# Patient Record
Sex: FEMALE | Race: BLACK OR AFRICAN AMERICAN | Employment: FULL TIME | URBAN - METROPOLITAN AREA
[De-identification: names, ages, dates, MRNs, and addresses within clinical notes are randomized per-mention and may not be internally consistent; named-entity substitution may affect disease eponyms.]

---

## 2017-01-13 ENCOUNTER — ANESTHESIA EVENT (OUTPATIENT)
Dept: ENDOSCOPY | Age: 32
End: 2017-01-13
Payer: MEDICAID

## 2017-01-16 ENCOUNTER — ANESTHESIA (OUTPATIENT)
Dept: ENDOSCOPY | Age: 32
End: 2017-01-16
Payer: MEDICAID

## 2017-01-16 ENCOUNTER — SURGERY (OUTPATIENT)
Age: 32
End: 2017-01-16

## 2017-01-16 ENCOUNTER — HOSPITAL ENCOUNTER (OUTPATIENT)
Age: 32
Setting detail: OUTPATIENT SURGERY
Discharge: HOME | End: 2017-01-16
Attending: INTERNAL MEDICINE | Admitting: INTERNAL MEDICINE
Payer: MEDICAID

## 2017-01-16 VITALS
BODY MASS INDEX: 37.03 KG/M2 | SYSTOLIC BLOOD PRESSURE: 137 MMHG | HEIGHT: 69 IN | DIASTOLIC BLOOD PRESSURE: 101 MMHG | HEART RATE: 70 BPM | OXYGEN SATURATION: 100 % | TEMPERATURE: 98.2 F | RESPIRATION RATE: 16 BRPM | WEIGHT: 250 LBS

## 2017-01-16 LAB
GLUCOSE BLD STRIP.AUTO-MCNC: 250 MG/DL (ref 70–110)
GLUCOSE BLD STRIP.AUTO-MCNC: 261 MG/DL (ref 70–110)
HCG UR QL: NEGATIVE

## 2017-01-16 PROCEDURE — 88342 IMHCHEM/IMCYTCHM 1ST ANTB: CPT | Performed by: INTERNAL MEDICINE

## 2017-01-16 PROCEDURE — 74011636637 HC RX REV CODE- 636/637: Performed by: NURSE ANESTHETIST, CERTIFIED REGISTERED

## 2017-01-16 PROCEDURE — 88305 TISSUE EXAM BY PATHOLOGIST: CPT | Performed by: INTERNAL MEDICINE

## 2017-01-16 PROCEDURE — 82962 GLUCOSE BLOOD TEST: CPT

## 2017-01-16 PROCEDURE — 74011250636 HC RX REV CODE- 250/636

## 2017-01-16 PROCEDURE — 74011000250 HC RX REV CODE- 250

## 2017-01-16 PROCEDURE — 77030009426 HC FCPS BIOP ENDOSC BSC -B: Performed by: INTERNAL MEDICINE

## 2017-01-16 PROCEDURE — 74011250636 HC RX REV CODE- 250/636: Performed by: NURSE ANESTHETIST, CERTIFIED REGISTERED

## 2017-01-16 PROCEDURE — 76040000019: Performed by: INTERNAL MEDICINE

## 2017-01-16 PROCEDURE — 76060000031 HC ANESTHESIA FIRST 0.5 HR: Performed by: INTERNAL MEDICINE

## 2017-01-16 PROCEDURE — 81025 URINE PREGNANCY TEST: CPT

## 2017-01-16 PROCEDURE — 74011000250 HC RX REV CODE- 250: Performed by: NURSE ANESTHETIST, CERTIFIED REGISTERED

## 2017-01-16 RX ORDER — LIDOCAINE HYDROCHLORIDE 20 MG/ML
INJECTION, SOLUTION EPIDURAL; INFILTRATION; INTRACAUDAL; PERINEURAL AS NEEDED
Status: DISCONTINUED | OUTPATIENT
Start: 2017-01-16 | End: 2017-01-16 | Stop reason: HOSPADM

## 2017-01-16 RX ORDER — MIDAZOLAM HYDROCHLORIDE 1 MG/ML
INJECTION, SOLUTION INTRAMUSCULAR; INTRAVENOUS AS NEEDED
Status: DISCONTINUED | OUTPATIENT
Start: 2017-01-16 | End: 2017-01-16 | Stop reason: HOSPADM

## 2017-01-16 RX ORDER — INSULIN LISPRO 100 [IU]/ML
INJECTION, SOLUTION INTRAVENOUS; SUBCUTANEOUS ONCE
Status: COMPLETED | OUTPATIENT
Start: 2017-01-16 | End: 2017-01-16

## 2017-01-16 RX ORDER — DEXTROSE 50 % IN WATER (D50W) INTRAVENOUS SYRINGE
25-50 AS NEEDED
Status: DISCONTINUED | OUTPATIENT
Start: 2017-01-16 | End: 2017-01-16 | Stop reason: HOSPADM

## 2017-01-16 RX ORDER — INSULIN GLARGINE 100 [IU]/ML
INJECTION, SOLUTION SUBCUTANEOUS
COMMUNITY
End: 2018-11-05 | Stop reason: SDUPTHER

## 2017-01-16 RX ORDER — PROPOFOL 10 MG/ML
INJECTION, EMULSION INTRAVENOUS AS NEEDED
Status: DISCONTINUED | OUTPATIENT
Start: 2017-01-16 | End: 2017-01-16 | Stop reason: HOSPADM

## 2017-01-16 RX ORDER — SODIUM CHLORIDE 0.9 % (FLUSH) 0.9 %
5-10 SYRINGE (ML) INJECTION EVERY 8 HOURS
Status: DISCONTINUED | OUTPATIENT
Start: 2017-01-16 | End: 2017-01-16 | Stop reason: HOSPADM

## 2017-01-16 RX ORDER — MIDAZOLAM HYDROCHLORIDE 1 MG/ML
INJECTION, SOLUTION INTRAMUSCULAR; INTRAVENOUS
Status: DISCONTINUED
Start: 2017-01-16 | End: 2017-01-16 | Stop reason: HOSPADM

## 2017-01-16 RX ORDER — METFORMIN HYDROCHLORIDE 1000 MG/1
1000 TABLET ORAL 2 TIMES DAILY WITH MEALS
COMMUNITY

## 2017-01-16 RX ORDER — SODIUM CHLORIDE 0.9 % (FLUSH) 0.9 %
5-10 SYRINGE (ML) INJECTION AS NEEDED
Status: DISCONTINUED | OUTPATIENT
Start: 2017-01-16 | End: 2017-01-16 | Stop reason: HOSPADM

## 2017-01-16 RX ORDER — FAMOTIDINE 10 MG/ML
20 INJECTION INTRAVENOUS ONCE
Status: COMPLETED | OUTPATIENT
Start: 2017-01-16 | End: 2017-01-16

## 2017-01-16 RX ORDER — MAGNESIUM SULFATE 100 %
4 CRYSTALS MISCELLANEOUS AS NEEDED
Status: DISCONTINUED | OUTPATIENT
Start: 2017-01-16 | End: 2017-01-16 | Stop reason: HOSPADM

## 2017-01-16 RX ORDER — SODIUM CHLORIDE, SODIUM LACTATE, POTASSIUM CHLORIDE, CALCIUM CHLORIDE 600; 310; 30; 20 MG/100ML; MG/100ML; MG/100ML; MG/100ML
75 INJECTION, SOLUTION INTRAVENOUS CONTINUOUS
Status: DISCONTINUED | OUTPATIENT
Start: 2017-01-16 | End: 2017-01-16 | Stop reason: HOSPADM

## 2017-01-16 RX ADMIN — INSULIN LISPRO 9 UNITS: 100 INJECTION, SOLUTION INTRAVENOUS; SUBCUTANEOUS at 10:24

## 2017-01-16 RX ADMIN — LIDOCAINE HYDROCHLORIDE 60 MG: 20 INJECTION, SOLUTION EPIDURAL; INFILTRATION; INTRACAUDAL; PERINEURAL at 10:42

## 2017-01-16 RX ADMIN — MIDAZOLAM HYDROCHLORIDE 2 MG: 1 INJECTION, SOLUTION INTRAMUSCULAR; INTRAVENOUS at 10:42

## 2017-01-16 RX ADMIN — PROPOFOL 50 MG: 10 INJECTION, EMULSION INTRAVENOUS at 10:46

## 2017-01-16 RX ADMIN — FAMOTIDINE 20 MG: 10 INJECTION, SOLUTION INTRAVENOUS at 10:19

## 2017-01-16 RX ADMIN — PROPOFOL 50 MG: 10 INJECTION, EMULSION INTRAVENOUS at 10:42

## 2017-01-16 RX ADMIN — PROPOFOL 50 MG: 10 INJECTION, EMULSION INTRAVENOUS at 10:44

## 2017-01-16 NOTE — PROGRESS NOTES
[email or web address]  [facility], [address]      Brief Procedure Note    Danny Barger  1985  [id number]    Date of Procedure: 1/16/2017    Preoperative diagnosis: 789.06 - R10.13,  Epigastric pain  789.02 - R10.12, Left upper quadrant abdominal pain  571.8 - K76.0, Steatosis of liver  564.00 - K59.00, Constipation  278.00 - E66.9,  Obesity    Postoperative diagnosis: mild gastritis    Type of Anesthesia: MAC (Monitored anesthesia care)    Description of findings: same as post op dx    Procedure: Procedure(s):  UPPER ENDOSCOPY w/ biopsies    :  Dr. Judith Sheriff MD    Assistant(s): Endoscopy Technician-1: Kiya Tam  Endoscopy Technician-2: David Morrison  Endoscopy RN-1: Joce Butt RN    EBL:None    Specimens:   ID Type Source Tests Collected by Time Destination   1 : Antrum and body biopsies Preservative Stomach, Antrum  Judith Sheriff MD 1/16/2017 1045 Pathology       Findings: See printed and scanned procedure note    Complications: None    Dr. Judith Sheriff MD  1/16/2017  10:55 AM

## 2017-01-16 NOTE — ANESTHESIA PREPROCEDURE EVALUATION
Anesthetic History   No history of anesthetic complications            Review of Systems / Medical History  Patient summary reviewed and pertinent labs reviewed    Pulmonary          Smoker         Neuro/Psych     seizures         Cardiovascular    Hypertension                   GI/Hepatic/Renal  Within defined limits              Endo/Other    Diabetes  Hypothyroidism  Morbid obesity and arthritis     Other Findings   Comments: Current Smoker? YES       Elective Surgery? Yes       Abstained from smoking 24 hours prior to anesthesia? YES    Risk Factors for Postoperative nausea/vomiting:       History of postoperative nausea/vomiting? NO       Female? YES       Motion sickness? NO       Intended opioid administration for postoperative analgesia?   NO           Physical Exam    Airway  Mallampati: II  TM Distance: 4 - 6 cm  Neck ROM: normal range of motion   Mouth opening: Normal     Cardiovascular  Regular rate and rhythm,  S1 and S2 normal,  no murmur, click, rub, or gallop             Dental  No notable dental hx       Pulmonary  Breath sounds clear to auscultation               Abdominal  Abdominal exam normal       Other Findings            Anesthetic Plan    ASA: 3  Anesthesia type: MAC          Induction: Intravenous  Anesthetic plan and risks discussed with: Patient

## 2017-01-16 NOTE — ANESTHESIA POSTPROCEDURE EVALUATION
Post-Anesthesia Evaluation and Assessment    Patient: Kelly Cowan MRN: [id number]  SSN: [id number]    YOB: 1985  Age: 27 y.o. Sex: female       Cardiovascular Function/Vital Signs  Visit Vitals    BP (!) 162/99    Pulse 81    Temp 36.7 °C (98.1 °F)    Resp 20    Ht 5' 9\" (1.753 m)    Wt 113.4 kg (250 lb)    SpO2 95%    Breastfeeding No    BMI 36.92 kg/m2       Patient is status post MAC anesthesia for Procedure(s):  UPPER ENDOSCOPY w/ biopsies. Nausea/Vomiting: None    Postoperative hydration reviewed and adequate. Pain:  Pain Scale 1: Numeric (0 - 10) (01/16/17 1020)  Pain Intensity 1: 9 (01/16/17 1020)   Managed    Neurological Status: At baseline    Mental Status and Level of Consciousness: Arousable    Pulmonary Status:   O2 Device: Nasal cannula (01/16/17 1048)   Adequate oxygenation and airway patent    Complications related to anesthesia: None    Post-anesthesia assessment completed.  No concerns    Signed By: Mary Jo Knott MD     January 16, 2017

## 2017-01-16 NOTE — DISCHARGE INSTRUCTIONS
Gastritis: Care Instructions  Your Care Instructions    Gastritis is a sore and upset stomach. It happens when something irritates the stomach lining. Many things can cause it. These include an infection such as the flu or something you ate or drank. Medicines or a sore on the lining of the stomach (ulcer) also can cause it. Your belly may bloat and ache. You may belch, vomit, and feel sick to your stomach. You should be able to relieve the problem by taking medicine. And it may help to change your diet. If gastritis lasts, your doctor may prescribe medicine. Follow-up care is a key part of your treatment and safety. Be sure to make and go to all appointments, and call your doctor if you are having problems. It's also a good idea to know your test results and keep a list of the medicines you take. How can you care for yourself at home? · If your doctor prescribed antibiotics, take them as directed. Do not stop taking them just because you feel better. You need to take the full course of antibiotics. · Be safe with medicines. If your doctor prescribed medicine to decrease stomach acid, take it as directed. Call your doctor if you think you are having a problem with your medicine. · Do not take any other medicine, including over-the-counter pain relievers, without talking to your doctor first.  · If your doctor recommends over-the-counter medicine to reduce stomach acid, such as Pepcid AC, Prilosec, Tagamet HB, or Zantac 75, follow the directions on the label. · Drink plenty of fluids (enough so that your urine is light yellow or clear like water) to prevent dehydration. Choose water and other caffeine-free clear liquids. If you have kidney, heart, or liver disease and have to limit fluids, talk with your doctor before you increase the amount of fluids you drink. · Limit how much alcohol you drink. · Avoid coffee, tea, cola drinks, chocolate, and other foods with caffeine.  They increase stomach acid.  When should you call for help? Call 911 anytime you think you may need emergency care. For example, call if:  · You vomit blood or what looks like coffee grounds. · You pass maroon or very bloody stools. Call your doctor now or seek immediate medical care if:  · You start breathing fast and have not produced urine in the last 8 hours. · You cannot keep fluids down. Watch closely for changes in your health, and be sure to contact your doctor if:  · You do not get better as expected. Where can you learn more? Go to http://josh-autumn.info/. Enter [id number] in the search box to learn more about \"Gastritis: Care Instructions. \"  Current as of: August 9, 2016  Content Version: 11.1  © 6931-5191 [employer]. Care instructions adapted under license by [employer] (which disclaims liability or warranty for this information). If you have questions about a medical condition or this instruction, always ask your healthcare professional. [place] [address] any warranty or liability for your use of this information. DISCHARGE SUMMARY from Nurse     POST-PROCEDURE INSTRUCTIONS:    Call your Physician if you:  ? Observe any excess bleeding. ? Develop a temperature over 100.5o F.  ? Experience abdominal, shoulder or chest pain. ? Notice any signs of decreased circulation or nerve impairment to an extremity such as a change in color, persistent numbness, tingling, coldness or increase in pain. ? Vomit blood or you have nausea and vomiting lasting longer than 4 hours. ? Are unable to take medications. ? Are unable to urinate within 8 hours after discharge following general anesthesia or intravenous sedation.     For the next 24 hours after receiving general anesthesia or intravenous sedation, or while taking prescription Narcotics, limit your activities:  ? Do NOT drive a motor vehicle, operate hazard machinery or power tools, or perform tasks that require coordination. The medication you received during your procedure may have some effect on your mental awareness. ? Do NOT make important personal or business decisions. The medication you received during your procedure may have some effect on your mental awareness. ? Do NOT drink alcoholic beverages. These drinks do not mix well with the medications that have been given to you. ? Upon discharge from the hospital, you must be accompanied by a responsible adult. ? Resume your diet as directed by your physician. ? Resume medications as your physician has prescribed. ? Please give a list of your current medications to your Primary Care Provider. ? Please update this list whenever your medications are discontinued, doses are changed, or new medications (including over-the-counter products) are added. ? Please carry medication information at all times in case of emergency situations. These are general instructions for a healthy lifestyle:    No smoking/ No tobacco products/ Avoid exposure to second hand smoke.  Surgeon General's Warning:  Quitting smoking now greatly reduces serious risk to your health. Obesity, smoking, and a sedentary lifestyle greatly increase your risk for illness.  A healthy diet, regular physical exercise & weight monitoring are important for maintaining a healthy lifestyle   You may be retaining fluid if you have a history of heart failure or if you experience any of the following symptoms:  Weight gain of 3 pounds or more overnight or 5 pounds in a week, increased swelling in our hands or feet or shortness of breath while lying flat in bed. Please call your doctor as soon as you notice any of these symptoms; do not wait until your next office visit.     Recognize signs and symptoms of STROKE:  F  -  Face looks uneven  A  -  Arms unable to move or move unevenly  S  -  Speech slurred or non-existent  T  -  Time to call 911 - as soon as signs and symptoms begin - DO NOT go back to bed or wait to see If you get better - TIME IS BRAIN. Colorectal Screening   Colorectal cancer almost always develops from precancerous polyps (abnormal growths) in the colon or rectum. Screening tests can find precancerous polyps, so that they can be removed before they turn into cancer. Screening tests can also find colorectal cancer early, when treatment works best.  [employer] Speak with your physician about when you should begin screening and how often you should be tested. Upper GI Endoscopy: What to Expect at [address] [place] [address]  After you have an endoscopy, you will stay at the hospital or clinic for 1 to 2 hours. This will allow the medicine to wear off. You will be able to go home after your doctor or nurse checks to make sure you are not having any problems. You may have to stay overnight if you had treatment during the test. You may have a sore throat for a day or two after the test.  This care sheet gives you a general idea about what to expect after the test.  How can you care for yourself at home? Activity  · Rest as much as you need to after you go home. · You should be able to go back to your usual activities the day after the test.  Diet  · Follow your doctor's directions for eating after the test.  · Drink plenty of fluids (unless your doctor has told you not to). Medications  · If you have a sore throat the day after the test, use an over-the-counter spray to numb your throat. Follow-up care is a key part of your treatment and safety. Be sure to make and go to all appointments, and call your doctor if you are having problems. It's also a good idea to know your test results and keep a list of the medicines you take. When should you call for help? Call 911 anytime you think you may need emergency care. For example, call if:  · You passed out (lost consciousness). · You cough up blood. · You vomit blood or what looks like coffee grounds.   · You pass maroon or very bloody stools. Call your doctor now or seek immediate medical care if:  · You have trouble swallowing. · You have belly pain. · Your stools are black and tarlike or have streaks of blood. · You are sick to your stomach or cannot keep fluids down. Watch closely for changes in your health, and be sure to contact your doctor if:  · Your throat still hurts after a day or two. · You do not get better as expected. Where can you learn more? Go to [employer]  Enter J454 in the search box to learn more about \"Upper GI Endoscopy: What to Expect at Home. \"   © 8405-3904 [employer]. Care instructions adapted under license by [employer] (which disclaims liability or warranty for this information). This care instruction is for use with your licensed healthcare professional. If you have questions about a medical condition or this instruction, always ask your healthcare professional. [place] [address] any warranty or liability for your use of this information.   Content Version: 94.5.446621; Current as of: November 14, 2014

## 2017-01-16 NOTE — IP AVS SNAPSHOT
[address] [facility] 
 
 
 [address] [id number] [phone] Patient: Fred Castro MRN: [id number] RAT:7/51/3789 You are allergic to the following No active allergies Recent Documentation Height Weight Breastfeeding? BMI OB Status Smoking Status 1.753 m 113.4 kg No 36.92 kg/m2 Hysterectomy Former Smoker Emergency Contacts Name Discharge Info Relation Home Work Mobile [place] CAREGIVER [3] Mother [14] [phone] About your hospitalization You were admitted on:  January 16, 2017 You last received care in the:  [facility] You were discharged on:  January 16, 2017 Unit phone number:  [phone] Why you were hospitalized Your primary diagnosis was:  Not on File Providers Seen During Your Hospitalizations Provider Role Specialty Primary office phone Tomma Dakin, MD Attending Provider Gastroenterology [phone] Your Primary Care Physician (PCP) Primary Care Physician Office Phone Office Fax [phone], [address] [place] [address] [phone] Follow-up Information Follow up With Details Comments Contact Info Tomma Dakin, MD   [address] [place] [address] [address] 200 [facility] 
[phone] Cammy Hodges MD   [address] [place] [address] 
[phone] Current Discharge Medication List  
  
CONTINUE these medications which have NOT CHANGED Dose & Instructions Dispensing Information Comments Morning Noon Evening Bedtime  
 atorvastatin 40 mg tablet Commonly known as:  LIPITOR Your next dose is: Today, Tomorrow Other:  _________ Take  by mouth daily. Refills:  0  
     
   
   
   
  
 fenofibrate nanocrystallized 48 mg tablet Commonly known as:  [employer] Your next dose is: Today, Tomorrow Other:  _________ Dose:  48 mg Take 48 mg by mouth. Refills:  0  
     
   
   
   
  
 furosemide 20 mg tablet Commonly known as:  LASIX Your next dose is: Today, Tomorrow Other:  _________ Take  by mouth daily. Refills:  0  
     
   
   
   
  
 LANTUS 100 unit/mL injection Generic drug:  insulin glargine Your next dose is: Today, Tomorrow Other:  _________  
   
   
 by SubCUTAneous route nightly. Indications: type 2 diabetes mellitus Refills:  0  
     
   
   
   
  
 liothyronine 25 mcg tablet Commonly known as:  CYTOMEL Your next dose is: Today, Tomorrow Other:  _________ Dose:  50 mcg Take 50 mcg by mouth daily. Refills:  0  
     
   
   
   
  
 lisinopril 5 mg tablet Commonly known as:  Joe Shutters Your next dose is: Today, Tomorrow Other:  _________ Dose:  40 mg Take 40 mg by mouth daily. Refills:  0  
     
   
   
   
  
 metFORMIN 1,000 mg tablet Commonly known as:  GLUCOPHAGE Your next dose is: Today, Tomorrow Other:  _________ Dose:  1000 mg Take 1,000 mg by mouth two (2) times daily (with meals). Refills:  0  
     
   
   
   
  
 metoprolol tartrate 25 mg tablet Commonly known as:  LOPRESSOR Your next dose is: Today, Tomorrow Other:  _________ Dose:  25 mg Take 25 mg by mouth two (2) times a day. Refills:  0  
     
   
   
   
  
 topiramate 25 mg tablet Commonly known as:  TOPAMAX Your next dose is: Today, Tomorrow Other:  _________ Dose:  25 mg Take 1 Tab by mouth two (2) times a day. Quantity:  60 Tab Refills:  0 Discharge Instructions Gastritis: Care Instructions Your Care Instructions Gastritis is a sore and upset stomach. It happens when something irritates the stomach lining. Many things can cause it.  These include an infection such as the flu or something you ate or drank. Medicines or a sore on the lining of the stomach (ulcer) also can cause it. Your belly may bloat and ache. You may belch, vomit, and feel sick to your stomach. You should be able to relieve the problem by taking medicine. And it may help to change your diet. If gastritis lasts, your doctor may prescribe medicine. Follow-up care is a key part of your treatment and safety. Be sure to make and go to all appointments, and call your doctor if you are having problems. It's also a good idea to know your test results and keep a list of the medicines you take. How can you care for yourself at home? · If your doctor prescribed antibiotics, take them as directed. Do not stop taking them just because you feel better. You need to take the full course of antibiotics. · Be safe with medicines. If your doctor prescribed medicine to decrease stomach acid, take it as directed. Call your doctor if you think you are having a problem with your medicine. · Do not take any other medicine, including over-the-counter pain relievers, without talking to your doctor first. 
· If your doctor recommends over-the-counter medicine to reduce stomach acid, such as Pepcid AC, Prilosec, Tagamet HB, or Zantac 75, follow the directions on the label. · Drink plenty of fluids (enough so that your urine is light yellow or clear like water) to prevent dehydration. Choose water and other caffeine-free clear liquids. If you have kidney, heart, or liver disease and have to limit fluids, talk with your doctor before you increase the amount of fluids you drink. · Limit how much alcohol you drink. · Avoid coffee, tea, cola drinks, chocolate, and other foods with caffeine. They increase stomach acid. When should you call for help? Call 911 anytime you think you may need emergency care. For example, call if: 
· You vomit blood or what looks like coffee grounds. · You pass maroon or very bloody stools. Call your doctor now or seek immediate medical care if: 
· You start breathing fast and have not produced urine in the last 8 hours. · You cannot keep fluids down. Watch closely for changes in your health, and be sure to contact your doctor if: 
· You do not get better as expected. Where can you learn more? Go to http://josh-autumn.info/. Enter [id number] in the search box to learn more about \"Gastritis: Care Instructions. \" Current as of: August 9, 2016 Content Version: 11.1 © 6917-3167 [employer]. Care instructions adapted under license by [employer] (which disclaims liability or warranty for this information). If you have questions about a medical condition or this instruction, always ask your healthcare professional. [address] any warranty or liability for your use of this information. DISCHARGE SUMMARY from Nurse POST-PROCEDURE INSTRUCTIONS: 
 
Call your Physician if you: 
? Observe any excess bleeding. ? Develop a temperature over 100.5o F. 
? Experience abdominal, shoulder or chest pain. ? Notice any signs of decreased circulation or nerve impairment to an extremity such as a change in color, persistent numbness, tingling, coldness or increase in pain. ? Vomit blood or you have nausea and vomiting lasting longer than 4 hours. ? Are unable to take medications. ? Are unable to urinate within 8 hours after discharge following general anesthesia or intravenous sedation. For the next 24 hours after receiving general anesthesia or intravenous sedation, or while taking prescription Narcotics, limit your activities: 
? Do NOT drive a motor vehicle, operate hazard machinery or power tools, or perform tasks that require coordination. The medication you received during your procedure may have some effect on your mental awareness. ? Do NOT make important personal or business decisions.   The medication you received during your procedure may have some effect on your mental awareness. ? Do NOT drink alcoholic beverages. These drinks do not mix well with the medications that have been given to you. ? Upon discharge from the hospital, you must be accompanied by a responsible adult. ? Resume your diet as directed by your physician. ? Resume medications as your physician has prescribed. ? Please give a list of your current medications to your Primary Care Provider. ? Please update this list whenever your medications are discontinued, doses are changed, or new medications (including over-the-counter products) are added. ? Please carry medication information at all times in case of emergency situations. These are general instructions for a healthy lifestyle: No smoking/ No tobacco products/ Avoid exposure to second hand smoke. ? Surgeon General's Warning:  Quitting smoking now greatly reduces serious risk to your health. Obesity, smoking, and a sedentary lifestyle greatly increase your risk for illness. ? A healthy diet, regular physical exercise & weight monitoring are important for maintaining a healthy lifestyle ? You may be retaining fluid if you have a history of heart failure or if you experience any of the following symptoms:  Weight gain of 3 pounds or more overnight or 5 pounds in a week, increased swelling in our hands or feet or shortness of breath while lying flat in bed. Please call your doctor as soon as you notice any of these symptoms; do not wait until your next office visit. Recognize signs and symptoms of STROKE: 
F  -  Face looks uneven A  -  Arms unable to move or move unevenly S  -  Speech slurred or non-existent T  -  Time to call 911 - as soon as signs and symptoms begin - DO NOT go back to bed or wait to see If you get better - TIME IS BRAIN. Colorectal Screening ?  Colorectal cancer almost always develops from precancerous polyps (abnormal growths) in the colon or rectum. Screening tests can find precancerous polyps, so that they can be removed before they turn into cancer. Screening tests can also find colorectal cancer early, when treatment works best. 
? Speak with your physician about when you should begin screening and how often you should be tested. Upper GI Endoscopy: What to Expect at [facility] Your Recovery After you have an endoscopy, you will stay at the hospital or clinic for 1 to 2 hours. This will allow the medicine to wear off. You will be able to go home after your doctor or nurse checks to make sure you are not having any problems. You may have to stay overnight if you had treatment during the test. You may have a sore throat for a day or two after the test. 
This care sheet gives you a general idea about what to expect after the test. 
How can you care for yourself at home? Activity · Rest as much as you need to after you go home. · You should be able to go back to your usual activities the day after the test. 
Diet · Follow your doctor's directions for eating after the test. 
· Drink plenty of fluids (unless your doctor has told you not to). Medications · If you have a sore throat the day after the test, use an over-the-counter spray to numb your throat. Follow-up care is a key part of your treatment and safety. Be sure to make and go to all appointments, and call your doctor if you are having problems. It's also a good idea to know your test results and keep a list of the medicines you take. When should you call for help? Call 911 anytime you think you may need emergency care. For example, call if: 
· You passed out (lost consciousness). · You cough up blood. · You vomit blood or what looks like coffee grounds. · You pass maroon or very bloody stools. Call your doctor now or seek immediate medical care if: 
· You have trouble swallowing. · You have belly pain. · Your stools are black and tarlike or have streaks of blood. · You are sick to your stomach or cannot keep fluids down. Watch closely for changes in your health, and be sure to contact your doctor if: 
· Your throat still hurts after a day or two. · You do not get better as expected. Where can you learn more? Go to BroadHop.be Enter [phone] in the search box to learn more about \"Upper GI Endoscopy: What to Expect at Home. \"  
© 3934-3400 Healthwise, Incorporated. Care instructions adapted under license by [facility] (which disclaims liability or warranty for this information). This care instruction is for use with your licensed healthcare professional. If you have questions about a medical condition or this instruction, always ask your healthcare professional. [address] any warranty or liability for your use of this information. Content Version: 75.7.629472; Current as of: November 14, 2014 Discharge Orders None Introducing [facility] SERVICES! [facility] introduces [employer] patient portal. Now you can access parts of your medical record, email your doctor's office, and request medication refills online. 1. In your internet browser, go to [email or web address] 2. Click on the First Time User? Click Here link in the Sign In box. You will see the New Member Sign Up page. 3. Enter your [employer]ist[employer] Access Code exactly as it appears below. You will not need to use this code after youve completed the sign-up process. If you do not sign up before the expiration date, you must request a new code. · [employer] Access Code: [email or web address] Expires: 4/13/2017  4:55 PM 
 
4. Enter the last four digits of your Social Security Number (xxxx) and Date of Birth (mm/dd/yyyy) as indicated and click Submit. You will be taken to the next sign-up page. 5. Create a [employer] ID. This will be your [employer] login ID and cannot be changed, so think of one that is secure and easy to remember. 6. Create a [employer] password. You can change your password at any time. 7. Enter your Password Reset Question and Answer. This can be used at a later time if you forget your password. 8. Enter your e-mail address. You will receive e-mail notification when new information is available in [address] 9. Click Sign Up. You can now view and download portions of your medical record. 10. Click the Download Summary menu link to download a portable copy of your medical information. If you have questions, please visit the Frequently Asked Questions section of the [employer] website. Remember, [employer] is NOT to be used for urgent needs. For medical emergencies, dial 911. Now available from your iPhone and Android! General Information Please provide this summary of care documentation to your next provider. Patient Signature:  ____________________________________________________________ Date:  ____________________________________________________________  
  
Greta Yanez Provider Signature:  ____________________________________________________________ Date:  ____________________________________________________________

## 2017-01-16 NOTE — H&P
H&P is updated and reviewed on procedure note, physical exam/medications/allergies were reviewed immediately prior to anesthesia    Clarissa Ordoñez MD  [facility].  [email or web address] [employer]  Phone: [phone]  Pager: [phone]  Cell: [phone]. [email or web address]

## 2017-01-28 ENCOUNTER — HOSPITAL ENCOUNTER (EMERGENCY)
Age: 32
Discharge: HOME | End: 2017-01-28
Attending: EMERGENCY MEDICINE
Payer: MEDICAID

## 2017-01-28 VITALS
OXYGEN SATURATION: 99 % | HEART RATE: 75 BPM | SYSTOLIC BLOOD PRESSURE: 131 MMHG | RESPIRATION RATE: 18 BRPM | DIASTOLIC BLOOD PRESSURE: 82 MMHG | WEIGHT: 250 LBS | HEIGHT: 69 IN | TEMPERATURE: 98.3 F | BODY MASS INDEX: 37.03 KG/M2

## 2017-01-28 DIAGNOSIS — B37.31 YEAST VAGINITIS: Primary | ICD-10-CM

## 2017-01-28 DIAGNOSIS — K29.50 CHRONIC GASTRITIS WITHOUT BLEEDING, UNSPECIFIED GASTRITIS TYPE: ICD-10-CM

## 2017-01-28 DIAGNOSIS — R10.10 UPPER ABDOMINAL PAIN: ICD-10-CM

## 2017-01-28 DIAGNOSIS — R73.9 HYPERGLYCEMIA: ICD-10-CM

## 2017-01-28 LAB
ALBUMIN SERPL BCP-MCNC: 2.8 G/DL (ref 3.4–5)
ALBUMIN/GLOB SERPL: 0.8 {RATIO} (ref 0.8–1.7)
ALP SERPL-CCNC: 77 U/L (ref 45–117)
ALT SERPL-CCNC: 24 U/L (ref 13–56)
ANION GAP BLD CALC-SCNC: 11 MMOL/L (ref 3–18)
APPEARANCE UR: CLEAR
AST SERPL W P-5'-P-CCNC: 13 U/L (ref 15–37)
BACTERIA URNS QL MICRO: ABNORMAL /HPF
BASOPHILS # BLD AUTO: 0 K/UL (ref 0–0.1)
BASOPHILS # BLD: 0 % (ref 0–2)
BILIRUB SERPL-MCNC: 0.3 MG/DL (ref 0.2–1)
BILIRUB UR QL: NEGATIVE
BUN SERPL-MCNC: 9 MG/DL (ref 7–18)
BUN/CREAT SERPL: 11 (ref 12–20)
CALCIUM SERPL-MCNC: 8.2 MG/DL (ref 8.5–10.1)
CHLORIDE SERPL-SCNC: 101 MMOL/L (ref 100–108)
CO2 SERPL-SCNC: 24 MMOL/L (ref 21–32)
COLOR UR: YELLOW
CREAT SERPL-MCNC: 0.81 MG/DL (ref 0.6–1.3)
DIFFERENTIAL METHOD BLD: NORMAL
EOSINOPHIL # BLD: 0.1 K/UL (ref 0–0.4)
EOSINOPHIL NFR BLD: 1 % (ref 0–5)
EPITH CASTS URNS QL MICRO: ABNORMAL /LPF (ref 0–5)
ERYTHROCYTE [DISTWIDTH] IN BLOOD BY AUTOMATED COUNT: 13.5 % (ref 11.6–14.5)
GLOBULIN SER CALC-MCNC: 3.3 G/DL (ref 2–4)
GLUCOSE SERPL-MCNC: 304 MG/DL (ref 74–99)
GLUCOSE UR STRIP.AUTO-MCNC: >1000 MG/DL
HCT VFR BLD AUTO: 36.6 % (ref 35–45)
HGB BLD-MCNC: 12.3 G/DL (ref 12–16)
HGB UR QL STRIP: ABNORMAL
KETONES UR QL STRIP.AUTO: NEGATIVE MG/DL
LEUKOCYTE ESTERASE UR QL STRIP.AUTO: NEGATIVE
LIPASE SERPL-CCNC: 113 U/L (ref 73–393)
LYMPHOCYTES # BLD AUTO: 44 % (ref 21–52)
LYMPHOCYTES # BLD: 3.4 K/UL (ref 0.9–3.6)
MCH RBC QN AUTO: 28.6 PG (ref 24–34)
MCHC RBC AUTO-ENTMCNC: 33.6 G/DL (ref 31–37)
MCV RBC AUTO: 85.1 FL (ref 74–97)
MONOCYTES # BLD: 0.3 K/UL (ref 0.05–1.2)
MONOCYTES NFR BLD AUTO: 4 % (ref 3–10)
NEUTS SEG # BLD: 4 K/UL (ref 1.8–8)
NEUTS SEG NFR BLD AUTO: 51 % (ref 40–73)
NITRITE UR QL STRIP.AUTO: NEGATIVE
PH UR STRIP: 5.5 [PH] (ref 5–8)
PLATELET # BLD AUTO: 263 K/UL (ref 135–420)
PMV BLD AUTO: 10.4 FL (ref 9.2–11.8)
POTASSIUM SERPL-SCNC: 3.5 MMOL/L (ref 3.5–5.5)
PROT SERPL-MCNC: 6.1 G/DL (ref 6.4–8.2)
PROT UR STRIP-MCNC: 300 MG/DL
RBC # BLD AUTO: 4.3 M/UL (ref 4.2–5.3)
RBC #/AREA URNS HPF: ABNORMAL /HPF (ref 0–5)
SERVICE CMNT-IMP: NORMAL
SODIUM SERPL-SCNC: 136 MMOL/L (ref 136–145)
SP GR UR REFRACTOMETRY: >1.03 (ref 1–1.03)
UROBILINOGEN UR QL STRIP.AUTO: 0.2 EU/DL (ref 0.2–1)
WBC # BLD AUTO: 7.9 K/UL (ref 4.6–13.2)
WBC URNS QL MICRO: ABNORMAL /HPF (ref 0–4)
WET PREP GENITAL: NORMAL

## 2017-01-28 PROCEDURE — 74011250636 HC RX REV CODE- 250/636: Performed by: PHYSICIAN ASSISTANT

## 2017-01-28 PROCEDURE — 83690 ASSAY OF LIPASE: CPT | Performed by: PHYSICIAN ASSISTANT

## 2017-01-28 PROCEDURE — 85025 COMPLETE CBC W/AUTO DIFF WBC: CPT | Performed by: PHYSICIAN ASSISTANT

## 2017-01-28 PROCEDURE — 96374 THER/PROPH/DIAG INJ IV PUSH: CPT

## 2017-01-28 PROCEDURE — 81001 URINALYSIS AUTO W/SCOPE: CPT | Performed by: PHYSICIAN ASSISTANT

## 2017-01-28 PROCEDURE — 74011250637 HC RX REV CODE- 250/637: Performed by: PHYSICIAN ASSISTANT

## 2017-01-28 PROCEDURE — 87210 SMEAR WET MOUNT SALINE/INK: CPT | Performed by: PHYSICIAN ASSISTANT

## 2017-01-28 PROCEDURE — 74011000250 HC RX REV CODE- 250: Performed by: PHYSICIAN ASSISTANT

## 2017-01-28 PROCEDURE — 96361 HYDRATE IV INFUSION ADD-ON: CPT

## 2017-01-28 PROCEDURE — 99284 EMERGENCY DEPT VISIT MOD MDM: CPT

## 2017-01-28 PROCEDURE — 87491 CHLMYD TRACH DNA AMP PROBE: CPT | Performed by: PHYSICIAN ASSISTANT

## 2017-01-28 PROCEDURE — 96375 TX/PRO/DX INJ NEW DRUG ADDON: CPT

## 2017-01-28 PROCEDURE — 80053 COMPREHEN METABOLIC PANEL: CPT | Performed by: PHYSICIAN ASSISTANT

## 2017-01-28 RX ORDER — FAMOTIDINE 10 MG/ML
20 INJECTION INTRAVENOUS
Status: COMPLETED | OUTPATIENT
Start: 2017-01-28 | End: 2017-01-28

## 2017-01-28 RX ORDER — SODIUM CHLORIDE 9 MG/ML
1000 INJECTION, SOLUTION INTRAVENOUS ONCE
Status: COMPLETED | OUTPATIENT
Start: 2017-01-28 | End: 2017-01-28

## 2017-01-28 RX ORDER — FLUCONAZOLE 100 MG/1
150 TABLET ORAL
Status: COMPLETED | OUTPATIENT
Start: 2017-01-28 | End: 2017-01-28

## 2017-01-28 RX ORDER — METOCLOPRAMIDE HYDROCHLORIDE 5 MG/ML
10 INJECTION INTRAMUSCULAR; INTRAVENOUS
Status: COMPLETED | OUTPATIENT
Start: 2017-01-28 | End: 2017-01-28

## 2017-01-28 RX ADMIN — SODIUM CHLORIDE 1000 ML: 900 INJECTION, SOLUTION INTRAVENOUS at 16:36

## 2017-01-28 RX ADMIN — METOCLOPRAMIDE 10 MG: 5 INJECTION, SOLUTION INTRAMUSCULAR; INTRAVENOUS at 13:57

## 2017-01-28 RX ADMIN — FAMOTIDINE 20 MG: 10 INJECTION, SOLUTION INTRAVENOUS at 13:57

## 2017-01-28 RX ADMIN — SODIUM CHLORIDE 1000 ML: 900 INJECTION, SOLUTION INTRAVENOUS at 13:57

## 2017-01-28 RX ADMIN — FLUCONAZOLE 150 MG: 100 TABLET ORAL at 16:36

## 2017-01-28 NOTE — ED PROVIDER NOTES
HPI Comments: 27yoF with hx of HTN, DM, seizures, thyroid cancer to ED for evaluation of RUQ, epigastric, and LUQ abd pain x last night. Pt states she ate baked chicken and mashed potatoes last night for dinner prior to pain starting. Hx of same pain, had EGD with biopsies by Dr. Marizol Pinzon earlier this month. The biopsy showed inflammation secondary to chronic gastritis. She was placed on medication for this. Pt also reports vaginal irritation and white discharge x 4 days. Reports dysuria. Denies fever, chills, back pain, pelvic pain or any other associated complaints. Patient is a 27 y.o. female presenting with abdominal pain, female genitourinary complaint, and other event. The history is provided by the patient. Abdominal Pain      Vaginal Pain   Associated symptoms include abdominal pain. Other   Associated symptoms include abdominal pain. Past Medical History:   Diagnosis Date    Arthritis     Chest pain     Diabetes ([address]     Essential hypertension     Headache(784.0)     Hyperchloremia     Hypertension     Seizures (HCC)     SOB (shortness of breath)     Thyroid cancer (HCC)        Past Surgical History:   Procedure Laterality Date    Hx partial hysterectomy      Hx tubal ligation      Hx thyroidectomy           Family History:   Problem Relation Age of Onset    Hypertension Mother        Social History     Social History    Marital status: SINGLE     Spouse name: N/A    Number of children: N/A    Years of education: N/A     Occupational History    Not on file. Social History Main Topics    Smoking status: Former Smoker    Smokeless tobacco: Not on file    Alcohol use No    Drug use: No    Sexual activity: Yes     Partners: Male     Birth control/ protection: Surgical     Other Topics Concern    Not on file     Social History Narrative         ALLERGIES: Review of patient's allergies indicates no known allergies.     Review of Systems   Gastrointestinal: Positive for abdominal pain. Genitourinary: Positive for vaginal discharge. All other systems reviewed and are negative. Vitals:    01/28/17 1245   BP: 131/82   Pulse: 75   Resp: 18   Temp: 98.3 °F (36.8 °C)   SpO2: 99%   Weight: 113.4 kg (250 lb)   Height: 5' 9\" (1.753 m)            Physical Exam   Constitutional: She is oriented to person, place, and time. She appears well-developed and well-nourished. No distress. Morbidly obese   HENT:   Head: Normocephalic and atraumatic. Right Ear: External ear normal.   Left Ear: External ear normal.   Mouth/Throat: Oropharynx is clear and moist.   Eyes: Conjunctivae are normal.   Neck: Normal range of motion. Neck supple. Cardiovascular: Normal rate and regular rhythm. Pulmonary/Chest: Effort normal and breath sounds normal.   Abdominal: Soft. She exhibits no distension. There is tenderness. There is no rebound and no guarding. Musculoskeletal: Normal range of motion. Neurological: She is alert and oriented to person, place, and time. Skin: Skin is warm and dry. No rash noted. She is not diaphoretic. Psychiatric: She has a normal mood and affect. MDM  Number of Diagnoses or Management Options  Diagnosis management comments: DDX: Abdominal pain, Pregnancy, Pancreatitis, Ectopic Pregnancy, Gastritis, Gastroenteritis, Colitis, Diverticulitis, Ovarian Cyst, Ovarian Torsion, Tubo-Ovarian Abscess, PID, PUD, Cholecystitis, Choledocholithiasis, Mesenteric Ischemia, Ectopic Pregnancy Rupture, Pelvic Pain Syndrome, Acute Cystitis, Pyelonephritis, Renal Colic, Biliary Colic, Perforation, Splenic Flexure Syndrome, Abdominal Aortic Aneurysm, Gastrointestinal Bleed. Pt with upper abd pain and vaginal irritation. She is hyperglycemic and dehydrated. Will give 2L NS and treat with diflucan PO x1 for yeast vaginitis. She has good f/u with GI and has been evaluated with EGD. VSS, afebrile, tolerating PO. Pt is stable for out/pt f/u. Pt agrees with plan.  Elton Galeazzi ESTEBAN Castle 4:36 PM           Amount and/or Complexity of Data Reviewed  Clinical lab tests: ordered and reviewed  Review and summarize past medical records: yes    Risk of Complications, Morbidity, and/or Mortality  Presenting problems: moderate  Diagnostic procedures: moderate  Management options: low    Patient Progress  Patient progress: improved    ED Course       Pelvic Exam  Date/Time: 1/28/2017 1:53 PM  Performed by: PA  Procedure duration:  3 minutes. Exam assisted by:  Pat Record. Type of exam performed: bimanual and speculum. External genitalia appearance: normal.    Vaginal exam:  normal.    Cervical exam:  normal.    Specimen(s) collected:  chlamydia, GC and vaginal culture.   Bimanual exam:  normal.    Patient tolerance: Patient tolerated the procedure well with no immediate complications

## 2017-01-28 NOTE — ED TRIAGE NOTES
Pt, c/o  Right side abdominal  Pain for 2 days , also c/o  Vaginal  Irritation /pain with urination  Also 2 days

## 2017-01-28 NOTE — DISCHARGE INSTRUCTIONS
Gastritis: Care Instructions  Your Care Instructions    Gastritis is a sore and upset stomach. It happens when something irritates the stomach lining. Many things can cause it. These include an infection such as the flu or something you ate or drank. Medicines or a sore on the lining of the stomach (ulcer) also can cause it. Your belly may bloat and ache. You may belch, vomit, and feel sick to your stomach. You should be able to relieve the problem by taking medicine. And it may help to change your diet. If gastritis lasts, your doctor may prescribe medicine. Follow-up care is a key part of your treatment and safety. Be sure to make and go to all appointments, and call your doctor if you are having problems. It's also a good idea to know your test results and keep a list of the medicines you take. How can you care for yourself at home? · If your doctor prescribed antibiotics, take them as directed. Do not stop taking them just because you feel better. You need to take the full course of antibiotics. · Be safe with medicines. If your doctor prescribed medicine to decrease stomach acid, take it as directed. Call your doctor if you think you are having a problem with your medicine. · Do not take any other medicine, including over-the-counter pain relievers, without talking to your doctor first.  · If your doctor recommends over-the-counter medicine to reduce stomach acid, such as Pepcid AC, Prilosec, Tagamet HB, or Zantac 75, follow the directions on the label. · Drink plenty of fluids (enough so that your urine is light yellow or clear like water) to prevent dehydration. Choose water and other caffeine-free clear liquids. If you have kidney, heart, or liver disease and have to limit fluids, talk with your doctor before you increase the amount of fluids you drink. · Limit how much alcohol you drink. · Avoid coffee, tea, cola drinks, chocolate, and other foods with caffeine.  They increase stomach acid.  When should you call for help? Call 911 anytime you think you may need emergency care. For example, call if:  · You vomit blood or what looks like coffee grounds. · You pass maroon or very bloody stools. Call your doctor now or seek immediate medical care if:  · You start breathing fast and have not produced urine in the last 8 hours. · You cannot keep fluids down. Watch closely for changes in your health, and be sure to contact your doctor if:  · You do not get better as expected. Where can you learn more? Go to http://josh-autumn.info/. Enter [id number] in the search box to learn more about \"Gastritis: Care Instructions. \"  Current as of: August 9, 2016  Content Version: 11.1  © 5754-9931 [employer]. Care instructions adapted under license by [employer] (which disclaims liability or warranty for this information). If you have questions about a medical condition or this instruction, always ask your healthcare professional. [address] any warranty or liability for your use of this information. Learning About High Blood Sugar  What is high blood sugar? Your body turns the food you eat into glucose (sugar), which it uses for energy. But if your body isn't able to use the sugar right away, it can build up in your blood and lead to high blood sugar. When the amount of sugar in your blood stays too high for too much of the time, you may have diabetes. Diabetes is a disease that can cause serious health problems. The good news is that lifestyle changes may help you get your blood sugar back to normal and avoid or delay diabetes. What causes high blood sugar? Sugar (glucose) can build up in your blood if you:  · Are overweight. · Have a family history of diabetes. · Take certain medicines, such as steroids. What are the symptoms? Having high blood sugar may not cause any symptoms at all.  Or it may make you feel very thirsty or very hungry. You may also urinate more often than usual, have blurry vision, or lose weight without trying. How is high blood sugar treated? You can take steps to lower your blood sugar level if you understand what makes it get higher. Your doctor may want you to learn how to test your blood sugar level at home. Then you can see how illness, stress, or different kinds of food or medicine raise or lower your blood sugar level. Other tests may be needed to see if you have diabetes. How can you prevent high blood sugar? · Watch your weight. If you're overweight, losing just a small amount of weight may help. Reducing fat around your waist is most important. · Limit the amount of calories, sweets, and unhealthy fat you eat. Ask your doctor if a dietitian can help you. A registered dietitian can help you create meal plans that fit your lifestyle. · Get at least 30 minutes of exercise on most days of the week. Exercise helps control your blood sugar. It also helps you maintain a healthy weight. Walking is a good choice. You also may want to do other activities, such as running, swimming, cycling, or playing tennis or team sports. · If your doctor prescribed medicines, take them exactly as prescribed. Call your doctor if you think you are having a problem with your medicine. You will get more details on the specific medicines your doctor prescribes. Follow-up care is a key part of your treatment and safety. Be sure to make and go to all appointments, and call your doctor if you are having problems. It's also a good idea to know your test results and keep a list of the medicines you take. Where can you learn more? Go to http://josh-autumn.info/. Enter O108 in the search box to learn more about \"Learning About High Blood Sugar. \"  Current as of: May 23, 2016  Content Version: 11.1  © 9471-8330 [employer].  Care instructions adapted under license by [employer] (which disclaims liability or warranty for this information). If you have questions about a medical condition or this instruction, always ask your healthcare professional. [address] any warranty or liability for your use of this information.

## 2017-01-28 NOTE — ED NOTES
I performed a brief evaluation, including history and physical, of the patient here in triage and I have determined that pt will need further treatment and evaluation from the fast track side ER physician/PA. I have placed initial orders to help in expediting patients care.      January 28, 2017 at 12:51 PM - April DEVAN Davis PA-C        Visit Vitals    /82 (BP 1 Location: Left arm, BP Patient Position: Sitting)    Pulse 75    Temp 98.3 °F (36.8 °C)    Resp 18    Ht 5' 9\" (1.753 m)    Wt 113.4 kg (250 lb)    SpO2 99%    BMI 36.92 kg/m2

## 2017-01-30 LAB
C TRACH RRNA SPEC QL NAA+PROBE: NEGATIVE
N GONORRHOEA RRNA SPEC QL NAA+PROBE: NEGATIVE
SPECIMEN SOURCE: NORMAL

## 2017-01-31 ENCOUNTER — HOSPITAL ENCOUNTER (OUTPATIENT)
Dept: NUCLEAR MEDICINE | Age: 32
Discharge: HOME | End: 2017-01-31
Attending: INTERNAL MEDICINE
Payer: MEDICAID

## 2017-01-31 DIAGNOSIS — R68.81 EARLY SATIETY: ICD-10-CM

## 2017-01-31 PROCEDURE — 78264 GASTRIC EMPTYING IMG STUDY: CPT

## 2017-03-14 ENCOUNTER — HOSPITAL ENCOUNTER (OUTPATIENT)
Dept: NUCLEAR MEDICINE | Age: 32
Discharge: HOME | End: 2017-03-14
Attending: INTERNAL MEDICINE
Payer: MEDICAID

## 2017-03-14 VITALS — BODY MASS INDEX: 33.97 KG/M2 | WEIGHT: 230 LBS

## 2017-03-14 DIAGNOSIS — R10.11 RIGHT UPPER QUADRANT PAIN: ICD-10-CM

## 2017-03-14 PROCEDURE — 78227 HEPATOBIL SYST IMAGE W/DRUG: CPT

## 2017-03-14 PROCEDURE — 74011250636 HC RX REV CODE- 250/636: Performed by: INTERNAL MEDICINE

## 2017-03-14 RX ORDER — SODIUM CHLORIDE 9 MG/ML
50 INJECTION, SOLUTION INTRAVENOUS
Status: SHIPPED | OUTPATIENT
Start: 2017-03-14 | End: 2017-03-14

## 2017-03-14 RX ADMIN — SINCALIDE 2.1 MCG: 5 INJECTION, POWDER, LYOPHILIZED, FOR SOLUTION INTRAVENOUS at 12:23

## 2017-04-26 ENCOUNTER — HOSPITAL ENCOUNTER (EMERGENCY)
Age: 32
Discharge: HOME | End: 2017-04-26
Attending: EMERGENCY MEDICINE
Payer: MEDICAID

## 2017-04-26 VITALS
OXYGEN SATURATION: 98 % | BODY MASS INDEX: 35.55 KG/M2 | WEIGHT: 240 LBS | DIASTOLIC BLOOD PRESSURE: 67 MMHG | TEMPERATURE: 98.7 F | SYSTOLIC BLOOD PRESSURE: 121 MMHG | RESPIRATION RATE: 18 BRPM | HEART RATE: 79 BPM | HEIGHT: 69 IN

## 2017-04-26 DIAGNOSIS — H92.03 EARACHE SYMPTOMS IN BOTH EARS: Primary | ICD-10-CM

## 2017-04-26 PROCEDURE — 99282 EMERGENCY DEPT VISIT SF MDM: CPT

## 2017-04-26 RX ORDER — IBUPROFEN 600 MG/1
600 TABLET ORAL
Qty: 20 TAB | Refills: 0 | Status: SHIPPED | OUTPATIENT
Start: 2017-04-26 | End: 2018-03-30

## 2017-04-26 RX ORDER — AMOXICILLIN 875 MG/1
875 TABLET, FILM COATED ORAL 2 TIMES DAILY
Qty: 20 TAB | Refills: 0 | Status: SHIPPED | OUTPATIENT
Start: 2017-04-26 | End: 2017-05-06

## 2017-04-26 NOTE — ED PROVIDER NOTES
HPI Comments: 8:52 AM Marjorie Hernandez is a 27 y.o. female who presents to the ED via POV c/o bilateral ear pain that began 2 days ago. Pt reports an associated HA and more pain in her left ear. Pt denies swimming, cough, congestion, runny nose, any further sx, complaints or concerns at this time. Past Medical History:   Diagnosis Date    Arthritis     Chest pain     Diabetes ([address]     Essential hypertension     Headache     Hyperchloremia     Hypertension     Seizures (HCC)     SOB (shortness of breath)     Thyroid cancer (HCC)        Past Surgical History:   Procedure Laterality Date    HX PARTIAL HYSTERECTOMY      HX THYROIDECTOMY      HX TUBAL LIGATION           Family History:   Problem Relation Age of Onset    Hypertension Mother        Social History     Social History    Marital status: SINGLE     Spouse name: N/A    Number of children: N/A    Years of education: N/A     Occupational History    Not on file. Social History Main Topics    Smoking status: Former Smoker    Smokeless tobacco: Not on file    Alcohol use No    Drug use: No    Sexual activity: Yes     Partners: Male     Birth control/ protection: Surgical     Other Topics Concern    Not on file     Social History Narrative         ALLERGIES: Review of patient's allergies indicates no known allergies. Review of Systems   Constitutional: Negative for chills and fever. HENT: Positive for ear pain (bilateral). Negative for congestion, rhinorrhea and sore throat. Respiratory: Negative for cough and shortness of breath. Cardiovascular: Negative for chest pain. Gastrointestinal: Negative for diarrhea and vomiting. Skin: Negative for rash. Neurological: Negative for headaches.        Vitals:    04/26/17 0830   BP: 121/67   Pulse: 79   Resp: 18   Temp: 98.7 °F (37.1 °C)   SpO2: 98%   Weight: 108.9 kg (240 lb)   Height: 5' 9\" (1.753 m)            Physical Exam   Constitutional: She is oriented to person, place, and time. She appears well-developed and well-nourished. No distress. HENT:   Head: Normocephalic and atraumatic. Fluid post to L TMJ, R TM translucent and midposition. Dentition normal. Nose clear. OP normal   Eyes: No scleral icterus. Cardiovascular: Normal rate. Pulmonary/Chest: Effort normal.   Lymphadenopathy:     She has no cervical adenopathy. Neurological: She is alert and oriented to person, place, and time. Skin: Skin is warm and dry. Psychiatric: She has a normal mood and affect. Nursing note and vitals reviewed. MDM  Number of Diagnoses or Management Options  Earache symptoms in both ears:   Diagnosis management comments: IMP: Bilat ear pain. Minimal exam findings. No dental abscess visible as source of referred pain. No nasal congestion. ED Course       Procedures    Vitals:  Patient Vitals for the past 12 hrs:   Temp Pulse Resp BP SpO2   04/26/17 0830 98.7 °F (37.1 °C) 79 18 121/67 98 %   Pulse ox reviewed and WNL. Medications ordered:   Medications - No data to display      Lab findings:  No results found for this or any previous visit (from the past 12 hour(s)). EKG interpretation by ED Physician:        X-Ray, CT or other radiology findings or impressions:  No orders to display         Progress notes, Consult notes or additional Procedure notes:        Disposition:  Diagnosis:   1. Earache symptoms in both ears      1) Amoxicillin  2) Ibuprofen  3) PCP follow up 1 week if not improved. Disposition: home    [facility]  Documented by: Shawanda hamptoning for, and in the presence of, Dianelys Singletary MD 04/26/17 9:24 AM     Signed by: Sampson Montoya, 04/26/17 9:24 AM     PROVIDER ATTESTATION STATEMENT  I personally performed the services described in the documentation, reviewed the documentation, as recorded by the scribe in my presence, and it accurately and completely records my words and actions.   Dianelys Singletary MD

## 2017-04-26 NOTE — ED NOTES
D/C instructions given to mom with 3 prescriptions  Voiced understanding of follow up instructions  Pt ambulated to exit without difficulty   No distress noted.  No complaints voiced

## 2017-04-26 NOTE — DISCHARGE INSTRUCTIONS
Earache: Care Instructions  Your Care Instructions  Even though infection is a common cause of ear pain, not all ear pain means an infection. If you have ear pain and don't have an infection, it could be because of a jaw problem, such as temporomandibular joint (TMJ) pain. Or it could be because of a neck problem. When ear discomfort or pain is mild or comes and goes without other symptoms, home treatment may be all you need. Follow-up care is a key part of your treatment and safety. Be sure to make and go to all appointments, and call your doctor if you are having problems. It's also a good idea to know your test results and keep a list of the medicines you take. How can you care for yourself at home? · Apply heat on the ear to ease pain. To apply heat, put a warm water bottle, a heating pad set on low, or a warm cloth on your ear. Do not go to sleep with a heating pad on your skin. · Take an over-the-counter pain medicine, such as acetaminophen (Tylenol), ibuprofen (Advil, Motrin), or naproxen (Aleve). Be safe with medicines. Read and follow all instructions on the label. · Do not take two or more pain medicines at the same time unless the doctor told you to. Many pain medicines have acetaminophen, which is Tylenol. Too much acetaminophen (Tylenol) can be harmful. · Never insert anything, such as a cotton swab or a curt pin, into the ear. When should you call for help? Call your doctor now or seek immediate medical care if:  · You have a fever. · You feel a lump in your neck or jaw. · The area around the ear starts to swell. · There is pus or blood draining from the ear. · There is new or different drainage from the ear. Watch closely for changes in your health, and be sure to contact your doctor if:  · Your pain gets worse. · You do not get better as expected. Where can you learn more? Go to http://josh-autumn.info/.   Enter U017 in the search box to learn more about \"Earache: Care Instructions. \"  Current as of: July 29, 2016  Content Version: 11.2  © 1794-5732 [employer]. Care instructions adapted under license by [employer] (which disclaims liability or warranty for this information). If you have questions about a medical condition or this instruction, always ask your healthcare professional. [place] [address] any warranty or liability for your use of this information.

## 2017-09-24 ENCOUNTER — HOSPITAL ENCOUNTER (EMERGENCY)
Age: 32
Discharge: HOME | End: 2017-09-24
Attending: EMERGENCY MEDICINE
Payer: MEDICAID

## 2017-09-24 ENCOUNTER — APPOINTMENT (OUTPATIENT)
Dept: GENERAL RADIOLOGY | Age: 32
End: 2017-09-24
Attending: PHYSICIAN ASSISTANT
Payer: MEDICAID

## 2017-09-24 VITALS
HEART RATE: 88 BPM | SYSTOLIC BLOOD PRESSURE: 147 MMHG | DIASTOLIC BLOOD PRESSURE: 91 MMHG | TEMPERATURE: 97.4 F | RESPIRATION RATE: 16 BRPM | OXYGEN SATURATION: 99 %

## 2017-09-24 DIAGNOSIS — R07.89 CHEST WALL PAIN: ICD-10-CM

## 2017-09-24 DIAGNOSIS — A59.01 TRICHOMONAS VAGINALIS (TV) INFECTION: Primary | ICD-10-CM

## 2017-09-24 LAB
ANION GAP SERPL CALC-SCNC: 8 MMOL/L (ref 3–18)
APPEARANCE UR: CLEAR
BACTERIA URNS QL MICRO: ABNORMAL /HPF
BASOPHILS # BLD: 0 K/UL (ref 0–0.06)
BASOPHILS NFR BLD: 0 % (ref 0–2)
BILIRUB UR QL: NEGATIVE
BUN SERPL-MCNC: 9 MG/DL (ref 7–18)
BUN/CREAT SERPL: 10 (ref 12–20)
CALCIUM SERPL-MCNC: 9.2 MG/DL (ref 8.5–10.1)
CHLORIDE SERPL-SCNC: 100 MMOL/L (ref 100–108)
CK MB CFR SERPL CALC: 1.2 % (ref 0–4)
CK MB SERPL-MCNC: 6.4 NG/ML (ref 5–25)
CK SERPL-CCNC: 517 U/L (ref 26–192)
CO2 SERPL-SCNC: 24 MMOL/L (ref 21–32)
COLOR UR: YELLOW
CREAT SERPL-MCNC: 0.89 MG/DL (ref 0.6–1.3)
DIFFERENTIAL METHOD BLD: ABNORMAL
EOSINOPHIL # BLD: 0.1 K/UL (ref 0–0.4)
EOSINOPHIL NFR BLD: 1 % (ref 0–5)
EPITH CASTS URNS QL MICRO: ABNORMAL /LPF (ref 0–5)
ERYTHROCYTE [DISTWIDTH] IN BLOOD BY AUTOMATED COUNT: 13.7 % (ref 11.6–14.5)
GLUCOSE SERPL-MCNC: 192 MG/DL (ref 74–99)
GLUCOSE UR STRIP.AUTO-MCNC: >1000 MG/DL
HCG UR QL: NEGATIVE
HCT VFR BLD AUTO: 44.7 % (ref 35–45)
HGB BLD-MCNC: 15.1 G/DL (ref 12–16)
HGB UR QL STRIP: ABNORMAL
HYALINE CASTS URNS QL MICRO: ABNORMAL /LPF (ref 0–2)
KETONES UR QL STRIP.AUTO: NEGATIVE MG/DL
LEUKOCYTE ESTERASE UR QL STRIP.AUTO: NEGATIVE
LYMPHOCYTES # BLD: 3.5 K/UL (ref 0.9–3.6)
LYMPHOCYTES NFR BLD: 43 % (ref 21–52)
MCH RBC QN AUTO: 28.2 PG (ref 24–34)
MCHC RBC AUTO-ENTMCNC: 33.8 G/DL (ref 31–37)
MCV RBC AUTO: 83.4 FL (ref 74–97)
MONOCYTES # BLD: 0.5 K/UL (ref 0.05–1.2)
MONOCYTES NFR BLD: 6 % (ref 3–10)
MUCOUS THREADS URNS QL MICRO: ABNORMAL /LPF
NEUTS SEG # BLD: 4 K/UL (ref 1.8–8)
NEUTS SEG NFR BLD: 50 % (ref 40–73)
NITRITE UR QL STRIP.AUTO: NEGATIVE
PH UR STRIP: 6 [PH] (ref 5–8)
PLATELET # BLD AUTO: 288 K/UL (ref 135–420)
PMV BLD AUTO: 10.4 FL (ref 9.2–11.8)
POTASSIUM SERPL-SCNC: 3.6 MMOL/L (ref 3.5–5.5)
PROT UR STRIP-MCNC: >1000 MG/DL
RBC # BLD AUTO: 5.36 M/UL (ref 4.2–5.3)
RBC #/AREA URNS HPF: ABNORMAL /HPF (ref 0–5)
SERVICE CMNT-IMP: NORMAL
SODIUM SERPL-SCNC: 132 MMOL/L (ref 136–145)
SP GR UR REFRACTOMETRY: >1.03 (ref 1–1.03)
TROPONIN I SERPL-MCNC: <0.02 NG/ML (ref 0–0.04)
UROBILINOGEN UR QL STRIP.AUTO: 0.2 EU/DL (ref 0.2–1)
WBC # BLD AUTO: 8.1 K/UL (ref 4.6–13.2)
WBC URNS QL MICRO: ABNORMAL /HPF (ref 0–4)
WET PREP GENITAL: NORMAL

## 2017-09-24 PROCEDURE — 93005 ELECTROCARDIOGRAM TRACING: CPT

## 2017-09-24 PROCEDURE — 96360 HYDRATION IV INFUSION INIT: CPT

## 2017-09-24 PROCEDURE — 80048 BASIC METABOLIC PNL TOTAL CA: CPT | Performed by: PHYSICIAN ASSISTANT

## 2017-09-24 PROCEDURE — 81025 URINE PREGNANCY TEST: CPT | Performed by: PHYSICIAN ASSISTANT

## 2017-09-24 PROCEDURE — 87491 CHLMYD TRACH DNA AMP PROBE: CPT | Performed by: PHYSICIAN ASSISTANT

## 2017-09-24 PROCEDURE — 74011250637 HC RX REV CODE- 250/637: Performed by: PHYSICIAN ASSISTANT

## 2017-09-24 PROCEDURE — 99284 EMERGENCY DEPT VISIT MOD MDM: CPT

## 2017-09-24 PROCEDURE — 82550 ASSAY OF CK (CPK): CPT | Performed by: PHYSICIAN ASSISTANT

## 2017-09-24 PROCEDURE — 96372 THER/PROPH/DIAG INJ SC/IM: CPT

## 2017-09-24 PROCEDURE — 81001 URINALYSIS AUTO W/SCOPE: CPT | Performed by: PHYSICIAN ASSISTANT

## 2017-09-24 PROCEDURE — 74011250636 HC RX REV CODE- 250/636: Performed by: PHYSICIAN ASSISTANT

## 2017-09-24 PROCEDURE — 85025 COMPLETE CBC W/AUTO DIFF WBC: CPT | Performed by: PHYSICIAN ASSISTANT

## 2017-09-24 PROCEDURE — 71020 XR CHEST PA LAT: CPT

## 2017-09-24 PROCEDURE — 87210 SMEAR WET MOUNT SALINE/INK: CPT | Performed by: PHYSICIAN ASSISTANT

## 2017-09-24 RX ORDER — AZITHROMYCIN 250 MG/1
1000 TABLET, FILM COATED ORAL
Status: COMPLETED | OUTPATIENT
Start: 2017-09-24 | End: 2017-09-24

## 2017-09-24 RX ORDER — METRONIDAZOLE 500 MG/1
2000 TABLET ORAL
Status: COMPLETED | OUTPATIENT
Start: 2017-09-24 | End: 2017-09-24

## 2017-09-24 RX ORDER — CEFTRIAXONE 250 MG/8ML
250 INJECTION, POWDER, FOR SOLUTION INTRAMUSCULAR; INTRAVENOUS
Status: COMPLETED | OUTPATIENT
Start: 2017-09-24 | End: 2017-09-24

## 2017-09-24 RX ADMIN — AZITHROMYCIN 1000 MG: 250 TABLET, FILM COATED ORAL at 16:19

## 2017-09-24 RX ADMIN — CEFTRIAXONE SODIUM 250 MG: 250 INJECTION, POWDER, FOR SOLUTION INTRAMUSCULAR; INTRAVENOUS at 16:18

## 2017-09-24 RX ADMIN — SODIUM CHLORIDE 1000 ML: 900 INJECTION, SOLUTION INTRAVENOUS at 14:53

## 2017-09-24 RX ADMIN — METRONIDAZOLE 2000 MG: 500 TABLET ORAL at 16:19

## 2017-09-24 NOTE — ED PROVIDER NOTES
HPI Comments: 35 yo F c/o CP x 2 days. CP located on left side, wraps around from her back to under her breast. Has been constant from onset. Worse with palpation or movement. Has not taken anything at home for pain. Also c/o vaginal discharge and itching x 2 days. Has not tried anything at home for sx. No concern for STD. Denies fever, chills, SOB, n/v/d, recent travel, recent surgery, OCP use . No other complaints. Past Medical History:   Diagnosis Date    Arthritis     Chest pain     Diabetes ([address]     Essential hypertension     Headache     Hyperchloremia     Hypertension     Seizures (HCC)     SOB (shortness of breath)     Thyroid cancer (HCC)        Past Surgical History:   Procedure Laterality Date    HX PARTIAL HYSTERECTOMY      HX THYROIDECTOMY      HX TUBAL LIGATION           Family History:   Problem Relation Age of Onset    Hypertension Mother        Social History     Social History    Marital status: SINGLE     Spouse name: N/A    Number of children: N/A    Years of education: N/A     Occupational History    Not on file. Social History Main Topics    Smoking status: Former Smoker    Smokeless tobacco: Not on file    Alcohol use No    Drug use: No    Sexual activity: Yes     Partners: Male     Birth control/ protection: Surgical     Other Topics Concern    Not on file     Social History Narrative         ALLERGIES: Review of patient's allergies indicates no known allergies. Review of Systems   Constitutional: Negative for chills and fever. Respiratory: Negative for cough, shortness of breath and wheezing. Cardiovascular: Positive for chest pain. Negative for palpitations. Gastrointestinal: Negative for abdominal pain. Genitourinary: Positive for vaginal discharge. Negative for dysuria and frequency. Skin: Negative for rash. All other systems reviewed and are negative.       Vitals:    09/24/17 1243 09/24/17 1322 09/24/17 1338   BP: 130/80  (!) 147/91 Pulse: 60  88   Resp: 16  16   Temp: 98.1 °F (36.7 °C)  97.4 °F (36.3 °C)   SpO2: 99% 99% 99%            Physical Exam   Constitutional: She is oriented to person, place, and time. She appears well-developed and well-nourished. No distress. HENT:   Head: Normocephalic and atraumatic. Eyes: Conjunctivae are normal.   Neck: Normal range of motion. Neck supple. Cardiovascular: Normal rate, regular rhythm and normal heart sounds. Pulmonary/Chest: Effort normal and breath sounds normal. No respiratory distress. She has no wheezes. She has no rales. Abdominal: Soft. She exhibits no distension. There is no tenderness. There is no rebound and no guarding. Genitourinary: Uterus normal. There is no tenderness or lesion on the right labia. There is no tenderness or lesion on the left labia. Cervix exhibits no motion tenderness. Right adnexum displays no tenderness and no fullness. Left adnexum displays no tenderness and no fullness. No tenderness or bleeding in the vagina. Vaginal discharge (yellow) found. Musculoskeletal: Normal range of motion. Neurological: She is alert and oriented to person, place, and time. Skin: Skin is warm and dry. Psychiatric: She has a normal mood and affect. Her behavior is normal. Judgment and thought content normal.   Nursing note and vitals reviewed.        MDM  Number of Diagnoses or Management Options  Chest wall pain:   Trichomonas vaginalis (TV) infection:     ED Course       Procedures    -------------------------------------------------------------------------------------------------------------------     EKG INTERPRETATIONS:      RADIOLOGY RESULTS:   XR CHEST PA LAT   Final Result          LABORATORY RESULTS:  Recent Results (from the past 12 hour(s))   EKG, 12 LEAD, INITIAL    Collection Time: 09/24/17  1:14 PM   Result Value Ref Range    Ventricular Rate 93 BPM    Atrial Rate 93 BPM    P-R Interval 128 ms    QRS Duration 74 ms    Q-T Interval 320 ms    QTC Calculation (Bezet) 397 ms    Calculated P Axis 67 degrees    Calculated R Axis 23 degrees    Calculated T Axis 91 degrees    Diagnosis       Normal sinus rhythm  Possible Left atrial enlargement  Nonspecific ST and T wave abnormality  Abnormal ECG  When compared with ECG of 20-SEP-2015 07:41,  ST now depressed in Anterior leads     URINALYSIS W/ RFLX MICROSCOPIC    Collection Time: 09/24/17  1:20 PM   Result Value Ref Range    Color YELLOW      Appearance CLEAR      Specific gravity >1.030 (H) 1.005 - 1.030    pH (UA) 6.0 5.0 - 8.0      Protein >1000 (A) NEG mg/dL    Glucose >1000 (A) NEG mg/dL    Ketone NEGATIVE  NEG mg/dL    Bilirubin NEGATIVE  NEG      Blood SMALL (A) NEG      Urobilinogen 0.2 0.2 - 1.0 EU/dL    Nitrites NEGATIVE  NEG      Leukocyte Esterase NEGATIVE  NEG     CBC WITH AUTOMATED DIFF    Collection Time: 09/24/17  1:20 PM   Result Value Ref Range    WBC 8.1 4.6 - 13.2 K/uL    RBC 5.36 (H) 4.20 - 5.30 M/uL    HGB 15.1 12.0 - 16.0 g/dL    HCT 44.7 35.0 - 45.0 %    MCV 83.4 74.0 - 97.0 FL    MCH 28.2 24.0 - 34.0 PG    MCHC 33.8 31.0 - 37.0 g/dL    RDW 13.7 11.6 - 14.5 %    PLATELET 292 841 - 678 K/uL    MPV 10.4 9.2 - 11.8 FL    NEUTROPHILS 50 40 - 73 %    LYMPHOCYTES 43 21 - 52 %    MONOCYTES 6 3 - 10 %    EOSINOPHILS 1 0 - 5 %    BASOPHILS 0 0 - 2 %    ABS. NEUTROPHILS 4.0 1.8 - 8.0 K/UL    ABS. LYMPHOCYTES 3.5 0.9 - 3.6 K/UL    ABS. MONOCYTES 0.5 0.05 - 1.2 K/UL    ABS. EOSINOPHILS 0.1 0.0 - 0.4 K/UL    ABS.  BASOPHILS 0.0 0.0 - 0.06 K/UL    DF AUTOMATED     METABOLIC PANEL, BASIC    Collection Time: 09/24/17  1:20 PM   Result Value Ref Range    Sodium 132 (L) 136 - 145 mmol/L    Potassium 3.6 3.5 - 5.5 mmol/L    Chloride 100 100 - 108 mmol/L    CO2 24 21 - 32 mmol/L    Anion gap 8 3.0 - 18 mmol/L    Glucose 192 (H) 74 - 99 mg/dL    BUN 9 7.0 - 18 MG/DL    Creatinine 0.89 0.6 - 1.3 MG/DL    BUN/Creatinine ratio 10 (L) 12 - 20      GFR est AA >60 >60 ml/min/1.73m2    GFR est non-AA >60 >60 ml/min/1.73m2    Calcium 9.2 8.5 - 10.1 MG/DL   CARDIAC PANEL,(CK, CKMB & TROPONIN)    Collection Time: 09/24/17  1:20 PM   Result Value Ref Range     (H) 26 - 192 U/L    CK - MB 6.4 (H) <3.6 ng/ml    CK-MB Index 1.2 0.0 - 4.0 %    Troponin-I, Qt. <0.02 0.0 - 0.045 NG/ML   URINE MICROSCOPIC ONLY    Collection Time: 09/24/17  1:20 PM   Result Value Ref Range    WBC 1 to 4 0 - 4 /hpf    RBC 0 to 3 0 - 5 /hpf    Epithelial cells 2+ 0 - 5 /lpf    Bacteria 1+ (A) NEG /hpf    Mucus 1+ (A) NEG /lpf    Hyaline cast 0 to 3 0 - 2 /lpf   HCG URINE, QL    Collection Time: 09/24/17  1:20 PM   Result Value Ref Range    HCG urine, Ql. NEGATIVE  NEG     WET PREP    Collection Time: 09/24/17  3:45 PM   Result Value Ref Range    Special Requests: NO SPECIAL REQUESTS      Wet prep FEW  MOTILE TRICHOMONAS NOTED        Wet prep CLUE CELLS ABSENT      Wet prep NO YEAST SEEN             CONSULTATIONS:        PROGRESS NOTES:    4:16 PM Pt well appearing and in NAD. EKG shows NSR, no ST changes, normal axis. CP reproducible with palpation. PERC negative. Low concern for ACS. Trich noted on wet prep. Otherwise labs unremarkable. Would like empiric STD treatment. PCP f/u for further eval.      Lengthy D/W pt regarding possible worsening of pt's condition, need for follow up and strict ED return instructions for any worsening symptoms. DISPOSITION:  ED DIAGNOSIS & DISPOSITION INFORMATION  Diagnosis:   1. Trichomonas vaginalis (TV) infection    2.  Chest wall pain          Disposition: home    Follow-up Information     Follow up With Details Comments [address], [place] Call on 9/25/2017 To make a follow up appointment [address] [address] [phone]      [facility]  Immediately if symptoms worsen [address] [address]  [phone]          Patient's Medications   Start Taking    No medications on file   Continue Taking    ATORVASTATIN (LIPITOR) 40 MG TABLET    Take  by mouth daily. FENOFIBRATE NANOCRYSTALLIZED (TRICOR) 48 MG TABLET    Take 48 mg by mouth. FUROSEMIDE (LASIX) 20 MG TABLET    Take  by mouth daily. IBUPROFEN (MOTRIN) 600 MG TABLET    Take 1 Tab by mouth every six (6) hours as needed for Pain. INSULIN GLARGINE (LANTUS) 100 UNIT/ML INJECTION    by SubCUTAneous route nightly. Indications: type 2 diabetes mellitus    LIOTHYRONINE (CYTOMEL) 25 MCG TABLET    Take 50 mcg by mouth daily. LISINOPRIL (PRINIVIL, ZESTRIL) 5 MG TABLET    Take 40 mg by mouth daily. METFORMIN (GLUCOPHAGE) 1,000 MG TABLET    Take 1,000 mg by mouth two (2) times daily (with meals). METOPROLOL TARTRATE (LOPRESSOR) 25 MG TABLET    Take 25 mg by mouth two (2) times a day. TOPIRAMATE (TOPAMAX) 25 MG TABLET    Take 1 Tab by mouth two (2) times a day.    These Medications have changed    No medications on file   Stop Taking    No medications on file

## 2017-09-24 NOTE — DISCHARGE INSTRUCTIONS
Trichomoniasis: Care Instructions  Your Care Instructions  Trichomoniasis is a sexually transmitted infection (STI) that is spread by having sex with an infected partner. Trichomoniasis is commonly called trich (say \"trick\"). In women, trich may cause vaginal itching and a smelly discharge. But in many cases, especially in men, there are no symptoms. Stu Coulter is treated so that you do not spread it to others. Both you and your sex partner or partners should be treated at the same time so you do not infect each other again. Trich may cause problems with pregnancy. Your doctor will talk with you about treatment for Trich if you are pregnant. Follow-up care is a key part of your treatment and safety. Be sure to make and go to all appointments, and call your doctor if you are having problems. Its also a good idea to know your test results and keep a list of the medicines you take. How can you care for yourself at home? · Take your antibiotics as directed. Do not stop taking them just because you feel better. You need to take the full course of antibiotics. · Do not have sex while you are being treated. If your doctor gave you a single dose of antibiotics, do not have sex for one week after being treated and until your partner also has been treated. · Tell your sex partner (or partners) that he or she will also need to be tested and treated. · Use a cold water compress or cool baths to relieve itching. To prevent trichomoniasis in the future  · Use latex condoms every time you have sex. Use them from the beginning to the end of sexual contact. · Talk to your partner before having sex. Find out if he or she has or is at risk for trich or any other STI. Keep in mind that a person may be able to spread an STI even if he or she does not have symptoms. · Do not have sex if you are being treated for trich or any other STI.   · Do not have sex with anyone who has symptoms of an STI, such as sores on the genitals or mouth.  · Having one sex partner (who does not have STIs and does not have sex with anyone else) is a good way to avoid STIs. When should you call for help? Call your doctor now or seek immediate medical care if:  · You have unusual vaginal bleeding. · You have a fever. · You have new discharge from the vagina or penis. · You have pelvic pain. Watch closely for changes in your health, and be sure to contact your doctor if:  · You do not get better as expected. · You have any new symptoms or your symptoms get worse. Where can you learn more? Go to http://josh-autumn.info/. Enter B581 in the search box to learn more about \"Trichomoniasis: Care Instructions. \"  Current as of: March 20, 2017  Content Version: 11.3  © 7719-9201 [employer]. Care instructions adapted under license by [employer] (which disclaims liability or warranty for this information). If you have questions about a medical condition or this instruction, always ask your healthcare professional. [address] any warranty or liability for your use of this information.

## 2017-09-24 NOTE — ED TRIAGE NOTES
Patient arrived from home c/o left arm pain and vaginal itching. Patient states she has 0/10 pain at the moment. Patient allergies up to date.

## 2017-09-24 NOTE — Clinical Note
Please follow up with your primary doctor for further evaluation and further STD testing. Return to the ED for any concerns.

## 2017-09-25 LAB
ATRIAL RATE: 93 BPM
C TRACH RRNA SPEC QL NAA+PROBE: NEGATIVE
CALCULATED P AXIS, ECG09: 67 DEGREES
CALCULATED R AXIS, ECG10: 23 DEGREES
CALCULATED T AXIS, ECG11: 91 DEGREES
DIAGNOSIS, 93000: NORMAL
N GONORRHOEA RRNA SPEC QL NAA+PROBE: NEGATIVE
P-R INTERVAL, ECG05: 128 MS
Q-T INTERVAL, ECG07: 320 MS
QRS DURATION, ECG06: 74 MS
QTC CALCULATION (BEZET), ECG08: 397 MS
SPECIMEN SOURCE: NORMAL
VENTRICULAR RATE, ECG03: 93 BPM

## 2017-10-19 ENCOUNTER — HOSPITAL ENCOUNTER (EMERGENCY)
Age: 32
Discharge: HOME | End: 2017-10-19
Attending: EMERGENCY MEDICINE
Payer: MEDICAID

## 2017-10-19 VITALS
RESPIRATION RATE: 20 BRPM | BODY MASS INDEX: 37.03 KG/M2 | TEMPERATURE: 97.9 F | HEART RATE: 89 BPM | DIASTOLIC BLOOD PRESSURE: 72 MMHG | WEIGHT: 250 LBS | OXYGEN SATURATION: 98 % | HEIGHT: 69 IN | SYSTOLIC BLOOD PRESSURE: 108 MMHG

## 2017-10-19 DIAGNOSIS — R73.9 HYPERGLYCEMIA: ICD-10-CM

## 2017-10-19 DIAGNOSIS — B96.89 BV (BACTERIAL VAGINOSIS): ICD-10-CM

## 2017-10-19 DIAGNOSIS — B37.31 YEAST VAGINITIS: Primary | ICD-10-CM

## 2017-10-19 DIAGNOSIS — N76.0 BV (BACTERIAL VAGINOSIS): ICD-10-CM

## 2017-10-19 LAB
ALBUMIN SERPL-MCNC: 2.8 G/DL (ref 3.4–5)
ALBUMIN/GLOB SERPL: 0.6 {RATIO} (ref 0.8–1.7)
ALP SERPL-CCNC: 97 U/L (ref 45–117)
ALT SERPL-CCNC: 31 U/L (ref 13–56)
ANION GAP SERPL CALC-SCNC: 8 MMOL/L (ref 3–18)
APPEARANCE UR: CLEAR
AST SERPL-CCNC: 16 U/L (ref 15–37)
BACTERIA URNS QL MICRO: ABNORMAL /HPF
BASOPHILS # BLD: 0 K/UL (ref 0–0.1)
BASOPHILS NFR BLD: 0 % (ref 0–2)
BILIRUB SERPL-MCNC: 0.3 MG/DL (ref 0.2–1)
BILIRUB UR QL: NEGATIVE
BUN SERPL-MCNC: 14 MG/DL (ref 7–18)
BUN/CREAT SERPL: 17 (ref 12–20)
CALCIUM SERPL-MCNC: 9.4 MG/DL (ref 8.5–10.1)
CHLORIDE SERPL-SCNC: 98 MMOL/L (ref 100–108)
CO2 SERPL-SCNC: 26 MMOL/L (ref 21–32)
COLOR UR: YELLOW
CREAT SERPL-MCNC: 0.81 MG/DL (ref 0.6–1.3)
DIFFERENTIAL METHOD BLD: NORMAL
EOSINOPHIL # BLD: 0.1 K/UL (ref 0–0.4)
EOSINOPHIL NFR BLD: 1 % (ref 0–5)
EPITH CASTS URNS QL MICRO: ABNORMAL /LPF (ref 0–5)
ERYTHROCYTE [DISTWIDTH] IN BLOOD BY AUTOMATED COUNT: 13.3 % (ref 11.6–14.5)
GLOBULIN SER CALC-MCNC: 4.9 G/DL (ref 2–4)
GLUCOSE SERPL-MCNC: 286 MG/DL (ref 74–99)
GLUCOSE UR STRIP.AUTO-MCNC: >1000 MG/DL
HCG UR QL: NEGATIVE
HCT VFR BLD AUTO: 40.7 % (ref 35–45)
HGB BLD-MCNC: 13.7 G/DL (ref 12–16)
HGB UR QL STRIP: ABNORMAL
KETONES UR QL STRIP.AUTO: NEGATIVE MG/DL
LEUKOCYTE ESTERASE UR QL STRIP.AUTO: NEGATIVE
LIPASE SERPL-CCNC: 125 U/L (ref 73–393)
LYMPHOCYTES # BLD: 3.3 K/UL (ref 0.9–3.6)
LYMPHOCYTES NFR BLD: 38 % (ref 21–52)
MCH RBC QN AUTO: 28.3 PG (ref 24–34)
MCHC RBC AUTO-ENTMCNC: 33.7 G/DL (ref 31–37)
MCV RBC AUTO: 84.1 FL (ref 74–97)
MONOCYTES # BLD: 0.5 K/UL (ref 0.05–1.2)
MONOCYTES NFR BLD: 6 % (ref 3–10)
NEUTS SEG # BLD: 4.8 K/UL (ref 1.8–8)
NEUTS SEG NFR BLD: 55 % (ref 40–73)
NITRITE UR QL STRIP.AUTO: NEGATIVE
PH UR STRIP: 5 [PH] (ref 5–8)
PLATELET # BLD AUTO: 409 K/UL (ref 135–420)
PMV BLD AUTO: 10.3 FL (ref 9.2–11.8)
POTASSIUM SERPL-SCNC: 4.2 MMOL/L (ref 3.5–5.5)
PROT SERPL-MCNC: 7.7 G/DL (ref 6.4–8.2)
PROT UR STRIP-MCNC: 300 MG/DL
RBC # BLD AUTO: 4.84 M/UL (ref 4.2–5.3)
RBC #/AREA URNS HPF: ABNORMAL /HPF (ref 0–5)
SERVICE CMNT-IMP: NORMAL
SODIUM SERPL-SCNC: 132 MMOL/L (ref 136–145)
SP GR UR REFRACTOMETRY: 1.03 (ref 1–1.03)
UROBILINOGEN UR QL STRIP.AUTO: 0.2 EU/DL (ref 0.2–1)
WBC # BLD AUTO: 8.7 K/UL (ref 4.6–13.2)
WBC URNS QL MICRO: ABNORMAL /HPF (ref 0–4)
WET PREP GENITAL: NORMAL

## 2017-10-19 PROCEDURE — 99284 EMERGENCY DEPT VISIT MOD MDM: CPT

## 2017-10-19 PROCEDURE — 80053 COMPREHEN METABOLIC PANEL: CPT | Performed by: PHYSICIAN ASSISTANT

## 2017-10-19 PROCEDURE — 81001 URINALYSIS AUTO W/SCOPE: CPT | Performed by: PHYSICIAN ASSISTANT

## 2017-10-19 PROCEDURE — 74011250637 HC RX REV CODE- 250/637: Performed by: PHYSICIAN ASSISTANT

## 2017-10-19 PROCEDURE — 85025 COMPLETE CBC W/AUTO DIFF WBC: CPT | Performed by: PHYSICIAN ASSISTANT

## 2017-10-19 PROCEDURE — 81025 URINE PREGNANCY TEST: CPT | Performed by: PHYSICIAN ASSISTANT

## 2017-10-19 PROCEDURE — 83690 ASSAY OF LIPASE: CPT | Performed by: PHYSICIAN ASSISTANT

## 2017-10-19 PROCEDURE — 87210 SMEAR WET MOUNT SALINE/INK: CPT | Performed by: PHYSICIAN ASSISTANT

## 2017-10-19 PROCEDURE — 87491 CHLMYD TRACH DNA AMP PROBE: CPT | Performed by: PHYSICIAN ASSISTANT

## 2017-10-19 RX ORDER — FLUCONAZOLE 100 MG/1
150 TABLET ORAL
Status: COMPLETED | OUTPATIENT
Start: 2017-10-19 | End: 2017-10-19

## 2017-10-19 RX ORDER — METRONIDAZOLE 500 MG/1
500 TABLET ORAL 2 TIMES DAILY
Qty: 14 TAB | Refills: 0 | Status: SHIPPED | OUTPATIENT
Start: 2017-10-19 | End: 2017-10-26

## 2017-10-19 RX ADMIN — FLUCONAZOLE 150 MG: 100 TABLET ORAL at 17:39

## 2017-10-19 NOTE — ED PROVIDER NOTES
HPI Comments: 32yoF to ED c/o vaginal itching, dysuria x 3 days. Reports associated left flank pain. Denies abd pain, N/V/D, fever, chills or any other complaints. Patient is a 34 y.o. female presenting with abdominal pain and frequency. The history is provided by the patient. Abdominal Pain    Associated symptoms include dysuria and frequency. Urinary Frequency    Associated symptoms include frequency and abdominal pain. Pertinent negatives include no vaginal discharge. Past Medical History:   Diagnosis Date    Arthritis     Chest pain     Diabetes ([address]     Essential hypertension     Headache     Hyperchloremia     Hypertension     Seizures (HCC)     SOB (shortness of breath)     Thyroid cancer (HCC)        Past Surgical History:   Procedure Laterality Date    HX PARTIAL HYSTERECTOMY      HX THYROIDECTOMY      HX TUBAL LIGATION           Family History:   Problem Relation Age of Onset    Hypertension Mother        Social History     Social History    Marital status: SINGLE     Spouse name: N/A    Number of children: N/A    Years of education: N/A     Occupational History    Not on file. Social History Main Topics    Smoking status: Former Smoker    Smokeless tobacco: Not on file    Alcohol use No    Drug use: No    Sexual activity: Yes     Partners: Male     Birth control/ protection: Surgical     Other Topics Concern    Not on file     Social History Narrative         ALLERGIES: Review of patient's allergies indicates no known allergies. Review of Systems   Gastrointestinal: Positive for abdominal pain. Genitourinary: Positive for dysuria and frequency. Negative for vaginal discharge.        Vitals:    10/19/17 1432   BP: 108/72   Pulse: 89   Resp: 20   Temp: 97.9 °F (36.6 °C)   SpO2: 98%   Weight: 113.4 kg (250 lb)   Height: 5' 9\" (1.753 m)            Physical Exam     MDM  Number of Diagnoses or Management Options  BV (bacterial vaginosis):   Hyperglycemia:   Yeast vaginitis:   Diagnosis management comments: Pt c/o urinary symptoms and vaginal itching. Likely yeast infection due to hyperglycemia in poorly controlled DM. UA w/o UTI; BV on wet prep. Remainder of labs essentially unremarkable. Pt advised to f/u with pcp for eval of hyperglycemia. ESTEBAN Marsh 5:33 PM         Amount and/or Complexity of Data Reviewed  Clinical lab tests: reviewed    Risk of Complications, Morbidity, and/or Mortality  Presenting problems: low  Diagnostic procedures: low  Management options: low    Patient Progress  Patient progress: improved    ED Course       Pelvic Exam  Date/Time: 10/19/2017 5:32 PM  Performed by: PA  Procedure duration:  5 minutes. Exam assisted by:  Nadine Gamble RN. Type of exam performed: speculum and bimanual.    External genitalia appearance: normal.    Vaginal exam:  discharge. The discharge was white and thick. Cervical exam:  normal and no cervical motion tenderness. Specimen(s) collected:  chlamydia, GC and vaginal culture.   Bimanual exam:  normal.    Patient tolerance: Patient tolerated the procedure well with no immediate complications

## 2017-10-19 NOTE — ED NOTES
I performed a brief evaluation, including history and physical, of the patient here in triage and I have determined that pt will need further treatment and evaluation from the fast track provider. I have placed initial orders to help in expediting patients care.      October 19, 2017 at 2:47 PM - April DEVAN Davis PA-C        Visit Vitals    /72 (BP 1 Location: Left arm, BP Patient Position: At rest)    Pulse 89    Temp 97.9 °F (36.6 °C)    Resp 20    Ht 5' 9\" (1.753 m)    Wt 113.4 kg (250 lb)    SpO2 98%    BMI 36.92 kg/m2

## 2017-10-19 NOTE — ED NOTES
PT discharged per provider order, educated patient on discharge instruction, medication and follow up care, verbalized understanding, ambulated out of ED

## 2017-10-19 NOTE — DISCHARGE INSTRUCTIONS
Bacterial Vaginosis: Care Instructions  Your Care Instructions    Bacterial vaginosis is a type of vaginal infection. It is caused by excess growth of certain bacteria that are normally found in the vagina. Symptoms can include itching, swelling, pain when you urinate or have sex, and a gray or yellow discharge with a \"fishy\" odor. It is not considered an infection that is spread through sexual contact. Although symptoms can be annoying and uncomfortable, bacterial vaginosis does not usually cause other health problems. However, if you have it while you are pregnant, it can cause complications. While the infection may go away on its own, most doctors use antibiotics to treat it. You may have been prescribed pills or vaginal cream. With treatment, bacterial vaginosis usually clears up in 5 to 7 days. Follow-up care is a key part of your treatment and safety. Be sure to make and go to all appointments, and call your doctor if you are having problems. It's also a good idea to know your test results and keep a list of the medicines you take. How can you care for yourself at home? · Take your antibiotics as directed. Do not stop taking them just because you feel better. You need to take the full course of antibiotics. · Do not eat or drink anything that contains alcohol if you are taking metronidazole (Flagyl). · Keep using your medicine if you start your period. Use pads instead of tampons while using a vaginal cream or suppository. Tampons can absorb the medicine. · Wear loose cotton clothing. Do not wear nylon and other materials that hold body heat and moisture close to the skin. · Do not scratch. Relieve itching with a cold pack or a cool bath. · Do not wash your vaginal area more than once a day. Use plain water or a mild, unscented soap. Do not douche. When should you call for help?   Watch closely for changes in your health, and be sure to contact your doctor if:  · You have unexpected vaginal bleeding. · You have a fever. · You have new or increased pain in your vagina or pelvis. · You are not getting better after 1 week. · Your symptoms return after you finish the course of your medicine. Where can you learn more? Go to http://josh-autumn.info/. Marlene Silva in the search box to learn more about \"Bacterial Vaginosis: Care Instructions. \"  Current as of: October 13, 2016  Content Version: 11.3  © 0991-2900 [employer]. Care instructions adapted under license by [employer] (which disclaims liability or warranty for this information). If you have questions about a medical condition or this instruction, always ask your healthcare professional. [address] any warranty or liability for your use of this information. Vaginal Yeast Infection: Care Instructions  Your Care Instructions  A vaginal yeast infection is caused by too many yeast cells in the vagina. This is common in women of all ages. Itching, vaginal discharge and irritation, and other symptoms can bother you. But yeast infections don't often cause other health problems. Some medicines can increase your risk of getting a yeast infection. These include antibiotics, birth control pills, hormones, and steroids. You may also be more likely to get a yeast infection if you are pregnant, have diabetes, douche, or wear tight clothes. With treatment, most yeast infections get better in 2 to 3 days. Follow-up care is a key part of your treatment and safety. Be sure to make and go to all appointments, and call your doctor if you are having problems. It's also a good idea to know your test results and keep a list of the medicines you take. How can you care for yourself at home? · Take your medicines exactly as prescribed. Call your doctor if you think you are having a problem with your medicine. · Ask your doctor about over-the-counter (OTC) medicines for yeast infections.  They may cost less than prescription medicines. If you use an OTC treatment, read and follow all instructions on the label. · Do not use tampons while using a vaginal cream or suppository. The tampons can absorb the medicine. Use pads instead. · Wear loose cotton clothing. Do not wear nylon or other fabric that holds body heat and moisture close to the skin. · Try sleeping without underwear. · Do not scratch. Relieve itching with a cold pack or a cool bath. · Do not wash your vaginal area more than once a day. Use plain water or a mild, unscented soap. Air-dry the vaginal area. · Change out of wet swimsuits after swimming. · Do not have sex until you have finished your treatment. · Do not douche. When should you call for help? Call your doctor now or seek immediate medical care if:  · You have unexpected vaginal bleeding. · You have new or increased pain in your vagina or pelvis. Watch closely for changes in your health, and be sure to contact your doctor if:  · You have a fever. · You are not getting better after 2 days. · Your symptoms come back after you finish your medicines. Where can you learn more? Go to http://josh-autumn.info/. Enter Z145 in the search box to learn more about \"Vaginal Yeast Infection: Care Instructions. \"  Current as of: October 13, 2016  Content Version: 11.3  © 1338-0339 [employer]. Care instructions adapted under license by [employer] (which disclaims liability or warranty for this information). If you have questions about a medical condition or this instruction, always ask your healthcare professional. [place] [address] any warranty or liability for your use of this information.

## 2017-11-20 ENCOUNTER — HOSPITAL ENCOUNTER (EMERGENCY)
Age: 32
Discharge: HOME | End: 2017-11-20
Attending: EMERGENCY MEDICINE
Payer: MEDICAID

## 2017-11-20 VITALS
SYSTOLIC BLOOD PRESSURE: 145 MMHG | DIASTOLIC BLOOD PRESSURE: 90 MMHG | HEART RATE: 77 BPM | OXYGEN SATURATION: 96 % | TEMPERATURE: 98.1 F | RESPIRATION RATE: 16 BRPM | HEIGHT: 68 IN | WEIGHT: 230 LBS | BODY MASS INDEX: 34.86 KG/M2

## 2017-11-20 DIAGNOSIS — R73.9 HYPERGLYCEMIA: ICD-10-CM

## 2017-11-20 DIAGNOSIS — B37.31 VAGINAL YEAST INFECTION: Primary | ICD-10-CM

## 2017-11-20 DIAGNOSIS — A59.01 TRICHOMONAS VAGINITIS: ICD-10-CM

## 2017-11-20 LAB
ANION GAP SERPL CALC-SCNC: 10 MMOL/L (ref 3–18)
APPEARANCE UR: CLEAR
BACTERIA URNS QL MICRO: ABNORMAL /HPF
BASOPHILS # BLD: 0 K/UL (ref 0–0.06)
BASOPHILS NFR BLD: 0 % (ref 0–2)
BILIRUB UR QL: NEGATIVE
BUN SERPL-MCNC: 12 MG/DL (ref 7–18)
BUN/CREAT SERPL: 13 (ref 12–20)
CALCIUM SERPL-MCNC: 8.7 MG/DL (ref 8.5–10.1)
CHLORIDE SERPL-SCNC: 96 MMOL/L (ref 100–108)
CO2 SERPL-SCNC: 25 MMOL/L (ref 21–32)
COLOR UR: YELLOW
CREAT SERPL-MCNC: 0.94 MG/DL (ref 0.6–1.3)
DIFFERENTIAL METHOD BLD: NORMAL
EOSINOPHIL # BLD: 0.1 K/UL (ref 0–0.4)
EOSINOPHIL NFR BLD: 1 % (ref 0–5)
EPITH CASTS URNS QL MICRO: ABNORMAL /LPF (ref 0–5)
ERYTHROCYTE [DISTWIDTH] IN BLOOD BY AUTOMATED COUNT: 13.5 % (ref 11.6–14.5)
GLUCOSE BLD STRIP.AUTO-MCNC: 311 MG/DL (ref 70–110)
GLUCOSE BLD STRIP.AUTO-MCNC: 456 MG/DL (ref 70–110)
GLUCOSE SERPL-MCNC: 428 MG/DL (ref 74–99)
GLUCOSE UR STRIP.AUTO-MCNC: >1000 MG/DL
HCG UR QL: NEGATIVE
HCT VFR BLD AUTO: 42.4 % (ref 35–45)
HGB BLD-MCNC: 13.9 G/DL (ref 12–16)
HGB UR QL STRIP: ABNORMAL
KETONES UR QL STRIP.AUTO: ABNORMAL MG/DL
LEUKOCYTE ESTERASE UR QL STRIP.AUTO: NEGATIVE
LYMPHOCYTES # BLD: 2.8 K/UL (ref 0.9–3.6)
LYMPHOCYTES NFR BLD: 39 % (ref 21–52)
MCH RBC QN AUTO: 27.8 PG (ref 24–34)
MCHC RBC AUTO-ENTMCNC: 32.8 G/DL (ref 31–37)
MCV RBC AUTO: 84.8 FL (ref 74–97)
MONOCYTES # BLD: 0.5 K/UL (ref 0.05–1.2)
MONOCYTES NFR BLD: 6 % (ref 3–10)
NEUTS SEG # BLD: 3.9 K/UL (ref 1.8–8)
NEUTS SEG NFR BLD: 54 % (ref 40–73)
NITRITE UR QL STRIP.AUTO: NEGATIVE
PH UR STRIP: 5.5 [PH] (ref 5–8)
PLATELET # BLD AUTO: 351 K/UL (ref 135–420)
PMV BLD AUTO: 10.8 FL (ref 9.2–11.8)
POTASSIUM SERPL-SCNC: 3.8 MMOL/L (ref 3.5–5.5)
PROT UR STRIP-MCNC: 300 MG/DL
RBC # BLD AUTO: 5 M/UL (ref 4.2–5.3)
RBC #/AREA URNS HPF: ABNORMAL /HPF (ref 0–5)
SERVICE CMNT-IMP: NORMAL
SODIUM SERPL-SCNC: 131 MMOL/L (ref 136–145)
SP GR UR REFRACTOMETRY: >1.03 (ref 1–1.03)
UROBILINOGEN UR QL STRIP.AUTO: 0.2 EU/DL (ref 0.2–1)
WBC # BLD AUTO: 7.3 K/UL (ref 4.6–13.2)
WBC URNS QL MICRO: ABNORMAL /HPF (ref 0–5)
WET PREP GENITAL: NORMAL

## 2017-11-20 PROCEDURE — 85025 COMPLETE CBC W/AUTO DIFF WBC: CPT | Performed by: PHYSICIAN ASSISTANT

## 2017-11-20 PROCEDURE — 96374 THER/PROPH/DIAG INJ IV PUSH: CPT

## 2017-11-20 PROCEDURE — 81025 URINE PREGNANCY TEST: CPT | Performed by: PHYSICIAN ASSISTANT

## 2017-11-20 PROCEDURE — 80048 BASIC METABOLIC PNL TOTAL CA: CPT | Performed by: PHYSICIAN ASSISTANT

## 2017-11-20 PROCEDURE — 87210 SMEAR WET MOUNT SALINE/INK: CPT | Performed by: PHYSICIAN ASSISTANT

## 2017-11-20 PROCEDURE — 81001 URINALYSIS AUTO W/SCOPE: CPT | Performed by: PHYSICIAN ASSISTANT

## 2017-11-20 PROCEDURE — 87491 CHLMYD TRACH DNA AMP PROBE: CPT | Performed by: PHYSICIAN ASSISTANT

## 2017-11-20 PROCEDURE — 99284 EMERGENCY DEPT VISIT MOD MDM: CPT

## 2017-11-20 PROCEDURE — 74011636637 HC RX REV CODE- 636/637: Performed by: PHYSICIAN ASSISTANT

## 2017-11-20 PROCEDURE — 82962 GLUCOSE BLOOD TEST: CPT

## 2017-11-20 RX ORDER — FLUCONAZOLE 150 MG/1
150 TABLET ORAL ONCE
Qty: 1 TAB | Refills: 0 | Status: SHIPPED | OUTPATIENT
Start: 2017-11-20 | End: 2017-11-20

## 2017-11-20 RX ORDER — METRONIDAZOLE 500 MG/1
500 TABLET ORAL 2 TIMES DAILY
Qty: 14 TAB | Refills: 0 | Status: SHIPPED | OUTPATIENT
Start: 2017-11-20 | End: 2017-11-27

## 2017-11-20 RX ADMIN — INSULIN HUMAN 8 UNITS: 100 INJECTION, SOLUTION PARENTERAL at 11:40

## 2017-11-20 NOTE — DISCHARGE INSTRUCTIONS
Trichomoniasis: Care Instructions  Your Care Instructions  Trichomoniasis is a sexually transmitted infection (STI) that is spread by having sex with an infected partner. Trichomoniasis is commonly called trich (say \"trick\"). In women, trich may cause vaginal itching and a smelly discharge. But in many cases, especially in men, there are no symptoms. Maxine Arp is treated so that you do not spread it to others. Both you and your sex partner or partners should be treated at the same time so you do not infect each other again. Trich may cause problems with pregnancy. Your doctor will talk with you about treatment for Trich if you are pregnant. Follow-up care is a key part of your treatment and safety. Be sure to make and go to all appointments, and call your doctor if you are having problems. It's also a good idea to know your test results and keep a list of the medicines you take. How can you care for yourself at home? · Take your antibiotics as directed. Do not stop taking them just because you feel better. You need to take the full course of antibiotics. · Do not have sex while you are being treated. If your doctor gave you a single dose of antibiotics, do not have sex for one week after being treated and until your partner also has been treated. · Tell your sex partner (or partners) that he or she will also need to be tested and treated. · Use a cold water compress or cool baths to relieve itching. To prevent trichomoniasis in the future  · Use latex condoms every time you have sex. Use them from the beginning to the end of sexual contact. · Talk to your partner before having sex. Find out if he or she has or is at risk for trich or any other STI. Keep in mind that a person may be able to spread an STI even if he or she does not have symptoms. · Do not have sex if you are being treated for trich or any other STI.   · Do not have sex with anyone who has symptoms of an STI, such as sores on the genitals or mouth.  · Having one sex partner (who does not have STIs and does not have sex with anyone else) is a good way to avoid STIs. When should you call for help? Call your doctor now or seek immediate medical care if:  ? · You have unusual vaginal bleeding. ? · You have a fever. ? · You have new discharge from the vagina or penis. ? · You have pelvic pain. ? Watch closely for changes in your health, and be sure to contact your doctor if:  ? · You do not get better as expected. ? · You have any new symptoms or your symptoms get worse. Where can you learn more? Go to http://josh-autumn.info/. Enter I646 in the search box to learn more about \"Trichomoniasis: Care Instructions. \"  Current as of: March 20, 2017  Content Version: 11.4  © 6849-3392 [employer]. Care instructions adapted under license by [employer] (which disclaims liability or warranty for this information). If you have questions about a medical condition or this instruction, always ask your healthcare professional. [place] [address] any warranty or liability for your use of this information. Vaginal Yeast Infection: Care Instructions  Your Care Instructions    A vaginal yeast infection is caused by too many yeast cells in the vagina. This is common in women of all ages. Itching, vaginal discharge and irritation, and other symptoms can bother you. But yeast infections don't often cause other health problems. Some medicines can increase your risk of getting a yeast infection. These include antibiotics, birth control pills, hormones, and steroids. You may also be more likely to get a yeast infection if you are pregnant, have diabetes, douche, or wear tight clothes. With treatment, most yeast infections get better in 2 to 3 days. Follow-up care is a key part of your treatment and safety. Be sure to make and go to all appointments, and call your doctor if you are having problems. It's also a good idea to know your test results and keep a list of the medicines you take. How can you care for yourself at home? · Take your medicines exactly as prescribed. Call your doctor if you think you are having a problem with your medicine. · Ask your doctor about over-the-counter (OTC) medicines for yeast infections. They may cost less than prescription medicines. If you use an OTC treatment, read and follow all instructions on the label. · Do not use tampons while using a vaginal cream or suppository. The tampons can absorb the medicine. Use pads instead. · Wear loose cotton clothing. Do not wear nylon or other fabric that holds body heat and moisture close to the skin. · Try sleeping without underwear. · Do not scratch. Relieve itching with a cold pack or a cool bath. · Do not wash your vaginal area more than once a day. Use plain water or a mild, unscented soap. Air-dry the vaginal area. · Change out of wet swimsuits after swimming. · Do not have sex until you have finished your treatment. · Do not douche. When should you call for help? Call your doctor now or seek immediate medical care if:  ? · You have unexpected vaginal bleeding. ? · You have new or increased pain in your vagina or pelvis. ? Watch closely for changes in your health, and be sure to contact your doctor if:  ? · You have a fever. ? · You are not getting better after 2 days. ? · Your symptoms come back after you finish your medicines. Where can you learn more? Go to http://josh-autumn.info/. Enter A447 in the search box to learn more about \"Vaginal Yeast Infection: Care Instructions. \"  Current as of: October 13, 2016  Content Version: 11.4  © 9003-8567 [employer]. Care instructions adapted under license by [employer] (which disclaims liability or warranty for this information).  If you have questions about a medical condition or this instruction, always ask your healthcare professional. [place] [address] any warranty or liability for your use of this information. Learning About High Blood Sugar  What is high blood sugar? Your body turns the food you eat into glucose (sugar), which it uses for energy. But if your body isn't able to use the sugar right away, it can build up in your blood and lead to high blood sugar. When the amount of sugar in your blood stays too high for too much of the time, you may have diabetes. Diabetes is a disease that can cause serious health problems. The good news is that lifestyle changes may help you get your blood sugar back to normal and avoid or delay diabetes. What causes high blood sugar? Sugar (glucose) can build up in your blood if you:  · Are overweight. · Have a family history of diabetes. · Take certain medicines, such as steroids. What are the symptoms? Having high blood sugar may not cause any symptoms at all. Or it may make you feel very thirsty or very hungry. You may also urinate more often than usual, have blurry vision, or lose weight without trying. How is high blood sugar treated? You can take steps to lower your blood sugar level if you understand what makes it get higher. Your doctor may want you to learn how to test your blood sugar level at home. Then you can see how illness, stress, or different kinds of food or medicine raise or lower your blood sugar level. Other tests may be needed to see if you have diabetes. How can you prevent high blood sugar? · Watch your weight. If you're overweight, losing just a small amount of weight may help. Reducing fat around your waist is most important. · Limit the amount of calories, sweets, and unhealthy fat you eat. Ask your doctor if a dietitian can help you. A registered dietitian can help you create meal plans that fit your lifestyle. · Get at least 30 minutes of exercise on most days of the week. Exercise helps control your blood sugar. It also helps you maintain a healthy weight. Walking is a good choice. You also may want to do other activities, such as running, swimming, cycling, or playing tennis or team sports. · If your doctor prescribed medicines, take them exactly as prescribed. Call your doctor if you think you are having a problem with your medicine. You will get more details on the specific medicines your doctor prescribes. Follow-up care is a key part of your treatment and safety. Be sure to make and go to all appointments, and call your doctor if you are having problems. It's also a good idea to know your test results and keep a list of the medicines you take. Where can you learn more? Go to http://josh-autumn.info/. Enter O108 in the search box to learn more about \"Learning About High Blood Sugar. \"  Current as of: March 13, 2017  Content Version: 11.4  © 8053-6929 [employer]. Care instructions adapted under license by [employer] (which disclaims liability or warranty for this information). If you have questions about a medical condition or this instruction, always ask your healthcare professional. [address] any warranty or liability for your use of this information.

## 2017-11-20 NOTE — ED PROVIDER NOTES
HPI Comments: 34year old female with history of DM to the ED with C/O vaginal burning, itching for the past several days. States she is a IDDM and that her sugar has been fluctuating all the way up to 300s. Denies any fevers, chills, NVD, abdominal pain, headache, chest pain. The history is provided by the patient. Past Medical History:   Diagnosis Date    Arthritis     Chest pain     Diabetes ([address]     Essential hypertension     Headache     Hyperchloremia     Hypertension     Seizures (HCC)     SOB (shortness of breath)     Thyroid cancer (HCC)        Past Surgical History:   Procedure Laterality Date    HX PARTIAL HYSTERECTOMY      HX THYROIDECTOMY      HX TUBAL LIGATION           Family History:   Problem Relation Age of Onset    Hypertension Mother        Social History     Social History    Marital status: SINGLE     Spouse name: N/A    Number of children: N/A    Years of education: N/A     Occupational History    Not on file. Social History Main Topics    Smoking status: Former Smoker    Smokeless tobacco: Not on file    Alcohol use No    Drug use: No    Sexual activity: Yes     Partners: Male     Birth control/ protection: Surgical     Other Topics Concern    Not on file     Social History Narrative         ALLERGIES: Review of patient's allergies indicates no known allergies. Review of Systems   Constitutional: Negative for chills and fever. HENT: Negative. Respiratory: Negative for chest tightness, shortness of breath and wheezing. Gastrointestinal: Negative for abdominal pain, diarrhea, nausea and vomiting. Genitourinary: Positive for vaginal discharge. Negative for dysuria, flank pain, frequency, pelvic pain, vaginal bleeding and vaginal pain. Vaginal itching and burning   Musculoskeletal: Negative. Neurological: Negative. All other systems reviewed and are negative.       Vitals:    11/20/17 0941   BP: 145/90   Pulse: 77   Resp: 16 Temp: 98.1 °F (36.7 °C)   SpO2: 96%   Weight: 104.3 kg (230 lb)   Height: 5' 8\" (1.727 m)            Physical Exam   Constitutional: She is oriented to person, place, and time. She appears well-developed and well-nourished. No distress. HENT:   Head: Normocephalic and atraumatic. Eyes: EOM are normal. Pupils are equal, round, and reactive to light. No scleral icterus. Neck: Neck supple. No tracheal deviation present. Cardiovascular: Normal rate, regular rhythm and normal heart sounds. Exam reveals no gallop and no friction rub. No murmur heard. Pulmonary/Chest: Effort normal and breath sounds normal. No respiratory distress. She has no wheezes. She has no rales. Abdominal: Soft. Bowel sounds are normal. She exhibits no distension and no mass. There is no tenderness. There is no rebound and no guarding. Protuberant abdomen   Genitourinary:   Genitourinary Comments: Bimanual and speculum exam were performed. G/C and wet prep specimens were obtained. Patient tolerated the procedure well. Chaperoned by Audrey Ramos. Cervical os was closed  There was no vaginal bleeding. + thin white vaginal discharge. There was no CMT and Adnexal tenderness. Uterus was WNL. No masses. Labia with erythema and yeasty discharge. No shallow base ulcers     Lymphadenopathy:     She has no cervical adenopathy. Neurological: She is alert and oriented to person, place, and time. No cranial nerve deficit. Skin: Skin is warm and dry. No rash noted. She is not diaphoretic. No erythema. No pallor. Psychiatric: She has a normal mood and affect. Her behavior is normal.   Nursing note and vitals reviewed. MDM  Number of Diagnoses or Management Options  Hyperglycemia:   Trichomonas vaginitis:   Vaginal yeast infection:   Diagnosis management comments: IMpression:  Hyperglycemia, trichomonas, yeast.  Treated hyperglycemia. Will write for Flagyl and Diflucan. PCP follow up. PAtient agrees.    Maeve Pastor [facility], [address] 1:07 PM           Amount and/or Complexity of Data Reviewed  Clinical lab tests: ordered and reviewed    Risk of Complications, Morbidity, and/or Mortality  Presenting problems: moderate  Diagnostic procedures: moderate  Management options: moderate    Patient Progress  Patient progress: stable    ED Course       Procedures

## 2018-02-04 ENCOUNTER — HOSPITAL ENCOUNTER (EMERGENCY)
Age: 33
Discharge: HOME | End: 2018-02-04
Attending: EMERGENCY MEDICINE
Payer: MEDICAID

## 2018-02-04 VITALS
OXYGEN SATURATION: 98 % | TEMPERATURE: 97.8 F | SYSTOLIC BLOOD PRESSURE: 132 MMHG | HEART RATE: 96 BPM | RESPIRATION RATE: 18 BRPM | DIASTOLIC BLOOD PRESSURE: 87 MMHG

## 2018-02-04 DIAGNOSIS — M54.17 LUMBOSACRAL RADICULOPATHY: Primary | ICD-10-CM

## 2018-02-04 LAB
D DIMER PPP FEU-MCNC: 0.48 UG/ML(FEU)
HCG UR QL: NEGATIVE

## 2018-02-04 PROCEDURE — 99283 EMERGENCY DEPT VISIT LOW MDM: CPT

## 2018-02-04 PROCEDURE — 81025 URINE PREGNANCY TEST: CPT

## 2018-02-04 PROCEDURE — 85379 FIBRIN DEGRADATION QUANT: CPT

## 2018-02-04 PROCEDURE — 93971 EXTREMITY STUDY: CPT

## 2018-02-04 RX ORDER — METHYLPREDNISOLONE 4 MG/1
TABLET ORAL
Qty: 1 DOSE PACK | Refills: 0 | Status: SHIPPED | OUTPATIENT
Start: 2018-02-04 | End: 2018-12-20 | Stop reason: ALTCHOICE

## 2018-02-04 NOTE — ED TRIAGE NOTES
Patient complains of right leg pain , patient states she has had it before but not this bad. Patient states it has been hurting for two days.

## 2018-02-04 NOTE — PROCEDURES
[facility]  [facility] ***    Name: Myrna Root  MRN: [id number]  : [address] 1985  HIS Order #: [id number]  [address] [facility] Visit #: [id number]  Date: 2018    TYPE OF TEST: Peripheral Venous Testing    REASON FOR TEST  Limb swelling    Right Leg:-  Deep venous thrombosis:           No  Superficial venous thrombosis:    Not examined  Deep venous insufficiency:        Not examined  Superficial venous insufficiency: Not examined    Left Leg:-  Deep venous thrombosis:           No  Superficial venous thrombosis:    No  Deep venous insufficiency:        Not examined  Superficial venous insufficiency: Not examined      INTERPRETATION/FINDINGS  Duplex images were obtained using 2-D gray scale, color flow, and  spectral Doppler analysis. Right leg :  1. Deep vein(s) visualized include the common femoral, proximal  femoral, mid femoral, distal femoral, popliteal(above knee),  popliteal(fossa), popliteal(below knee), posterior tibial and peroneal   veins. 2. No evidence of deep venous thrombosis detected in the veins  visualized. 3. Superficial vein(s) visualized include the great saphenous vein. Left leg :  1. Deep vein(s) visualized include the common femoral vein. 2. No evidence of deep venous thrombosis detected in the veins  visualized. 3. No evidence of superficial thrombosis detected. ADDITIONAL COMMENTS    I have personally reviewed the data relevant to the interpretation of  this  study. TECHNOLOGIST: Pretty Singh, [facility], RVT/  Signed: 2018 03:56 PM    PHYSICIAN: Cheyenne Martines MD  Signed: 2018 08:59 AM

## 2018-02-04 NOTE — ED PROVIDER NOTES
EMERGENCY DEPARTMENT HISTORY AND PHYSICAL EXAM    4:07 PM      Date: 2/4/2018  Patient Name: Arabella Wong    History of Presenting Illness     Chief Complaint   Patient presents with    Leg Pain         History Provided By: Patient    Chief Complaint: R leg with sharp pain radiating down the leg x 24 hours. Denies of any fall, no foot droop or urinary sx. Is currently taking Neurontin without relief. Duration:  as noted above   Timing:  Constant  Location: as noted above  Quality: Sharp  Severity: 7 out of 10  Modifying Factors: none  Associated Symptoms: as noted above       Additional History (Context): Arabella Wong is a 34 y.o. female with diabetes, hypertension, hyperlipidemia and seizure who presents with sx as noted above but denies of any fall, or pain and swelling on the L leg. Allen Renteria PCP: Penelope Ramírez MD    Current Outpatient Prescriptions   Medication Sig Dispense Refill    methylPREDNISolone (MEDROL, ROBERT,) 4 mg tablet PLEASE TAKEN AS INSTRUCTED ON BOX 1 Dose Pack 0    ibuprofen (MOTRIN) 600 mg tablet Take 1 Tab by mouth every six (6) hours as needed for Pain. 20 Tab 0    insulin glargine (LANTUS) 100 unit/mL injection by SubCUTAneous route nightly. Indications: type 2 diabetes mellitus      metFORMIN (GLUCOPHAGE) 1,000 mg tablet Take 1,000 mg by mouth two (2) times daily (with meals).  metoprolol tartrate (LOPRESSOR) 25 mg tablet Take 25 mg by mouth two (2) times a day.  fenofibrate nanocrystallized (TRICOR) 48 mg tablet Take 48 mg by mouth.  atorvastatin (LIPITOR) 40 mg tablet Take  by mouth daily.  furosemide (LASIX) 20 mg tablet Take  by mouth daily.  liothyronine (CYTOMEL) 25 mcg tablet Take 50 mcg by mouth daily.  topiramate (TOPAMAX) 25 mg tablet Take 1 Tab by mouth two (2) times a day. 60 Tab 0    lisinopril (PRINIVIL, ZESTRIL) 5 mg tablet Take 40 mg by mouth daily.          Past History     Past Medical History:  Past Medical History:   Diagnosis Date    Arthritis     Chest pain     Diabetes ([facility] [address]     Essential hypertension     Headache     Hyperchloremia     Hypertension     Seizures (HCC)     SOB (shortness of breath)     Thyroid cancer (HCC)        Past Surgical History:  Past Surgical History:   Procedure Laterality Date    HX PARTIAL HYSTERECTOMY      HX THYROIDECTOMY      HX TUBAL LIGATION         Family History:  Family History   Problem Relation Age of Onset    Hypertension Mother        Social History:  Social History   Substance Use Topics    Smoking status: Former Smoker    Smokeless tobacco: Not on file    Alcohol use No       Allergies:  No Known Allergies      Review of Systems       Review of Systems   Constitutional: Negative for fever. Cardiovascular: Negative for chest pain and palpitations. Gastrointestinal: Negative for abdominal distention. Genitourinary: Negative for dysuria. All other systems reviewed and are negative. Physical Exam     Visit Vitals    /87 (BP 1 Location: Left arm, BP Patient Position: At rest)    Pulse 96    Temp 97.8 °F (36.6 °C)    Resp 18    SpO2 98%         Physical Exam   Constitutional: She is oriented to person, place, and time. She appears well-developed and well-nourished. No distress. HENT:   Head: Normocephalic and atraumatic. Eyes: Conjunctivae and EOM are normal. Pupils are equal, round, and reactive to light. Neck: Normal range of motion. Cardiovascular: Normal rate, regular rhythm and normal heart sounds. Pulmonary/Chest: Effort normal and breath sounds normal. No respiratory distress. She has no wheezes. She has no rales. She exhibits no tenderness. Abdominal: Soft. Bowel sounds are normal. She exhibits no distension. There is no tenderness. There is no rebound and no guarding. Musculoskeletal: Normal range of motion.    Able to perform SLR both leg  Lateral foot sensation intact both feet    Ankle DTR intact     DP/PT + 1 R foot    Neurological: She is alert and oriented to person, place, and time. Skin: Skin is warm. She is not diaphoretic. Psychiatric: She has a normal mood and affect. Her behavior is normal. Judgment and thought content normal.         Diagnostic Study Results     Labs -  Recent Results (from the past 12 hour(s))   HCG URINE, QL    Collection Time: 02/04/18  1:58 PM   Result Value Ref Range    HCG urine, QL NEGATIVE  NEG     D DIMER    Collection Time: 02/04/18  2:05 PM   Result Value Ref Range    D DIMER 0.48 (H) <0.46 ug/ml(FEU)     Radiologic Studies -   DUPLEX LOWER EXT VENOUS RIGHT             Medical Decision Making   I am the first provider for this patient. I reviewed the vital signs, available nursing notes, past medical history, past surgical history, family history and social history. Vital Signs-Reviewed the patient's vital signs. Provider Notes (Medical Decision Making):   Lower ext PVL negative for DVT. Will treat for radiculopathy with tapering dose steriods and recommend follow up with PCP. Diagnosis     Clinical Impression:   1. Lumbosacral radiculopathy        Disposition: HOME    Follow-up Information     Follow up With Details Comments [address], [place] In 1 day Follow up with your PCP [address]      [facility] EMERGENCY DEPT  As needed, If symptoms worsen [address] [place] [id number]  [phone]           Patient's Medications   Start Taking    METHYLPREDNISOLONE (MEDROL, ROBERT,) 4 MG TABLET    PLEASE TAKEN AS INSTRUCTED ON BOX   Continue Taking    ATORVASTATIN (LIPITOR) 40 MG TABLET    Take  by mouth daily. FENOFIBRATE NANOCRYSTALLIZED (TRICOR) 48 MG TABLET    Take 48 mg by mouth. FUROSEMIDE (LASIX) 20 MG TABLET    Take  by mouth daily. IBUPROFEN (MOTRIN) 600 MG TABLET    Take 1 Tab by mouth every six (6) hours as needed for Pain.     INSULIN GLARGINE (LANTUS) 100 UNIT/ML INJECTION    by SubCUTAneous route nightly. Indications: type 2 diabetes mellitus    LIOTHYRONINE (CYTOMEL) 25 MCG TABLET    Take 50 mcg by mouth daily. LISINOPRIL (PRINIVIL, ZESTRIL) 5 MG TABLET    Take 40 mg by mouth daily. METFORMIN (GLUCOPHAGE) 1,000 MG TABLET    Take 1,000 mg by mouth two (2) times daily (with meals). METOPROLOL TARTRATE (LOPRESSOR) 25 MG TABLET    Take 25 mg by mouth two (2) times a day. TOPIRAMATE (TOPAMAX) 25 MG TABLET    Take 1 Tab by mouth two (2) times a day.    These Medications have changed    No medications on file   Stop Taking    No medications on file

## 2018-02-13 ENCOUNTER — HOSPITAL ENCOUNTER (OUTPATIENT)
Dept: GENERAL RADIOLOGY | Age: 33
Discharge: HOME | End: 2018-02-13
Payer: MEDICAID

## 2018-02-13 DIAGNOSIS — M79.669 CALF PAIN: ICD-10-CM

## 2018-02-13 PROCEDURE — 73562 X-RAY EXAM OF KNEE 3: CPT

## 2018-02-13 PROCEDURE — 73590 X-RAY EXAM OF LOWER LEG: CPT

## 2018-02-19 ENCOUNTER — HOSPITAL ENCOUNTER (OUTPATIENT)
Dept: PHYSICAL THERAPY | Age: 33
Discharge: HOME | End: 2018-02-19
Payer: MEDICAID

## 2018-02-19 PROCEDURE — 97110 THERAPEUTIC EXERCISES: CPT

## 2018-02-19 PROCEDURE — 97162 PT EVAL MOD COMPLEX 30 MIN: CPT

## 2018-02-19 NOTE — PROGRESS NOTES
PT DAILY TREATMENT NOTE 8-14    Patient Name: Jelly Maciel  Date:2018  : 1985  [x]  Patient  Verified  Payor: BLUE CROSS MEDICAID / Plan: [facility] HEALTHKEEPERS PLUS / Product Type: Managed Care Medicaid /    In time:900  Out time:935  Total Treatment Time (min): 35  Visit #: 1 of 8    Treatment Area: Pain in right leg [M79.604]    SUBJECTIVE  Pain Level (0-10 scale): 10  Any medication changes, allergies to medications, adverse drug reactions, diagnosis change, or new procedure performed?: [x] No    [] Yes (see summary sheet for update)  Subjective functional status/changes:   [x] See Eval form in paper chart     OBJECTIVE      27 min [x]Eval                  []Re-Eval         8 min Therapeutic Exercise:  [x] See flow sheet :HEP   Rationale: increase ROM, increase strength, improve coordination, improve balance and increase proprioception to improve the patients ability to perform ADLs. With   [] TE   [] TA   [] neuro   [] other: Patient Education: [x] Review HEP    [] Progressed/Changed HEP based on:   [] positioning   [] body mechanics   [] transfers   [] heat/ice application    [] other:           Pain Level (0-10 scale) post treatment: 10    ASSESSMENT:   [x]  See Evaluation          Goals:  Short Term Goals: To be accomplished in 1 weeks:  1. Establish HEP for ROM & Strengthening. Long Term Goals: To be accomplished in 4 weeks:  1. Patient will be independent with HEP for ROM & Strengthening. Eval Status:n/a  2. Pt will report decrease in referred LE symptoms by 50% to improve ease with mobility. Eval Status:n/a  3. Pt will increase FOTO score to 5 points to demonstrate increased ease with ADLs. Eval Status: FOTO: 25  4. Pt will report decrease in average pain rating on VAS to <5/10 to improve ease with mobility.     Eval Status:10/10     PLAN      [x]  Continue plan of care           Tung Flower PT 2018  9:55 AM

## 2018-02-19 NOTE — PROGRESS NOTES
[facility] [place] [address]  [address], [address] [phone] fax    Plan of Care/ Statement of Necessity for Physical Therapy Services           Patient name: Tucker Aly Start of Care: 2018   Referral source: Jori Lau MD : 1985    Medical Diagnosis: Pain in right leg [M79.604]   Onset Date:1 month    Treatment Diagnosis: R leg pain    Prior Hospitalization: see medical history Provider#: [id number]   Medications: Verified on Patient summary List    Comorbidities: hx of thyroid CA, seizures, DM, HTN   Prior Level of Function: currently not working, lives with children in 2 story [facility]. Functionally independent. The Plan of Care and following information is based on the information from the initial evaluation. Assessment/ key information: Patient is a 34 y. o.female presenting with Pain in right leg [M79.604]. Ms. Lashae Nicholson presents to initial PT evaluation with c/o worsening posterior R calf pain over the past month, without reported mechanism of injury. She  Demonstrates positive (+) SLR and (+) slump on R  For reproduction of pain into the R calf. She displays clear lumbar extension bias consistent with potential disc irritation. We will work to centralize symptoms to decrease pain and improve ease with daily mobility. Patient will benefit from skilled PT services to address deficits and facilitate return to premorbid activity level and promote improved quality of life. Evaluation Complexity History MEDIUM  Complexity : 1-2 comorbidities / personal factors will impact the outcome/ POC ; Examination MEDIUM Complexity : 3 Standardized tests and measures addressing body structure, function, activity limitation and / or participation in recreation  ;Presentation MEDIUM Complexity : Evolving with changing characteristics  ; Clinical Decision Making MEDIUM Complexity : FOTO score of 26-74  Overall Complexity Rating: MEDIUM  Problem List: pain affecting function, decrease ROM, decrease strength, edema affecting function, impaired gait/ balance, decrease ADL/ functional abilitiies, decrease activity tolerance, decrease flexibility/ joint mobility and decrease transfer abilities   Treatment Plan may include any combination of the following: Therapeutic exercise, Therapeutic activities, Neuromuscular re-education, Physical agent/modality, Gait/balance training, Manual therapy, Aquatic therapy, Patient education, Self Care training, Functional mobility training, Home safety training and Stair training  Patient / Family readiness to learn indicated by: asking questions, trying to perform skills and interest  Persons(s) to be included in education: patient (P)  Barriers to Learning/Limitations: None  Patient Goal (s): get the pain down.   Patient Self Reported Health Status: fair  Rehabilitation Potential: good  Short Term Goals: To be accomplished in 1 weeks:  1. Establish HEP for ROM & Strengthening. Long Term Goals: To be accomplished in 4 weeks:  1. Patient will be independent with HEP for ROM & Strengthening. Eval Status:n/a  2. Pt will report decrease in referred LE symptoms by 50% to improve ease with mobility. Eval Status:n/a  3. Pt will increase FOTO score to 5 points to demonstrate increased ease with ADLs. Eval Status: FOTO: 25  4. Pt will report decrease in average pain rating on VAS to <5/10 to improve ease with mobility. Eval Status:10/10     Frequency / Duration: Patient to be seen 2 times per week for 4 weeks. Patient/ Caregiver education and instruction: Diagnosis, prognosis, self care, activity modification and exercises   [x]  Plan of care has been reviewed with DAYTON Llanos, PT 2/19/2018 9:03 AM    ________________________________________________________________________    I certify that the above Therapy Services are being furnished while the patient is under my care.  I agree with the treatment plan and certify that this therapy is necessary.     Physician's Signature:____________________  Date:____________Time: _________    Please sign and return to [facility] - [facility]  [address] [place] [address] [address], [address] [phone] fax

## 2018-02-26 ENCOUNTER — APPOINTMENT (OUTPATIENT)
Dept: PHYSICAL THERAPY | Age: 33
End: 2018-02-26
Payer: MEDICAID

## 2018-02-28 ENCOUNTER — APPOINTMENT (OUTPATIENT)
Dept: PHYSICAL THERAPY | Age: 33
End: 2018-02-28
Payer: MEDICAID

## 2018-02-28 ENCOUNTER — HOSPITAL ENCOUNTER (OUTPATIENT)
Dept: PHYSICAL THERAPY | Age: 33
Discharge: HOME | End: 2018-02-28
Payer: MEDICAID

## 2018-02-28 PROCEDURE — 97112 NEUROMUSCULAR REEDUCATION: CPT

## 2018-02-28 PROCEDURE — 97110 THERAPEUTIC EXERCISES: CPT

## 2018-02-28 NOTE — PROGRESS NOTES
PT DAILY TREATMENT NOTE 12    Patient Name: Danish Apt  Date:2018  : 1985  [x]  Patient  Verified  Payor: BLUE CROSS MEDICAID / Plan: [facility] HEALTHKEEPERS PLUS / Product Type: Managed Care Medicaid /    In time:1018  Out time:1102  Total Treatment Time (min): 44  Visit #: 2 of 8    Treatment Area: Pain in right leg [M79.604]    SUBJECTIVE  Pain Level (0-10 scale): 10  Any medication changes, allergies to medications, adverse drug reactions, diagnosis change, or new procedure performed?: [x] No    [] Yes (see summary sheet for update)  Subjective functional status/changes:   [] No changes reported  \"I'm having pain down both legs. \"    OBJECTIVE    Modality rationale: decrease pain to improve the patients ability to improve mobility and gait   Min Type Additional Details    [] Estim:  []Unatt       []IFC  []Premod                        []Other:  []w/ice   []w/heat  Position:  Location:    [] Estim: []Att    []TENS instruct  []NMES                    []Other:  []w/US   []w/ice   []w/heat  Position:  Location:    []  Traction: [] Cervical       []Lumbar                       [] Prone          []Supine                       []Intermittent   []Continuous Lbs:  [] before manual  [] after manual    []  Ultrasound: []Continuous   [] Pulsed                           []1MHz   []3MHz W/cm2:  Location:    []  Iontophoresis with dexamethasone         Location: [] Take home patch   [] In clinic   10 []  Ice     [x]  heat  []  Ice massage  []  Laser   []  Anodyne Position: seated   Location: Right posterior calf    []  Laser with stim  []  Other:  Position:  Location:    []  Vasopneumatic Device Pressure:       [] lo [] med [] hi   Temperature: [] lo [] med [] hi   [x] Skin assessment post-treatment:  [x]intact []redness- no adverse reaction    []redness - adverse reaction:     8 min Therapeutic Exercise:  [x] See flow sheet :   Rationale: increase ROM and increase strength to improve the patients ability to perform ADLs    26 min Neuromuscular Re-education:  [x]  See flow sheet : quad re-ed activities, prone activities, core stabilization activaties   Rationale: increase ROM, increase strength, improve coordination, improve balance and increase proprioception  to improve the patients ability to improve mobility and positional tolerance      With   [x] TE   [] TA   [x] neuro   [] other: Patient Education: [x] Review HEP    [] Progressed/Changed HEP based on:   [x] positioning   [x] body mechanics   [] transfers   [] heat/ice application    [] other:      Other Objective/Functional Measures:      Pain Level (0-10 scale) post treatment: 6    ASSESSMENT/Changes in Function: Initiated POC. Her pain remains elevated currently to 10/10. Pt was challenged with squat form. She shifts weight anteriorly and requires a significant amount of UE assistance to perform. Poor sitting and standing posture. Pt sacral sits with sitting and stands in an APT. Patient will continue to benefit from skilled PT services to modify and progress therapeutic interventions, address functional mobility deficits, address ROM deficits, address strength deficits, analyze and address soft tissue restrictions, analyze and cue movement patterns, analyze and modify body mechanics/ergonomics, assess and modify postural abnormalities, address imbalance/dizziness and instruct in home and community integration to attain remaining goals. [x]  See Plan of Care  []  See progress note/recertification  []  See Discharge Summary         Progress towards goals / Updated goals:  Short Term Goals: To be accomplished in 1 weeks:  1. Establish HEP for ROM & Strengthening. MET   Long Term Goals: To be accomplished in 4 weeks:  1. Patient will be independent with HEP for ROM & Strengthening. Eval Status:n/a   Pt reports compliance  2. Pt will report decrease in referred LE symptoms by 50% to improve ease with mobility.     Eval Status:n/a   Continues to have sx  3. Pt will increase FOTO score to 5 points to demonstrate increased ease with ADLs. Eval Status: FOTO: 25   Assess at 4th visit  4. Pt will report decrease in average pain rating on VAS to <5/10 to improve ease with mobility.     Eval Status:10/10    Pain remains elevated    PLAN  []  Upgrade activities as tolerated     [x]  Continue plan of care  []  Update interventions per flow sheet       []  Discharge due to:_  []  Other:_      Gabo Najera PTA, [facility] 2/28/2018  11:05 AM    Future Appointments  Date Time Provider Wilson Booth   2/28/2018 10:30 AM [facility]   3/2/2018 10:00 AM [facility]

## 2018-03-02 ENCOUNTER — HOSPITAL ENCOUNTER (OUTPATIENT)
Dept: PHYSICAL THERAPY | Age: 33
Discharge: HOME | End: 2018-03-02
Payer: MEDICAID

## 2018-03-02 PROCEDURE — 97112 NEUROMUSCULAR REEDUCATION: CPT

## 2018-03-02 PROCEDURE — 97110 THERAPEUTIC EXERCISES: CPT

## 2018-03-02 NOTE — PROGRESS NOTES
PT DAILY TREATMENT NOTE     Patient Name: Kendra Celis  Date:3/2/2018  : 1985  [x]  Patient  Verified  Payor: BLUE CROSS MEDICAID / Plan: [facility] [email or web address] HEALTHKEEPERS PLUS / Product Type: Managed Care Medicaid /    In time:1000  Out time:1052  Total Treatment Time (min): 52  Visit #: 3 of 8    Treatment Area: Pain in right leg [M79.604]    SUBJECTIVE  Pain Level (0-10 scale): 6  Any medication changes, allergies to medications, adverse drug reactions, diagnosis change, or new procedure performed?: [x] No    [] Yes (see summary sheet for update)  Subjective functional status/changes:   [] No changes reported  \"I feel better today. \"    OBJECTIVE    Modality rationale: decrease pain to improve the patients ability to improve mobility and gait   Min Type Additional Details    [] Estim:  []Unatt       []IFC  []Premod                        []Other:  []w/ice   []w/heat  Position:  Location:    [] Estim: []Att    []TENS instruct  []NMES                    []Other:  []w/US   []w/ice   []w/heat  Position:  Location:    []  Traction: [] Cervical       []Lumbar                       [] Prone          []Supine                       []Intermittent   []Continuous Lbs:  [] before manual  [] after manual    []  Ultrasound: []Continuous   [] Pulsed                           []1MHz   []3MHz W/cm2:  Location:    []  Iontophoresis with dexamethasone         Location: [] Take home patch   [] In clinic   10 []  Ice     [x]  heat  []  Ice massage  []  Laser   []  Anodyne Position: seated   Location: right calf    []  Laser with stim  []  Other:  Position:  Location:    []  Vasopneumatic Device Pressure:       [] lo [] med [] hi   Temperature: [] lo [] med [] hi   [x] Skin assessment post-treatment:  [x]intact []redness- no adverse reaction    []redness - adverse reaction:     12 min Therapeutic Exercise:  [x] See flow sheet :   Rationale: increase ROM and increase strength to improve the patients ability to perform ADLs    30 min Neuromuscular Re-education:  [x]  See flow sheet : quad re-ed activities, prone activities, core stabilization activaties   Rationale: increase ROM, increase strength, improve coordination, improve balance and increase proprioception  to improve the patients ability to improve mobility and positional tolerance         With   [x] TE   [] TA   [x] neuro   [] other: Patient Education: [x] Review HEP    [] Progressed/Changed HEP based on:   [x] positioning   [x] body mechanics   [] transfers   [] heat/ice application    [] other:      Other Objective/Functional Measures:      Pain Level (0-10 scale) post treatment: 5    ASSESSMENT/Changes in Function: Pt had improved mobility around the clinic and had good carryover from her first follow up visit. We will progress as able. Patient will continue to benefit from skilled PT services to modify and progress therapeutic interventions, address functional mobility deficits, address ROM deficits, address strength deficits, analyze and address soft tissue restrictions, analyze and cue movement patterns, analyze and modify body mechanics/ergonomics, assess and modify postural abnormalities, address imbalance/dizziness and instruct in home and community integration to attain remaining goals. [x]  See Plan of Care  []  See progress note/recertification  []  See Discharge Summary         Progress towards goals / Updated goals:  Short Term Goals: To be accomplished in 1 weeks:  1. Establish HEP for ROM & Strengthening. New [place] be accomplished in 4 weeks:  1. Patient will be independent with HEP for ROM & Strengthening. Eval Status:n/a   Pt reports compliance  2. Pt will report decrease in referred LE symptoms by 50% to improve ease with mobility. Eval Status:n/a   Continues to have sx  3. Pt will increase FOTO score to 5 points to demonstrate increased ease with ADLs.     Eval Status: FOTO: 25   Assess at NV  4.  Pt will report decrease in average pain rating on VAS to <5/10 to improve ease with mobility. Eval Status:10/10    Pain remains elevated    PLAN  []  Upgrade activities as tolerated     [x]  Continue plan of care  []  Update interventions per flow sheet       []  Discharge due to:_  []  Other:_      Antnoino Can, PTA, CSCS 3/2/2018  10:57 AM    No future appointments.

## 2018-03-05 ENCOUNTER — APPOINTMENT (OUTPATIENT)
Dept: PHYSICAL THERAPY | Age: 33
End: 2018-03-05
Payer: MEDICAID

## 2018-03-08 ENCOUNTER — APPOINTMENT (OUTPATIENT)
Dept: PHYSICAL THERAPY | Age: 33
End: 2018-03-08
Payer: MEDICAID

## 2018-03-08 ENCOUNTER — HOSPITAL ENCOUNTER (OUTPATIENT)
Dept: PHYSICAL THERAPY | Age: 33
Discharge: HOME | End: 2018-03-08
Payer: MEDICAID

## 2018-03-08 PROCEDURE — 97112 NEUROMUSCULAR REEDUCATION: CPT | Performed by: PHYSICAL THERAPIST

## 2018-03-08 PROCEDURE — 97110 THERAPEUTIC EXERCISES: CPT | Performed by: PHYSICAL THERAPIST

## 2018-03-08 NOTE — PROGRESS NOTES
PT DAILY TREATMENT NOTE     Patient Name: Karely Cai  Date:3/8/2018  : 1985  [x]  Patient  Verified  Payor: BLUE CROSS MEDICAID / Plan: [facility] HEALTHKEEPERS PLUS / Product Type: Managed Care Medicaid /    In time:1015  Out time:1058  Total Treatment Time (min): 43  Visit #: 4 of 8    Treatment Area: Pain in right leg [M79.604]    SUBJECTIVE  Pain Level (0-10 scale): 0  Any medication changes, allergies to medications, adverse drug reactions, diagnosis change, or new procedure performed?: [x] No    [] Yes (see summary sheet for update)  Subjective functional status/changes:   [] No changes reported  \"My pain is still a 9-10/10 average when I'm outside doing things. My R leg hurts when I'm outside. \"    OBJECTIVE    30 min Therapeutic Exercise:  [] See flow sheet :   Rationale: increase ROM, increase strength, improve coordination, improve balance and increase proprioception to improve the patients ability to ambulate in the community with reduced c/o pain. 13 min Neuromuscular Re-education:  []  See flow sheet :   Rationale: increase ROM, increase strength, improve coordination, improve balance and increase proprioception  to improve the patients ability to ambulate with reduced c/o pain. With   [] TE   [] TA   [] neuro   [] other: Patient Education: [x] Review HEP    [] Progressed/Changed HEP based on:   [] positioning   [] body mechanics   [] transfers   [] heat/ice application    [] other:      Other Objective/Functional Measures: Pt continues to require increased verbal and visual cues for correct squat body mechanics. Pain Level (0-10 scale) post treatment: 0    ASSESSMENT/Changes in Function: Pt tolerates session well today and reports no pain before/during/after treatment today.     Patient will continue to benefit from skilled PT services to modify and progress therapeutic interventions, address functional mobility deficits, address ROM deficits, address strength deficits, analyze and address soft tissue restrictions, analyze and cue movement patterns, analyze and modify body mechanics/ergonomics, assess and modify postural abnormalities and address imbalance/dizziness to attain remaining goals. []  See Plan of Care  []  See progress note/recertification  []  See Discharge Summary         Progress towards goals / Updated goals:  Short Term Goals: To be accomplished in 1 weeks:  1. Establish HEP for ROM & Strengthening. New [place] be accomplished in 4 weeks:  1. Patient will be independent with HEP for ROM & Strengthening. Eval Status:n/a                        Pt reports compliance  2. Pt will report decrease in referred LE symptoms by 50% to improve ease with mobility. Eval Status:n/a                        Continues to have sx  3. Pt will increase FOTO score to 5 points to demonstrate increased ease with ADLs.                          Eval Status: FOTO: 25                        3/8/18: 44  4. Pt will report decrease in average pain rating on VAS to <5/10 to improve ease with mobility. Eval Status:10/10                         Pain remains elevated--pt reports 4-5/10 avg in her home, but 9-10/10 avg pain when she is outside \"in the air\"    PLAN  []  Upgrade activities as tolerated     []  Continue plan of care  []  Update interventions per flow sheet       []  Discharge due to:_  []  Other:_      Brendolyn Opitz, PT 3/8/2018  10:35 AM    No future appointments.

## 2018-03-15 ENCOUNTER — APPOINTMENT (OUTPATIENT)
Dept: PHYSICAL THERAPY | Age: 33
End: 2018-03-15
Payer: MEDICAID

## 2018-03-15 NOTE — PROGRESS NOTES
[facility]  [address], [address] [phone] fax    Discharge Summary  Patient name: Luciana Terry Start of Care: 2018   Referral source: Manuel Blank MD : 1985                          Medical Diagnosis: Pain in right leg [M79.604] Onset Date:1 month                          Treatment Diagnosis: R leg pain    Prior Hospitalization: see medical history Provider#: [id number]   Medications: Verified on Patient summary List    Comorbidities: hx of thyroid CA, seizures, DM, HTN   Prior Level of Function: currently not working, lives with children in [facility]. Functionally independent.          Visits from Start of Care: 4    Missed Visits: 4    Reporting Period : 18 to 3/8/18    Progress towards goals / Updated goals: - unable to reassess goals, not met  Short Term Goals: To be accomplished in 1 weeks:  1. Establish HEP for ROM & Strengthening.                        [facility]   [facility] be accomplished in 4 weeks:  1. Patient will be independent with HEP for ROM & Strengthening.                        Eval Status:n/a                        Pt reports compliance  2. Pt will report decrease in referred LE symptoms by 50% to improve ease with mobility.                       Eval Status:n/a                        Continues to have sx  3. Pt will increase FOTO score to 5 points to demonstrate increased ease with ADLs.                          Eval Status: FOTO: 25                        3/8/18: 44  4. Pt will report decrease in average pain rating on VAS to <5/10 to improve ease with mobility.                          Eval Status:10/10                         Pain remains elevated--pt reports 4-5/10 avg in her home, but 9-10/10 avg pain when she is outside \"in the air\"     Assessment/Summary of care: Dear Dr. Manuel Blank MD, under your direction, we have been providing physical therapy for your patient Chris Lindsay, for a diagnosis of Pain in right leg [M79.604]. The patient was scheduled for 8 visits. They attended 4 of them and was last seen on 3/8/18 with 4 missed visits. On the last visit they reported \"My pain is still a 9-10/10 average when I'm outside doing things. My R leg hurts when I'm outside. \"    Due to the inability to further their care from non-attendance, we are discharging the patient from physical therapy at this time. We appreciate the kind referral and would willingly work with this patient again, should they be able to arrange regular attendance. Your patient's health is our primary concern. RECOMMENDATIONS:  [x]Discontinue therapy: []Patient has reached or is progressing toward set goals      [x]Patient is non-compliant or has abdicated      []Due to lack of appreciable progress towards set goals    [place], PT 3/15/2018 11:51 AM    NOTE TO PHYSICIAN:  Please complete the following and fax to: [facility] at [facility] at [phone]  . Retain this original for your records. If you are unable to process this request in   24 hours, please contact our office.      [] I have read the above report and request that my patient continue therapy with the following changes/special instructions:  [] I have read the above report and request that my patient be discharged from therapy    Physician's Signature:_____________________ Date:___________Time:__________

## 2018-03-20 ENCOUNTER — HOSPITAL ENCOUNTER (OUTPATIENT)
Dept: PHYSICAL THERAPY | Age: 33
End: 2018-03-20
Payer: MEDICAID

## 2018-03-22 ENCOUNTER — APPOINTMENT (OUTPATIENT)
Dept: PHYSICAL THERAPY | Age: 33
End: 2018-03-22
Payer: MEDICAID

## 2018-03-29 ENCOUNTER — HOSPITAL ENCOUNTER (EMERGENCY)
Age: 33
Discharge: HOME | End: 2018-03-29
Attending: EMERGENCY MEDICINE
Payer: MEDICAID

## 2018-03-29 VITALS
TEMPERATURE: 98.5 F | HEART RATE: 102 BPM | HEIGHT: 69 IN | SYSTOLIC BLOOD PRESSURE: 156 MMHG | OXYGEN SATURATION: 95 % | WEIGHT: 174 LBS | RESPIRATION RATE: 16 BRPM | BODY MASS INDEX: 25.77 KG/M2 | DIASTOLIC BLOOD PRESSURE: 100 MMHG

## 2018-03-29 DIAGNOSIS — L02.91 ABSCESS: Primary | ICD-10-CM

## 2018-03-29 PROCEDURE — 99282 EMERGENCY DEPT VISIT SF MDM: CPT

## 2018-03-29 RX ORDER — SULFAMETHOXAZOLE AND TRIMETHOPRIM 800; 160 MG/1; MG/1
1 TABLET ORAL 2 TIMES DAILY
Qty: 14 TAB | Refills: 0 | Status: SHIPPED | OUTPATIENT
Start: 2018-03-29 | End: 2018-04-05

## 2018-03-29 NOTE — LETTER
NOTIFICATION RETURN TO WORK / SCHOOL 
 
3/29/2018 8:36 PM 
 
Ms. [address] [address] To Whom It May Concern: 
 
Fox Walsh was under the care of [facility] EMERGENCY DEPT. She will return to work/school on: 03/31/2018 If there are questions or concerns please have the patient contact our office. Sincerely, ESTEBAN Strong/Luke Saha RN

## 2018-03-30 ENCOUNTER — HOSPITAL ENCOUNTER (EMERGENCY)
Age: 33
Discharge: HOME | End: 2018-03-31
Attending: EMERGENCY MEDICINE | Admitting: EMERGENCY MEDICINE
Payer: MEDICAID

## 2018-03-30 VITALS
SYSTOLIC BLOOD PRESSURE: 150 MMHG | OXYGEN SATURATION: 100 % | HEART RATE: 97 BPM | DIASTOLIC BLOOD PRESSURE: 91 MMHG | TEMPERATURE: 98.6 F | RESPIRATION RATE: 16 BRPM

## 2018-03-30 DIAGNOSIS — L02.91 ABSCESS: Primary | ICD-10-CM

## 2018-03-30 PROCEDURE — 75810000289 HC I&D ABSCESS SIMP/COMP/MULT

## 2018-03-30 PROCEDURE — 99282 EMERGENCY DEPT VISIT SF MDM: CPT

## 2018-03-30 PROCEDURE — 77030018836 HC SOL IRR NACL ICUM -A

## 2018-03-30 PROCEDURE — 77030019895 HC PCKNG STRP IODO -A

## 2018-03-30 RX ORDER — CLINDAMYCIN HYDROCHLORIDE 150 MG/1
300 CAPSULE ORAL
Status: COMPLETED | OUTPATIENT
Start: 2018-03-30 | End: 2018-03-31

## 2018-03-30 RX ORDER — IBUPROFEN 800 MG/1
800 TABLET ORAL
Qty: 20 TAB | Refills: 0 | Status: SHIPPED | OUTPATIENT
Start: 2018-03-30 | End: 2018-04-06

## 2018-03-30 RX ORDER — CLINDAMYCIN HYDROCHLORIDE 300 MG/1
300 CAPSULE ORAL 4 TIMES DAILY
Qty: 40 CAP | Refills: 0 | Status: SHIPPED | OUTPATIENT
Start: 2018-03-30 | End: 2018-04-09

## 2018-03-30 RX ORDER — IBUPROFEN 600 MG/1
600 TABLET ORAL
Qty: 20 TAB | Refills: 0 | Status: SHIPPED | OUTPATIENT
Start: 2018-03-30 | End: 2018-12-20 | Stop reason: SDUPTHER

## 2018-03-30 RX ORDER — IBUPROFEN 600 MG/1
600 TABLET ORAL
Qty: 20 TAB | Refills: 0 | Status: SHIPPED | OUTPATIENT
Start: 2018-03-30 | End: 2019-03-17

## 2018-03-30 RX ORDER — IBUPROFEN 400 MG/1
800 TABLET ORAL
Status: COMPLETED | OUTPATIENT
Start: 2018-03-30 | End: 2018-03-31

## 2018-03-30 NOTE — LETTER
[address] [place] [address] [facility] 
[address] [facility] [address] 
[phone] Work/School Note Date: 3/30/2018 To Whom It May concern: 
 
Saadia Khan was seen and treated today in the emergency room by the following provider(s): 
Attending Provider: Elpidio Currie MD.   
 
Saadia Khan may return to work on Monday 4/ 2/ 2018. Sincerely, Cynthia Dyer RN

## 2018-03-30 NOTE — DISCHARGE INSTRUCTIONS

## 2018-03-30 NOTE — ED PROVIDER NOTES
EMERGENCY DEPARTMENT HISTORY AND PHYSICAL EXAM    Date: 3/29/2018  Patient Name: Tiffanie Peguero    History of Presenting Illness     Chief Complaint   Patient presents with   [address]         History Provided By: Patient    Chief Complaint: abscess  Duration: 1 Days  Timing:  Acute  Location: left hip and left lower back  Quality: Aching  Severity: Moderate  Modifying Factors: none  Associated Symptoms: denies any other associated signs or symptoms      Additional History (Context): Tiffanie Peguero is a 34 y.o. female with diabetes who presents with concern for abscess which she noticed yesterday. States she initially thought she was bit by something as spots were itchy, today they began to hurt. Has not taken anything for pain. Denies fever. No other complaints. PCP: Karol Whipple MD    Current Outpatient Prescriptions   Medication Sig Dispense Refill    trimethoprim-sulfamethoxazole (BACTRIM DS) 160-800 mg per tablet Take 1 Tab by mouth two (2) times a day for 7 days. 14 Tab 0    methylPREDNISolone (MEDROL, ROBERT,) 4 mg tablet PLEASE TAKEN AS INSTRUCTED ON BOX 1 Dose Pack 0    ibuprofen (MOTRIN) 600 mg tablet Take 1 Tab by mouth every six (6) hours as needed for Pain. 20 Tab 0    insulin glargine (LANTUS) 100 unit/mL injection by SubCUTAneous route nightly. Indications: type 2 diabetes mellitus      metFORMIN (GLUCOPHAGE) 1,000 mg tablet Take 1,000 mg by mouth two (2) times daily (with meals).  metoprolol tartrate (LOPRESSOR) 25 mg tablet Take 25 mg by mouth two (2) times a day.  fenofibrate nanocrystallized (TRICOR) 48 mg tablet Take 48 mg by mouth.  atorvastatin (LIPITOR) 40 mg tablet Take  by mouth daily.  furosemide (LASIX) 20 mg tablet Take  by mouth daily.  liothyronine (CYTOMEL) 25 mcg tablet Take 50 mcg by mouth daily.  topiramate (TOPAMAX) 25 mg tablet Take 1 Tab by mouth two (2) times a day.  60 Tab 0    lisinopril (PRINIVIL, ZESTRIL) 5 mg tablet Take 40 mg by mouth daily. Past History     Past Medical History:  Past Medical History:   Diagnosis Date    Arthritis     Chest pain     Diabetes ([address]     Essential hypertension     Headache(784.0)     Hyperchloremia     Hypertension     Seizures (HCC)     SOB (shortness of breath)     Thyroid cancer (HCC)        Past Surgical History:  Past Surgical History:   Procedure Laterality Date    HX PARTIAL HYSTERECTOMY      HX THYROIDECTOMY      HX TUBAL LIGATION         Family History:  Family History   Problem Relation Age of Onset    Hypertension Mother        Social History:  Social History   Substance Use Topics    Smoking status: Former Smoker    Smokeless tobacco: Never Used    Alcohol use No       Allergies:  No Known Allergies      Review of Systems   Review of Systems   Constitutional: Negative for chills and fever. Skin: Positive for color change. All other systems reviewed and are negative. All Other Systems Negative  Physical Exam     Vitals:    03/29/18 2004   BP: (!) 156/100   Pulse: (!) 102   Resp: 16   Temp: 98.5 °F (36.9 °C)   SpO2: 95%   Weight: 78.9 kg (174 lb)   Height: 5' 9\" (1.753 m)     Physical Exam   Constitutional: She is oriented to person, place, and time. She appears well-developed and well-nourished. No distress. HENT:   Head: Normocephalic and atraumatic. Eyes: Conjunctivae are normal.   Neck: Normal range of motion. Neck supple. Cardiovascular: Normal rate, regular rhythm and normal heart sounds. Pulmonary/Chest: Effort normal and breath sounds normal. No respiratory distress. She has no wheezes. She has no rales. Musculoskeletal: Normal range of motion. Neurological: She is alert and oriented to person, place, and time. Skin: Skin is warm and dry. 3 cm circular area of erythema noted to left hip. 1 cm circular area of erythema noted to left lower back. No central fluctuance or drainage noted. Psychiatric: She has a normal mood and affect.  Her behavior is normal. Judgment and thought content normal.   Nursing note and vitals reviewed. Diagnostic Study Results     Labs -   No results found for this or any previous visit (from the past 12 hour(s)). Radiologic Studies -   No orders to display     CT Results  (Last 48 hours)    None        CXR Results  (Last 48 hours)    None            Medical Decision Making   I am the first provider for this patient. I reviewed the vital signs, available nursing notes, past medical history, past surgical history, family history and social history. Records Reviewed: Nursing Notes    Procedures:  Procedures    Provider Notes (Medical Decision Making):     DDX: insect bite, abscess    Pt well appearing and in NAD. No indication for I+D. Will start on abx. PCP f/u for further eval.     MED RECONCILIATION:  No current facility-administered medications for this encounter. Current Outpatient Prescriptions   Medication Sig    trimethoprim-sulfamethoxazole (BACTRIM DS) 160-800 mg per tablet Take 1 Tab by mouth two (2) times a day for 7 days.  methylPREDNISolone (MEDROL, ROBERT,) 4 mg tablet PLEASE TAKEN AS INSTRUCTED ON BOX    ibuprofen (MOTRIN) 600 mg tablet Take 1 Tab by mouth every six (6) hours as needed for Pain.  insulin glargine (LANTUS) 100 unit/mL injection by SubCUTAneous route nightly. Indications: type 2 diabetes mellitus    metFORMIN (GLUCOPHAGE) 1,000 mg tablet Take 1,000 mg by mouth two (2) times daily (with meals).  metoprolol tartrate (LOPRESSOR) 25 mg tablet Take 25 mg by mouth two (2) times a day.  fenofibrate nanocrystallized (TRICOR) 48 mg tablet Take 48 mg by mouth.  atorvastatin (LIPITOR) 40 mg tablet Take  by mouth daily.  furosemide (LASIX) 20 mg tablet Take  by mouth daily.  liothyronine (CYTOMEL) 25 mcg tablet Take 50 mcg by mouth daily.  topiramate (TOPAMAX) 25 mg tablet Take 1 Tab by mouth two (2) times a day.     lisinopril (PRINIVIL, ZESTRIL) 5 mg tablet Take 40 mg by mouth daily. Disposition:  home    DISCHARGE NOTE:     Patient is improved, resting quietly and comfortably. The patient will be discharged home.      The patient was reassured that these symptoms do not appear to represent a serious or life threatening condition at this time. Warning signs of worsening condition were discussed and understood by the patient.      Based on patient's age, coexisting illness, exam, and the results of this ED evaluation, the decision to treat as an outpatient was made. Based on the information available at time of discharge, acute pathology requiring immediate intervention was deemed relative unlikely. While it is impossible to completely exclude the possibility of underlying serious disease or worsening of condition, I feel the relative likelihood is extremely low. I discussed this uncertainty with the patient, who understood ED evaluation and treatment and felt comfortable with the outpatient treatment plan.      All questions regarding care, test results, and follow up were answered. The patient is stable and appropriate to discharge. They understand that they should return to the emergency department for any new or worsening symptoms. I stressed the importance of follow up for repeat assessment and possibly further evaluation/treatment. Follow-up Information     Follow up With Details Comments Contact Info    Enoch Plascencia MD  As needed [address]  [facility] [address]  [phone]      [address] [facility] EMERGENCY DEPT  Immediately if symptoms worsen [address]  [phone]          Current Discharge Medication List      START taking these medications    Details   trimethoprim-sulfamethoxazole (BACTRIM DS) 160-800 mg per tablet Take 1 Tab by mouth two (2) times a day for 7 days.   Qty: 14 Tab, Refills: 0         CONTINUE these medications which have NOT CHANGED    Details   methylPREDNISolone (MEDROL, ROBERT,) 4 mg tablet PLEASE TAKEN AS INSTRUCTED ON BOX  Qty: 1 Dose Pack, Refills: 0      ibuprofen (MOTRIN) 600 mg tablet Take 1 Tab by mouth every six (6) hours as needed for Pain. Qty: 20 Tab, Refills: 0      insulin glargine (LANTUS) 100 unit/mL injection by SubCUTAneous route nightly. Indications: type 2 diabetes mellitus      metFORMIN (GLUCOPHAGE) 1,000 mg tablet Take 1,000 mg by mouth two (2) times daily (with meals). metoprolol tartrate (LOPRESSOR) 25 mg tablet Take 25 mg by mouth two (2) times a day. fenofibrate nanocrystallized (TRICOR) 48 mg tablet Take 48 mg by mouth. atorvastatin (LIPITOR) 40 mg tablet Take  by mouth daily. furosemide (LASIX) 20 mg tablet Take  by mouth daily. liothyronine (CYTOMEL) 25 mcg tablet Take 50 mcg by mouth daily. topiramate (TOPAMAX) 25 mg tablet Take 1 Tab by mouth two (2) times a day. Qty: 60 Tab, Refills: 0    Associated Diagnoses: Migraines; Seizures ([facility])      lisinopril (PRINIVIL, ZESTRIL) 5 mg tablet Take 40 mg by mouth daily. Associated Diagnoses: Migraines; Seizures ([facility]               Diagnosis     Clinical Impression:   1.  Abscess

## 2018-03-30 NOTE — ED NOTES
I have reviewed discharge instructions with the patient. The patient verbalized understanding. Patient armband removed and given to patient to take home. Patient was informed of the privacy risks if armband lost or stolen  Current Discharge Medication List      START taking these medications    Details   trimethoprim-sulfamethoxazole (BACTRIM DS) 160-800 mg per tablet Take 1 Tab by mouth two (2) times a day for 7 days. Qty: 14 Tab, Refills: 0         CONTINUE these medications which have NOT CHANGED    Details   methylPREDNISolone (MEDROL, ROBERT,) 4 mg tablet PLEASE TAKEN AS INSTRUCTED ON BOX  Qty: 1 Dose Pack, Refills: 0      ibuprofen (MOTRIN) 600 mg tablet Take 1 Tab by mouth every six (6) hours as needed for Pain. Qty: 20 Tab, Refills: 0      insulin glargine (LANTUS) 100 unit/mL injection by SubCUTAneous route nightly. Indications: type 2 diabetes mellitus      metFORMIN (GLUCOPHAGE) 1,000 mg tablet Take 1,000 mg by mouth two (2) times daily (with meals). metoprolol tartrate (LOPRESSOR) 25 mg tablet Take 25 mg by mouth two (2) times a day. fenofibrate nanocrystallized (TRICOR) 48 mg tablet Take 48 mg by mouth. atorvastatin (LIPITOR) 40 mg tablet Take  by mouth daily. furosemide (LASIX) 20 mg tablet Take  by mouth daily. liothyronine (CYTOMEL) 25 mcg tablet Take 50 mcg by mouth daily. topiramate (TOPAMAX) 25 mg tablet Take 1 Tab by mouth two (2) times a day. Qty: 60 Tab, Refills: 0    Associated Diagnoses: Migraines; Seizures ([facility])      lisinopril (PRINIVIL, ZESTRIL) 5 mg tablet Take 40 mg by mouth daily. Associated Diagnoses: Migraines;  Seizures ([address]

## 2018-03-31 PROCEDURE — 74011250637 HC RX REV CODE- 250/637: Performed by: EMERGENCY MEDICINE

## 2018-03-31 RX ADMIN — CLINDAMYCIN HYDROCHLORIDE 300 MG: 150 CAPSULE ORAL at 00:07

## 2018-03-31 RX ADMIN — IBUPROFEN 800 MG: 400 TABLET ORAL at 00:07

## 2018-03-31 NOTE — ED TRIAGE NOTES
Seen here last night for insect bite to left hip. Tonight having increased pain and numbness down her left leg.

## 2018-03-31 NOTE — DISCHARGE INSTRUCTIONS

## 2018-03-31 NOTE — ED PROVIDER NOTES
EMERGENCY DEPARTMENT HISTORY AND PHYSICAL EXAM    11:21 PM      Date: 3/30/2018  Patient Name: Calli Vanessa    History of Presenting Illness     Chief Complaint   Patient presents with    Insect Bite         History Provided By: Patient    Chief Complaint: abscess  Duration:  Days  Timing:  Constant and Worsening  Location: Lt lateral hip  Quality: Aching  Severity: Mild  Modifying Factors: No worsening or alleviating factors. Associated Symptoms: denies any other associated signs or symptoms      Additional History (Context): Calli Vanessa is a 34 y.o. female with PmHx of HTN and DM who presents with a constant mild aching abscess to the Lt lateral hip x 2 days. Pt was seen here last night for a abscess and was Dx with bactrim. Pt states that since then the abscess has gotten bigger with increased pain. No worsening or alleviating factors. Pt denies fevers, chills, and any other Sx or complaints at this time. PCP: Shanon Lay MD    Current Facility-Administered Medications   Medication Dose Route Frequency Provider Last Rate Last Dose    clindamycin (CLEOCIN) capsule 300 mg  300 mg Oral NOW Shonna Camarillo MD        ibuprofen (MOTRIN) tablet 800 mg  800 mg Oral NOW Shonna Camarillo MD         Current Outpatient Prescriptions   Medication Sig Dispense Refill    clindamycin (CLEOCIN) 300 mg capsule Take 1 Cap by mouth four (4) times daily for 10 days. 40 Cap 0    ibuprofen (MOTRIN) 800 mg tablet Take 1 Tab by mouth every six (6) hours as needed for Pain for up to 7 days. 20 Tab 0    ibuprofen (MOTRIN) 600 mg tablet Take 1 Tab by mouth every six (6) hours as needed for Pain. 20 Tab 0    clindamycin (CLEOCIN) 300 mg capsule Take 1 Cap by mouth four (4) times daily for 10 days. 40 Cap 0    ibuprofen (MOTRIN) 600 mg tablet Take 1 Tab by mouth every six (6) hours as needed for Pain.  20 Tab 0    trimethoprim-sulfamethoxazole (BACTRIM DS) 160-800 mg per tablet Take 1 Tab by mouth two (2) times a day for 7 days. 14 Tab 0    methylPREDNISolone (MEDROL, ROBERT,) 4 mg tablet PLEASE TAKEN AS INSTRUCTED ON BOX 1 Dose Pack 0    insulin glargine (LANTUS) 100 unit/mL injection by SubCUTAneous route nightly. Indications: type 2 diabetes mellitus      metFORMIN (GLUCOPHAGE) 1,000 mg tablet Take 1,000 mg by mouth two (2) times daily (with meals).  metoprolol tartrate (LOPRESSOR) 25 mg tablet Take 25 mg by mouth two (2) times a day.  fenofibrate nanocrystallized (TRICOR) 48 mg tablet Take 48 mg by mouth.  atorvastatin (LIPITOR) 40 mg tablet Take  by mouth daily.  furosemide (LASIX) 20 mg tablet Take  by mouth daily.  liothyronine (CYTOMEL) 25 mcg tablet Take 50 mcg by mouth daily.  topiramate (TOPAMAX) 25 mg tablet Take 1 Tab by mouth two (2) times a day. 60 Tab 0    lisinopril (PRINIVIL, ZESTRIL) 5 mg tablet Take 40 mg by mouth daily. Past History     Past Medical History:  Past Medical History:   Diagnosis Date    Arthritis     Chest pain     Diabetes ([address]     Essential hypertension     Headache(784.0)     Hyperchloremia     Hypertension     Seizures (HCC)     SOB (shortness of breath)     Thyroid cancer (HCC)        Past Surgical History:  Past Surgical History:   Procedure Laterality Date    HX PARTIAL HYSTERECTOMY      HX THYROIDECTOMY      HX TUBAL LIGATION         Family History:  Family History   Problem Relation Age of Onset    Hypertension Mother        Social History:  Social History   Substance Use Topics    Smoking status: Former Smoker    Smokeless tobacco: Never Used    Alcohol use No       Allergies:  No Known Allergies      Review of Systems       Review of Systems   Constitutional: Negative. Negative for chills and fever. HENT: Negative. Eyes: Negative. Respiratory: Negative. Cardiovascular: Negative. Gastrointestinal: Negative. Endocrine: Negative. Genitourinary: Negative. Skin: Positive for wound.    Allergic/Immunologic: Negative. Neurological: Negative. Hematological: Negative. Psychiatric/Behavioral: Negative. All other systems reviewed and are negative. Physical Exam     Visit Vitals    BP (!) 150/91    Pulse 97    Temp 98.6 °F (37 °C)    Resp 16    SpO2 100%         Physical Exam   Constitutional: She is oriented to person, place, and time. She appears well-developed and well-nourished. No distress. HENT:   Head: Normocephalic. Right Ear: External ear normal.   Left Ear: External ear normal.   Mouth/Throat: No oropharyngeal exudate. Eyes: Conjunctivae and EOM are normal. Pupils are equal, round, and reactive to light. Right eye exhibits no discharge. Left eye exhibits no discharge. No scleral icterus. Neck: Normal range of motion. Neck supple. No JVD present. No tracheal deviation present. No thyromegaly present. Cardiovascular: Normal rate, regular rhythm, normal heart sounds and intact distal pulses. Exam reveals no gallop and no friction rub. No murmur heard. Pulmonary/Chest: Effort normal and breath sounds normal. No stridor. No respiratory distress. She has no wheezes. She has no rales. She exhibits no tenderness. Abdominal: Soft. Bowel sounds are normal. She exhibits no distension and no mass. There is no tenderness. There is no rebound and no guarding. Musculoskeletal: Normal range of motion. She exhibits no edema or tenderness. Lymphadenopathy:     She has no cervical adenopathy. Neurological: She is alert and oriented to person, place, and time. She displays normal reflexes. No cranial nerve deficit. She exhibits normal muscle tone. Coordination normal.   Skin: Skin is warm and dry. No rash noted. She is not diaphoretic. No erythema. No pallor. 3cm area of induration with fluctuance and erythema to the lt lateral hip area   Nursing note and vitals reviewed.         Diagnostic Study Results     Labs -  No results found for this or any previous visit (from the past 12 hour(s)). Radiologic Studies -   No orders to display         Medical Decision Making   I am the first provider for this patient. I reviewed the vital signs, available nursing notes, past medical history, past surgical history, family history and social history. Vital Signs-Reviewed the patient's vital signs. Records Reviewed: Nursing Notes and Old Medical Records (Time of Review: 11:21 PM)    ED Course: Progress Notes, Reevaluation, and Consults:  11:54 PM I have assessed the patient and discussed her results and diagnosis. Pt feels better. Pt is discharged is in stable condition. Patient will f/u with PCP. Patient is to return to emergency department if any new or worsening condition. Patient understands and verbalizes agreement with plan. I&D Abcess Simple  Date/Time: 3/30/2018 11:52 PM  Performed by: Veronica Shearer by: Yari Antunez     Consent:     Consent obtained:  Written    Consent given by:  Patient    Risks discussed:  Infection    Alternatives discussed:  Alternative treatment  Location:     Type:  Abscess    Size:  3cm    Location:  Lower extremity    Lower extremity location:  Hip    Hip location:  L hip  Pre-procedure details:     Procedure prep: [place]urclens. Anesthesia (see MAR for exact dosages): Anesthesia method:  Local infiltration    Local anesthetic:  Lidocaine 1% w/o epi  Procedure type:     Complexity:  Simple  Procedure details:     Needle aspiration: no      Incision types:  Stab incision    Scalpel blade:  11    Wound management:  Irrigated with saline    Drainage:  Purulent    Drainage amount: Moderate    Wound treatment:  Drain placed    Packing materials:  1/4 in iodoform gauze    Amount 1/4\" iodoform:  8cm  Post-procedure details:     Patient tolerance of procedure: Tolerated well, no immediate complications         Provider Notes (Medical Decision Making): Dif Dx: abscess, cellulitis, lipoma      Diagnosis     Clinical Impression:   1.  Abscess Disposition: d/c    Follow-up Information     Follow up With Details Comments Contact Info    Claude Tapia MD Call in 1 day For wound re-check [address]      [id number] [facility] EMERGENCY DEPT Go to As needed, If symptoms worsen [address] [facility]  [phone]           Patient's Medications   Start Taking    CLINDAMYCIN (CLEOCIN) 300 MG CAPSULE    Take 1 Cap by mouth four (4) times daily for 10 days. CLINDAMYCIN (CLEOCIN) 300 MG CAPSULE    Take 1 Cap by mouth four (4) times daily for 10 days. IBUPROFEN (MOTRIN) 600 MG TABLET    Take 1 Tab by mouth every six (6) hours as needed for Pain. IBUPROFEN (MOTRIN) 800 MG TABLET    Take 1 Tab by mouth every six (6) hours as needed for Pain for up to 7 days. Continue Taking    ATORVASTATIN (LIPITOR) 40 MG TABLET    Take  by mouth daily. FENOFIBRATE NANOCRYSTALLIZED (TRICOR) 48 MG TABLET    Take 48 mg by mouth. FUROSEMIDE (LASIX) 20 MG TABLET    Take  by mouth daily. INSULIN GLARGINE (LANTUS) 100 UNIT/ML INJECTION    by SubCUTAneous route nightly. Indications: type 2 diabetes mellitus    LIOTHYRONINE (CYTOMEL) 25 MCG TABLET    Take 50 mcg by mouth daily. LISINOPRIL (PRINIVIL, ZESTRIL) 5 MG TABLET    Take 40 mg by mouth daily. METFORMIN (GLUCOPHAGE) 1,000 MG TABLET    Take 1,000 mg by mouth two (2) times daily (with meals). METHYLPREDNISOLONE (MEDROL, ROBERT,) 4 MG TABLET    PLEASE TAKEN AS INSTRUCTED ON BOX    METOPROLOL TARTRATE (LOPRESSOR) 25 MG TABLET    Take 25 mg by mouth two (2) times a day. TOPIRAMATE (TOPAMAX) 25 MG TABLET    Take 1 Tab by mouth two (2) times a day. TRIMETHOPRIM-SULFAMETHOXAZOLE (BACTRIM DS) 160-800 MG PER TABLET    Take 1 Tab by mouth two (2) times a day for 7 days.    These Medications have changed    Modified Medication Previous Medication    IBUPROFEN (MOTRIN) 600 MG TABLET ibuprofen (MOTRIN) 600 mg tablet       Take 1 Tab by mouth every six (6) hours as needed for Pain. Take 1 Tab by mouth every six (6) hours as needed for Pain. Stop Taking    No medications on file     _______________________________    Attestations:  Scribe Attestation     Mary Kate Castillo acting as a scribe for and in the presence of Janine Donnelly MD      March 30, 2018 at 11:21 PM       Provider Attestation:      I personally performed the services described in the documentation, reviewed the documentation, as recorded by the scribe in my presence, and it accurately and completely records my words and actions.  March 30, 2018 at 11:21 PM - Janine Donnelly MD    _______________________________

## 2018-05-24 ENCOUNTER — HOSPITAL ENCOUNTER (OUTPATIENT)
Dept: ULTRASOUND IMAGING | Age: 33
Discharge: HOME | End: 2018-05-24
Attending: FAMILY MEDICINE
Payer: MEDICAID

## 2018-05-24 ENCOUNTER — HOSPITAL ENCOUNTER (EMERGENCY)
Age: 33
Discharge: HOME | End: 2018-05-24
Attending: EMERGENCY MEDICINE
Payer: MEDICAID

## 2018-05-24 VITALS
HEART RATE: 83 BPM | DIASTOLIC BLOOD PRESSURE: 93 MMHG | TEMPERATURE: 97.5 F | WEIGHT: 260 LBS | RESPIRATION RATE: 16 BRPM | OXYGEN SATURATION: 100 % | BODY MASS INDEX: 38.51 KG/M2 | HEIGHT: 69 IN | SYSTOLIC BLOOD PRESSURE: 147 MMHG

## 2018-05-24 DIAGNOSIS — E11.69 DIABETES MELLITUS TYPE 2 IN OBESE (HCC): ICD-10-CM

## 2018-05-24 DIAGNOSIS — R51.9 NONINTRACTABLE EPISODIC HEADACHE, UNSPECIFIED HEADACHE TYPE: Primary | ICD-10-CM

## 2018-05-24 DIAGNOSIS — R10.13 ACUTE EPIGASTRIC PAIN: ICD-10-CM

## 2018-05-24 DIAGNOSIS — E66.9 DIABETES MELLITUS TYPE 2 IN OBESE (HCC): ICD-10-CM

## 2018-05-24 DIAGNOSIS — I10 ESSENTIAL HYPERTENSION: ICD-10-CM

## 2018-05-24 LAB
ANION GAP SERPL CALC-SCNC: 9 MMOL/L (ref 3–18)
BASOPHILS # BLD: 0 K/UL (ref 0–0.1)
BASOPHILS NFR BLD: 0 % (ref 0–2)
BUN SERPL-MCNC: 13 MG/DL (ref 7–18)
BUN/CREAT SERPL: 16 (ref 12–20)
CALCIUM SERPL-MCNC: 8.8 MG/DL (ref 8.5–10.1)
CHLORIDE SERPL-SCNC: 102 MMOL/L (ref 100–108)
CO2 SERPL-SCNC: 27 MMOL/L (ref 21–32)
CREAT SERPL-MCNC: 0.79 MG/DL (ref 0.6–1.3)
DIFFERENTIAL METHOD BLD: NORMAL
EOSINOPHIL # BLD: 0.1 K/UL (ref 0–0.4)
EOSINOPHIL NFR BLD: 1 % (ref 0–5)
ERYTHROCYTE [DISTWIDTH] IN BLOOD BY AUTOMATED COUNT: 13.8 % (ref 11.6–14.5)
GLUCOSE SERPL-MCNC: 224 MG/DL (ref 74–99)
HCT VFR BLD AUTO: 36.9 % (ref 35–45)
HGB BLD-MCNC: 12.1 G/DL (ref 12–16)
LYMPHOCYTES # BLD: 3.4 K/UL (ref 0.9–3.6)
LYMPHOCYTES NFR BLD: 42 % (ref 21–52)
MCH RBC QN AUTO: 26.7 PG (ref 24–34)
MCHC RBC AUTO-ENTMCNC: 32.8 G/DL (ref 31–37)
MCV RBC AUTO: 81.3 FL (ref 74–97)
MONOCYTES # BLD: 0.5 K/UL (ref 0.05–1.2)
MONOCYTES NFR BLD: 6 % (ref 3–10)
NEUTS SEG # BLD: 4.1 K/UL (ref 1.8–8)
NEUTS SEG NFR BLD: 51 % (ref 40–73)
PLATELET # BLD AUTO: 290 K/UL (ref 135–420)
PMV BLD AUTO: 9.4 FL (ref 9.2–11.8)
POTASSIUM SERPL-SCNC: 3.6 MMOL/L (ref 3.5–5.5)
RBC # BLD AUTO: 4.54 M/UL (ref 4.2–5.3)
SODIUM SERPL-SCNC: 138 MMOL/L (ref 136–145)
WBC # BLD AUTO: 8.1 K/UL (ref 4.6–13.2)

## 2018-05-24 PROCEDURE — 80048 BASIC METABOLIC PNL TOTAL CA: CPT | Performed by: EMERGENCY MEDICINE

## 2018-05-24 PROCEDURE — 96375 TX/PRO/DX INJ NEW DRUG ADDON: CPT

## 2018-05-24 PROCEDURE — 96374 THER/PROPH/DIAG INJ IV PUSH: CPT

## 2018-05-24 PROCEDURE — 99283 EMERGENCY DEPT VISIT LOW MDM: CPT

## 2018-05-24 PROCEDURE — 85025 COMPLETE CBC W/AUTO DIFF WBC: CPT | Performed by: EMERGENCY MEDICINE

## 2018-05-24 PROCEDURE — 76700 US EXAM ABDOM COMPLETE: CPT

## 2018-05-24 PROCEDURE — 96361 HYDRATE IV INFUSION ADD-ON: CPT

## 2018-05-24 PROCEDURE — 74011250636 HC RX REV CODE- 250/636: Performed by: EMERGENCY MEDICINE

## 2018-05-24 RX ORDER — METOCLOPRAMIDE HYDROCHLORIDE 5 MG/ML
10 INJECTION INTRAMUSCULAR; INTRAVENOUS
Status: COMPLETED | OUTPATIENT
Start: 2018-05-24 | End: 2018-05-24

## 2018-05-24 RX ORDER — DIPHENHYDRAMINE HYDROCHLORIDE 50 MG/ML
50 INJECTION, SOLUTION INTRAMUSCULAR; INTRAVENOUS ONCE
Status: COMPLETED | OUTPATIENT
Start: 2018-05-24 | End: 2018-05-24

## 2018-05-24 RX ADMIN — METOCLOPRAMIDE 10 MG: 5 INJECTION, SOLUTION INTRAMUSCULAR; INTRAVENOUS at 19:25

## 2018-05-24 RX ADMIN — SODIUM CHLORIDE 1000 ML: 900 INJECTION, SOLUTION INTRAVENOUS at 18:15

## 2018-05-24 RX ADMIN — DIPHENHYDRAMINE HYDROCHLORIDE 50 MG: 50 INJECTION, SOLUTION INTRAMUSCULAR; INTRAVENOUS at 19:25

## 2018-05-24 NOTE — ED PROVIDER NOTES
EMERGENCY DEPARTMENT HISTORY AND PHYSICAL EXAM    Date: 5/24/2018  Patient Name: Madiha Sherman    History of Presenting Illness     Chief Complaint   Patient presents with    Headache         History Provided By: Patient    Chief Complaint: headache  Duration: 2 Days  Timing:  Acute  Location: frontal  Quality: Aching  Severity: Moderate  Modifying Factors: taking Ibuprofen w/o relief  Associated Symptoms: denies any other associated signs or symptoms      Additional History (Context): Madiha Sherman is a 34 y.o. female with diabetes, hypertension, hyperlipidemia, obesity, osteoarthritis, malignancy and seizure who presents with HA since last night. HA on left frontal/temporal area, now across her forehead. Took Ibuprofen 500mg w/o relief. Denies injury/trauma, fever, nuchal rigidity, changes in speech or vision, unilateral weakness. +partial hysterectomy. Takes her HTN and seizure meds regularly. PCP: Red Rodriguez MD    Current Facility-Administered Medications   Medication Dose Route Frequency Provider Last Rate Last Dose    sodium chloride 0.9 % bolus infusion 1,000 mL  1,000 mL IntraVENous ONCE Lynsey Locket, PA        diphenhydrAMINE (BENADRYL) injection 50 mg  50 mg IntraVENous ONCE Lynsey Locket, PA        metoclopramide HCl (REGLAN) injection 10 mg  10 mg IntraVENous NOW Lynsey Locket, PA         Current Outpatient Prescriptions   Medication Sig Dispense Refill    ibuprofen (MOTRIN) 600 mg tablet Take 1 Tab by mouth every six (6) hours as needed for Pain. 20 Tab 0    ibuprofen (MOTRIN) 600 mg tablet Take 1 Tab by mouth every six (6) hours as needed for Pain. 20 Tab 0    methylPREDNISolone (MEDROL, ROBERT,) 4 mg tablet PLEASE TAKEN AS INSTRUCTED ON BOX 1 Dose Pack 0    insulin glargine (LANTUS) 100 unit/mL injection by SubCUTAneous route nightly. Indications: type 2 diabetes mellitus      metFORMIN (GLUCOPHAGE) 1,000 mg tablet Take 1,000 mg by mouth two (2) times daily (with meals).       metoprolol tartrate (LOPRESSOR) 25 mg tablet Take 25 mg by mouth two (2) times a day.  fenofibrate nanocrystallized (TRICOR) 48 mg tablet Take 48 mg by mouth.  atorvastatin (LIPITOR) 40 mg tablet Take  by mouth daily.  furosemide (LASIX) 20 mg tablet Take  by mouth daily.  liothyronine (CYTOMEL) 25 mcg tablet Take 50 mcg by mouth daily.  topiramate (TOPAMAX) 25 mg tablet Take 1 Tab by mouth two (2) times a day. 60 Tab 0    lisinopril (PRINIVIL, ZESTRIL) 5 mg tablet Take 40 mg by mouth daily. Past History     Past Medical History:  Past Medical History:   Diagnosis Date    Arthritis     Chest pain     Diabetes ([address]     Essential hypertension     Headache(784.0)     Hyperchloremia     Hypertension     Seizures (HCC)     SOB (shortness of breath)     Thyroid cancer (HCC)        Past Surgical History:  Past Surgical History:   Procedure Laterality Date    HX PARTIAL HYSTERECTOMY      HX THYROIDECTOMY      HX TUBAL LIGATION         Family History:  Family History   Problem Relation Age of Onset    Hypertension Mother        Social History:  Social History   Substance Use Topics    Smoking status: Former Smoker    Smokeless tobacco: Never Used    Alcohol use No       Allergies:  No Known Allergies      Review of Systems   Review of Systems   Constitutional: Negative. Negative for fever. HENT: Negative. Eyes: Negative. Negative for photophobia and visual disturbance. Respiratory: Negative. Cardiovascular: Negative. Gastrointestinal: Negative. Negative for nausea and vomiting. Endocrine: Negative. Genitourinary: Negative. Musculoskeletal: Negative for neck pain and neck stiffness. Skin: Negative. Allergic/Immunologic: Negative. Neurological: Positive for headaches. Negative for dizziness, tremors, seizures, syncope, facial asymmetry, speech difficulty, weakness, light-headedness and numbness. Hematological: Negative. Psychiatric/Behavioral: Negative. All other systems reviewed and are negative. All Other Systems Negative  Physical Exam     Vitals:    05/24/18 1620   BP: (!) 147/93   Pulse: 83   Resp: 16   Temp: 97.5 °F (36.4 °C)   SpO2: 100%   Weight: 117.9 kg (260 lb)   Height: 5' 9\" (1.753 m)     Physical Exam   Constitutional: She is oriented to person, place, and time. She appears well-developed. No distress. Morbidly obese   HENT:   Head: Normocephalic and atraumatic. Eyes: EOM are normal. Pupils are equal, round, and reactive to light. No nystagmus. Neck: No JVD present. No tracheal deviation present. No thyromegaly present. Cardiovascular: Normal rate, regular rhythm and normal heart sounds. Exam reveals no gallop and no friction rub. No murmur heard. Pulmonary/Chest: Effort normal and breath sounds normal. No stridor. No respiratory distress. She has no wheezes. She has no rales. She exhibits no tenderness. Abdominal: Soft. She exhibits no distension and no mass. There is no tenderness. There is no rebound and no guarding. Musculoskeletal: She exhibits no edema or tenderness. Lymphadenopathy:     She has no cervical adenopathy. Neurological: She is alert and oriented to person, place, and time. Negative Romberg. No dysdiadochokinesis, past pointing, tremor. Normal heel-shin. Skin: Skin is warm and dry. No rash noted. No erythema. No pallor. Psychiatric: She has a normal mood and affect. Her behavior is normal. Thought content normal.   Nursing note and vitals reviewed.              Diagnostic Study Results     Labs -     Recent Results (from the past 12 hour(s))   CBC WITH AUTOMATED DIFF    Collection Time: 05/24/18  6:25 PM   Result Value Ref Range    WBC 8.1 4.6 - 13.2 K/uL    RBC 4.54 4.20 - 5.30 M/uL    HGB 12.1 12.0 - 16.0 g/dL    HCT 36.9 35.0 - 45.0 %    MCV 81.3 74.0 - 97.0 FL    MCH 26.7 24.0 - 34.0 PG    MCHC 32.8 31.0 - 37.0 g/dL    RDW 13.8 11.6 - 14.5 %    PLATELET 662 135 - 420 K/uL    MPV 9.4 9.2 - 11.8 FL    NEUTROPHILS 51 40 - 73 %    LYMPHOCYTES 42 21 - 52 %    MONOCYTES 6 3 - 10 %    EOSINOPHILS 1 0 - 5 %    BASOPHILS 0 0 - 2 %    ABS. NEUTROPHILS 4.1 1.8 - 8.0 K/UL    ABS. LYMPHOCYTES 3.4 0.9 - 3.6 K/UL    ABS. MONOCYTES 0.5 0.05 - 1.2 K/UL    ABS. EOSINOPHILS 0.1 0.0 - 0.4 K/UL    ABS. BASOPHILS 0.0 0.0 - 0.1 K/UL    DF AUTOMATED     METABOLIC PANEL, BASIC    Collection Time: 05/24/18  6:25 PM   Result Value Ref Range    Sodium 138 136 - 145 mmol/L    Potassium 3.6 3.5 - 5.5 mmol/L    Chloride 102 100 - 108 mmol/L    CO2 27 21 - 32 mmol/L    Anion gap 9 3.0 - 18 mmol/L    Glucose 224 (H) 74 - 99 mg/dL    BUN 13 7.0 - 18 MG/DL    Creatinine 0.79 0.6 - 1.3 MG/DL    BUN/Creatinine ratio 16 12 - 20      GFR est AA >60 >60 ml/min/1.73m2    GFR est non-AA >60 >60 ml/min/1.73m2    Calcium 8.8 8.5 - 10.1 MG/DL       Radiologic Studies -   No orders to display     CT Results  (Last 48 hours)    None        CXR Results  (Last 48 hours)    None            Medical Decision Making   I am the first provider for this patient. I reviewed the vital signs, available nursing notes, past medical history, past surgical history, family history and social history. Vital Signs-Reviewed the patient's vital signs. Records Reviewed: Nursing Notes    Procedures:  Procedures    Provider Notes (Medical Decision Making): migraine; sinusitis; ICH; tension HA; cluster HA    Get labs, check electrolytes and hydrate. Treat pain.    7:06 PM  HA has resolved.     MED RECONCILIATION:  Current Facility-Administered Medications   Medication Dose Route Frequency    sodium chloride 0.9 % bolus infusion 1,000 mL  1,000 mL IntraVENous ONCE    diphenhydrAMINE (BENADRYL) injection 50 mg  50 mg IntraVENous ONCE    metoclopramide HCl (REGLAN) injection 10 mg  10 mg IntraVENous NOW     Current Outpatient Prescriptions   Medication Sig    ibuprofen (MOTRIN) 600 mg tablet Take 1 Tab by mouth every six (6) hours as needed for Pain.  ibuprofen (MOTRIN) 600 mg tablet Take 1 Tab by mouth every six (6) hours as needed for Pain.  methylPREDNISolone (MEDROL, ROBERT,) 4 mg tablet PLEASE TAKEN AS INSTRUCTED ON BOX    insulin glargine (LANTUS) 100 unit/mL injection by SubCUTAneous route nightly. Indications: type 2 diabetes mellitus    metFORMIN (GLUCOPHAGE) 1,000 mg tablet Take 1,000 mg by mouth two (2) times daily (with meals).  metoprolol tartrate (LOPRESSOR) 25 mg tablet Take 25 mg by mouth two (2) times a day.  fenofibrate nanocrystallized (TRICOR) 48 mg tablet Take 48 mg by mouth.  atorvastatin (LIPITOR) 40 mg tablet Take  by mouth daily.  furosemide (LASIX) 20 mg tablet Take  by mouth daily.  liothyronine (CYTOMEL) 25 mcg tablet Take 50 mcg by mouth daily.  topiramate (TOPAMAX) 25 mg tablet Take 1 Tab by mouth two (2) times a day.  lisinopril (PRINIVIL, ZESTRIL) 5 mg tablet Take 40 mg by mouth daily. Disposition:  home    DISCHARGE NOTE:   7:06 PM    Pt has been reexamined. Patient has no new complaints, changes, or physical findings. Care plan outlined and precautions discussed. Results of labs were reviewed with the patient. All medications were reviewed with the patient; will d/c home with reassurance. All of pt's questions and concerns were addressed. Patient was instructed and agrees to follow up with PCP, as well as to return to the ED upon further deterioration. Patient is ready to go home. Follow-up Information     Follow up With Details Comments Contact Info    Red Rodriguez MD Schedule an appointment as soon as possible for a visit in 1 day  Ike Walton      [facility] EMERGENCY DEPT  If symptoms worsen return immediately [address]  [phone]          Current Discharge Medication List          Diagnosis     Clinical Impression:   1.  Nonintractable episodic headache, unspecified headache type 2. Diabetes mellitus type 2 in obese ([address]    3.  Essential hypertension

## 2018-05-24 NOTE — ED TRIAGE NOTES
Pt. States \"My head keep hurting it's going from one side to the other side\" reports symptoms started \"last night\".  Denies any N/V.

## 2018-05-25 ENCOUNTER — HOSPITAL ENCOUNTER (EMERGENCY)
Age: 33
Discharge: HOME | End: 2018-05-25
Attending: EMERGENCY MEDICINE | Admitting: EMERGENCY MEDICINE
Payer: MEDICAID

## 2018-05-25 ENCOUNTER — APPOINTMENT (OUTPATIENT)
Dept: CT IMAGING | Age: 33
End: 2018-05-25
Attending: EMERGENCY MEDICINE
Payer: MEDICAID

## 2018-05-25 VITALS
RESPIRATION RATE: 16 BRPM | DIASTOLIC BLOOD PRESSURE: 96 MMHG | HEART RATE: 89 BPM | BODY MASS INDEX: 38.51 KG/M2 | HEIGHT: 69 IN | WEIGHT: 260 LBS | TEMPERATURE: 97.8 F | SYSTOLIC BLOOD PRESSURE: 164 MMHG | OXYGEN SATURATION: 100 %

## 2018-05-25 DIAGNOSIS — R51.9 NONINTRACTABLE EPISODIC HEADACHE, UNSPECIFIED HEADACHE TYPE: Primary | ICD-10-CM

## 2018-05-25 LAB
ANION GAP SERPL CALC-SCNC: 9 MMOL/L (ref 3–18)
BASOPHILS # BLD: 0 K/UL (ref 0–0.1)
BASOPHILS NFR BLD: 0 % (ref 0–2)
BUN SERPL-MCNC: 9 MG/DL (ref 7–18)
BUN/CREAT SERPL: 10 (ref 12–20)
CALCIUM SERPL-MCNC: 8.7 MG/DL (ref 8.5–10.1)
CHLORIDE SERPL-SCNC: 100 MMOL/L (ref 100–108)
CO2 SERPL-SCNC: 27 MMOL/L (ref 21–32)
CREAT SERPL-MCNC: 0.88 MG/DL (ref 0.6–1.3)
DIFFERENTIAL METHOD BLD: NORMAL
EOSINOPHIL # BLD: 0.1 K/UL (ref 0–0.4)
EOSINOPHIL NFR BLD: 1 % (ref 0–5)
ERYTHROCYTE [DISTWIDTH] IN BLOOD BY AUTOMATED COUNT: 13.8 % (ref 11.6–14.5)
GLUCOSE SERPL-MCNC: 275 MG/DL (ref 74–99)
HCT VFR BLD AUTO: 37.4 % (ref 35–45)
HGB BLD-MCNC: 12.4 G/DL (ref 12–16)
LYMPHOCYTES # BLD: 2.7 K/UL (ref 0.9–3.6)
LYMPHOCYTES NFR BLD: 35 % (ref 21–52)
MCH RBC QN AUTO: 27.1 PG (ref 24–34)
MCHC RBC AUTO-ENTMCNC: 33.2 G/DL (ref 31–37)
MCV RBC AUTO: 81.8 FL (ref 74–97)
MONOCYTES # BLD: 0.6 K/UL (ref 0.05–1.2)
MONOCYTES NFR BLD: 7 % (ref 3–10)
NEUTS SEG # BLD: 4.5 K/UL (ref 1.8–8)
NEUTS SEG NFR BLD: 57 % (ref 40–73)
PLATELET # BLD AUTO: 292 K/UL (ref 135–420)
PMV BLD AUTO: 10 FL (ref 9.2–11.8)
POTASSIUM SERPL-SCNC: 3.6 MMOL/L (ref 3.5–5.5)
RBC # BLD AUTO: 4.57 M/UL (ref 4.2–5.3)
SODIUM SERPL-SCNC: 136 MMOL/L (ref 136–145)
WBC # BLD AUTO: 7.9 K/UL (ref 4.6–13.2)

## 2018-05-25 PROCEDURE — 80201 ASSAY OF TOPIRAMATE: CPT | Performed by: EMERGENCY MEDICINE

## 2018-05-25 PROCEDURE — 74011250636 HC RX REV CODE- 250/636: Performed by: EMERGENCY MEDICINE

## 2018-05-25 PROCEDURE — 85025 COMPLETE CBC W/AUTO DIFF WBC: CPT | Performed by: EMERGENCY MEDICINE

## 2018-05-25 PROCEDURE — 80048 BASIC METABOLIC PNL TOTAL CA: CPT | Performed by: EMERGENCY MEDICINE

## 2018-05-25 PROCEDURE — 99284 EMERGENCY DEPT VISIT MOD MDM: CPT

## 2018-05-25 PROCEDURE — 96374 THER/PROPH/DIAG INJ IV PUSH: CPT

## 2018-05-25 PROCEDURE — 70450 CT HEAD/BRAIN W/O DYE: CPT

## 2018-05-25 PROCEDURE — 96375 TX/PRO/DX INJ NEW DRUG ADDON: CPT

## 2018-05-25 RX ORDER — KETOROLAC TROMETHAMINE 30 MG/ML
30 INJECTION, SOLUTION INTRAMUSCULAR; INTRAVENOUS
Status: COMPLETED | OUTPATIENT
Start: 2018-05-25 | End: 2018-05-25

## 2018-05-25 RX ORDER — METOCLOPRAMIDE HYDROCHLORIDE 5 MG/ML
10 INJECTION INTRAMUSCULAR; INTRAVENOUS ONCE
Status: COMPLETED | OUTPATIENT
Start: 2018-05-25 | End: 2018-05-25

## 2018-05-25 RX ADMIN — KETOROLAC TROMETHAMINE 30 MG: 30 INJECTION INTRAMUSCULAR; INTRAVENOUS at 16:13

## 2018-05-25 RX ADMIN — SODIUM CHLORIDE 1000 ML: 900 INJECTION, SOLUTION INTRAVENOUS at 16:13

## 2018-05-25 RX ADMIN — METOCLOPRAMIDE 10 MG: 5 INJECTION, SOLUTION INTRAMUSCULAR; INTRAVENOUS at 16:13

## 2018-05-25 NOTE — ED NOTES
I have reviewed discharge instructions with the patient. The patient verbalized understanding.  Iv discontinued, pt ambulated out of ED in stable condition with no distress noted and no complaints voiced

## 2018-05-25 NOTE — DISCHARGE INSTRUCTIONS
Resume your normal medications and make sure you are getting plenty of sleep  Contact your Neurologist to see if you can be seen this week and let them know we checked the level of your seizure medication  If you continue to have recurrent seizures, change in your vision or worsening headaches, please return to the ED as you may need to have the pressure of your spinal fluid checked

## 2018-05-25 NOTE — ED TRIAGE NOTES
Pt reports HA x 2 days, seen here yesterday for it. Pt family reports 2 witnessed seizures less than 1 min long. Pt arrives A/O x 4, with HA and photosensitivity. Pt does have a  Hx of seizures.

## 2018-05-25 NOTE — ED PROVIDER NOTES
EMERGENCY DEPARTMENT HISTORY AND PHYSICAL EXAM    3:55 PM      Date: 5/25/2018  Patient Name: Malcolm Reid    History of Presenting Illness     Chief Complaint   Patient presents with    Seizure    Headache         History Provided By: Patient and Patient's family member    Chief Complaint: Seizure  Duration:  Today PTA  Timing:  Acute  Location: Reports frontal HA  Quality: N/A  Severity: 10 out of 10  Modifying Factors: Patient was seen in the ED 1 day ago for her headache, was given Benadryl, and Ibuprofen, and was discharged. Patient states that she went home, and slept. Notes that she woke up with the same headache. Associated Symptoms: Patient reports an associated symptom of a frontal headache. Patient also notes initial left eye redness, and pain, but reports that the pain is located in her bilateral eyes. Also reports photophobia. Additional History (Context): Malcolm Reid is a 34 y.o. female with PMHx of Chest Pain, SOB, DM, Seizures, HTN, and Hyperchloremia who presents with 2 witnessed seizures onset today. Patient reports having a headache for 2 days. Patient was seen in the ED 1 day ago for her headache, was given Benadryl, and Ibuprofen, and was discharged. Patient states that she went home, and slept. Notes that she woke up with the same headache. Patient's family member then reports that the patient had a seizure, and ended up laying on the floor. Upon EMS arrival, the patient had another seizure. Patient does not recall having her seizures. Reports taking Topamax for her seizures. Patient also notes initial left eye redness, and pain, but reports that the pain is now located in her bilateral eyes. Also reports photophobia. Reports last seizure in 2017. Patient denies any recent injuries/falls. Reports that her mother passed away in December 2017. No other concerns were expressed at this time.      PCP: Hobson Castleman, MD    Current Outpatient Prescriptions   Medication Sig Dispense Refill    ibuprofen (MOTRIN) 600 mg tablet Take 1 Tab by mouth every six (6) hours as needed for Pain. 20 Tab 0    ibuprofen (MOTRIN) 600 mg tablet Take 1 Tab by mouth every six (6) hours as needed for Pain. 20 Tab 0    methylPREDNISolone (MEDROL, ROBERT,) 4 mg tablet PLEASE TAKEN AS INSTRUCTED ON BOX 1 Dose Pack 0    insulin glargine (LANTUS) 100 unit/mL injection by SubCUTAneous route nightly. Indications: type 2 diabetes mellitus      metFORMIN (GLUCOPHAGE) 1,000 mg tablet Take 1,000 mg by mouth two (2) times daily (with meals).  metoprolol tartrate (LOPRESSOR) 25 mg tablet Take 25 mg by mouth two (2) times a day.  fenofibrate nanocrystallized (TRICOR) 48 mg tablet Take 48 mg by mouth.  atorvastatin (LIPITOR) 40 mg tablet Take  by mouth daily.  furosemide (LASIX) 20 mg tablet Take  by mouth daily.  liothyronine (CYTOMEL) 25 mcg tablet Take 50 mcg by mouth daily.  topiramate (TOPAMAX) 25 mg tablet Take 1 Tab by mouth two (2) times a day. 60 Tab 0    lisinopril (PRINIVIL, ZESTRIL) 5 mg tablet Take 40 mg by mouth daily. Past History     Past Medical History:  Past Medical History:   Diagnosis Date    Arthritis     Chest pain     Diabetes ([address]     Essential hypertension     Headache(784.0)     Hyperchloremia     Hypertension     Seizures (HCC)     SOB (shortness of breath)     Thyroid cancer (HCC)        Past Surgical History:  Past Surgical History:   Procedure Laterality Date    HX PARTIAL HYSTERECTOMY      HX THYROIDECTOMY      HX TUBAL LIGATION         Family History:  Family History   Problem Relation Age of Onset    Hypertension Mother        Social History:  Social History   Substance Use Topics    Smoking status: Former Smoker    Smokeless tobacco: Never Used    Alcohol use No       Allergies:  No Known Allergies      Review of Systems     Review of Systems   Constitutional: Negative for chills and fever.    Eyes: Positive for photophobia, pain (bilateral) and redness (left). Respiratory: Negative for shortness of breath. Cardiovascular: Negative for chest pain. Gastrointestinal: Negative for diarrhea, nausea and vomiting. Neurological: Positive for seizures and headaches. All other systems reviewed and are negative. Physical Exam     Visit Vitals    BP (!) 164/96    Pulse 89    Temp 97.8 °F (36.6 °C)    Resp 16    Ht 5' 9\" (1.753 m)    Wt 117.9 kg (260 lb)    SpO2 100%    BMI 38.4 kg/m2     Physical Exam   Constitutional: She is oriented to person, place, and time. She appears well-developed and well-nourished. No distress. HENT:   Head: Normocephalic and atraumatic. Eyes: Conjunctivae and EOM are normal. Right eye exhibits no discharge. Left eye exhibits no discharge. No scleral icterus. Neck: Normal range of motion. Neck supple. No tracheal deviation present. Cardiovascular: Normal rate, regular rhythm and normal heart sounds. No murmur heard. Pulmonary/Chest: Effort normal and breath sounds normal. No respiratory distress. She has no wheezes. She has no rales. Abdominal: Soft. She exhibits no distension. There is no tenderness. There is no rebound and no guarding. Musculoskeletal: Normal range of motion. She exhibits no edema or deformity. Neurological: She is alert and oriented to person, place, and time. No cranial nerve deficit. Skin: Skin is warm and dry. She is not diaphoretic. Psychiatric: She has a normal mood and affect. Her behavior is normal. Judgment and thought content normal.         Diagnostic Study Results     Labs -  No results found for this or any previous visit (from the past 12 hour(s)). Radiologic Studies -   CT HEAD WO CONT   Final Result            Medical Decision Making   I am the first provider for this patient. I reviewed the vital signs, available nursing notes, past medical history, past surgical history, family history and social history.     Vital Signs-Reviewed the patient's vital signs. Pulse Oximetry Analysis -  100% on room air (Interpretation)    Records Reviewed: Nursing Notes and Triage notes  (Time of Review: 3:55 PM)    ED Course: Progress Notes, Reevaluation, and Consults:  5:00 PM : Pt care transferred to Dr. Christiane Garcia  ,ED provider. History of patient complaint(s), available diagnostic reports and current treatment plan has been discussed thoroughly. Bedside rounding on patient occured : yes . Intended disposition of patient : TBD  Pending diagnostics reports and/or labs (please list): Pending CT Head, and re-evaluation of headache after CT. Diagnosis     Clinical Impression:   1. Nonintractable episodic headache, unspecified headache type        Disposition: Signed out to Dr. Christiane Garcia, ED Provider     Follow-up Information     Follow up With Details Comments Contact Info    [facility] EMERGENCY DEPT  As needed, If symptoms worsen [address] [facility] [address] [facility]    Leigh Guzman MD In 3 days  [facility]  [phone]             Discharge Medication List as of 5/25/2018  6:01 PM      CONTINUE these medications which have NOT CHANGED    Details   !! ibuprofen (MOTRIN) 600 mg tablet Take 1 Tab by mouth every six (6) hours as needed for Pain., Normal, Disp-20 Tab, R-0      !! ibuprofen (MOTRIN) 600 mg tablet Take 1 Tab by mouth every six (6) hours as needed for Pain., Print, Disp-20 Tab, R-0      methylPREDNISolone (MEDROL, ROBERT,) 4 mg tablet PLEASE TAKEN AS INSTRUCTED ON BOX, Print, Disp-1 Dose Pack, R-0      insulin glargine (LANTUS) 100 unit/mL injection by SubCUTAneous route nightly. Indications: type 2 diabetes mellitus, Historical Med      metFORMIN (GLUCOPHAGE) 1,000 mg tablet Take 1,000 mg by mouth two (2) times daily (with meals). , Historical Med      metoprolol tartrate (LOPRESSOR) 25 mg tablet Take 25 mg by mouth two (2) times a day., Historical Med      fenofibrate nanocrystallized (TRICOR) 48 mg tablet Take 48 mg by mouth., Historical Med      atorvastatin (LIPITOR) 40 mg tablet Take  by mouth daily. , Historical Med      furosemide (LASIX) 20 mg tablet Take  by mouth daily. , Historical Med      liothyronine (CYTOMEL) 25 mcg tablet Take 50 mcg by mouth daily. , Historical Med      topiramate (TOPAMAX) 25 mg tablet Take 1 Tab by mouth two (2) times a day., Print, Disp-60 Tab, R-0      lisinopril (PRINIVIL, ZESTRIL) 5 mg tablet Take 40 mg by mouth daily. , Historical Med       !! - Potential duplicate medications found. Please discuss with provider.        _______________________________    Attestations:  Sampson Washington 9967 acting as a scribe for and in the presence of Luis M Nathan MD      May 25, 2018 at 3:55 PM       Provider Attestation:      I personally performed the services described in the documentation, reviewed the documentation, as recorded by the scribe in my presence, and it accurately and completely records my words and actions.  May 25, 2018 at 3:55 PM - Luis M Nathan MD    _______________________________

## 2018-05-25 NOTE — ED NOTES
5:32 PM :Pt care assumed from Dr. Arelis Goldman , ED provider. Pt complaint(s), current treatment plan, progression and available diagnostic results have been discussed thoroughly. Rounding occurred: yes  Intended Disposition: TBD   Pending diagnostic reports and/or labs (please list): Reevaluation after Head CT results    Flaquito Marvin Oswaldo acting as a scribe for and in the presence of Torsten Rai MD      May 25, 2018 at 5:33 PM       Provider Attestation:      I personally performed the services described in the documentation, reviewed the documentation, as recorded by the scribe in my presence, and it accurately and completely records my words and actions. May 25, 2018 at 5:33 PM - Torsten Rai MD      Patient with good resolution of headache in ED and reassuring neurovascular examination at time of discharge. Discussed possible need for opening pressure for pseudotumor if headache continues return or worsen. No complaints of change in visual acuity.

## 2018-05-29 LAB — TOPIRAMATE SERPL-MCNC: 1.2 UG/ML (ref 2–25)

## 2018-09-29 ENCOUNTER — HOSPITAL ENCOUNTER (EMERGENCY)
Age: 33
Discharge: HOME | End: 2018-09-29
Attending: EMERGENCY MEDICINE
Payer: MEDICAID

## 2018-09-29 ENCOUNTER — APPOINTMENT (OUTPATIENT)
Dept: GENERAL RADIOLOGY | Age: 33
End: 2018-09-29
Attending: EMERGENCY MEDICINE
Payer: MEDICAID

## 2018-09-29 VITALS
HEIGHT: 69 IN | RESPIRATION RATE: 16 BRPM | TEMPERATURE: 97.9 F | HEART RATE: 94 BPM | BODY MASS INDEX: 38.51 KG/M2 | DIASTOLIC BLOOD PRESSURE: 82 MMHG | SYSTOLIC BLOOD PRESSURE: 148 MMHG | WEIGHT: 260 LBS | OXYGEN SATURATION: 99 %

## 2018-09-29 DIAGNOSIS — G40.909 SEIZURE DISORDER (HCC): Primary | ICD-10-CM

## 2018-09-29 LAB
ALBUMIN SERPL-MCNC: 2.8 G/DL (ref 3.4–5)
ALBUMIN/GLOB SERPL: 0.6 {RATIO} (ref 0.8–1.7)
ALP SERPL-CCNC: 109 U/L (ref 45–117)
ALT SERPL-CCNC: 42 U/L (ref 13–56)
ANION GAP SERPL CALC-SCNC: 6 MMOL/L (ref 3–18)
APPEARANCE UR: CLEAR
AST SERPL-CCNC: 29 U/L (ref 15–37)
BACTERIA URNS QL MICRO: ABNORMAL /HPF
BASOPHILS # BLD: 0 K/UL (ref 0–0.1)
BASOPHILS NFR BLD: 0 % (ref 0–2)
BILIRUB SERPL-MCNC: 0.3 MG/DL (ref 0.2–1)
BILIRUB UR QL: NEGATIVE
BUN SERPL-MCNC: 15 MG/DL (ref 7–18)
BUN/CREAT SERPL: 14 (ref 12–20)
CALCIUM SERPL-MCNC: 8.5 MG/DL (ref 8.5–10.1)
CHLORIDE SERPL-SCNC: 98 MMOL/L (ref 100–108)
CO2 SERPL-SCNC: 27 MMOL/L (ref 21–32)
COLOR UR: YELLOW
CREAT SERPL-MCNC: 1.06 MG/DL (ref 0.6–1.3)
DIFFERENTIAL METHOD BLD: NORMAL
EOSINOPHIL # BLD: 0.1 K/UL (ref 0–0.4)
EOSINOPHIL NFR BLD: 1 % (ref 0–5)
EPITH CASTS URNS QL MICRO: ABNORMAL /LPF (ref 0–5)
ERYTHROCYTE [DISTWIDTH] IN BLOOD BY AUTOMATED COUNT: 13.3 % (ref 11.6–14.5)
GLOBULIN SER CALC-MCNC: 4.4 G/DL (ref 2–4)
GLUCOSE BLD STRIP.AUTO-MCNC: 377 MG/DL (ref 70–110)
GLUCOSE SERPL-MCNC: 388 MG/DL (ref 74–99)
GLUCOSE UR STRIP.AUTO-MCNC: >1000 MG/DL
HCG UR QL: NEGATIVE
HCT VFR BLD AUTO: 39.9 % (ref 35–45)
HGB BLD-MCNC: 12.7 G/DL (ref 12–16)
HGB UR QL STRIP: ABNORMAL
KETONES UR QL STRIP.AUTO: ABNORMAL MG/DL
LEUKOCYTE ESTERASE UR QL STRIP.AUTO: NEGATIVE
LYMPHOCYTES # BLD: 2.5 K/UL (ref 0.9–3.6)
LYMPHOCYTES NFR BLD: 31 % (ref 21–52)
MCH RBC QN AUTO: 26.1 PG (ref 24–34)
MCHC RBC AUTO-ENTMCNC: 31.8 G/DL (ref 31–37)
MCV RBC AUTO: 82.1 FL (ref 74–97)
MONOCYTES # BLD: 0.7 K/UL (ref 0.05–1.2)
MONOCYTES NFR BLD: 9 % (ref 3–10)
NEUTS SEG # BLD: 4.6 K/UL (ref 1.8–8)
NEUTS SEG NFR BLD: 59 % (ref 40–73)
NITRITE UR QL STRIP.AUTO: NEGATIVE
PH UR STRIP: 5 [PH] (ref 5–8)
PLATELET # BLD AUTO: 286 K/UL (ref 135–420)
PMV BLD AUTO: 10.4 FL (ref 9.2–11.8)
POTASSIUM SERPL-SCNC: 4.4 MMOL/L (ref 3.5–5.5)
PROT SERPL-MCNC: 7.2 G/DL (ref 6.4–8.2)
PROT UR STRIP-MCNC: 300 MG/DL
RBC # BLD AUTO: 4.86 M/UL (ref 4.2–5.3)
RBC #/AREA URNS HPF: ABNORMAL /HPF (ref 0–5)
SODIUM SERPL-SCNC: 131 MMOL/L (ref 136–145)
SP GR UR REFRACTOMETRY: 1.03 (ref 1–1.03)
UROBILINOGEN UR QL STRIP.AUTO: 0.2 EU/DL (ref 0.2–1)
WBC # BLD AUTO: 7.8 K/UL (ref 4.6–13.2)
WBC URNS QL MICRO: ABNORMAL /HPF (ref 0–4)

## 2018-09-29 PROCEDURE — 99284 EMERGENCY DEPT VISIT MOD MDM: CPT

## 2018-09-29 PROCEDURE — 81025 URINE PREGNANCY TEST: CPT | Performed by: EMERGENCY MEDICINE

## 2018-09-29 PROCEDURE — 73590 X-RAY EXAM OF LOWER LEG: CPT

## 2018-09-29 PROCEDURE — 80053 COMPREHEN METABOLIC PANEL: CPT | Performed by: EMERGENCY MEDICINE

## 2018-09-29 PROCEDURE — 85025 COMPLETE CBC W/AUTO DIFF WBC: CPT | Performed by: EMERGENCY MEDICINE

## 2018-09-29 PROCEDURE — 73562 X-RAY EXAM OF KNEE 3: CPT

## 2018-09-29 PROCEDURE — 82962 GLUCOSE BLOOD TEST: CPT

## 2018-09-29 PROCEDURE — 73564 X-RAY EXAM KNEE 4 OR MORE: CPT

## 2018-09-29 PROCEDURE — 81001 URINALYSIS AUTO W/SCOPE: CPT | Performed by: EMERGENCY MEDICINE

## 2018-09-29 NOTE — ED TRIAGE NOTES
Patient presents to ER for witnessed seizure, patient C/O head pain and Left leg pain. EMS states witnesses saw patient lower herself to floor just prior to seizure, during seizure patient did hit her head on floor per witnesses. Patient is awake, alert and oriented x4. Seizure pads placed to side rails.

## 2018-09-29 NOTE — DISCHARGE INSTRUCTIONS
Epilepsy: Care Instructions  Your Care Instructions    Epilepsy is a common condition that causes repeated seizures. The seizures are caused by bursts of electrical activity in the brain that aren't normal. Seizures may cause problems with muscle control, movement, speech, vision, or awareness. They can be scary. Epilepsy affects each person differently. Some people have only a few seizures. Others get them more often. If you know what triggers a seizure, you may be able to avoid having one. You can take medicines to control and reduce seizures. You and your doctor will need to find the right combination, schedule, and dose of medicine. This may take time and careful changes. Seizures may get worse and happen more often over time. Follow-up care is a key part of your treatment and safety. Be sure to make and go to all appointments, and call your doctor if you are having problems. It's also a good idea to know your test results and keep a list of the medicines you take. How can you care for yourself at home? · Be safe with medicines. Take your medicines exactly as prescribed. Call your doctor if you think you are having a problem with your medicine. · Make a treatment plan with your doctor. Be sure to follow your plan. · Try to identify and avoid things that may make you more likely to have a seizure. These may include:  ¨ Not getting enough sleep. ¨ Using drugs or alcohol. ¨ Being emotionally stressed. ¨ Skipping meals. · Keep a record of any seizures you have. Note the date, time of day, and any details about the seizure that you can remember. Your doctor can use this information to plan or adjust your medicine or other treatment. · Be sure that any doctor treating you for another condition knows that you have epilepsy. Each doctor should know what medicines you are taking, if any. · Wear a medical ID bracelet. You can buy this at most [employer].  If you have a seizure that leaves you unconscious or unable to speak for yourself, this bracelet will let those who are treating you know that you have epilepsy. · Talk to your doctor about whether it is safe for you to do certain activities, such as drive or swim. When should you call for help? Call 911 anytime you think you may need emergency care. For example, call if:    · A seizure does not stop as it normally does.     · You have new symptoms such as:  ¨ Numbness, tingling, or weakness on one side of your body or face. ¨ Vision changes. ¨ Trouble speaking or thinking clearly.    Call your doctor now or seek immediate medical care if:    · You have a fever.     · You have a severe headache.    Watch closely for changes in your health, and be sure to contact your doctor if:    · The normal pattern or features of your seizures change. Where can you learn more? Go to http://josh-autumn.info/. Vergil Louder in the search box to learn more about \"Epilepsy: Care Instructions. \"  Current as of: October 9, 2017  Content Version: 11.7  © 1679-9186 [employer]. Care instructions adapted under license by [facility] (which disclaims liability or warranty for this information). If you have questions about a medical condition or this instruction, always ask your healthcare professional. [address] any warranty or liability for your use of this information.

## 2018-09-29 NOTE — ED NOTES
Pt able to ambulate with a limp to the bathroom with minimal assistance and return to bed with no assist.

## 2018-09-29 NOTE — ED PROVIDER NOTES
HPI Comments: Patient has a hx of seizure and has a seizure tonight. She states while having a seizure for a few seconds states she fell and injuried her left knee and lower leg. She denies having other complaints of injuries. .    Patient is a 28 y.o. female presenting with seizures. Seizure             Past Medical History:   Diagnosis Date    Arthritis     Chest pain     Diabetes ([address]     Essential hypertension     Headache(784.0)     Hyperchloremia     Hypertension     Seizures (HCC)     SOB (shortness of breath)     Thyroid cancer (HCC)        Past Surgical History:   Procedure Laterality Date    HX PARTIAL HYSTERECTOMY      HX THYROIDECTOMY      HX TUBAL LIGATION           Family History:   Problem Relation Age of Onset    Hypertension Mother        Social History     Social History    Marital status: SINGLE     Spouse name: N/A    Number of children: N/A    Years of education: N/A     Occupational History    Not on file. Social History Main Topics    Smoking status: Former Smoker    Smokeless tobacco: Never Used    Alcohol use No    Drug use: No    Sexual activity: Yes     Partners: Male     Birth control/ protection: Surgical     Other Topics Concern    Not on file     Social History Narrative         ALLERGIES: Review of patient's allergies indicates no known allergies. Review of Systems   Constitutional: Negative. HENT: Negative. Eyes: Negative. Respiratory: Negative. Cardiovascular: Negative. Genitourinary: Negative. Musculoskeletal: Positive for joint swelling, myalgias and neck pain. Negative for back pain. (+) left knee:  (+) pain, no edema, decrease ROM, normal pulses and sensory    Neurological: Negative. Hematological: Negative. Psychiatric/Behavioral: Negative.         Vitals:    09/29/18 0321   BP: 161/84   Pulse: (!) 111   Resp: 19   Temp: 97.9 °F (36.6 °C)   SpO2: 98%   Weight: 117.9 kg (260 lb)   Height: 5' 9\" (1.753 m) Physical Exam   Constitutional: She is oriented to person, place, and time. She appears well-developed and well-nourished. No distress. HENT:   Head: Normocephalic and atraumatic. Right Ear: External ear normal.   Left Ear: External ear normal.   Nose: Nose normal.   Eyes: Conjunctivae are normal. Pupils are equal, round, and reactive to light. Neck: Normal range of motion. Neck supple. Cardiovascular: Normal rate, regular rhythm and normal heart sounds. No murmur heard. Pulmonary/Chest: Effort normal and breath sounds normal. No respiratory distress. She has no wheezes. She has no rales. Abdominal: She exhibits no distension. There is no tenderness. There is no rebound and no guarding. Musculoskeletal: Normal range of motion. She exhibits no edema, tenderness or deformity. Neurological: She is alert and oriented to person, place, and time. Skin: Skin is warm and dry. No rash noted. She is not diaphoretic. No erythema. Psychiatric: She has a normal mood and affect. Her behavior is normal.        MDM  Number of Diagnoses or Management Options  Seizure disorder [facility]): new and requires workup     Amount and/or Complexity of Data Reviewed  Clinical lab tests: reviewed  Tests in the radiology section of CPT®: ordered    Risk of Complications, Morbidity, and/or Mortality  Presenting problems: moderate  Diagnostic procedures: moderate  Management options: moderate      ED Course     Patient remained stable. No seizure occurred in the ER.   Procedures    Dx: contusion of knee and leg, seizure disorder    Disposition: D/C

## 2018-09-29 NOTE — LETTER
[facility] EMERGENCY DEPT 
[address] [facility] [id number] 
[phone] Work/School Note Date: 9/29/2018 To Whom It May concern: 
 
Lyn Piper was seen and treated today in the emergency room by the following provider(s): 
Attending Provider: Maldonado Asencio MD.   
 
Lyn Piper may return to work on 10/1/18. Sincerely, 
 
 
 
 
Pritesh Allan

## 2018-11-05 ENCOUNTER — OFFICE VISIT (OUTPATIENT)
Dept: NEUROLOGY | Age: 33
End: 2018-11-05

## 2018-11-05 VITALS
TEMPERATURE: 98.3 F | HEIGHT: 69 IN | DIASTOLIC BLOOD PRESSURE: 70 MMHG | SYSTOLIC BLOOD PRESSURE: 138 MMHG | WEIGHT: 277.8 LBS | HEART RATE: 102 BPM | OXYGEN SATURATION: 98 % | RESPIRATION RATE: 16 BRPM | BODY MASS INDEX: 41.15 KG/M2

## 2018-11-05 DIAGNOSIS — G43.719 INTRACTABLE CHRONIC MIGRAINE WITHOUT AURA AND WITHOUT STATUS MIGRAINOSUS: ICD-10-CM

## 2018-11-05 DIAGNOSIS — R56.9 SEIZURES (HCC): Primary | ICD-10-CM

## 2018-11-05 PROBLEM — E66.01 OBESITY, MORBID (HCC): Status: ACTIVE | Noted: 2018-11-05

## 2018-11-05 RX ORDER — LIDOCAINE 50 MG/G
PATCH TOPICAL EVERY 24 HOURS
COMMUNITY
End: 2019-03-17

## 2018-11-05 RX ORDER — TRAZODONE HYDROCHLORIDE 50 MG/1
50 TABLET ORAL
COMMUNITY
End: 2021-05-24

## 2018-11-05 RX ORDER — TOPIRAMATE 50 MG/1
50 TABLET, FILM COATED ORAL 2 TIMES DAILY
Qty: 60 TAB | Refills: 6 | Status: SHIPPED | OUTPATIENT
Start: 2018-11-05 | End: 2018-12-20

## 2018-11-05 RX ORDER — INSULIN GLARGINE 100 [IU]/ML
90 INJECTION, SOLUTION SUBCUTANEOUS 2 TIMES DAILY
COMMUNITY
End: 2021-05-24

## 2018-11-05 RX ORDER — MELOXICAM 15 MG/1
15 TABLET ORAL
COMMUNITY
End: 2022-07-05

## 2018-11-05 RX ORDER — LIOTHYRONINE SODIUM 25 UG/1
75 TABLET ORAL DAILY
COMMUNITY

## 2018-11-05 RX ORDER — GABAPENTIN 300 MG/1
300 CAPSULE ORAL
COMMUNITY
End: 2021-08-16 | Stop reason: SDUPTHER

## 2018-11-05 NOTE — LETTER
11/5/2018 3:22 PM 
 
Patient:  Robin Madrigal YOB: 1985 Date of Visit: 11/5/2018 Dear Álvaro Joe MD 
[address] [facility] [address] VIA Facsimile: [phone] 
 : 
 
 
Thank you for referring Ms. Robin Madrigal to me for evaluation/treatment. Below are the relevant portions of my assessment and plan of care. If you have questions, please do not hesitate to call me. I look forward to following Ms. Del Real Chris along with you.  
 
 
 
Sincerely, 
 
 
Solange Renya MD

## 2018-11-05 NOTE — PROGRESS NOTES
Rohith Cleveland is a 28 y.o., right handed female, with an established history of diabetes and hypertension, who comes in complaining of seizures and headaches. She began having seizures in . The onset was spontaneous. She describes having some stereotypical movements and then fall down to the ground with generalized tonic clonic movements. She gets a seizure rarely since being on medication. Her most recent seizure was at the end of 2018 and prior to that event it was a year since her last event. She has no family of epilepsy. She's confused and has a headache after the event. She gets a sense she's going to have the seizure. , she gets a funny feeling in her body that she can't describe. Since  she's had no more than 5-6 seizures. She also complains of constant headaches. They started them about 3 years ago. She describes a right greater than left pressure like and throbbing sensation felt on the right side of her head. There's no nausea or vomiting. She denies any focal weakness or sensation changes. She gets no warning the headache's coming. To her knowledge no one in her family suffers with headaches. These seemed to start spontaneously. Her prior Neurologist diagnosed her with FANY and got her on a CPAP device. The last time she was evaluated was about 3 years ago. The last time she used the device was about 1 year ago for various reason. Social History; she's single, lives with her children. She denies use of alcohol, tobacco or illicit drugs. Works in [employer].      Family History; mother  from cancer, had hypertension. Father's history unknown. Has no siblings. Current Outpatient Medications   Medication Sig Dispense Refill    insulin glulisine U-100 (APIDRA SOLOSTAR U-100 INSULIN) 100 unit/mL pen 10 Units by SubCUTAneous route Before breakfast, lunch, and dinner.       insulin glargine (BASAGLAR KWIKPEN U-100 INSULIN) 100 unit/mL (3 mL) inpn 90 Units by SubCUTAneous route two (2) times a day.  gabapentin (NEURONTIN) 300 mg capsule Take 300 mg by mouth. One cap QAM, two caps QPM      lidocaine (LIDODERM) 5 % by TransDERmal route every twenty-four (24) hours. Apply patch to the affected area for 12 hours a day and remove for 12 hours a day.  liothyronine (CYTOMEL) 25 mcg tablet Take 75 mcg by mouth daily.  meloxicam (MOBIC) 15 mg tablet Take 15 mg by mouth daily as needed for Pain.  traZODone (DESYREL) 50 mg tablet Take 50 mg by mouth nightly as needed for Sleep.  metFORMIN (GLUCOPHAGE) 1,000 mg tablet Take 1,000 mg by mouth two (2) times daily (with meals).  metoprolol tartrate (LOPRESSOR) 25 mg tablet Take 25 mg by mouth daily.  topiramate (TOPAMAX) 25 mg tablet Take 1 Tab by mouth two (2) times a day. 60 Tab 0    lisinopril (PRINIVIL, ZESTRIL) 5 mg tablet Take 40 mg by mouth daily.  ibuprofen (MOTRIN) 600 mg tablet Take 1 Tab by mouth every six (6) hours as needed for Pain. 20 Tab 0    ibuprofen (MOTRIN) 600 mg tablet Take 1 Tab by mouth every six (6) hours as needed for Pain. 20 Tab 0    methylPREDNISolone (MEDROL, ROBERT,) 4 mg tablet PLEASE TAKEN AS INSTRUCTED ON BOX 1 Dose Pack 0    fenofibrate nanocrystallized (TRICOR) 48 mg tablet Take 48 mg by mouth.  atorvastatin (LIPITOR) 40 mg tablet Take  by mouth daily.  furosemide (LASIX) 20 mg tablet Take  by mouth daily. Past Medical History:   Diagnosis Date    Arthritis     Chest pain     Diabetes [facility] [address]     Essential hypertension     Headache(784.0)     Hyperchloremia     Hypertension     Seizures (HCC)     SOB (shortness of breath)     Thyroid cancer (HCC)        Past Surgical History:   Procedure Laterality Date    HX PARTIAL HYSTERECTOMY      HX THYROIDECTOMY      HX TUBAL LIGATION         No Known Allergies    Patient Active Problem List   Diagnosis Code    Migraines G43.909    Seizures ([facility] [address] R56.9    Obesity (BMI 30-39. 9) E66.9    Hyperglycemia R73.9    Vaginosis N76.0    Hypoglycemia E16.2    Obesity, morbid ([facility]) E66.01         Review of Systems:   As above otherwise 11 point review of systems negative including;   Constitutional no fever or chills  Skin denies rash or itching  HENT  Denies tinnitus, hearing lose  Eyes denies diplopia vision lose  Respiratory denies shortness of breath  Cardiovascular denies chest pain, dyspnea on exertion  Gastrointestinal denies nausea, vomiting, diarrhea, constipation  Genitourinary denies incontinence  Musculoskeletal denies joint pain or swelling  Endocrine denies weight change  Hematology denies easy bruising or bleeding   Neurological as above in HPI      PHYSICAL EXAMINATION:      VITAL SIGNS:    Visit Vitals  /70 (BP 1 Location: Right arm, BP Patient Position: Sitting)   Pulse (!) 102   Temp 98.3 °F (36.8 °C) (Oral)   Resp 16   Ht 5' 9\" (1.753 m)   Wt 126 kg (277 lb 12.8 oz)   SpO2 98%   BMI 41.02 kg/m²       GENERAL: The patient is well developed, well nourished, and in no apparent distress. EXTREMITIES: No clubbing, cyanosis, or edema is identified. Pulses 2+ and symmetrical.  Muscle tone is normal.  HEAD:   Ear, nose, and throat appear to be without trauma. The patient is normocephalic. NEUROLOGIC EXAMINATION    MENTAL STATUS: The patient is awake, alert, and oriented x 4. Fund of knowledge is adequate. Speech is fluent and memory appears to be intact, both long and short term. CRANIAL NERVES: II - Visual fields are full to confrontation. Funduscopic examination reveals flat disks bilaterally. Pupils are both 4 mm and briskly reactive to light and accommodation. III, IV, VI - Extraocular movements are intact and there is no nystagmus. V - Facial sensation is intact to pinprick and light touch. VII - Face is symmetrical.   VIII - Hearing is present. IX, X, XII- Palate rises symmetrically. Gag is present. Tongue is in the midline.       XI - Shoulder shrugging and head turning intact  MOTOR:  The patient is 5/5 in all four limbs without any drift. Fine finger movements are symmetrical.  Isolated motor group testing reveals no focal abnormalities. Tone is normal.  Sensory examination is intact to pinprick, light touch and position sense testing. Reflexes are 2+ and symmetrical. Plantars are down going. Cerebellar examination reveals no gross ataxia or dysmetria. Gait is normal and the patient can tandem walk without any difficulty. CBC:   Lab Results   Component Value Date/Time    WBC 7.8 09/29/2018 03:30 AM    RBC 4.86 09/29/2018 03:30 AM    HGB 12.7 09/29/2018 03:30 AM    HCT 39.9 09/29/2018 03:30 AM    PLATELET 744 47/57/1985 03:30 AM     BMP:   Lab Results   Component Value Date/Time    Glucose 388 (H) 09/29/2018 03:30 AM    Sodium 131 (L) 09/29/2018 03:30 AM    Potassium 4.4 09/29/2018 03:30 AM    Chloride 98 (L) 09/29/2018 03:30 AM    CO2 27 09/29/2018 03:30 AM    BUN 15 09/29/2018 03:30 AM    Creatinine 1.06 09/29/2018 03:30 AM    Calcium 8.5 09/29/2018 03:30 AM     CMP:   Lab Results   Component Value Date/Time    Glucose 388 (H) 09/29/2018 03:30 AM    Sodium 131 (L) 09/29/2018 03:30 AM    Potassium 4.4 09/29/2018 03:30 AM    Chloride 98 (L) 09/29/2018 03:30 AM    CO2 27 09/29/2018 03:30 AM    BUN 15 09/29/2018 03:30 AM    Creatinine 1.06 09/29/2018 03:30 AM    Calcium 8.5 09/29/2018 03:30 AM    Anion gap 6 09/29/2018 03:30 AM    BUN/Creatinine ratio 14 09/29/2018 03:30 AM    Alk.  phosphatase 109 09/29/2018 03:30 AM    Protein, total 7.2 09/29/2018 03:30 AM    Albumin 2.8 (L) 09/29/2018 03:30 AM    Globulin 4.4 (H) 09/29/2018 03:30 AM    A-G Ratio 0.6 (L) 09/29/2018 03:30 AM     Coagulation:   Lab Results   Component Value Date/Time    Prothrombin time 12.8 04/16/2013 06:40 PM    INR 1.0 04/16/2013 06:40 PM    aPTT 26.6 04/16/2013 06:40 PM     Cardiac markers:   Lab Results   Component Value Date/Time     (H) 09/24/2017 01:20 PM    CK-MB Index 1.2 09/24/2017 01:20 PM          Impression: Combination of both seizures and headaches in this patient who has risk factors including none. She has no family history of either epilepsy or migraines. She has no head trauma and no history of brain injury. She has what sounds like pretty benign seizures, only having one seizure a year or so. She also has migraines that are bit more bothersome, having nearly daily headaches. I did warn her about analgesic rebound headaches and not to take over-the-counter analgesics for the headaches, which are not working anyway. Plan: At this time after going through medications I have decided to increase her Topamax from 25 mg twice a day to 50 mg twice a day and attempt to take care of her headaches and give her better seizure control. An EEG will be obtained. I will see her back in about 4 weeks. In reference to her obstructive sleep apnea and CPAP device, I may send her for a reevaluation by sleep medicine if she does not feel the device continues to function well. PLEASE NOTE:   This document has been produced using voice recognition software. Unrecognized errors in transcription may be present.

## 2018-11-14 ENCOUNTER — HOSPITAL ENCOUNTER (OUTPATIENT)
Dept: NEUROLOGY | Age: 33
Discharge: HOME | End: 2018-11-14
Attending: PSYCHIATRY & NEUROLOGY
Payer: MEDICAID

## 2018-11-14 DIAGNOSIS — R56.9 SEIZURES (HCC): ICD-10-CM

## 2018-11-14 PROCEDURE — 95819 EEG AWAKE AND ASLEEP: CPT

## 2018-11-16 NOTE — PROCEDURES
[address] [place] [address] Dr NINA    Mehdi Thomson  MR#: [id number]  : 1985  ACCOUNT #: [de-identified]   DATE OF SERVICE: 2018    EEG NUMBER      HISTORY:  A 65-year-old female with history of a seizure disorder. MEDICATIONS:  Include insulin, gabapentin, liothyronine, Meloxicam, trazodone, metformin, metoprolol, Topamax, lisinopril, ibuprofen, Lasix. EEG REPORT:  This is a 16-channel routine EEG done using the International 10-20 electrode placement system. The predominant background consists of low amplitude 7-8 Hz posterior predominant rhythms with interspersed 6-7 Hz more central based symmetric activity consistent with drowsiness. There are frequent periods of electroencephalographic stage II sleep with the presence of central vertex waves, K complexes and sleep spindles. There were no abnormal photoparoxysmal responses. Hyperventilation did not produce alterations of the background. No epileptiform abnormalities were observed. IMPRESSION:  This is a normal awake, drowsy, and asleep EEG.       MD KARISSA Taylor / SUKUMAR  D: 11/15/2018 17:20     T: 2018 01:03  JOB #: [id number]  CC: Chrystal Wolf MD

## 2018-11-24 ENCOUNTER — HOSPITAL ENCOUNTER (EMERGENCY)
Age: 33
Discharge: HOME | End: 2018-11-24
Attending: EMERGENCY MEDICINE
Payer: MEDICAID

## 2018-11-24 VITALS
BODY MASS INDEX: 37.03 KG/M2 | TEMPERATURE: 98 F | RESPIRATION RATE: 22 BRPM | OXYGEN SATURATION: 99 % | HEIGHT: 69 IN | WEIGHT: 250 LBS | DIASTOLIC BLOOD PRESSURE: 79 MMHG | HEART RATE: 94 BPM | SYSTOLIC BLOOD PRESSURE: 155 MMHG

## 2018-11-24 DIAGNOSIS — R56.9 SEIZURE (HCC): Primary | ICD-10-CM

## 2018-11-24 DIAGNOSIS — R73.9 HYPERGLYCEMIA: ICD-10-CM

## 2018-11-24 LAB
ALBUMIN SERPL-MCNC: 2.8 G/DL (ref 3.4–5)
ALBUMIN/GLOB SERPL: 0.7 {RATIO} (ref 0.8–1.7)
ALP SERPL-CCNC: 100 U/L (ref 45–117)
ALT SERPL-CCNC: 32 U/L (ref 13–56)
ANION GAP SERPL CALC-SCNC: 10 MMOL/L (ref 3–18)
APPEARANCE UR: ABNORMAL
AST SERPL-CCNC: 18 U/L (ref 15–37)
BASOPHILS # BLD: 0 K/UL (ref 0–0.1)
BASOPHILS NFR BLD: 0 % (ref 0–2)
BILIRUB SERPL-MCNC: 0.2 MG/DL (ref 0.2–1)
BILIRUB UR QL: NEGATIVE
BUN SERPL-MCNC: 20 MG/DL (ref 7–18)
BUN/CREAT SERPL: 19 (ref 12–20)
CALCIUM SERPL-MCNC: 8.5 MG/DL (ref 8.5–10.1)
CHLORIDE SERPL-SCNC: 99 MMOL/L (ref 100–108)
CO2 SERPL-SCNC: 23 MMOL/L (ref 21–32)
COLOR UR: YELLOW
CREAT SERPL-MCNC: 1.08 MG/DL (ref 0.6–1.3)
DIFFERENTIAL METHOD BLD: NORMAL
EOSINOPHIL # BLD: 0.1 K/UL (ref 0–0.4)
EOSINOPHIL NFR BLD: 1 % (ref 0–5)
ERYTHROCYTE [DISTWIDTH] IN BLOOD BY AUTOMATED COUNT: 13.5 % (ref 11.6–14.5)
GLOBULIN SER CALC-MCNC: 4.3 G/DL (ref 2–4)
GLUCOSE BLD STRIP.AUTO-MCNC: 279 MG/DL (ref 70–110)
GLUCOSE BLD STRIP.AUTO-MCNC: 317 MG/DL (ref 70–110)
GLUCOSE BLD STRIP.AUTO-MCNC: 330 MG/DL (ref 70–110)
GLUCOSE BLD STRIP.AUTO-MCNC: 402 MG/DL (ref 70–110)
GLUCOSE SERPL-MCNC: 461 MG/DL (ref 74–99)
GLUCOSE UR STRIP.AUTO-MCNC: >1000 MG/DL
HCT VFR BLD AUTO: 37.8 % (ref 35–45)
HGB BLD-MCNC: 12.5 G/DL (ref 12–16)
HGB UR QL STRIP: ABNORMAL
KETONES UR QL STRIP.AUTO: NEGATIVE MG/DL
LEUKOCYTE ESTERASE UR QL STRIP.AUTO: NEGATIVE
LYMPHOCYTES # BLD: 2.9 K/UL (ref 0.9–3.6)
LYMPHOCYTES NFR BLD: 36 % (ref 21–52)
MCH RBC QN AUTO: 27.4 PG (ref 24–34)
MCHC RBC AUTO-ENTMCNC: 33.1 G/DL (ref 31–37)
MCV RBC AUTO: 82.7 FL (ref 74–97)
MONOCYTES # BLD: 0.7 K/UL (ref 0.05–1.2)
MONOCYTES NFR BLD: 8 % (ref 3–10)
NEUTS SEG # BLD: 4.4 K/UL (ref 1.8–8)
NEUTS SEG NFR BLD: 55 % (ref 40–73)
NITRITE UR QL STRIP.AUTO: NEGATIVE
PH UR STRIP: 5.5 [PH] (ref 5–8)
PLATELET # BLD AUTO: 269 K/UL (ref 135–420)
PMV BLD AUTO: 10 FL (ref 9.2–11.8)
POTASSIUM SERPL-SCNC: 4.6 MMOL/L (ref 3.5–5.5)
PROT SERPL-MCNC: 7.1 G/DL (ref 6.4–8.2)
PROT UR STRIP-MCNC: 100 MG/DL
RBC # BLD AUTO: 4.57 M/UL (ref 4.2–5.3)
SODIUM SERPL-SCNC: 132 MMOL/L (ref 136–145)
SP GR UR REFRACTOMETRY: 1.03 (ref 1–1.03)
UROBILINOGEN UR QL STRIP.AUTO: 0.2 EU/DL (ref 0.2–1)
WBC # BLD AUTO: 8.1 K/UL (ref 4.6–13.2)

## 2018-11-24 PROCEDURE — 99285 EMERGENCY DEPT VISIT HI MDM: CPT

## 2018-11-24 PROCEDURE — 96374 THER/PROPH/DIAG INJ IV PUSH: CPT

## 2018-11-24 PROCEDURE — 82962 GLUCOSE BLOOD TEST: CPT

## 2018-11-24 PROCEDURE — 96376 TX/PRO/DX INJ SAME DRUG ADON: CPT

## 2018-11-24 PROCEDURE — 74011636637 HC RX REV CODE- 636/637: Performed by: EMERGENCY MEDICINE

## 2018-11-24 PROCEDURE — 85025 COMPLETE CBC W/AUTO DIFF WBC: CPT

## 2018-11-24 PROCEDURE — 74011250636 HC RX REV CODE- 250/636: Performed by: EMERGENCY MEDICINE

## 2018-11-24 PROCEDURE — 81003 URINALYSIS AUTO W/O SCOPE: CPT

## 2018-11-24 PROCEDURE — 80053 COMPREHEN METABOLIC PANEL: CPT

## 2018-11-24 PROCEDURE — 96361 HYDRATE IV INFUSION ADD-ON: CPT

## 2018-11-24 RX ADMIN — INSULIN HUMAN 5 UNITS: 100 INJECTION, SOLUTION PARENTERAL at 07:30

## 2018-11-24 RX ADMIN — SODIUM CHLORIDE 1000 ML: 900 INJECTION, SOLUTION INTRAVENOUS at 07:22

## 2018-11-24 RX ADMIN — SODIUM CHLORIDE 1000 ML: 900 INJECTION, SOLUTION INTRAVENOUS at 05:45

## 2018-11-24 RX ADMIN — INSULIN HUMAN 10 UNITS: 100 INJECTION, SOLUTION PARENTERAL at 08:39

## 2018-11-24 RX ADMIN — INSULIN HUMAN 10 UNITS: 100 INJECTION, SOLUTION PARENTERAL at 06:17

## 2018-11-24 NOTE — ED TRIAGE NOTES
Pt. Joshua Lowland in by EMS after having a seizure at work and being lowered to the ground by coworkers per EMS. Pt upon arrival is A/Ox4 and ambulatory.

## 2018-11-24 NOTE — ED NOTES
Patient armband removed and shredded  Pt discharged home with after care instructions given to pt . Pt verbalizes understand of after care instructions.  Return to the ED for any worsening symptoms and follow with primary care doctor as directed   Andrey Thomas RN

## 2018-11-24 NOTE — LETTER
NOTIFICATION RETURN TO WORK 
 
11/24/2018 8:30 AM 
 
Ms. Brooke Short [facility] [id number] To Whom It May Concern: 
 
Brooke Sung is currently under the care of [id number] [facility] EMERGENCY DEPT. She will return to work on: 11/26/2018 If there are questions or concerns please have the patient contact our office.  
 
 
 
Sincerely, 
 
 
 
 
Robbie Wylie MD

## 2018-11-24 NOTE — ED PROVIDER NOTES
HPI: Patient is 27 yo female who has a hx of seizure disorder. She was at work and an episode of tonic clonic activity and fainted. She was standing when this happened and a co worker caught her prior to falling. Seizure activity lasted for a few minutes. Past Medical History:   Diagnosis Date    Arthritis     Chest pain     Diabetes ([address]     Essential hypertension     Headache(784.0)     Hyperchloremia     Hypertension     Seizures (HCC)     SOB (shortness of breath)     Thyroid cancer (HCC)        Past Surgical History:   Procedure Laterality Date    HX PARTIAL HYSTERECTOMY      HX THYROIDECTOMY      HX TUBAL LIGATION           Family History:   Problem Relation Age of Onset    Hypertension Mother        Social History     Socioeconomic History    Marital status: SINGLE     Spouse name: Not on file    Number of children: Not on file    Years of education: Not on file    Highest education level: Not on file   Social Needs    Financial resource strain: Not on file    Food insecurity - worry: Not on file    Food insecurity - inability: Not on file   Sequenom needs - medical: Not on file   Sequenom needs - non-medical: Not on file   Occupational History    Not on file   Tobacco Use    Smoking status: Former Smoker    Smokeless tobacco: Never Used   Substance and Sexual Activity    Alcohol use: No    Drug use: No    Sexual activity: Yes     Partners: Male     Birth control/protection: Surgical   Other Topics Concern    Not on file   Social History Narrative    Not on file         ALLERGIES: Patient has no known allergies. Review of Systems   Constitutional: Negative. HENT: Negative. Eyes: Negative. Respiratory: Negative. Cardiovascular: Negative. Gastrointestinal: Negative. Endocrine: Negative. Genitourinary: Negative. Musculoskeletal: Negative. Skin: Negative. Allergic/Immunologic: Negative. Neurological: Negative. Hematological: Negative. Psychiatric/Behavioral: Negative. All other systems reviewed and are negative. Vitals:    11/24/18 0522   BP: 149/83   Pulse: (!) 101   Resp: 20   Temp: 98 °F (36.7 °C)   SpO2: 100%   Weight: 113.4 kg (250 lb)   Height: 5' 9\" (1.753 m)            Physical Exam   Constitutional: She is oriented to person, place, and time. She appears well-developed and well-nourished. No distress. HENT:   Head: Normocephalic. Right Ear: External ear normal.   Left Ear: External ear normal.   Mouth/Throat: No oropharyngeal exudate. Eyes: Conjunctivae and EOM are normal. Pupils are equal, round, and reactive to light. Right eye exhibits no discharge. Left eye exhibits no discharge. No scleral icterus. Neck: Normal range of motion. Neck supple. No JVD present. No tracheal deviation present. No thyromegaly present. Cardiovascular: Normal rate, regular rhythm, normal heart sounds and intact distal pulses. Exam reveals no gallop and no friction rub. No murmur heard. Pulmonary/Chest: Effort normal and breath sounds normal. No stridor. No respiratory distress. She has no wheezes. She has no rales. She exhibits no tenderness. Abdominal: Soft. Bowel sounds are normal. She exhibits no distension and no mass. There is no tenderness. There is no rebound and no guarding. Musculoskeletal: Normal range of motion. She exhibits no edema or tenderness. Lymphadenopathy:     She has no cervical adenopathy. Neurological: She is alert and oriented to person, place, and time. She displays normal reflexes. No cranial nerve deficit. She exhibits normal muscle tone. Coordination normal.   Skin: Skin is warm and dry. No rash noted. She is not diaphoretic. No erythema. No pallor. Nursing note and vitals reviewed. MDM: seizure disorder, hyperglycemia, viral syndrome       Procedures     Hospital course: patient was tx with IV fluid and IV insulin.     DX: hyperglycemia, seizure disorder    Disp: Endorsed to Dr. Tracy Mims at 1948. Pt feeling much better, A&O ready to go home, agrees with dispo and F/U plan.   Dav Dobbins MD  8:29 AM

## 2018-11-24 NOTE — ED NOTES
7:24 AM :Pt care assumed from Dr. Elisabeth Moreno , ED provider. Pt complaint(s), current treatment plan, progression and available diagnostic results have been discussed thoroughly. Rounding occurred: yes  Intended Disposition: TBD  Pending diagnostic reports and/or labs (please list): Blood glucose to be brought down to normal      Scribe [employer] as a scribe for and in the presence of Sabina Vo MD      November 24, 2018 at [facility]       Provider Attestation:      I personally performed the services described in the documentation, reviewed the documentation, as recorded by the scribe in my presence, and it accurately and completely records my words and actions.  November 24, 2018 at 7:25 AM - Sabina Vo MD

## 2018-11-24 NOTE — ED NOTES
Received report on pt from [address] [place] [address] in room 6 sleeping with family at bedside. Pt awoke to verbal stimuli monitor showing NSR. Pt  Dr Shari Hastings ordered 5 units of insulin IV and another 1000ml of NS. Pt ambulated to BR to void.

## 2018-11-24 NOTE — ED NOTES
Pt. Arrived to the ER by EMS after having a seizure at work were her co-workers lowered her to the ground. Pt is A/Ox4 upon arrival, she is ambulatory to the bathroom with assistance. Pt is also complaining of a headache 8/10. MD notified and at the bedside at this time. Will continue to monitor.

## 2018-11-27 ENCOUNTER — DOCUMENTATION ONLY (OUTPATIENT)
Dept: NEUROLOGY | Age: 33
End: 2018-11-27

## 2018-12-20 ENCOUNTER — OFFICE VISIT (OUTPATIENT)
Dept: NEUROLOGY | Age: 33
End: 2018-12-20

## 2018-12-20 VITALS
RESPIRATION RATE: 20 BRPM | OXYGEN SATURATION: 98 % | BODY MASS INDEX: 41.47 KG/M2 | WEIGHT: 280 LBS | HEART RATE: 89 BPM | HEIGHT: 69 IN | DIASTOLIC BLOOD PRESSURE: 84 MMHG | SYSTOLIC BLOOD PRESSURE: 116 MMHG | TEMPERATURE: 97.5 F

## 2018-12-20 DIAGNOSIS — G43.719 INTRACTABLE CHRONIC MIGRAINE WITHOUT AURA AND WITHOUT STATUS MIGRAINOSUS: ICD-10-CM

## 2018-12-20 DIAGNOSIS — R56.9 SEIZURES (HCC): ICD-10-CM

## 2018-12-20 DIAGNOSIS — G47.33 OBSTRUCTIVE SLEEP APNEA: Primary | ICD-10-CM

## 2018-12-20 RX ORDER — TOPIRAMATE 25 MG/1
75 TABLET ORAL 2 TIMES DAILY
Qty: 180 TAB | Refills: 6 | Status: SHIPPED | OUTPATIENT
Start: 2018-12-20 | End: 2018-12-31 | Stop reason: SDUPTHER

## 2018-12-20 NOTE — PROGRESS NOTES
Re:  Mary Kate Beck up visit     12/20/2018 10:01 AM    SSN: [id number]    Subjective:   Kimberly Nielsen returns for follow up. She's had no seizures in the month. She still gets a headache weekly, down from nearly daily after the increase in the Topamax. She couldn't use the CPAP device since it hasn't been used for a while. She has the feeling that she can't breath properly when she gets up after sleeping. This has just recently started. Medications:    Current Outpatient Medications   Medication Sig Dispense Refill    insulin glulisine U-100 (APIDRA SOLOSTAR U-100 INSULIN) 100 unit/mL pen 10 Units by SubCUTAneous route Before breakfast, lunch, and dinner.  insulin glargine (BASAGLAR KWIKPEN U-100 INSULIN) 100 unit/mL (3 mL) inpn 90 Units by SubCUTAneous route two (2) times a day.  gabapentin (NEURONTIN) 300 mg capsule Take 300 mg by mouth. One cap QAM, two caps QPM      lidocaine (LIDODERM) 5 % by TransDERmal route every twenty-four (24) hours. Apply patch to the affected area for 12 hours a day and remove for 12 hours a day.  liothyronine (CYTOMEL) 25 mcg tablet Take 75 mcg by mouth daily.  meloxicam (MOBIC) 15 mg tablet Take 15 mg by mouth daily as needed for Pain.  traZODone (DESYREL) 50 mg tablet Take 50 mg by mouth nightly as needed for Sleep.  topiramate (TOPAMAX) 50 mg tablet Take 1 Tab by mouth two (2) times a day. 60 Tab 6    metFORMIN (GLUCOPHAGE) 1,000 mg tablet Take 1,000 mg by mouth two (2) times daily (with meals).  metoprolol tartrate (LOPRESSOR) 25 mg tablet Take 25 mg by mouth daily.  fenofibrate nanocrystallized (TRICOR) 48 mg tablet Take 48 mg by mouth.  atorvastatin (LIPITOR) 40 mg tablet Take  by mouth daily.  furosemide (LASIX) 20 mg tablet Take  by mouth daily.  lisinopril (PRINIVIL, ZESTRIL) 5 mg tablet Take 40 mg by mouth daily.       ibuprofen (MOTRIN) 600 mg tablet Take 1 Tab by mouth every six (6) hours as needed for Pain. 20 Tab 0       Vital signs:    Visit Vitals  /84 (BP 1 Location: Left arm, BP Patient Position: Sitting)   Pulse 89   Temp 97.5 °F (36.4 °C) (Oral)   Resp 20   Ht 5' 9\" (1.753 m)   Wt 127 kg (280 lb)   SpO2 98%   BMI 41.35 kg/m²       Review of Systems:   As above otherwise 11 point review of systems negative including;   Constitutional no fever or chills  Skin denies rash or itching  HEENT  Denies tinnitus, hearing lose  Eyes denies diplopia vision lose  Respiratory denies sortness of breath  Cardiovascular denies chest pain, dyspnea on exertion  Gastrointestinal denies nausea, vomiting, diarrhea, constipation  Genitourinary denies incontinence  Musculoskeletal denies joint pain or swelling  Endocrine denies weight change  Hematology denies easy bruising or bleeding   Neurological as above in HPI      Patient Active Problem List   Diagnosis Code    Migraines G43.909    Seizures ([address] R56.9    Obesity (BMI 30-39. 9) E66.9    Hyperglycemia R73.9    Vaginosis N76.0    Hypoglycemia E16.2    Obesity, morbid (HCC) E66.01         Objective: The patient is awake, alert, and oriented x 4. Fund of knowledge is adequate. Speech is fluent and memory is intact. Cranial Nerves: II - Visual fields are full to confrontation. III, IV, VI - Extraocular movements are intact. There is no nystagmus. V - Facial sensation is intact to pinprick. VII - Face is symmetrical.  VIII - Hearing is present. IX, X, XII - Palate is symmetrical.   XI - Shoulder shrugging and head turning intact  Motor: The patient moves all four limbs fairly well and symmetrically. Tone is normal. Reflexes are 2+ and symmetrical. Plantars are down going.  Gait is normal.    CBC:   Lab Results   Component Value Date/Time    WBC 8.1 11/24/2018 05:43 AM    RBC 4.57 11/24/2018 05:43 AM    HGB 12.5 11/24/2018 05:43 AM    HCT 37.8 11/24/2018 05:43 AM    PLATELET 779 83/39/3445 05:43 AM     BMP:   Lab Results   Component Value Date/Time    Glucose 461 (HH) 11/24/2018 05:43 AM    Sodium 132 (L) 11/24/2018 05:43 AM    Potassium 4.6 11/24/2018 05:43 AM    Chloride 99 (L) 11/24/2018 05:43 AM    CO2 23 11/24/2018 05:43 AM    BUN 20 (H) 11/24/2018 05:43 AM    Creatinine 1.08 11/24/2018 05:43 AM    Calcium 8.5 11/24/2018 05:43 AM     CMP:   Lab Results   Component Value Date/Time    Glucose 461 (HH) 11/24/2018 05:43 AM    Sodium 132 (L) 11/24/2018 05:43 AM    Potassium 4.6 11/24/2018 05:43 AM    Chloride 99 (L) 11/24/2018 05:43 AM    CO2 23 11/24/2018 05:43 AM    BUN 20 (H) 11/24/2018 05:43 AM    Creatinine 1.08 11/24/2018 05:43 AM    Calcium 8.5 11/24/2018 05:43 AM    Anion gap 10 11/24/2018 05:43 AM    BUN/Creatinine ratio 19 11/24/2018 05:43 AM    Alk. phosphatase 100 11/24/2018 05:43 AM    Protein, total 7.1 11/24/2018 05:43 AM    Albumin 2.8 (L) 11/24/2018 05:43 AM    Globulin 4.3 (H) 11/24/2018 05:43 AM    A-G Ratio 0.7 (L) 11/24/2018 05:43 AM     Coagulation:   Lab Results   Component Value Date/Time    Prothrombin time 12.8 04/16/2013 06:40 PM    INR 1.0 04/16/2013 06:40 PM    aPTT 26.6 04/16/2013 06:40 PM       Assessment:  Migraines and seizure, both seem to be getting under good control. Still having the occasional headache. Sleep apnea, now with early morning shortness of breath. Plan:  Needs updated sleep study for titration of CPAP device. Increase Topamax to 75 mg BID. Return here in about 8 weeks. Sincerely,        Clint Garnett.  Oksana Tapia M.D.

## 2018-12-20 NOTE — PROGRESS NOTES
Skyla Peres is a 28 y.o. female in today for follow-up on migraines and seizures. Patient has c/o strangling cough upon waking. 1. Have you been to the ER, urgent care clinic since your last visit? Hospitalized since your last visit? No    2. Have you seen or consulted any other health care providers outside of the [facility] since your last visit? Include any pap smears or colon screening.  No

## 2018-12-27 DIAGNOSIS — G43.719 INTRACTABLE CHRONIC MIGRAINE WITHOUT AURA AND WITHOUT STATUS MIGRAINOSUS: ICD-10-CM

## 2018-12-27 DIAGNOSIS — R56.9 SEIZURES (HCC): ICD-10-CM

## 2018-12-27 NOTE — TELEPHONE ENCOUNTER
Requested Prescriptions     Pending Prescriptions Disp Refills    topiramate (TOPAMAX) 25 mg tablet 180 Tab 6     Sig: Take 3 Tabs by mouth two (2) times a day.      Faxed request scanned to chart

## 2018-12-31 RX ORDER — TOPIRAMATE 25 MG/1
75 TABLET ORAL 2 TIMES DAILY
Qty: 180 TAB | Refills: 6 | OUTPATIENT
Start: 2018-12-31

## 2018-12-31 RX ORDER — TOPIRAMATE 25 MG/1
75 TABLET ORAL 2 TIMES DAILY
Qty: 180 TAB | Refills: 6 | Status: SHIPPED | OUTPATIENT
Start: 2018-12-31 | End: 2021-01-08 | Stop reason: SDUPTHER

## 2019-01-22 ENCOUNTER — HOSPITAL ENCOUNTER (OUTPATIENT)
Dept: SLEEP MEDICINE | Age: 34
Discharge: HOME | End: 2019-01-22
Payer: MEDICAID

## 2019-01-22 DIAGNOSIS — G47.33 OBSTRUCTIVE SLEEP APNEA: ICD-10-CM

## 2019-01-22 PROCEDURE — 95810 POLYSOM 6/> YRS 4/> PARAM: CPT

## 2019-01-23 VITALS — HEART RATE: 85 BPM | DIASTOLIC BLOOD PRESSURE: 80 MMHG | SYSTOLIC BLOOD PRESSURE: 123 MMHG

## 2019-02-08 ENCOUNTER — OFFICE VISIT (OUTPATIENT)
Dept: NEUROLOGY | Age: 34
End: 2019-02-08

## 2019-02-08 VITALS
WEIGHT: 276 LBS | HEIGHT: 69 IN | TEMPERATURE: 98.5 F | HEART RATE: 83 BPM | SYSTOLIC BLOOD PRESSURE: 120 MMHG | BODY MASS INDEX: 40.88 KG/M2 | DIASTOLIC BLOOD PRESSURE: 80 MMHG | OXYGEN SATURATION: 98 % | RESPIRATION RATE: 18 BRPM

## 2019-02-08 DIAGNOSIS — E66.01 MORBID OBESITY (HCC): ICD-10-CM

## 2019-02-08 DIAGNOSIS — G47.33 OSA (OBSTRUCTIVE SLEEP APNEA): Primary | ICD-10-CM

## 2019-02-08 DIAGNOSIS — G40.909 SEIZURE DISORDER (HCC): ICD-10-CM

## 2019-02-08 DIAGNOSIS — G44.89 OTHER HEADACHE SYNDROME: ICD-10-CM

## 2019-02-08 NOTE — PROGRESS NOTES
2/8/2019 10:15 AM    SSN: [id number]    Subjective:  27 y/o female referred by Dr. Mary Joseph for evaluation of FANY. Symptoms of EDS, snoring, fatigue, falling asleep anywhere, witnessed apneas in sleep led to a sleep study showing FANY. Reportedly she was having seizures in her sleep and there was concern the FANY was provoking them. She started about 2 yrs ago, used it for about a year, but had to get off because her mom became ill and all the up and down made her stop. She still has her machine at home. A PSG orderd by Dr. Mary Joseph done on 1/22/19 showed AHi of 10.5, REM AHi at 19, desats to min of 80%. She is on topamax and gabapentin for headaches. Social History     Socioeconomic History    Marital status: SINGLE     Spouse name: Not on file    Number of children: Not on file    Years of education: Not on file    Highest education level: Not on file   Social Needs    Financial resource strain: Not on file    Food insecurity - worry: Not on file    Food insecurity - inability: Not on file    Transportation needs - medical: Not on file   T[place]oodo needs - non-medical: Not on file   Occupational History    Not on file   Tobacco Use    Smoking status: Former Smoker    Smokeless tobacco: Never Used   Substance and Sexual Activity    Alcohol use: No    Drug use: No    Sexual activity: Yes     Partners: Male     Birth control/protection: Surgical   Other Topics Concern    Not on file   Social History Narrative    Not on file       Family History   Problem Relation Age of Onset    Hypertension Mother        Current Outpatient Medications   Medication Sig Dispense Refill    topiramate (TOPAMAX) 25 mg tablet Take 3 Tabs by mouth two (2) times a day. 180 Tab 6    insulin glulisine U-100 (APIDRA SOLOSTAR U-100 INSULIN) 100 unit/mL pen 10 Units by SubCUTAneous route Before breakfast, lunch, and dinner.       insulin glargine (BASAGLAR KWIKPEN U-100 INSULIN) 100 unit/mL (3 mL) inpn 90 Units by SubCUTAneous route two (2) times a day.  gabapentin (NEURONTIN) 300 mg capsule Take 300 mg by mouth. One cap QAM, two caps QPM      lidocaine (LIDODERM) 5 % by TransDERmal route every twenty-four (24) hours. Apply patch to the affected area for 12 hours a day and remove for 12 hours a day.  liothyronine (CYTOMEL) 25 mcg tablet Take 75 mcg by mouth daily.  meloxicam (MOBIC) 15 mg tablet Take 15 mg by mouth daily as needed for Pain.  traZODone (DESYREL) 50 mg tablet Take 50 mg by mouth nightly as needed for Sleep.  ibuprofen (MOTRIN) 600 mg tablet Take 1 Tab by mouth every six (6) hours as needed for Pain. 20 Tab 0    metFORMIN (GLUCOPHAGE) 1,000 mg tablet Take 1,000 mg by mouth two (2) times daily (with meals).  metoprolol tartrate (LOPRESSOR) 25 mg tablet Take 25 mg by mouth daily.  fenofibrate nanocrystallized (TRICOR) 48 mg tablet Take 48 mg by mouth.  atorvastatin (LIPITOR) 40 mg tablet Take  by mouth daily.  furosemide (LASIX) 20 mg tablet Take  by mouth daily.  lisinopril (PRINIVIL, ZESTRIL) 5 mg tablet Take 40 mg by mouth daily. Past Medical History:   Diagnosis Date    Arthritis     Chest pain     Diabetes ([address]     Essential hypertension     Headache(784.0)     Hyperchloremia     Hypertension     Seizures (HCC)     SOB (shortness of breath)     Thyroid cancer (HCC)        Past Surgical History:   Procedure Laterality Date    HX PARTIAL HYSTERECTOMY      HX THYROIDECTOMY      HX TUBAL LIGATION         No Known Allergies    Vital signs:    Visit Vitals  /80 (BP 1 Location: Right arm, BP Patient Position: Sitting)   Pulse 83   Temp 98.5 °F (36.9 °C) (Oral)   Resp 18   Ht 5' 9\" (1.753 m)   Wt 125.2 kg (276 lb)   SpO2 98%   BMI 40.76 kg/m²       Review of Systems:   GENERAL: Denies fever.  fatigue  CARDIAC: No CP or SOB  PULMONARY: No cough of SOB  MUSCULOSKELETAL: No new joint pain  NEURO: SEE HPI      EXAM: Alert, in NAD. Obese, Mallampati IV. Heart is regular. Oriented x3, EOM's are full, PERRL, no facial asymmetries. Strength and tone are normal. DTR's +2, gait symmetric           Assessment/Plan: FANY, with a metabolic syndrome, very symptomatic, associated co-morbidities including headaches and seizures, in the past nocturnal. She had benefited symptomatically when she consistently used it for a year. Machine not too old to need new one, but titration is. Will get CPAP trial. Will help her in the meantime with procurement of supplies so she can start at whatever settings she is on with her current machine. I will see her after her CPAP trial and we can make adjustments to treatment then. Counseled pt at length about obstructive sleep apnea evaluation, treatment options, weight loss as appropriate, and consequences of untreated FANY. PLEASE NOTE:   Portions of this document may have been produced using voice recognition software. Unrecognized errors in transcription may be present. This note will not be viewable in [address]

## 2019-02-08 NOTE — PROGRESS NOTES
Chief Complaint   Patient presents with    Sleep Study     follow [facility]     [facility]  over the last two weeks 2/8/2019   Little interest or pleasure in doing things Not at all   Feeling down, depressed, irritable, or hopeless Not at all   Total Score PHQ 2 0

## 2019-02-27 ENCOUNTER — TELEPHONE (OUTPATIENT)
Dept: NEUROLOGY | Age: 34
End: 2019-02-27

## 2019-02-27 NOTE — TELEPHONE ENCOUNTER
Jay Guevara @ [facility] [[email or web address]] [address] requests pt's  sleep study results and office notes prior to sleep study. Please fax to: [phone].

## 2019-03-17 ENCOUNTER — HOSPITAL ENCOUNTER (EMERGENCY)
Age: 34
Discharge: HOME | End: 2019-03-17
Attending: EMERGENCY MEDICINE
Payer: MEDICAID

## 2019-03-17 VITALS
HEIGHT: 69 IN | DIASTOLIC BLOOD PRESSURE: 78 MMHG | OXYGEN SATURATION: 96 % | TEMPERATURE: 99.3 F | BODY MASS INDEX: 37.03 KG/M2 | SYSTOLIC BLOOD PRESSURE: 139 MMHG | RESPIRATION RATE: 18 BRPM | HEART RATE: 103 BPM | WEIGHT: 250 LBS

## 2019-03-17 DIAGNOSIS — M54.50 ACUTE LOW BACK PAIN WITHOUT SCIATICA, UNSPECIFIED BACK PAIN LATERALITY: Primary | ICD-10-CM

## 2019-03-17 PROCEDURE — 99282 EMERGENCY DEPT VISIT SF MDM: CPT

## 2019-03-17 RX ORDER — METHOCARBAMOL 500 MG/1
500 TABLET, FILM COATED ORAL 3 TIMES DAILY
Qty: 12 TAB | Refills: 0 | Status: SHIPPED | OUTPATIENT
Start: 2019-03-17 | End: 2019-03-18

## 2019-03-17 RX ORDER — LIDOCAINE 40 MG/G
CREAM TOPICAL
Qty: 15 G | Refills: 0 | Status: SHIPPED | OUTPATIENT
Start: 2019-03-17 | End: 2019-03-18

## 2019-03-17 NOTE — ED TRIAGE NOTES
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 Denies difficulty with urination.

## 2019-03-17 NOTE — LETTER
NOTIFICATION RETURN TO WORK / SCHOOL 
 
3/17/2019 8:21 PM 
 
Ms. Nabor Fuchs 
[facility] [address] To Whom It May Concern: 
 
Nabor Fuchs is currently under the care of [address] [facility] EMERGENCY DEPT. She will return to work/school on: 3/19/2019 If there are questions or concerns please have the patient contact our office. Sincerely, JACQUELINE Posada RN

## 2019-03-17 NOTE — ED PROVIDER NOTES
EMERGENCY DEPARTMENT HISTORY AND PHYSICAL EXAM    Date: 3/17/2019  Patient Name: Lakeshia Naranjo    History of Presenting Illness     Chief Complaint   Patient presents with    Back Pain          History Provided By: Patient    Chief Complaint: Back pain  Duration: 2 Days  Timing:  Acute  Location: Lower back  Quality: Aching  Severity: 10 out of 10  Modifying Factors: No relief with aleve or heating pain  Associated Symptoms: denies any other associated signs or symptoms    Additional History (Context):   7:57 PM   Lakeshia Naranjo is a 28 y.o. female with PMHX of DM who presents to the emergency department C/O lower back pain that feels like an achy pain and is worse with certain movements onset 2 days ago. No relief with Aleve or heating pad. 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 Patient denies numbness, tingling, radiation of pain, loss of bowel or bladder control, fevers, IV drug use, saddle anesthesia, urinary symptoms, and any other sxs or complaints. PCP: Jesse Beltrán MD    Current Outpatient Medications   Medication Sig Dispense Refill    lidocaine (XYLOCAINE) 4 % topical cream Apply  to affected area three (3) times daily as needed for Pain. 15 g 0    methocarbamol (ROBAXIN) 500 mg tablet Take 1 Tab by mouth three (3) times daily. 12 Tab 0    topiramate (TOPAMAX) 25 mg tablet Take 3 Tabs by mouth two (2) times a day. 180 Tab 6    insulin glulisine U-100 (APIDRA SOLOSTAR U-100 INSULIN) 100 unit/mL pen 10 Units by SubCUTAneous route Before breakfast, lunch, and dinner.  insulin glargine (BASAGLAR KWIKPEN U-100 INSULIN) 100 unit/mL (3 mL) inpn 90 Units by SubCUTAneous route two (2) times a day.  gabapentin (NEURONTIN) 300 mg capsule Take 300 mg by mouth. One cap QAM, two caps QPM      liothyronine (CYTOMEL) 25 mcg tablet Take 75 mcg by mouth daily.       meloxicam (MOBIC) 15 mg tablet Take 15 mg by mouth daily as needed for Pain.      traZODone (DESYREL) 50 mg tablet Take 50 mg by mouth nightly as needed for Sleep.  metFORMIN (GLUCOPHAGE) 1,000 mg tablet Take 1,000 mg by mouth two (2) times daily (with meals).  metoprolol tartrate (LOPRESSOR) 25 mg tablet Take 25 mg by mouth daily.  fenofibrate nanocrystallized (TRICOR) 48 mg tablet Take 48 mg by mouth.  atorvastatin (LIPITOR) 40 mg tablet Take  by mouth daily.  furosemide (LASIX) 20 mg tablet Take  by mouth daily.  lisinopril (PRINIVIL, ZESTRIL) 5 mg tablet Take 40 mg by mouth daily. Past History     Past Medical History:  Past Medical History:   Diagnosis Date    Arthritis     Chest pain     Diabetes ([facility] [address]     Essential hypertension     Headache(784.0)     Hyperchloremia     Hypertension     Seizures (HCC)     SOB (shortness of breath)     Thyroid cancer ([facility]        Past Surgical History:  Past Surgical History:   Procedure Laterality Date    HX PARTIAL HYSTERECTOMY      HX THYROIDECTOMY      HX TUBAL LIGATION         Family History:  Family History   Problem Relation Age of Onset    Hypertension Mother        Social History:  Social History     Tobacco Use    Smoking status: Former Smoker    Smokeless tobacco: Never Used   Substance Use Topics    Alcohol use: No    Drug use: No       Allergies:  No Known Allergies      Review of Systems   Review of Systems   Constitutional: Negative for fever. Genitourinary: Negative for difficulty urinating, dysuria, frequency and urgency. Musculoskeletal: Positive for back pain. Negative for gait problem. Neurological: Negative for weakness and numbness. All other systems reviewed and are negative. Physical Exam     Vitals:    03/17/19 1949   BP: 139/78   Pulse: (!) 103   Resp: 18   Temp: 99.3 °F (37.4 °C)   SpO2: 96%   Weight: 113.4 kg (250 lb)   Height: 5' 9\" (1.753 m)     Physical Exam   Constitutional: She appears well-developed and well-nourished. No distress.    HENT: Head: Normocephalic and atraumatic. Right Ear: External ear normal.   Left Ear: External ear normal.   Nose: Nose normal.   Eyes: Conjunctivae are normal.   Neck: Normal range of motion. Cardiovascular: Normal rate, regular rhythm and normal heart sounds. Pulmonary/Chest: Effort normal and breath sounds normal. No respiratory distress. Musculoskeletal:   Reproducible tenderness to palpation of the lumbar spine diffusely and paraspinal muscles bilaterally. No deformity or step-off appreciated. Bilateral lower extremity strength is symmetric and sensation is intact. Normal plantar dorsiflexion. Ambulates without abnormality or foot drop appreciated. No overlying skin changes of the back are noted. Neurological: She is alert. Skin: Skin is warm and dry. She is not diaphoretic. Psychiatric: She has a normal mood and affect. Nursing note and vitals reviewed. Diagnostic Study Results     Labs -   No results found for this or any previous visit (from the past 12 hour(s)). Radiologic Studies -   No orders to display     CT Results  (Last 48 hours)    None        CXR Results  (Last 48 hours)    None          Medications given in the ED-  Medications - No data to display      Medical Decision Making   I am the first provider for this patient. I reviewed the vital signs, available nursing notes, past medical history, past surgical history, family history and social history. Vital Signs-Reviewed the patient's vital signs. Records Reviewed: Nursing Notes and Old Medical Records    Provider Notes (Medical Decision Making): Appears well hydrated and non toxic, with stable vital signs, presenting with atraumatic lower back pain that is nonradiating and without any red flag symptoms today. No neuro deficits appreciated on exam therefore the very low suspicion for spinal cord injury or spinal abscess.   As there was no known trauma or injury do not feel x-ray will be of benefit to the patient. Recommend patient continue anti-inflammatory, will add on lidocaine cream and muscle relaxer, continue heating pad and light stretching as tolerated and PCP and orthopedist follow-up. Based on today's assessment, I feel the patient is stable for discharge to home with outpatient follow up. Return precautions and referrals provided. Diagnosis and Disposition       DISCHARGE NOTE:  8:03 PM   Saray Sutton's  results have been reviewed with her. She has been counseled regarding her diagnosis. She verbally conveys understanding and agreement of the signs, symptoms, diagnosis, treatment and prognosis and additionally agrees to follow up as recommended with Dr. Jesse Beltrán MD in 24 - 48 hours. She also agrees with the care-plan and conveys that all of her questions have been answered. I have also put together some discharge instructions for her that include: 1) educational information regarding their diagnosis, 2) how to care for their diagnosis at home, as well a 3) list of reasons why they would want to return to the ED prior to their follow-up appointment, should their condition change. CLINICAL IMPRESSION:    1. Acute low back pain without sciatica, unspecified back pain laterality        PLAN:  1. D/C Home  2. Current Discharge Medication List      START taking these medications    Details   lidocaine (XYLOCAINE) 4 % topical cream Apply  to affected area three (3) times daily as needed for Pain. Qty: 15 g, Refills: 0      methocarbamol (ROBAXIN) 500 mg tablet Take 1 Tab by mouth three (3) times daily. Qty: 12 Tab, Refills: 0         CONTINUE these medications which have NOT CHANGED    Details   meloxicam (MOBIC) 15 mg tablet Take 15 mg by mouth daily as needed for Pain.          STOP taking these medications       lidocaine (LIDODERM) 5 % Comments:   Reason for Stopping:         ibuprofen (MOTRIN) 600 mg tablet Comments:   Reason for Stopping:             3.   Follow-up Information Follow up With Specialties Details Why Contact Info    Kadeem Moore MD [facility] Schedule an appointment as soon as possible for a visit  [address] [phone]      Valencia Carlos MD Orthopedic Surgery Schedule an appointment as soon as possible for a visit  [facility] [address]  [phone] [address] [facility] EMERGENCY DEPT Emergency Medicine  As needed, If symptoms worsen [address]  [phone]

## 2019-03-18 ENCOUNTER — APPOINTMENT (OUTPATIENT)
Dept: GENERAL RADIOLOGY | Age: 34
End: 2019-03-18
Attending: PHYSICIAN ASSISTANT
Payer: MEDICAID

## 2019-03-18 ENCOUNTER — HOSPITAL ENCOUNTER (EMERGENCY)
Age: 34
Discharge: HOME | End: 2019-03-18
Attending: EMERGENCY MEDICINE
Payer: MEDICAID

## 2019-03-18 VITALS
HEART RATE: 89 BPM | HEIGHT: 69 IN | WEIGHT: 250 LBS | BODY MASS INDEX: 37.03 KG/M2 | SYSTOLIC BLOOD PRESSURE: 142 MMHG | DIASTOLIC BLOOD PRESSURE: 91 MMHG | RESPIRATION RATE: 18 BRPM | OXYGEN SATURATION: 98 % | TEMPERATURE: 98.5 F

## 2019-03-18 DIAGNOSIS — R03.0 ELEVATED BLOOD PRESSURE READING: ICD-10-CM

## 2019-03-18 DIAGNOSIS — S39.012D BACK STRAIN, SUBSEQUENT ENCOUNTER: Primary | ICD-10-CM

## 2019-03-18 LAB
APPEARANCE UR: CLEAR
BACTERIA URNS QL MICRO: NEGATIVE /HPF
BILIRUB UR QL: NEGATIVE
COLOR UR: YELLOW
EPITH CASTS URNS QL MICRO: NORMAL /LPF (ref 0–5)
GLUCOSE UR STRIP.AUTO-MCNC: >1000 MG/DL
HGB UR QL STRIP: ABNORMAL
KETONES UR QL STRIP.AUTO: NEGATIVE MG/DL
LEUKOCYTE ESTERASE UR QL STRIP.AUTO: NEGATIVE
NITRITE UR QL STRIP.AUTO: NEGATIVE
PH UR STRIP: 6.5 [PH] (ref 5–8)
PROT UR STRIP-MCNC: 30 MG/DL
RBC #/AREA URNS HPF: NORMAL /HPF (ref 0–5)
SP GR UR REFRACTOMETRY: 1.02 (ref 1–1.03)
UROBILINOGEN UR QL STRIP.AUTO: 0.2 EU/DL (ref 0.2–1)
WBC URNS QL MICRO: NEGATIVE /HPF (ref 0–4)

## 2019-03-18 PROCEDURE — 72100 X-RAY EXAM L-S SPINE 2/3 VWS: CPT

## 2019-03-18 PROCEDURE — 81001 URINALYSIS AUTO W/SCOPE: CPT

## 2019-03-18 PROCEDURE — 99282 EMERGENCY DEPT VISIT SF MDM: CPT

## 2019-03-18 RX ORDER — TRAMADOL HYDROCHLORIDE 50 MG/1
50 TABLET ORAL
Qty: 8 TAB | Refills: 0 | Status: SHIPPED | OUTPATIENT
Start: 2019-03-18 | End: 2019-03-21

## 2019-03-18 NOTE — ED TRIAGE NOTES
Patient states being evaluated and treated last night for lower back pain. States receiving prescriptions for robaxin and lidocaine cream without relief. States pain is now shooting down posterior legs bilaterally.

## 2019-03-18 NOTE — DISCHARGE INSTRUCTIONS
Take your meloxicam that you have already prescribed to and use the lidocaine cream as directed for pain. Muscle relaxer as prescribed for muscle spasms and pain. You are not to drive, operate heavy machinery, or work while on muscle relaxer. Use a heating pad and perform light stretching as tolerated. Return to the ER for persistent or worsening symptoms, numbness, tingling, loss of bowel or bladder control, chest pain, shortness of breath, fevers, concerns about dehydration, if you are unable to follow up as instructed, and for any other concerns. Learning About Relief for Back Pain  What is back tension and strain? Back strain happens when you overstretch, or pull, a muscle in your back. You may hurt your back in an accident or when you exercise or lift something. Most back pain will get better with rest and time. You can take care of yourself at home to help your back heal.  What can you do first to relieve back pain? When you first feel back pain, try these steps:  · Walk. Take a short walk (10 to 20 minutes) on a level surface (no slopes, hills, or stairs) every 2 to 3 hours. Walk only distances you can manage without pain, especially leg pain. · Relax. Find a comfortable position for rest. Some people are comfortable on the floor or a medium-firm bed with a small pillow under their head and another under their knees. Some people prefer to lie on their side with a pillow between their knees. Don't stay in one position for too long. · Try heat or ice. Try using a heating pad on a low or medium setting, or take a warm shower, for 15 to 20 minutes every 2 to 3 hours. Or you can buy single-use heat wraps that last up to 8 hours. You can also try an ice pack for 10 to 15 minutes every 2 to 3 hours. You can use an ice pack or a bag of frozen vegetables wrapped in a thin towel. There is not strong evidence that either heat or ice will help, but you can try them to see if they help.  You may also want to try switching between heat and cold. · Take pain medicine exactly as directed. ¨ If the doctor gave you a prescription medicine for pain, take it as prescribed. ¨ If you are not taking a prescription pain medicine, ask your doctor if you can take an over-the-counter medicine. What else can you do? · Stretch and exercise. Exercises that increase flexibility may relieve your pain and make it easier for your muscles to keep your spine in a good, neutral position. And don't forget to keep walking. · Do self-massage. You can use self-massage to unwind after work or school or to energize yourself in the morning. You can easily massage your feet, hands, or neck. Self-massage works best if you are in comfortable clothes and are sitting or lying in a comfortable position. Use oil or lotion to massage bare skin. · Reduce stress. Back pain can lead to a vicious [place]: Distress about the pain tenses the muscles in your back, which in turn causes more pain. Learn how to relax your mind and your muscles to lower your stress. Where can you learn more? Go to http://josh-autumn.info/. Enter U961 in the search box to learn more about \"Learning About Relief for Back Pain. \"  Current as of: March 21, 2017  Content Version: 11.5  © 7826-7104 [employer]. Care instructions adapted under license by [employer] (which disclaims liability or warranty for this information). If you have questions about a medical condition or this instruction, always ask your healthcare professional. [place] [address] any warranty or liability for your use of this information.

## 2019-03-18 NOTE — DISCHARGE INSTRUCTIONS
Patient Education      Take medication as prescribed. Follow-up with your primary care physician in 2 days for reassessment. Bring the results from this visit with your for their review. Return to the ED immediately for any new, worsening, or persistent symptoms    Back Strain: Care Instructions  Overview    A back strain happens when you overstretch, or pull, a muscle in your back. You may hurt your back in an accident or when you exercise or lift something. Sometimes you may not know how you hurt your back. Most back pain will get better with rest and time. You can take care of yourself at home to help your back heal.  Follow-up care is a key part of your treatment and safety. Be sure to make and go to all appointments, and call your doctor if you are having problems. It's also a good idea to know your test results and keep a list of the medicines you take. How can you care for yourself at home? · Try to stay as active as you can, but stop or reduce any activity that causes pain. · Put ice or a cold pack on the sore muscle for 10 to 20 minutes at a time to stop swelling. Try this every 1 to 2 hours for 3 days (when you are awake) or until the swelling goes down. Put a thin cloth between the ice pack and your skin. · After 2 or 3 days, apply a heating pad on low or a warm cloth to your back. Some doctors suggest that you go back and forth between hot and cold treatments. · Take pain medicines exactly as directed. ? If the doctor gave you a prescription medicine for pain, take it as prescribed. ? If you are not taking a prescription pain medicine, ask your doctor if you can take an over-the-counter medicine. · Try sleeping on your side with a pillow between your legs. Or put a pillow under your knees when you lie on your back. These measures can ease pain in your lower back. · Return to your usual level of activity slowly. When should you call for help?   Call 911 anytime you think you may need emergency care. For example, call if:    · You are unable to move a leg at all.   [facility] your doctor now or seek immediate medical care if:    · You have new or worse symptoms in your legs, belly, or buttocks. Symptoms may include:  ? Numbness or tingling. ? Weakness. ? Pain.     · You lose bladder or bowel control.    Watch closely for changes in your health, and be sure to contact your doctor if:    · You have a fever, lose weight, or don't feel well.     · You are not getting better as expected. Where can you learn more? Go to http://josh-autumn.info/. Enter P686 in the search box to learn more about \"Back Strain: Care Instructions. \"  Current as of: September 20, 2018  Content Version: 11.9  © 4084-6782 [employer]. Care instructions adapted under license by [employer] (which disclaims liability or warranty for this information). If you have questions about a medical condition or this instruction, always ask your healthcare professional. [place] [address] any warranty or liability for your use of this information. Patient Education        Elevated Blood Pressure: Care Instructions  Your Care Instructions    Blood pressure is a measure of how hard the blood pushes against the walls of your arteries. It's normal for blood pressure to go up and down throughout the day. But if it stays up over time, you have high blood pressure. Two numbers tell you your blood pressure. The first number is the systolic pressure. It shows how hard the blood pushes when your heart is pumping. The second number is the diastolic pressure. It shows how hard the blood pushes between heartbeats, when your heart is relaxed and filling with blood. An ideal blood pressure in adults is less than 120/80 (say \"120 over 80\"). High blood pressure is 140/90 or higher. You have high blood pressure if your top number is 140 or higher or your bottom number is 90 or higher, or both.   The main test for high blood pressure is simple, fast, and painless. To diagnose high blood pressure, your doctor will test your blood pressure at different times. After testing your blood pressure, your doctor may ask you to test it again when you are home. If you are diagnosed with high blood pressure, you can work with your doctor to make a long-term plan to manage it. Follow-up care is a key part of your treatment and safety. Be sure to make and go to all appointments, and call your doctor if you are having problems. It's also a good idea to know your test results and keep a list of the medicines you take. How can you care for yourself at home? · Do not smoke. Smoking increases your risk for heart attack and stroke. If you need help quitting, talk to your doctor about stop-smoking programs and medicines. These can increase your chances of quitting for good. · Stay at a healthy weight. · Try to limit how much sodium you eat to less than 2,300 milligrams (mg) a day. Your doctor may ask you to try to eat less than 1,500 mg a day. · Be physically active. Get at least 30 minutes of exercise on most days of the week. Walking is a good choice. You also may want to do other activities, such as running, swimming, cycling, or playing tennis or team sports. · Avoid or limit alcohol. Talk to your doctor about whether you can drink any alcohol. · Eat plenty of fruits, vegetables, and low-fat dairy products. Eat less saturated and total fats. · Learn how to check your blood pressure at home. When should you call for help? Call your doctor now or seek immediate medical care if:  ? · Your blood pressure is much higher than normal (such as 180/110 or higher). ? · You think high blood pressure is causing symptoms such as:  ¨ Severe headache. ¨ Blurry vision. ? Watch closely for changes in your health, and be sure to contact your doctor if:  ? · You do not get better as expected. Where can you learn more?   Go to http://josh-autumn.info/. Enter C091 in the search box to learn more about \"Elevated Blood Pressure: Care Instructions. \"  Current as of: September 21, 2016  Content Version: 11.4  © 9625-0934 [employer]. Care instructions adapted under license by [employer] (which disclaims liability or warranty for this information). If you have questions about a medical condition or this instruction, always ask your healthcare professional. [place] [address] any warranty or liability for your use of this information. Patient Education        Back Care and Preventing Injuries: Care Instructions  Your Care Instructions    You can hurt your back doing many everyday activities: lifting a heavy box, bending down to garden, exercising at the gym, and even getting out of bed. But you can keep your back strong and healthy by doing some exercises. You also can follow a few tips for sitting, sleeping, and lifting to avoid hurting your back again. Talk to your doctor before you start an exercise program. Ask for help if you want to learn more about keeping your back healthy. Follow-up care is a key part of your treatment and safety. Be sure to make and go to all appointments, and call your doctor if you are having problems. It's also a good idea to know your test results and keep a list of the medicines you take. How can you care for yourself at home? · Stay at a healthy weight to avoid strain on your lower back. · Do not smoke. Smoking increases the risk of osteoporosis, which weakens the spine. If you need help quitting, talk to your doctor about stop-smoking programs and medicines. These can increase your chances of quitting for good. · Make sure you sleep in a position that maintains your back's normal curves and on a mattress that feels comfortable. Sleep on your side with a pillow between your knees, or sleep on your back with a pillow under your knees.  These positions can reduce strain on your back. · When you get out of bed, lie on your side and bend both knees. Drop your feet over the edge of the bed as you push up with both arms. Scoot to the edge of the bed. Make sure your feet are in line with your rear end (buttocks), and then stand up. · If you must stand for a long time, put one foot on a stool, ledge, or box. Exercise to strengthen your back and other muscles  · Get at least 30 minutes of exercise on most days of the week. Walking is a good choice. You also may want to do other activities, such as running, swimming, cycling, or playing tennis or team sports. · Stretch your back muscles. Here are few exercises to try:  ? Lie on your back with your knees bent and your feet flat on the floor. Gently pull one bent knee to your chest. Put that foot back on the floor, and then pull the other knee to your chest. Hold for 15 to 30 seconds. Repeat 2 to 4 times. ? Do pelvic tilts. Lie on your back with your knees bent. Tighten your stomach muscles. Pull your belly button (navel) in and up toward your ribs. You should feel like your back is pressing to the floor and your hips and pelvis are slightly lifting off the floor. Hold for 6 seconds while breathing smoothly. · Keep your core muscles strong. The muscles of your back, belly (abdomen), and buttocks support your spine. ? Pull in your belly, and imagine pulling your navel toward your spine. Hold this for 6 seconds, then relax. Remember to keep breathing normally as you tense your muscles. ? Do curl-ups. Always do them with your knees bent. Keep your low back on the floor, and curl your shoulders toward your knees using a smooth, slow motion. Keep your arms folded across your chest. If this bothers your neck, try putting your hands behind your neck (not your head), with your elbows spread apart. ? Lie on your back with your knees bent and your feet flat on the floor.  Tighten your belly muscles, and then push with your feet and raise your buttocks up a few inches. Hold this position 6 seconds as you continue to breathe normally, then lower yourself slowly to the floor. Repeat 8 to 12 times. ? If you like group exercise, try Pilates or yoga. These classes have poses that strengthen the core muscles. Protect your back when you sit  · Place a small pillow, a rolled-up towel, or a lumbar roll in the curve of your back if you need extra support. · Sit in a chair that is low enough to let you place both feet flat on the floor with both knees nearly level with your hips. If your chair or desk is too high, use a foot rest to raise your knees. · When driving, keep your knees nearly level with your hips. Sit straight, and drive with both hands on the steering wheel. Your arms should be in a slightly bent position. · Try a kneeling chair, which helps tilt your hips forward. This takes pressure off your lower back. · Try sitting on an exercise ball. It can rock from side to side, which helps keep your back loose. Lift properly  · Squat down, bending at the hips and knees only. If you need to, put one knee to the floor and extend your other knee in front of you, bent at a right angle (half kneeling). · Press your chest straight forward. This helps keep your upper back straight while keeping a slight arch in your low back. · Hold the load as close to your body as possible, at the level of your navel. · Use your feet to change direction, taking small steps. · Lead with your hips as you change direction. Keep your shoulders in line with your hips as you move. Do not twist your body. · Set down your load carefully, squatting with your knees and hips only. When should you call for help? Watch closely for changes in your health, and be sure to contact your doctor if you have any problems. Where can you learn more? Go to http://josh-autumn.info/.   Enter S810 in the search box to learn more about \"Back Care and Preventing Injuries: Care Instructions. \"  Current as of: September 20, 2018  Content Version: 11.9  © 2341-7491 [employer]. Care instructions adapted under license by [employer] (which disclaims liability or warranty for this information). If you have questions about a medical condition or this instruction, always ask your healthcare professional. [place] [address] any warranty or liability for your use of this information.

## 2019-03-18 NOTE — LETTER
[address] [place] [address] [facility] 
[address] [facility] [id number] [phone] Work/School Note Date: 3/18/2019 To Whom It May concern: 
 
Tiffanie Peguero was seen and treated today in the emergency room by the following provider(s): 
Attending Provider: Mich Lewis MD.   
 
Tiffanie Peguero may return to work on 3/20/19.  
 
Sincerely, 
 
 
 
 
ESTEBAN Weaver

## 2019-03-18 NOTE — ED NOTES
Klarissa Apple is a 28 y.o. female that was discharged in good condition. The patients diagnosis, condition and treatment were explained to  patient and aftercare instructions were given. The patient verbalized understanding. Patient armband removed and shredded.

## 2019-03-18 NOTE — ED PROVIDER NOTES
EMERGENCY DEPARTMENT HISTORY AND PHYSICAL EXAM    2:15 PM      Date: 3/18/2019  Patient Name: Malcom Gaucher    History of Presenting Illness     Chief Complaint   Patient presents with    Back Pain         History Provided By: Patient    Chief Complaint: low back pain  Duration:  Days  Timing:  Constant  Location: low back  Quality: Aching  Severity: Severe  Modifying Factors: worse with movement  Associated Symptoms: denies any other associated signs or symptoms      Additional History (Context): Malcom Gaucher is a 28 y.o. female with hx of HLD< HTN, DM, seizures, thyroid cancer (remission since 2014) who presents with complaint of low back pain x  3 days. Patient notes the pain is diffuse, worse with movement, and radiates into bilateral lower extremity. Denies any specific injury or trauma, but notes that she is a  and stands and lifts at work. Notes she was evaluated yesterday and discharged with Lidoderm and pain medicine that has not been helping. Notes the pain is worse with any movement. Denies any history of IV drug use, active cancer, urinary/bowel incontinence/retention, weakness, abdominal pain, vomiting, diarrhea, rash. PCP: Stacey Goldman MD    Current Outpatient Medications   Medication Sig Dispense Refill    traMADol (ULTRAM) 50 mg tablet Take 1 Tab by mouth every six (6) hours as needed for Pain for up to 3 days. Max Daily Amount: 200 mg. 8 Tab 0    topiramate (TOPAMAX) 25 mg tablet Take 3 Tabs by mouth two (2) times a day. 180 Tab 6    insulin glulisine U-100 (APIDRA SOLOSTAR U-100 INSULIN) 100 unit/mL pen 10 Units by SubCUTAneous route Before breakfast, lunch, and dinner.  insulin glargine (BASAGLAR KWIKPEN U-100 INSULIN) 100 unit/mL (3 mL) inpn 90 Units by SubCUTAneous route two (2) times a day.  gabapentin (NEURONTIN) 300 mg capsule Take 300 mg by mouth. One cap QAM, two caps QPM      liothyronine (CYTOMEL) 25 mcg tablet Take 75 mcg by mouth daily.       meloxicam (MOBIC) 15 mg tablet Take 15 mg by mouth daily as needed for Pain.  traZODone (DESYREL) 50 mg tablet Take 50 mg by mouth nightly as needed for Sleep.  metFORMIN (GLUCOPHAGE) 1,000 mg tablet Take 1,000 mg by mouth two (2) times daily (with meals).  metoprolol tartrate (LOPRESSOR) 25 mg tablet Take 25 mg by mouth daily.  fenofibrate nanocrystallized (TRICOR) 48 mg tablet Take 48 mg by mouth.  atorvastatin (LIPITOR) 40 mg tablet Take  by mouth daily.  furosemide (LASIX) 20 mg tablet Take  by mouth daily.  lisinopril (PRINIVIL, ZESTRIL) 5 mg tablet Take 40 mg by mouth daily. Past History     Past Medical History:  Past Medical History:   Diagnosis Date    Arthritis     Chest pain     Diabetes ([facility] [address]     Essential hypertension     Headache(784.0)     Hyperchloremia     Hypertension     Seizures (HCC)     SOB (shortness of breath)     Thyroid cancer ([facility] [address]        Past Surgical History:  Past Surgical History:   Procedure Laterality Date    HX PARTIAL HYSTERECTOMY      HX THYROIDECTOMY      HX TUBAL LIGATION         Family History:  Family History   Problem Relation Age of Onset    Hypertension Mother        Social History:  Social History     Tobacco Use    Smoking status: Former Smoker    Smokeless tobacco: Never Used   Substance Use Topics    Alcohol use: No    Drug use: No       Allergies:  No Known Allergies      Review of Systems       Review of Systems   Constitutional: Negative for chills and fever. Respiratory: Negative for shortness of breath. Cardiovascular: Negative for chest pain. Gastrointestinal: Negative for abdominal pain, nausea and vomiting. Musculoskeletal: Positive for back pain. Skin: Negative for rash. Neurological: Negative for weakness. All other systems reviewed and are negative.         Physical Exam     Visit Vitals  BP (!) 142/91 (BP 1 Location: Left arm, BP Patient Position: At rest)   Pulse 89   Temp 98.5 °F (36.9 °C)   Resp 18   Ht 5' 9\" (1.753 m)   Wt 113.4 kg (250 lb)   SpO2 98%   BMI 36.92 kg/m²         Physical Exam   Constitutional: She appears well-developed and well-nourished. No distress. HENT:   Head: Normocephalic and atraumatic. Neck: Normal range of motion. Neck supple. Cardiovascular: Normal rate, regular rhythm, normal heart sounds and intact distal pulses. Exam reveals no gallop and no friction rub. No murmur heard. Pulmonary/Chest: Effort normal and breath sounds normal. No respiratory distress. She has no wheezes. She has no rales. Abdominal: Soft. Bowel sounds are normal. She exhibits no distension. There is no tenderness. There is no rebound and no guarding. Musculoskeletal: Normal range of motion. Lumbar back: She exhibits tenderness (right and left lumbar paravertebrals TTP, moderate midline tenderness). She exhibits normal range of motion, no swelling, no edema, no deformity and no spasm. No erythema or edema to back, No saddle anesthesia, negative SLR b/l, full ROM of hip and knee, normal gait    Neurological: She is alert. Skin: Skin is warm and dry. No rash noted. She is not diaphoretic. Nursing note and vitals reviewed.         Diagnostic Study Results     Labs -  Recent Results (from the past 12 hour(s))   URINALYSIS W/ RFLX MICROSCOPIC    Collection Time: 03/18/19 12:15 PM   Result Value Ref Range    Color YELLOW      Appearance CLEAR      Specific gravity 1.023 1.005 - 1.030      pH (UA) 6.5 5.0 - 8.0      Protein 30 (A) NEG mg/dL    Glucose >1,000 (A) NEG mg/dL    Ketone NEGATIVE  NEG mg/dL    Bilirubin NEGATIVE  NEG      Blood TRACE (A) NEG      Urobilinogen 0.2 0.2 - 1.0 EU/dL    Nitrites NEGATIVE  NEG      Leukocyte Esterase NEGATIVE  NEG     URINE MICROSCOPIC ONLY    Collection Time: 03/18/19 12:15 PM   Result Value Ref Range    WBC NEGATIVE  0 - 4 /hpf    RBC 0 to 3 0 - 5 /hpf    Epithelial cells 1+ 0 - 5 /lpf    Bacteria NEGATIVE  NEG /hpf       Radiologic Studies -   XR SPINE LUMB 2 OR 3 V   Final Result   IMPRESSION:      No acute osseous abnormality. Mild degenerative disc disease L4-5. Medical Decision Making   I am the first provider for this patient. I reviewed the vital signs, available nursing notes, past medical history, past surgical history, family history and social history. Vital Signs-Reviewed the patient's vital signs. Pulse Oximetry Analysis -  98 on room air     Records Reviewed: Nursing Notes and Old Medical Records (Time of Review: 2:15 PM)    ED Course: Progress Notes, Reevaluation, and Consults:  2:15 PM  Reviewed results with patient. Discussed need for close outpatient follow-up. Discussed strict return precautions, including fever, vomiting, or any other medical concerns. Pt ambulatory without any changes in gait. Provider Notes (Medical Decision Making): 39-year-old female who presents due to low back pain x days. No specific injury/trauma. No red flag symptoms. No abdominal tenderness to palpation. X-ray without acute process. UA without evidence of infection. Do not suspect nephrolithiasis. Pain is worse with movement and reproducible with palpation. Appears consistent with back strain. Stable for discharge with symptomatic management and close outpatient follow-up. Diagnosis     Clinical Impression:   1. Back strain, subsequent encounter    2.  Elevated blood pressure reading        Disposition: home     Follow-up Information     Follow up With Specialties Details [facility] Emergency Medicine  If symptoms worsen [id number]    Zurdo Edmond MD Family Practice In 2 days  [address]  [phone]                Medication List      START taking these medications    traMADol 50 mg tablet  Commonly known as:  ULTRAM  Take 1 Tab by mouth every six (6) hours as needed for Pain for up to 3 days. Max Daily Amount: 200 mg. STOP taking these medications    lidocaine 4 % topical cream  Commonly known as:  XYLOCAINE     methocarbamol 500 mg tablet  Commonly known as:  ROBAXIN        ASK your doctor about these medications    APIDRA SOLOSTAR U-100 INSULIN 100 unit/mL pen  Generic drug:  insulin glulisine U-100     atorvastatin 40 mg tablet  Commonly known as:  LIPITOR     BASAGLAR KWIKPEN U-100 INSULIN 100 unit/mL (3 mL) Inpn  Generic drug:  insulin glargine     fenofibrate nanocrystallized 48 mg tablet  Commonly known as:  TRICOR     furosemide 20 mg tablet  Commonly known as:  LASIX     gabapentin 300 mg capsule  Commonly known as:  NEURONTIN     liothyronine 25 mcg tablet  Commonly known as:  CYTOMEL     lisinopril 5 mg tablet  Commonly known as:  PRINIVIL, ZESTRIL     meloxicam 15 mg tablet  Commonly known as:  MOBIC     metFORMIN 1,000 mg tablet  Commonly known as:  GLUCOPHAGE     metoprolol tartrate 25 mg tablet  Commonly known as:  LOPRESSOR     topiramate 25 mg tablet  Commonly known as:  TOPAMAX  Take 3 Tabs by mouth two (2) times a day.      traZODone 50 mg tablet  Commonly known as:  DESYREL           Where to Get Your Medications      Information about where to get these medications is not yet available    Ask your nurse or doctor about these medications  · traMADol 50 mg tablet

## 2019-04-28 ENCOUNTER — HOSPITAL ENCOUNTER (EMERGENCY)
Age: 34
Discharge: HOME | End: 2019-04-28
Attending: EMERGENCY MEDICINE
Payer: MEDICAID

## 2019-04-28 VITALS
RESPIRATION RATE: 20 BRPM | DIASTOLIC BLOOD PRESSURE: 83 MMHG | HEART RATE: 104 BPM | SYSTOLIC BLOOD PRESSURE: 127 MMHG | TEMPERATURE: 98.4 F | OXYGEN SATURATION: 100 %

## 2019-04-28 DIAGNOSIS — G40.909 RECURRENT SEIZURES (HCC): Primary | ICD-10-CM

## 2019-04-28 LAB
ALBUMIN SERPL-MCNC: 3 G/DL (ref 3.4–5)
ALBUMIN/GLOB SERPL: 0.8 {RATIO} (ref 0.8–1.7)
ALP SERPL-CCNC: 96 U/L (ref 45–117)
ALT SERPL-CCNC: 30 U/L (ref 13–56)
ANION GAP SERPL CALC-SCNC: 10 MMOL/L (ref 3–18)
AST SERPL-CCNC: 19 U/L (ref 15–37)
BASOPHILS # BLD: 0 K/UL (ref 0–0.1)
BASOPHILS NFR BLD: 0 % (ref 0–2)
BILIRUB SERPL-MCNC: 0.2 MG/DL (ref 0.2–1)
BUN SERPL-MCNC: 20 MG/DL (ref 7–18)
BUN/CREAT SERPL: 18 (ref 12–20)
CALCIUM SERPL-MCNC: 9.2 MG/DL (ref 8.5–10.1)
CHLORIDE SERPL-SCNC: 103 MMOL/L (ref 100–108)
CO2 SERPL-SCNC: 21 MMOL/L (ref 21–32)
CREAT SERPL-MCNC: 1.1 MG/DL (ref 0.6–1.3)
DIFFERENTIAL METHOD BLD: NORMAL
EOSINOPHIL # BLD: 0.1 K/UL (ref 0–0.4)
EOSINOPHIL NFR BLD: 1 % (ref 0–5)
ERYTHROCYTE [DISTWIDTH] IN BLOOD BY AUTOMATED COUNT: 13.3 % (ref 11.6–14.5)
GLOBULIN SER CALC-MCNC: 3.6 G/DL (ref 2–4)
GLUCOSE BLD STRIP.AUTO-MCNC: 356 MG/DL (ref 70–110)
GLUCOSE BLD STRIP.AUTO-MCNC: 466 MG/DL (ref 70–110)
GLUCOSE SERPL-MCNC: 463 MG/DL (ref 74–99)
HCG SERPL-ACNC: <1 MIU/ML (ref 0–10)
HCT VFR BLD AUTO: 37.8 % (ref 35–45)
HGB BLD-MCNC: 12.9 G/DL (ref 12–16)
LYMPHOCYTES # BLD: 3.5 K/UL (ref 0.9–3.6)
LYMPHOCYTES NFR BLD: 44 % (ref 21–52)
MCH RBC QN AUTO: 27.7 PG (ref 24–34)
MCHC RBC AUTO-ENTMCNC: 34.1 G/DL (ref 31–37)
MCV RBC AUTO: 81.1 FL (ref 74–97)
MONOCYTES # BLD: 0.5 K/UL (ref 0.05–1.2)
MONOCYTES NFR BLD: 6 % (ref 3–10)
NEUTS SEG # BLD: 3.8 K/UL (ref 1.8–8)
NEUTS SEG NFR BLD: 49 % (ref 40–73)
PLATELET # BLD AUTO: 350 K/UL (ref 135–420)
PMV BLD AUTO: 11 FL (ref 9.2–11.8)
POTASSIUM SERPL-SCNC: 4.2 MMOL/L (ref 3.5–5.5)
PROT SERPL-MCNC: 6.6 G/DL (ref 6.4–8.2)
RBC # BLD AUTO: 4.66 M/UL (ref 4.2–5.3)
SODIUM SERPL-SCNC: 134 MMOL/L (ref 136–145)
VALPROATE SERPL-MCNC: <3 UG/ML (ref 50–100)
WBC # BLD AUTO: 8 K/UL (ref 4.6–13.2)

## 2019-04-28 PROCEDURE — 80053 COMPREHEN METABOLIC PANEL: CPT

## 2019-04-28 PROCEDURE — 74011636637 HC RX REV CODE- 636/637: Performed by: EMERGENCY MEDICINE

## 2019-04-28 PROCEDURE — 85025 COMPLETE CBC W/AUTO DIFF WBC: CPT

## 2019-04-28 PROCEDURE — 82962 GLUCOSE BLOOD TEST: CPT

## 2019-04-28 PROCEDURE — 80164 ASSAY DIPROPYLACETIC ACD TOT: CPT

## 2019-04-28 PROCEDURE — 80201 ASSAY OF TOPIRAMATE: CPT

## 2019-04-28 PROCEDURE — 93005 ELECTROCARDIOGRAM TRACING: CPT

## 2019-04-28 PROCEDURE — 99285 EMERGENCY DEPT VISIT HI MDM: CPT

## 2019-04-28 PROCEDURE — 84702 CHORIONIC GONADOTROPIN TEST: CPT

## 2019-04-28 RX ADMIN — INSULIN HUMAN 8 UNITS: 100 INJECTION, SOLUTION PARENTERAL at 18:40

## 2019-04-28 NOTE — ED PROVIDER NOTES
EMERGENCY DEPARTMENT HISTORY AND PHYSICAL EXAM  This was created with voice recognition software and transcription errors may be present. 5:24 PM  Date: 4/28/2019  Patient Name: Natali Steward    History of Presenting Illness     Chief Complaint:    History Provided By:     HPI: Natali Steward is a 28 y.o. female with a past medical history of chest pain shortness of breath diabetes seizures thyroid cancer who presents with seizure. Patient states she is not really sure what happened. She remembers she was at work and then she woke up and her manager was standing over her. Denies any chest pain or shortness of breath no abdominal pain. She is followed by [facility] with family [facility]. PCP: Anika Apple MD      Past History     Past Medical History:  Past Medical History:   Diagnosis Date    Arthritis     Chest pain     Diabetes ([address]     Essential hypertension     Headache(784.0)     Hyperchloremia     Hypertension     Seizures (HCC)     SOB (shortness of breath)     Thyroid cancer ([address]        Past Surgical History:  Past Surgical History:   Procedure Laterality Date    HX PARTIAL HYSTERECTOMY      HX THYROIDECTOMY      HX TUBAL LIGATION         Family History:  Family History   Problem Relation Age of Onset    Hypertension Mother        Social History:  Social History     Tobacco Use    Smoking status: Former Smoker    Smokeless tobacco: Never Used   Substance Use Topics    Alcohol use: No    Drug use: No       Allergies:  No Known Allergies    Review of Systems     Review of Systems   Constitutional: Negative for fatigue and fever. Respiratory: Negative for cough. Cardiovascular: Negative for chest pain. All other systems reviewed and are negative. 10 point review of systems otherwise negative unless noted in HPI. Physical Exam       Physical Exam   Constitutional: She is oriented to person, place, and time. She appears well-developed.    HENT:   Head: Normocephalic and atraumatic. Eyes: Pupils are equal, round, and reactive to light. EOM are normal.   Neck: Normal range of motion. Neck supple. Cardiovascular: Normal rate, regular rhythm and normal heart sounds. Exam reveals no friction rub. No murmur heard. Pulmonary/Chest: Effort normal and breath sounds normal. No respiratory distress. She has no wheezes. Abdominal: Soft. She exhibits no distension. There is no tenderness. There is no rebound and no guarding. Musculoskeletal: Normal range of motion. Neurological: She is alert and oriented to person, place, and time. Skin: Skin is warm and dry. Psychiatric: She has a normal mood and affect. Her behavior is normal. Thought content normal.       Diagnostic Study Results     Vital Signs   Visit Vitals  /60   Pulse (!) 103   Temp 98.4 °F (36.9 °C)   Resp 15   SpO2 100%      EKG: EKG shows sinus at 102 with a normal axis normal upper extremity  Labs: reviewed. hcg neg  hyperglyecmia   No gap /bicarb wnl  Imaging:     Medical Decision Making     ED Course: Progress Notes, Reevaluation, and Consults:      Provider Notes (Medical Decision Making): This is a 59-year-old female history of hyperglycemia diabetes who presents with questionable seizure versus syncope. She is not really sure what happened she states her last seizure was in November December. We will check some labs levels EKG and reevaluate. EKG is noted to be mildly tachycardic however no delta wave no Brugada pattern I do not think this is secondary to a pulmonary embolism. Will check orthostatics but doubtful      Labs noted  Sx vs syncope. topiramate sendout. Will refer to pcp/       Diagnosis     Clinical Impression: No diagnosis found. Disposition:    Patient's Medications   Start Taking    No medications on file   Continue Taking    ATORVASTATIN (LIPITOR) 40 MG TABLET    Take  by mouth daily. FENOFIBRATE NANOCRYSTALLIZED (TRICOR) 48 MG TABLET    Take 48 mg by mouth.     FUROSEMIDE (LASIX) 20 MG TABLET    Take  by mouth daily. GABAPENTIN (NEURONTIN) 300 MG CAPSULE    Take 300 mg by mouth. One cap QAM, two caps QPM    INSULIN GLARGINE (BASAGLAR KWIKPEN U-100 INSULIN) 100 UNIT/ML (3 ML) INPN    90 Units by SubCUTAneous route two (2) times a day. INSULIN GLULISINE U-100 (APIDRA SOLOSTAR U-100 INSULIN) 100 UNIT/ML PEN    10 Units by SubCUTAneous route Before breakfast, lunch, and dinner. LIOTHYRONINE (CYTOMEL) 25 MCG TABLET    Take 75 mcg by mouth daily. LISINOPRIL (PRINIVIL, ZESTRIL) 5 MG TABLET    Take 40 mg by mouth daily. MELOXICAM (MOBIC) 15 MG TABLET    Take 15 mg by mouth daily as needed for Pain. METFORMIN (GLUCOPHAGE) 1,000 MG TABLET    Take 1,000 mg by mouth two (2) times daily (with meals). METOPROLOL TARTRATE (LOPRESSOR) 25 MG TABLET    Take 25 mg by mouth daily. TOPIRAMATE (TOPAMAX) 25 MG TABLET    Take 3 Tabs by mouth two (2) times a day. TRAZODONE (DESYREL) 50 MG TABLET    Take 50 mg by mouth nightly as needed for Sleep.    These Medications have changed    No medications on file   Stop Taking    No medications on file

## 2019-04-28 NOTE — ED TRIAGE NOTES
Pt arrived via EMS c/o feeling \"off\". Pt was at work when staff helped lower her to the ground. Pt states she felt like she had a seizure. Hx of seizures. EMS .

## 2019-04-28 NOTE — DISCHARGE INSTRUCTIONS
Patient Education        Epilepsy: Care Instructions  Your Care Instructions    Epilepsy is a common condition that causes repeated seizures. The seizures are caused by bursts of electrical activity in the brain that aren't normal. Seizures may cause problems with muscle control, movement, speech, vision, or awareness. They can be scary. Epilepsy affects each person differently. Some people have only a few seizures. Others get them more often. If you know what triggers a seizure, you may be able to avoid having one. You can take medicines to control and reduce seizures. You and your doctor will need to find the right combination, schedule, and dose of medicine. This may take time and careful changes. Seizures may get worse and happen more often over time. Follow-up care is a key part of your treatment and safety. Be sure to make and go to all appointments, and call your doctor if you are having problems. It's also a good idea to know your test results and keep a list of the medicines you take. How can you care for yourself at home? · Be safe with medicines. Take your medicines exactly as prescribed. Call your doctor if you think you are having a problem with your medicine. · Make a treatment plan with your doctor. Be sure to follow your plan. · Try to identify and avoid things that may make you more likely to have a seizure. These may include:  ? Not getting enough sleep. ? Using drugs or alcohol. ? Being emotionally stressed. ? Skipping meals. · Keep a record of any seizures you have. Note the date, time of day, and any details about the seizure that you can remember. Your doctor can use this information to plan or adjust your medicine or other treatment. · Be sure that any doctor treating you for another condition knows that you have epilepsy. Each doctor should know what medicines you are taking, if any. · Wear a medical ID bracelet. You can buy this at most [employer].  If you have a seizure that leaves you unconscious or unable to speak for yourself, this bracelet will let those who are treating you know that you have epilepsy. · Talk to your doctor about whether it is safe for you to do certain activities, such as drive or swim. When should you call for help? Call 911 anytime you think you may need emergency care. For example, call if:    · A seizure does not stop as it normally does.     · You have new symptoms such as:  ? Numbness, tingling, or weakness on one side of your body or face. ? Vision changes. ? Trouble speaking or thinking clearly.    Call your doctor now or seek immediate medical care if:    · You have a fever.     · You have a severe headache.    Watch closely for changes in your health, and be sure to contact your doctor if:    · The normal pattern or features of your seizures change. Where can you learn more? Go to http://josh-autumn.info/. Mer Tiwari in the search box to learn more about \"Epilepsy: Care Instructions. \"  Current as of: Maegan 3, 2018  Content Version: 11.9  © 1090-3162 [employer]. Care instructions adapted under license by [employer] (which disclaims liability or warranty for this information). If you have questions about a medical condition or this instruction, always ask your healthcare professional. [address] any warranty or liability for your use of this information.

## 2019-04-29 LAB
ATRIAL RATE: 102 BPM
CALCULATED P AXIS, ECG09: 61 DEGREES
CALCULATED R AXIS, ECG10: 34 DEGREES
CALCULATED T AXIS, ECG11: 37 DEGREES
DIAGNOSIS, 93000: NORMAL
P-R INTERVAL, ECG05: 144 MS
Q-T INTERVAL, ECG07: 342 MS
QRS DURATION, ECG06: 90 MS
QTC CALCULATION (BEZET), ECG08: 445 MS
VENTRICULAR RATE, ECG03: 102 BPM

## 2019-04-30 LAB — TOPIRAMATE SERPL-MCNC: NORMAL UG/ML (ref 2–25)

## 2019-05-31 ENCOUNTER — APPOINTMENT (OUTPATIENT)
Dept: CT IMAGING | Age: 34
End: 2019-05-31
Attending: EMERGENCY MEDICINE
Payer: MEDICAID

## 2019-05-31 ENCOUNTER — HOSPITAL ENCOUNTER (EMERGENCY)
Age: 34
Discharge: HOME | End: 2019-05-31
Attending: EMERGENCY MEDICINE
Payer: MEDICAID

## 2019-05-31 VITALS
SYSTOLIC BLOOD PRESSURE: 129 MMHG | DIASTOLIC BLOOD PRESSURE: 55 MMHG | OXYGEN SATURATION: 98 % | TEMPERATURE: 97.3 F | RESPIRATION RATE: 14 BRPM | HEART RATE: 100 BPM

## 2019-05-31 DIAGNOSIS — Z79.4 TYPE 2 DIABETES MELLITUS WITH HYPERGLYCEMIA, WITH LONG-TERM CURRENT USE OF INSULIN (HCC): ICD-10-CM

## 2019-05-31 DIAGNOSIS — E11.65 TYPE 2 DIABETES MELLITUS WITH HYPERGLYCEMIA, WITH LONG-TERM CURRENT USE OF INSULIN (HCC): ICD-10-CM

## 2019-05-31 DIAGNOSIS — S09.90XA HEAD INJURY, INITIAL ENCOUNTER: Primary | ICD-10-CM

## 2019-05-31 LAB
ALBUMIN SERPL-MCNC: 3.1 G/DL (ref 3.4–5)
ALBUMIN/GLOB SERPL: 0.7 {RATIO} (ref 0.8–1.7)
ALP SERPL-CCNC: 91 U/L (ref 45–117)
ALT SERPL-CCNC: 29 U/L (ref 13–56)
ANION GAP SERPL CALC-SCNC: 9 MMOL/L (ref 3–18)
APPEARANCE UR: CLEAR
AST SERPL-CCNC: 16 U/L (ref 15–37)
ATRIAL RATE: 94 BPM
BACTERIA URNS QL MICRO: ABNORMAL /HPF
BASOPHILS # BLD: 0 K/UL (ref 0–0.1)
BASOPHILS NFR BLD: 0 % (ref 0–2)
BILIRUB SERPL-MCNC: 0.4 MG/DL (ref 0.2–1)
BILIRUB UR QL: NEGATIVE
BUN SERPL-MCNC: 22 MG/DL (ref 7–18)
BUN/CREAT SERPL: 20 (ref 12–20)
CALCIUM SERPL-MCNC: 9 MG/DL (ref 8.5–10.1)
CALCULATED P AXIS, ECG09: 60 DEGREES
CALCULATED R AXIS, ECG10: 32 DEGREES
CALCULATED T AXIS, ECG11: 45 DEGREES
CHLORIDE SERPL-SCNC: 104 MMOL/L (ref 100–108)
CO2 SERPL-SCNC: 21 MMOL/L (ref 21–32)
COLOR UR: YELLOW
CREAT SERPL-MCNC: 1.09 MG/DL (ref 0.6–1.3)
DIAGNOSIS, 93000: NORMAL
DIFFERENTIAL METHOD BLD: ABNORMAL
EOSINOPHIL # BLD: 0.1 K/UL (ref 0–0.4)
EOSINOPHIL NFR BLD: 1 % (ref 0–5)
EPITH CASTS URNS QL MICRO: ABNORMAL /LPF (ref 0–5)
ERYTHROCYTE [DISTWIDTH] IN BLOOD BY AUTOMATED COUNT: 12.9 % (ref 11.6–14.5)
GLOBULIN SER CALC-MCNC: 4.7 G/DL (ref 2–4)
GLUCOSE SERPL-MCNC: 356 MG/DL (ref 74–99)
GLUCOSE UR STRIP.AUTO-MCNC: >1000 MG/DL
HCT VFR BLD AUTO: 40.4 % (ref 35–45)
HGB BLD-MCNC: 13.5 G/DL (ref 12–16)
HGB UR QL STRIP: ABNORMAL
KETONES UR QL STRIP.AUTO: ABNORMAL MG/DL
LEUKOCYTE ESTERASE UR QL STRIP.AUTO: NEGATIVE
LYMPHOCYTES # BLD: 4 K/UL (ref 0.9–3.6)
LYMPHOCYTES NFR BLD: 45 % (ref 21–52)
MCH RBC QN AUTO: 27 PG (ref 24–34)
MCHC RBC AUTO-ENTMCNC: 33.4 G/DL (ref 31–37)
MCV RBC AUTO: 80.8 FL (ref 74–97)
MONOCYTES # BLD: 0.5 K/UL (ref 0.05–1.2)
MONOCYTES NFR BLD: 6 % (ref 3–10)
NEUTS SEG # BLD: 4.4 K/UL (ref 1.8–8)
NEUTS SEG NFR BLD: 48 % (ref 40–73)
NITRITE UR QL STRIP.AUTO: NEGATIVE
P-R INTERVAL, ECG05: 136 MS
PH UR STRIP: 5 [PH] (ref 5–8)
PLATELET # BLD AUTO: 378 K/UL (ref 135–420)
PMV BLD AUTO: 10.3 FL (ref 9.2–11.8)
POTASSIUM SERPL-SCNC: 4.1 MMOL/L (ref 3.5–5.5)
PROT SERPL-MCNC: 7.8 G/DL (ref 6.4–8.2)
PROT UR STRIP-MCNC: 300 MG/DL
Q-T INTERVAL, ECG07: 364 MS
QRS DURATION, ECG06: 86 MS
QTC CALCULATION (BEZET), ECG08: 455 MS
RBC # BLD AUTO: 5 M/UL (ref 4.2–5.3)
RBC #/AREA URNS HPF: ABNORMAL /HPF (ref 0–5)
SODIUM SERPL-SCNC: 134 MMOL/L (ref 136–145)
SP GR UR REFRACTOMETRY: 1.02 (ref 1–1.03)
TROPONIN I SERPL-MCNC: <0.02 NG/ML (ref 0–0.04)
UROBILINOGEN UR QL STRIP.AUTO: 0.2 EU/DL (ref 0.2–1)
VENTRICULAR RATE, ECG03: 94 BPM
WBC # BLD AUTO: 9 K/UL (ref 4.6–13.2)
WBC URNS QL MICRO: ABNORMAL /HPF (ref 0–4)

## 2019-05-31 PROCEDURE — 74011250637 HC RX REV CODE- 250/637: Performed by: EMERGENCY MEDICINE

## 2019-05-31 PROCEDURE — 74011636637 HC RX REV CODE- 636/637: Performed by: EMERGENCY MEDICINE

## 2019-05-31 PROCEDURE — 70450 CT HEAD/BRAIN W/O DYE: CPT

## 2019-05-31 PROCEDURE — 96374 THER/PROPH/DIAG INJ IV PUSH: CPT

## 2019-05-31 PROCEDURE — 81001 URINALYSIS AUTO W/SCOPE: CPT

## 2019-05-31 PROCEDURE — 84484 ASSAY OF TROPONIN QUANT: CPT

## 2019-05-31 PROCEDURE — 99285 EMERGENCY DEPT VISIT HI MDM: CPT

## 2019-05-31 PROCEDURE — 80053 COMPREHEN METABOLIC PANEL: CPT

## 2019-05-31 PROCEDURE — 85025 COMPLETE CBC W/AUTO DIFF WBC: CPT

## 2019-05-31 PROCEDURE — 74011250636 HC RX REV CODE- 250/636: Performed by: EMERGENCY MEDICINE

## 2019-05-31 PROCEDURE — 93005 ELECTROCARDIOGRAM TRACING: CPT

## 2019-05-31 RX ORDER — ACETAMINOPHEN 500 MG
1000 TABLET ORAL ONCE
Status: COMPLETED | OUTPATIENT
Start: 2019-05-31 | End: 2019-05-31

## 2019-05-31 RX ADMIN — SODIUM CHLORIDE 1000 ML: 900 INJECTION, SOLUTION INTRAVENOUS at 17:05

## 2019-05-31 RX ADMIN — HUMAN INSULIN 10 UNITS: 100 INJECTION, SOLUTION SUBCUTANEOUS at 17:05

## 2019-05-31 RX ADMIN — ACETAMINOPHEN 1000 MG: 500 TABLET ORAL at 17:10

## 2019-05-31 NOTE — ED TRIAGE NOTES
Patient arrived via EMS from [facility]. Patient c/o of feeling flushed and \"blacked out\". Patient states that she has history of seizure, insulin dependent diabetic. Per EMS, when they arrived patient was A&O x4, ambulatory, in no acute distress. Per EMS, BS is 425.

## 2019-05-31 NOTE — DISCHARGE INSTRUCTIONS
Patient Education        Type 2 Diabetes: Care Instructions  Your Care Instructions    Type 2 diabetes is a disease that develops when the body's tissues cannot use insulin properly. Over time, the pancreas cannot make enough insulin. Insulin is a hormone that helps the body's cells use sugar (glucose) for energy. It also helps the body store extra sugar in muscle, fat, and liver cells. Without insulin, the sugar cannot get into the cells to do its work. It stays in the blood instead. This can cause high blood sugar levels. A person has diabetes when the blood sugar stays too high too much of the time. Over time, diabetes can lead to diseases of the heart, blood vessels, nerves, kidneys, and eyes. You may be able to control your blood sugar by losing weight, eating a healthy diet, and getting daily exercise. You may also have to take insulin or other diabetes medicine. Follow-up care is a key part of your treatment and safety. Be sure to make and go to all appointments. Call your doctor if you are having problems. It's also a good idea to know your test results and keep a list of the medicines you take. How can you care for yourself at home? · Keep your blood sugar at a target level (which you set with your doctor). ? Eat a good diet that spreads carbohydrate throughout the day. Carbohydrate--the body's main source of fuel--affects blood sugar more than any other nutrient. Carbohydrate is in fruits, vegetables, milk, and yogurt. It also is in breads, cereals, vegetables such as potatoes and corn, and sugary foods such as candy and cakes. ? Aim for 30 minutes of exercise on most, preferably all, days of the week. Walking is a good choice. You also may want to do other activities, such as running, swimming, cycling, or playing tennis or team sports. If your doctor says it's okay, do muscle-strengthening exercises at least 2 times a week. ? Take your medicines exactly as prescribed.  Call your doctor if you think you are having a problem with your medicine. You will get more details on the specific medicines your doctor prescribes. · Check your blood sugar as often as your doctor recommends. It is important to keep track of any symptoms you have, such as low blood sugar. Also tell your doctor if you have any changes in your activities, diet, or insulin use. · Talk to your doctor before you start taking aspirin every day. Aspirin can help certain people lower their risk of a heart attack or stroke. But taking aspirin isn't right for everyone, because it can cause serious bleeding. · Do not smoke. If you need help quitting, talk to your doctor about stop-smoking programs and medicines. These can increase your chances of quitting for good. · Keep your cholesterol and blood pressure at normal levels. You may need to take one or more medicines to reach your goals. Take them exactly as directed. Do not stop or change a medicine without talking to your doctor first.  When should you call for help? Call 911 anytime you think you may need emergency care. For example, call if:    · You passed out (lost consciousness), or you suddenly become very sleepy or confused. (You may have very low blood sugar.)    Call your doctor now or seek immediate medical care if:    · Your blood sugar is 300 mg/dL or is higher than the level your doctor has set for you.     · You have symptoms of low blood sugar, such as:  ? Sweating. ? Feeling nervous, shaky, and weak. ? Extreme hunger and slight nausea. ? Dizziness and headache.  ? Blurred vision. ? Confusion.    Watch closely for changes in your health, and be sure to contact your doctor if:    · You often have problems controlling your blood sugar.     · You have symptoms of long-term diabetes problems, such as:  ? New vision changes. ? New pain, numbness, or tingling in your hands or feet. ? Skin problems. Where can you learn more?   Go to http://josh-autumn.info/. Enter C553 in the search box to learn more about \"Type 2 Diabetes: Care Instructions. \"  Current as of: July 25, 2018  Content Version: 11.9  © 0507-7553 [employer]. Care instructions adapted under license by [employer] (which disclaims liability or warranty for this information). If you have questions about a medical condition or this instruction, always ask your healthcare professional. [place] [address] any warranty or liability for your use of this information. Patient Education        Learning About a Closed Head Injury  What is a closed head injury? A closed head injury happens when your head gets hit hard. The strong force of the blow causes your brain to shake in your skull. This movement can cause the brain to bruise, swell, or tear. Sometimes nerves or blood vessels also get damaged. This can cause bleeding in or around the brain. A concussion is a type of closed head injury. What are the symptoms? If you have a mild concussion, you may have a mild headache or feel \"not quite right. \" These symptoms are common. They usually go away over a few days to 4 weeks. But sometimes after a concussion, you feel like you can't function as well as before the injury. And you have new symptoms. This is called postconcussive syndrome. You may:  · Find it harder to solve problems, think, concentrate, or remember. · Have headaches. · Have changes in your sleep patterns, such as not being able to sleep or sleeping all the time. · Have changes in your personality. · Not be interested in your usual activities. · Feel angry or anxious without a clear reason. · Lose your sense of taste or smell. · Be dizzy, lightheaded, or unsteady. It may be hard to stand or walk. How is a closed head injury treated? Any person who may have a concussion needs to see a doctor. Some people have to stay in the hospital to be watched. Others can go home safely. If you go home, follow your doctor's instructions. He or she will tell you if you need someone to watch you closely for the next 24 hours or longer. Rest is the best treatment. Get plenty of sleep at night. And try to rest during the day. · Avoid activities that are physically or mentally demanding. These include housework, exercise, and schoolwork. And don't play video games, send text messages, or use the computer. You may need to change your school or work schedule to be able to avoid these activities. · Ask your doctor when it's okay to drive, ride a bike, or operate machinery. · Take an over-the-counter pain medicine, such as acetaminophen (Tylenol), ibuprofen (Advil, Motrin), or naproxen (Aleve). Be safe with medicines. Read and follow all instructions on the label. · Check with your doctor before you use any other medicines for pain. · Do not drink alcohol or use illegal drugs. They can slow recovery. They can also increase your risk of getting a second head injury. Follow-up care is a key part of your treatment and safety. Be sure to make and go to all appointments, and call your doctor if you are having problems. It's also a good idea to know your test results and keep a list of the medicines you take. Where can you learn more? Go to http://josh-autumn.info/. Enter E235 in the search box to learn more about \"Learning About a Closed Head Injury. \"  Current as of: Maegan 3, 2018  Content Version: 11.9  © 5651-9618 [employer]. Care instructions adapted under license by [employer] (which disclaims liability or warranty for this information). If you have questions about a medical condition or this instruction, always ask your healthcare professional. [address] any warranty or liability for your use of this information.

## 2019-05-31 NOTE — ED PROVIDER NOTES
EMERGENCY DEPARTMENT HISTORY AND PHYSICAL EXAM    2:45 PM      Date: 5/31/2019  Patient Name: Kendy Baum    History of Presenting Illness     Chief Complaint   Patient presents with    Dizziness         History Provided By: patient    Additional History (Context): Kendy Baum is a 28 y.o. female presents with near syncopal episode which occurred while she was at work. She has had a headache all day, with standing as a , felt dizzy and things went black and she fell striking her head. She says she could hear voices calling to her and she came back around so does not sound like it was clearly a syncopal episode. She is aware that her blood sugar is high and has not taken her insulin today. Her headache is moderate in intensity and is constant. Rebecca Hunt PCP: Joaquina Martin MD    Chief Complaint:   Duration:    Timing:    Location:   Quality:   Severity:   Modifying Factors:   Associated Symptoms:       Current Facility-Administered Medications   Medication Dose Route Frequency Provider Last Rate Last Dose    sodium chloride 0.9 % bolus infusion 1,000 mL  1,000 mL IntraVENous ONCE Sabina Bee MD        insulin regular (NOVOLIN R, HUMULIN R) injection 10 Units  10 Units IntraVENous NOW Reanna Brown MD        acetaminophen (TYLENOL) tablet 1,000 mg  1,000 mg Oral ONCE Je Bee MD         Current Outpatient Medications   Medication Sig Dispense Refill    topiramate (TOPAMAX) 25 mg tablet Take 3 Tabs by mouth two (2) times a day. 180 Tab 6    insulin glulisine U-100 (APIDRA SOLOSTAR U-100 INSULIN) 100 unit/mL pen 10 Units by SubCUTAneous route Before breakfast, lunch, and dinner.  insulin glargine (BASAGLAR KWIKPEN U-100 INSULIN) 100 unit/mL (3 mL) inpn 90 Units by SubCUTAneous route two (2) times a day.  gabapentin (NEURONTIN) 300 mg capsule Take 300 mg by mouth. One cap QAM, two caps QPM      liothyronine (CYTOMEL) 25 mcg tablet Take 75 mcg by mouth daily.  meloxicam (MOBIC) 15 mg tablet Take 15 mg by mouth daily as needed for Pain.  traZODone (DESYREL) 50 mg tablet Take 50 mg by mouth nightly as needed for Sleep.  metFORMIN (GLUCOPHAGE) 1,000 mg tablet Take 1,000 mg by mouth two (2) times daily (with meals).  metoprolol tartrate (LOPRESSOR) 25 mg tablet Take 25 mg by mouth daily.  fenofibrate nanocrystallized (TRICOR) 48 mg tablet Take 48 mg by mouth.  atorvastatin (LIPITOR) 40 mg tablet Take  by mouth daily.  furosemide (LASIX) 20 mg tablet Take  by mouth daily.  lisinopril (PRINIVIL, ZESTRIL) 5 mg tablet Take 40 mg by mouth daily. Past History     Past Medical History:  Past Medical History:   Diagnosis Date    Arthritis     Chest pain     Diabetes ([facility] [address]     Essential hypertension     Headache(784.0)     Hyperchloremia     Hypertension     Seizures (HCC)     SOB (shortness of breath)     Thyroid cancer ([facility] [address]        Past Surgical History:  Past Surgical History:   Procedure Laterality Date    HX PARTIAL HYSTERECTOMY      HX THYROIDECTOMY      HX TUBAL LIGATION         Family History:  Family History   Problem Relation Age of Onset    Hypertension Mother        Social History:  Social History     Tobacco Use    Smoking status: Former Smoker    Smokeless tobacco: Never Used   Substance Use Topics    Alcohol use: No    Drug use: No       Allergies:  No Known Allergies      Review of Systems     Review of Systems   Constitutional: Negative for diaphoresis and fever. HENT: Negative for congestion and sore throat. Eyes: Negative for pain and itching. Respiratory: Negative for cough and shortness of breath. Cardiovascular: Negative for chest pain and palpitations. Gastrointestinal: Negative for abdominal pain and diarrhea. Endocrine: Positive for polyuria. Negative for polydipsia. Genitourinary: Negative for dysuria and hematuria. Musculoskeletal: Negative for arthralgias and myalgias. Skin: Negative for rash and wound. Neurological: Positive for headaches. Negative for seizures and syncope. Hematological: Does not bruise/bleed easily. Psychiatric/Behavioral: Negative for agitation and hallucinations. Physical Exam       Patient Vitals for the past 12 hrs:   Temp Pulse Resp BP SpO2   05/31/19 1700 -- -- -- -- 98 %   05/31/19 1700 -- 100 14 129/55 99 %   05/31/19 1658 97.3 °F (36.3 °C) 99 17 -- 99 %   05/31/19 1657 -- -- -- 150/79 --   05/31/19 1445 -- 100 16 130/70 98 %       Physical Exam   Constitutional: She appears well-developed and well-nourished. HENT:   Head: Normocephalic. Tenderness of the occiput. No skull deformity or laceration. Eyes: Pupils are equal, round, and reactive to light. Conjunctivae and EOM are normal. No scleral icterus. Neck: Normal range of motion. Neck supple. No JVD present. Cardiovascular: Normal rate, regular rhythm and normal heart sounds. 4 intact extremity pulses   Pulmonary/Chest: Effort normal and breath sounds normal.   Abdominal: Soft. She exhibits no mass. There is no tenderness. Musculoskeletal: Normal range of motion. Lymphadenopathy:     She has no cervical adenopathy. Neurological: She is alert. She has normal strength. No cranial nerve deficit or sensory deficit. Skin: Skin is warm and dry. Nursing note and vitals reviewed.         Diagnostic Study Results   Labs -  Recent Results (from the past 12 hour(s))   EKG, 12 LEAD, INITIAL    Collection Time: 05/31/19  3:43 PM   Result Value Ref Range    Ventricular Rate 94 BPM    Atrial Rate 94 BPM    P-R Interval 136 ms    QRS Duration 86 ms    Q-T Interval 364 ms    QTC Calculation (Bezet) 455 ms    Calculated P Axis 60 degrees    Calculated R Axis 32 degrees    Calculated T Axis 45 degrees    Diagnosis       Normal sinus rhythm  Possible Left atrial enlargement  Borderline ECG  When compared with ECG of 28-APR-2019 17:44,  No significant change was found  Confirmed by Fermin Stratton ([id number]) on 5/31/2019 4:11:19 PM     CBC WITH AUTOMATED DIFF    Collection Time: 05/31/19  3:47 PM   Result Value Ref Range    WBC 9.0 4.6 - 13.2 K/uL    RBC 5.00 4.20 - 5.30 M/uL    HGB 13.5 12.0 - 16.0 g/dL    HCT 40.4 35.0 - 45.0 %    MCV 80.8 74.0 - 97.0 FL    MCH 27.0 24.0 - 34.0 PG    MCHC 33.4 31.0 - 37.0 g/dL    RDW 12.9 11.6 - 14.5 %    PLATELET 643 167 - 466 K/uL    MPV 10.3 9.2 - 11.8 FL    NEUTROPHILS 48 40 - 73 %    LYMPHOCYTES 45 21 - 52 %    MONOCYTES 6 3 - 10 %    EOSINOPHILS 1 0 - 5 %    BASOPHILS 0 0 - 2 %    ABS. NEUTROPHILS 4.4 1.8 - 8.0 K/UL    ABS. LYMPHOCYTES 4.0 (H) 0.9 - 3.6 K/UL    ABS. MONOCYTES 0.5 0.05 - 1.2 K/UL    ABS. EOSINOPHILS 0.1 0.0 - 0.4 K/UL    ABS. BASOPHILS 0.0 0.0 - 0.1 K/UL    DF AUTOMATED     METABOLIC PANEL, COMPREHENSIVE    Collection Time: 05/31/19  3:47 PM   Result Value Ref Range    Sodium 134 (L) 136 - 145 mmol/L    Potassium 4.1 3.5 - 5.5 mmol/L    Chloride 104 100 - 108 mmol/L    CO2 21 21 - 32 mmol/L    Anion gap 9 3.0 - 18 mmol/L    Glucose 356 (H) 74 - 99 mg/dL    BUN 22 (H) 7.0 - 18 MG/DL    Creatinine 1.09 0.6 - 1.3 MG/DL    BUN/Creatinine ratio 20 12 - 20      GFR est AA >60 >60 ml/min/1.73m2    GFR est non-AA 57 (L) >60 ml/min/1.73m2    Calcium 9.0 8.5 - 10.1 MG/DL    Bilirubin, total 0.4 0.2 - 1.0 MG/DL    ALT (SGPT) 29 13 - 56 U/L    AST (SGOT) 16 15 - 37 U/L    Alk.  phosphatase 91 45 - 117 U/L    Protein, total 7.8 6.4 - 8.2 g/dL    Albumin 3.1 (L) 3.4 - 5.0 g/dL    Globulin 4.7 (H) 2.0 - 4.0 g/dL    A-G Ratio 0.7 (L) 0.8 - 1.7     TROPONIN I    Collection Time: 05/31/19  3:47 PM   Result Value Ref Range    Troponin-I, QT <0.02 0.0 - 0.045 NG/ML   URINALYSIS W/ RFLX MICROSCOPIC    Collection Time: 05/31/19  4:02 PM   Result Value Ref Range    Color YELLOW      Appearance CLEAR      Specific gravity 1.022 1.005 - 1.030      pH (UA) 5.0 5.0 - 8.0      Protein 300 (A) NEG mg/dL    Glucose >1,000 (A) NEG mg/dL    Ketone TRACE (A) NEG mg/dL Bilirubin NEGATIVE  NEG      Blood TRACE (A) NEG      Urobilinogen 0.2 0.2 - 1.0 EU/dL    Nitrites NEGATIVE  NEG      Leukocyte Esterase NEGATIVE  NEG     URINE MICROSCOPIC ONLY    Collection Time: 05/31/19  4:02 PM   Result Value Ref Range    WBC NONE 0 - 4 /hpf    RBC 0 to 2 0 - 5 /hpf    Epithelial cells 1+ 0 - 5 /lpf    Bacteria FEW (A) NEG /hpf       Radiologic Studies -   CT HEAD WO CONT   Final Result   IMPRESSION:       Normal CT of the brain. Chronic sinusitis. Ct Head Wo Cont    Result Date: 5/31/2019  CT Of The Head Without Contrast CPT CODE:  51247 INDICATION: Headache, post trauma patient states she has history of seizure and blacked out today. Hyperglycemia TECHNIQUE: 5 mm helical scan obtained of the head. All CT scans at this facility are performed using dose optimization techniques as appropriate to a performed exam, to include automated exposure control, adjustment of the mA and/or kV according to patient's size (including appropriate matching for site specific examinations), or use of iterative reconstruction technique. COMPARISON: None. FINDINGS: No midline shift, mass effect or abnormal intra-axial or extraaxial fluid collection or hydrocephalus is seen. No hemorrhage identified. No mass lesion identified. No acute infarction identified. No skull fracture. Multiple opacified mastoid air cells bilaterally. Few opacified ethmoid air cells on the right. Remainder paranasal sinuses, mastoid air cells, middle ears normally aerated. IMPRESSION: Normal CT of the brain. Chronic sinusitis.       Medications ordered:   Medications   sodium chloride 0.9 % bolus infusion 1,000 mL (has no administration in time range)   insulin regular (NOVOLIN R, HUMULIN R) injection 10 Units (has no administration in time range)   acetaminophen (TYLENOL) tablet 1,000 mg (has no administration in time range)         Medical Decision Making   Initial Medical Decision Making and DDx:  Evaluate for intracranial injury, electrolyte abnormality, hyperglycemia or DKA. Cardiac arrhythmia as the cause of syncope is unlikely. ED Course: Progress Notes, Reevaluation, and Consults:     5:05 PM 5:05 PM Pt reevaluated at this time. Discussed results and findings, as well as, diagnosis and plan for discharge. Follow up with doctors/services listed. Return to the emergency department for alarming symptoms. Pt verbalizes understanding and agreement with plan. All questions addressed. Discussed results with the patient, no anemia kidney failure DKA. Consistent with hyperglycemia and uncontrolled diabetes. Tylenol for headache. After liter of fluid and 10 of insulin will get her up and have her ambulate. I am the first provider for this patient. I reviewed the vital signs, available nursing notes, past medical history, past surgical history, family history and social history. Patient Vitals for the past 12 hrs:   Temp Pulse Resp BP SpO2   05/31/19 1700 -- -- -- -- 98 %   05/31/19 1700 -- 100 14 129/55 99 %   05/31/19 1658 97.3 °F (36.3 °C) 99 17 -- 99 %   05/31/19 1657 -- -- -- 150/79 --   05/31/19 1445 -- 100 16 130/70 98 %       Vital Signs-Reviewed the patient's vital signs. Pulse Oximetry Analysis, Cardiac Monitor, 12 lead ekg:  Telemetry sinus tachycardia at 100, twelve-lead EKG at 1543 sinus rhythm at 94 no acute process, 98% on room air  Interpreted by the EP. Records Reviewed: Nursing notes reviewed (Time of Review: 2:45 PM)    Procedures:   Critical Care Time:   Aspirin: (was aspirin given for stroke?)    Diagnosis     Clinical Impression:   1. Head injury, initial encounter    2.  Type 2 diabetes mellitus with hyperglycemia, with long-term current use of insulin ([facility] [address]        Disposition:       Follow-up Information     Follow up With Specialties Details Why Contact Info    Zelda Brown MD Family Practice In 2 days  [address] [place] [address] Str[address]  [address] [phone] Patient's Medications   Start Taking    No medications on file   Continue Taking    ATORVASTATIN (LIPITOR) 40 MG TABLET    Take  by mouth daily. FENOFIBRATE NANOCRYSTALLIZED (TRICOR) 48 MG TABLET    Take 48 mg by mouth. FUROSEMIDE (LASIX) 20 MG TABLET    Take  by mouth daily. GABAPENTIN (NEURONTIN) 300 MG CAPSULE    Take 300 mg by mouth. One cap QAM, two caps QPM    INSULIN GLARGINE (BASAGLAR KWIKPEN U-100 INSULIN) 100 UNIT/ML (3 ML) INPN    90 Units by SubCUTAneous route two (2) times a day. INSULIN GLULISINE U-100 (APIDRA SOLOSTAR U-100 INSULIN) 100 UNIT/ML PEN    10 Units by SubCUTAneous route Before breakfast, lunch, and dinner. LIOTHYRONINE (CYTOMEL) 25 MCG TABLET    Take 75 mcg by mouth daily. LISINOPRIL (PRINIVIL, ZESTRIL) 5 MG TABLET    Take 40 mg by mouth daily. MELOXICAM (MOBIC) 15 MG TABLET    Take 15 mg by mouth daily as needed for Pain. METFORMIN (GLUCOPHAGE) 1,000 MG TABLET    Take 1,000 mg by mouth two (2) times daily (with meals). METOPROLOL TARTRATE (LOPRESSOR) 25 MG TABLET    Take 25 mg by mouth daily. TOPIRAMATE (TOPAMAX) 25 MG TABLET    Take 3 Tabs by mouth two (2) times a day. TRAZODONE (DESYREL) 50 MG TABLET    Take 50 mg by mouth nightly as needed for Sleep.    These Medications have changed    No medications on file   Stop Taking    No medications on file     _______________________________    Notes:    Henri Richards MD using Dragon dictation      _______________________________

## 2019-06-09 ENCOUNTER — HOSPITAL ENCOUNTER (EMERGENCY)
Age: 34
Discharge: HOME | End: 2019-06-09
Attending: EMERGENCY MEDICINE
Payer: MEDICAID

## 2019-06-09 VITALS
HEART RATE: 95 BPM | BODY MASS INDEX: 35.55 KG/M2 | HEIGHT: 69 IN | SYSTOLIC BLOOD PRESSURE: 148 MMHG | TEMPERATURE: 97.9 F | DIASTOLIC BLOOD PRESSURE: 87 MMHG | OXYGEN SATURATION: 99 % | RESPIRATION RATE: 16 BRPM | WEIGHT: 240 LBS

## 2019-06-09 DIAGNOSIS — H10.31 ACUTE BACTERIAL CONJUNCTIVITIS OF RIGHT EYE: Primary | ICD-10-CM

## 2019-06-09 PROCEDURE — 99281 EMR DPT VST MAYX REQ PHY/QHP: CPT

## 2019-06-09 RX ORDER — MOXIFLOXACIN 5 MG/ML
1 SOLUTION/ DROPS OPHTHALMIC 3 TIMES DAILY
Qty: 1 BOTTLE | Refills: 0 | Status: SHIPPED | OUTPATIENT
Start: 2019-06-09 | End: 2020-09-09 | Stop reason: CLARIF

## 2019-06-09 NOTE — ED PROVIDER NOTES
Patient with a history of diabetes and hypertension presents emergency department with exposure to pinkeye. Patient states that she was at her child's graduation and there were lots of little children and she thinks she may have gotten exposed to pinkeye. Positive exudate weeping itching and photophobia noted no fever reported no pain in the eye no injury reported    The history is provided by the patient. No  was used. Red Eye   This is a new problem. The current episode started 12 to 24 hours ago. The problem occurs constantly. The problem has not changed since onset. Nothing aggravates the symptoms. Nothing relieves the symptoms. She has tried nothing for the symptoms.         Past Medical History:   Diagnosis Date    Arthritis     Chest pain     Diabetes ([address]     Essential hypertension     Headache(784.0)     Hyperchloremia     Hypertension     Seizures (HCC)     SOB (shortness of breath)     Thyroid cancer (HCC)        Past Surgical History:   Procedure Laterality Date    HX PARTIAL HYSTERECTOMY      HX THYROIDECTOMY      HX TUBAL LIGATION           Family History:   Problem Relation Age of Onset    Hypertension Mother        Social History     Socioeconomic History    Marital status: SINGLE     Spouse name: Not on file    Number of children: Not on file    Years of education: Not on file    Highest education level: Not on file   Occupational History    Not on file   Social Needs    Financial resource strain: Not on file    Food insecurity:     Worry: Not on file     Inability: Not on file    Transportation needs:     Medical: Not on file     Non-medical: Not on file   Tobacco Use    Smoking status: Former Smoker    Smokeless tobacco: Never Used   Substance and Sexual Activity    Alcohol use: No    Drug use: No    Sexual activity: Yes     Partners: Male     Birth control/protection: Surgical   Lifestyle    Physical activity:     Days per week: Not on file Minutes per session: Not on file    Stress: Not on file   Relationships    Social connections:     Talks on phone: Not on file     Gets together: Not on file     Attends Che[employer] service: Not on file     Active member of club or organization: Not on file     Attends meetings of clubs or organizations: Not on file     Relationship status: Not on file    Intimate partner violence:     Fear of current or ex partner: Not on file     Emotionally abused: Not on file     Physically abused: Not on file     Forced sexual activity: Not on file   Other Topics Concern    Not on file   Social History Narrative    Not on file         ALLERGIES: Patient has no known allergies. Review of Systems   Constitutional: Negative for fever. Eyes: Positive for photophobia, discharge, redness and itching. Negative for pain and visual disturbance. Skin: Negative for color change. Neurological: Negative for dizziness. All other systems reviewed and are negative. Vitals:    06/09/19 1700   BP: 148/87   Pulse: 95   Resp: 16   Temp: 97.9 °F (36.6 °C)   SpO2: 99%   Weight: 108.9 kg (240 lb)   Height: 5' 9\" (1.753 m)            Physical Exam   Constitutional: She is oriented to person, place, and time. She appears well-developed and well-nourished. HENT:   Head: Normocephalic and atraumatic. Eyes: Pupils are equal, round, and reactive to light. EOM and lids are normal. Right conjunctiva is injected. Right conjunctiva has no hemorrhage. Neck: Normal range of motion. Neck supple. Cardiovascular: Normal rate and regular rhythm. Pulmonary/Chest: Effort normal and breath sounds normal.   Abdominal: Soft. Bowel sounds are normal.   Musculoskeletal: Normal range of motion. Neurological: She is alert and oriented to person, place, and time. She has normal reflexes. Skin: Skin is warm and dry. Psychiatric: She has a normal mood and affect.  Her behavior is normal. Judgment and thought content normal.   Nursing note and vitals reviewed. MDM  Number of Diagnoses or Management Options  Acute bacterial conjunctivitis of right eye: minor  Diagnosis management comments: I suspect bacterial conjunctivitis given her exposure and symptoms and exam.  I will place on Vigamox and patient will follow-up with her eye doctor this week. Patient informed that if it spreads to her left eye to use the Vigamox in her left eye         Amount and/or Complexity of Data Reviewed  Review and summarize past medical records: yes  Independent visualization of images, tracings, or specimens: yes    Risk of Complications, Morbidity, and/or Mortality  Presenting problems: low  Diagnostic procedures: low  Management options: low    Patient Progress  Patient progress: stable         Procedures              Vitals:  Patient Vitals for the past 12 hrs:   Temp Pulse Resp BP SpO2   06/09/19 1700 97.9 °F (36.6 °C) 95 16 148/87 99 %            Disposition:    Diagnosis:   1. Acute bacterial conjunctivitis of right eye        Disposition: to Home      Follow-up Information     Follow up With Specialties Details Why Contact Info    Aleah Bhat MD Family Practice In 2 days  [address] [phone]             Patient's Medications   Start Taking    No medications on file   Continue Taking    ATORVASTATIN (LIPITOR) 40 MG TABLET    Take  by mouth daily. FENOFIBRATE NANOCRYSTALLIZED (TRICOR) 48 MG TABLET    Take 48 mg by mouth. FUROSEMIDE (LASIX) 20 MG TABLET    Take  by mouth daily. GABAPENTIN (NEURONTIN) 300 MG CAPSULE    Take 300 mg by mouth. One cap QAM, two caps QPM    INSULIN GLARGINE (BASAGLAR KWIKPEN U-100 INSULIN) 100 UNIT/ML (3 ML) INPN    90 Units by SubCUTAneous route two (2) times a day. INSULIN GLULISINE U-100 (APIDRA SOLOSTAR U-100 INSULIN) 100 UNIT/ML PEN    10 Units by SubCUTAneous route Before breakfast, lunch, and dinner. LIOTHYRONINE (CYTOMEL) 25 MCG TABLET    Take 75 mcg by mouth daily. LISINOPRIL (PRINIVIL, ZESTRIL) 5 MG TABLET    Take 40 mg by mouth daily. MELOXICAM (MOBIC) 15 MG TABLET    Take 15 mg by mouth daily as needed for Pain. METFORMIN (GLUCOPHAGE) 1,000 MG TABLET    Take 1,000 mg by mouth two (2) times daily (with meals). METOPROLOL TARTRATE (LOPRESSOR) 25 MG TABLET    Take 25 mg by mouth daily. TOPIRAMATE (TOPAMAX) 25 MG TABLET    Take 3 Tabs by mouth two (2) times a day. TRAZODONE (DESYREL) 50 MG TABLET    Take 50 mg by mouth nightly as needed for Sleep. These Medications have changed    No medications on file   Stop Taking    No medications on file       Return to the ER if you are unable to obtain referral as directed. Kassi Poisson  results have been reviewed with her. She has been counseled regarding her diagnosis, treatment, and plan. She verbally conveys understanding and agreement of the signs, symptoms, diagnosis, treatment and prognosis and additionally agrees to follow up as discussed. She also agrees with the care-plan and conveys that all of her questions have been answered. I have also provided discharge instructions for her that include: educational information regarding their diagnosis and treatment, and list of reasons why they would want to return to the ED prior to their follow-up appointment, should her condition change. Juan Carlos Arteaga ENP-C,FNP-C      Dragon voice recognition was used to generate this report, which may have resulted in some phonetic based errors in grammar and contents.  Even though attempts were made to correct all the mistakes, some may have been missed, and remained in the body of the document

## 2019-06-09 NOTE — ED NOTES
Discharge instructions given to patient by provider. Discharged home ambulatory, in stable condition.

## 2019-06-09 NOTE — DISCHARGE INSTRUCTIONS
Patient Education      As we discussed please follow-up with your eye doctor this week. Pinkeye: Care Instructions  Your Care Instructions    Pinkeye is redness and swelling of the eye surface and the conjunctiva (the lining of the eyelid and the covering of the white part of the eye). Pinkeye is also called conjunctivitis. Pinkeye is often caused by infection with bacteria or a virus. Dry air, allergies, smoke, and chemicals are other common causes. Pinkeye often clears on its own in 7 to 10 days. Antibiotics only help if the pinkeye is caused by bacteria. Pinkeye caused by infection spreads easily. If an allergy or chemical is causing pinkeye, it will not go away unless you can avoid whatever is causing it. Follow-up care is a key part of your treatment and safety. Be sure to make and go to all appointments, and call your doctor if you are having problems. It's also a good idea to know your test results and keep a list of the medicines you take. How can you care for yourself at home? · Wash your hands often. Always wash them before and after you treat pinkeye or touch your eyes or face. · Use moist cotton or a clean, wet cloth to remove crust. Wipe from the inside corner of the eye to the outside. Use a clean part of the cloth for each wipe. · Put cold or warm wet cloths on your eye a few times a day if the eye hurts. · Do not wear contact lenses or eye makeup until the pinkeye is gone. Throw away any eye makeup you were using when you got pinkeye. Clean your contacts and storage case. If you wear disposable contacts, use a new pair when your eye has cleared and it is safe to wear contacts again. · If the doctor gave you antibiotic ointment or eyedrops, use them as directed. Use the medicine for as long as instructed, even if your eye starts looking better soon. Keep the bottle tip clean, and do not let it touch the eye area.   · To put in eyedrops or ointment:  ? Tilt your head back, and pull your lower eyelid down with one finger. ? Drop or squirt the medicine inside the lower lid. ? Close your eye for 30 to 60 seconds to let the drops or ointment move around. ? Do not touch the ointment or dropper tip to your eyelashes or any other surface. · Do not share towels, pillows, or washcloths while you have pinkeye. When should you call for help? Call your doctor now or seek immediate medical care if:    · You have pain in your eye, not just irritation on the surface.     · You have a change in vision or loss of vision.     · You have an increase in discharge from the eye.     · Your eye has not started to improve or begins to get worse within 48 hours after you start using antibiotics.     · Pinkeye lasts longer than 7 days.    Watch closely for changes in your health, and be sure to contact your doctor if you have any problems. Where can you learn more? Go to http://josh-autumn.info/. Enter Y392 in the search box to learn more about \"Pinkeye: Care Instructions. \"  Current as of: September 23, 2018  Content Version: 11.9  © 2184-1094 [employer]. Care instructions adapted under license by [employer] (which disclaims liability or warranty for this information). If you have questions about a medical condition or this instruction, always ask your healthcare professional. [address] any warranty or liability for your use of this information.

## 2019-08-06 ENCOUNTER — APPOINTMENT (OUTPATIENT)
Dept: GENERAL RADIOLOGY | Age: 34
End: 2019-08-06
Attending: EMERGENCY MEDICINE
Payer: MEDICAID

## 2019-08-06 ENCOUNTER — HOSPITAL ENCOUNTER (EMERGENCY)
Age: 34
Discharge: HOME | End: 2019-08-06
Attending: EMERGENCY MEDICINE
Payer: MEDICAID

## 2019-08-06 VITALS
BODY MASS INDEX: 37.03 KG/M2 | WEIGHT: 250 LBS | OXYGEN SATURATION: 99 % | HEART RATE: 99 BPM | RESPIRATION RATE: 18 BRPM | TEMPERATURE: 98.2 F | HEIGHT: 69 IN | DIASTOLIC BLOOD PRESSURE: 71 MMHG | SYSTOLIC BLOOD PRESSURE: 138 MMHG

## 2019-08-06 DIAGNOSIS — M79.602 LEFT ARM PAIN: ICD-10-CM

## 2019-08-06 DIAGNOSIS — R07.89 MUSCULOSKELETAL CHEST PAIN: ICD-10-CM

## 2019-08-06 DIAGNOSIS — S39.012A LUMBOSACRAL STRAIN, INITIAL ENCOUNTER: Primary | ICD-10-CM

## 2019-08-06 LAB
ANION GAP SERPL CALC-SCNC: 9 MMOL/L (ref 3–18)
APPEARANCE UR: CLEAR
BACTERIA URNS QL MICRO: ABNORMAL /HPF
BASOPHILS # BLD: 0 K/UL (ref 0–0.1)
BASOPHILS NFR BLD: 0 % (ref 0–2)
BILIRUB UR QL: NEGATIVE
BUN SERPL-MCNC: 23 MG/DL (ref 7–18)
BUN/CREAT SERPL: 19 (ref 12–20)
CALCIUM SERPL-MCNC: 9.3 MG/DL (ref 8.5–10.1)
CHLORIDE SERPL-SCNC: 102 MMOL/L (ref 100–111)
CK MB CFR SERPL CALC: 2.2 % (ref 0–4)
CK MB SERPL-MCNC: 6.6 NG/ML (ref 5–25)
CK SERPL-CCNC: 294 U/L (ref 26–192)
CO2 SERPL-SCNC: 22 MMOL/L (ref 21–32)
COLOR UR: YELLOW
CREAT SERPL-MCNC: 1.22 MG/DL (ref 0.6–1.3)
DIFFERENTIAL METHOD BLD: ABNORMAL
EOSINOPHIL # BLD: 0.1 K/UL (ref 0–0.4)
EOSINOPHIL NFR BLD: 1 % (ref 0–5)
EPITH CASTS URNS QL MICRO: ABNORMAL /LPF (ref 0–5)
ERYTHROCYTE [DISTWIDTH] IN BLOOD BY AUTOMATED COUNT: 13.2 % (ref 11.6–14.5)
GLUCOSE SERPL-MCNC: 411 MG/DL (ref 74–99)
GLUCOSE UR STRIP.AUTO-MCNC: >1000 MG/DL
HCT VFR BLD AUTO: 40.5 % (ref 35–45)
HGB BLD-MCNC: 13.3 G/DL (ref 12–16)
HGB UR QL STRIP: ABNORMAL
KETONES UR QL STRIP.AUTO: NEGATIVE MG/DL
LEUKOCYTE ESTERASE UR QL STRIP.AUTO: NEGATIVE
LYMPHOCYTES # BLD: 3.9 K/UL (ref 0.9–3.6)
LYMPHOCYTES NFR BLD: 45 % (ref 21–52)
MCH RBC QN AUTO: 26.9 PG (ref 24–34)
MCHC RBC AUTO-ENTMCNC: 32.8 G/DL (ref 31–37)
MCV RBC AUTO: 81.8 FL (ref 74–97)
MONOCYTES # BLD: 0.7 K/UL (ref 0.05–1.2)
MONOCYTES NFR BLD: 8 % (ref 3–10)
NEUTS SEG # BLD: 3.9 K/UL (ref 1.8–8)
NEUTS SEG NFR BLD: 46 % (ref 40–73)
NITRITE UR QL STRIP.AUTO: NEGATIVE
PH UR STRIP: 5.5 [PH] (ref 5–8)
PLATELET # BLD AUTO: 353 K/UL (ref 135–420)
PMV BLD AUTO: 10.3 FL (ref 9.2–11.8)
POTASSIUM SERPL-SCNC: 4.1 MMOL/L (ref 3.5–5.5)
PROT UR STRIP-MCNC: 30 MG/DL
RBC # BLD AUTO: 4.95 M/UL (ref 4.2–5.3)
RBC #/AREA URNS HPF: ABNORMAL /HPF (ref 0–5)
SODIUM SERPL-SCNC: 133 MMOL/L (ref 136–145)
SP GR UR REFRACTOMETRY: 1.02 (ref 1–1.03)
TROPONIN I SERPL-MCNC: <0.02 NG/ML (ref 0–0.04)
UROBILINOGEN UR QL STRIP.AUTO: 0.2 EU/DL (ref 0.2–1)
WBC # BLD AUTO: 8.7 K/UL (ref 4.6–13.2)
WBC URNS QL MICRO: ABNORMAL /HPF (ref 0–4)

## 2019-08-06 PROCEDURE — 82550 ASSAY OF CK (CPK): CPT

## 2019-08-06 PROCEDURE — 74011250637 HC RX REV CODE- 250/637: Performed by: EMERGENCY MEDICINE

## 2019-08-06 PROCEDURE — 81001 URINALYSIS AUTO W/SCOPE: CPT

## 2019-08-06 PROCEDURE — 96374 THER/PROPH/DIAG INJ IV PUSH: CPT

## 2019-08-06 PROCEDURE — 71046 X-RAY EXAM CHEST 2 VIEWS: CPT

## 2019-08-06 PROCEDURE — 74011250636 HC RX REV CODE- 250/636: Performed by: EMERGENCY MEDICINE

## 2019-08-06 PROCEDURE — 93005 ELECTROCARDIOGRAM TRACING: CPT

## 2019-08-06 PROCEDURE — 80048 BASIC METABOLIC PNL TOTAL CA: CPT

## 2019-08-06 PROCEDURE — 99284 EMERGENCY DEPT VISIT MOD MDM: CPT

## 2019-08-06 PROCEDURE — 85025 COMPLETE CBC W/AUTO DIFF WBC: CPT

## 2019-08-06 RX ORDER — ACETAMINOPHEN 500 MG
1000 TABLET ORAL
Status: COMPLETED | OUTPATIENT
Start: 2019-08-06 | End: 2019-08-06

## 2019-08-06 RX ORDER — KETOROLAC TROMETHAMINE 30 MG/ML
15 INJECTION, SOLUTION INTRAMUSCULAR; INTRAVENOUS
Status: COMPLETED | OUTPATIENT
Start: 2019-08-06 | End: 2019-08-06

## 2019-08-06 RX ORDER — DIAZEPAM 10 MG/1
10 TABLET ORAL
Qty: 5 TAB | Refills: 0 | Status: SHIPPED | OUTPATIENT
Start: 2019-08-06 | End: 2019-08-11

## 2019-08-06 RX ORDER — DIAZEPAM 5 MG/1
10 TABLET ORAL
Status: COMPLETED | OUTPATIENT
Start: 2019-08-06 | End: 2019-08-06

## 2019-08-06 RX ORDER — ACETAMINOPHEN 500 MG
1000 TABLET ORAL
Qty: 30 TAB | Refills: 0 | Status: SHIPPED | OUTPATIENT
Start: 2019-08-06 | End: 2019-08-16

## 2019-08-06 RX ORDER — NAPROXEN 500 MG/1
500 TABLET ORAL 2 TIMES DAILY WITH MEALS
Qty: 6 TAB | Refills: 0 | Status: SHIPPED | OUTPATIENT
Start: 2019-08-06 | End: 2019-08-09

## 2019-08-06 RX ADMIN — ACETAMINOPHEN 1000 MG: 500 TABLET ORAL at 21:34

## 2019-08-06 RX ADMIN — KETOROLAC TROMETHAMINE 15 MG: 30 INJECTION, SOLUTION INTRAMUSCULAR at 21:34

## 2019-08-06 RX ADMIN — DIAZEPAM 10 MG: 5 TABLET ORAL at 21:34

## 2019-08-06 NOTE — ED TRIAGE NOTES
Patient reports to ED with complaints of low back pain, and reproducible chest pain that radiates down left arm.

## 2019-08-07 LAB
ATRIAL RATE: 105 BPM
CALCULATED P AXIS, ECG09: 64 DEGREES
CALCULATED R AXIS, ECG10: 25 DEGREES
CALCULATED T AXIS, ECG11: 24 DEGREES
DIAGNOSIS, 93000: NORMAL
P-R INTERVAL, ECG05: 134 MS
Q-T INTERVAL, ECG07: 332 MS
QRS DURATION, ECG06: 84 MS
QTC CALCULATION (BEZET), ECG08: 438 MS
VENTRICULAR RATE, ECG03: 105 BPM

## 2019-08-07 NOTE — ED PROVIDER NOTES
[facility] [place]  EMERGENCY DEPARTMENT HISTORY AND PHYSICAL EXAM       Date: 8/6/2019   Patient Name: Cristian Palencia   YOB: 1985  Medical Record Number: [id number]    HISTORY OF PRESENTING ILLNESS:     Cristian Palencia is a 27 y.o. female presenting with the noted PMH to the ED c/o left arm pain radiating into her chest associated with an intermittent numb feeling for the last week and diffuse lumbar back pain for the last 3 days with no known triggering event. Pt works as a  at [employer]. Made an appt with her PCP but it was cancelled and rescheduled yesterday. No bowel of bladder sxs, no LE weakness. Back pain radiates around to her bilateral groin. Primary Care Provider: Liam Garza MD   Specialist:    Past Medical History:   Past Medical History:   Diagnosis Date    Arthritis     Chest pain     Diabetes ([address]     Essential hypertension     Headache(784.0)     Hyperchloremia     Hypertension     Seizures ([address]     SOB (shortness of breath)     Thyroid cancer ([address]         Past Surgical History:   Past Surgical History:   Procedure Laterality Date    HX PARTIAL HYSTERECTOMY      HX THYROIDECTOMY      HX TUBAL LIGATION          Social History:   Social History     Tobacco Use    Smoking status: Former Smoker    Smokeless tobacco: Never Used   Substance Use Topics    Alcohol use: No    Drug use: No        Allergies:   No Known Allergies     REVIEW OF SYSTEMS:  Review of Systems   Constitutional: Negative for chills and fever. HENT: Negative for ear pain and sore throat. Eyes: Negative for pain and visual disturbance. Respiratory: Negative for cough and shortness of breath. Cardiovascular: Positive for chest pain. Negative for palpitations. Gastrointestinal: Negative for abdominal pain, diarrhea, nausea and vomiting. Genitourinary: Negative for flank pain. Musculoskeletal: Positive for back pain. Negative for neck pain.    Neurological: Positive for numbness. Negative for syncope and headaches. Psychiatric/Behavioral: Negative for agitation. The patient is not nervous/anxious. PHYSICAL EXAM:  Vitals:    08/06/19 1955 08/06/19 2245   BP: 145/75 138/71   Pulse: (!) 105 99   Resp: 18 18   Temp: 98.3 °F (36.8 °C) 98.2 °F (36.8 °C)   SpO2: 100% 99%   Weight: 113.4 kg (250 lb)    Height: 5' 9\" (1.753 m)        Physical Exam   Constitutional: She is oriented to person, place, and time. She appears well-developed and well-nourished. No distress. Morbid obesity   HENT:   Head: Normocephalic and atraumatic. Mouth/Throat: Oropharynx is clear and moist.   Eyes: Pupils are equal, round, and reactive to light. Conjunctivae and EOM are normal. No scleral icterus. Neck: Normal range of motion. Neck supple. No tracheal deviation present. Cardiovascular: Normal rate, regular rhythm and intact distal pulses. No murmur heard. Pulmonary/Chest: Effort normal and breath sounds normal. No respiratory distress. She has no wheezes. She has no rales. Abdominal: Soft. There is no tenderness. Musculoskeletal: Normal range of motion. She exhibits tenderness (diffuse midline and bilateral lumbar TTP). She exhibits no edema or deformity. 5/5 strength in all extremities  2+ radial and DP pulses  Sensation intact    Neurological: She is alert and oriented to person, place, and time. No cranial nerve deficit. No gross neuro deficit   Skin: Skin is warm and dry. No rash noted. She is not diaphoretic. Psychiatric: She has a normal mood and affect. Nursing note and vitals reviewed. Medications   acetaminophen (TYLENOL) tablet 1,000 mg (1,000 mg Oral Given 8/6/19 2134)   ketorolac (TORADOL) injection 15 mg (15 mg IntraVENous Given 8/6/19 2134)   diazePAM (VALIUM) tablet 10 mg (10 mg Oral Given 8/6/19 2134)       RESULTS:    Labs -   Labs Reviewed   CBC WITH AUTOMATED DIFF - Abnormal; Notable for the following components:       Result Value    ABS. LYMPHOCYTES 3.9 (*)     All other components within normal limits   METABOLIC PANEL, BASIC - Abnormal; Notable for the following components:    Sodium 133 (*)     Glucose 411 (*)     BUN 23 (*)     GFR est non-AA 50 (*)     All other components within normal limits   CARDIAC PANEL,(CK, CKMB & TROPONIN) - Abnormal; Notable for the following components:     (*)     CK - MB 6.6 (*)     All other components within normal limits   URINALYSIS W/ RFLX MICROSCOPIC - Abnormal; Notable for the following components:    Protein 30 (*)     Glucose >1,000 (*)     Blood TRACE (*)     All other components within normal limits   URINE MICROSCOPIC ONLY - Abnormal; Notable for the following components:    Bacteria FEW (*)     All other components within normal limits       Radiologic Studies -  No results found. EKG interpretation:   Time 1953  Sinus tachycardia rate 105, intervals wnl, normal axis. Left atrial enlargement. No acute ST elevation or depression noted. MEDICAL DECISION MAKING      27 y.o. female with noted past medical history who presented with diffuse low back pain for the past 3 days with no triggering event and no red flag signs or symptoms concerning for cauda equina. Patient had diffuse tenderness to her lumbar area bilaterally on exam making me most suspicious for lumbosacral strain. Do not feel imaging is indicated at this time. Also complains of intermittent left arm pain with paresthesias for the past 1 week that radiates to her left chest, the symptoms are most likely due to cervical radiculopathy, I have a low suspicion for cardiac etiology. Patient is low risk for mace by the heart score. Nonspecific elevations of CK and CK-MB. Patient treated symptomatically with resolution of her symptoms on reevaluation.     ED Course as of Aug 07 0330   Tue Aug 06, 2019   2054 Patient had no complaints of shortness of breath and oxygen saturation of 100% on room air with no tachypnea therefore I was not concerned for pulmonary embolus. [KG]   2055 Patient's glucose is 414, she is asymptomatic and has no ketones in her urine, not concern for DKA. Patient reports she forgot to take her Lantus this morning but she did take her 80 units of insulin around 6 PM, she has not eaten dinner yet and she plans to take her Lantus before going to bed. [KG]      ED Course User Index  [KG] Margarito Pelayo        Patient has no new complaints, changes, or physical findings. Results were reviewed with the patient. Pt's questions and concerns were addressed. Care plan was outlined, including follow-up with PCP/specialist and return precautions were discussed. Patient is felt to be stable for discharge at this time. Diagnosis   Clinical Impression:   1. Lumbosacral strain, initial encounter    2. Left arm pain    3. Musculoskeletal chest pain           Follow-up Information     Follow up With Specialties Details Why Contact Info    Gladys Brown MD [facility] In 3 days  [address] [place] [id number] [phone] [address] [facility] EMERGENCY DEPT Emergency Medicine  If symptoms worsen [address] [facility]  [phone]          Discharge Medication List as of 8/6/2019 10:56 PM      START taking these medications    Details   acetaminophen (TYLENOL EXTRA STRENGTH) 500 mg tablet Take 2 Tabs by mouth every eight (8) hours as needed for Pain for up to 10 days. No more than 3,000 mg per day. Indications: pain, Print, Disp-30 Tab, R-0      naproxen (NAPROSYN) 500 mg tablet Take 1 Tab by mouth two (2) times daily (with meals) for 3 days. , Print, Disp-6 Tab, R-0      diazePAM (VALIUM) 10 mg tablet Take 1 Tab by mouth nightly as needed for Anxiety for up to 5 days.  Max Daily Amount: 10 mg., Print, Disp-5 Tab, R-0         CONTINUE these medications which have NOT CHANGED    Details   moxifloxacin (VIGAMOX) 0.5 % ophthalmic solution Administer 1 Drop to right eye three (3) times daily., Print, Disp-1 Bottle, R-0      topiramate (TOPAMAX) 25 mg tablet Take 3 Tabs by mouth two (2) times a day., Normal, Disp-180 Tab, R-6      insulin glulisine U-100 (APIDRA SOLOSTAR U-100 INSULIN) 100 unit/mL pen 10 Units by SubCUTAneous route Before breakfast, lunch, and dinner., Historical Med      insulin glargine (BASAGLAR KWIKPEN U-100 INSULIN) 100 unit/mL (3 mL) inpn 90 Units by SubCUTAneous route two (2) times a day., Historical Med      gabapentin (NEURONTIN) 300 mg capsule Take 300 mg by mouth. One cap QAM, two caps QPM, Historical Med      liothyronine (CYTOMEL) 25 mcg tablet Take 75 mcg by mouth daily. , Historical Med      meloxicam (MOBIC) 15 mg tablet Take 15 mg by mouth daily as needed for Pain., Historical Med      traZODone (DESYREL) 50 mg tablet Take 50 mg by mouth nightly as needed for Sleep., Historical Med      metFORMIN (GLUCOPHAGE) 1,000 mg tablet Take 1,000 mg by mouth two (2) times daily (with meals). , Historical Med      metoprolol tartrate (LOPRESSOR) 25 mg tablet Take 25 mg by mouth daily. , Historical Med      fenofibrate nanocrystallized (TRICOR) 48 mg tablet Take 48 mg by mouth., Historical Med      atorvastatin (LIPITOR) 40 mg tablet Take  by mouth daily. , Historical Med      furosemide (LASIX) 20 mg tablet Take  by mouth daily. , Historical Med      lisinopril (PRINIVIL, ZESTRIL) 5 mg tablet Take 40 mg by mouth daily. , Historical Med             _______________________________   Attestations:

## 2019-08-07 NOTE — DISCHARGE INSTRUCTIONS

## 2019-08-07 NOTE — ED NOTES
I have reviewed discharge instructions with the patient. The patient verbalized understanding. Patient armband removed and shredded  Current Discharge Medication List      START taking these medications    Details   acetaminophen (TYLENOL EXTRA STRENGTH) 500 mg tablet Take 2 Tabs by mouth every eight (8) hours as needed for Pain for up to 10 days. No more than 3,000 mg per day. Indications: pain  Qty: 30 Tab, Refills: 0      naproxen (NAPROSYN) 500 mg tablet Take 1 Tab by mouth two (2) times daily (with meals) for 3 days. Qty: 6 Tab, Refills: 0      diazePAM (VALIUM) 10 mg tablet Take 1 Tab by mouth nightly as needed for Anxiety for up to 5 days.  Max Daily Amount: 10 mg.  Qty: 5 Tab, Refills: 0    Associated Diagnoses: Lumbosacral strain, initial encounter

## 2019-10-01 ENCOUNTER — HOSPITAL ENCOUNTER (EMERGENCY)
Age: 34
Discharge: HOME | End: 2019-10-01
Attending: EMERGENCY MEDICINE
Payer: MEDICAID

## 2019-10-01 VITALS
HEART RATE: 92 BPM | RESPIRATION RATE: 16 BRPM | TEMPERATURE: 98 F | SYSTOLIC BLOOD PRESSURE: 146 MMHG | DIASTOLIC BLOOD PRESSURE: 78 MMHG

## 2019-10-01 DIAGNOSIS — G40.909 RECURRENT SEIZURES (HCC): Primary | ICD-10-CM

## 2019-10-01 LAB
ALBUMIN SERPL-MCNC: 2.7 G/DL (ref 3.4–5)
ALBUMIN/GLOB SERPL: 0.6 {RATIO} (ref 0.8–1.7)
ALP SERPL-CCNC: 99 U/L (ref 45–117)
ALT SERPL-CCNC: 30 U/L (ref 13–56)
ANION GAP SERPL CALC-SCNC: 7 MMOL/L (ref 3–18)
APPEARANCE UR: CLEAR
AST SERPL-CCNC: 16 U/L (ref 10–38)
BACTERIA URNS QL MICRO: NEGATIVE /HPF
BASOPHILS # BLD: 0 K/UL (ref 0–0.1)
BASOPHILS NFR BLD: 0 % (ref 0–2)
BILIRUB SERPL-MCNC: 0.2 MG/DL (ref 0.2–1)
BILIRUB UR QL: NEGATIVE
BUN SERPL-MCNC: 16 MG/DL (ref 7–18)
BUN/CREAT SERPL: 18 (ref 12–20)
CALCIUM SERPL-MCNC: 7.9 MG/DL (ref 8.5–10.1)
CHLORIDE SERPL-SCNC: 103 MMOL/L (ref 100–111)
CO2 SERPL-SCNC: 22 MMOL/L (ref 21–32)
COLOR UR: YELLOW
CREAT SERPL-MCNC: 0.88 MG/DL (ref 0.6–1.3)
DIFFERENTIAL METHOD BLD: NORMAL
EOSINOPHIL # BLD: 0.1 K/UL (ref 0–0.4)
EOSINOPHIL NFR BLD: 1 % (ref 0–5)
EPITH CASTS URNS QL MICRO: NORMAL /LPF (ref 0–5)
ERYTHROCYTE [DISTWIDTH] IN BLOOD BY AUTOMATED COUNT: 13.3 % (ref 11.6–14.5)
GLOBULIN SER CALC-MCNC: 4.3 G/DL (ref 2–4)
GLUCOSE SERPL-MCNC: 266 MG/DL (ref 74–99)
GLUCOSE UR STRIP.AUTO-MCNC: >1000 MG/DL
HCT VFR BLD AUTO: 36.6 % (ref 35–45)
HGB BLD-MCNC: 12.3 G/DL (ref 12–16)
HGB UR QL STRIP: ABNORMAL
KETONES UR QL STRIP.AUTO: ABNORMAL MG/DL
LEUKOCYTE ESTERASE UR QL STRIP.AUTO: NEGATIVE
LYMPHOCYTES # BLD: 3.3 K/UL (ref 0.9–3.6)
LYMPHOCYTES NFR BLD: 36 % (ref 21–52)
MAGNESIUM SERPL-MCNC: 2 MG/DL (ref 1.6–2.6)
MCH RBC QN AUTO: 27 PG (ref 24–34)
MCHC RBC AUTO-ENTMCNC: 33.6 G/DL (ref 31–37)
MCV RBC AUTO: 80.3 FL (ref 74–97)
MONOCYTES # BLD: 0.6 K/UL (ref 0.05–1.2)
MONOCYTES NFR BLD: 6 % (ref 3–10)
NEUTS SEG # BLD: 5.3 K/UL (ref 1.8–8)
NEUTS SEG NFR BLD: 57 % (ref 40–73)
NITRITE UR QL STRIP.AUTO: NEGATIVE
PH UR STRIP: 5.5 [PH] (ref 5–8)
PLATELET # BLD AUTO: 303 K/UL (ref 135–420)
PMV BLD AUTO: 9.9 FL (ref 9.2–11.8)
POTASSIUM SERPL-SCNC: 3.7 MMOL/L (ref 3.5–5.5)
PROT SERPL-MCNC: 7 G/DL (ref 6.4–8.2)
PROT UR STRIP-MCNC: 300 MG/DL
RBC # BLD AUTO: 4.56 M/UL (ref 4.2–5.3)
RBC #/AREA URNS HPF: NEGATIVE /HPF (ref 0–5)
SODIUM SERPL-SCNC: 132 MMOL/L (ref 136–145)
SP GR UR REFRACTOMETRY: 1.02 (ref 1–1.03)
UROBILINOGEN UR QL STRIP.AUTO: 0.2 EU/DL (ref 0.2–1)
WBC # BLD AUTO: 9.1 K/UL (ref 4.6–13.2)
WBC URNS QL MICRO: NORMAL /HPF (ref 0–4)

## 2019-10-01 PROCEDURE — 74011250636 HC RX REV CODE- 250/636: Performed by: EMERGENCY MEDICINE

## 2019-10-01 PROCEDURE — 80053 COMPREHEN METABOLIC PANEL: CPT

## 2019-10-01 PROCEDURE — 74011250637 HC RX REV CODE- 250/637: Performed by: EMERGENCY MEDICINE

## 2019-10-01 PROCEDURE — 85025 COMPLETE CBC W/AUTO DIFF WBC: CPT

## 2019-10-01 PROCEDURE — 83735 ASSAY OF MAGNESIUM: CPT

## 2019-10-01 PROCEDURE — 81001 URINALYSIS AUTO W/SCOPE: CPT

## 2019-10-01 PROCEDURE — 99284 EMERGENCY DEPT VISIT MOD MDM: CPT

## 2019-10-01 RX ORDER — TOPIRAMATE 25 MG/1
75 TABLET ORAL
Status: COMPLETED | OUTPATIENT
Start: 2019-10-01 | End: 2019-10-01

## 2019-10-01 RX ORDER — TOPIRAMATE 25 MG/1
50 TABLET ORAL
Status: DISCONTINUED | OUTPATIENT
Start: 2019-10-01 | End: 2019-10-01

## 2019-10-01 RX ADMIN — SODIUM CHLORIDE 1000 ML: 900 INJECTION, SOLUTION INTRAVENOUS at 19:00

## 2019-10-01 RX ADMIN — TOPIRAMATE 75 MG: 25 TABLET, FILM COATED ORAL at 17:25

## 2019-10-01 NOTE — DISCHARGE INSTRUCTIONS
Patient Education        Epilepsy: Care Instructions  Your Care Instructions    Epilepsy is a common condition that causes repeated seizures. The seizures are caused by bursts of electrical activity in the brain that aren't normal. Seizures may cause problems with muscle control, movement, speech, vision, or awareness. They can be scary. Epilepsy affects each person differently. Some people have only a few seizures. Others get them more often. If you know what triggers a seizure, you may be able to avoid having one. You can take medicines to control and reduce seizures. You and your doctor will need to find the right combination, schedule, and dose of medicine. This may take time and careful changes. Seizures may get worse and happen more often over time. Follow-up care is a key part of your treatment and safety. Be sure to make and go to all appointments, and call your doctor if you are having problems. It's also a good idea to know your test results and keep a list of the medicines you take. How can you care for yourself at home? · Be safe with medicines. Take your medicines exactly as prescribed. Call your doctor if you think you are having a problem with your medicine. · Make a treatment plan with your doctor. Be sure to follow your plan. · Try to identify and avoid things that may make you more likely to have a seizure. These may include:  ? Not getting enough sleep. ? Using drugs or alcohol. ? Being emotionally stressed. ? Skipping meals. · Keep a record of any seizures you have. Note the date, time of day, and any details about the seizure that you can remember. Your doctor can use this information to plan or adjust your medicine or other treatment. · Be sure that any doctor treating you for another condition knows that you have epilepsy. Each doctor should know what medicines you are taking, if any. · Wear a medical ID bracelet. You can buy this at most [employer].  If you have a seizure that leaves you unconscious or unable to speak for yourself, this bracelet will let those who are treating you know that you have epilepsy. · Talk to your doctor about whether it is safe for you to do certain activities, such as drive or swim. When should you call for help? Call 911 anytime you think you may need emergency care. For example, call if:    · A seizure does not stop as it normally does.     · You have new symptoms such as:  ? Numbness, tingling, or weakness on one side of your body or face. ? Vision changes. ? Trouble speaking or thinking clearly.    Call your doctor now or seek immediate medical care if:    · You have a fever.     · You have a severe headache.    Watch closely for changes in your health, and be sure to contact your doctor if:    · The normal pattern or features of your seizures change. Where can you learn more? Go to http://josh-autumn.info/. Livier Lemus in the search box to learn more about \"Epilepsy: Care Instructions. \"  Current as of: March 28, 2019  Content Version: 12.2  © 7006-2015 [employer]. Care instructions adapted under license by [employer] (which disclaims liability or warranty for this information). If you have questions about a medical condition or this instruction, always ask your healthcare professional. [address] any warranty or liability for your use of this information.

## 2019-10-01 NOTE — ED PROVIDER NOTES
EMERGENCY DEPARTMENT HISTORY AND PHYSICAL EXAM    5:20 PM      Date: 10/1/2019  Patient Name: Eduar Singh    History of Presenting Illness     Chief Complaint   Patient presents with    Seizure         History Provided By: Patient  Location/Duration/Severity/Modifying factors   Patient is a 42-year-old female with a history of obstructive sleep apnea, recurrent seizures, diabetes, hypertension, presents emergency department with a complaint of seizure that occurred at work. Patient is not driving however went to work at [employer] and was starting to feel overheated as well as had a dry mouth and then lost consciousness. Per report she had a seizure and woke up with EMS. EMS checked her blood sugar in route and it was over 200 and patient now complains of mild buttocks pain mild pain behind her head however denies any nausea, vomiting, headache, or numbness or tingling in her arms or legs. Patient is taking medications and says she takes Topamax twice a day once in the morning once in the evening and has not missed the dose. Patient follows up with a neurologist here at [facility]. Patient denies any aggravating alleviating factors. Patient describes her pain is mild without radiation. PCP: Nii Fischer MD    Current Facility-Administered Medications   Medication Dose Route Frequency Provider Last Rate Last Dose    sodium chloride 0.9 % bolus infusion 1,000 mL  1,000 mL IntraVENous ONCE Jose Major MD         Current Outpatient Medications   Medication Sig Dispense Refill    moxifloxacin (VIGAMOX) 0.5 % ophthalmic solution Administer 1 Drop to right eye three (3) times daily. 1 Bottle 0    topiramate (TOPAMAX) 25 mg tablet Take 3 Tabs by mouth two (2) times a day. 180 Tab 6    insulin glulisine U-100 (APIDRA SOLOSTAR U-100 INSULIN) 100 unit/mL pen 10 Units by SubCUTAneous route Before breakfast, lunch, and dinner.       insulin glargine (BASAGLAR KWIKPEN U-100 INSULIN) 100 unit/mL (3 mL) inpn 90 Units by SubCUTAneous route two (2) times a day.  gabapentin (NEURONTIN) 300 mg capsule Take 300 mg by mouth. One cap QAM, two caps QPM      liothyronine (CYTOMEL) 25 mcg tablet Take 75 mcg by mouth daily.  meloxicam (MOBIC) 15 mg tablet Take 15 mg by mouth daily as needed for Pain.  traZODone (DESYREL) 50 mg tablet Take 50 mg by mouth nightly as needed for Sleep.  metFORMIN (GLUCOPHAGE) 1,000 mg tablet Take 1,000 mg by mouth two (2) times daily (with meals).  metoprolol tartrate (LOPRESSOR) 25 mg tablet Take 25 mg by mouth daily.  fenofibrate nanocrystallized (TRICOR) 48 mg tablet Take 48 mg by mouth.  atorvastatin (LIPITOR) 40 mg tablet Take  by mouth daily.  furosemide (LASIX) 20 mg tablet Take  by mouth daily.  lisinopril (PRINIVIL, ZESTRIL) 5 mg tablet Take 40 mg by mouth daily. Past History     Past Medical History:  Past Medical History:   Diagnosis Date    Arthritis     Chest pain     Diabetes ([facility] [address]     Essential hypertension     Headache(784.0)     Hyperchloremia     Hypertension     Seizures (HCC)     SOB (shortness of breath)     Thyroid cancer ([facility] [address]        Past Surgical History:  Past Surgical History:   Procedure Laterality Date    HX PARTIAL HYSTERECTOMY      HX THYROIDECTOMY      HX TUBAL LIGATION         Family History:  Family History   Problem Relation Age of Onset    Hypertension Mother        Social History:  Social History     Tobacco Use    Smoking status: Former Smoker    Smokeless tobacco: Never Used   Substance Use Topics    Alcohol use: No    Drug use: No       Allergies:  No Known Allergies      Review of Systems       Review of Systems   Constitutional: Negative for chills, fatigue, fever and unexpected weight change. HENT: Negative for congestion and rhinorrhea. Respiratory: Negative for chest tightness and shortness of breath.     Cardiovascular: Negative for chest pain, palpitations and leg swelling. Gastrointestinal: Negative for abdominal pain, nausea and vomiting. Genitourinary: Negative for dysuria. Musculoskeletal: Positive for back pain. Skin: Negative for rash. Neurological: Positive for seizures and headaches. Negative for dizziness and weakness. Psychiatric/Behavioral: The patient is not nervous/anxious. Physical Exam     Visit Vitals  /78   Pulse 92   Resp 16         Physical Exam   Constitutional: She is oriented to person, place, and time. She appears well-developed and well-nourished. No distress. Overweight   HENT:   Head: Normocephalic. Right Ear: External ear normal.   Left Ear: External ear normal.   Nose: Nose normal.   Mouth/Throat: Oropharynx is clear and moist.   Mild tenderness along the occiput with no step-off or hematoma   Eyes: Pupils are equal, round, and reactive to light. Conjunctivae and EOM are normal. No scleral icterus. Neck: Normal range of motion. Neck supple. No JVD present. No tracheal deviation present. No thyromegaly present. Cardiovascular: Normal rate, regular rhythm, normal heart sounds and intact distal pulses. Exam reveals no gallop and no friction rub. No murmur heard. Pulmonary/Chest: Effort normal and breath sounds normal. She exhibits no tenderness. Abdominal: Soft. Bowel sounds are normal. She exhibits no distension. There is no tenderness. There is no rebound and no guarding. Musculoskeletal: Normal range of motion. She exhibits no edema or tenderness. Mild paraspinal low back pain without midline tenderness or step-off   Lymphadenopathy:     She has no cervical adenopathy. Neurological: She is alert and oriented to person, place, and time. No cranial nerve deficit. Coordination normal.   No sensory loss, Gait not observed, Motor 5/5   Skin: Skin is warm and dry. Psychiatric: She has a normal mood and affect.  Her behavior is normal. Judgment and thought content normal.   No family currently the bedside with good insight to care   Nursing note and vitals reviewed. Diagnostic Study Results     Labs -  Recent Results (from the past 12 hour(s))   CBC WITH AUTOMATED DIFF    Collection Time: 10/01/19  5:08 PM   Result Value Ref Range    WBC 9.1 4.6 - 13.2 K/uL    RBC 4.56 4.20 - 5.30 M/uL    HGB 12.3 12.0 - 16.0 g/dL    HCT 36.6 35.0 - 45.0 %    MCV 80.3 74.0 - 97.0 FL    MCH 27.0 24.0 - 34.0 PG    MCHC 33.6 31.0 - 37.0 g/dL    RDW 13.3 11.6 - 14.5 %    PLATELET 958 671 - 650 K/uL    MPV 9.9 9.2 - 11.8 FL    NEUTROPHILS 57 40 - 73 %    LYMPHOCYTES 36 21 - 52 %    MONOCYTES 6 3 - 10 %    EOSINOPHILS 1 0 - 5 %    BASOPHILS 0 0 - 2 %    ABS. NEUTROPHILS 5.3 1.8 - 8.0 K/UL    ABS. LYMPHOCYTES 3.3 0.9 - 3.6 K/UL    ABS. MONOCYTES 0.6 0.05 - 1.2 K/UL    ABS. EOSINOPHILS 0.1 0.0 - 0.4 K/UL    ABS. BASOPHILS 0.0 0.0 - 0.1 K/UL    DF AUTOMATED     METABOLIC PANEL, COMPREHENSIVE    Collection Time: 10/01/19  5:08 PM   Result Value Ref Range    Sodium 132 (L) 136 - 145 mmol/L    Potassium 3.7 3.5 - 5.5 mmol/L    Chloride 103 100 - 111 mmol/L    CO2 22 21 - 32 mmol/L    Anion gap 7 3.0 - 18 mmol/L    Glucose 266 (H) 74 - 99 mg/dL    BUN 16 7.0 - 18 MG/DL    Creatinine 0.88 0.6 - 1.3 MG/DL    BUN/Creatinine ratio 18 12 - 20      GFR est AA >60 >60 ml/min/1.73m2    GFR est non-AA >60 >60 ml/min/1.73m2    Calcium 7.9 (L) 8.5 - 10.1 MG/DL    Bilirubin, total 0.2 0.2 - 1.0 MG/DL    ALT (SGPT) 30 13 - 56 U/L    AST (SGOT) 16 10 - 38 U/L    Alk. phosphatase 99 45 - 117 U/L    Protein, total 7.0 6.4 - 8.2 g/dL    Albumin 2.7 (L) 3.4 - 5.0 g/dL    Globulin 4.3 (H) 2.0 - 4.0 g/dL    A-G Ratio 0.6 (L) 0.8 - 1.7     MAGNESIUM    Collection Time: 10/01/19  5:08 PM   Result Value Ref Range    Magnesium 2.0 1.6 - 2.6 mg/dL       Radiologic Studies -   No orders to display         Medical Decision Making   I am the first provider for this patient.     I reviewed the vital signs, available nursing notes, past medical history, past surgical history, family history and social history. Vital Signs-Reviewed the patient's vital signs. Records Reviewed: Nursing Notes and Old Medical Records (Time of Review: 5:20 PM)    ED Course: Progress Notes, Reevaluation, and Consults:     Patient is feeling much better and is ambulating. Her labs are reassuring and discussed with her about her Topamax dosing and she knows she is been using her all this bottle and has had to take 2 twice a day however I discussed with her that the recent notes they should be taking 3 tablets twice a day. She says she will go check her medications at home and I will discuss with the neurologist regarding keeping her at 75 twice daily for now given that we are not sure if she is compliant with her given dose. Patient is to follow closely with Dr. Festus Pitts and Dr. Aida Fallon and will return if at all worsened or concerned. Discussed case with Dr. Erick Moritz and recommends to continue the 75 mg twice daily of Topamax for now and will see the patient in the office. Workup and recommendations were reviewed with the patient and all questions were answered. The patient understands the plan and will proceed with close outpatient care. I have encouraged the patient to return if at all worsened or concerned. Dilma Rodriguez, DO 6:53 PM      Provider Notes (Medical Decision Making):   MDM  Number of Diagnoses or Management Options  Diagnosis management comments: Patient is a 27-year-old female with a history of insulin dependent diabetes, hypertension, seizure disorder, obstructive sleep apnea, the presents emergency department with complaint of seizure that occurred at work. Patient is alert and oriented and appears to have hit her head although denies any nausea and only has a very mild headache. Do not suspect intracranial process requiring imaging.   We will follow her electrolytes, urinalysis, give her evening dose of Topamax which appears to be 75 mg which is slightly different than what she has had at 50 mg. We will continue to observe the patient then reevaluate. Lucy Carreno, DO 5:26 PM        Procedures        Diagnosis     Clinical Impression:   1. Recurrent seizures ([address]        Disposition: DC    Follow-up Information    None          Patient's Medications   Start Taking    No medications on file   Continue Taking    ATORVASTATIN (LIPITOR) 40 MG TABLET    Take  by mouth daily. FENOFIBRATE NANOCRYSTALLIZED (TRICOR) 48 MG TABLET    Take 48 mg by mouth. FUROSEMIDE (LASIX) 20 MG TABLET    Take  by mouth daily. GABAPENTIN (NEURONTIN) 300 MG CAPSULE    Take 300 mg by mouth. One cap QAM, two caps QPM    INSULIN GLARGINE (BASAGLAR KWIKPEN U-100 INSULIN) 100 UNIT/ML (3 ML) INPN    90 Units by SubCUTAneous route two (2) times a day. INSULIN GLULISINE U-100 (APIDRA SOLOSTAR U-100 INSULIN) 100 UNIT/ML PEN    10 Units by SubCUTAneous route Before breakfast, lunch, and dinner. LIOTHYRONINE (CYTOMEL) 25 MCG TABLET    Take 75 mcg by mouth daily. LISINOPRIL (PRINIVIL, ZESTRIL) 5 MG TABLET    Take 40 mg by mouth daily. MELOXICAM (MOBIC) 15 MG TABLET    Take 15 mg by mouth daily as needed for Pain. METFORMIN (GLUCOPHAGE) 1,000 MG TABLET    Take 1,000 mg by mouth two (2) times daily (with meals). METOPROLOL TARTRATE (LOPRESSOR) 25 MG TABLET    Take 25 mg by mouth daily. MOXIFLOXACIN (VIGAMOX) 0.5 % OPHTHALMIC SOLUTION    Administer 1 Drop to right eye three (3) times daily. TOPIRAMATE (TOPAMAX) 25 MG TABLET    Take 3 Tabs by mouth two (2) times a day. TRAZODONE (DESYREL) 50 MG TABLET    Take 50 mg by mouth nightly as needed for Sleep. These Medications have changed    No medications on file   Stop Taking    No medications on file     Disclaimer: Sections of this note are dictated using utilizing voice recognition software. Minor typographical errors may be present. If questions arise, please do not hesitate to contact me or call our department.

## 2019-10-01 NOTE — ED TRIAGE NOTES
Pt arrived via ems from work after having a witnessed seizure. Pt is now alert and oriented. Pt states she hit her head and tailbone.

## 2019-10-04 ENCOUNTER — HOSPITAL ENCOUNTER (OUTPATIENT)
Dept: NON INVASIVE DIAGNOSTICS | Age: 34
Discharge: HOME | End: 2019-10-04
Attending: FAMILY MEDICINE
Payer: MEDICAID

## 2019-10-04 VITALS — BODY MASS INDEX: 37.03 KG/M2 | HEIGHT: 69 IN | WEIGHT: 250 LBS

## 2019-10-04 DIAGNOSIS — R07.9 CHEST PAIN: ICD-10-CM

## 2019-10-04 DIAGNOSIS — I20.8 ANGINA DECUBITUS (HCC): ICD-10-CM

## 2019-10-04 DIAGNOSIS — E11.65 TYPE II OR UNSPECIFIED TYPE DIABETES MELLITUS WITH RENAL MANIFESTATIONS, UNCONTROLLED(250.42) (HCC): ICD-10-CM

## 2019-10-04 DIAGNOSIS — E11.29 TYPE II OR UNSPECIFIED TYPE DIABETES MELLITUS WITH RENAL MANIFESTATIONS, UNCONTROLLED(250.42) (HCC): ICD-10-CM

## 2019-10-04 LAB
STRESS ANGINA INDEX: 0
STRESS BASELINE DIAS BP: 82 MMHG
STRESS BASELINE HR: 89 BPM
STRESS BASELINE SYS BP: 140 MMHG
STRESS ESTIMATED WORKLOAD: 5.9 METS
STRESS EXERCISE DUR MIN: NORMAL
STRESS PEAK DIAS BP: 80 MMHG
STRESS PEAK SYS BP: 168 MMHG
STRESS PERCENT HR ACHIEVED: 82 %
STRESS POST PEAK HR: 151 BPM
STRESS RATE PRESSURE PRODUCT: NORMAL BPM*MMHG
STRESS SR DUKE TREADMILL SCORE: 0
STRESS ST DEPRESSION: 0 MM
STRESS ST ELEVATION: 0 MM
STRESS TARGET HR: 185 BPM

## 2019-10-04 PROCEDURE — 93017 CV STRESS TEST TRACING ONLY: CPT

## 2019-11-25 ENCOUNTER — APPOINTMENT (OUTPATIENT)
Dept: GENERAL RADIOLOGY | Age: 34
End: 2019-11-25
Attending: EMERGENCY MEDICINE
Payer: MEDICAID

## 2019-11-25 ENCOUNTER — HOSPITAL ENCOUNTER (EMERGENCY)
Age: 34
Discharge: HOME | End: 2019-11-25
Attending: EMERGENCY MEDICINE
Payer: MEDICAID

## 2019-11-25 VITALS
SYSTOLIC BLOOD PRESSURE: 139 MMHG | TEMPERATURE: 98.4 F | OXYGEN SATURATION: 98 % | HEART RATE: 99 BPM | DIASTOLIC BLOOD PRESSURE: 88 MMHG | HEIGHT: 69 IN | WEIGHT: 250 LBS | RESPIRATION RATE: 16 BRPM | BODY MASS INDEX: 37.03 KG/M2

## 2019-11-25 DIAGNOSIS — S39.011A FLANK STRAIN, INITIAL ENCOUNTER: Primary | ICD-10-CM

## 2019-11-25 LAB
APPEARANCE UR: CLEAR
BACTERIA URNS QL MICRO: NEGATIVE /HPF
BILIRUB UR QL: NEGATIVE
COLOR UR: YELLOW
EPITH CASTS URNS QL MICRO: NORMAL /LPF (ref 0–5)
GLUCOSE UR STRIP.AUTO-MCNC: >1000 MG/DL
HGB UR QL STRIP: ABNORMAL
KETONES UR QL STRIP.AUTO: NEGATIVE MG/DL
LEUKOCYTE ESTERASE UR QL STRIP.AUTO: NEGATIVE
NITRITE UR QL STRIP.AUTO: NEGATIVE
PH UR STRIP: 7 [PH] (ref 5–8)
PROT UR STRIP-MCNC: 100 MG/DL
RBC #/AREA URNS HPF: NORMAL /HPF (ref 0–5)
SP GR UR REFRACTOMETRY: 1.02 (ref 1–1.03)
UROBILINOGEN UR QL STRIP.AUTO: 0.2 EU/DL (ref 0.2–1)
WBC URNS QL MICRO: NORMAL /HPF (ref 0–4)

## 2019-11-25 PROCEDURE — 71046 X-RAY EXAM CHEST 2 VIEWS: CPT

## 2019-11-25 PROCEDURE — 96372 THER/PROPH/DIAG INJ SC/IM: CPT

## 2019-11-25 PROCEDURE — 81001 URINALYSIS AUTO W/SCOPE: CPT

## 2019-11-25 PROCEDURE — 74011250636 HC RX REV CODE- 250/636: Performed by: EMERGENCY MEDICINE

## 2019-11-25 PROCEDURE — 99283 EMERGENCY DEPT VISIT LOW MDM: CPT

## 2019-11-25 RX ORDER — KETOROLAC TROMETHAMINE 15 MG/ML
15 INJECTION, SOLUTION INTRAMUSCULAR; INTRAVENOUS
Status: COMPLETED | OUTPATIENT
Start: 2019-11-25 | End: 2019-11-25

## 2019-11-25 RX ORDER — LIDOCAINE 50 MG/G
PATCH TOPICAL
Qty: 15 EACH | Refills: 0 | Status: SHIPPED | OUTPATIENT
Start: 2019-11-25 | End: 2022-07-05

## 2019-11-25 RX ADMIN — KETOROLAC TROMETHAMINE 15 MG: 15 INJECTION, SOLUTION INTRAMUSCULAR; INTRAVENOUS at 20:01

## 2019-11-25 NOTE — ED TRIAGE NOTES
Patient c/o pain to left flank x one week. Denies urinary problems or vaginal concerns. Denies heavy lifting or falls. Patient states cough x 3 days. Denies cough being productive.

## 2019-11-26 NOTE — ED PROVIDER NOTES
Magdy Medina is a 27 y.o. female with a past medical history of hypertension, diabetes, seizure disorder, and arthritis coming in with left flank pain for last 1 week. Patient states that she is a  at [employer] and stands all day and has to do a lot of lifting of bags. She states that her left flank has some achy and sharp pain which is been intermittent over the last week. She states the pain is generally worse at work. She states that the pain is exacerbated by moving or twisting. She denies any nausea or vomiting. She denies any hematuria or dark urine. She denies any abdominal pain, chest pain, shortness of breath, or pleuritic pain. She denies any fevers or chills. Denies any falls or trauma. Patient tried taking some Aleve which her mother gave her which helped her pain some. She denies any other complaints at this time.            Past Medical History:   Diagnosis Date    Arthritis     Chest pain     Diabetes ([address]     Essential hypertension     Headache(784.0)     Hyperchloremia     Hypertension     Seizures (HCC)     SOB (shortness of breath)     Thyroid cancer (HCC)        Past Surgical History:   Procedure Laterality Date    HX PARTIAL HYSTERECTOMY      HX THYROIDECTOMY      HX TUBAL LIGATION           Family History:   Problem Relation Age of Onset    Hypertension Mother        Social History     Socioeconomic History    Marital status: SINGLE     Spouse name: Not on file    Number of children: Not on file    Years of education: Not on file    Highest education level: Not on file   Occupational History    Not on file   Social Needs    Financial resource strain: Not on file    Food insecurity:     Worry: Not on file     Inability: Not on file    Transportation needs:     Medical: Not on file     Non-medical: Not on file   Tobacco Use    Smoking status: Former Smoker    Smokeless tobacco: Never Used   Substance and Sexual Activity    Alcohol use: No    Drug use: No    Sexual activity: Yes     Partners: Male     Birth control/protection: Surgical   Lifestyle    Physical activity:     Days per week: Not on file     Minutes per session: Not on file    Stress: Not on file   Relationships    Social connections:     Talks on phone: Not on file     Gets together: Not on file     Attends Yarsani service: Not on file     Active member of club or organization: Not on file     Attends meetings of clubs or organizations: Not on file     Relationship status: Not on file    Intimate partner violence:     Fear of current or ex partner: Not on file     Emotionally abused: Not on file     Physically abused: Not on file     Forced sexual activity: Not on file   Other Topics Concern    Not on file   Social History Narrative    Not on file         ALLERGIES: Patient has no known allergies. Review of Systems   Constitutional: Negative. Negative for chills and fever. HENT: Negative. Eyes: Negative. Negative for visual disturbance. Respiratory: Negative. Negative for cough and shortness of breath. Cardiovascular: Negative. Negative for chest pain and leg swelling. Gastrointestinal: Negative. Negative for abdominal pain, constipation, diarrhea, nausea and vomiting. Genitourinary: Positive for flank pain. Negative for decreased urine volume, difficulty urinating, dysuria and hematuria. Musculoskeletal: Negative for myalgias. Skin: Negative. Negative for rash. Neurological: Negative. Negative for dizziness, weakness and light-headedness. Psychiatric/Behavioral: Negative. All other systems reviewed and are negative. Vitals:    11/25/19 1801   BP: 139/88   Pulse: 99   Resp: 16   Temp: 98.4 °F (36.9 °C)   SpO2: 98%   Weight: 113.4 kg (250 lb)   Height: 5' 9\" (1.753 m)            Physical Exam  Vitals signs reviewed. Constitutional:       General: She is not in acute distress. Appearance: Normal appearance. She is well-developed.    HENT: Head: Normocephalic and atraumatic. Eyes:      Extraocular Movements: Extraocular movements intact. Pupils: Pupils are equal, round, and reactive to light. Neck:      Musculoskeletal: Normal range of motion and neck supple. Thyroid: No thyromegaly. Vascular: No JVD. Trachea: Trachea normal.   Cardiovascular:      Rate and Rhythm: Normal rate and regular rhythm. Heart sounds: S1 normal and S2 normal. No murmur. No friction rub. No gallop. Pulmonary:      Effort: Pulmonary effort is normal. No accessory muscle usage or respiratory distress. Breath sounds: Normal breath sounds. Abdominal:      General: There is no distension. Palpations: Abdomen is soft. Abdomen is not rigid. Tenderness: There is no tenderness. There is no guarding. Comments: No left abdominal or other abdominal tenderness   Musculoskeletal: Normal range of motion. Comments: Tender to palpation over the large muscles of the left flank. Negative CVA tenderness to percussion. Pain exacerbated by twisting of the torso or lateral stretching. Skin:     General: Skin is warm. Findings: No rash. Neurological:      Mental Status: She is alert and oriented to person, place, and time. Cranial Nerves: No cranial nerve deficit. Coordination: Coordination normal.   Psychiatric:         Speech: Speech normal.         Behavior: Behavior normal.          MDM  Number of Diagnoses or Management Options  Flank strain, initial encounter:   Diagnosis management comments: Naveed Cadena is a 27 y.o. female coming in with left flank pain over the last 1 week. Pain is intermittent. Not consistent with a kidney stone or pyelonephritis. Patient does have large amount of glucose and protein in her urine as well as trace blood. This is consistent with prior urinalysis here and she is scheduled to see a nephrologist as an outpatient already.   I think this is very unlikely renal she is tender to palpation over the muscles. Seems much more consistent with a flank muscle strain. X-ray unremarkable. Will treat with lidocaine patches and Tylenol. Given return precautions for worsening symptoms, vomiting, fevers, or urinary symptoms. Patient verbalizes understanding and agrees with this plan. Procedures      Medications ordered:   Medications   ketorolac (TORADOL) injection 15 mg (15 mg IntraMUSCular Given 11/25/19 2001)         X-Ray, CT or other radiology findings or impressions:  XR CHEST PA LAT   Final Result   IMPRESSION:   No acute cardiopulmonary disease. No significant interval change. Disposition:  Diagnosis:   1. Flank strain, initial encounter        Disposition: Discharge    Follow-up Information     Follow up With Specialties Details Why Contact Info    Adithya Tobin MD Internal Medicine Call in 3 days for office follow up     [id number] [facility] EMERGENCY DEPT Emergency Medicine  As needed, If symptoms worsen [id number] [facility]  [phone]           Discharge Medication List as of 11/25/2019  8:04 PM      START taking these medications    Details   lidocaine (LIDODERM) 5 % Apply patch to the affected area for 12 hours a day and remove for 12 hours a day., Print, Disp-15 Each, R-0         CONTINUE these medications which have NOT CHANGED    Details   moxifloxacin (VIGAMOX) 0.5 % ophthalmic solution Administer 1 Drop to right eye three (3) times daily. , Print, Disp-1 Bottle, R-0      topiramate (TOPAMAX) 25 mg tablet Take 3 Tabs by mouth two (2) times a day., Normal, Disp-180 Tab, R-6      insulin glulisine U-100 (APIDRA SOLOSTAR U-100 INSULIN) 100 unit/mL pen 10 Units by SubCUTAneous route Before breakfast, lunch, and dinner., Historical Med      insulin glargine (BASAGLAR KWIKPEN U-100 INSULIN) 100 unit/mL (3 mL) inpn 90 Units by SubCUTAneous route two (2) times a day., Historical Med      gabapentin (NEURONTIN) 300 mg capsule Take 300 mg by mouth. One cap QAM, two caps QPM, Historical Med      liothyronine (CYTOMEL) 25 mcg tablet Take 75 mcg by mouth daily. , Historical Med      meloxicam (MOBIC) 15 mg tablet Take 15 mg by mouth daily as needed for Pain., Historical Med      traZODone (DESYREL) 50 mg tablet Take 50 mg by mouth nightly as needed for Sleep., Historical Med      metFORMIN (GLUCOPHAGE) 1,000 mg tablet Take 1,000 mg by mouth two (2) times daily (with meals). , Historical Med      metoprolol tartrate (LOPRESSOR) 25 mg tablet Take 25 mg by mouth daily. , Historical Med      fenofibrate nanocrystallized (TRICOR) 48 mg tablet Take 48 mg by mouth., Historical Med      atorvastatin (LIPITOR) 40 mg tablet Take  by mouth daily. , Historical Med      furosemide (LASIX) 20 mg tablet Take  by mouth daily. , Historical Med      lisinopril (PRINIVIL, ZESTRIL) 5 mg tablet Take 40 mg by mouth daily. , Historical Med

## 2019-12-18 ENCOUNTER — HOSPITAL ENCOUNTER (OUTPATIENT)
Dept: ULTRASOUND IMAGING | Age: 34
Discharge: HOME | End: 2019-12-18
Attending: INTERNAL MEDICINE
Payer: MEDICAID

## 2019-12-18 ENCOUNTER — HOSPITAL ENCOUNTER (OUTPATIENT)
Dept: LAB | Age: 34
Discharge: HOME | End: 2019-12-18

## 2019-12-18 DIAGNOSIS — R80.1 PERSISTENT PROTEINURIA: ICD-10-CM

## 2019-12-18 LAB — XX-LABCORP SPECIMEN COL,LCBCF: NORMAL

## 2019-12-18 PROCEDURE — 99001 SPECIMEN HANDLING PT-LAB: CPT

## 2019-12-18 PROCEDURE — 76770 US EXAM ABDO BACK WALL COMP: CPT

## 2020-03-03 ENCOUNTER — HOSPITAL ENCOUNTER (EMERGENCY)
Age: 35
Discharge: HOME | End: 2020-03-03
Attending: EMERGENCY MEDICINE
Payer: MEDICAID

## 2020-03-03 VITALS
BODY MASS INDEX: 39.25 KG/M2 | SYSTOLIC BLOOD PRESSURE: 153 MMHG | RESPIRATION RATE: 14 BRPM | HEIGHT: 69 IN | HEART RATE: 89 BPM | WEIGHT: 265 LBS | OXYGEN SATURATION: 98 % | DIASTOLIC BLOOD PRESSURE: 94 MMHG | TEMPERATURE: 98.3 F

## 2020-03-03 DIAGNOSIS — R03.0 ELEVATED BLOOD PRESSURE READING: ICD-10-CM

## 2020-03-03 DIAGNOSIS — R51.9 ACUTE NONINTRACTABLE HEADACHE, UNSPECIFIED HEADACHE TYPE: Primary | ICD-10-CM

## 2020-03-03 LAB
ANION GAP SERPL CALC-SCNC: 6 MMOL/L (ref 3–18)
BASOPHILS # BLD: 0 K/UL (ref 0–0.1)
BASOPHILS NFR BLD: 0 % (ref 0–2)
BUN SERPL-MCNC: 12 MG/DL (ref 7–18)
BUN/CREAT SERPL: 17 (ref 12–20)
CALCIUM SERPL-MCNC: 8.3 MG/DL (ref 8.5–10.1)
CHLORIDE SERPL-SCNC: 106 MMOL/L (ref 100–111)
CO2 SERPL-SCNC: 26 MMOL/L (ref 21–32)
CREAT SERPL-MCNC: 0.7 MG/DL (ref 0.6–1.3)
DIFFERENTIAL METHOD BLD: NORMAL
EOSINOPHIL # BLD: 0.1 K/UL (ref 0–0.4)
EOSINOPHIL NFR BLD: 1 % (ref 0–5)
ERYTHROCYTE [DISTWIDTH] IN BLOOD BY AUTOMATED COUNT: 13.1 % (ref 11.6–14.5)
GLUCOSE SERPL-MCNC: 197 MG/DL (ref 74–99)
HCT VFR BLD AUTO: 37.7 % (ref 35–45)
HGB BLD-MCNC: 12.6 G/DL (ref 12–16)
LYMPHOCYTES # BLD: 3.1 K/UL (ref 0.9–3.6)
LYMPHOCYTES NFR BLD: 39 % (ref 21–52)
MCH RBC QN AUTO: 27.2 PG (ref 24–34)
MCHC RBC AUTO-ENTMCNC: 33.4 G/DL (ref 31–37)
MCV RBC AUTO: 81.3 FL (ref 74–97)
MONOCYTES # BLD: 0.6 K/UL (ref 0.05–1.2)
MONOCYTES NFR BLD: 7 % (ref 3–10)
NEUTS SEG # BLD: 4.2 K/UL (ref 1.8–8)
NEUTS SEG NFR BLD: 53 % (ref 40–73)
PLATELET # BLD AUTO: 242 K/UL (ref 135–420)
PMV BLD AUTO: 9.9 FL (ref 9.2–11.8)
POTASSIUM SERPL-SCNC: 3.7 MMOL/L (ref 3.5–5.5)
RBC # BLD AUTO: 4.64 M/UL (ref 4.2–5.3)
SODIUM SERPL-SCNC: 138 MMOL/L (ref 136–145)
WBC # BLD AUTO: 7.9 K/UL (ref 4.6–13.2)

## 2020-03-03 PROCEDURE — 85025 COMPLETE CBC W/AUTO DIFF WBC: CPT

## 2020-03-03 PROCEDURE — 99282 EMERGENCY DEPT VISIT SF MDM: CPT

## 2020-03-03 PROCEDURE — 74011250636 HC RX REV CODE- 250/636: Performed by: NURSE PRACTITIONER

## 2020-03-03 PROCEDURE — 96372 THER/PROPH/DIAG INJ SC/IM: CPT

## 2020-03-03 PROCEDURE — 80048 BASIC METABOLIC PNL TOTAL CA: CPT

## 2020-03-03 RX ORDER — BUTALBITAL, ACETAMINOPHEN AND CAFFEINE 300; 40; 50 MG/1; MG/1; MG/1
1 CAPSULE ORAL
Qty: 10 CAP | Refills: 0 | Status: SHIPPED | OUTPATIENT
Start: 2020-03-03 | End: 2020-03-13

## 2020-03-03 RX ORDER — KETOROLAC TROMETHAMINE 15 MG/ML
15 INJECTION, SOLUTION INTRAMUSCULAR; INTRAVENOUS
Status: COMPLETED | OUTPATIENT
Start: 2020-03-03 | End: 2020-03-03

## 2020-03-03 RX ADMIN — KETOROLAC TROMETHAMINE 15 MG: 15 INJECTION, SOLUTION INTRAMUSCULAR; INTRAVENOUS at 07:13

## 2020-03-03 RX ADMIN — SODIUM CHLORIDE 500 ML: 900 INJECTION, SOLUTION INTRAVENOUS at 07:14

## 2020-03-03 NOTE — LETTER
NOTIFICATION RETURN TO WORK / SCHOOL 
 
3/3/2020 8:46 AM 
 
Ms. Zeny Ceballos [id number] To Whom It May Concern: 
 
Gloria Parks is currently under the care of [facility] EM[facility]. She will return to work on Thursday Feb 05, 2020 If there are questions or concerns please have the patient contact our office. Sincerely, Mary Willson NP

## 2020-03-03 NOTE — DISCHARGE INSTRUCTIONS

## 2020-03-03 NOTE — ED PROVIDER NOTES
EMERGENCY DEPARTMENT HISTORY AND PHYSICAL EXAM    6:55 AM    Date: 3/3/2020  Patient Name: Renea Welch    History of Presenting Illness     Chief Complaint   Patient presents with    Headache     History Provided By: Patient  Chief complaint: intermittent headache. Duration: 4 days   Timing: on 2 hours off 2-3 hours  Location: frontal headache   Quality: aching / throbbing   Severity : 10/10  Modifying factors : not improved with tylenol   Associated Symptoms: nausea /vomiting X 1 on Saturday, no recurrence. Chills, no fevers. Photophobia, no aura. No changes in vision. Additional History (Context): Renea Welch is a 27 y.o. female with past medical history significant for seizure disorder, diabetes, hypertension, chronic kidney disease stag 2, and morbid obesity who presented to the ED as noted above. This is not worst headache of her life. PCP: Deniz Lindsey MD    Current Facility-Administered Medications   Medication Dose Route Frequency Provider Last Rate Last Dose    sodium chloride 0.9 % bolus infusion 500 mL  500 mL IntraVENous ONCE Atul Holm N,  mL/hr at 03/03/20 0714 500 mL at 03/03/20 1561     Current Outpatient Medications   Medication Sig Dispense Refill    lidocaine (LIDODERM) 5 % Apply patch to the affected area for 12 hours a day and remove for 12 hours a day. 15 Each 0    moxifloxacin (VIGAMOX) 0.5 % ophthalmic solution Administer 1 Drop to right eye three (3) times daily. 1 Bottle 0    topiramate (TOPAMAX) 25 mg tablet Take 3 Tabs by mouth two (2) times a day. 180 Tab 6    insulin glulisine U-100 (APIDRA SOLOSTAR U-100 INSULIN) 100 unit/mL pen 10 Units by SubCUTAneous route Before breakfast, lunch, and dinner.  insulin glargine (BASAGLAR KWIKPEN U-100 INSULIN) 100 unit/mL (3 mL) inpn 90 Units by SubCUTAneous route two (2) times a day.  gabapentin (NEURONTIN) 300 mg capsule Take 300 mg by mouth.  One cap QAM, two caps QPM      liothyronine (CYTOMEL) 25 mcg tablet Take 75 mcg by mouth daily.  meloxicam (MOBIC) 15 mg tablet Take 15 mg by mouth daily as needed for Pain.  traZODone (DESYREL) 50 mg tablet Take 50 mg by mouth nightly as needed for Sleep.  metFORMIN (GLUCOPHAGE) 1,000 mg tablet Take 1,000 mg by mouth two (2) times daily (with meals).  metoprolol tartrate (LOPRESSOR) 25 mg tablet Take 25 mg by mouth daily.  fenofibrate nanocrystallized (TRICOR) 48 mg tablet Take 48 mg by mouth.  atorvastatin (LIPITOR) 40 mg tablet Take  by mouth daily.  furosemide (LASIX) 20 mg tablet Take  by mouth daily.  lisinopril (PRINIVIL, ZESTRIL) 5 mg tablet Take 40 mg by mouth daily. Past History     Past Medical History:  Past Medical History:   Diagnosis Date    Arthritis     Chest pain     Diabetes ([address]     Essential hypertension     Headache(784.0)     Hyperchloremia     Hypertension     Seizures (HCC)     SOB (shortness of breath)     Thyroid cancer (HCC)        Past Surgical History:  Past Surgical History:   Procedure Laterality Date    HX PARTIAL HYSTERECTOMY      HX THYROIDECTOMY      HX TUBAL LIGATION         Family History:  Family History   Problem Relation Age of Onset    Hypertension Mother        Social History:  Social History     Tobacco Use    Smoking status: Former Smoker    Smokeless tobacco: Never Used   Substance Use Topics    Alcohol use: No    Drug use: No       Allergies:  No Known Allergies      Review of Systems       Review of Systems   Constitutional: Positive for chills. Negative for diaphoresis, fatigue and fever. HENT: Negative for congestion, ear pain, facial swelling, postnasal drip, rhinorrhea, sinus pain and sore throat. Eyes: Positive for photophobia. Negative for pain and visual disturbance. Respiratory: Negative for cough, chest tightness, shortness of breath and wheezing. Cardiovascular: Negative for chest pain, palpitations and leg swelling.    Gastrointestinal: Positive for nausea and vomiting. Negative for abdominal pain, constipation and diarrhea. Genitourinary: Negative for dysuria, flank pain, hematuria, pelvic pain, vaginal bleeding and vaginal discharge. Musculoskeletal: Negative for back pain, joint swelling, neck pain and neck stiffness. Neurological: Positive for headaches. Negative for dizziness, seizures, syncope, facial asymmetry, speech difficulty, weakness, light-headedness and numbness. Physical Exam     Visit Vitals  BP (!) 153/94 (BP 1 Location: Left arm, BP Patient Position: Sitting)   Pulse 89   Temp 98.3 °F (36.8 °C)   Resp 14   Ht 5' 9\" (1.753 m)   Wt 120.2 kg (265 lb)   SpO2 98%   BMI 39.13 kg/m²         Physical Exam  Vitals signs and nursing note reviewed. Constitutional:       General: She is not in acute distress. Appearance: Normal appearance. She is obese. She is not ill-appearing, toxic-appearing or diaphoretic. HENT:      Head: Normocephalic and atraumatic. Right Ear: Tympanic membrane normal.      Left Ear: Tympanic membrane normal.      Nose: Rhinorrhea present. Mouth/Throat:      Mouth: Mucous membranes are moist.      Pharynx: Oropharynx is clear. No oropharyngeal exudate or posterior oropharyngeal erythema. Eyes:      Extraocular Movements: Extraocular movements intact. Conjunctiva/sclera: Conjunctivae normal.      Pupils: Pupils are equal, round, and reactive to light. Neck:      Musculoskeletal: Normal range of motion and neck supple. No neck rigidity or muscular tenderness. Cardiovascular:      Rate and Rhythm: Normal rate and regular rhythm. Pulses: Normal pulses. Heart sounds: Normal heart sounds. No murmur. Pulmonary:      Effort: Pulmonary effort is normal. No respiratory distress. Breath sounds: Normal breath sounds. No wheezing. Abdominal:      General: Bowel sounds are normal. There is no distension. Palpations: Abdomen is soft. Tenderness:  There is no abdominal tenderness. There is no right CVA tenderness or left CVA tenderness. Musculoskeletal: Normal range of motion. Skin:     General: Skin is warm and dry. Neurological:      General: No focal deficit present. Mental Status: She is alert and oriented to person, place, and time. Mental status is at baseline. Sensory: No sensory deficit. Motor: No weakness. Coordination: Coordination normal.      Gait: Gait normal.   Psychiatric:         Behavior: Behavior normal.       Diagnostic Study Results     Labs -  No results found for this or any previous visit (from the past 12 hour(s)). Radiologic Studies -   No orders to display       Medical Decision Making   I am the first provider for this patient. I reviewed the vital signs, available nursing notes, past medical history, past surgical history, family history and social history. Vital Signs-Reviewed the patient's vital signs. Records Reviewed: Nursing Notes and Old Medical Records (Time of Review: 6:55 AM)    ED Course: Progress Notes, Reevaluation, and Consults:    Provider Notes (Medical Decision Making): This is a 27year old female with past medial history significant for seizure disorder, diabetes, hypertension, chronic kidney disease stag 2, and morbid obesity who presented to the ED with chief complaint of intermittent frontal aching / throbbing headache on going for 4 days. Headaches is not worst headache of her life. Lasts 2 hours, off 3-4 hours. With associated n/v X 1 occasion 3 days ago, no recurrence. Chills without fevers. Photophobia without aura or changes in vision. Vitals with elevated blood pressure but otherwise stable. Exam reassuring with No focal neuro deficits. 7: 13 AM. Will treat headache with migraine cocktail, IVF and reassess. Low threshold for imaging given normal vitals, normal exam, no focal neuro deficits, not worst headache of her life, and no hx trauma or injury.  No recent seizure activity. 7:32 AM: headache reassessed. sleeping comfortably. 8:41 AM: remains headache free. Stable for discharge home with migraine treatment. Strict return to ED instructions provided with patient verbalizing understanding. She is to otherwise follow up with PCP or neurology for headaches. Diagnosis     Clinical Impression:   1. Acute nonintractable headache, unspecified headache type    2. Elevated blood pressure reading        Disposition: home     6:55 AM  Reviewed results with patient. Discussed need for close outpatient follow-up this week for reassessment. Follow-up Information    None          Patient's Medications   Start Taking    No medications on file   Continue Taking    ATORVASTATIN (LIPITOR) 40 MG TABLET    Take  by mouth daily. FENOFIBRATE NANOCRYSTALLIZED (TRICOR) 48 MG TABLET    Take 48 mg by mouth. FUROSEMIDE (LASIX) 20 MG TABLET    Take  by mouth daily. GABAPENTIN (NEURONTIN) 300 MG CAPSULE    Take 300 mg by mouth. One cap QAM, two caps QPM    INSULIN GLARGINE (BASAGLAR KWIKPEN U-100 INSULIN) 100 UNIT/ML (3 ML) INPN    90 Units by SubCUTAneous route two (2) times a day. INSULIN GLULISINE U-100 (APIDRA SOLOSTAR U-100 INSULIN) 100 UNIT/ML PEN    10 Units by SubCUTAneous route Before breakfast, lunch, and dinner. LIDOCAINE (LIDODERM) 5 %    Apply patch to the affected area for 12 hours a day and remove for 12 hours a day. LIOTHYRONINE (CYTOMEL) 25 MCG TABLET    Take 75 mcg by mouth daily. LISINOPRIL (PRINIVIL, ZESTRIL) 5 MG TABLET    Take 40 mg by mouth daily. MELOXICAM (MOBIC) 15 MG TABLET    Take 15 mg by mouth daily as needed for Pain. METFORMIN (GLUCOPHAGE) 1,000 MG TABLET    Take 1,000 mg by mouth two (2) times daily (with meals). METOPROLOL TARTRATE (LOPRESSOR) 25 MG TABLET    Take 25 mg by mouth daily. MOXIFLOXACIN (VIGAMOX) 0.5 % OPHTHALMIC SOLUTION    Administer 1 Drop to right eye three (3) times daily.     TOPIRAMATE (TOPAMAX) 25 MG TABLET Take 3 Tabs by mouth two (2) times a day. TRAZODONE (DESYREL) 50 MG TABLET    Take 50 mg by mouth nightly as needed for Sleep. These Medications have changed    No medications on file   Stop Taking    No medications on file       Dictation disclaimer:  Please note that this dictation was completed with Glassful, the computer voice recognition software. Quite often unanticipated grammatical, syntax, homophones, and other interpretive errors are inadvertently transcribed by the computer software. Please disregard these errors. Please excuse any errors that have escaped final proofreading.

## 2020-03-11 ENCOUNTER — HOSPITAL ENCOUNTER (OUTPATIENT)
Dept: MAMMOGRAPHY | Age: 35
Discharge: HOME | End: 2020-03-11
Attending: FAMILY MEDICINE
Payer: MEDICAID

## 2020-03-11 DIAGNOSIS — N64.4 BREAST PAIN: ICD-10-CM

## 2020-03-11 DIAGNOSIS — Z12.31 ENCOUNTER FOR SCREENING MAMMOGRAM FOR BREAST CANCER: ICD-10-CM

## 2020-03-11 PROCEDURE — 77062 BREAST TOMOSYNTHESIS BI: CPT

## 2020-03-18 ENCOUNTER — HOSPITAL ENCOUNTER (OUTPATIENT)
Dept: NON INVASIVE DIAGNOSTICS | Age: 35
Discharge: HOME | End: 2020-03-18
Attending: FAMILY MEDICINE
Payer: MEDICAID

## 2020-03-18 VITALS
BODY MASS INDEX: 39.25 KG/M2 | SYSTOLIC BLOOD PRESSURE: 140 MMHG | DIASTOLIC BLOOD PRESSURE: 80 MMHG | HEIGHT: 69 IN | WEIGHT: 265 LBS

## 2020-03-18 VITALS
DIASTOLIC BLOOD PRESSURE: 94 MMHG | WEIGHT: 265 LBS | BODY MASS INDEX: 39.25 KG/M2 | HEIGHT: 69 IN | SYSTOLIC BLOOD PRESSURE: 153 MMHG

## 2020-03-18 DIAGNOSIS — M25.473 ANKLE SWELLING: ICD-10-CM

## 2020-03-18 DIAGNOSIS — R60.0 EDEMA, LOWER EXTREMITY: ICD-10-CM

## 2020-03-18 DIAGNOSIS — R07.9 CHEST PAIN: ICD-10-CM

## 2020-03-18 LAB
ECHO AO ROOT DIAM: 2.9 CM
ECHO LA AREA 4C: 15.6 CM2
ECHO LA VOL 2C: 28.7 ML (ref 22–52)
ECHO LA VOL 4C: 37.56 ML (ref 22–52)
ECHO LA VOL BP: 35.4 ML (ref 22–52)
ECHO LA VOL/BSA BIPLANE: 15.21 ML/M2 (ref 16–28)
ECHO LA VOLUME INDEX A2C: 12.33 ML/M2 (ref 16–28)
ECHO LA VOLUME INDEX A4C: 16.13 ML/M2 (ref 16–28)
ECHO LV E' LATERAL VELOCITY: 7.06 CM/S
ECHO LV E' SEPTAL VELOCITY: 7.8 CM/S
ECHO LV EDV TEICHHOLZ: 20.28 ML
ECHO LV ESV TEICHHOLZ: 4.24 ML
ECHO LV INTERNAL DIMENSION DIASTOLIC: 3.45 CM (ref 3.9–5.3)
ECHO LV INTERNAL DIMENSION SYSTOLIC: 1.84 CM
ECHO LV IVSD: 1.49 CM (ref 0.6–0.9)
ECHO LV MASS 2D: 208.3 G (ref 67–162)
ECHO LV MASS INDEX 2D: 89.5 G/M2 (ref 43–95)
ECHO LV POSTERIOR WALL DIASTOLIC: 1.4 CM (ref 0.6–0.9)
ECHO LVOT DIAM: 2.26 CM
ECHO LVOT PEAK GRADIENT: 3.7 MMHG
ECHO LVOT PEAK VELOCITY: 96.05 CM/S
ECHO LVOT VTI: 19.51 CM
ECHO MV A VELOCITY: 30.4 CM/S
ECHO MV E DECELERATION TIME (DT): 183.7 MS
ECHO MV E VELOCITY: 58.4 CM/S
ECHO MV E/A RATIO: 1.92
ECHO MV E/E' LATERAL: 8.27
ECHO MV E/E' RATIO (AVERAGED): 7.88
ECHO MV E/E' SEPTAL: 7.49
LVFS 2D: 46.67 %
LVOT MG: 2.29 MMHG
LVOT MV: 0.72 CM/S
LVSV (TEICH): 16.04 ML
MV DEC SLOPE: 3.18
STRESS BASELINE DIAS BP: 80 MMHG
STRESS BASELINE HR: 87 BPM
STRESS BASELINE SYS BP: 140 MMHG
STRESS ESTIMATED WORKLOAD: 1 METS
STRESS EXERCISE DUR MIN: NORMAL
STRESS PEAK DIAS BP: 62 MMHG
STRESS PEAK SYS BP: 150 MMHG
STRESS PERCENT HR ACHIEVED: 62 %
STRESS POST PEAK HR: 114 BPM
STRESS RATE PRESSURE PRODUCT: NORMAL BPM*MMHG
STRESS ST DEPRESSION: 0 MM
STRESS ST ELEVATION: 0 MM
STRESS TARGET HR: 185 BPM

## 2020-03-18 PROCEDURE — 93017 CV STRESS TEST TRACING ONLY: CPT

## 2020-03-18 PROCEDURE — 74011250636 HC RX REV CODE- 250/636: Performed by: FAMILY MEDICINE

## 2020-03-18 PROCEDURE — 93306 TTE W/DOPPLER COMPLETE: CPT

## 2020-03-18 RX ORDER — SODIUM CHLORIDE 9 MG/ML
250 INJECTION, SOLUTION INTRAVENOUS ONCE
Status: COMPLETED | OUTPATIENT
Start: 2020-03-18 | End: 2020-03-18

## 2020-03-18 RX ADMIN — REGADENOSON 0.4 MG: 0.08 INJECTION, SOLUTION INTRAVENOUS at 09:50

## 2020-03-18 RX ADMIN — SODIUM CHLORIDE 250 ML/HR: 900 INJECTION, SOLUTION INTRAVENOUS at 09:50

## 2020-03-18 NOTE — PROGRESS NOTES
Patient was injected with 22.8 millicuries 99WDY Sestamibi on 3/18/20 at 0815. Patient was injected with 54.3 millicuries 07XTO Sestamibi on 3/18/20 at 0950. Patient's armbands were removed and placed in shred-it box.     Patient had a Nuclear Lexiscan Stress Test.

## 2020-03-24 ENCOUNTER — HOSPITAL ENCOUNTER (EMERGENCY)
Age: 35
Discharge: HOME | End: 2020-03-24
Attending: EMERGENCY MEDICINE
Payer: MEDICAID

## 2020-03-24 VITALS
DIASTOLIC BLOOD PRESSURE: 78 MMHG | HEART RATE: 97 BPM | SYSTOLIC BLOOD PRESSURE: 150 MMHG | TEMPERATURE: 98.7 F | RESPIRATION RATE: 15 BRPM | OXYGEN SATURATION: 99 %

## 2020-03-24 DIAGNOSIS — R51.9 ACUTE NONINTRACTABLE HEADACHE, UNSPECIFIED HEADACHE TYPE: ICD-10-CM

## 2020-03-24 DIAGNOSIS — R56.9 SEIZURE (HCC): Primary | ICD-10-CM

## 2020-03-24 LAB
ANION GAP SERPL CALC-SCNC: 8 MMOL/L (ref 3–18)
APTT PPP: 25.3 SEC (ref 23–36.4)
BASOPHILS # BLD: 0 K/UL (ref 0–0.1)
BASOPHILS NFR BLD: 0 % (ref 0–2)
BUN SERPL-MCNC: 15 MG/DL (ref 7–18)
BUN/CREAT SERPL: 17 (ref 12–20)
CALCIUM SERPL-MCNC: 8.6 MG/DL (ref 8.5–10.1)
CHLORIDE SERPL-SCNC: 105 MMOL/L (ref 100–111)
CK MB CFR SERPL CALC: 1.8 % (ref 0–4)
CK MB SERPL-MCNC: 11 NG/ML (ref 5–25)
CK SERPL-CCNC: 600 U/L (ref 26–192)
CO2 SERPL-SCNC: 23 MMOL/L (ref 21–32)
CREAT SERPL-MCNC: 0.86 MG/DL (ref 0.6–1.3)
DIFFERENTIAL METHOD BLD: NORMAL
EOSINOPHIL # BLD: 0.1 K/UL (ref 0–0.4)
EOSINOPHIL NFR BLD: 1 % (ref 0–5)
ERYTHROCYTE [DISTWIDTH] IN BLOOD BY AUTOMATED COUNT: 13.5 % (ref 11.6–14.5)
GLUCOSE SERPL-MCNC: 227 MG/DL (ref 74–99)
HCT VFR BLD AUTO: 38.5 % (ref 35–45)
HGB BLD-MCNC: 13 G/DL (ref 12–16)
LYMPHOCYTES # BLD: 3.4 K/UL (ref 0.9–3.6)
LYMPHOCYTES NFR BLD: 44 % (ref 21–52)
MCH RBC QN AUTO: 27.4 PG (ref 24–34)
MCHC RBC AUTO-ENTMCNC: 33.8 G/DL (ref 31–37)
MCV RBC AUTO: 81.2 FL (ref 74–97)
MONOCYTES # BLD: 0.5 K/UL (ref 0.05–1.2)
MONOCYTES NFR BLD: 7 % (ref 3–10)
NEUTS SEG # BLD: 3.8 K/UL (ref 1.8–8)
NEUTS SEG NFR BLD: 48 % (ref 40–73)
PLATELET # BLD AUTO: 358 K/UL (ref 135–420)
PMV BLD AUTO: 10.3 FL (ref 9.2–11.8)
POTASSIUM SERPL-SCNC: 3.4 MMOL/L (ref 3.5–5.5)
RBC # BLD AUTO: 4.74 M/UL (ref 4.2–5.3)
SODIUM SERPL-SCNC: 136 MMOL/L (ref 136–145)
TROPONIN I SERPL-MCNC: <0.02 NG/ML (ref 0–0.04)
WBC # BLD AUTO: 7.8 K/UL (ref 4.6–13.2)

## 2020-03-24 PROCEDURE — 85730 THROMBOPLASTIN TIME PARTIAL: CPT

## 2020-03-24 PROCEDURE — 99284 EMERGENCY DEPT VISIT MOD MDM: CPT

## 2020-03-24 PROCEDURE — 82550 ASSAY OF CK (CPK): CPT

## 2020-03-24 PROCEDURE — 80048 BASIC METABOLIC PNL TOTAL CA: CPT

## 2020-03-24 PROCEDURE — 85025 COMPLETE CBC W/AUTO DIFF WBC: CPT

## 2020-03-24 NOTE — DISCHARGE INSTRUCTIONS
SPECIFIC PATIENT INSTRUCTIONS FROM THE PHYSICIAN WHO TREATED YOU IN THE ER TODAY:  1. Return if worse. 2. You must continue to take your seizure medication(s) regularly to minimize breakthrough seizures. 3. Follow up with your doctor or neurologist within the next 2-3 days. Patient Education        Seizure: Care Instructions  Your Care Instructions    Seizures are caused by abnormal patterns of electrical signals in the brain. They are different for each person. Seizures can affect movement, speech, vision, or awareness. Some people have only slight shaking of a hand and do not pass out. Other people may pass out and have violent shaking of the whole body. Some people appear to stare into space. They are awake, but they can't respond normally. Later, they may not remember what happened. You may need tests to identify the type and cause of the seizures. A seizure may occur only once, or you may have them more than one time. Taking medicines as directed and following up with your doctor may help keep you from having more seizures. The doctor has checked you carefully, but problems can develop later. If you notice any problems or new symptoms, get medical treatment right away. Follow-up care is a key part of your treatment and safety. Be sure to make and go to all appointments, and call your doctor if you are having problems. It's also a good idea to know your test results and keep a list of the medicines you take. How can you care for yourself at home? · Be safe with medicines. Take your medicines exactly as prescribed. Call your doctor if you think you are having a problem with your medicine. · Do not do any activity that could be dangerous to you or others until your doctor says it is safe to do so. For example, do not drive a car, operate machinery, swim, or climb ladders.   · Be sure that anyone treating you for any health problem knows that you have had a seizure and what medicines you are taking for it. · Identify and avoid things that may make you more likely to have a seizure. These may include lack of sleep, alcohol or drug use, stress, or not eating. · Make sure you go to your follow-up appointment. When should you call for help? Call 911 anytime you think you may need emergency care. For example, call if:    · You have another seizure.     · You have more than one seizure in 24 hours.     · You have new symptoms, such as trouble walking, speaking, or thinking clearly.    Call your doctor now or seek immediate medical care if:    · You are not acting normally.    Watch closely for changes in your health, and be sure to contact your doctor if you have any problems. Where can you learn more? Go to http://josh-autumn.info/  Enter M769 in the search box to learn more about \"Seizure: Care Instructions. \"  Current as of: November 19, 2019Content Version: 12.4  © 3458-6758 [employer]. Care instructions adapted under license by [employer] (which disclaims liability or warranty for this information). If you have questions about a medical condition or this instruction, always ask your healthcare professional. [place] [address] any warranty or liability for your use of this information. Patient Education        Headache: Care Instructions  Your Care Instructions    Headaches have many possible causes. Most headaches aren't a sign of a more serious problem, and they will get better on their own. Home treatment may help you feel better faster. The doctor has checked you carefully, but problems can develop later. If you notice any problems or new symptoms, get medical treatment right away. Follow-up care is a key part of your treatment and safety. Be sure to make and go to all appointments, and call your doctor if you are having problems. It's also a good idea to know your test results and keep a list of the medicines you take.   How can you care for yourself at home? · Do not drive if you have taken a prescription pain medicine. · Rest in a quiet, dark room until your headache is gone. Close your eyes and try to relax or go to sleep. Don't watch TV or read. · Put a cold, moist cloth or cold pack on the painful area for 10 to 20 minutes at a time. Put a thin cloth between the cold pack and your skin. · Use a warm, moist towel or a heating pad set on low to relax tight shoulder and neck muscles. · Have someone gently massage your neck and shoulders. · Take pain medicines exactly as directed. ? If the doctor gave you a prescription medicine for pain, take it as prescribed. ? If you are not taking a prescription pain medicine, ask your doctor if you can take an over-the-counter medicine. · Be careful not to take pain medicine more often than the instructions allow, because you may get worse or more frequent headaches when the medicine wears off. · Do not ignore new symptoms that occur with a headache, such as a fever, weakness or numbness, vision changes, or confusion. These may be signs of a more serious problem. To prevent headaches  · Keep a headache diary so you can figure out what triggers your headaches. Avoiding triggers may help you prevent headaches. Record when each headache began, how long it lasted, and what the pain was like (throbbing, aching, stabbing, or dull). Write down any other symptoms you had with the headache, such as nausea, flashing lights or dark spots, or sensitivity to bright light or loud noise. Note if the headache occurred near your period. List anything that might have triggered the headache, such as certain foods (chocolate, cheese, wine) or odors, smoke, bright light, stress, or lack of sleep. · Find healthy ways to deal with stress. Headaches are most common during or right after stressful times.  Take time to relax before and after you do something that has caused a headache in the past.  · Try to keep your muscles relaxed by keeping good posture. Check your jaw, face, neck, and shoulder muscles for tension, and try relaxing them. When sitting at a desk, change positions often, and stretch for 30 seconds each hour. · Get plenty of sleep and exercise. · Eat regularly and well. Long periods without food can trigger a headache. · Treat yourself to a massage. Some people find that regular massages are very helpful in relieving tension. · Limit caffeine by not drinking too much coffee, tea, or soda. But don't quit caffeine suddenly, because that can also give you headaches. · Reduce eyestrain from computers by blinking frequently and looking away from the computer screen every so often. Make sure you have proper eyewear and that your monitor is set up properly, about an arm's length away. · Seek help if you have depression or anxiety. Your headaches may be linked to these conditions. Treatment can both prevent headaches and help with symptoms of anxiety or depression. When should you call for help? Call 911 anytime you think you may need emergency care. For example, call if:    · You have signs of a stroke. These may include:  ? Sudden numbness, paralysis, or weakness in your face, arm, or leg, especially on only one side of your body. ? Sudden vision changes. ? Sudden trouble speaking. ? Sudden confusion or trouble understanding simple statements. ? Sudden problems with walking or balance. ? A sudden, severe headache that is different from past headaches.    Call your doctor now or seek immediate medical care if:    · You have a new or worse headache.     · Your headache gets much worse. Where can you learn more? Go to http://josh-autumn.info/  Enter M271 in the search box to learn more about \"Headache: Care Instructions. \"  Current as of: November 19, 2019Content Version: 12.4  © 9440-2473 [employer].   Care instructions adapted under license by [employer] (which disclaims liability or warranty for this information). If you have questions about a medical condition or this instruction, always ask your healthcare professional. [place] [address] any warranty or liability for your use of this information. travelmob Activation    Thank you for requesting access to [employer]mob. Please follow the instructions below to securely access and download your online medical record. travelmob allows you to send messages to your doctor, view your test results, renew your prescriptions, schedule appointments, and more. How Do I Sign Up? In your internet browser, go to [email or web address]. Click on the First Time User? Click Here link in the Sign In box. You will see the New Member Sign Up page. Enter your travelmob Access Code exactly as it appears below. You will not need to use this code after you´ve completed the sign-up process. If you do not sign up before the expiration date, you must request a new code. travelmob Access Code: [email or web address]  Expires: 3/28/2019  2:27 PM (This is the date your travelmob access code will )    Enter the last four digits of your Social Security Number (xxxx) and Date of Birth (mm/dd/yyyy) as indicated and click Submit. You will be taken to the next sign-up page. Create a travelmob ID. This will be your travelmob login ID and cannot be changed, so think of one that is secure and easy to remember. Create a travelmob password. You can change your password at any time. Enter your Password Reset Question and Answer. This can be used at a later time if you forget your password. Enter your e-mail address. You will receive e-mail notification when new information is available in [address] Click Sign Up. You can now view and download portions of your medical record. Click the [employer] link to download a portable copy of your medical information.     Additional Information    If you have questions, please visit the Frequently Asked Questions section of the [facility] website at [email or web address] Remember, [facility] is NOT to be used for urgent needs. For medical emergencies, dial 911.

## 2020-03-24 NOTE — ED PROVIDER NOTES
[facility]  [facility] EMERGENCY DEPT      3:15 PM    Date: 3/24/2020  Patient Name: Lexi Cabral    History of Presenting Illness     Chief Complaint   Patient presents with    Seizure    Headache       27 y.o. female with noted past medical history who presents to the emergency department who presents to the emergency room status post seizure. The patient was working at [employer] when she had a witnessed generalized tonic-clonic seizure. There was no fall the ground and no hitting of her head and no other trauma noted. Subsequent to that fire rescue arrived on scene transfer the patient to the ER for evaluation treatment. Upon initial evaluation the ER upon initial arrival, the patient is awake alert answer headache which she states she often has after having a seizure. No other complaints. Patient states she is on Topamax and has been compliant with it. Patient denies any other associated signs or symptoms. Patient denies any other complaints. Nursing notes regarding the HPI and triage nursing notes were reviewed. Prior medical records were reviewed. Current Outpatient Medications   Medication Sig Dispense Refill    lidocaine (LIDODERM) 5 % Apply patch to the affected area for 12 hours a day and remove for 12 hours a day. 15 Each 0    moxifloxacin (VIGAMOX) 0.5 % ophthalmic solution Administer 1 Drop to right eye three (3) times daily. 1 Bottle 0    topiramate (TOPAMAX) 25 mg tablet Take 3 Tabs by mouth two (2) times a day. 180 Tab 6    insulin glulisine U-100 (APIDRA SOLOSTAR U-100 INSULIN) 100 unit/mL pen 10 Units by SubCUTAneous route Before breakfast, lunch, and dinner.  insulin glargine (BASAGLAR KWIKPEN U-100 INSULIN) 100 unit/mL (3 mL) inpn 90 Units by SubCUTAneous route two (2) times a day.  gabapentin (NEURONTIN) 300 mg capsule Take 300 mg by mouth. One cap QAM, two caps QPM      liothyronine (CYTOMEL) 25 mcg tablet Take 75 mcg by mouth daily.       meloxicam (MOBIC) 15 mg tablet Take 15 mg by mouth daily as needed for Pain.  traZODone (DESYREL) 50 mg tablet Take 50 mg by mouth nightly as needed for Sleep.  metFORMIN (GLUCOPHAGE) 1,000 mg tablet Take 1,000 mg by mouth two (2) times daily (with meals).  metoprolol tartrate (LOPRESSOR) 25 mg tablet Take 25 mg by mouth daily.  fenofibrate nanocrystallized (TRICOR) 48 mg tablet Take 48 mg by mouth.  atorvastatin (LIPITOR) 40 mg tablet Take  by mouth daily.  furosemide (LASIX) 20 mg tablet Take  by mouth daily.  lisinopril (PRINIVIL, ZESTRIL) 5 mg tablet Take 40 mg by mouth daily. Past History     Past Medical History:  Past Medical History:   Diagnosis Date    Arthritis     Chest pain     Diabetes ([address]     Essential hypertension     Headache(784.0)     Hyperchloremia     Hypertension     Seizures (HCC)     SOB (shortness of breath)     Thyroid [facility]        Past Surgical History:  Past Surgical History:   Procedure Laterality Date    HX PARTIAL HYSTERECTOMY      HX THYROIDECTOMY      HX TUBAL LIGATION         Family History:  Family History   Problem Relation Age of Onset    Hypertension Mother        Social History:  Social History     Tobacco Use    Smoking status: Former Smoker    Smokeless tobacco: Never Used   Substance Use Topics    Alcohol use: No    Drug use: No       Allergies:  No Known Allergies    Patient's primary care provider (as noted in EPIC):  Derrel Lesches, MD    Review of Systems   Constitutional: Negative for diaphoresis. HENT: Negative for congestion. Eyes: Negative for discharge. Respiratory: Negative for stridor. Cardiovascular: Negative for palpitations. Gastrointestinal: Negative for diarrhea. Endocrine: Negative for heat intolerance. Genitourinary: Negative for flank pain. Musculoskeletal: Negative for back pain. Neurological: Positive for seizures.  Negative for dizziness, tremors, syncope, facial asymmetry, speech difficulty, weakness, light-headedness and numbness. Psychiatric/Behavioral: Negative for hallucinations. All other systems reviewed and are negative. Visit Vitals  /78   Pulse 97   Temp 98.7 °F (37.1 °C)   Resp 15   SpO2 99%       PHYSICAL EXAM:    CONSTITUTIONAL:  Alert, in no apparent distress;  well developed;  well nourished. HEAD:  Normocephalic, atraumatic. EYES:  EOMI. Non-icteric sclera. Normal conjunctiva. ENTM:  Nose:  no rhinorrhea. Throat:  no erythema or exudate, mucous membranes moist.  NECK:  No JVD. Supple  RESPIRATORY:  Chest clear, equal breath sounds, good air movement. CARDIOVASCULAR:  Regular rate and rhythm. No murmurs, rubs, or gallops. GI:  Normal bowel sounds, abdomen soft and non-tender. No rebound or guarding. BACK:  Non-tender. UPPER EXT:  Normal inspection. LOWER EXT:  No edema, no calf tenderness. Distal pulses intact. NEURO:  Moves all four extremities. Normal motor exam and sensation in all four extremities. Normal CN II-XII exam.  Normal bilateral finger-to-nose exam.     SKIN:  No rashes;  Normal for age. PSYCH:  Alert and normal affect. DIFFERENTIAL DIAGNOSES/ MEDICAL DECISION MAKING:   Breakthrough seizures in epileptic, electrolyte abnormality/ies, medical noncomplaince, head trauma, intracranial hemorrhage, seizures in epileptic from alcohol use, infection, and/or decreased sleep lowering threshold, sepsis, other etiologies versus combination of the above.        ED COURSE:      Diagnostic Study Results     Abnormal lab results from this emergency department encounter:  Labs Reviewed   METABOLIC PANEL, BASIC - Abnormal; Notable for the following components:       Result Value    Potassium 3.4 (*)     Glucose 227 (*)     All other components within normal limits   CARDIAC PANEL,(CK, CKMB & TROPONIN) - Abnormal; Notable for the following components:     (*)     CK - MB 11.0 (*)     All other components within normal limits   CBC WITH AUTOMATED DIFF   PTT       Lab values for this patient within approximately the last 12 hours:  Recent Results (from the past 12 hour(s))   CBC WITH AUTOMATED DIFF    Collection Time: 03/24/20  1:30 PM   Result Value Ref Range    WBC 7.8 4.6 - 13.2 K/uL    RBC 4.74 4.20 - 5.30 M/uL    HGB 13.0 12.0 - 16.0 g/dL    HCT 38.5 35.0 - 45.0 %    MCV 81.2 74.0 - 97.0 FL    MCH 27.4 24.0 - 34.0 PG    MCHC 33.8 31.0 - 37.0 g/dL    RDW 13.5 11.6 - 14.5 %    PLATELET 260 736 - 331 K/uL    MPV 10.3 9.2 - 11.8 FL    NEUTROPHILS 48 40 - 73 %    LYMPHOCYTES 44 21 - 52 %    MONOCYTES 7 3 - 10 %    EOSINOPHILS 1 0 - 5 %    BASOPHILS 0 0 - 2 %    ABS. NEUTROPHILS 3.8 1.8 - 8.0 K/UL    ABS. LYMPHOCYTES 3.4 0.9 - 3.6 K/UL    ABS. MONOCYTES 0.5 0.05 - 1.2 K/UL    ABS. EOSINOPHILS 0.1 0.0 - 0.4 K/UL    ABS. BASOPHILS 0.0 0.0 - 0.1 K/UL    DF AUTOMATED     METABOLIC PANEL, BASIC    Collection Time: 03/24/20  1:30 PM   Result Value Ref Range    Sodium 136 136 - 145 mmol/L    Potassium 3.4 (L) 3.5 - 5.5 mmol/L    Chloride 105 100 - 111 mmol/L    CO2 23 21 - 32 mmol/L    Anion gap 8 3.0 - 18 mmol/L    Glucose 227 (H) 74 - 99 mg/dL    BUN 15 7.0 - 18 MG/DL    Creatinine 0.86 0.6 - 1.3 MG/DL    BUN/Creatinine ratio 17 12 - 20      GFR est AA >60 >60 ml/min/1.73m2    GFR est non-AA >60 >60 ml/min/1.73m2    Calcium 8.6 8.5 - 10.1 MG/DL   PTT    Collection Time: 03/24/20  1:30 PM   Result Value Ref Range    aPTT 25.3 23.0 - 36.4 SEC   CARDIAC PANEL,(CK, CKMB & TROPONIN)    Collection Time: 03/24/20  1:30 PM   Result Value Ref Range     (H) 26 - 192 U/L    CK - MB 11.0 (H) <3.6 ng/ml    CK-MB Index 1.8 0.0 - 4.0 %    Troponin-I, QT <0.02 0.0 - 0.045 NG/ML       Radiologist and cardiologist interpretations if available at time of this note:  No results found.     Medication(s) ordered for patient during this emergency visit encounter:  Medications - No data to display    Medical Decision Making     I am the first provider for this patient. I reviewed the vital signs, available nursing notes, past medical history, past surgical history, family history and social history. Vital Signs:  Reviewed the patient's vital signs. Based on the patient's history of presenting illness, physical examination, laboratory, radiographic, and/or tests results, and response to medical interventions, I believe the patient most likely is having breakthrough seizures in a known epileptic. Diagnoses:  1. 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 SPECIFIC PATIENT INSTRUCTIONS FROM THE PHYSICIAN WHO TREATED YOU IN THE ER TODAY:  1. Return if worse. 2. You must continue to take your seizure medication(s) regularly to minimize breakthrough seizures. 3. Follow up with your doctor or neurologist within the next 2-3 days. Patient is improved, resting quietly and comfortably. The patient will be discharged home. The patient was reassured that these symptoms do not appear to represent a serious or life threatening condition at this time. Warning signs of worsening condition were discussed and understood by the patient. Based on patient's age, coexisting illness, exam, and the results of this ED evaluation, the decision to treat as an outpatient was made. Based on the information available at time of discharge, acute pathology requiring immediate intervention was deemed relative unlikely. While it is impossible to completely exclude the possibility of underlying serious disease or worsening of condition, I feel the relative likelihood is extremely low. I discussed this uncertainty with the patient, who understood ED evaluation and treatment and felt comfortable with the outpatient treatment plan. All questions regarding care, test results, and follow up were answered. The patient is stable and appropriate to discharge. They understand that they should return to the emergency department for any new or worsening symptoms.  I stressed the importance of follow up for repeat assessment and possibly further evaluation/treatment. Dictation disclaimer:  Please note that this dictation was completed with [employer], the computer voice recognition software. Quite often unanticipated grammatical, syntax, homophones, and other interpretive errors are inadvertently transcribed by the computer software. Please disregard these errors. Please excuse any errors that have escaped final proofreading. Coding Diagnoses     Clinical Impression:   1. Seizure ([address]    2. Acute nonintractable headache, unspecified headache type        Disposition     Disposition: Discharge. Jaqui Fuchs M.D. MIREILLE Board Certified Emergency Physician    Provider Attestation:  If a scribe was utilized in generation of this patient record, I personally performed the services described in the documentation, reviewed the documentation, as recorded by the scribe in my presence, and it accurately records the patient's history of presenting illness, review of systems, patient physical examination, and procedures performed by me as the attending physician. TRINI Hurd Board Certified Emergency Physician  3/24/2020.  3:16 PM

## 2020-03-24 NOTE — ED TRIAGE NOTES
EMS reports pt was working at [employer]: witness seizure; last around 3-5 minutes; c/o HA since last hs; HA intermittently for 2-3 months;

## 2020-03-24 NOTE — ED NOTES
Pt provided with non -slip socks and assisted to the restroom. Pt resting in bed with HOB 60 degrees.

## 2020-03-25 ENCOUNTER — PATIENT OUTREACH (OUTPATIENT)
Dept: CASE MANAGEMENT | Age: 35
End: 2020-03-25

## 2020-03-25 NOTE — PROGRESS NOTES
Patient contacted regarding recent discharge and COVID-19 risk   Care Transition Nurse/ Ambulatory Care Manager contacted the patient by telephone to perform post discharge assessment. Verified name and  with patient as identifiers. Patient has following risk factors of: diabetes. CTN/ACM reviewed discharge instructions, medical action plan and red flags related to discharge diagnosis. Reviewed and educated them on any new and changed medications related to discharge diagnosis. Advised obtaining a 90-day supply of all daily and as-needed medications. Education provided regarding infection prevention, and signs and symptoms of COVID-19 and when to seek medical attention with patient who verbalized understanding. Discussed exposure protocols and quarantine from [address] you at higher risk for severe illness  and given an opportunity for questions and concerns. The patient agrees to contact the COVID-19 hotline [phone] or PCP office for questions related to their healthcare. CTN/ACM provided contact information for future reference. From CDC: Are you at higher risk for severe illness?  Wash your hands often.  Avoid close contact (6 feet, which is about two arm lengths) with people who are sick.  Put distance between yourself and other people if COVID-19 is spreading in your community.  Clean and disinfect frequently touched surfaces.  Avoid all cruise travel and non-essential air travel.  Call your healthcare professional if you have concerns about COVID-19 and your underlying condition or if you are sick.     For more information on steps you can take to protect yourself, see CDC's How to Protect Yourself

## 2020-04-06 ENCOUNTER — VIRTUAL VISIT (OUTPATIENT)
Dept: NEUROLOGY | Age: 35
End: 2020-04-06

## 2020-04-06 VITALS — HEIGHT: 69 IN | WEIGHT: 270 LBS | BODY MASS INDEX: 39.99 KG/M2

## 2020-04-06 DIAGNOSIS — E66.01 MORBID OBESITY (HCC): ICD-10-CM

## 2020-04-06 DIAGNOSIS — G44.89 OTHER HEADACHE SYNDROME: Primary | ICD-10-CM

## 2020-04-06 DIAGNOSIS — G47.33 OSA (OBSTRUCTIVE SLEEP APNEA): ICD-10-CM

## 2020-04-06 DIAGNOSIS — I10 ESSENTIAL HYPERTENSION: ICD-10-CM

## 2020-04-06 NOTE — PATIENT INSTRUCTIONS
Thank you for choosing [employer] and [facility] for your  
 
care. You may receive a survey about your visit. We appreciate you taking time  
 
to complete this survey as we use your feedback to improve our services. We  
 
realize we are not perfect, but we strive to provide excellent care.

## 2020-04-06 NOTE — PROGRESS NOTES
4/6/2020 8:51 AM    SSN: [id number]      HPI:  41-year-old female who I saw February 8, 2019 for a chief complaint of obstructive sleep apnea, referred by Dr. Liya Deluca. We reviewed a January 2019 sleep study showing an AHI of 11, increasing during REM to 19 with desaturations down to 80%. She could not make it the day of her CPAP trial because of work issues, so she never rescheduled and is been over a year since I saw her and she has yet to have her CPAP trial.  She does not have supplies, which I ordered last time here pending that trial.  She has a machine that is now about 1to 3years old, she does not know the settings of this machine and she cannot access them virtually with me on video. She decided to see me again because she has been snoring a lot, feeling very tired, and waking up a lot with headaches.       Social History     Socioeconomic History    Marital status: SINGLE     Spouse name: Not on file    Number of children: Not on file    Years of education: Not on file    Highest education level: Not on file   Occupational History    Not on file   Social Needs    Financial resource strain: Not on file    Food insecurity     Worry: Not on file     Inability: Not on file    Transportation needs     Medical: Not on file     Non-medical: Not on file   Tobacco Use    Smoking status: Former Smoker    Smokeless tobacco: Never Used   Substance and Sexual Activity    Alcohol use: No    Drug use: No    Sexual activity: Yes     Partners: Male     Birth control/protection: Surgical   Lifestyle    Physical activity     Days per week: Not on file     Minutes per session: Not on file    Stress: Not on file   Relationships    Social connections     Talks on phone: Not on file     Gets together: Not on file     Attends Cheondo[employer] service: Not on file     Active member of club or organization: Not on file     Attends meetings of clubs or organizations: Not on file     Relationship status: Not on file    Intimate partner violence     Fear of current or ex partner: Not on file     Emotionally abused: Not on file     Physically abused: Not on file     Forced sexual activity: Not on file   Other Topics Concern    Not on file   Social History Narrative    Not on file       Family History   Problem Relation Age of Onset    Hypertension Mother        Current Outpatient Medications   Medication Sig Dispense Refill    lidocaine (LIDODERM) 5 % Apply patch to the affected area for 12 hours a day and remove for 12 hours a day. 15 Each 0    topiramate (TOPAMAX) 25 mg tablet Take 3 Tabs by mouth two (2) times a day. 180 Tab 6    insulin glulisine U-100 (APIDRA SOLOSTAR U-100 INSULIN) 100 unit/mL pen 10 Units by SubCUTAneous route Before breakfast, lunch, and dinner.  insulin glargine (BASAGLAR KWIKPEN U-100 INSULIN) 100 unit/mL (3 mL) inpn 90 Units by SubCUTAneous route two (2) times a day.  gabapentin (NEURONTIN) 300 mg capsule Take 300 mg by mouth. One cap QAM, two caps QPM      liothyronine (CYTOMEL) 25 mcg tablet Take 75 mcg by mouth daily.  meloxicam (MOBIC) 15 mg tablet Take 15 mg by mouth daily as needed for Pain.  traZODone (DESYREL) 50 mg tablet Take 50 mg by mouth nightly as needed for Sleep.  metFORMIN (GLUCOPHAGE) 1,000 mg tablet Take 1,000 mg by mouth two (2) times daily (with meals).  metoprolol tartrate (LOPRESSOR) 25 mg tablet Take 25 mg by mouth daily.  fenofibrate nanocrystallized (TRICOR) 48 mg tablet Take 48 mg by mouth.  atorvastatin (LIPITOR) 40 mg tablet Take  by mouth daily.  furosemide (LASIX) 20 mg tablet Take  by mouth daily.  lisinopril (PRINIVIL, ZESTRIL) 5 mg tablet Take 40 mg by mouth daily.  moxifloxacin (VIGAMOX) 0.5 % ophthalmic solution Administer 1 Drop to right eye three (3) times daily.  1 Bottle 0       Past Medical History:   Diagnosis Date    Arthritis     Chest pain     Diabetes ([address]     Essential hypertension     Headache(784.0)     Hyperchloremia     Hypertension     Seizures (HCC)     SOB (shortness of breath)     Thyroid cancer (HCC)        Past Surgical History:   Procedure Laterality Date    HX PARTIAL HYSTERECTOMY      HX THYROIDECTOMY      HX TUBAL LIGATION         No Known Allergies    Review of Systems:   GENERAL: No reported fever or fatigue  CARDIAC: No CP or SOB  PULMONARY: No cough of SOB reported  MUSCULOSKELETAL: No new joint pain  NEURO: SEE HPI      Vital signs:    Visit Vitals  Ht 5' 9\" (1.753 m)   Wt 122.5 kg (270 lb) Comment: patient taken   BMI 39.87 kg/m²      HR-80   RR-18      EXAM: Alert, in NAD. Obese, Mallampati 4. Oriented to person, time, place, normal attention and concentration, no aphasia, EOM's are full, no facial asymmetries, hearing is present. No lateralizing weakness. gait  deferred      Assessment/Plan: Obstructive sleep apnea, untreated, with morning headaches, heavy disruptive snoring, associated obesity and hypertension as well as hyperlipidemia, underscoring the importance of getting this treated. She has not been compliant. She has not been engaged on her care and we discussed it. I emphasized the importance of getting this treated. Unfortunately right now we cannot do a CPAP titration in lab, I do not know the settings, she does not have a medical equipment company. I will ask  DME to help her at least with supplies, she will start using it empirically and I will see her in 6 weeks when we will discuss adjustments of the machine settings after obtaining a download. I counseled her about sleep hygiene. I counseled her about the relationship between CPAP/FANY and hypertension as well as her headaches.       Pt counseled about helping to prevent the spread of coronavirus disease by practicing social isolation, to stay home except again essential medical care, to cover coughs and sneezes, clean hands often, and a monitor for fever and noticed respiratory symptoms. PLEASE NOTE:   Portions of this document may have been produced using voice recognition software. Unrecognized errors in transcription may be present. This note will not be viewable in [address] Molly Parisi is a 27 y.o. female who was seen by synchronous (real-time) audio-video technology on 4/6/2020. Consent:  She and/or her healthcare decision maker is aware that this patient-initiated Telehealth encounter is a billable service, with coverage as determined by her insurance carrier. She is aware that she may receive a bill and has provided verbal consent to proceed: Yes    I was in the office while conducting this encounter. I spent 25 minutes with this established patient, and 15 minutes of the time was spent counseling and/or coordinating care regarding the aforementioned issues as stated above. Pursuant to the emergency declaration under the [facility] waiver authority and the [employer] Act, this Virtual  Visit was conducted, with patient's consent, to reduce the patient's risk of exposure to COVID-19 and provide continuity of care for an established patient. Services were provided through a video synchronous discussion virtually to substitute for in-person clinic visit.     Enrique Gloria MD

## 2020-04-06 NOTE — PROGRESS NOTES
Chief Complaint   Patient presents with    Sleep Problem     supplies update     Is someone accompanying this pt? Virtual visit [phone]    Is the patient using any DME equipment during [address]? no    Depression Screening:  3 most recent PHQ Screens 2/8/2019   Little interest or pleasure in doing things Not at all   Feeling down, depressed, irritable, or hopeless Not at all   Total Score PHQ 2 0       Learning Assessment:  No flowsheet data found. Abuse Screening:  No flowsheet data found. Fall Risk  No flowsheet data found. Coordination of Care:  1. Have you been to the ER, urgent care clinic since your last visit? Hospitalized since your last visit? no    2. Have you seen or consulted any other health care providers outside of the [address] [place] [address] since your last visit? Include any pap smears or colon screening.  no

## 2020-04-08 ENCOUNTER — PATIENT OUTREACH (OUTPATIENT)
Dept: CASE MANAGEMENT | Age: 35
End: 2020-04-08

## 2020-04-08 NOTE — PROGRESS NOTES
Patient resolved from Transition of Care episode on 4/8/20  Patient/family has been provided the following resources and education related to COVID-19:                         Signs, symptoms and red flags related to COVID-19            CDC exposure and quarantine guidelines            Conduit exposure contact - [phone]            Contact for their local Department of Health: MARTINE [address]  [phone]     Patient currently reports that the following symptoms have improved:  no new/worsening symptoms     No further outreach scheduled with this CTN/ACM. Episode of Care resolved. Patient has this CTN/ACM contact information if future needs arise.

## 2020-06-08 ENCOUNTER — HOSPITAL ENCOUNTER (EMERGENCY)
Age: 35
Discharge: HOME | End: 2020-06-08
Attending: EMERGENCY MEDICINE
Payer: MEDICAID

## 2020-06-08 VITALS
BODY MASS INDEX: 38.51 KG/M2 | SYSTOLIC BLOOD PRESSURE: 157 MMHG | WEIGHT: 260 LBS | HEART RATE: 98 BPM | HEIGHT: 69 IN | RESPIRATION RATE: 14 BRPM | OXYGEN SATURATION: 99 % | DIASTOLIC BLOOD PRESSURE: 90 MMHG | TEMPERATURE: 98 F

## 2020-06-08 DIAGNOSIS — N76.0 ACUTE VAGINITIS: ICD-10-CM

## 2020-06-08 DIAGNOSIS — B96.89 BV (BACTERIAL VAGINOSIS): ICD-10-CM

## 2020-06-08 DIAGNOSIS — A64 STD (FEMALE): Primary | ICD-10-CM

## 2020-06-08 DIAGNOSIS — N76.0 BV (BACTERIAL VAGINOSIS): ICD-10-CM

## 2020-06-08 DIAGNOSIS — R73.9 HYPERGLYCEMIA: ICD-10-CM

## 2020-06-08 LAB
APPEARANCE UR: CLEAR
BACTERIA URNS QL MICRO: NEGATIVE /HPF
BILIRUB UR QL: NEGATIVE
COLOR UR: YELLOW
EPITH CASTS URNS QL MICRO: NORMAL /LPF (ref 0–5)
GLUCOSE BLD STRIP.AUTO-MCNC: 373 MG/DL (ref 70–110)
GLUCOSE UR STRIP.AUTO-MCNC: >1000 MG/DL
HGB UR QL STRIP: ABNORMAL
HYALINE CASTS URNS QL MICRO: NORMAL /LPF (ref 0–2)
KETONES UR QL STRIP.AUTO: NEGATIVE MG/DL
LEUKOCYTE ESTERASE UR QL STRIP.AUTO: ABNORMAL
NITRITE UR QL STRIP.AUTO: NEGATIVE
PH UR STRIP: 5.5 [PH] (ref 5–8)
PROT UR STRIP-MCNC: 300 MG/DL
RBC #/AREA URNS HPF: NORMAL /HPF (ref 0–5)
SERVICE CMNT-IMP: NORMAL
SP GR UR REFRACTOMETRY: 1.02 (ref 1–1.03)
UROBILINOGEN UR QL STRIP.AUTO: 0.2 EU/DL (ref 0.2–1)
WBC URNS QL MICRO: NORMAL /HPF (ref 0–4)
WET PREP GENITAL: NORMAL

## 2020-06-08 PROCEDURE — 96372 THER/PROPH/DIAG INJ SC/IM: CPT

## 2020-06-08 PROCEDURE — 87491 CHLMYD TRACH DNA AMP PROBE: CPT

## 2020-06-08 PROCEDURE — 87210 SMEAR WET MOUNT SALINE/INK: CPT

## 2020-06-08 PROCEDURE — 74011250637 HC RX REV CODE- 250/637: Performed by: NURSE PRACTITIONER

## 2020-06-08 PROCEDURE — 82962 GLUCOSE BLOOD TEST: CPT

## 2020-06-08 PROCEDURE — 81001 URINALYSIS AUTO W/SCOPE: CPT

## 2020-06-08 PROCEDURE — 99284 EMERGENCY DEPT VISIT MOD MDM: CPT

## 2020-06-08 PROCEDURE — 74011250636 HC RX REV CODE- 250/636: Performed by: NURSE PRACTITIONER

## 2020-06-08 PROCEDURE — 74011000250 HC RX REV CODE- 250: Performed by: NURSE PRACTITIONER

## 2020-06-08 RX ORDER — METRONIDAZOLE 500 MG/1
500 TABLET ORAL 2 TIMES DAILY
Qty: 20 TAB | Refills: 0 | Status: SHIPPED | OUTPATIENT
Start: 2020-06-08 | End: 2020-06-18

## 2020-06-08 RX ORDER — METRONIDAZOLE 500 MG/1
500 TABLET ORAL
Status: COMPLETED | OUTPATIENT
Start: 2020-06-08 | End: 2020-06-08

## 2020-06-08 RX ORDER — FLUCONAZOLE 150 MG/1
150 TABLET ORAL DAILY
Qty: 1 TAB | Refills: 0 | Status: SHIPPED | OUTPATIENT
Start: 2020-06-08 | End: 2020-09-09 | Stop reason: CLARIF

## 2020-06-08 RX ORDER — FLUCONAZOLE 100 MG/1
200 TABLET ORAL
Status: COMPLETED | OUTPATIENT
Start: 2020-06-08 | End: 2020-06-08

## 2020-06-08 RX ORDER — AZITHROMYCIN 250 MG/1
1000 TABLET, FILM COATED ORAL DAILY
Status: DISCONTINUED | OUTPATIENT
Start: 2020-06-09 | End: 2020-06-08 | Stop reason: HOSPADM

## 2020-06-08 RX ADMIN — FLUCONAZOLE 200 MG: 100 TABLET ORAL at 19:43

## 2020-06-08 RX ADMIN — CEFTRIAXONE SODIUM 250 MG: 250 INJECTION, POWDER, FOR SOLUTION INTRAMUSCULAR; INTRAVENOUS at 18:49

## 2020-06-08 RX ADMIN — METRONIDAZOLE 500 MG: 500 TABLET ORAL at 19:43

## 2020-06-08 NOTE — ED PROVIDER NOTES
[facility]  Emergency Department Treatment Report        6:48 PM Trav Arechiga is a 27 y.o. female who presents to ED for evaluation of vaginal itching and irritation. Patient states she has had it for a few days patient also states she is having unprotected intercourse and would like to be checked for an STD. Patient also states she would like to be treated for an STD today. Patient does not wish to have a vaginal exam just stating that she is itchy. No other complaints, associated symptoms or modifying factors at this time. PCP: Ezekiel Gonzalez MD      The history is provided by the patient. No  was used. Vaginal Itching    This is a recurrent problem. The current episode started 12 to 24 hours ago. There was no discharge from the vagina. Associated symptoms include genital burning and genital itching. Pertinent negatives include no fever, no abdominal discomfort, no genital lesions, no painful intercourse, no pelvic pain, no perineal odor, no perineal pain, no perineal swelling and no urinary burning. She has tried nothing for the symptoms.         Past Medical History:   Diagnosis Date    Arthritis     Chest pain     Diabetes ([address]     Essential hypertension     Headache(784.0)     Hyperchloremia     Hypertension     Seizures (HCC)     SOB (shortness of breath)     Thyroid cancer (HCC)        Past Surgical History:   Procedure Laterality Date    HX PARTIAL HYSTERECTOMY      HX THYROIDECTOMY      HX TUBAL LIGATION           Family History:   Problem Relation Age of Onset    Hypertension Mother        Social History     Socioeconomic History    Marital status: SINGLE     Spouse name: Not on file    Number of children: Not on file    Years of education: Not on file    Highest education level: Not on file   Occupational History    Not on file   Social Needs    Financial resource strain: Not on file    Food insecurity     Worry: Not on file Inability: Not on file    Transportation needs     Medical: Not on file     Non-medical: Not on file   Tobacco Use    Smoking status: Former Smoker    Smokeless tobacco: Never Used   Substance and Sexual Activity    Alcohol use: No    Drug use: No    Sexual activity: Yes     Partners: Male     Birth control/protection: Surgical   Lifestyle    Physical activity     Days per week: Not on file     Minutes per session: Not on file    Stress: Not on file   Relationships    Social connections     Talks on phone: Not on file     Gets together: Not on file     Attends [employer] service: Not on file     Active member of club or organization: Not on file     Attends meetings of clubs or organizations: Not on file     Relationship status: Not on file    Intimate partner violence     Fear of current or ex partner: Not on file     Emotionally abused: Not on file     Physically abused: Not on file     Forced sexual activity: Not on file   Other Topics Concern    Not on file   Social History Narrative    Not on file         ALLERGIES: Patient has no known allergies. Review of Systems   Constitutional: Negative for fever. Gastrointestinal: Negative for abdominal distention, abdominal pain and blood in stool. Genitourinary: Positive for vaginal pain. Negative for decreased urine volume, menstrual problem, pelvic pain, urgency, vaginal bleeding and vaginal discharge. Itching   Neurological: Negative for dizziness. All other systems reviewed and are negative. Vitals:    06/08/20 1707   BP: 157/90   Pulse: 98   Resp: 14   Temp: 98 °F (36.7 °C)   SpO2: 99%   Weight: 117.9 kg (260 lb)   Height: 5' 9\" (1.753 m)            Physical Exam  Vitals signs and nursing note reviewed. Constitutional:       Appearance: She is well-developed. HENT:      Head: Normocephalic and atraumatic. Eyes:      Conjunctiva/sclera: Conjunctivae normal.      Pupils: Pupils are equal, round, and reactive to light.    Neck: Musculoskeletal: Normal range of motion and neck supple. Cardiovascular:      Rate and Rhythm: Normal rate and regular rhythm. Pulmonary:      Effort: Pulmonary effort is normal.      Breath sounds: Normal breath sounds. Abdominal:      General: Bowel sounds are normal. There is no distension. Palpations: Abdomen is soft. Tenderness: There is no abdominal tenderness. Genitourinary:     Comments: Deferred  Musculoskeletal: Normal range of motion. Skin:     General: Skin is warm and dry. Neurological:      Mental Status: She is alert and oriented to person, place, and time. Deep Tendon Reflexes: Reflexes are normal and symmetric. Psychiatric:         Behavior: Behavior normal.         Thought Content: Thought content normal.         Judgment: Judgment normal.          MDM  Number of Diagnoses or Management Options  Acute vaginitis:   BV (bacterial vaginosis):   Hyperglycemia:   STD (female):   Diagnosis management comments: Patient treated for bacterial vaginosis for candidiasis of her vaginal area and for STDs with Rocephin and gentamicin. Patient advised to check in 3 days on my chart for the results of her tests of chlamydia and gonorrhea.     No other acute illness suspected patient discharged home in no distress             Amount and/or Complexity of Data Reviewed  Clinical lab tests: ordered and reviewed  Review and summarize past medical records: yes  Independent visualization of images, tracings, or specimens: yes    Risk of Complications, Morbidity, and/or Mortality  Presenting problems: low  Diagnostic procedures: low  Management options: low    Patient Progress  Patient progress: improved         Procedures            Vitals:  Patient Vitals for the past 12 hrs:   Temp Pulse Resp BP SpO2   06/08/20 1707 98 °F (36.7 °C) 98 14 157/90 99 %       Medications ordered:   Medications   metroNIDAZOLE (FLAGYL) tablet 500 mg (500 mg Oral Given 6/8/20 1943)   cefTRIAXone (ROCEPHIN) 250 mg in lidocaine (PF) (XYLOCAINE) 10 mg/mL (1 %) IM injection (250 mg IntraMUSCular Given 6/8/20 1849)   fluconazole (DIFLUCAN) tablet 200 mg (200 mg Oral Given 6/8/20 1943)         Lab findings:  Recent Results (from the past 12 hour(s))   URINALYSIS W/ RFLX MICROSCOPIC    Collection Time: 06/08/20  5:09 PM   Result Value Ref Range    Color YELLOW      Appearance CLEAR      Specific gravity 1.024 1.005 - 1.030      pH (UA) 5.5 5.0 - 8.0      Protein 300 (A) NEG mg/dL    Glucose >1,000 (A) NEG mg/dL    Ketone Negative NEG mg/dL    Bilirubin Negative NEG      Blood SMALL (A) NEG      Urobilinogen 0.2 0.2 - 1.0 EU/dL    Nitrites Negative NEG      Leukocyte Esterase TRACE (A) NEG     WET PREP    Collection Time: 06/08/20  5:09 PM   Result Value Ref Range    Special Requests: NO SPECIAL REQUESTS      Wet prep FEW  CLUE CELLS PRESENT        Wet prep NO YEAST SEEN      Wet prep NO TRICHOMONAS SEEN     URINE MICROSCOPIC ONLY    Collection Time: 06/08/20  5:09 PM   Result Value Ref Range    WBC 0 to 1 0 - 4 /hpf    RBC 1 to 4 0 - 5 /hpf    Epithelial cells 2+ 0 - 5 /lpf    Bacteria Negative NEG /hpf    Hyaline cast 0 to 1 0 - 2 /lpf   GLUCOSE, POC    Collection Time: 06/08/20  6:28 PM   Result Value Ref Range    Glucose (POC) 373 (H) 70 - 110 mg/dL         Disposition:    Diagnosis:   1. STD (female)    2. Acute vaginitis    3. BV (bacterial vaginosis)    4. Hyperglycemia        Disposition: to Home      Follow-up Information     Follow up With Specialties Details Why Contact Info    Johnathan Hernandez MD Internal Medicine       [address] [place] [address],[address]  In 2 days  [address]  [phone]           Discharge Medication List as of 6/8/2020  7:51 PM      START taking these medications    Details   metroNIDAZOLE (FlagyL) 500 mg tablet Take 1 Tab by mouth two (2) times a day for 10 days. , Print, Disp-20 Tab, R-0      fluconazole (Diflucan) 150 mg tablet Take 1 Tab by mouth daily.  Take in 3 days x 1 daily, Print, Disp-1 Tab, R-0         CONTINUE these medications which have NOT CHANGED    Details   lidocaine (LIDODERM) 5 % Apply patch to the affected area for 12 hours a day and remove for 12 hours a day., Print, Disp-15 Each, R-0      moxifloxacin (VIGAMOX) 0.5 % ophthalmic solution Administer 1 Drop to right eye three (3) times daily. , Print, Disp-1 Bottle, R-0      topiramate (TOPAMAX) 25 mg tablet Take 3 Tabs by mouth two (2) times a day., Normal, Disp-180 Tab, R-6      insulin glulisine U-100 (APIDRA SOLOSTAR U-100 INSULIN) 100 unit/mL pen 10 Units by SubCUTAneous route Before breakfast, lunch, and dinner., Historical Med      insulin glargine (BASAGLAR KWIKPEN U-100 INSULIN) 100 unit/mL (3 mL) inpn 90 Units by SubCUTAneous route two (2) times a day., Historical Med      gabapentin (NEURONTIN) 300 mg capsule Take 300 mg by mouth. One cap QAM, two caps QPM, Historical Med      liothyronine (CYTOMEL) 25 mcg tablet Take 75 mcg by mouth daily. , Historical Med      meloxicam (MOBIC) 15 mg tablet Take 15 mg by mouth daily as needed for Pain., Historical Med      traZODone (DESYREL) 50 mg tablet Take 50 mg by mouth nightly as needed for Sleep., Historical Med      metFORMIN (GLUCOPHAGE) 1,000 mg tablet Take 1,000 mg by mouth two (2) times daily (with meals). , Historical Med      metoprolol tartrate (LOPRESSOR) 25 mg tablet Take 25 mg by mouth daily. , Historical Med      fenofibrate nanocrystallized (TRICOR) 48 mg tablet Take 48 mg by mouth., Historical Med      atorvastatin (LIPITOR) 40 mg tablet Take  by mouth daily. , Historical Med      furosemide (LASIX) 20 mg tablet Take  by mouth daily. , Historical Med      lisinopril (PRINIVIL, ZESTRIL) 5 mg tablet Take 40 mg by mouth daily. , Historical Med             Return to the ER if you are unable to obtain referral as directed. Will Mary Lou  results have been reviewed with her.   She has been counseled regarding her diagnosis, treatment, and plan.  She verbally conveys understanding and agreement of the signs, symptoms, diagnosis, treatment and prognosis and additionally agrees to follow up as discussed. She also agrees with the care-plan and conveys that all of her questions have been answered. I have also provided discharge instructions for her that include: educational information regarding their diagnosis and treatment, and list of reasons why they would want to return to the ED prior to their follow-up appointment, should her condition change. Christine Bauman ENP-C,FNP-C      Dragon voice recognition was used to generate this report, which may have resulted in some phonetic based errors in grammar and contents.  Even though attempts were made to correct all the mistakes, some may have been missed, and remained in the body of the document

## 2020-06-08 NOTE — LETTER
NOTIFICATION RETURN TO WORK / SCHOOL 
 
6/8/2020 7:51 PM 
 
[facility] [id number] To Whom It May Concern: 
 
Satya Lin is currently under the care of [facility] EMERGENCY DEPT. She will return to work/school on:  6- If there are questions or concerns please have the patient contact our office. Sincerely, Julieta Melendez ENP-C, FNP-C

## 2020-06-08 NOTE — ED TRIAGE NOTES
The patient presents for evaluation of vaginal itching and irritating x two days. Denies discharge. She is also complaining dysuria and urinary frequency and urgency.

## 2020-06-09 ENCOUNTER — PATIENT OUTREACH (OUTPATIENT)
Dept: CASE MANAGEMENT | Age: 35
End: 2020-06-09

## 2020-06-09 NOTE — PROGRESS NOTES
Patient contacted regarding recent discharge and COVID-19 risk. Discussed COVID-19 related testing which was not done at this time. Test results were not done. Patient informed of results, if available? [facility] Transition Nurse/ Ambulatory Care Manager contacted the patient by telephone to perform post discharge assessment. Verified name and  with patient as identifiers. Patient has following risk factors of: diabetes. CTN/ACM reviewed discharge instructions, medical action plan and red flags related to discharge diagnosis. Reviewed and educated them on any new and changed medications related to discharge diagnosis. Advised obtaining a 90-day supply of all daily and as-needed medications. Education provided regarding infection prevention, and signs and symptoms of COVID-19 and when to seek medical attention with patient who verbalized understanding. Discussed exposure protocols and quarantine from [address] you at higher risk for severe illness  and given an opportunity for questions and concerns. The patient agrees to contact the COVID-19 hotline [phone] or PCP office for questions related to their healthcare. CTN/ACM provided contact information for future reference. From CDC: Are you at higher risk for severe illness?  Wash your hands often.  Avoid close contact (6 feet, which is about two arm lengths) with people who are sick.  Put distance between yourself and other people if COVID-19 is spreading in your community.  Clean and disinfect frequently touched surfaces.  Avoid all cruise travel and non-essential air travel.  Call your healthcare professional if you have concerns about COVID-19 and your underlying condition or if you are sick.     For more information on steps you can take to protect yourself, see CDC's How to Protect Yourself      Patient/family/caregiver given information for [place] and agrees to enroll yes  Patient's preferred e-mail: [email or web address]  Patient's preferred phone number: [phone]  Based on Loop alert triggers, patient will be contacted by nurse care manager for worsening symptoms. Pt will be further monitored by COVID Loop Team based on severity of symptoms and risk factors.

## 2020-06-17 ENCOUNTER — PATIENT OUTREACH (OUTPATIENT)
Dept: CASE MANAGEMENT | Age: 35
End: 2020-06-17

## 2020-07-07 ENCOUNTER — HOSPITAL ENCOUNTER (OUTPATIENT)
Dept: LAB | Age: 35
Discharge: HOME | End: 2020-07-07

## 2020-07-07 LAB — XX-LABCORP SPECIMEN COL,LCBCF: NORMAL

## 2020-07-07 PROCEDURE — 99001 SPECIMEN HANDLING PT-LAB: CPT

## 2020-07-30 ENCOUNTER — HOSPITAL ENCOUNTER (OUTPATIENT)
Dept: CT IMAGING | Age: 35
Discharge: HOME | End: 2020-07-30
Attending: RADIOLOGY | Admitting: RADIOLOGY
Payer: MEDICAID

## 2020-07-30 VITALS
HEIGHT: 67 IN | WEIGHT: 269 LBS | HEART RATE: 92 BPM | RESPIRATION RATE: 19 BRPM | DIASTOLIC BLOOD PRESSURE: 83 MMHG | SYSTOLIC BLOOD PRESSURE: 148 MMHG | OXYGEN SATURATION: 97 % | BODY MASS INDEX: 42.22 KG/M2 | TEMPERATURE: 97.9 F

## 2020-07-30 DIAGNOSIS — N18.9 CHRONIC KIDNEY DISEASE: ICD-10-CM

## 2020-07-30 DIAGNOSIS — R80.1 PERSISTENT PROTEINURIA: ICD-10-CM

## 2020-07-30 DIAGNOSIS — I10 HTN (HYPERTENSION): ICD-10-CM

## 2020-07-30 LAB
ANION GAP SERPL CALC-SCNC: 4 MMOL/L (ref 3–18)
APTT PPP: 26.6 SEC (ref 23–36.4)
BUN SERPL-MCNC: 18 MG/DL (ref 7–18)
BUN/CREAT SERPL: 24 (ref 12–20)
CALCIUM SERPL-MCNC: 8.6 MG/DL (ref 8.5–10.1)
CHLORIDE SERPL-SCNC: 109 MMOL/L (ref 100–111)
CO2 SERPL-SCNC: 23 MMOL/L (ref 21–32)
CREAT SERPL-MCNC: 0.74 MG/DL (ref 0.6–1.3)
ERYTHROCYTE [DISTWIDTH] IN BLOOD BY AUTOMATED COUNT: 13.6 % (ref 11.6–14.5)
GLUCOSE BLD STRIP.AUTO-MCNC: 258 MG/DL (ref 70–110)
GLUCOSE SERPL-MCNC: 266 MG/DL (ref 74–99)
HCG UR QL: NEGATIVE
HCT VFR BLD AUTO: 37.9 % (ref 35–45)
HGB BLD-MCNC: 12.5 G/DL (ref 12–16)
INR PPP: 1 (ref 0.8–1.2)
MCH RBC QN AUTO: 27.1 PG (ref 24–34)
MCHC RBC AUTO-ENTMCNC: 33 G/DL (ref 31–37)
MCV RBC AUTO: 82.2 FL (ref 74–97)
PLATELET # BLD AUTO: 320 K/UL (ref 135–420)
PMV BLD AUTO: 10.4 FL (ref 9.2–11.8)
POTASSIUM SERPL-SCNC: 4.2 MMOL/L (ref 3.5–5.5)
PROTHROMBIN TIME: 13.1 SEC (ref 11.5–15.2)
RBC # BLD AUTO: 4.61 M/UL (ref 4.2–5.3)
SODIUM SERPL-SCNC: 136 MMOL/L (ref 136–145)
WBC # BLD AUTO: 8.1 K/UL (ref 4.6–13.2)

## 2020-07-30 PROCEDURE — 82962 GLUCOSE BLOOD TEST: CPT

## 2020-07-30 PROCEDURE — 85730 THROMBOPLASTIN TIME PARTIAL: CPT

## 2020-07-30 PROCEDURE — 74011250636 HC RX REV CODE- 250/636: Performed by: RADIOLOGY

## 2020-07-30 PROCEDURE — 88305 TISSUE EXAM BY PATHOLOGIST: CPT

## 2020-07-30 PROCEDURE — 80048 BASIC METABOLIC PNL TOTAL CA: CPT

## 2020-07-30 PROCEDURE — 88313 SPECIAL STAINS GROUP 2: CPT

## 2020-07-30 PROCEDURE — 88348 ELECTRON MICROSCOPY DX: CPT

## 2020-07-30 PROCEDURE — 85610 PROTHROMBIN TIME: CPT

## 2020-07-30 PROCEDURE — 88346 IMFLUOR 1ST 1ANTB STAIN PX: CPT

## 2020-07-30 PROCEDURE — 88350 IMFLUOR EA ADDL 1ANTB STN PX: CPT

## 2020-07-30 PROCEDURE — 85027 COMPLETE CBC AUTOMATED: CPT

## 2020-07-30 PROCEDURE — 50200 RENAL BIOPSY PERQ: CPT

## 2020-07-30 PROCEDURE — 81025 URINE PREGNANCY TEST: CPT

## 2020-07-30 RX ORDER — FENTANYL CITRATE 50 UG/ML
12.5-5 INJECTION, SOLUTION INTRAMUSCULAR; INTRAVENOUS
Status: DISCONTINUED | OUTPATIENT
Start: 2020-07-30 | End: 2020-07-30 | Stop reason: HOSPADM

## 2020-07-30 RX ORDER — OXYCODONE AND ACETAMINOPHEN 5; 325 MG/1; MG/1
1 TABLET ORAL
Status: DISCONTINUED | OUTPATIENT
Start: 2020-07-30 | End: 2020-07-30 | Stop reason: HOSPADM

## 2020-07-30 RX ORDER — MIDAZOLAM HYDROCHLORIDE 1 MG/ML
.5-2 INJECTION, SOLUTION INTRAMUSCULAR; INTRAVENOUS
Status: DISCONTINUED | OUTPATIENT
Start: 2020-07-30 | End: 2020-07-30 | Stop reason: HOSPADM

## 2020-07-30 RX ORDER — SODIUM CHLORIDE 9 MG/ML
20 INJECTION, SOLUTION INTRAVENOUS CONTINUOUS
Status: DISCONTINUED | OUTPATIENT
Start: 2020-07-30 | End: 2020-07-30 | Stop reason: HOSPADM

## 2020-07-30 RX ADMIN — FENTANYL CITRATE 50 MCG: 50 INJECTION, SOLUTION INTRAMUSCULAR; INTRAVENOUS at 12:05

## 2020-07-30 RX ADMIN — MIDAZOLAM HYDROCHLORIDE 1 MG: 2 INJECTION, SOLUTION INTRAMUSCULAR; INTRAVENOUS at 12:00

## 2020-07-30 RX ADMIN — MIDAZOLAM HYDROCHLORIDE 1 MG: 2 INJECTION, SOLUTION INTRAMUSCULAR; INTRAVENOUS at 12:05

## 2020-07-30 RX ADMIN — SODIUM CHLORIDE 20 ML/HR: 900 INJECTION, SOLUTION INTRAVENOUS at 09:28

## 2020-07-30 RX ADMIN — FENTANYL CITRATE 50 MCG: 50 INJECTION, SOLUTION INTRAMUSCULAR; INTRAVENOUS at 12:00

## 2020-07-30 NOTE — H&P
OUTPATIENT HISTORY AND PHYSICAL      Today 7/30/2020     Indication/Symptoms:   Judie Reid is a 38 y.o. female  With [facility] here for random renal Bx. Current Meds:    Prior to Admission medications    Medication Sig Start Date End Date Taking? Authorizing Provider   topiramate (TOPAMAX) 25 mg tablet Take 3 Tabs by mouth two (2) times a day. 12/31/18  Yes Leon Alexander MD   insulin glulisine U-100 (APIDRA SOLOSTAR U-100 INSULIN) 100 unit/mL pen 10 Units by SubCUTAneous route Before breakfast, lunch, and dinner. Yes Provider, Historical   insulin glargine (BASAGLAR KWIKPEN U-100 INSULIN) 100 unit/mL (3 mL) inpn 90 Units by SubCUTAneous route two (2) times a day. Yes Provider, Historical   gabapentin (NEURONTIN) 300 mg capsule Take 300 mg by mouth. One cap QAM, two caps QPM   Yes Provider, Historical   liothyronine (CYTOMEL) 25 mcg tablet Take 75 mcg by mouth daily. Yes Provider, Historical   metFORMIN (GLUCOPHAGE) 1,000 mg tablet Take 1,000 mg by mouth two (2) times daily (with meals). Yes Provider, Historical   metoprolol tartrate (LOPRESSOR) 25 mg tablet Take 25 mg by mouth daily. Yes Other, MD Abdulaziz   fenofibrate nanocrystallized (TRICOR) 48 mg tablet Take 48 mg by mouth. Yes Other, MD Abdulaziz   atorvastatin (LIPITOR) 40 mg tablet Take  by mouth daily. Yes Provider, Historical   furosemide (LASIX) 20 mg tablet Take  by mouth daily. Yes Provider, Historical   lisinopril (PRINIVIL, ZESTRIL) 5 mg tablet Take 40 mg by mouth daily. Yes Provider, Historical   fluconazole (Diflucan) 150 mg tablet Take 1 Tab by mouth daily. Take in 3 days x 1 daily 6/8/20   Thang GARCIA NP   lidocaine (LIDODERM) 5 % Apply patch to the affected area for 12 hours a day and remove for 12 hours a day. 11/25/19   Mela Coleman MD   moxifloxacin (VIGAMOX) 0.5 % ophthalmic solution Administer 1 Drop to right eye three (3) times daily.  6/9/19   Jay Vega NP   meloxicam (MOBIC) 15 mg tablet Take 15 mg by mouth daily as needed for Pain. Provider, Historical   traZODone (DESYREL) 50 mg tablet Take 50 mg by mouth nightly as needed for Sleep. Provider, Historical       Allergies:    No Known Allergies    Comorbid Conditions:    Past Medical History:   Diagnosis Date    Arthritis     Chest pain     Diabetes ([address]     Essential hypertension     Headache(784.0)     Hyperchloremia     Hypertension     Seizures (HCC)     SOB (shortness of breath)     Thyroid cancer (HCC)           Past Surgical History:   Procedure Laterality Date    HX PARTIAL HYSTERECTOMY      HX THYROIDECTOMY      HX TUBAL LIGATION       Data:    Visit Vitals  /83 (BP 1 Location: Left arm, BP Patient Position: At rest)   Pulse 92   Temp 97.9 °F (36.6 °C)   Resp 19   Ht 5' 7\" (1.702 m)   Wt 122 kg (269 lb)   SpO2 97%   BMI 42.13 kg/m²   :  Recent Labs     07/30/20  0923        Recent Labs     07/30/20  0923   INR 1.0   APTT 26.6       The H & P and/or progress notes and any available imaging were reviewed. The risks, indications and possible alternatives to the procedure, including doing nothing, were discussed and informed consent was obtained. Physical Exam:      Mental status:   Alert and oriented. Examination specific to the procedure proposed to be performed and any co morbid conditions:   Mallampati classification 2 ,  ASA2   Heart:   RRR. Lungs:   CTAB. No wheezes, rales or rhonchi. The patient is an appropriate candidate to undergo the planned procedure and sedation.     Yao De La Cruz MD

## 2020-07-30 NOTE — PROGRESS NOTES
TRANSFER - OUT REPORT:    Verbal report given to Abdelrahman Puentes RN(name) on Shi Kuhn  being transferred to Phase 2(unit) for routine post - op       Report consisted of patients Situation, Background, Assessment and   Recommendations(SBAR). Information from the following report(s) SBAR, Kardex, Procedure Summary and MAR was reviewed with the receiving nurse. Lines:   Peripheral IV 07/30/20 Posterior;Right Hand (Active)        Opportunity for questions and clarification was provided.       Patient transported with:   Registered Nurse

## 2020-07-30 NOTE — DISCHARGE INSTRUCTIONS
DISCHARGE SUMMARY from Nurse    PATIENT INSTRUCTIONS:    After general anesthesia or intravenous sedation, for 24 hours or while taking prescription Narcotics:  · Limit your activities  · Do not drive and operate hazardous machinery  · Do not make important personal or business decisions  · Do  not drink alcoholic beverages  · If you have not urinated within 8 hours after discharge, please contact your surgeon on call. Report the following to your surgeon:  · Excessive pain, swelling, redness or odor of or around the surgical area  · Temperature over 100.5  · Nausea and vomiting lasting longer than 4 hours or if unable to take medications  · Any signs of decreased circulation or nerve impairment to extremity: change in color, persistent  numbness, tingling, coldness or increase pain  · Any questions    What to do at Home:  Recommended activity: Activity as tolerated and no driving for today. *  Please give a list of your current medications to your Primary Care Provider. *  Please update this list whenever your medications are discontinued, doses are      changed, or new medications (including over-the-counter products) are added. *  Please carry medication information at all times in case of emergency situations. These are general instructions for a healthy lifestyle:    No smoking/ No tobacco products/ Avoid exposure to second hand smoke  Surgeon General's Warning:  Quitting smoking now greatly reduces serious risk to your health. Obesity, smoking, and sedentary lifestyle greatly increases your risk for illness    A healthy diet, regular physical exercise & weight monitoring are important for maintaining a healthy lifestyle    You may be retaining fluid if you have a history of heart failure or if you experience any of the following symptoms:  Weight gain of 3 pounds or more overnight or 5 pounds in a week, increased swelling in our hands or feet or shortness of breath while lying flat in bed. Please call your doctor as soon as you notice any of these symptoms; do not wait until your next office visit. The discharge information has been reviewed with the patient. The patient verbalized understanding. Discharge medications reviewed with the patient and appropriate educational materials and side effects teaching were provided. ___________________________________________________________________________________________________________________________________    Patient Education     Needle Biopsy of the Kidney: What to Expect at Home  Your Recovery     A kidney biopsy is a test to take a sample (biopsy) of kidney. The doctor puts a long needle through your back (flank) into the kidney. Another doctor will look at the kidney tissue with a microscope to check for problems. After the test, you will be told to lie down on your back for several hours. After this, you should avoid strenuous activity for the next 2 to 3 days. It's normal to feel some soreness in the area of the biopsy for 2 to 3 days. You may have a small amount of bleeding on the bandage after the test. You may notice some blood in your urine after the test. This should go away within 12 to 24 hours. If it doesn't, call your doctor. This care sheet gives you a general idea about how long it will take for you to recover. But each person recovers at a different pace. Follow the steps below to feel better as quickly as possible. How can you care for yourself at home? Activity  · Avoid lifting anything that would make you strain. This may include heavy grocery bags and milk containers, a heavy briefcase or backpack, cat litter or dog food bags, a vacuum , or a child. · Avoid strenuous activities, such as bicycle riding, jogging, weight lifting, or aerobic exercise, until your doctor says it is okay. · Try to walk each day. Start by walking a little more than you did the day before. Bit by bit, increase the amount you walk. Walking boosts blood flow and helps prevent pneumonia and constipation. · Rest when you feel tired. Getting enough sleep will help you recover. · Ask your doctor when it is okay for you to have sex. Diet  · You can eat your normal diet. If your stomach is upset, try bland, low-fat foods like plain rice, broiled chicken, toast, and yogurt. · Drink plenty of fluids to avoid becoming dehydrated. Medicines  · Your doctor will tell you if and when you can restart your medicines. He or she will also give you instructions about taking any new medicines. · If you take aspirin or some other blood thinner, ask your doctor if and when to start taking it again. Make sure that you understand exactly what your doctor wants you to do. · Do not take aspirin or anti-inflammatory medicines for a week after the biopsy. Incision care  · Keep a bandage over the puncture site for the first 1 or 2 days. Follow-up care is a key part of your treatment and safety. Be sure to make and go to all appointments, and call your doctor if you are having problems. It's also a good idea to know your test results and keep a list of the medicines you take. When should you call for help? [id number] anytime you think you may need emergency care. For example, call if:  · You passed out (lost consciousness). Call your doctor now or seek immediate medical care if:  · You have signs of infection, such as:  ? Increased pain, swelling, warmth, or redness. ? Red streaks leading from the puncture site. ? Pus draining from the puncture site. ? A fever. · Bright red blood has soaked through the bandage over the puncture site. · You have new or worse pain at the puncture site. Watch closely for any changes in your health, and call your doctor if:  · You have blood in your urine for more than 1 day. Where can you learn more?   Go to http://www.gray.com/  Enter S609 in the search box to learn more about \"Needle Biopsy of the Kidney: What to Expect at Home. \"  Current as of: August 12, 2019               Content Version: 12.5  © 9165-9398 [employer]. Care instructions adapted under license by [employer] (which disclaims liability or warranty for this information). If you have questions about a medical condition or this instruction, always ask your healthcare professional. [address] any warranty or liability for your use of this information.

## 2020-08-15 ENCOUNTER — HOSPITAL ENCOUNTER (EMERGENCY)
Age: 35
Discharge: HOME | End: 2020-08-15
Attending: EMERGENCY MEDICINE
Payer: MEDICAID

## 2020-08-15 ENCOUNTER — APPOINTMENT (OUTPATIENT)
Dept: GENERAL RADIOLOGY | Age: 35
End: 2020-08-15
Attending: EMERGENCY MEDICINE
Payer: MEDICAID

## 2020-08-15 ENCOUNTER — APPOINTMENT (OUTPATIENT)
Dept: CT IMAGING | Age: 35
End: 2020-08-15
Attending: EMERGENCY MEDICINE
Payer: MEDICAID

## 2020-08-15 VITALS
TEMPERATURE: 98.7 F | RESPIRATION RATE: 22 BRPM | DIASTOLIC BLOOD PRESSURE: 141 MMHG | SYSTOLIC BLOOD PRESSURE: 162 MMHG | OXYGEN SATURATION: 100 % | HEART RATE: 121 BPM

## 2020-08-15 DIAGNOSIS — Z20.822 SUSPECTED COVID-19 VIRUS INFECTION: ICD-10-CM

## 2020-08-15 DIAGNOSIS — R73.9 HYPERGLYCEMIA: ICD-10-CM

## 2020-08-15 DIAGNOSIS — R51.9 NONINTRACTABLE EPISODIC HEADACHE, UNSPECIFIED HEADACHE TYPE: Primary | ICD-10-CM

## 2020-08-15 LAB
ALBUMIN SERPL-MCNC: 2.5 G/DL (ref 3.4–5)
ALBUMIN/GLOB SERPL: 0.5 {RATIO} (ref 0.8–1.7)
ALP SERPL-CCNC: 100 U/L (ref 45–117)
ALT SERPL-CCNC: 27 U/L (ref 13–56)
ANION GAP SERPL CALC-SCNC: 6 MMOL/L (ref 3–18)
APPEARANCE UR: CLEAR
AST SERPL-CCNC: 17 U/L (ref 10–38)
BASOPHILS # BLD: 0 K/UL (ref 0–0.1)
BASOPHILS NFR BLD: 0 % (ref 0–2)
BILIRUB SERPL-MCNC: 0.2 MG/DL (ref 0.2–1)
BILIRUB UR QL: NEGATIVE
BUN SERPL-MCNC: 16 MG/DL (ref 7–18)
BUN/CREAT SERPL: 13 (ref 12–20)
CALCIUM SERPL-MCNC: 8.9 MG/DL (ref 8.5–10.1)
CHLORIDE SERPL-SCNC: 104 MMOL/L (ref 100–111)
CO2 SERPL-SCNC: 24 MMOL/L (ref 21–32)
COLOR UR: YELLOW
CREAT SERPL-MCNC: 1.26 MG/DL (ref 0.6–1.3)
DIFFERENTIAL METHOD BLD: ABNORMAL
EOSINOPHIL # BLD: 0 K/UL (ref 0–0.4)
EOSINOPHIL NFR BLD: 1 % (ref 0–5)
EPITH CASTS URNS QL MICRO: NORMAL /LPF (ref 0–5)
ERYTHROCYTE [DISTWIDTH] IN BLOOD BY AUTOMATED COUNT: 13.6 % (ref 11.6–14.5)
GLOBULIN SER CALC-MCNC: 4.7 G/DL (ref 2–4)
GLUCOSE SERPL-MCNC: 389 MG/DL (ref 74–99)
GLUCOSE UR STRIP.AUTO-MCNC: >1000 MG/DL
HCG UR QL: NEGATIVE
HCT VFR BLD AUTO: 35.7 % (ref 35–45)
HGB BLD-MCNC: 11.7 G/DL (ref 12–16)
HGB UR QL STRIP: ABNORMAL
KETONES UR QL STRIP.AUTO: NEGATIVE MG/DL
LEUKOCYTE ESTERASE UR QL STRIP.AUTO: NEGATIVE
LYMPHOCYTES # BLD: 3.4 K/UL (ref 0.9–3.6)
LYMPHOCYTES NFR BLD: 42 % (ref 21–52)
MAGNESIUM SERPL-MCNC: 2.1 MG/DL (ref 1.6–2.6)
MCH RBC QN AUTO: 27 PG (ref 24–34)
MCHC RBC AUTO-ENTMCNC: 32.8 G/DL (ref 31–37)
MCV RBC AUTO: 82.4 FL (ref 74–97)
MONOCYTES # BLD: 0.5 K/UL (ref 0.05–1.2)
MONOCYTES NFR BLD: 6 % (ref 3–10)
NEUTS SEG # BLD: 4.3 K/UL (ref 1.8–8)
NEUTS SEG NFR BLD: 51 % (ref 40–73)
NITRITE UR QL STRIP.AUTO: NEGATIVE
PH UR STRIP: 6 [PH] (ref 5–8)
PLATELET # BLD AUTO: 231 K/UL (ref 135–420)
PMV BLD AUTO: 10.7 FL (ref 9.2–11.8)
POTASSIUM SERPL-SCNC: 4.1 MMOL/L (ref 3.5–5.5)
PROT SERPL-MCNC: 7.2 G/DL (ref 6.4–8.2)
PROT UR STRIP-MCNC: 100 MG/DL
RBC # BLD AUTO: 4.33 M/UL (ref 4.2–5.3)
RBC #/AREA URNS HPF: NORMAL /HPF (ref 0–5)
SODIUM SERPL-SCNC: 134 MMOL/L (ref 136–145)
SP GR UR REFRACTOMETRY: 1.02 (ref 1–1.03)
T4 FREE SERPL-MCNC: 0.3 NG/DL (ref 0.7–1.5)
TSH SERPL DL<=0.05 MIU/L-ACNC: 3.81 UIU/ML (ref 0.36–3.74)
UROBILINOGEN UR QL STRIP.AUTO: 0.2 EU/DL (ref 0.2–1)
WBC # BLD AUTO: 8.2 K/UL (ref 4.6–13.2)
WBC URNS QL MICRO: NORMAL /HPF (ref 0–4)

## 2020-08-15 PROCEDURE — 84439 ASSAY OF FREE THYROXINE: CPT

## 2020-08-15 PROCEDURE — 99285 EMERGENCY DEPT VISIT HI MDM: CPT

## 2020-08-15 PROCEDURE — 85025 COMPLETE CBC W/AUTO DIFF WBC: CPT

## 2020-08-15 PROCEDURE — 93005 ELECTROCARDIOGRAM TRACING: CPT

## 2020-08-15 PROCEDURE — 81025 URINE PREGNANCY TEST: CPT

## 2020-08-15 PROCEDURE — 87635 SARS-COV-2 COVID-19 AMP PRB: CPT

## 2020-08-15 PROCEDURE — 70450 CT HEAD/BRAIN W/O DYE: CPT

## 2020-08-15 PROCEDURE — 81001 URINALYSIS AUTO W/SCOPE: CPT

## 2020-08-15 PROCEDURE — 84443 ASSAY THYROID STIM HORMONE: CPT

## 2020-08-15 PROCEDURE — 83735 ASSAY OF MAGNESIUM: CPT

## 2020-08-15 PROCEDURE — 80053 COMPREHEN METABOLIC PANEL: CPT

## 2020-08-15 PROCEDURE — 71045 X-RAY EXAM CHEST 1 VIEW: CPT

## 2020-08-15 RX ORDER — ACETAMINOPHEN 500 MG
1000 TABLET ORAL
Status: DISCONTINUED | OUTPATIENT
Start: 2020-08-15 | End: 2020-08-15 | Stop reason: HOSPADM

## 2020-08-15 RX ORDER — PROCHLORPERAZINE EDISYLATE 5 MG/ML
10 INJECTION INTRAMUSCULAR; INTRAVENOUS
Status: DISCONTINUED | OUTPATIENT
Start: 2020-08-15 | End: 2020-08-15 | Stop reason: HOSPADM

## 2020-08-15 NOTE — ED TRIAGE NOTES
Pt arrived via EMS from home with a complaint of a headache. Pt states headache was so bad it was making her dizzy. Pt says headaches started after her renal biopsy.

## 2020-08-15 NOTE — ED NOTES
Addendum:    Patient's free T4 did come back as she was walking on the emergency [facility]. I was able to stop her and let her know that her level was very low. I advised her to make sure that she is not missing any doses of her thyroid medication which she supposed be taking. I is also advised her to follow-up with her PCP as soon as possible for a TSH recheck. Patient verbalizes understanding and agrees with this plan.     Augustina Caban MD

## 2020-08-15 NOTE — ED NOTES
I have reviewed discharge instructions with the patient. The patient verbalized understanding. Pt ambulated to [facility].

## 2020-08-15 NOTE — ED PROVIDER NOTES
Eva Zarco is a 38 y.o. female with past medical history of hypertension, diabetes, and seizure disorder coming in complaining of headache. Patient states that for the last 2 weeks she has had headaches intermittently. She states that it usually her frontal and temporal area and is usually achy in nature. She states the headaches come and go. She states a couple hours ago she had a severe headache tonight. She does report some associated vomiting occasionally. Denies any abdominal pain, chest pain, or shortness of breath. Patient does report lightheadedness and dizziness with the headache. She also reports sensitivity to light. Denies any neck pain, head trauma, fevers, chills, or myalgias. Patient does note that she has had a cough for the last 2 weeks as well. Denies any lower extremity edema. Patient states she has been taking Tylenol and ibuprofen as needed over-the-counter for pain.            Past Medical History:   Diagnosis Date    Arthritis     Chest pain     Diabetes ([address]     Essential hypertension     Headache(784.0)     Hyperchloremia     Hypertension     Seizures (HCC)     SOB (shortness of breath)     Thyroid cancer (HCC)        Past Surgical History:   Procedure Laterality Date    HX PARTIAL HYSTERECTOMY      HX THYROIDECTOMY      HX TUBAL LIGATION           Family History:   Problem Relation Age of Onset    Hypertension Mother        Social History     Socioeconomic History    Marital status: SINGLE     Spouse name: Not on file    Number of children: Not on file    Years of education: Not on file    Highest education level: Not on file   Occupational History    Not on file   Social Needs    Financial resource strain: Not on file    Food insecurity     Worry: Not on file     Inability: Not on file    Transportation needs     Medical: Not on file     Non-medical: Not on file   Tobacco Use    Smoking status: Former Smoker    Smokeless tobacco: Never Used Substance and Sexual Activity    Alcohol use: No    Drug use: No    Sexual activity: Yes     Partners: Male     Birth control/protection: Surgical   Lifestyle    Physical activity     Days per week: Not on file     Minutes per session: Not on file    Stress: Not on file   Relationships    Social connections     Talks on phone: Not on file     Gets together: Not on file     Attends [employer] service: Not on file     Active member of club or organization: Not on file     Attends meetings of clubs or organizations: Not on file     Relationship status: Not on file    Intimate partner violence     Fear of current or ex partner: Not on file     Emotionally abused: Not on file     Physically abused: Not on file     Forced sexual activity: Not on file   Other Topics Concern    Not on file   Social History Narrative    Not on file         ALLERGIES: Patient has no known allergies. Review of Systems   Constitutional: Negative. Negative for chills and fever. Eyes: Positive for photophobia. Respiratory: Positive for cough. Negative for shortness of breath. Cardiovascular: Negative. Negative for chest pain and palpitations. Gastrointestinal: Positive for vomiting. Negative for abdominal pain and nausea. Genitourinary: Negative. Negative for dysuria. Musculoskeletal: Negative. Negative for myalgias. Skin: Negative. Negative for rash. Neurological: Positive for dizziness, light-headedness and headaches. Negative for syncope, weakness and numbness. All other systems reviewed and are negative. Vitals:    08/15/20 0130   BP: 139/72   Pulse: (!) 105   Resp: 20   Temp: 98.7 °F (37.1 °C)   SpO2: 100%            Physical Exam  Vitals signs reviewed. Constitutional:       General: She is not in acute distress. Appearance: Normal appearance. She is well-developed. She is obese. HENT:      Head: Normocephalic and atraumatic.       Mouth/Throat:      Comments: Coughs frequently during exam.  Eyes:      Extraocular Movements: Extraocular movements intact. Conjunctiva/sclera: Conjunctivae normal.      Pupils: Pupils are equal, round, and reactive to light. Comments: No photophobia on exam   Neck:      Musculoskeletal: Normal range of motion and neck supple. Comments: Full range of motion. No meningeal signs. Cardiovascular:      Rate and Rhythm: Normal rate and regular rhythm. Heart sounds: S1 normal and S2 normal. No murmur. No friction rub. No gallop. Pulmonary:      Effort: Pulmonary effort is normal. No accessory muscle usage or respiratory distress. Breath sounds: Normal breath sounds. Abdominal:      General: There is no distension. Tenderness: There is no abdominal tenderness. Musculoskeletal: Normal range of motion. General: No tenderness. Right lower leg: No edema. Left lower leg: No edema. Skin:     General: Skin is warm. Findings: No rash. Neurological:      General: No focal deficit present. Mental Status: She is alert and oriented to person, place, and time. Cranial Nerves: No cranial nerve deficit. Sensory: No sensory deficit. Motor: No weakness. Gait: Gait normal.      Comments: Stable, steady gait. Ambulates without difficulty. No cerebellar signs or ataxia. Psychiatric:         Speech: Speech normal.          MDM  Number of Diagnoses or Management Options  Diagnosis management comments: Kiran Bates is a 38 y.o. female coming in with intermittent headaches ever since she had a renal biopsy 2 weeks ago. Also reporting significant cough. Denying fever, chills, or body aches. Normal neurologic exam.  Will treat patient's headache symptomatically and obtain CT noncontrast due to the reported severity of the pain. Lower suspicion of subarachnoid hemorrhage. No focal neurologic deficits. Will test for COVID-19 given the persistent cough.          Procedures      Vitals:  Patient Vitals for the past 12 hrs:   Temp Pulse Resp BP SpO2   08/15/20 0130 98.7 °F (37.1 °C) (!) 105 20 139/72 100 %       Medications ordered:   Medications   sodium chloride 0.9 % bolus infusion 1,000 mL (has no administration in time range)   prochlorperazine (COMPAZINE) injection 10 mg (has no administration in time range)   acetaminophen (TYLENOL) tablet 1,000 mg (has no administration in time range)   insulin regular (NOVOLIN R, HUMULIN R) injection 10 Units (has no administration in time range)   lactated ringers bolus infusion 1,000 mL (has no administration in time range)         Lab findings:  Recent Results (from the past 12 hour(s))   URINALYSIS W/ RFLX MICROSCOPIC    Collection Time: 08/15/20  1:20 AM   Result Value Ref Range    Color YELLOW      Appearance CLEAR      Specific gravity 1.021 1.005 - 1.030      pH (UA) 6.0 5.0 - 8.0      Protein 100 (A) NEG mg/dL    Glucose >1,000 (A) NEG mg/dL    Ketone Negative NEG mg/dL    Bilirubin Negative NEG      Blood TRACE (A) NEG      Urobilinogen 0.2 0.2 - 1.0 EU/dL    Nitrites Negative NEG      Leukocyte Esterase Negative NEG     HCG URINE, QL    Collection Time: 08/15/20  1:20 AM   Result Value Ref Range    HCG urine, QL Negative NEG     URINE MICROSCOPIC ONLY    Collection Time: 08/15/20  1:20 AM   Result Value Ref Range    WBC 0 to 1 0 - 4 /hpf    RBC 0 to 2 0 - 5 /hpf    Epithelial cells 0 to 1 0 - 5 /lpf   EKG, 12 LEAD, INITIAL    Collection Time: 08/15/20  1:45 AM   Result Value Ref Range    Ventricular Rate 101 BPM    Atrial Rate 101 BPM    P-R Interval 138 ms    QRS Duration 86 ms    Q-T Interval 344 ms    QTC Calculation (Bezet) 446 ms    Calculated P Axis 72 degrees    Calculated R Axis 38 degrees    Calculated T Axis 48 degrees    Diagnosis       Sinus tachycardia  Possible Left atrial enlargement  Borderline ECG  When compared with ECG of 18-MAR-2020 10:57,  Nonspecific T wave abnormality, improved in Inferior leads  Nonspecific T wave abnormality no longer evident in Anterolateral leads  QT has shortened     CBC WITH AUTOMATED DIFF    Collection Time: 08/15/20  4:00 AM   Result Value Ref Range    WBC 8.2 4.6 - 13.2 K/uL    RBC 4.33 4.20 - 5.30 M/uL    HGB 11.7 (L) 12.0 - 16.0 g/dL    HCT 35.7 35.0 - 45.0 %    MCV 82.4 74.0 - 97.0 FL    MCH 27.0 24.0 - 34.0 PG    MCHC 32.8 31.0 - 37.0 g/dL    RDW 13.6 11.6 - 14.5 %    PLATELET 488 438 - 444 K/uL    MPV 10.7 9.2 - 11.8 FL    NEUTROPHILS 51 40 - 73 %    LYMPHOCYTES 42 21 - 52 %    MONOCYTES 6 3 - 10 %    EOSINOPHILS 1 0 - 5 %    BASOPHILS 0 0 - 2 %    ABS. NEUTROPHILS 4.3 1.8 - 8.0 K/UL    ABS. LYMPHOCYTES 3.4 0.9 - 3.6 K/UL    ABS. MONOCYTES 0.5 0.05 - 1.2 K/UL    ABS. EOSINOPHILS 0.0 0.0 - 0.4 K/UL    ABS. BASOPHILS 0.0 0.0 - 0.1 K/UL    DF AUTOMATED     METABOLIC PANEL, COMPREHENSIVE    Collection Time: 08/15/20  4:00 AM   Result Value Ref Range    Sodium 134 (L) 136 - 145 mmol/L    Potassium 4.1 3.5 - 5.5 mmol/L    Chloride 104 100 - 111 mmol/L    CO2 24 21 - 32 mmol/L    Anion gap 6 3.0 - 18 mmol/L    Glucose 389 (H) 74 - 99 mg/dL    BUN 16 7.0 - 18 MG/DL    Creatinine 1.26 0.6 - 1.3 MG/DL    BUN/Creatinine ratio 13 12 - 20      GFR est AA 58 (L) >60 ml/min/1.73m2    GFR est non-AA 48 (L) >60 ml/min/1.73m2    Calcium 8.9 8.5 - 10.1 MG/DL    Bilirubin, total 0.2 0.2 - 1.0 MG/DL    ALT (SGPT) 27 13 - 56 U/L    AST (SGOT) 17 10 - 38 U/L    Alk.  phosphatase 100 45 - 117 U/L    Protein, total 7.2 6.4 - 8.2 g/dL    Albumin 2.5 (L) 3.4 - 5.0 g/dL    Globulin 4.7 (H) 2.0 - 4.0 g/dL    A-G Ratio 0.5 (L) 0.8 - 1.7     MAGNESIUM    Collection Time: 08/15/20  4:00 AM   Result Value Ref Range    Magnesium 2.1 1.6 - 2.6 mg/dL   TSH 3RD GENERATION    Collection Time: 08/15/20  4:00 AM   Result Value Ref Range    TSH 3.81 (H) 0.36 - 3.74 uIU/mL       X-Ray, CT or other radiology findings or impressions:  CT HEAD WO CONT   Final Result   IMPRESSION: Chronic bilateral mastoid effusion, slightly worse than before with   no acute intracranial abnormalities otherwise. XR CHEST PORT    (Results Pending)       Progress notes, Consult notes or additional Procedure notes:     Reevaluation of patient:   I have reassessed the patient. Patient is feeling much better. She is ambulated the bathroom and back multiple times. States her headache feels greatly improved. At this point patient alerted the nursing staff that she was ready to leave. States she has to go  her kids. I discussed with the patient the importance of IV fluids and treatment for her hyperglycemia, however she refuses. She did asked to sign out [address] I have advised her to follow-up with her primary doctor and to return immediately if she changes her mind or has acutely worsening symptoms. She seems to have decision-making capacity understands the risk of worsening hyperglycemia including DKA. Understands follow-up instructions. Disposition:  Diagnosis:   1. Nonintractable episodic headache, unspecified headache type    2. Suspected COVID-19 virus infection    3. Hyperglycemia        Disposition: AMA    Follow-up Information     Follow up With Specialties Details Why Contact Info    Starr Bosch MD Internal Medicine Schedule an appointment as soon as possible for a visit for office follow up     [facility] EMERGENCY DEPT Emergency Medicine  As needed, If symptoms worsen [address] [facility]  [phone]           Patient's Medications   Start Taking    No medications on file   Continue Taking    ATORVASTATIN (LIPITOR) 40 MG TABLET    Take  by mouth daily. FENOFIBRATE NANOCRYSTALLIZED (TRICOR) 48 MG TABLET    Take 48 mg by mouth. FLUCONAZOLE (DIFLUCAN) 150 MG TABLET    Take 1 Tab by mouth daily. Take in 3 days x 1 daily    FUROSEMIDE (LASIX) 20 MG TABLET    Take  by mouth daily. GABAPENTIN (NEURONTIN) 300 MG CAPSULE    Take 300 mg by mouth.  One cap QAM, two caps QPM    INSULIN GLARGINE (BASAGLAR KWIKPEN U-100 INSULIN) 100 UNIT/ML (3 ML) INPN    90 Units by SubCUTAneous route two (2) times a day. INSULIN GLULISINE U-100 (APIDRA SOLOSTAR U-100 INSULIN) 100 UNIT/ML PEN    10 Units by SubCUTAneous route Before breakfast, lunch, and dinner. LIDOCAINE (LIDODERM) 5 %    Apply patch to the affected area for 12 hours a day and remove for 12 hours a day. LIOTHYRONINE (CYTOMEL) 25 MCG TABLET    Take 75 mcg by mouth daily. LISINOPRIL (PRINIVIL, ZESTRIL) 5 MG TABLET    Take 40 mg by mouth daily. MELOXICAM (MOBIC) 15 MG TABLET    Take 15 mg by mouth daily as needed for Pain. METFORMIN (GLUCOPHAGE) 1,000 MG TABLET    Take 1,000 mg by mouth two (2) times daily (with meals). METOPROLOL TARTRATE (LOPRESSOR) 25 MG TABLET    Take 25 mg by mouth daily. MOXIFLOXACIN (VIGAMOX) 0.5 % OPHTHALMIC SOLUTION    Administer 1 Drop to right eye three (3) times daily. TOPIRAMATE (TOPAMAX) 25 MG TABLET    Take 3 Tabs by mouth two (2) times a day. TRAZODONE (DESYREL) 50 MG TABLET    Take 50 mg by mouth nightly as needed for Sleep.    These Medications have changed    No medications on file   Stop Taking    No medications on file

## 2020-08-16 LAB
ATRIAL RATE: 101 BPM
CALCULATED P AXIS, ECG09: 72 DEGREES
CALCULATED R AXIS, ECG10: 38 DEGREES
CALCULATED T AXIS, ECG11: 48 DEGREES
DIAGNOSIS, 93000: NORMAL
P-R INTERVAL, ECG05: 138 MS
Q-T INTERVAL, ECG07: 344 MS
QRS DURATION, ECG06: 86 MS
QTC CALCULATION (BEZET), ECG08: 446 MS
SARS-COV-2, COV2NT: NOT DETECTED
VENTRICULAR RATE, ECG03: 101 BPM

## 2020-08-17 ENCOUNTER — PATIENT OUTREACH (OUTPATIENT)
Dept: CASE MANAGEMENT | Age: 35
End: 2020-08-17

## 2020-08-17 NOTE — PROGRESS NOTES
Date/Time:  8/17/2020 3:31 PM  Attempted to reach patient by telephone. Left HIPPA compliant message requesting a return call. Will attempt to reach patient again.

## 2020-08-19 ENCOUNTER — PATIENT OUTREACH (OUTPATIENT)
Dept: CASE MANAGEMENT | Age: 35
End: 2020-08-19

## 2020-08-19 NOTE — PROGRESS NOTES
Patient contacted regarding recent discharge and COVID-19 risk. Discussed COVID-19 related testing which was available at this time. Test results were negative. Patient informed of results, if available? yes    Care Transition Nurse/ Ambulatory Care Manager/ LPN Care Coordinator contacted the patient by telephone to perform post discharge assessment. Verified name and  with patient as identifiers. Patient has following risk factors of: diabetes and chronic kidney disease. CTN/ACM/LPN reviewed discharge instructions, medical action plan and red flags related to discharge diagnosis. Reviewed and educated them on any new and changed medications related to discharge diagnosis. Advised obtaining a 90-day supply of all daily and as-needed medications. Advance Care Planning:   Does patient have an Advance Directive: not on file     Education provided regarding infection prevention, and signs and symptoms of COVID-19 and when to seek medical attention with patient who verbalized understanding. Discussed exposure protocols and quarantine from [address] you at higher risk for severe illness  and given an opportunity for questions and concerns. The patient agrees to contact the COVID-19 hotline [phone] or PCP office for questions related to their healthcare. CTN/ACM/LPN provided contact information for future reference. From CDC: Are you at higher risk for severe illness?  Wash your hands often.  Avoid close contact (6 feet, which is about two arm lengths) with people who are sick.  Put distance between yourself and other people if COVID-19 is spreading in your community.  Clean and disinfect frequently touched surfaces.  Avoid all cruise travel and non-essential air travel.  Call your healthcare professional if you have concerns about COVID-19 and your underlying condition or if you are sick.     For more information on steps you can take to protect yourself, see CDC's How to Protect Yourself      Patient/family/caregiver given information for GetWell Loop and agrees to enroll yes  Patient's preferred e-mail:  [email or web address]  Patient's preferred phone number: [phone]  Based on Loop alert triggers, patient will be contacted by nurse care manager for worsening symptoms. Pt will be further monitored by COVID Loop Team based on severity of symptoms and risk factors.

## 2020-08-31 ENCOUNTER — HOSPITAL ENCOUNTER (OUTPATIENT)
Dept: LAB | Age: 35
Discharge: HOME | End: 2020-08-31

## 2020-08-31 PROCEDURE — 99001 SPECIMEN HANDLING PT-LAB: CPT

## 2020-09-02 LAB — XX-LABCORP SPECIMEN COL,LCBCF: NORMAL

## 2020-09-09 ENCOUNTER — HOSPITAL ENCOUNTER (EMERGENCY)
Age: 35
Discharge: HOME | End: 2020-09-09
Attending: EMERGENCY MEDICINE
Payer: MEDICAID

## 2020-09-09 VITALS
RESPIRATION RATE: 19 BRPM | TEMPERATURE: 98.8 F | OXYGEN SATURATION: 100 % | DIASTOLIC BLOOD PRESSURE: 88 MMHG | SYSTOLIC BLOOD PRESSURE: 138 MMHG | HEART RATE: 105 BPM

## 2020-09-09 DIAGNOSIS — R10.2 VAGINAL PAIN: ICD-10-CM

## 2020-09-09 DIAGNOSIS — L02.91 ABSCESS: Primary | ICD-10-CM

## 2020-09-09 PROCEDURE — 99283 EMERGENCY DEPT VISIT LOW MDM: CPT

## 2020-09-09 PROCEDURE — 75810000289 HC I&D ABSCESS SIMP/COMP/MULT

## 2020-09-09 RX ORDER — CEPHALEXIN 500 MG/1
500 CAPSULE ORAL 4 TIMES DAILY
Qty: 28 CAP | Refills: 0 | Status: SHIPPED | OUTPATIENT
Start: 2020-09-09 | End: 2020-09-16

## 2020-09-09 RX ORDER — CEPHALEXIN 500 MG/1
500 CAPSULE ORAL 4 TIMES DAILY
Qty: 28 CAP | Refills: 0 | Status: SHIPPED | OUTPATIENT
Start: 2020-09-09 | End: 2020-09-09

## 2020-09-09 RX ORDER — TRAMADOL HYDROCHLORIDE 50 MG/1
50 TABLET ORAL
Qty: 8 TAB | Refills: 0 | OUTPATIENT
Start: 2020-09-09 | End: 2020-09-09

## 2020-09-09 RX ORDER — TRAMADOL HYDROCHLORIDE 50 MG/1
50 TABLET ORAL
Qty: 8 TAB | Refills: 0 | Status: SHIPPED | OUTPATIENT
Start: 2020-09-09 | End: 2020-09-12

## 2020-09-09 NOTE — ED PROVIDER NOTES
EMERGENCY DEPARTMENT HISTORY AND PHYSICAL EXAM    1:03 PM      Date: 9/9/2020  Patient Name: Jak Trinidad    History of Presenting Illness     Chief Complaint   Patient presents with    Skin Problem         History Provided By: Patient    Additional History (Context): Jak Trinidad is a 38 y.o. female with hx of HTN, Diabetes who presents with complain of vaginal pain and lump on her left labia for the pas 4 days. Pt reports its painful when she walks. Pt reports she has had abscess in the past in other parts of her body. Pt denies any vaginal discharge or urinary symptoms. PCP: Na Desai MD    Current Outpatient Medications   Medication Sig Dispense Refill    cephALEXin (Keflex) 500 mg capsule Take 1 Cap by mouth four (4) times daily for 7 days. 28 Cap 0    traMADoL (Ultram) 50 mg tablet Take 1 Tab by mouth every four (4) hours as needed for Pain for up to 3 days. Max Daily Amount: 300 mg. 8 Tab 0    lidocaine (LIDODERM) 5 % Apply patch to the affected area for 12 hours a day and remove for 12 hours a day. 15 Each 0    topiramate (TOPAMAX) 25 mg tablet Take 3 Tabs by mouth two (2) times a day. 180 Tab 6    insulin glulisine U-100 (APIDRA SOLOSTAR U-100 INSULIN) 100 unit/mL pen 10 Units by SubCUTAneous route Before breakfast, lunch, and dinner.  insulin glargine (BASAGLAR KWIKPEN U-100 INSULIN) 100 unit/mL (3 mL) inpn 90 Units by SubCUTAneous route two (2) times a day.  gabapentin (NEURONTIN) 300 mg capsule Take 300 mg by mouth. One cap QAM, two caps QPM      liothyronine (CYTOMEL) 25 mcg tablet Take 75 mcg by mouth daily.  meloxicam (MOBIC) 15 mg tablet Take 15 mg by mouth daily as needed for Pain.  traZODone (DESYREL) 50 mg tablet Take 50 mg by mouth nightly as needed for Sleep.  metFORMIN (GLUCOPHAGE) 1,000 mg tablet Take 1,000 mg by mouth two (2) times daily (with meals).  metoprolol tartrate (LOPRESSOR) 25 mg tablet Take 25 mg by mouth daily.       fenofibrate nanocrystallized (TRICOR) 48 mg tablet Take 48 mg by mouth.  atorvastatin (LIPITOR) 40 mg tablet Take  by mouth daily.  furosemide (LASIX) 20 mg tablet Take  by mouth daily.  lisinopril (PRINIVIL, ZESTRIL) 5 mg tablet Take 40 mg by mouth daily. Past History     Past Medical History:  Past Medical History:   Diagnosis Date    Arthritis     Chest pain     [facility] [address]     Essential hypertension     Headache(784.0)     Hyperchloremia     Hypertension     Seizures (HCC)     Seizures (HCC)     SOB (shortness of breath)     Thyroid cancer ([facility] [address]        Past Surgical History:  Past Surgical History:   Procedure Laterality Date    HX PARTIAL HYSTERECTOMY      HX THYROIDECTOMY      HX TUBAL LIGATION         Family History:  Family History   Problem Relation Age of Onset    Hypertension Mother        Social History:  Social History     Tobacco Use    Smoking status: Former Smoker    Smokeless tobacco: Never Used   Substance Use Topics    Alcohol use: No    Drug use: No       Allergies:  No Known Allergies      Review of Systems       Review of Systems   Constitutional: Negative for chills and fever. Respiratory: Negative for shortness of breath. Cardiovascular: Negative for chest pain. Gastrointestinal: Negative for abdominal pain, nausea and vomiting. Genitourinary: Positive for vaginal pain. Skin: Negative for rash. Neurological: Negative for weakness. All other systems reviewed and are negative. Physical Exam     Visit Vitals  /88 (BP 1 Location: Right arm, BP Patient Position: Sitting)   Pulse (!) 105   Temp 98.8 °F (37.1 °C)   Resp 19   SpO2 100%         Physical Exam  Vitals signs reviewed. Exam conducted with a chaperone present Garrett Garcia). Constitutional:       General: She is not in acute distress. Appearance: Normal appearance. She is well-developed. She is not ill-appearing or toxic-appearing.    HENT:      Head: Normocephalic and atraumatic. Eyes:      Conjunctiva/sclera: Conjunctivae normal.      Pupils: Pupils are equal, round, and reactive to light. Neck:      Musculoskeletal: Normal range of motion. Trachea: Trachea normal.   Cardiovascular:      Rate and Rhythm: Normal rate and regular rhythm. Pulmonary:      Effort: Pulmonary effort is normal.      Breath sounds: Normal breath sounds. Abdominal:      General: There is no abdominal bruit. Palpations: Abdomen is not rigid. There is no shifting dullness, fluid wave or pulsatile mass. Tenderness: Negative signs include Clarke's sign and McBurney's sign. Genitourinary:     Labia:         Right: Tenderness present. Comments: Indurated mass on right labia majora. No fluctuance. About 2 cm in length. No obvious opening or drainage. Neurological:      General: No focal deficit present. Mental Status: She is alert and oriented to person, place, and time. Mental status is at baseline. Diagnostic Study Results     Labs -  No results found for this or any previous visit (from the past 12 hour(s)). Radiologic Studies -   No orders to display         Medical Decision Making   I am the first provider for this patient. I reviewed the vital signs, available nursing notes, past medical history, past surgical history, family history and social history. Vital Signs-Reviewed the patient's vital signs. Records Reviewed: Nursing Notes and Old Medical Records (Time of Review: 1:03 PM)    ED Course: Progress Notes, Reevaluation, and Consults:      Provider Notes (Medical Decision Making): On physical exam patient had indurated mass on right labia majora. No fluctuance. About 2 cm in length. No obvious opening or drainage. Discuss with patient postponing draining abscess until it became bigger but she still wanted it to be drained. Pt made aware of risks of incomplete drainage. Pt agreed with procedure.  No purulent drainage drained from procedure. Pt given keflex and to follow up with PCP. Pt to return if abscess gets bigger for further drainage. I&D Abcess Complex    Date/Time: 9/9/2020 1:09 PM  Performed by: Parveen Mahajan NP  Authorized by: Parveen Mahajan NP     Consent:     Consent obtained:  Verbal    Consent given by:  Patient    Risks discussed:  Bleeding, incomplete drainage, pain and infection    Alternatives discussed:  Alternative treatment and delayed treatment  Location:     Type:  Abscess    Location:  Anogenital    Anogenital location:  Vulva  Pre-procedure details:     Skin preparation:  Antiseptic wash and Betadine  Anesthesia (see MAR for exact dosages): Anesthesia method:  Local infiltration    Local anesthetic:  Lidocaine 2% w/o epi  Procedure type:     Complexity:  Complex  Procedure details:     Needle aspiration: yes      Needle size:  18 G    Incision types:  Stab incision    Incision depth:  Dermal    Scalpel blade:  11    Wound management:  Probed and deloculated    Drainage:  Bloody    Drainage amount:  Scant    Wound treatment:  Wound left open    Packing materials:  None  Post-procedure details:     Patient tolerance of procedure: Tolerated well, no immediate complications          Diagnosis     Clinical Impression:   1. Abscess    2. Vaginal pain        Disposition: home     Follow-up Information     Follow up With Specialties Details Why [address] [facility] EMERGENCY DEPT Emergency Medicine  If symptoms worsen [address] [facility]    Kandice Renteria MD Internal Medicine In 1 week             Patient's Medications   Start Taking    CEPHALEXIN (KEFLEX) 500 MG CAPSULE    Take 1 Cap by mouth four (4) times daily for 7 days. TRAMADOL (ULTRAM) 50 MG TABLET    Take 1 Tab by mouth every four (4) hours as needed for Pain for up to 3 days. Max Daily Amount: 300 mg. Continue Taking    ATORVASTATIN (LIPITOR) 40 MG TABLET    Take  by mouth daily.     FENOFIBRATE NANOCRYSTALLIZED (TRICOR) 48 MG TABLET    Take 48 mg by mouth. FUROSEMIDE (LASIX) 20 MG TABLET    Take  by mouth daily. GABAPENTIN (NEURONTIN) 300 MG CAPSULE    Take 300 mg by mouth. One cap QAM, two caps QPM    INSULIN GLARGINE (BASAGLAR KWIKPEN U-100 INSULIN) 100 UNIT/ML (3 ML) INPN    90 Units by SubCUTAneous route two (2) times a day. INSULIN GLULISINE U-100 (APIDRA SOLOSTAR U-100 INSULIN) 100 UNIT/ML PEN    10 Units by SubCUTAneous route Before breakfast, lunch, and dinner. LIDOCAINE (LIDODERM) 5 %    Apply patch to the affected area for 12 hours a day and remove for 12 hours a day. LIOTHYRONINE (CYTOMEL) 25 MCG TABLET    Take 75 mcg by mouth daily. LISINOPRIL (PRINIVIL, ZESTRIL) 5 MG TABLET    Take 40 mg by mouth daily. MELOXICAM (MOBIC) 15 MG TABLET    Take 15 mg by mouth daily as needed for Pain. METFORMIN (GLUCOPHAGE) 1,000 MG TABLET    Take 1,000 mg by mouth two (2) times daily (with meals). METOPROLOL TARTRATE (LOPRESSOR) 25 MG TABLET    Take 25 mg by mouth daily. TOPIRAMATE (TOPAMAX) 25 MG TABLET    Take 3 Tabs by mouth two (2) times a day. TRAZODONE (DESYREL) 50 MG TABLET    Take 50 mg by mouth nightly as needed for Sleep. These Medications have changed    No medications on file   Stop Taking    No medications on file       Dictation disclaimer:  Please note that this dictation was completed with [employer], the computer voice recognition software. Quite often unanticipated grammatical, syntax, homophones, and other interpretive errors are inadvertently transcribed by the computer software. Please disregard these errors. Please excuse any errors that have escaped final proofreading.

## 2020-09-09 NOTE — DISCHARGE INSTRUCTIONS

## 2020-09-09 NOTE — ED TRIAGE NOTES
Per pt \" I have a boil on my vagina\" Pt reports possible boil to vaginal area since 9/5/2020 that she states continues to get better.  Pt states hx of partical hysterectomy

## 2020-09-09 NOTE — ED NOTES
Patient discharged and given discharge instructions by Carlos Enrique Garrido. Patient ambulatory from ED. Patient accompanied by self.

## 2020-09-11 ENCOUNTER — HOSPITAL ENCOUNTER (EMERGENCY)
Age: 35
Discharge: HOME | End: 2020-09-11
Attending: EMERGENCY MEDICINE | Admitting: EMERGENCY MEDICINE
Payer: MEDICAID

## 2020-09-11 VITALS
TEMPERATURE: 99 F | SYSTOLIC BLOOD PRESSURE: 156 MMHG | HEART RATE: 104 BPM | DIASTOLIC BLOOD PRESSURE: 102 MMHG | OXYGEN SATURATION: 100 % | RESPIRATION RATE: 19 BRPM

## 2020-09-11 DIAGNOSIS — N76.4 ABSCESS OF LABIA MAJORA: Primary | ICD-10-CM

## 2020-09-11 LAB
APPEARANCE UR: CLEAR
BACTERIA URNS QL MICRO: ABNORMAL /HPF
BILIRUB UR QL: NEGATIVE
COLOR UR: YELLOW
EPITH CASTS URNS QL MICRO: ABNORMAL /LPF (ref 0–5)
GLUCOSE UR STRIP.AUTO-MCNC: >1000 MG/DL
HGB UR QL STRIP: ABNORMAL
KETONES UR QL STRIP.AUTO: NEGATIVE MG/DL
LEUKOCYTE ESTERASE UR QL STRIP.AUTO: NEGATIVE
NITRITE UR QL STRIP.AUTO: NEGATIVE
PH UR STRIP: 6 [PH] (ref 5–8)
PROT UR STRIP-MCNC: 300 MG/DL
RBC #/AREA URNS HPF: ABNORMAL /HPF (ref 0–5)
SP GR UR REFRACTOMETRY: 1.02 (ref 1–1.03)
UROBILINOGEN UR QL STRIP.AUTO: 1 EU/DL (ref 0.2–1)
WBC URNS QL MICRO: ABNORMAL /HPF (ref 0–4)

## 2020-09-11 PROCEDURE — 99282 EMERGENCY DEPT VISIT SF MDM: CPT

## 2020-09-11 PROCEDURE — 81001 URINALYSIS AUTO W/SCOPE: CPT

## 2020-09-11 PROCEDURE — 75810000117 HC INC/DRN VULVA/PERINEUM

## 2020-09-11 PROCEDURE — 75810000289 HC I&D ABSCESS SIMP/COMP/MULT

## 2020-09-11 RX ORDER — CLINDAMYCIN HYDROCHLORIDE 300 MG/1
300 CAPSULE ORAL 4 TIMES DAILY
Qty: 28 CAP | Refills: 0 | Status: SHIPPED | OUTPATIENT
Start: 2020-09-11 | End: 2020-09-18

## 2020-09-11 RX ORDER — NAPROXEN 500 MG/1
500 TABLET ORAL 2 TIMES DAILY WITH MEALS
Qty: 20 TAB | Refills: 0 | Status: SHIPPED | OUTPATIENT
Start: 2020-09-11 | End: 2020-09-21

## 2020-09-11 NOTE — ED NOTES
Discharged by PA, unseen by RN. Patient given discharge paperwork. Verbalized understanding. All belongings taken with patient. Discharged home.

## 2020-09-11 NOTE — DISCHARGE INSTRUCTIONS

## 2020-09-11 NOTE — ED PROVIDER NOTES
EMERGENCY DEPARTMENT HISTORY AND PHYSICAL EXAM      Date: 9/11/2020  Patient Name: Geraldine Roger    History of Presenting Illness     Chief Complaint   Patient presents with    Skin Problem       History Provided By: Patient    HPI: Geraldine Roger, 38 y.o. female PMHx significant for hypertension, DM, seizures, thyroid cancer presents ambulatory to the ED with cc of continued pain to right labia x 1 week. Patient states she was seen 2 days ago and treated with Keflex. Patient states she has been taking antibiotics without relief of symptoms. Patient reports abscess has grown in size and she is here to have it drained. Patient states she has been applying warm compresses. There are no other complaints, changes, or physical findings at this time. PCP: Bria Ramos MD    No current facility-administered medications on file prior to encounter. Current Outpatient Medications on File Prior to Encounter   Medication Sig Dispense Refill    cephALEXin (Keflex) 500 mg capsule Take 1 Cap by mouth four (4) times daily for 7 days. 28 Cap 0    traMADoL (Ultram) 50 mg tablet Take 1 Tab by mouth every four (4) hours as needed for Pain for up to 3 days. Max Daily Amount: 300 mg. 8 Tab 0    lidocaine (LIDODERM) 5 % Apply patch to the affected area for 12 hours a day and remove for 12 hours a day. 15 Each 0    topiramate (TOPAMAX) 25 mg tablet Take 3 Tabs by mouth two (2) times a day. 180 Tab 6    insulin glulisine U-100 (APIDRA SOLOSTAR U-100 INSULIN) 100 unit/mL pen 10 Units by SubCUTAneous route Before breakfast, lunch, and dinner.  insulin glargine (BASAGLAR KWIKPEN U-100 INSULIN) 100 unit/mL (3 mL) inpn 90 Units by SubCUTAneous route two (2) times a day.  gabapentin (NEURONTIN) 300 mg capsule Take 300 mg by mouth. One cap QAM, two caps QPM      liothyronine (CYTOMEL) 25 mcg tablet Take 75 mcg by mouth daily.       meloxicam (MOBIC) 15 mg tablet Take 15 mg by mouth daily as needed for Pain.      traZODone (DESYREL) 50 mg tablet Take 50 mg by mouth nightly as needed for Sleep.  metFORMIN (GLUCOPHAGE) 1,000 mg tablet Take 1,000 mg by mouth two (2) times daily (with meals).  metoprolol tartrate (LOPRESSOR) 25 mg tablet Take 25 mg by mouth daily.  fenofibrate nanocrystallized (TRICOR) 48 mg tablet Take 48 mg by mouth.  atorvastatin (LIPITOR) 40 mg tablet Take  by mouth daily.  furosemide (LASIX) 20 mg tablet Take  by mouth daily.  lisinopril (PRINIVIL, ZESTRIL) 5 mg tablet Take 40 mg by mouth daily. Past History     Past Medical History:  Past Medical History:   Diagnosis Date    Arthritis     Chest pain     Diabetes ([facility] [address]     Essential hypertension     Headache(784.0)     Hyperchloremia     Hypertension     Seizures (HCC)     Seizures (HCC)     SOB (shortness of breath)     Thyroid cancer ([facility] [address]        Past Surgical History:  Past Surgical History:   Procedure Laterality Date    HX PARTIAL HYSTERECTOMY      HX THYROIDECTOMY      HX TUBAL LIGATION         Family History:  Family History   Problem Relation Age of Onset    Hypertension Mother        Social History:  Social History     Tobacco Use    Smoking status: Former Smoker    Smokeless tobacco: Never Used   Substance Use Topics    Alcohol use: No    Drug use: No       Allergies:  No Known Allergies      Review of Systems   Review of Systems   Constitutional: Negative for chills and fever. Respiratory: Negative for shortness of breath. Cardiovascular: Negative for chest pain. Gastrointestinal: Negative for abdominal pain, nausea and vomiting. Genitourinary: Negative for flank pain. Musculoskeletal: Negative for back pain and myalgias. Skin: Negative for color change, pallor, rash and wound. Abscess to right labia   Neurological: Negative for dizziness, weakness and light-headedness. All other systems reviewed and are negative.       Physical Exam   Physical Exam  Vitals signs and nursing note reviewed. Constitutional:       General: She is not in acute distress. Appearance: She is well-developed. Comments: Patient well-appearing in NAD   HENT:      Head: Normocephalic and atraumatic. Eyes:      Conjunctiva/sclera: Conjunctivae normal.   Cardiovascular:      Rate and Rhythm: Normal rate and regular rhythm. Heart sounds: Normal heart sounds. Pulmonary:      Effort: Pulmonary effort is normal. No respiratory distress. Breath sounds: Normal breath sounds. Abdominal:      General: Bowel sounds are normal. There is no distension. Palpations: Abdomen is soft. Genitourinary:      Musculoskeletal: Normal range of motion. Skin:     General: Skin is warm. Findings: No rash. Neurological:      Mental Status: She is alert and oriented to person, place, and time. Psychiatric:         Behavior: Behavior normal.         Diagnostic Study Results     Labs -     Recent Results (from the past 12 hour(s))   URINALYSIS W/ RFLX MICROSCOPIC    Collection Time: 09/11/20 11:42 AM   Result Value Ref Range    Color YELLOW      Appearance CLEAR      Specific gravity 1.023 1.005 - 1.030      pH (UA) 6.0 5.0 - 8.0      Protein 300 (A) NEG mg/dL    Glucose >1,000 (A) NEG mg/dL    Ketone Negative NEG mg/dL    Bilirubin Negative NEG      Blood TRACE (A) NEG      Urobilinogen 1.0 0.2 - 1.0 EU/dL    Nitrites Negative NEG      Leukocyte Esterase Negative NEG     URINE MICROSCOPIC ONLY    Collection Time: 09/11/20 11:42 AM   Result Value Ref Range    WBC 0 to 1 0 - 4 /hpf    RBC 0 to 1 0 - 5 /hpf    Epithelial cells 2+ 0 - 5 /lpf    Bacteria FEW (A) NEG /hpf       Radiologic Studies -   No orders to display     CT Results  (Last 48 hours)    None        CXR Results  (Last 48 hours)    None          Medical Decision Making   I am the first provider for this patient.     I reviewed the vital signs, available nursing notes, past medical history, past surgical history, family history and social history. Vital Signs-Reviewed the patient's vital signs. Patient Vitals for the past 12 hrs:   Temp Pulse Resp BP SpO2   09/11/20 1018 99 °F (37.2 °C) (!) 104 19 (!) 156/102 100 %       Records Reviewed: Nursing Notes and Old Medical Records    Provider Notes (Medical Decision Making):   DDx: Abscess, cyst, cellulitis, Bartholins abscess    39 yo F who presents with abscess to right labia x1 week. Treated with Keflex 2 days ago and returns for drainage. Abscess drained with moderate amount of purulent drainage. Packing placed. Prescribed clindamycin because patient takes lisinopril which interacts with Bactrim. Discussed with patient return in 2 days for packing removal and wound recheck. Patient nontoxic-appearing in NAD. Patient with prompt outpatient follow-up with PCP in 1-2 days. Patient given strict instructions to return if symptoms worsen. ED Course:   Initial assessment performed. The patients presenting problems have been discussed, and they are in agreement with the care plan formulated and outlined with them. I have encouraged them to ask questions as they arise throughout their visit. I&D Abcess Complex    Date/Time: 9/11/2020 12:14 PM  Performed by: ESTEBAN Gomez  Authorized by: ESTEBAN Gomez     Consent:     Consent obtained:  Verbal    Consent given by:  Patient    Risks discussed:  Incomplete drainage, infection and pain  Location:     Type:  Abscess    Size:  4 cm    Location: right labia. Pre-procedure details:     Skin preparation:  Betadine  Anesthesia (see MAR for exact dosages):      Anesthesia method:  Local infiltration    Local anesthetic:  Lidocaine 1% w/o epi  Procedure type:     Complexity:  Complex  Procedure details:     Incision types:  Single straight    Incision depth:  Dermal    Scalpel blade:  11    Wound management:  Irrigated with saline and probed and deloculated    Drainage:  Bloody and purulent    Drainage amount: Moderate    Packing materials:  1/4 in gauze    Amount 1/4\":  5 cm  Post-procedure details:     Patient tolerance of procedure: Tolerated well, no immediate complications          Disposition:  12:14 PM  Discussed dx and treatment plan. Discussed importance of PCP follow up. All questions answered. Pt voiced they understood. Return if sx worsen. PLAN:  1. Current Discharge Medication List      START taking these medications    Details   naproxen (Naprosyn) 500 mg tablet Take 1 Tab by mouth two (2) times daily (with meals) for 10 days. Qty: 20 Tab, Refills: 0      clindamycin (CLEOCIN) 300 mg capsule Take 1 Cap by mouth four (4) times daily for 7 days. Qty: 28 Cap, Refills: 0           2. Follow-up Information     Follow up With Specialties Details Why Contact Info    [facility] Emergency Medicine In 2 days For wound re-check [address] [facility] [address] [facility]    Ottoniel Elmore MD Internal Medicine Schedule an appointment as soon as possible for a visit in 1 day      [facility] Department of OB/GYN  Schedule an appointment as soon as possible for a visit in 1 day  [facility], [address] [phone]        Return to ED if worse     Diagnosis     Clinical Impression:   1. Abscess of labia majora        Attestations:    Valentino Kirk, PA    Please note that this dictation was completed with [employer], the computer voice recognition software. Quite often unanticipated grammatical, syntax, homophones, and other interpretive errors are inadvertently transcribed by the computer software. Please disregard these errors. Please excuse any errors that have escaped final proofreading. Thank you.

## 2021-01-08 ENCOUNTER — OFFICE VISIT (OUTPATIENT)
Dept: SURGERY | Age: 36
End: 2021-01-08
Payer: MEDICAID

## 2021-01-08 VITALS
BODY MASS INDEX: 45.96 KG/M2 | SYSTOLIC BLOOD PRESSURE: 144 MMHG | HEIGHT: 66 IN | WEIGHT: 286 LBS | OXYGEN SATURATION: 100 % | DIASTOLIC BLOOD PRESSURE: 90 MMHG | HEART RATE: 94 BPM | TEMPERATURE: 97.2 F | RESPIRATION RATE: 18 BRPM

## 2021-01-08 DIAGNOSIS — E66.01 MORBID OBESITY WITH BMI OF 45.0-49.9, ADULT (HCC): Primary | ICD-10-CM

## 2021-01-08 PROCEDURE — 99205 OFFICE O/P NEW HI 60 MIN: CPT | Performed by: SURGERY

## 2021-01-08 RX ORDER — INSULIN ASPART 100 [IU]/ML
INJECTION, SOLUTION INTRAVENOUS; SUBCUTANEOUS
COMMUNITY

## 2021-01-08 RX ORDER — FLUTICASONE PROPIONATE 50 MCG
SPRAY, SUSPENSION (ML) NASAL
COMMUNITY
Start: 2020-10-26 | End: 2021-05-24

## 2021-01-08 RX ORDER — PANTOPRAZOLE SODIUM 40 MG/1
TABLET, DELAYED RELEASE ORAL
COMMUNITY
Start: 2020-10-26

## 2021-01-08 RX ORDER — TOPIRAMATE 50 MG/1
TABLET, FILM COATED ORAL
COMMUNITY
Start: 2020-11-27

## 2021-01-08 RX ORDER — INSULIN GLARGINE 100 [IU]/ML
INJECTION, SOLUTION SUBCUTANEOUS
COMMUNITY
Start: 2020-11-30

## 2021-01-08 NOTE — PROGRESS NOTES
Chief Complaint   Patient presents with    Advice Only     confirmed video     Pt ID confirmed    Weight Loss Metrics 1/8/2021 1/8/2021 7/30/2020 6/8/2020 4/6/2020 3/18/2020 3/18/2020   Pre op / Initial Wt 286 - - - - - -   Today's Wt - 286 lb 269 lb 260 lb 270 lb 265 lb 265 lb   BMI - 46.16 kg/m2 42.13 kg/m2 38.4 kg/m2 39.87 kg/m2 39.13 kg/m2 39.13 kg/m2   Ideal Body Wt 137 - - - - - -   Excess Body Wt 149 - - - - - -   Goal Wt 167 - - - - - -   Wt loss to date 0 - - - - - -   % Wt Loss 0 - - - - - -   80% .2 - - - - - -       Body mass index is 46.16 kg/m².

## 2021-01-08 NOTE — PROGRESS NOTES
Consult    Patient: Rosalinda Mtz MRN: [id number]  SSN: [id number]    YOB: 1985  Age: 38 y.o. Sex: female      Initial  Consultation for Bariatric Surgery     Rosalinda Mtz is a 76-year-old black female who presents for discussion of the surgical options available for the definitive management of her clinically severe obesity. Onset obesity: Childhood  Weight at age 24: 220 pounds and a 5 feet 6 inches frame  Maximum/current weight: 286 pounds and a 5 feet 6 inches frame with a body mass index of 46  Pattern/progression of weight gain: Slowly progressive interrupted by dietary weight loss followed by regain of lost weight as well as additional weight thus exhibiting yoyo effect after maximum weight of 286 pounds. Maximum medical weight loss attempts: Multiple unsupervised and supervised weight loss trials of maximal loss occurring in 2018 losing 5 pounds over 3 months  Comorbidities: Hypertension, adult-onset diabetes mellitus with nephropathy/neuropathy, hypertriglyceridemia, hypercholesterolemia, gastroesophageal reflux disease, stress urinary incontinence, clinical obstructive sleep apnea, carpal tunnel syndrome, venous stasis disease, with related arthropathy-back  Current weight: 206. BMI: 46  Ideal body weight: 137  Excess body weight 149  Postsurgical weight loss: 119  Postsurgical goal weight: 167  Allergies: No known drug allergies  Current medications: See medication list  Past medical history:  1. Clinically severe obesity with a body mass index of 46 and obesity related comorbidities hypertension, adult-onset diabetes mellitus with nephropathy, neuropathy hypertriglyceridemia, hypercholesterolemia, Gastrosoft reflux disease, stricture incontinence, clinical obstructive sleep apnea, carpal tunnel syndrome, stasis disease, weight related arthropathy-back  2. Question of atypical angina  3. Allergic rhinitis  4. Vitamin D deficiency  5. Cholelithiasis  6.   Migraine cephalgia  7. Seizure disorder  8. Chronic kidney disease stage II  9. History of multinodular goiter  10 surgical hypothyroidism status post total thyroidectomy for papillary thyroid carcinoma of unknown stage  Past surgical history  1.  section   2. ALEX/BSO/open   3. Total thyroidectomy   Social history: Denies utilization of alcohol, quit smoking in  with a prior history of 1/2 pack of cigarettes daily for 2 years. Family history: Mother  54-pancreatic carcinoma  Father 53-unknown health  No siblings    No Known Allergies    Current Outpatient Medications on File Prior to Visit   Medication Sig Dispense Refill    fluticasone propionate (FLONASE) 50 mcg/actuation nasal spray       pantoprazole (PROTONIX) 40 mg tablet       Lantus U-100 Insulin 100 unit/mL injection       topiramate (TOPAMAX) 50 mg tablet       insulin aspart U-100 (NovoLOG U-100 Insulin aspart) 100 unit/mL injection by SubCUTAneous route.  gabapentin (NEURONTIN) 300 mg capsule Take 300 mg by mouth. One cap QAM, two caps QPM      liothyronine (CYTOMEL) 25 mcg tablet Take 75 mcg by mouth daily.  metFORMIN (GLUCOPHAGE) 1,000 mg tablet Take 1,000 mg by mouth two (2) times daily (with meals).  metoprolol tartrate (LOPRESSOR) 25 mg tablet Take 25 mg by mouth daily.  lisinopril (PRINIVIL, ZESTRIL) 5 mg tablet Take 40 mg by mouth daily.  lidocaine (LIDODERM) 5 % Apply patch to the affected area for 12 hours a day and remove for 12 hours a day. 15 Each 0    insulin glulisine U-100 (APIDRA SOLOSTAR U-100 INSULIN) 100 unit/mL pen 10 Units by SubCUTAneous route Before breakfast, lunch, and dinner.  insulin glargine (BASAGLAR KWIKPEN U-100 INSULIN) 100 unit/mL (3 mL) inpn 90 Units by SubCUTAneous route two (2) times a day.  meloxicam (MOBIC) 15 mg tablet Take 15 mg by mouth daily as needed for Pain.       traZODone (DESYREL) 50 mg tablet Take 50 mg by mouth nightly as needed for Sleep.      fenofibrate nanocrystallized (TRICOR) 48 mg tablet Take 48 mg by mouth.  atorvastatin (LIPITOR) 40 mg tablet Take  by mouth daily.  furosemide (LASIX) 20 mg tablet Take  by mouth daily. No current facility-administered medications on file prior to visit. Past Medical History:   Diagnosis Date    Arthritis     Chest pain     [facility] [address]     Essential hypertension     Headache(784.0)     Hyperchloremia     Hypertension     Seizures ([facility]     8/2020 last seizure    Seizures (HCC)     SOB (shortness of breath)     Thyroid cancer ([facility]        Past Surgical History:   Procedure Laterality Date    HX PARTIAL HYSTERECTOMY      HX THYROIDECTOMY      HX TUBAL LIGATION         Social History     Tobacco Use    Smoking status: Former Smoker    Smokeless tobacco: Never Used   Substance Use Topics    Alcohol use: No    Drug use: No       Family History   Problem Relation Age of Onset    Hypertension Mother          Review of Systems:      General: Denies fevers, chills, night sweats, fatigue, weight loss, or weight gain.     HEENT: Denies changes in auditory or visual acuity, recurrent pharyngitis, epistaxis, chronic rhinorrhea, vertigo    Respiratory: Denies increasing shortness of breath, productive cough, hemoptysis    Cardiac: Denies known history of cardiac disease, heart murmur, palpitations    GI: Denies dysphagia, recurrent emesis, hematemesis, changes in bowel habits, hematochezia, melena    : Denies hematuria frequency urgency dysuria    Musculoskeletal: Denies fractures, dislocations    Neurologic: Denies history of CVA, paralysis paresthesias, recurrent cephalgia, seizures    Endocrine: Denies polyuria, polydipsia, polyphagia, heat and cold intolerance    Lymph/heme: Denies a history of malignancy, anemia, bruising, blood transfusions    Integumentary: Negative for dermatitis         Physical Exam    Visit Vitals  BP (!) 144/90   Pulse 94   Temp 97.2 °F (36.2 °C)   Resp 18   Ht 5' 6\" (1.676 m)   Wt 129.7 kg (286 lb)   SpO2 100%   BMI 46.16 kg/m²       Nursing note reviewed. General: Clinically severely obese in no acute distress, nontoxic in appearance. Head: Normocephalic, atraumatic  Mouth: Clear, no overt lesions, oral mucosa is pink and moist.  Neck: Supple, no masses, no adenopathy or carotid bruits, trachea midline  Resp: Clear to auscultation bilaterally, no wheezing, rhonchi, or rales, excursions normal and symmetrical.  Cardio: Regular rate and rhythm, no murmurs, clicks, gallops, or rubs. Abdomen: Obese, soft, nontender, nondistended, normoactive bowel sounds, no hernias. Extremities: Warm, well perfused, no tenderness or swelling, normal gait/station, without edema or varicosities  Neuro: Sensation and strength grossly intact and symmetrical.  Psych: Alert and oriented to person, place, and time. Impression/Plan:      74-year-old black female with a [employer] is a 54 with obesity related comorbidities hypertension, adult-onset diabetes mellitus, nephropathy, neuropathy, hypertriglyceridemia, hypercholesterolemia, gastroesophageal reflux disease, stricture incontinence, post obstructive sleep apnea, carpal tunnel syndrome, venous stasis disease, weight related arthropathy-back who would benefit from bariatric surgery. We have had an extensive discussion with regard to the risks, benefits and likely outcomes of the operation. We've discussed the restrictive and malabsorptive nature of the gastric bypass and compared and contrasted with the sleeve gastrectomy. The patient understands the likelihood of losing approximately 80% of their excess weight in 12 to 18 months. The patient also understands the risks including but not limited to bleeding, infection, need for reoperation, ulcers, leaks and strictures, bowel obstruction secondary to adhesions and internal hernias, DVT, PE, heart attack, stroke, and death.  Patient also understands risks of inadequate weight loss, excess weight loss, vitamin insufficiency, protein malnutrition, excess skin, and loss of hair. We have reviewed the components of a successful postoperative course including requirement for a high protein, low carbohydrate diet, 60 oz a day of zero calorie liquids, daily vitamin supplementation, daily exercise, regular follow-up, and participation in support groups.  At this time we will enroll the patient in our bariatric program, undertake routine laboratory evaluation, chest X-ray, EKG, possible UGI and evaluation by  nutritionist as well as psychologist and pending their satisfactory completion of the preop evaluation, plan to pursue laparoscopic potentially open gastric bypass to achieve definitive durable weight loss on a personal level with expected resolution of obesity related comorbidities

## 2021-02-11 ENCOUNTER — HOSPITAL ENCOUNTER (OUTPATIENT)
Dept: ULTRASOUND IMAGING | Age: 36
Discharge: HOME | End: 2021-02-11
Attending: INTERNAL MEDICINE
Payer: MEDICAID

## 2021-02-11 ENCOUNTER — HOSPITAL ENCOUNTER (OUTPATIENT)
Dept: LAB | Age: 36
Discharge: HOME | End: 2021-02-11

## 2021-02-11 DIAGNOSIS — R80.1 PERSISTENT PROTEINURIA: ICD-10-CM

## 2021-02-11 LAB — XX-LABCORP SPECIMEN COL,LCBCF: NORMAL

## 2021-02-11 PROCEDURE — 99001 SPECIMEN HANDLING PT-LAB: CPT

## 2021-02-11 PROCEDURE — 76770 US EXAM ABDO BACK WALL COMP: CPT

## 2021-02-18 ENCOUNTER — DOCUMENTATION ONLY (OUTPATIENT)
Dept: BARIATRICS/WEIGHT MGMT | Age: 36
End: 2021-02-18

## 2021-02-18 NOTE — PROGRESS NOTES
2/18/2021:  Patient did not show for her nutrition visit. This is her 2nd no show. She did not respond to any e-mails or the nutrition requirements leading up to her visit the first time. I then spoke to the patient and she understood what the requirements were and she again, did not do them. She was not contacted to be rescheduled again.     Thelda Cabot, MS RD

## 2021-03-08 ENCOUNTER — TRANSCRIBE ORDER (OUTPATIENT)
Dept: SCHEDULING | Age: 36
End: 2021-03-08

## 2021-03-08 DIAGNOSIS — Z12.31 VISIT FOR SCREENING MAMMOGRAM: Primary | ICD-10-CM

## 2021-03-29 ENCOUNTER — HOSPITAL ENCOUNTER (OUTPATIENT)
Dept: LAB | Age: 36
Discharge: HOME | End: 2021-03-29

## 2021-03-29 LAB — XX-LABCORP SPECIMEN COL,LCBCF: NORMAL

## 2021-03-29 PROCEDURE — 99001 SPECIMEN HANDLING PT-LAB: CPT

## 2021-03-31 ENCOUNTER — TRANSCRIBE ORDER (OUTPATIENT)
Dept: SCHEDULING | Age: 36
End: 2021-03-31

## 2021-03-31 DIAGNOSIS — R80.1 PERSISTENT PROTEINURIA: Primary | ICD-10-CM

## 2021-04-05 ENCOUNTER — APPOINTMENT (OUTPATIENT)
Dept: GENERAL RADIOLOGY | Age: 36
End: 2021-04-05
Attending: EMERGENCY MEDICINE
Payer: MEDICAID

## 2021-04-05 ENCOUNTER — HOSPITAL ENCOUNTER (EMERGENCY)
Age: 36
Discharge: HOME | End: 2021-04-06
Attending: EMERGENCY MEDICINE
Payer: MEDICAID

## 2021-04-05 DIAGNOSIS — R10.9 FLANK PAIN, CHRONIC: ICD-10-CM

## 2021-04-05 DIAGNOSIS — R55 SYNCOPE, UNSPECIFIED SYNCOPE TYPE: Primary | ICD-10-CM

## 2021-04-05 DIAGNOSIS — G89.29 FLANK PAIN, CHRONIC: ICD-10-CM

## 2021-04-05 LAB
ALBUMIN SERPL-MCNC: 2.9 G/DL (ref 3.4–5)
ALBUMIN/GLOB SERPL: 0.6 {RATIO} (ref 0.8–1.7)
ALP SERPL-CCNC: 92 U/L (ref 45–117)
ALT SERPL-CCNC: 26 U/L (ref 13–56)
AMPHET UR QL SCN: NEGATIVE
ANION GAP SERPL CALC-SCNC: 10 MMOL/L (ref 3–18)
APPEARANCE UR: CLEAR
AST SERPL-CCNC: 15 U/L (ref 10–38)
BACTERIA URNS QL MICRO: ABNORMAL /HPF
BARBITURATES UR QL SCN: NEGATIVE
BASOPHILS # BLD: 0 K/UL (ref 0–0.1)
BASOPHILS NFR BLD: 0 % (ref 0–2)
BENZODIAZ UR QL: NEGATIVE
BILIRUB SERPL-MCNC: 0.3 MG/DL (ref 0.2–1)
BILIRUB UR QL: NEGATIVE
BUN SERPL-MCNC: 30 MG/DL (ref 7–18)
BUN/CREAT SERPL: 25 (ref 12–20)
CALCIUM SERPL-MCNC: 9.1 MG/DL (ref 8.5–10.1)
CANNABINOIDS UR QL SCN: POSITIVE
CHLORIDE SERPL-SCNC: 102 MMOL/L (ref 100–111)
CO2 SERPL-SCNC: 26 MMOL/L (ref 21–32)
COCAINE UR QL SCN: NEGATIVE
COLOR UR: YELLOW
CREAT SERPL-MCNC: 1.21 MG/DL (ref 0.6–1.3)
DIFFERENTIAL METHOD BLD: ABNORMAL
EOSINOPHIL # BLD: 0.1 K/UL (ref 0–0.4)
EOSINOPHIL NFR BLD: 1 % (ref 0–5)
EPITH CASTS URNS QL MICRO: ABNORMAL /LPF (ref 0–5)
ERYTHROCYTE [DISTWIDTH] IN BLOOD BY AUTOMATED COUNT: 13.1 % (ref 11.6–14.5)
ETHANOL SERPL-MCNC: <3 MG/DL (ref 0–3)
GLOBULIN SER CALC-MCNC: 5.2 G/DL (ref 2–4)
GLUCOSE SERPL-MCNC: 240 MG/DL (ref 74–99)
GLUCOSE UR STRIP.AUTO-MCNC: 250 MG/DL
HCT VFR BLD AUTO: 38.9 % (ref 35–45)
HDSCOM,HDSCOM: ABNORMAL
HGB BLD-MCNC: 13.2 G/DL (ref 12–16)
HGB UR QL STRIP: ABNORMAL
KETONES UR QL STRIP.AUTO: NEGATIVE MG/DL
LEUKOCYTE ESTERASE UR QL STRIP.AUTO: NEGATIVE
LYMPHOCYTES # BLD: 4.4 K/UL (ref 0.9–3.6)
LYMPHOCYTES NFR BLD: 49 % (ref 21–52)
MAGNESIUM SERPL-MCNC: 2.2 MG/DL (ref 1.6–2.6)
MCH RBC QN AUTO: 27.3 PG (ref 24–34)
MCHC RBC AUTO-ENTMCNC: 33.9 G/DL (ref 31–37)
MCV RBC AUTO: 80.4 FL (ref 74–97)
METHADONE UR QL: NEGATIVE
MONOCYTES # BLD: 0.6 K/UL (ref 0.05–1.2)
MONOCYTES NFR BLD: 7 % (ref 3–10)
NEUTS SEG # BLD: 3.8 K/UL (ref 1.8–8)
NEUTS SEG NFR BLD: 43 % (ref 40–73)
NITRITE UR QL STRIP.AUTO: NEGATIVE
OPIATES UR QL: NEGATIVE
PCP UR QL: NEGATIVE
PH UR STRIP: 5 [PH] (ref 5–8)
PLATELET # BLD AUTO: 320 K/UL (ref 135–420)
PMV BLD AUTO: 10 FL (ref 9.2–11.8)
POTASSIUM SERPL-SCNC: 3.7 MMOL/L (ref 3.5–5.5)
PROT SERPL-MCNC: 8.1 G/DL (ref 6.4–8.2)
PROT UR STRIP-MCNC: >1000 MG/DL
RBC # BLD AUTO: 4.84 M/UL (ref 4.2–5.3)
SODIUM SERPL-SCNC: 138 MMOL/L (ref 136–145)
SP GR UR REFRACTOMETRY: 1.01 (ref 1–1.03)
T4 FREE SERPL-MCNC: 0.2 NG/DL (ref 0.7–1.5)
TSH SERPL DL<=0.05 MIU/L-ACNC: 5.46 UIU/ML (ref 0.36–3.74)
UROBILINOGEN UR QL STRIP.AUTO: 0.2 EU/DL (ref 0.2–1)
WBC # BLD AUTO: 8.8 K/UL (ref 4.6–13.2)

## 2021-04-05 PROCEDURE — 87635 SARS-COV-2 COVID-19 AMP PRB: CPT

## 2021-04-05 PROCEDURE — 80307 DRUG TEST PRSMV CHEM ANLYZR: CPT

## 2021-04-05 PROCEDURE — 81001 URINALYSIS AUTO W/SCOPE: CPT

## 2021-04-05 PROCEDURE — 99285 EMERGENCY DEPT VISIT HI MDM: CPT

## 2021-04-05 PROCEDURE — 74011250636 HC RX REV CODE- 250/636: Performed by: EMERGENCY MEDICINE

## 2021-04-05 PROCEDURE — 82077 ASSAY SPEC XCP UR&BREATH IA: CPT

## 2021-04-05 PROCEDURE — 85025 COMPLETE CBC W/AUTO DIFF WBC: CPT

## 2021-04-05 PROCEDURE — 84439 ASSAY OF FREE THYROXINE: CPT

## 2021-04-05 PROCEDURE — 93005 ELECTROCARDIOGRAM TRACING: CPT

## 2021-04-05 PROCEDURE — 83735 ASSAY OF MAGNESIUM: CPT

## 2021-04-05 PROCEDURE — 71045 X-RAY EXAM CHEST 1 VIEW: CPT

## 2021-04-05 PROCEDURE — 84443 ASSAY THYROID STIM HORMONE: CPT

## 2021-04-05 PROCEDURE — 80053 COMPREHEN METABOLIC PANEL: CPT

## 2021-04-05 RX ADMIN — SODIUM CHLORIDE 1000 ML: 900 INJECTION, SOLUTION INTRAVENOUS at 23:12

## 2021-04-06 ENCOUNTER — APPOINTMENT (OUTPATIENT)
Dept: CT IMAGING | Age: 36
End: 2021-04-06
Attending: EMERGENCY MEDICINE
Payer: MEDICAID

## 2021-04-06 VITALS
WEIGHT: 266 LBS | BODY MASS INDEX: 39.4 KG/M2 | SYSTOLIC BLOOD PRESSURE: 131 MMHG | OXYGEN SATURATION: 99 % | DIASTOLIC BLOOD PRESSURE: 79 MMHG | HEART RATE: 99 BPM | TEMPERATURE: 97.5 F | HEIGHT: 69 IN | RESPIRATION RATE: 16 BRPM

## 2021-04-06 LAB
ATRIAL RATE: 107 BPM
CALCULATED P AXIS, ECG09: 112 DEGREES
CALCULATED R AXIS, ECG10: 156 DEGREES
CALCULATED T AXIS, ECG11: 143 DEGREES
COVID-19 RAPID TEST, COVR: NOT DETECTED
DIAGNOSIS, 93000: NORMAL
P-R INTERVAL, ECG05: 136 MS
Q-T INTERVAL, ECG07: 340 MS
QRS DURATION, ECG06: 86 MS
QTC CALCULATION (BEZET), ECG08: 453 MS
SOURCE, COVRS: NORMAL
VENTRICULAR RATE, ECG03: 107 BPM

## 2021-04-06 PROCEDURE — 96374 THER/PROPH/DIAG INJ IV PUSH: CPT

## 2021-04-06 PROCEDURE — 74176 CT ABD & PELVIS W/O CONTRAST: CPT

## 2021-04-06 PROCEDURE — 74011250636 HC RX REV CODE- 250/636: Performed by: EMERGENCY MEDICINE

## 2021-04-06 RX ORDER — KETOROLAC TROMETHAMINE 15 MG/ML
15 INJECTION, SOLUTION INTRAMUSCULAR; INTRAVENOUS
Status: COMPLETED | OUTPATIENT
Start: 2021-04-06 | End: 2021-04-06

## 2021-04-06 RX ADMIN — KETOROLAC TROMETHAMINE 15 MG: 15 INJECTION, SOLUTION INTRAMUSCULAR; INTRAVENOUS at 01:10

## 2021-04-06 NOTE — ED PROVIDER NOTES
Vanessa Hutton is a 38 y.o. female with a past medical history of hypertension, diabetes, hyperlipidemia, seizure disorder, and chronic kidney disease coming in with 2 weeks of urinary symptoms and bilateral flank pain. Patient states her blood sugars have also been very labile and she is having trouble keeping them under control despite taking her insulin and diabetes meds as prescribed. She states that she has been very tired and fatigued. She also reports feeling lightheaded earlier tonight and states that she had a near syncopal event. She reports a couple episodes of vomiting over the last 2 weeks as well as a cough occasionally productive of sputum. She denies any chest pain or shortness of breath. She states that she will occasionally have some intermittent pains diffusely throughout her abdomen, but denies any abdominal pain currently. Denies diarrhea. Denies fevers or chills. Patient states that she did speak to Dr. Per Álvarez, her nephrologist, and he was planning on doing a renal ultrasound but she has not had this done yet. Patient reports urinary urgency, frequency, and hesitancy, but denies burning dysuria or hematuria.            Past Medical History:   Diagnosis Date    Arthritis     Chest pain     Diabetes ([facility] [address]     Essential hypertension     Headache(784.0)     Hyperchloremia     Hypertension     Seizures ([facility] [address]     8/2020 last seizure    Seizures (HCC)     SOB (shortness of breath)     Thyroid cancer ([facility] [address]        Past Surgical History:   Procedure Laterality Date    HX PARTIAL HYSTERECTOMY      HX THYROIDECTOMY      HX TUBAL LIGATION           Family History:   Problem Relation Age of Onset    Hypertension Mother        Social History     Socioeconomic History    Marital status: SINGLE     Spouse name: Not on file    Number of children: Not on file    Years of education: Not on file    Highest education level: Not on file   Occupational History    Not on file   Social Needs    Financial resource strain: Not on file    Food insecurity     Worry: Not on file     Inability: Not on file    Transportation needs     Medical: Not on file     Non-medical: Not on file   Tobacco Use    Smoking status: Former Smoker    Smokeless tobacco: Never Used   Substance and Sexual Activity    Alcohol use: No    Drug use: No    Sexual activity: Yes     Partners: Male     Birth control/protection: Surgical   Lifestyle    Physical activity     Days per week: Not on file     Minutes per session: Not on file    Stress: Not on file   Relationships    Social connections     Talks on phone: Not on file     Gets together: Not on file     Attends Tenriism service: Not on file     Active member of club or organization: Not on file     Attends meetings of clubs or organizations: Not on file     Relationship status: Not on file    Intimate partner violence     Fear of current or ex partner: Not on file     Emotionally abused: Not on file     Physically abused: Not on file     Forced sexual activity: Not on file   Other Topics Concern    Not on file   Social History Narrative    Not on file         ALLERGIES: Patient has no known allergies. Review of Systems   Constitutional: Positive for fatigue. Negative for chills and fever. Respiratory: Positive for cough. Negative for shortness of breath. Cardiovascular: Negative. Negative for chest pain. Gastrointestinal: Positive for abdominal pain, nausea and vomiting. Genitourinary: Positive for flank pain, frequency and urgency. Negative for dysuria and hematuria. Musculoskeletal: Positive for back pain. Negative for myalgias. Skin: Negative. Negative for rash. Neurological: Positive for syncope (Near) and light-headedness. Negative for weakness. All other systems reviewed and are negative.       Vitals:    04/05/21 2315 04/05/21 2330 04/05/21 2345 04/06/21 0145   BP: (!) 99/58 106/69 (!) 125/59 (!) 113/54   Pulse: (!) 110 (!) 106 (!) 102 99   Resp: 16 19 17 14   Temp:       SpO2: 97% 100% 100% 99%   Weight:       Height:                Physical Exam  Vitals signs reviewed. Constitutional:       General: She is not in acute distress. Appearance: Normal appearance. She is well-developed. She is obese. HENT:      Head: Normocephalic and atraumatic. Mouth/Throat:      Mouth: Mucous membranes are dry. Eyes:      Extraocular Movements: Extraocular movements intact. Conjunctiva/sclera: Conjunctivae normal.      Pupils: Pupils are equal, round, and reactive to light. Neck:      Musculoskeletal: Normal range of motion and neck supple. No neck rigidity or muscular tenderness. Cardiovascular:      Rate and Rhythm: Regular rhythm. Tachycardia present. Heart sounds: S1 normal and S2 normal. No murmur. No friction rub. No gallop. Pulmonary:      Effort: Pulmonary effort is normal. No accessory muscle usage or respiratory distress. Breath sounds: Normal breath sounds. Abdominal:      General: There is no distension. Palpations: Abdomen is soft. Tenderness: There is no abdominal tenderness. There is right CVA tenderness and left CVA tenderness. Musculoskeletal: Normal range of motion. General: No tenderness. Right lower leg: No edema. Left lower leg: No edema. Skin:     General: Skin is warm. Findings: No rash. Neurological:      General: No focal deficit present. Mental Status: She is alert and oriented to person, place, and time. Cranial Nerves: No cranial nerve deficit. Sensory: No sensory deficit. Motor: No weakness. Coordination: Coordination normal.   Psychiatric:         Speech: Speech normal.          MDM  Number of Diagnoses or Management Options  Diagnosis management comments: Dora Aaron is a 38 y.o. female coming in with essentially 2 weeks of urinary symptoms, bilateral flank pain, and intermittent diffuse abdominal pain.   Also has had occasional vomiting and cough. She reports labile blood sugars despite compliance with her medications. She is overall well-appearing, but does appear clinically dry and is tachycardic. We will get work-up to rule out pyelonephritis, DKA, UTI, among others. Will initiate a broad work-up.          Procedures      Vitals:  Patient Vitals for the past 12 hrs:   Temp Pulse Resp BP SpO2   04/06/21 0145 -- 99 14 (!) 113/54 99 %   04/05/21 2345 -- (!) 102 17 (!) 125/59 100 %   04/05/21 2330 -- (!) 106 19 106/69 100 %   04/05/21 2315 -- (!) 110 16 (!) 99/58 97 %   04/05/21 2300 -- (!) 101 13 124/61 100 %   04/05/21 2245 -- (!) 102 20 (!) 119/59 98 %   04/05/21 2230 -- (!) 103 16 105/66 97 %   04/05/21 2215 -- (!) 108 19 126/77 99 %   04/05/21 2200 -- (!) 105 22 (!) 163/147 98 %   04/05/21 2130 -- (!) 104 20 129/79 96 %   04/05/21 2124 98.6 °F (37 °C) (!) 108 15 138/83 99 %       Medications ordered:   Medications   sodium chloride 0.9 % bolus infusion 1,000 mL (1,000 mL IntraVENous New Bag 4/5/21 2312)   ketorolac (TORADOL) injection 15 mg (15 mg IntraVENous Given 4/6/21 0110)         Lab findings:  Recent Results (from the past 12 hour(s))   EKG, 12 LEAD, INITIAL    Collection Time: 04/05/21  9:09 PM   Result Value Ref Range    Ventricular Rate 107 BPM    Atrial Rate 107 BPM    P-R Interval 136 ms    QRS Duration 86 ms    Q-T Interval 340 ms    QTC Calculation (Bezet) 453 ms    Calculated P Axis 112 degrees    Calculated R Axis 156 degrees    Calculated T Axis 143 degrees    Diagnosis       Suspect arm lead reversal, interpretation assumes no reversal  Sinus tachycardia  Left posterior fascicular block  Abnormal ECG  When compared with ECG of 15-AUG-2020 01:45,  Left posterior fascicular block is now present  T wave inversion now evident in Lateral leads     URINALYSIS W/ RFLX MICROSCOPIC    Collection Time: 04/05/21  9:15 PM   Result Value Ref Range    Color YELLOW      Appearance CLEAR      Specific gravity 1.015 1.005 - 1.030      pH (UA) 5.0 5.0 - 8.0      Protein >1,000 (A) NEG mg/dL    Glucose 250 (A) NEG mg/dL    Ketone Negative NEG mg/dL    Bilirubin Negative NEG      Blood TRACE (A) NEG      Urobilinogen 0.2 0.2 - 1.0 EU/dL    Nitrites Negative NEG      Leukocyte Esterase Negative NEG     DRUG SCREEN, URINE    Collection Time: 04/05/21  9:15 PM   Result Value Ref Range    BENZODIAZEPINES Negative NEG      BARBITURATES Negative NEG      THC (TH-CANNABINOL) Positive (A) NEG      OPIATES Negative NEG      PCP(PHENCYCLIDINE) Negative NEG      COCAINE Negative NEG      AMPHETAMINES Negative NEG      METHADONE Negative NEG      HDSCOM (NOTE)    URINE MICROSCOPIC ONLY    Collection Time: 04/05/21  9:15 PM   Result Value Ref Range    Epithelial cells 1+ 0 - 5 /lpf    Bacteria 1+ (A) NEG /hpf   CBC WITH AUTOMATED DIFF    Collection Time: 04/05/21  9:50 PM   Result Value Ref Range    WBC 8.8 4.6 - 13.2 K/uL    RBC 4.84 4.20 - 5.30 M/uL    HGB 13.2 12.0 - 16.0 g/dL    HCT 38.9 35.0 - 45.0 %    MCV 80.4 74.0 - 97.0 FL    MCH 27.3 24.0 - 34.0 PG    MCHC 33.9 31.0 - 37.0 g/dL    RDW 13.1 11.6 - 14.5 %    PLATELET 248 111 - 725 K/uL    MPV 10.0 9.2 - 11.8 FL    NEUTROPHILS 43 40 - 73 %    LYMPHOCYTES 49 21 - 52 %    MONOCYTES 7 3 - 10 %    EOSINOPHILS 1 0 - 5 %    BASOPHILS 0 0 - 2 %    ABS. NEUTROPHILS 3.8 1.8 - 8.0 K/UL    ABS. LYMPHOCYTES 4.4 (H) 0.9 - 3.6 K/UL    ABS. MONOCYTES 0.6 0.05 - 1.2 K/UL    ABS. EOSINOPHILS 0.1 0.0 - 0.4 K/UL    ABS.  BASOPHILS 0.0 0.0 - 0.1 K/UL    DF AUTOMATED     METABOLIC PANEL, COMPREHENSIVE    Collection Time: 04/05/21  9:50 PM   Result Value Ref Range    Sodium 138 136 - 145 mmol/L    Potassium 3.7 3.5 - 5.5 mmol/L    Chloride 102 100 - 111 mmol/L    CO2 26 21 - 32 mmol/L    Anion gap 10 3.0 - 18 mmol/L    Glucose 240 (H) 74 - 99 mg/dL    BUN 30 (H) 7.0 - 18 MG/DL    Creatinine 1.21 0.6 - 1.3 MG/DL    BUN/Creatinine ratio 25 (H) 12 - 20      GFR est AA >60 >60 ml/min/1.73m2    GFR est non-AA 50 (L) >60 ml/min/1.73m2    Calcium 9.1 8.5 - 10.1 MG/DL    Bilirubin, total 0.3 0.2 - 1.0 MG/DL    ALT (SGPT) 26 13 - 56 U/L    AST (SGOT) 15 10 - 38 U/L    Alk. phosphatase 92 45 - 117 U/L    Protein, total 8.1 6.4 - 8.2 g/dL    Albumin 2.9 (L) 3.4 - 5.0 g/dL    Globulin 5.2 (H) 2.0 - 4.0 g/dL    A-G Ratio 0.6 (L) 0.8 - 1.7     MAGNESIUM    Collection Time: 04/05/21  9:50 PM   Result Value Ref Range    Magnesium 2.2 1.6 - 2.6 mg/dL   TSH 3RD GENERATION    Collection Time: 04/05/21  9:50 PM   Result Value Ref Range    TSH 5.46 (H) 0.36 - 3.74 uIU/mL   ETHYL ALCOHOL    Collection Time: 04/05/21  9:50 PM   Result Value Ref Range    ALCOHOL(ETHYL),SERUM <3 0 - 3 MG/DL   T4, FREE    Collection Time: 04/05/21  9:50 PM   Result Value Ref Range    T4, Free 0.2 (L) 0.7 - 1.5 NG/DL   COVID-19 RAPID TEST    Collection Time: 04/05/21 11:49 PM   Result Value Ref Range    Specimen source Nasopharyngeal      COVID-19 rapid test Not detected NOTD         EKG interpretation by ED Physician: Sinus tachycardia rate of 107. No acute ischemic changes. X-Ray, CT or other radiology findings or impressions:  CT ABD PELV WO CONT   Final Result    No acute pathology in the abdomen or pelvis. XR CHEST PORT    (Results Pending)       Progress notes, Consult notes or additional Procedure notes:     Reevaluation of patient:   I have reassessed the patient. Patient is feeling better and resting comfortably in bed. She is hemodynamically stable and well-appearing. Labs are overall well-appearing and CT is unremarkable for any acute process in the abdomen or pelvis. Normal renal function on patient's blood work. She does have a mildly elevated TSH and a mildly low free T4, however she does have a history of hypothyroidism and is on a nonlevothyroxine thyroid medication. I advised her to follow-up with her PCP who prescribes this for further thyroid studies, no evidence of hypothyroidism clinically or myxedema coma.   She is also strict follow-up with her nephrologist and she verbalizes understanding agrees with this plan. Disposition:  Diagnosis:   1. Syncope, unspecified syncope type    2. Flank pain, chronic        Disposition: Discharge    Follow-up Information     Follow up With Specialties Details Why Contact Info    Alfredo Gallego MD Internal Medicine Schedule an appointment as soon as possible for a visit  for office follow up     [facility] Emergency Medicine Schedule an appointment as soon as possible for a visit  for office follow up [address]  [phone]           Patient's Medications   Start Taking    No medications on file   Continue Taking    ATORVASTATIN (LIPITOR) 40 MG TABLET    Take  by mouth daily. FENOFIBRATE NANOCRYSTALLIZED (TRICOR) 48 MG TABLET    Take 48 mg by mouth. FLUTICASONE PROPIONATE (FLONASE) 50 MCG/ACTUATION NASAL SPRAY        FUROSEMIDE (LASIX) 20 MG TABLET    Take  by mouth daily. GABAPENTIN (NEURONTIN) 300 MG CAPSULE    Take 300 mg by mouth. One cap QAM, two caps QPM    INSULIN ASPART U-100 (NOVOLOG U-100 INSULIN ASPART) 100 UNIT/ML INJECTION    by SubCUTAneous route. INSULIN GLARGINE (BASAGLAR KWIKPEN U-100 INSULIN) 100 UNIT/ML (3 ML) INPN    90 Units by SubCUTAneous route two (2) times a day. INSULIN GLULISINE U-100 (APIDRA SOLOSTAR U-100 INSULIN) 100 UNIT/ML PEN    10 Units by SubCUTAneous route Before breakfast, lunch, and dinner. LANTUS U-100 INSULIN 100 UNIT/ML INJECTION        LIDOCAINE (LIDODERM) 5 %    Apply patch to the affected area for 12 hours a day and remove for 12 hours a day. LIOTHYRONINE (CYTOMEL) 25 MCG TABLET    Take 75 mcg by mouth daily. LISINOPRIL (PRINIVIL, ZESTRIL) 5 MG TABLET    Take 40 mg by mouth daily. MELOXICAM (MOBIC) 15 MG TABLET    Take 15 mg by mouth daily as needed for Pain. METFORMIN (GLUCOPHAGE) 1,000 MG TABLET    Take 1,000 mg by mouth two (2) times daily (with meals).     METOPROLOL TARTRATE (LOPRESSOR) 25 MG TABLET    Take 25 mg by mouth daily. PANTOPRAZOLE (PROTONIX) 40 MG TABLET        TOPIRAMATE (TOPAMAX) 50 MG TABLET        TRAZODONE (DESYREL) 50 MG TABLET    Take 50 mg by mouth nightly as needed for Sleep.    These Medications have changed    No medications on file   Stop Taking    No medications on file

## 2021-04-06 NOTE — ED NOTES
Spoke with Dr Nelson Vincent regarding pts pain level of 10/10.   will write order for pain medication

## 2021-04-06 NOTE — ED TRIAGE NOTES
Pt states they experienced a seizure at home around 1 hour ago.  Pt is experiencing kidney pain for about 2 weeks rating 10/10 pain at this time

## 2021-04-21 ENCOUNTER — APPOINTMENT (OUTPATIENT)
Dept: ULTRASOUND IMAGING | Age: 36
End: 2021-04-21
Attending: INTERNAL MEDICINE
Payer: MEDICAID

## 2021-04-21 ENCOUNTER — HOSPITAL ENCOUNTER (OUTPATIENT)
Dept: ULTRASOUND IMAGING | Age: 36
Discharge: HOME | End: 2021-04-21
Attending: INTERNAL MEDICINE
Payer: MEDICAID

## 2021-04-21 ENCOUNTER — APPOINTMENT (OUTPATIENT)
Dept: MAMMOGRAPHY | Age: 36
End: 2021-04-21
Attending: FAMILY MEDICINE
Payer: MEDICAID

## 2021-04-21 DIAGNOSIS — R80.1 PERSISTENT PROTEINURIA: ICD-10-CM

## 2021-04-21 PROCEDURE — 76705 ECHO EXAM OF ABDOMEN: CPT

## 2021-05-16 ENCOUNTER — HOSPITAL ENCOUNTER (EMERGENCY)
Age: 36
Discharge: HOME | End: 2021-05-16
Attending: EMERGENCY MEDICINE
Payer: MEDICAID

## 2021-05-16 VITALS
TEMPERATURE: 98.2 F | OXYGEN SATURATION: 100 % | DIASTOLIC BLOOD PRESSURE: 102 MMHG | SYSTOLIC BLOOD PRESSURE: 167 MMHG | RESPIRATION RATE: 20 BRPM | HEART RATE: 97 BPM

## 2021-05-16 DIAGNOSIS — R56.9 SEIZURE (HCC): Primary | ICD-10-CM

## 2021-05-16 DIAGNOSIS — I10 ELEVATED BLOOD PRESSURE READING WITH DIAGNOSIS OF HYPERTENSION: ICD-10-CM

## 2021-05-16 DIAGNOSIS — R79.89 LOW TSH LEVEL: ICD-10-CM

## 2021-05-16 DIAGNOSIS — R73.9 HYPERGLYCEMIA: ICD-10-CM

## 2021-05-16 DIAGNOSIS — Z20.822 EXPOSURE TO COVID-19 VIRUS: ICD-10-CM

## 2021-05-16 LAB
ALBUMIN SERPL-MCNC: 2.8 G/DL (ref 3.4–5)
ALBUMIN/GLOB SERPL: 0.6 {RATIO} (ref 0.8–1.7)
ALP SERPL-CCNC: 105 U/L (ref 45–117)
ALT SERPL-CCNC: 42 U/L (ref 13–56)
ANION GAP SERPL CALC-SCNC: 7 MMOL/L (ref 3–18)
AST SERPL-CCNC: 22 U/L (ref 10–38)
ATRIAL RATE: 97 BPM
BASOPHILS # BLD: 0 K/UL (ref 0–0.1)
BASOPHILS NFR BLD: 0 % (ref 0–2)
BILIRUB SERPL-MCNC: 0.5 MG/DL (ref 0.2–1)
BUN SERPL-MCNC: 23 MG/DL (ref 7–18)
BUN/CREAT SERPL: 23 (ref 12–20)
CALCIUM SERPL-MCNC: 8.8 MG/DL (ref 8.5–10.1)
CALCULATED P AXIS, ECG09: 107 DEGREES
CALCULATED R AXIS, ECG10: 171 DEGREES
CALCULATED T AXIS, ECG11: 130 DEGREES
CHLORIDE SERPL-SCNC: 103 MMOL/L (ref 100–111)
CO2 SERPL-SCNC: 26 MMOL/L (ref 21–32)
COVID-19 RAPID TEST, COVR: NOT DETECTED
CREAT SERPL-MCNC: 1.01 MG/DL (ref 0.6–1.3)
DIAGNOSIS, 93000: NORMAL
DIFFERENTIAL METHOD BLD: ABNORMAL
EOSINOPHIL # BLD: 0 K/UL (ref 0–0.4)
EOSINOPHIL NFR BLD: 1 % (ref 0–5)
ERYTHROCYTE [DISTWIDTH] IN BLOOD BY AUTOMATED COUNT: 12.4 % (ref 11.6–14.5)
GLOBULIN SER CALC-MCNC: 4.8 G/DL (ref 2–4)
GLUCOSE BLD STRIP.AUTO-MCNC: 325 MG/DL (ref 70–110)
GLUCOSE BLD STRIP.AUTO-MCNC: 367 MG/DL (ref 70–110)
GLUCOSE SERPL-MCNC: 329 MG/DL (ref 74–99)
HCT VFR BLD AUTO: 35.9 % (ref 35–45)
HGB BLD-MCNC: 11.6 G/DL (ref 12–16)
LYMPHOCYTES # BLD: 2.9 K/UL (ref 0.9–3.6)
LYMPHOCYTES NFR BLD: 44 % (ref 21–52)
MCH RBC QN AUTO: 26.2 PG (ref 24–34)
MCHC RBC AUTO-ENTMCNC: 32.3 G/DL (ref 31–37)
MCV RBC AUTO: 81.2 FL (ref 74–97)
MONOCYTES # BLD: 0.6 K/UL (ref 0.05–1.2)
MONOCYTES NFR BLD: 10 % (ref 3–10)
NEUTS SEG # BLD: 3 K/UL (ref 1.8–8)
NEUTS SEG NFR BLD: 45 % (ref 40–73)
P-R INTERVAL, ECG05: 142 MS
PLATELET # BLD AUTO: 334 K/UL (ref 135–420)
PMV BLD AUTO: 9.5 FL (ref 9.2–11.8)
POTASSIUM SERPL-SCNC: 4.6 MMOL/L (ref 3.5–5.5)
PROT SERPL-MCNC: 7.6 G/DL (ref 6.4–8.2)
Q-T INTERVAL, ECG07: 346 MS
QRS DURATION, ECG06: 84 MS
QTC CALCULATION (BEZET), ECG08: 439 MS
RBC # BLD AUTO: 4.42 M/UL (ref 4.2–5.3)
SARS-COV-2, COV2: NORMAL
SODIUM SERPL-SCNC: 136 MMOL/L (ref 136–145)
SOURCE, COVRS: NORMAL
T3FREE SERPL-MCNC: 10.1 PG/ML (ref 2.18–3.98)
T4 FREE SERPL-MCNC: 0.2 NG/DL (ref 0.7–1.5)
TSH SERPL DL<=0.05 MIU/L-ACNC: 0.04 UIU/ML (ref 0.36–3.74)
VENTRICULAR RATE, ECG03: 97 BPM
WBC # BLD AUTO: 6.6 K/UL (ref 4.6–13.2)

## 2021-05-16 PROCEDURE — 80053 COMPREHEN METABOLIC PANEL: CPT

## 2021-05-16 PROCEDURE — 85025 COMPLETE CBC W/AUTO DIFF WBC: CPT

## 2021-05-16 PROCEDURE — 96374 THER/PROPH/DIAG INJ IV PUSH: CPT

## 2021-05-16 PROCEDURE — 74011636637 HC RX REV CODE- 636/637: Performed by: PHYSICIAN ASSISTANT

## 2021-05-16 PROCEDURE — 87635 SARS-COV-2 COVID-19 AMP PRB: CPT

## 2021-05-16 PROCEDURE — 84443 ASSAY THYROID STIM HORMONE: CPT

## 2021-05-16 PROCEDURE — 93005 ELECTROCARDIOGRAM TRACING: CPT

## 2021-05-16 PROCEDURE — 82962 GLUCOSE BLOOD TEST: CPT

## 2021-05-16 PROCEDURE — 99284 EMERGENCY DEPT VISIT MOD MDM: CPT

## 2021-05-16 PROCEDURE — 84481 FREE ASSAY (FT-3): CPT

## 2021-05-16 PROCEDURE — 84439 ASSAY OF FREE THYROXINE: CPT

## 2021-05-16 RX ADMIN — HUMAN INSULIN 10 UNITS: 100 INJECTION, SOLUTION SUBCUTANEOUS at 08:48

## 2021-05-16 NOTE — DISCHARGE INSTRUCTIONS
We are concerned that you may have the COVID-19 virus. You must self-quarantine at home and not leave for any reason until you are symptom free for three consecutive days, or if a viral test was sent, until that comes back negative. Come back to the hospital if you become short of breath at rest, without exerting yourself. This is a sign that you may need to be admitted to the hospital to be put on oxygen.

## 2021-05-16 NOTE — ED TRIAGE NOTES
Patient was laying in bed and had a seizure, she then walked in the living room to her daughter and had another seizure. Patient denies nausea, vomiting, or hitting her head. Patient ambulated to bed. Blood sugar per medics 367.

## 2021-05-16 NOTE — LETTER
[address] [place] [address] Dr [facility] EMERGENCY DEPT 
[address] [facility] [id number] [phone] Work/School Note Date: 5/16/2021 To Whom It May concern: 
 
Maryjane Russo was seen and treated today in the emergency room by the following provider(s): 
Attending Provider: Estefania Dacosta DO Physician Assistant: Gordo Ferraro. Maryjane Russo may return to work on 5/18/2021.  
 
Sincerely, 
 
 
 
 
Gerry Merritt, PA

## 2021-05-16 NOTE — ED PROVIDER NOTES
EMERGENCY DEPARTMENT HISTORY AND PHYSICAL EXAM    Date: 5/16/2021  Patient Name: Renetta Archer    History of Presenting Illness     Chief Complaint:   Chief Complaint   Patient presents with    Seizure     History Provided By: Patient    Additional History (Context): Renetta Archer is a 38 y.o. female with history of seizures, headaches, IDDM, thyroid cancer/resection, leg edema, to ER via EMS for evaluation of possible seizure this morning. Patient does not recall having a seizure but was told by her daughter that she had one. Patient states that she was at home in her bed when supposed seizure happens, denies falls. Patient denies feeling postictal.  Denies tongue biting or bowel/bladder incontinence. Patient is concerned about exposure as she Covid noted malaise, slight cough, mild diarrhea, and was informed that a coworker had Covid. Patient did not take her morning insulin yet. Patient takes Topamax twice a day for seizures and headaches. Last documented seizure was August 2020. Patient had virtual telehealth visit with neurology Dr. Rosalinda Beckford 4/6/2020, only FANY was addressed at that visit. She is not seen by neurology at this time. Denies fevers, palpitations, dry mouth, abdominal pain, nausea, vomiting. PCP: Jazlyn Michaud MD    Current Outpatient Medications   Medication Sig Dispense Refill    fluticasone propionate (FLONASE) 50 mcg/actuation nasal spray       pantoprazole (PROTONIX) 40 mg tablet       Lantus U-100 Insulin 100 unit/mL injection       topiramate (TOPAMAX) 50 mg tablet       insulin aspart U-100 (NovoLOG U-100 Insulin aspart) 100 unit/mL injection by SubCUTAneous route.  lidocaine (LIDODERM) 5 % Apply patch to the affected area for 12 hours a day and remove for 12 hours a day. 15 Each 0    insulin glulisine U-100 (APIDRA SOLOSTAR U-100 INSULIN) 100 unit/mL pen 10 Units by SubCUTAneous route Before breakfast, lunch, and dinner.       insulin glargine (BASAGLAR KWIKPEN U-100 INSULIN) 100 unit/mL (3 mL) inpn 90 Units by SubCUTAneous route two (2) times a day.  gabapentin (NEURONTIN) 300 mg capsule Take 300 mg by mouth. One cap QAM, two caps QPM      liothyronine (CYTOMEL) 25 mcg tablet Take 75 mcg by mouth daily.  meloxicam (MOBIC) 15 mg tablet Take 15 mg by mouth daily as needed for Pain.  traZODone (DESYREL) 50 mg tablet Take 50 mg by mouth nightly as needed for Sleep.  metFORMIN (GLUCOPHAGE) 1,000 mg tablet Take 1,000 mg by mouth two (2) times daily (with meals).  metoprolol tartrate (LOPRESSOR) 25 mg tablet Take 25 mg by mouth daily.  fenofibrate nanocrystallized (TRICOR) 48 mg tablet Take 48 mg by mouth.  atorvastatin (LIPITOR) 40 mg tablet Take  by mouth daily.  furosemide (LASIX) 20 mg tablet Take  by mouth daily.  lisinopril (PRINIVIL, ZESTRIL) 5 mg tablet Take 40 mg by mouth daily. Past History     Past Medical History:  Past Medical History:   Diagnosis Date    Arthritis     Chest pain     Diabetes ([facility] [address]     Essential hypertension     Headache(784.0)     Hyperchloremia     Hypertension     Seizures ([facility] [address]     8/2020 last seizure    Seizures (HCC)     SOB (shortness of breath)     Thyroid cancer ([facility] [address]        Past Surgical History:  Past Surgical History:   Procedure Laterality Date    HX PARTIAL HYSTERECTOMY      HX THYROIDECTOMY      HX TUBAL LIGATION         Family History:  Family History   Problem Relation Age of Onset    Hypertension Mother        Social History:  Social History     Tobacco Use    Smoking status: Former Smoker    Smokeless tobacco: Never Used   Substance Use Topics    Alcohol use: No    Drug use: No       Allergies:  No Known Allergies      Review of Systems   Review of Systems   Constitutional: Negative for activity change, appetite change, chills and fever. Eyes: Negative. Respiratory: Negative. Cardiovascular: Negative.     Gastrointestinal: Negative for abdominal pain, diarrhea, nausea and vomiting. Genitourinary: Negative for dysuria. Musculoskeletal: Negative. Neurological: Positive for seizures. Negative for dizziness, syncope and headaches. All other systems reviewed and are negative. All Other Systems Negative  Physical Exam     Vitals:    05/16/21 0845 05/16/21 0926 05/16/21 0930   BP: (!) 143/86 (!) 155/95 (!) 167/102   Pulse: 99 100 97   Resp: 13 15 20   Temp:   98.2 °F (36.8 °C)   SpO2: 100% 100% 100%     Physical Exam  Vitals signs and nursing note reviewed. Constitutional:       General: She is not in acute distress. Appearance: She is well-developed. She is obese. She is not toxic-appearing or diaphoretic. HENT:      Head: Normocephalic and atraumatic. No raccoon eyes, Irene's sign, abrasion or contusion. Jaw: No trismus. Comments: No evidence of head injury, no leakage from head orifices     Right Ear: Tympanic membrane, ear canal and external ear normal.      Left Ear: Tympanic membrane, ear canal and external ear normal.      Nose: Nose normal.      Mouth/Throat:      Lips: Pink. Mouth: Mucous membranes are moist.      Pharynx: Oropharynx is clear. Uvula midline. Comments: Uvula midline, no tongue biting marks  Eyes:      General: No scleral icterus. Extraocular Movements: Extraocular movements intact. Conjunctiva/sclera: Conjunctivae normal.   Neck:      Musculoskeletal: Normal range of motion and neck supple. Thyroid: No thyroid mass, thyromegaly or thyroid tenderness. Comments: Thyroidectomy scar noted (old and well-healed)  Cardiovascular:      Rate and Rhythm: Normal rate and regular rhythm. Pulses: Normal pulses. Heart sounds: Normal heart sounds. Pulmonary:      Effort: Pulmonary effort is normal.      Breath sounds: Normal breath sounds. Abdominal:      General: Bowel sounds are normal.      Palpations: Abdomen is soft. There is no mass. Tenderness:  There is no abdominal tenderness. There is no guarding or rebound. Hernia: No hernia is present. Musculoskeletal: Normal range of motion. Right lower leg: No edema. Left lower leg: No edema. Lymphadenopathy:      Cervical: No cervical adenopathy. Skin:     General: Skin is warm and dry. Neurological:      Mental Status: She is alert and oriented to person, place, and time. Diagnostic Study Results     Labs -     Recent Results (from the past 12 hour(s))   GLUCOSE, POC    Collection Time: 05/16/21  8:20 AM   Result Value Ref Range    Glucose (POC) 367 (H) 70 - 110 mg/dL   EKG, 12 LEAD, INITIAL    Collection Time: 05/16/21  8:31 AM   Result Value Ref Range    Ventricular Rate 97 BPM    Atrial Rate 97 BPM    P-R Interval 142 ms    QRS Duration 84 ms    Q-T Interval 346 ms    QTC Calculation (Bezet) 439 ms    Calculated P Axis 107 degrees    Calculated R Axis 171 degrees    Calculated T Axis 130 degrees    Diagnosis       Suspect arm lead reversal, interpretation assumes no reversal  Normal sinus rhythm  Lateral infarct , age undetermined  Abnormal ECG  When compared with ECG of 05-APR-2021 21:09,  No significant change was found     CBC WITH AUTOMATED DIFF    Collection Time: 05/16/21  8:35 AM   Result Value Ref Range    WBC 6.6 4.6 - 13.2 K/uL    RBC 4.42 4.20 - 5.30 M/uL    HGB 11.6 (L) 12.0 - 16.0 g/dL    HCT 35.9 35.0 - 45.0 %    MCV 81.2 74.0 - 97.0 FL    MCH 26.2 24.0 - 34.0 PG    MCHC 32.3 31.0 - 37.0 g/dL    RDW 12.4 11.6 - 14.5 %    PLATELET 264 949 - 654 K/uL    MPV 9.5 9.2 - 11.8 FL    NEUTROPHILS 45 40 - 73 %    LYMPHOCYTES 44 21 - 52 %    MONOCYTES 10 3 - 10 %    EOSINOPHILS 1 0 - 5 %    BASOPHILS 0 0 - 2 %    ABS. NEUTROPHILS 3.0 1.8 - 8.0 K/UL    ABS. LYMPHOCYTES 2.9 0.9 - 3.6 K/UL    ABS. MONOCYTES 0.6 0.05 - 1.2 K/UL    ABS. EOSINOPHILS 0.0 0.0 - 0.4 K/UL    ABS.  BASOPHILS 0.0 0.0 - 0.1 K/UL    DF AUTOMATED     METABOLIC PANEL, COMPREHENSIVE    Collection Time: 05/16/21  8:35 AM Result Value Ref Range    Sodium 136 136 - 145 mmol/L    Potassium 4.6 3.5 - 5.5 mmol/L    Chloride 103 100 - 111 mmol/L    CO2 26 21 - 32 mmol/L    Anion gap 7 3.0 - 18 mmol/L    Glucose 329 (H) 74 - 99 mg/dL    BUN 23 (H) 7.0 - 18 MG/DL    Creatinine 1.01 0.6 - 1.3 MG/DL    BUN/Creatinine ratio 23 (H) 12 - 20      GFR est AA >60 >60 ml/min/1.73m2    GFR est non-AA >60 >60 ml/min/1.73m2    Calcium 8.8 8.5 - 10.1 MG/DL    Bilirubin, total 0.5 0.2 - 1.0 MG/DL    ALT (SGPT) 42 13 - 56 U/L    AST (SGOT) 22 10 - 38 U/L    Alk. phosphatase 105 45 - 117 U/L    Protein, total 7.6 6.4 - 8.2 g/dL    Albumin 2.8 (L) 3.4 - 5.0 g/dL    Globulin 4.8 (H) 2.0 - 4.0 g/dL    A-G Ratio 0.6 (L) 0.8 - 1.7     TSH 3RD GENERATION    Collection Time: 05/16/21  8:35 AM   Result Value Ref Range    TSH 0.04 (L) 0.36 - 3.74 uIU/mL   T3, FREE    Collection Time: 05/16/21  8:35 AM   Result Value Ref Range    Triiodothyronine (T3), free 10.1 (H) 2.18 - 3.98 PG/ML   T4, FREE    Collection Time: 05/16/21  8:35 AM   Result Value Ref Range    T4, Free 0.2 (L) 0.7 - 1.5 NG/DL   SARS-COV-2    Collection Time: 05/16/21  8:47 AM   Result Value Ref Range    SARS-CoV-2 Please find results under separate order     COVID-19 RAPID TEST    Collection Time: 05/16/21  8:47 AM   Result Value Ref Range    Specimen source Nasopharyngeal      COVID-19 rapid test Not detected NOTD     GLUCOSE, POC    Collection Time: 05/16/21  9:37 AM   Result Value Ref Range    Glucose (POC) 325 (H) 70 - 110 mg/dL   EKG: reviewed in ER by Lesley Quintero DO ED attending at 8:31 am, NSR, HR 97 no STEMI    Radiologic Studies -   No orders to display     CT Results  (Last 48 hours)    None            Medical Decision Making   I am the first provider for this patient. I reviewed the vital signs, available nursing notes, past medical history, past surgical history, family history and social history. Vital Signs-Reviewed the patient's vital signs.     Records Reviewed: Nursing Notes, Old Medical Records, Previous Radiology Studies and Previous Laboratory Studies    Procedures:  Procedures    Provider Notes (Medical Decision Making):     8:43 AM   Well-appearing patient presents to emergency room for evaluation of possible seizure. Patient does not recall the seizure and denies postictal state. She has normal exam, no neuro deficits. There are no findings suggesting recent seizure. Suspect, patient might have jerking in sleep and daughter assumed this was a seizure. Patient is taking Topamax. Patient does have neurology care established but did not have recent follow-ups. Will check basic labs, EKG, will correct hyperglycemia that is detected on arrival (patient did not take her morning insulin), will provide diabetic friendly snack. If work-up is reassuring, will plan for discharge with close follow-up with PCP and neurology. Patient agrees to plan. Consult:    8:45 AM   Discussed care with Shelley Torres DO ED attending. Standard discussion; including history of patients chief complaint, available diagnostic results, and treatment course. PLAN: agrees to plan. Diana Danielle PA-C     PROGRESS NOTES:  10:01 AM   Blood glucose is improving with insulin, patient received diabetic appropriate snack, feeling better. No seizures in ED. Labs reviewed, low TSH of 0.04 noted, patient is currently 50 mcg of Synthroid, recently changed from 75 mcg. She denies heat/cold intolerance, palpitations, weight changes. Denies thyroid/neck masses. Will collect blood to be sent for the T3, T4, patient will follow up with her PCP/endocrinologist for further monitoring and treatment. Covid test is pending. Patient is given precautions. Given work note. BP is elevated but the patient has not taken her medications this morning yet. Has good supply at home. Patient stable for discharge. Discussed results, care in ED and further care, f/u and s/s warranting return to ED.  Pt understood and agreed to plan. MED RECONCILIATION:  No current facility-administered medications for this encounter. Current Outpatient Medications   Medication Sig    fluticasone propionate (FLONASE) 50 mcg/actuation nasal spray     pantoprazole (PROTONIX) 40 mg tablet     Lantus U-100 Insulin 100 unit/mL injection     topiramate (TOPAMAX) 50 mg tablet     insulin aspart U-100 (NovoLOG U-100 Insulin aspart) 100 unit/mL injection by SubCUTAneous route.  lidocaine (LIDODERM) 5 % Apply patch to the affected area for 12 hours a day and remove for 12 hours a day.  insulin glulisine U-100 (APIDRA SOLOSTAR U-100 INSULIN) 100 unit/mL pen 10 Units by SubCUTAneous route Before breakfast, lunch, and dinner.  insulin glargine (BASAGLAR KWIKPEN U-100 INSULIN) 100 unit/mL (3 mL) inpn 90 Units by SubCUTAneous route two (2) times a day.  gabapentin (NEURONTIN) 300 mg capsule Take 300 mg by mouth. One cap QAM, two caps QPM    liothyronine (CYTOMEL) 25 mcg tablet Take 75 mcg by mouth daily.  meloxicam (MOBIC) 15 mg tablet Take 15 mg by mouth daily as needed for Pain.  traZODone (DESYREL) 50 mg tablet Take 50 mg by mouth nightly as needed for Sleep.  metFORMIN (GLUCOPHAGE) 1,000 mg tablet Take 1,000 mg by mouth two (2) times daily (with meals).  metoprolol tartrate (LOPRESSOR) 25 mg tablet Take 25 mg by mouth daily.  fenofibrate nanocrystallized (TRICOR) 48 mg tablet Take 48 mg by mouth.  atorvastatin (LIPITOR) 40 mg tablet Take  by mouth daily.  furosemide (LASIX) 20 mg tablet Take  by mouth daily.  lisinopril (PRINIVIL, ZESTRIL) 5 mg tablet Take 40 mg by mouth daily. Disposition:  home    DISCHARGE NOTE:     Pt has been reexamined. Remains asymptomatic. Patient has no new complaints, changes, or physical findings. Care plan outlined and precautions discussed. Results of w/u were reviewed with the patient. All medications were reviewed with the patient; will d/c home. All of pt's questions and concerns were addressed. Patient was instructed and agrees to follow up with PCP and neurology, as well as to return to the ED upon further deterioration. Patient is ready to go home. Follow-up Information     Follow up With Specialties Details Why [address] [facility]    [facility] EMERGENCY DEPT Emergency Medicine  As needed, If symptoms worsen [address]  [address] [facility]  In 2 days for recheck of current symptoms, for further evaluation and treatment [address]    Alyson Clark MD Neurology Schedule an appointment as soon as possible for a visit  for further evaluation and treatment BY NEUROLOGY [address] [facility]  [phone]            Current Discharge Medication List              Diagnosis     Clinical Impression:   1. Seizure ([address]    2. Exposure to COVID-19 virus    3. Hyperglycemia    4. Low TSH level    5. Elevated blood pressure reading with diagnosis of hypertension          Dictation disclaimer:  Please note that this dictation was completed with [employer], the computer voice recognition software. Quite often unanticipated grammatical, syntax, homophones, and other interpretive errors are inadvertently transcribed by the computer software. Please disregard these errors. Please excuse any errors that have escaped final proofreading.

## 2021-05-24 ENCOUNTER — HOSPITAL ENCOUNTER (EMERGENCY)
Age: 36
Discharge: HOME | End: 2021-05-24
Attending: EMERGENCY MEDICINE
Payer: MEDICAID

## 2021-05-24 ENCOUNTER — APPOINTMENT (OUTPATIENT)
Dept: GENERAL RADIOLOGY | Age: 36
End: 2021-05-24
Attending: PHYSICIAN ASSISTANT
Payer: MEDICAID

## 2021-05-24 VITALS
RESPIRATION RATE: 18 BRPM | HEIGHT: 69 IN | OXYGEN SATURATION: 99 % | HEART RATE: 102 BPM | SYSTOLIC BLOOD PRESSURE: 142 MMHG | BODY MASS INDEX: 34.07 KG/M2 | WEIGHT: 230 LBS | DIASTOLIC BLOOD PRESSURE: 89 MMHG | TEMPERATURE: 98.8 F

## 2021-05-24 DIAGNOSIS — Z20.822 SUSPECTED COVID-19 VIRUS INFECTION: Primary | ICD-10-CM

## 2021-05-24 LAB — SARS-COV-2, COV2: NORMAL

## 2021-05-24 PROCEDURE — 99283 EMERGENCY DEPT VISIT LOW MDM: CPT

## 2021-05-24 PROCEDURE — 71046 X-RAY EXAM CHEST 2 VIEWS: CPT

## 2021-05-24 PROCEDURE — 74011000250 HC RX REV CODE- 250: Performed by: PHYSICIAN ASSISTANT

## 2021-05-24 PROCEDURE — U0003 INFECTIOUS AGENT DETECTION BY NUCLEIC ACID (DNA OR RNA); SEVERE ACUTE RESPIRATORY SYNDROME CORONAVIRUS 2 (SARS-COV-2) (CORONAVIRUS DISEASE [COVID-19]), AMPLIFIED PROBE TECHNIQUE, MAKING USE OF HIGH THROUGHPUT TECHNOLOGIES AS DESCRIBED BY CMS-2020-01-R: HCPCS

## 2021-05-24 RX ORDER — LIDOCAINE HYDROCHLORIDE 20 MG/ML
15 SOLUTION OROPHARYNGEAL AS NEEDED
Status: DISCONTINUED | OUTPATIENT
Start: 2021-05-24 | End: 2021-05-24 | Stop reason: HOSPADM

## 2021-05-24 RX ORDER — BENZONATATE 100 MG/1
100 CAPSULE ORAL
Qty: 30 CAPSULE | Refills: 0 | Status: SHIPPED | OUTPATIENT
Start: 2021-05-24 | End: 2021-05-31

## 2021-05-24 RX ADMIN — LIDOCAINE HYDROCHLORIDE 15 ML: 20 SOLUTION ORAL; TOPICAL at 11:26

## 2021-05-24 NOTE — ED TRIAGE NOTES
Pt report bilateral ear and throat pain times 1 week. Pt also reports cough and states her neighbor tested positive for covid 1 week ago.

## 2021-05-24 NOTE — ED PROVIDER NOTES
[address] [facility],[address]  [facility] EMERGENCY DEPT    Date: 5/24/2021  Patient Name: Leigh Stratton    History of Presenting Illness     Chief Complaint   Patient presents with    Ear Pain    Sore Throat       38 y.o. female with noted past medical history of T2DM, HTN, HLD, Epilepsy, and  Thyroid Cancer with thyroidectomy who presents to the emergency department with sore throat and L Ear Pain x 1 day. She reports symptoms beginning yesterday with acute worsening this morning which prompted her visit. 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 Reports sore throat with odynophagia without dysphagia. L Ear pain without otorrhea, hearing changes, tinnitus. She used ear drops in her L Ear which provided no relief and she is unable to recall the name of the drops. She has a cough with yellow sputum production and post-tussive emesis. She denies known COVID exposure, fever, chills, SOB, hemoptysis, loss of taste/smell. She has not taken any of her medications today. Patient denies any other associated signs or symptoms. Patient denies any other complaints. Nursing notes regarding the HPI and triage nursing notes were reviewed. Prior medical records were reviewed. Current Facility-Administered Medications   Medication Dose Route Frequency Provider Last Rate Last Admin    lidocaine (XYLOCAINE) 2 % viscous solution 15 mL  15 mL Mouth/Throat PRN Roslyn Mcfadden PA   15 mL at 05/24/21 1126     Current Outpatient Medications   Medication Sig Dispense Refill    benzonatate (Tessalon Perles) 100 mg capsule Take 1 Capsule by mouth three (3) times daily as needed for Cough for up to 7 days. 30 Capsule 0    pantoprazole (PROTONIX) 40 mg tablet       Lantus U-100 Insulin 100 unit/mL injection       topiramate (TOPAMAX) 50 mg tablet       insulin aspart U-100 (NovoLOG U-100 Insulin aspart) 100 unit/mL injection by SubCUTAneous route.       lidocaine (LIDODERM) 5 % Apply patch to the affected area for 12 hours a day and remove for 12 hours a day. 15 Each 0    insulin glulisine U-100 (APIDRA SOLOSTAR U-100 INSULIN) 100 unit/mL pen 10 Units by SubCUTAneous route Before breakfast, lunch, and dinner.  gabapentin (NEURONTIN) 300 mg capsule Take 300 mg by mouth. One cap QAM, two caps QPM      metFORMIN (GLUCOPHAGE) 1,000 mg tablet Take 1,000 mg by mouth two (2) times daily (with meals).  metoprolol tartrate (LOPRESSOR) 25 mg tablet Take 25 mg by mouth daily.  atorvastatin (LIPITOR) 40 mg tablet Take  by mouth daily.  furosemide (LASIX) 20 mg tablet Take  by mouth daily.  lisinopril (PRINIVIL, ZESTRIL) 5 mg tablet Take 40 mg by mouth daily.  liothyronine (CYTOMEL) 25 mcg tablet Take 75 mcg by mouth daily.  meloxicam (MOBIC) 15 mg tablet Take 15 mg by mouth daily as needed for Pain. Past History     Past Medical History:  Past Medical History:   Diagnosis Date    Arthritis     Chest pain     Diabetes ([facility]     Essential hypertension     Headache(784.0)     Hyperchloremia     Hypertension     Seizures ([facility]     8/2020 last seizure    Seizures (HCC)     SOB (shortness of breath)     Thyroid cancer ([facility]        Past Surgical History:  Past Surgical History:   Procedure Laterality Date    HX PARTIAL HYSTERECTOMY      HX THYROIDECTOMY      HX TUBAL LIGATION         Family History:  Family History   Problem Relation Age of Onset    Hypertension Mother        Social History:  Social History     Tobacco Use    Smoking status: Former Smoker    Smokeless tobacco: Never Used   Substance Use Topics    Alcohol use: No    Drug use: No       Allergies:  No Known Allergies    Patient's primary care provider (as noted in EPIC):  Fredy Hill MD    Review of Systems   Constitutional:  Denies malaise, fever, chills. Face:  Denies injury or pain. ENMT:  Reports sore throat, L ear pain. Neck:  Denies injury or pain.    Chest:  Denies injury. Cardiac:  Denies chest pain or palpitations. Respiratory:  Reports cough with sputum production, denies SOB. GI/ABD:  Reports post-tussive vomiting. Denies injury, pain, distention, nausea, diarrhea. :  Denies injury, pain, dysuria or urgency. Back:  Denies injury or pain. Pelvis:  Denies injury or pain. Extremity/MS:  Denies injury or pain. Neuro:  Denies headache, LOC, dizziness, neurologic symptoms/deficits/paresthesias. Skin: Denies injury, rash, itching or skin changes. All other systems negative as reviewed. Visit Vitals  BP (!) 142/89   Pulse (!) 102   Temp 98.8 °F (37.1 °C)   Resp 18   Ht 5' 9\" (1.753 m)   Wt 104.3 kg (230 lb)   SpO2 99%   BMI 33.97 kg/m²       PHYSICAL EXAM:    CONSTITUTIONAL:  Alert, in no apparent distress;  well developed;  well nourished. HEAD:  Normocephalic, atraumatic. EYES:  EOMI. Non-icteric sclera. Normal conjunctiva. ENTM:  Nose:  No rhinorrhea, erythematous inferior turbinates. Throat:  No erythema or exudate, mucous membranes moist. Uvula midline, airway patent. NECK:  Supple. Tender anterior and posterior cervical lymphadenopathy. RESPIRATORY:  Chest clear, equal breath sounds, good air movement. CARDIOVASCULAR:  Regular rate and rhythm. No murmurs, rubs, or gallops. NEURO:  Moves all four extremities, and grossly normal motor exam.  SKIN:  No rashes;  Normal for age. PSYCH:  Alert and normal affect. DIFFERENTIAL DIAGNOSES/ MEDICAL DECISION MAKING:  Covid, viral upper respiratory infection, acute bronchitis, influenza/ flu, pneumonia, new onset asthma, other etiologies versus a combination of the above (ex. uri on top of pneumonia). XR CHEST PA LAT    Result Date: 5/24/2021  INDICATION: productive cough TECHNIQUE: Two views of the chest COMPARISON: 5 April 2021 FINDINGS: The lungs are adequately inflated. No focal consolidation, effusion or pneumothorax. Heart size within normal limits. No acute osseous abnormality.      No acute cardiopulmonary findings. Based on the patient's history of presenting illness, physical examination, laboratory, radiographic, and/or tests results, and response to medical interventions, I believe the patient most likely is having the noted constellation of symptoms secondary to viral illness, possibly covid. Patient was tested for COVID-19, will call with any positive result. Counseled to rest, drink plenty of fluids, follow-up with PCP. She will self quarantine. Return protocol provided. Diagnosis:   1. Suspected COVID-19 virus infection      Disposition: Discharge    Follow-up Information     Follow up With Specialties Details Why Contact Info    Becca Dias MD Internal Medicine In 3 days      [facility] EMERGENCY DEPT Emergency Medicine  If symptoms worsen [address] [facility] [address]  [phone]          Discharge Medication List as of 5/24/2021 11:19 AM      START taking these medications    Details   benzonatate (Tessalon Perles) 100 mg capsule Take 1 Capsule by mouth three (3) times daily as needed for Cough for up to 7 days. , Normal, Disp-30 Capsule, R-0         CONTINUE these medications which have NOT CHANGED    Details   pantoprazole (PROTONIX) 40 mg tablet Historical Med      Lantus U-100 Insulin 100 unit/mL injection Historical Med, JULIO      topiramate (TOPAMAX) 50 mg tablet Historical Med      insulin aspart U-100 (NovoLOG U-100 Insulin aspart) 100 unit/mL injection by SubCUTAneous route., Historical Med      lidocaine (LIDODERM) 5 % Apply patch to the affected area for 12 hours a day and remove for 12 hours a day., Print, Disp-15 Each, R-0      insulin glulisine U-100 (APIDRA SOLOSTAR U-100 INSULIN) 100 unit/mL pen 10 Units by SubCUTAneous route Before breakfast, lunch, and dinner., Historical Med      gabapentin (NEURONTIN) 300 mg capsule Take 300 mg by mouth.  One cap QAM, two caps QPM, Historical Med      metFORMIN (GLUCOPHAGE) 1,000 mg tablet Take 1,000 mg by mouth two (2) times daily (with meals). , Historical Med      metoprolol tartrate (LOPRESSOR) 25 mg tablet Take 25 mg by mouth daily. , Historical Med      atorvastatin (LIPITOR) 40 mg tablet Take  by mouth daily. , Historical Med      furosemide (LASIX) 20 mg tablet Take  by mouth daily. , Historical Med      lisinopril (PRINIVIL, ZESTRIL) 5 mg tablet Take 40 mg by mouth daily. , Historical Med      liothyronine (CYTOMEL) 25 mcg tablet Take 75 mcg by mouth daily. , Historical Med      meloxicam (MOBIC) 15 mg tablet Take 15 mg by mouth daily as needed for Pain., Historical Med           ESTEBAN Valles

## 2021-05-24 NOTE — Clinical Note
[address] [place] [address] Dr [facility] EMERGENCY DEPT 
[address] [facility] [address] [phone] Work/School Note Date: 5/24/2021 To Whom It May concern: 
 
Vanessa Hutton was evaulated by the following provider(s): 
Attending Provider: Lashawn Martinez MD 
Physician Assistant: Dima Mccauley virus is suspected. Per the CDC guidelines we recommend home isolation until the following conditions are all met: 1. At least 10 days have passed since symptoms first appeared and 2. At least 24 hours have passed since last fever without the use of fever-reducing medications and 
3. Symptoms (e.g., cough, shortness of breath) have improved Sincerely, ESTEBAN Leger

## 2021-05-25 ENCOUNTER — PATIENT OUTREACH (OUTPATIENT)
Dept: CASE MANAGEMENT | Age: 36
End: 2021-05-25

## 2021-05-25 LAB — SARS-COV-2, COV2NT: NOT DETECTED

## 2021-05-25 NOTE — PROGRESS NOTES
Patient contacted regarding COVID-19 testing. Discussed COVID-19 related testing which was pending at this time. Test results were pending. Patient informed of results, if available? n/a     Care Transition Nurse contacted the patient by telephone to perform post discharge assessment. Call within 2 business days of discharge: Yes Verified name and  with patient as identifiers. Provided introduction to self, and explanation of the CTN/ACM role, and reason for call due to risk factors for infection and/or exposure to COVID-19. Symptoms reviewed with patient who verbalized the following symptoms: no new symptoms, no worsening symptoms and sore throat and ear pain at night. Patient is concerned that she was not swabbed for strep throat. Discussed that she was likely not swabbed due to presentation of symptoms, but encouraged her to follow up with her PCP. Due to no new or worsening symptoms encounter was not routed to provider for escalation. Discussed follow-up appointments. If no appointment was previously scheduled, appointment scheduling offered:  No, advised patient to schedule follow up with her PCP Dr. Afsaneh Schuler. [facility] follow up appointment(s): No future appointments. Non-[facility] follow up appointment(s): TBD    Interventions to address risk factors: n/a     Advance Care Planning:   Does patient have an Advance Directive: not on file     Educated patient about risk for severe COVID-19 due to risk factors according to CDC guidelines. CTN reviewed discharge instructions, medical action plan and red flag symptoms patient who verbalized understanding. Discussed COVID vaccination status yes and patient is interested in getting vaccinated. Education provided on COVID-19 vaccination as appropriate. Discussed exposure protocols and quarantine with CDC Guidelines.  Patient was given an opportunity to verbalize any questions and concerns and agrees to contact CTN or health care provider for questions related to their healthcare. Reviewed and educated patient on any new and changed medications related to discharge diagnosis     Was patient discharged with a pulse oximeter? No    CTN provided contact information. Plan for follow up call within the next 14 days based on severity of symptoms and risk factors.

## 2021-06-08 ENCOUNTER — PATIENT OUTREACH (OUTPATIENT)
Dept: CASE MANAGEMENT | Age: 36
End: 2021-06-08

## 2021-06-08 NOTE — PROGRESS NOTES
Patient resolved from [address] Transitions episode on 06/08/21. Discussed COVID-19 related testing which was available at this time. Test results were negative. Patient informed of results, if available? yes     Patient/family has been provided the following resources and education related to COVID-19:                         Signs, symptoms and red flags related to COVID-19            CDC exposure and quarantine guidelines            [employer] exposure contact - [phone]            Contact for their local Department of Health                 Patient currently reports that the following symptoms have resolved:  sore throat & ear pain. No further outreach scheduled with this CTN. Episode of Care resolved. Patient has this CTN contact information if future needs arise.

## 2021-08-07 ENCOUNTER — HOSPITAL ENCOUNTER (EMERGENCY)
Age: 36
Discharge: HOME | End: 2021-08-07
Attending: STUDENT IN AN ORGANIZED HEALTH CARE EDUCATION/TRAINING PROGRAM
Payer: MEDICAID

## 2021-08-07 VITALS
SYSTOLIC BLOOD PRESSURE: 148 MMHG | BODY MASS INDEX: 36.18 KG/M2 | RESPIRATION RATE: 16 BRPM | HEART RATE: 94 BPM | TEMPERATURE: 98.8 F | OXYGEN SATURATION: 100 % | DIASTOLIC BLOOD PRESSURE: 93 MMHG | WEIGHT: 245 LBS

## 2021-08-07 DIAGNOSIS — G40.919 BREAKTHROUGH SEIZURE (HCC): Primary | ICD-10-CM

## 2021-08-07 LAB
ANION GAP SERPL CALC-SCNC: 6 MMOL/L (ref 3–18)
BUN SERPL-MCNC: 20 MG/DL (ref 7–18)
BUN/CREAT SERPL: 19 (ref 12–20)
CALCIUM SERPL-MCNC: 9 MG/DL (ref 8.5–10.1)
CHLORIDE SERPL-SCNC: 106 MMOL/L (ref 100–111)
CO2 SERPL-SCNC: 26 MMOL/L (ref 21–32)
CREAT SERPL-MCNC: 1.04 MG/DL (ref 0.6–1.3)
GLUCOSE SERPL-MCNC: 205 MG/DL (ref 74–99)
POTASSIUM SERPL-SCNC: 3.7 MMOL/L (ref 3.5–5.5)
SODIUM SERPL-SCNC: 138 MMOL/L (ref 136–145)

## 2021-08-07 PROCEDURE — 80048 BASIC METABOLIC PNL TOTAL CA: CPT

## 2021-08-07 PROCEDURE — 99284 EMERGENCY DEPT VISIT MOD MDM: CPT

## 2021-08-07 NOTE — ED PROVIDER NOTES
54-year-old female with a history of diabetes, hypertension, and partial complex seizures patient presents with a primary complaint of a breakthrough seizure, patient was at work where she states been working harder than usual recently and believes she may become overheated at which point she had a partial complex seizure which is normal for her that began with numbness and tingling in her legs and followed by a brief period of between 15 and 30 seconds of unresponsiveness after which she returned to her baseline with a very short postictal period. She had no tonic-clonic activity, tongue biting, loss of urinary continence, and no associated trauma given that she felt the seizure coming and sat on the ground prior to it beginning. Patient also reports 2 to 3 days of bilateral hand and feet \"electric\" shooting pain not associated with any particular movement and not alleviated with rest. Patient also reports her blood sugar has been more labile recently and attributes this to increased stress. Reports no chest pain, difficulty breathing, recent fever/chills, abdominal pain, urinary symptoms, or any recent trauma.            Past Medical History:   Diagnosis Date    Arthritis     Chest pain     Diabetes ([facility]     Essential hypertension     Headache(784.0)     Hyperchloremia     Hypertension     Seizures ([facility]     8/2020 last seizure    Seizures (HCC)     SOB (shortness of breath)     Thyroid cancer ([facility]        Past Surgical History:   Procedure Laterality Date    HX PARTIAL HYSTERECTOMY      HX THYROIDECTOMY      HX TUBAL LIGATION           Family History:   Problem Relation Age of Onset    Hypertension Mother        Social History     Socioeconomic History    Marital status: SINGLE     Spouse name: Not on file    Number of children: Not on file    Years of education: Not on file    Highest education level: Not on file   Occupational History    Not on file   Tobacco Use    Smoking status: Former Smoker    Smokeless tobacco: Never Used   Substance and Sexual Activity    Alcohol use: No    Drug use: No    Sexual activity: Yes     Partners: Male     Birth control/protection: Surgical   Other Topics Concern    Not on file   Social History Narrative    Not on file     Social Determinants of Health     Financial Resource Strain:     Difficulty of Paying Living Expenses:    Food Insecurity:     Worried About Running Out of Food in the Last Year:     [address] in the Last Year:    Transportation Needs:     Lack of Transportation (Medical):  Lack of Transportation (Non-Medical):    Physical Activity:     Days of Exercise per Week:     Minutes of Exercise per Session:    Stress:     Feeling of Stress :    Social Connections:     Frequency of Communication with Friends and Family:     Frequency of Social Gatherings with Friends and Family:     Attends Confucianist Services:     Active Member of Clubs or Organizations:     Attends Club or Organization Meetings:     Marital Status:    Intimate Partner Violence:     Fear of Current or Ex-Partner:     Emotionally Abused:     Physically Abused:     Sexually Abused: ALLERGIES: Patient has no known allergies. Review of Systems   Constitutional: Negative for chills and fever. HENT: Negative for rhinorrhea and sore throat. Eyes: Negative for discharge and redness. Respiratory: Negative for cough and shortness of breath. Cardiovascular: Negative for chest pain and leg swelling. Gastrointestinal: Negative for abdominal pain, diarrhea, nausea and vomiting. Genitourinary: Negative for difficulty urinating and dysuria. Musculoskeletal: Negative for back pain and neck pain. Skin: Negative for rash and wound. Neurological: Positive for seizures and numbness. Negative for dizziness, tremors, syncope, facial asymmetry, speech difficulty, weakness, light-headedness and headaches.        There were no vitals filed for this visit.         Physical Exam  Constitutional:       General: She is not in acute distress. Appearance: She is not ill-appearing, toxic-appearing or diaphoretic. HENT:      Head: Normocephalic and atraumatic. Right Ear: External ear normal.      Left Ear: External ear normal.      Nose: No congestion or rhinorrhea. Mouth/Throat:      Mouth: Mucous membranes are moist.      Pharynx: No oropharyngeal exudate or posterior oropharyngeal erythema. Eyes:      General: No visual field deficit. Right eye: No discharge. Left eye: No discharge. Pupils: Pupils are equal, round, and reactive to light. Neck:      Vascular: No carotid bruit. Cardiovascular:      Rate and Rhythm: Normal rate and regular rhythm. Heart sounds: No murmur heard. No friction rub. No gallop. Pulmonary:      Effort: Pulmonary effort is normal. No respiratory distress. Breath sounds: No stridor. No wheezing, rhonchi or rales. Abdominal:      General: Abdomen is flat. There is no distension. Tenderness: There is no abdominal tenderness. There is no right CVA tenderness, left CVA tenderness, guarding or rebound. Musculoskeletal:         General: No swelling, tenderness, deformity or signs of injury. Cervical back: No rigidity or tenderness. Right lower leg: No edema. Left lower leg: No edema. Lymphadenopathy:      Cervical: No cervical adenopathy. Skin:     General: Skin is warm. Capillary Refill: Capillary refill takes less than 2 seconds. Coloration: Skin is not jaundiced or pale. Findings: No bruising, erythema, lesion or rash. Neurological:      General: No focal deficit present. Mental Status: She is alert and oriented to person, place, and time. GCS: GCS eye subscore is 4. GCS verbal subscore is 5. GCS motor subscore is 6. Cranial Nerves: No cranial nerve deficit, dysarthria or facial asymmetry. Sensory: No sensory deficit. Motor: No weakness, tremor, abnormal muscle tone, seizure activity or pronator drift. Psychiatric:         Mood and Affect: Mood normal.          [facility]  ED Course as of Aug 07 1525   Sat Aug 07, 2021   1523 Sodium: 138 [JK]   1523 Potassium: 3.7 [JK]   1523 Glucose(!): 205 [JK]   1523 Patient has remained asymptomatic while in the emergency department, tolerating p.o. well,  BMP reassuring remarkable only for elevated glucose. Will discharge patient to close primary care follow-up.     [JK]      ED Course User Index  [JK] Christopher Merritt MD       Procedures

## 2021-08-07 NOTE — DISCHARGE INSTRUCTIONS
His contact your primary care doctor soon as possible for follow-up appointment in the next 3 to 7 days. If you develop any sudden change in your condition including recurrent seizures, sudden/severe pain, passing out, or any other sudden/severe change in condition please return immediately to emergency department further evaluation and treatment.

## 2021-08-16 ENCOUNTER — HOSPITAL ENCOUNTER (EMERGENCY)
Age: 36
Discharge: HOME | End: 2021-08-16
Attending: STUDENT IN AN ORGANIZED HEALTH CARE EDUCATION/TRAINING PROGRAM
Payer: MEDICAID

## 2021-08-16 VITALS
BODY MASS INDEX: 37.03 KG/M2 | WEIGHT: 250 LBS | DIASTOLIC BLOOD PRESSURE: 93 MMHG | HEART RATE: 112 BPM | SYSTOLIC BLOOD PRESSURE: 155 MMHG | HEIGHT: 69 IN | RESPIRATION RATE: 18 BRPM | OXYGEN SATURATION: 98 % | TEMPERATURE: 98.1 F

## 2021-08-16 DIAGNOSIS — E11.49 OTHER DIABETIC NEUROLOGICAL COMPLICATION ASSOCIATED WITH TYPE 2 DIABETES MELLITUS (HCC): Primary | ICD-10-CM

## 2021-08-16 PROCEDURE — 99282 EMERGENCY DEPT VISIT SF MDM: CPT

## 2021-08-16 RX ORDER — GABAPENTIN 400 MG/1
400 CAPSULE ORAL 3 TIMES DAILY
Qty: 90 CAPSULE | Refills: 0 | Status: SHIPPED | OUTPATIENT
Start: 2021-08-16 | End: 2021-09-15

## 2021-08-16 RX ORDER — ACETAMINOPHEN 500 MG
1000 TABLET ORAL
Status: DISCONTINUED | OUTPATIENT
Start: 2021-08-16 | End: 2021-08-16 | Stop reason: HOSPADM

## 2021-08-16 RX ORDER — GABAPENTIN 300 MG/1
300 CAPSULE ORAL 3 TIMES DAILY
Status: DISCONTINUED | OUTPATIENT
Start: 2021-08-16 | End: 2021-08-16 | Stop reason: HOSPADM

## 2021-08-16 NOTE — ED PROVIDER NOTES
Patient a 8year-old female with a history of diabetes (poorly controlled as of late), hypertension, and seizures well-controlled on current regimen. Patient visited with primary complaint of bilateral lower extremity pain, patient scribes the pain as severe, shooting, pins-and-needles, worsened by light touch and with no significant improvement with over-the-counter medications. Patient denies any recent fever/chills, recent trauma, chest pain, difficulty breathing, any other joint pain, any joint swelling, or syncope. Past Medical History:   Diagnosis Date    Arthritis     Chest pain     Diabetes ([facility] [address]     Essential hypertension     Headache(784.0)     Hyperchloremia     Hypertension     Seizures ([facility] [address]     8/2020 last seizure    Seizures (HCC)     SOB (shortness of breath)     Thyroid cancer (HCC)        Past Surgical History:   Procedure Laterality Date    HX PARTIAL HYSTERECTOMY      HX THYROIDECTOMY      HX TUBAL LIGATION           Family History:   Problem Relation Age of Onset    Hypertension Mother        Social History     Socioeconomic History    Marital status: SINGLE     Spouse name: Not on file    Number of children: Not on file    Years of education: Not on file    Highest education level: Not on file   Occupational History    Not on file   Tobacco Use    Smoking status: Former Smoker    Smokeless tobacco: Never Used   Substance and Sexual Activity    Alcohol use: No    Drug use: No    Sexual activity: Yes     Partners: Male     Birth control/protection: Surgical   Other Topics Concern    Not on file   Social History Narrative    Not on file     Social Determinants of Health     Financial Resource Strain:     Difficulty of Paying Living Expenses:    Food Insecurity:     Worried About Running Out of Food in the Last Year:     Ran Out of Food in the Last Year:    Transportation Needs:     Lack of Transportation (Medical):      Lack of Transportation (Non-Medical):    Physical Activity:     Days of Exercise per Week:     Minutes of Exercise per Session:    Stress:     Feeling of Stress :    Social Connections:     Frequency of Communication with Friends and Family:     Frequency of Social Gatherings with Friends and Family:     Attends Christianity Services:     Active Member of Clubs or Organizations:     Attends Club or Organization Meetings:     Marital Status:    Intimate Partner Violence:     Fear of Current or Ex-Partner:     Emotionally Abused:     Physically Abused:     Sexually Abused: ALLERGIES: Patient has no known allergies. Review of Systems   Constitutional: Negative for chills and fever. HENT: Negative for rhinorrhea and sore throat. Eyes: Negative for discharge and redness. Respiratory: Negative for cough and shortness of breath. Cardiovascular: Negative for chest pain and leg swelling. Gastrointestinal: Negative for abdominal pain, diarrhea, nausea and vomiting. Genitourinary: Negative for difficulty urinating and dysuria. Musculoskeletal: Negative for back pain, joint swelling, neck pain and neck stiffness. Skin: Negative for rash and wound. Neurological: Negative for syncope, light-headedness and headaches. Vitals:    08/16/21 1144   BP: (!) 155/93   Pulse: (!) 112   Resp: 18   Temp: 98.1 °F (36.7 °C)   SpO2: 98%   Weight: 113.4 kg (250 lb)   Height: 5' 9\" (1.753 m)            Physical Exam  Constitutional:       General: She is not in acute distress. Appearance: She is not ill-appearing, toxic-appearing or diaphoretic. HENT:      Head: Normocephalic and atraumatic. Right Ear: External ear normal.      Left Ear: External ear normal.      Nose: No congestion or rhinorrhea. Mouth/Throat:      Mouth: Mucous membranes are moist.      Pharynx: No oropharyngeal exudate or posterior oropharyngeal erythema. Eyes:      General:         Right eye: No discharge.          Left eye: No discharge. Pupils: Pupils are equal, round, and reactive to light. Neck:      Vascular: No carotid bruit. Cardiovascular:      Rate and Rhythm: Normal rate and regular rhythm. Heart sounds: No murmur heard. No friction rub. No gallop. Pulmonary:      Effort: Pulmonary effort is normal. No respiratory distress. Breath sounds: No stridor. No wheezing, rhonchi or rales. Abdominal:      General: Abdomen is flat. There is no distension. Tenderness: There is no right CVA tenderness, left CVA tenderness, guarding or rebound. Musculoskeletal:         General: No swelling, tenderness, deformity or signs of injury. Cervical back: No rigidity or tenderness. Right lower leg: No edema. Left lower leg: No edema. Lymphadenopathy:      Cervical: No cervical adenopathy. Skin:     General: Skin is warm. Capillary Refill: Capillary refill takes less than 2 seconds. Coloration: Skin is not ashen, cyanotic, jaundiced, mottled or pale. Findings: No abrasion, bruising, erythema, laceration, lesion, petechiae or rash. Neurological:      General: No focal deficit present. Mental Status: She is alert and oriented to person, place, and time. Sensory: No sensory deficit. Motor: No weakness. Psychiatric:         Mood and Affect: Mood normal.          MDM  Number of Diagnoses or Management Options  Other diabetic neurological complication associated with type 2 diabetes mellitus ([facility] [address]  Diagnosis management comments: Presents with primary came of bilateral lower extremity pain, patient's symptoms are most consistent with worsening diabetic neuropathy specially given the patient reports poor control of her blood glucose recently with levels consistently above 200 mg/dL. Patient is otherwise well-appearing lower concern for any joint infection, trauma, or other concerning cause.   Will increase patient's Neurontin recommend continued over-the-counter medications and also have her follow-up promptly with her primary care provider as most effective treatment will be improved blood sugar control.          Procedures

## 2021-08-16 NOTE — Clinical Note
[facility] EMERGENCY DEPT  [id number] [address]  [phone]    Work/School Note    Date: 8/16/2021    To Whom It May concern:    Tash Herrera was seen and treated today in the emergency room by the following provider(s):  Attending Provider: Christina Apodaca MD.      Tash Herrera is excused from work/school on 8/16/2021 through 8/19/2021. She is medically clear to return to work/school on 8/20/2021.         Sincerely,          Birgit Escalante MD

## 2021-08-16 NOTE — DISCHARGE INSTRUCTIONS
As we discussed please contact your primary care doctor soon as possible to arrange a follow-up appointment as you need to improve control of your diabetes in order to help with your symptoms, have also increased your gabapentin pain medication as this medication works fairly well for neuropathic pain however you may need further adjustments. Please return to the emergency department you have any sudden changes to physically sudden increase in pain, inability to walk, fever/chills, or any other sudden/severe change in her condition.

## 2021-08-16 NOTE — ED TRIAGE NOTES
Patient reports bilateral hip, leg and foot pain with tingling and burning for a couple of weeks. Unknown mechanism of injury. Denies hx. States tried motrin and tylenol without relief.

## 2021-11-02 ENCOUNTER — HOSPITAL ENCOUNTER (OUTPATIENT)
Dept: LAB | Age: 36
Discharge: HOME | End: 2021-11-02

## 2021-11-02 LAB — XX-LABCORP SPECIMEN COL,LCBCF: NORMAL

## 2021-11-02 PROCEDURE — 99001 SPECIMEN HANDLING PT-LAB: CPT

## 2022-02-02 ENCOUNTER — HOSPITAL ENCOUNTER (EMERGENCY)
Age: 37
Discharge: HOME | End: 2022-02-02
Attending: STUDENT IN AN ORGANIZED HEALTH CARE EDUCATION/TRAINING PROGRAM
Payer: MEDICAID

## 2022-02-02 VITALS — OXYGEN SATURATION: 100 %

## 2022-02-02 DIAGNOSIS — R73.9 HYPERGLYCEMIA: Primary | ICD-10-CM

## 2022-02-02 LAB
ALBUMIN SERPL-MCNC: 3 G/DL (ref 3.4–5)
ALBUMIN/GLOB SERPL: 0.8 {RATIO} (ref 0.8–1.7)
ALP SERPL-CCNC: 99 U/L (ref 45–117)
ALT SERPL-CCNC: 32 U/L (ref 13–56)
ANION GAP SERPL CALC-SCNC: 7 MMOL/L (ref 3–18)
APPEARANCE UR: CLEAR
AST SERPL-CCNC: 16 U/L (ref 10–38)
ATRIAL RATE: 99 BPM
BACTERIA URNS QL MICRO: NEGATIVE /HPF
BASOPHILS # BLD: 0 K/UL (ref 0–0.1)
BASOPHILS NFR BLD: 0 % (ref 0–2)
BILIRUB SERPL-MCNC: 0.4 MG/DL (ref 0.2–1)
BILIRUB UR QL: NEGATIVE
BUN SERPL-MCNC: 13 MG/DL (ref 7–18)
BUN/CREAT SERPL: 14 (ref 12–20)
CALCIUM SERPL-MCNC: 9.3 MG/DL (ref 8.5–10.1)
CALCULATED P AXIS, ECG09: 79 DEGREES
CALCULATED R AXIS, ECG10: 45 DEGREES
CALCULATED T AXIS, ECG11: 55 DEGREES
CHLORIDE SERPL-SCNC: 106 MMOL/L (ref 100–111)
CO2 SERPL-SCNC: 24 MMOL/L (ref 21–32)
COLOR UR: YELLOW
CREAT SERPL-MCNC: 0.93 MG/DL (ref 0.6–1.3)
DIAGNOSIS, 93000: NORMAL
DIFFERENTIAL METHOD BLD: ABNORMAL
EOSINOPHIL # BLD: 0 K/UL (ref 0–0.4)
EOSINOPHIL NFR BLD: 0 % (ref 0–5)
EPITH CASTS URNS QL MICRO: NORMAL /LPF (ref 0–5)
ERYTHROCYTE [DISTWIDTH] IN BLOOD BY AUTOMATED COUNT: 12.6 % (ref 11.6–14.5)
GLOBULIN SER CALC-MCNC: 3.9 G/DL (ref 2–4)
GLUCOSE BLD STRIP.AUTO-MCNC: 234 MG/DL (ref 70–110)
GLUCOSE BLD STRIP.AUTO-MCNC: 300 MG/DL (ref 70–110)
GLUCOSE SERPL-MCNC: 301 MG/DL (ref 74–99)
GLUCOSE UR STRIP.AUTO-MCNC: >1000 MG/DL
HCG SERPL QL: NEGATIVE
HCT VFR BLD AUTO: 37.2 % (ref 35–45)
HGB BLD-MCNC: 11.7 G/DL (ref 12–16)
HGB UR QL STRIP: ABNORMAL
IMM GRANULOCYTES # BLD AUTO: 0 K/UL (ref 0–0.04)
IMM GRANULOCYTES NFR BLD AUTO: 0 % (ref 0–0.5)
KETONES UR QL STRIP.AUTO: NEGATIVE MG/DL
LEUKOCYTE ESTERASE UR QL STRIP.AUTO: NEGATIVE
LYMPHOCYTES # BLD: 2.9 K/UL (ref 0.9–3.6)
LYMPHOCYTES NFR BLD: 45 % (ref 21–52)
MCH RBC QN AUTO: 25.4 PG (ref 24–34)
MCHC RBC AUTO-ENTMCNC: 31.5 G/DL (ref 31–37)
MCV RBC AUTO: 80.7 FL (ref 78–100)
MONOCYTES # BLD: 0.5 K/UL (ref 0.05–1.2)
MONOCYTES NFR BLD: 7 % (ref 3–10)
NEUTS SEG # BLD: 3.1 K/UL (ref 1.8–8)
NEUTS SEG NFR BLD: 47 % (ref 40–73)
NITRITE UR QL STRIP.AUTO: NEGATIVE
NRBC # BLD: 0 K/UL (ref 0–0.01)
NRBC BLD-RTO: 0 PER 100 WBC
P-R INTERVAL, ECG05: 138 MS
PH UR STRIP: 5.5 [PH] (ref 5–8)
PLATELET # BLD AUTO: 255 K/UL (ref 135–420)
PMV BLD AUTO: 9.8 FL (ref 9.2–11.8)
POTASSIUM SERPL-SCNC: 3.8 MMOL/L (ref 3.5–5.5)
PROT SERPL-MCNC: 6.9 G/DL (ref 6.4–8.2)
PROT UR STRIP-MCNC: 100 MG/DL
Q-T INTERVAL, ECG07: 344 MS
QRS DURATION, ECG06: 86 MS
QTC CALCULATION (BEZET), ECG08: 441 MS
RBC # BLD AUTO: 4.61 M/UL (ref 4.2–5.3)
RBC #/AREA URNS HPF: NORMAL /HPF (ref 0–5)
SODIUM SERPL-SCNC: 137 MMOL/L (ref 136–145)
SP GR UR REFRACTOMETRY: 1.02 (ref 1–1.03)
TROPONIN-HIGH SENSITIVITY: 6 NG/L (ref 0–54)
UROBILINOGEN UR QL STRIP.AUTO: 0.2 EU/DL (ref 0.2–1)
VENTRICULAR RATE, ECG03: 99 BPM
WBC # BLD AUTO: 6.5 K/UL (ref 4.6–13.2)
WBC URNS QL MICRO: NORMAL /HPF (ref 0–4)

## 2022-02-02 PROCEDURE — 93005 ELECTROCARDIOGRAM TRACING: CPT

## 2022-02-02 PROCEDURE — 82962 GLUCOSE BLOOD TEST: CPT

## 2022-02-02 PROCEDURE — 80053 COMPREHEN METABOLIC PANEL: CPT

## 2022-02-02 PROCEDURE — 84703 CHORIONIC GONADOTROPIN ASSAY: CPT

## 2022-02-02 PROCEDURE — 85025 COMPLETE CBC W/AUTO DIFF WBC: CPT

## 2022-02-02 PROCEDURE — 84484 ASSAY OF TROPONIN QUANT: CPT

## 2022-02-02 PROCEDURE — 81001 URINALYSIS AUTO W/SCOPE: CPT

## 2022-02-02 PROCEDURE — 99284 EMERGENCY DEPT VISIT MOD MDM: CPT

## 2022-02-02 NOTE — ED PROVIDER NOTES
EMERGENCY DEPARTMENT HISTORY AND PHYSICAL EXAM    Date: 2/2/2022  Patient Name: Nina Evans    History of Presenting Illness     Chief Complaint   Patient presents with    High Blood Sugar         History Provided By: Patient  Additional History (Context): Nina Evans is a 39 y.o. female with diabetes, hypertension and seizure who presents with complaining of feeling lightheaded and having a headache today. She thought maybe she was going to have a seizure. She took her Topamax and was at work and said she was going maybe go lie down. Colleagues called EMS and blood sugar reported over 500. Denies syncope chest pain shortness of breath. Had a headache for the past few days. It is there when she goes to bed and is still present when she wakes up. PCP: Lai Lau MD    Current Facility-Administered Medications   Medication Dose Route Frequency Provider Last Rate Last Admin    sodium chloride 0.9 % bolus infusion 1,000 mL  1,000 mL IntraVENous ONCE Moriah Jc PA         Current Outpatient Medications   Medication Sig Dispense Refill    pantoprazole (PROTONIX) 40 mg tablet       Lantus U-100 Insulin 100 unit/mL injection       topiramate (TOPAMAX) 50 mg tablet       insulin aspart U-100 (NovoLOG U-100 Insulin aspart) 100 unit/mL injection by SubCUTAneous route.  lidocaine (LIDODERM) 5 % Apply patch to the affected area for 12 hours a day and remove for 12 hours a day. 15 Each 0    insulin glulisine U-100 (APIDRA SOLOSTAR U-100 INSULIN) 100 unit/mL pen 10 Units by SubCUTAneous route Before breakfast, lunch, and dinner.  liothyronine (CYTOMEL) 25 mcg tablet Take 75 mcg by mouth daily.  meloxicam (MOBIC) 15 mg tablet Take 15 mg by mouth daily as needed for Pain.  metFORMIN (GLUCOPHAGE) 1,000 mg tablet Take 1,000 mg by mouth two (2) times daily (with meals).  metoprolol tartrate (LOPRESSOR) 25 mg tablet Take 25 mg by mouth daily.       atorvastatin (LIPITOR) 40 mg tablet Take  by mouth daily.  furosemide (LASIX) 20 mg tablet Take  by mouth daily.  lisinopril (PRINIVIL, ZESTRIL) 5 mg tablet Take 40 mg by mouth daily. Past History     Past Medical History:  Past Medical History:   Diagnosis Date    Arthritis     Chest pain     Diabetes ([facility] [address]     Essential hypertension     Headache(784.0)     Hyperchloremia     Hypertension     Seizures ([facility] [address]     8/2020 last seizure    Seizures (HCC)     SOB (shortness of breath)     Thyroid cancer ([facility] [address]        Past Surgical History:  Past Surgical History:   Procedure Laterality Date    HX PARTIAL HYSTERECTOMY      HX THYROIDECTOMY      HX TUBAL LIGATION         Family History:  Family History   Problem Relation Age of Onset    Hypertension Mother        Social History:  Social History     Tobacco Use    Smoking status: Former Smoker    Smokeless tobacco: Never Used   Substance Use Topics    Alcohol use: No    Drug use: No       Allergies:  No Known Allergies      Review of Systems   Review of Systems   Constitutional: Negative for fever. HENT: Negative. Eyes: Negative. Negative for visual disturbance. Respiratory: Negative for shortness of breath. Cardiovascular: Negative for chest pain. Gastrointestinal: Negative for nausea and vomiting. Endocrine: Negative. Genitourinary: Negative. Musculoskeletal: Negative. Skin: Negative. Allergic/Immunologic: Negative. Neurological: Positive for light-headedness and headaches. Negative for dizziness, seizures, syncope, weakness and numbness. Hematological: Negative. Psychiatric/Behavioral: Negative. All Other Systems Negative  Physical Exam     Vitals:    02/02/22 1340   BP: (P) 119/78   Pulse: (!) (P) 105   SpO2: (P) 100%     Physical Exam  Vitals and nursing note reviewed. Constitutional:       Appearance: She is well-developed. She is obese. She is not ill-appearing. HENT:      Head: Normocephalic and atraumatic. Right Ear: External ear normal.      Left Ear: External ear normal.      Nose: Nose normal.   Eyes:      Extraocular Movements: Extraocular movements intact. Conjunctiva/sclera: Conjunctivae normal.      Pupils: Pupils are equal, round, and reactive to light. Neck:      Vascular: No JVD. Trachea: No tracheal deviation. Cardiovascular:      Rate and Rhythm: Regular rhythm. Tachycardia present. Heart sounds: Normal heart sounds. No murmur heard. No friction rub. No gallop. Pulmonary:      Effort: Pulmonary effort is normal. No respiratory distress. Breath sounds: Normal breath sounds. No wheezing or rales. Abdominal:      General: Bowel sounds are normal. There is no distension. Palpations: Abdomen is soft. There is no mass. Tenderness: There is no abdominal tenderness. There is no guarding or rebound. Musculoskeletal:         General: No tenderness. Normal range of motion. Cervical back: Normal range of motion and neck supple. Skin:     General: Skin is warm and dry. Findings: No rash. Neurological:      Mental Status: She is alert and oriented to person, place, and time. Cranial Nerves: No cranial nerve deficit. Motor: No weakness. Coordination: Coordination normal.      Gait: Gait normal.      Deep Tendon Reflexes: Reflexes are normal and symmetric.    Psychiatric:         Mood and Affect: Mood normal.         Behavior: Behavior normal.          Diagnostic Study Results     Labs -     Recent Results (from the past 12 hour(s))   CBC WITH AUTOMATED DIFF    Collection Time: 02/02/22  1:55 PM   Result Value Ref Range    WBC 6.5 4.6 - 13.2 K/uL    RBC 4.61 4.20 - 5.30 M/uL    HGB 11.7 (L) 12.0 - 16.0 g/dL    HCT 37.2 35.0 - 45.0 %    MCV 80.7 78.0 - 100.0 FL    MCH 25.4 24.0 - 34.0 PG    MCHC 31.5 31.0 - 37.0 g/dL    RDW 12.6 11.6 - 14.5 %    PLATELET 325 890 - 042 K/uL    MPV 9.8 9.2 - 11.8 FL    NRBC 0.0 0  WBC    ABSOLUTE NRBC 0.00 0.00 - 0.01 K/uL    NEUTROPHILS 47 40 - 73 %    LYMPHOCYTES 45 21 - 52 %    MONOCYTES 7 3 - 10 %    EOSINOPHILS 0 0 - 5 %    BASOPHILS 0 0 - 2 %    IMMATURE GRANULOCYTES 0 0.0 - 0.5 %    ABS. NEUTROPHILS 3.1 1.8 - 8.0 K/UL    ABS. LYMPHOCYTES 2.9 0.9 - 3.6 K/UL    ABS. MONOCYTES 0.5 0.05 - 1.2 K/UL    ABS. EOSINOPHILS 0.0 0.0 - 0.4 K/UL    ABS. BASOPHILS 0.0 0.0 - 0.1 K/UL    ABS. IMM. GRANS. 0.0 0.00 - 0.04 K/UL    DF AUTOMATED     METABOLIC PANEL, COMPREHENSIVE    Collection Time: 02/02/22  1:55 PM   Result Value Ref Range    Sodium 137 136 - 145 mmol/L    Potassium 3.8 3.5 - 5.5 mmol/L    Chloride 106 100 - 111 mmol/L    CO2 24 21 - 32 mmol/L    Anion gap 7 3.0 - 18 mmol/L    Glucose 301 (H) 74 - 99 mg/dL    BUN 13 7.0 - 18 MG/DL    Creatinine 0.93 0.6 - 1.3 MG/DL    BUN/Creatinine ratio 14 12 - 20      GFR est AA >60 >60 ml/min/1.73m2    GFR est non-AA >60 >60 ml/min/1.73m2    Calcium 9.3 8.5 - 10.1 MG/DL    Bilirubin, total 0.4 0.2 - 1.0 MG/DL    ALT (SGPT) 32 13 - 56 U/L    AST (SGOT) 16 10 - 38 U/L    Alk.  phosphatase 99 45 - 117 U/L    Protein, total 6.9 6.4 - 8.2 g/dL    Albumin 3.0 (L) 3.4 - 5.0 g/dL    Globulin 3.9 2.0 - 4.0 g/dL    A-G Ratio 0.8 0.8 - 1.7     TROPONIN-HIGH SENSITIVITY    Collection Time: 02/02/22  1:55 PM   Result Value Ref Range    Troponin-High Sensitivity 6 0 - 54 ng/L   HCG QL SERUM    Collection Time: 02/02/22  1:55 PM   Result Value Ref Range    HCG, Ql. Negative NEG     URINALYSIS W/ RFLX MICROSCOPIC    Collection Time: 02/02/22  1:55 PM   Result Value Ref Range    Color YELLOW      Appearance CLEAR      Specific gravity 1.019 1.005 - 1.030      pH (UA) 5.5 5.0 - 8.0      Protein 100 (A) NEG mg/dL    Glucose >1,000 (A) NEG mg/dL    Ketone Negative NEG mg/dL    Bilirubin Negative NEG      Blood TRACE (A) NEG      Urobilinogen 0.2 0.2 - 1.0 EU/dL    Nitrites Negative NEG      Leukocyte Esterase Negative NEG     URINE MICROSCOPIC ONLY    Collection Time: 02/02/22  1:55 PM   Result Value Ref Range    WBC 0 to 1 0 - 4 /hpf    RBC 0 to 3 0 - 5 /hpf    Epithelial cells 2+ 0 - 5 /lpf    Bacteria Negative NEG /hpf   GLUCOSE, POC    Collection Time: 02/02/22  1:58 PM   Result Value Ref Range    Glucose (POC) 300 (H) 70 - 110 mg/dL   EKG, 12 LEAD, INITIAL    Collection Time: 02/02/22  3:10 PM   Result Value Ref Range    Ventricular Rate 99 BPM    Atrial Rate 99 BPM    P-R Interval 138 ms    QRS Duration 86 ms    Q-T Interval 344 ms    QTC Calculation (Bezet) 441 ms    Calculated P Axis 79 degrees    Calculated R Axis 45 degrees    Calculated T Axis 55 degrees    Diagnosis       Normal sinus rhythm  Normal ECG  When compared with ECG of 16-MAY-2021 08:31,  QRS axis shifted left  Criteria for Lateral infarct are no longer present  Non-specific change in ST segment in Lateral leads     GLUCOSE, POC    Collection Time: 02/02/22  3:12 PM   Result Value Ref Range    Glucose (POC) 234 (H) 70 - 110 mg/dL       Radiologic Studies -   No orders to display     CT Results  (Last 48 hours)    None        CXR Results  (Last 48 hours)    None            Medical Decision Making   I am the first provider for this patient. I reviewed the vital signs, available nursing notes, past medical history, past surgical history, family history and social history. Vital Signs-Reviewed the patient's vital signs.     Records Reviewed: Nursing Notes    Procedures:  EKG    Date/Time: 2/2/2022 3:10 PM  Performed by: ESTEBAN Bautista  Authorized by: ESTEBAN Bautista     ECG reviewed by ED Physician in the absence of a cardiologist: yes    Interpretation:     Interpretation: normal    Rate:     ECG rate assessment: normal    Rhythm:     Rhythm: sinus rhythm    Ectopy:     Ectopy: none    QRS:     QRS axis:  Normal    QRS intervals:  Normal  Conduction:     Conduction: normal    ST segments:     ST segments:  Normal  T waves:     T waves: normal          Provider Notes (Medical Decision Making):   Parental: Arrhythmia syncope NSTEMI anemia seizure electrolyte abnormalities, DKA UTI hypoglycemia, orthostatic hypotension     Received a liter of fluids here. Her blood sugar was only just above 300. Without even insulin her blood sugar his come down close to 200. No arrhythmia on EKG concerning. Not anemic. Troponin normal.  Plan for discharge. MED RECONCILIATION:  Current Facility-Administered Medications   Medication Dose Route Frequency    sodium chloride 0.9 % bolus infusion 1,000 mL  1,000 mL IntraVENous ONCE     Current Outpatient Medications   Medication Sig    pantoprazole (PROTONIX) 40 mg tablet     Lantus U-100 Insulin 100 unit/mL injection     topiramate (TOPAMAX) 50 mg tablet     insulin aspart U-100 (NovoLOG U-100 Insulin aspart) 100 unit/mL injection by SubCUTAneous route.  lidocaine (LIDODERM) 5 % Apply patch to the affected area for 12 hours a day and remove for 12 hours a day.  insulin glulisine U-100 (APIDRA SOLOSTAR U-100 INSULIN) 100 unit/mL pen 10 Units by SubCUTAneous route Before breakfast, lunch, and dinner.  liothyronine (CYTOMEL) 25 mcg tablet Take 75 mcg by mouth daily.  meloxicam (MOBIC) 15 mg tablet Take 15 mg by mouth daily as needed for Pain.  metFORMIN (GLUCOPHAGE) 1,000 mg tablet Take 1,000 mg by mouth two (2) times daily (with meals).  metoprolol tartrate (LOPRESSOR) 25 mg tablet Take 25 mg by mouth daily.  atorvastatin (LIPITOR) 40 mg tablet Take  by mouth daily.  furosemide (LASIX) 20 mg tablet Take  by mouth daily.  lisinopril (PRINIVIL, ZESTRIL) 5 mg tablet Take 40 mg by mouth daily. Disposition:  home    DISCHARGE NOTE:   3:49 PM    Pt has been reexamined. Patient has no new complaints, changes, or physical findings. Care plan outlined and precautions discussed. Results of labs were reviewed with the patient. All medications were reviewed with the patient. All of pt's questions and concerns were addressed.  Patient was instructed and agrees to follow up with PCP, as well as to return to the ED upon further deterioration. Patient is ready to go home. Follow-up Information     Follow up With Specialties Details Why Contact Info    Dk Grady MD Internal Medicine Schedule an appointment as soon as possible for a visit in 1 day      [facility] EMERGENCY DEPT Emergency Medicine  If symptoms worsen return immediately [address]  [phone]          Current Discharge Medication List            Diagnosis     Clinical Impression:   1.  Hyperglycemia

## 2022-02-02 NOTE — ED TRIAGE NOTES
Patient arrived via EMS with complaint hyperglycemia per medics unable to get a reading.  Patient states \"I have a aura\"

## 2022-02-02 NOTE — ED TRIAGE NOTES
Per medic: \"Co worker called stating that patient felt like she was about to have a seizure. Upon EMS arrival patient was noted to be alert and oriented x3. With a 'HI\" glucose reading.

## 2022-02-21 ENCOUNTER — HOSPITAL ENCOUNTER (OUTPATIENT)
Dept: LAB | Age: 37
Discharge: HOME | End: 2022-02-21

## 2022-02-21 LAB — XX-LABCORP SPECIMEN COL,LCBCF: NORMAL

## 2022-02-21 PROCEDURE — 99001 SPECIMEN HANDLING PT-LAB: CPT

## 2022-03-19 PROBLEM — E66.01 OBESITY, MORBID (HCC): Status: ACTIVE | Noted: 2018-11-05

## 2022-03-27 ENCOUNTER — HOSPITAL ENCOUNTER (EMERGENCY)
Age: 37
Discharge: HOME | End: 2022-03-27
Attending: STUDENT IN AN ORGANIZED HEALTH CARE EDUCATION/TRAINING PROGRAM
Payer: MEDICAID

## 2022-03-27 ENCOUNTER — APPOINTMENT (OUTPATIENT)
Dept: CT IMAGING | Age: 37
End: 2022-03-27
Attending: EMERGENCY MEDICINE
Payer: MEDICAID

## 2022-03-27 VITALS
BODY MASS INDEX: 35.55 KG/M2 | TEMPERATURE: 97.7 F | WEIGHT: 240 LBS | SYSTOLIC BLOOD PRESSURE: 136 MMHG | RESPIRATION RATE: 18 BRPM | DIASTOLIC BLOOD PRESSURE: 84 MMHG | OXYGEN SATURATION: 100 % | HEART RATE: 105 BPM | HEIGHT: 69 IN

## 2022-03-27 DIAGNOSIS — G44.319 ACUTE POST-TRAUMATIC HEADACHE, NOT INTRACTABLE: Primary | ICD-10-CM

## 2022-03-27 LAB
ALBUMIN SERPL-MCNC: 2.9 G/DL (ref 3.4–5)
ALBUMIN/GLOB SERPL: 0.6 {RATIO} (ref 0.8–1.7)
ALP SERPL-CCNC: 103 U/L (ref 45–117)
ALT SERPL-CCNC: 28 U/L (ref 13–56)
ANION GAP SERPL CALC-SCNC: 5 MMOL/L (ref 3–18)
AST SERPL-CCNC: 18 U/L (ref 10–38)
BASOPHILS # BLD: 0 K/UL (ref 0–0.1)
BASOPHILS NFR BLD: 0 % (ref 0–2)
BILIRUB SERPL-MCNC: 0.4 MG/DL (ref 0.2–1)
BUN SERPL-MCNC: 26 MG/DL (ref 7–18)
BUN/CREAT SERPL: 22 (ref 12–20)
CALCIUM SERPL-MCNC: 9.3 MG/DL (ref 8.5–10.1)
CHLORIDE SERPL-SCNC: 103 MMOL/L (ref 100–111)
CO2 SERPL-SCNC: 24 MMOL/L (ref 21–32)
CREAT SERPL-MCNC: 1.18 MG/DL (ref 0.6–1.3)
DIFFERENTIAL METHOD BLD: NORMAL
EOSINOPHIL # BLD: 0 K/UL (ref 0–0.4)
EOSINOPHIL NFR BLD: 0 % (ref 0–5)
ERYTHROCYTE [DISTWIDTH] IN BLOOD BY AUTOMATED COUNT: 13.7 % (ref 11.6–14.5)
GLOBULIN SER CALC-MCNC: 4.9 G/DL (ref 2–4)
GLUCOSE SERPL-MCNC: 323 MG/DL (ref 74–99)
HCT VFR BLD AUTO: 40.2 % (ref 35–45)
HGB BLD-MCNC: 12.8 G/DL (ref 12–16)
IMM GRANULOCYTES # BLD AUTO: 0 K/UL (ref 0–0.04)
IMM GRANULOCYTES NFR BLD AUTO: 0 % (ref 0–0.5)
LYMPHOCYTES # BLD: 3.5 K/UL (ref 0.9–3.6)
LYMPHOCYTES NFR BLD: 40 % (ref 21–52)
MCH RBC QN AUTO: 25.6 PG (ref 24–34)
MCHC RBC AUTO-ENTMCNC: 31.8 G/DL (ref 31–37)
MCV RBC AUTO: 80.4 FL (ref 78–100)
MONOCYTES # BLD: 0.6 K/UL (ref 0.05–1.2)
MONOCYTES NFR BLD: 7 % (ref 3–10)
NEUTS SEG # BLD: 4.6 K/UL (ref 1.8–8)
NEUTS SEG NFR BLD: 52 % (ref 40–73)
NRBC # BLD: 0 K/UL (ref 0–0.01)
NRBC BLD-RTO: 0 PER 100 WBC
PLATELET # BLD AUTO: 408 K/UL (ref 135–420)
PMV BLD AUTO: 10.1 FL (ref 9.2–11.8)
POTASSIUM SERPL-SCNC: 4 MMOL/L (ref 3.5–5.5)
PROT SERPL-MCNC: 7.8 G/DL (ref 6.4–8.2)
RBC # BLD AUTO: 5 M/UL (ref 4.2–5.3)
SODIUM SERPL-SCNC: 132 MMOL/L (ref 136–145)
WBC # BLD AUTO: 8.8 K/UL (ref 4.6–13.2)

## 2022-03-27 PROCEDURE — 85025 COMPLETE CBC W/AUTO DIFF WBC: CPT

## 2022-03-27 PROCEDURE — 96375 TX/PRO/DX INJ NEW DRUG ADDON: CPT

## 2022-03-27 PROCEDURE — 96374 THER/PROPH/DIAG INJ IV PUSH: CPT

## 2022-03-27 PROCEDURE — 70450 CT HEAD/BRAIN W/O DYE: CPT

## 2022-03-27 PROCEDURE — 99284 EMERGENCY DEPT VISIT MOD MDM: CPT

## 2022-03-27 PROCEDURE — 80053 COMPREHEN METABOLIC PANEL: CPT

## 2022-03-27 PROCEDURE — 74011250636 HC RX REV CODE- 250/636: Performed by: EMERGENCY MEDICINE

## 2022-03-27 RX ORDER — DIPHENHYDRAMINE HYDROCHLORIDE 50 MG/ML
50 INJECTION, SOLUTION INTRAMUSCULAR; INTRAVENOUS ONCE
Status: COMPLETED | OUTPATIENT
Start: 2022-03-27 | End: 2022-03-27

## 2022-03-27 RX ORDER — ONDANSETRON 2 MG/ML
4 INJECTION INTRAMUSCULAR; INTRAVENOUS
Status: COMPLETED | OUTPATIENT
Start: 2022-03-27 | End: 2022-03-27

## 2022-03-27 RX ADMIN — ONDANSETRON 4 MG: 2 INJECTION INTRAMUSCULAR; INTRAVENOUS at 12:36

## 2022-03-27 RX ADMIN — DIPHENHYDRAMINE HYDROCHLORIDE 50 MG: 50 INJECTION, SOLUTION INTRAMUSCULAR; INTRAVENOUS at 12:36

## 2022-03-27 RX ADMIN — SODIUM CHLORIDE 1000 ML: 9 INJECTION, SOLUTION INTRAVENOUS at 12:39

## 2022-03-27 NOTE — ED PROVIDER NOTES
EMERGENCY DEPARTMENT HISTORY AND PHYSICAL EXAM    Date: 3/27/2022  Patient Name: Néstor Lou    History of Presenting Illness     Chief Complaint   Patient presents with    Headache         History Provided By: Patient      Additional History (Context): Néstor Lou is a 39 y.o. female with diabetes, hypertension, obesity and seizure who presents with complaint of a headache for the past 3 days. Headache is on the vertex denies radiation vision changes fever rash unilateral weakness or numbness changes in speech. 2 days prior to symptom onset she was accidentally hit in the head when she stood up and hit a cabinet. Denies syncope or nausea vomiting. Denies anticoagulation. Denies possibility of pregnancy with prior hysterectomy. PCP: Maylin Ivory MD    Current Outpatient Medications   Medication Sig Dispense Refill    pantoprazole (PROTONIX) 40 mg tablet       Lantus U-100 Insulin 100 unit/mL injection       topiramate (TOPAMAX) 50 mg tablet       insulin aspart U-100 (NovoLOG U-100 Insulin aspart) 100 unit/mL injection by SubCUTAneous route.  lidocaine (LIDODERM) 5 % Apply patch to the affected area for 12 hours a day and remove for 12 hours a day. 15 Each 0    insulin glulisine U-100 (APIDRA SOLOSTAR U-100 INSULIN) 100 unit/mL pen 10 Units by SubCUTAneous route Before breakfast, lunch, and dinner.  liothyronine (CYTOMEL) 25 mcg tablet Take 75 mcg by mouth daily.  meloxicam (MOBIC) 15 mg tablet Take 15 mg by mouth daily as needed for Pain.  metFORMIN (GLUCOPHAGE) 1,000 mg tablet Take 1,000 mg by mouth two (2) times daily (with meals).  metoprolol tartrate (LOPRESSOR) 25 mg tablet Take 25 mg by mouth daily.  atorvastatin (LIPITOR) 40 mg tablet Take  by mouth daily.  furosemide (LASIX) 20 mg tablet Take  by mouth daily.  lisinopril (PRINIVIL, ZESTRIL) 5 mg tablet Take 40 mg by mouth daily.          Past History     Past Medical History:  Past Medical History:   Diagnosis Date    Arthritis     Chest pain     Diabetes ([facility]     Essential hypertension     Headache(784.0)     Hyperchloremia     Hypertension     [facility]ures [facility]     8/2020 last seizure    Seizures (HCC)     SOB (shortness of breath)     Thyroid cancer ([facility]        Past Surgical History:  Past Surgical History:   Procedure Laterality Date    HX PARTIAL HYSTERECTOMY      HX THYROIDECTOMY      HX TUBAL LIGATION         Family History:  Family History   Problem Relation Age of Onset    Hypertension Mother        Social History:  Social History     Tobacco Use    Smoking status: Former Smoker    Smokeless tobacco: Never Used   Substance Use Topics    Alcohol use: No    Drug use: No       Allergies:  No Known Allergies      Review of Systems   Review of Systems   Constitutional: Negative. HENT: Negative. Eyes: Negative for visual disturbance. Gastrointestinal: Negative for nausea and vomiting. Endocrine: Negative. Genitourinary: Negative. Musculoskeletal: Negative for gait problem. Skin: Negative. Allergic/Immunologic: Negative. Neurological: Positive for headaches. Negative for dizziness, seizures, syncope, speech difficulty, weakness, light-headedness and numbness. Hematological: Negative. Psychiatric/Behavioral: Negative. All Other Systems Negative  Physical Exam     Vitals:    03/27/22 1115   BP: 136/84   Pulse: (!) 105   Resp: 18   Temp: 97.7 °F (36.5 °C)   SpO2: 100%   Weight: 108.9 kg (240 lb)   Height: 5' 9\" (1.753 m)     Physical Exam  Vitals and nursing note reviewed. Constitutional:       Appearance: She is well-developed. HENT:      Head: Normocephalic and atraumatic. Right Ear: External ear normal.      Left Ear: External ear normal.      Nose: Nose normal.   Eyes:      Extraocular Movements: Extraocular movements intact. Conjunctiva/sclera: Conjunctivae normal.      Pupils: Pupils are equal, round, and reactive to light. Comments: No nystagmus   Neck:      Vascular: No JVD. Trachea: No tracheal deviation. Cardiovascular:      Rate and Rhythm: Normal rate and regular rhythm. Heart sounds: Normal heart sounds. No murmur heard. No friction rub. No gallop. Pulmonary:      Effort: Pulmonary effort is normal. No respiratory distress. Breath sounds: Normal breath sounds. No wheezing or rales. Abdominal:      General: Bowel sounds are normal. There is no distension. Palpations: Abdomen is soft. There is no mass. Tenderness: There is no abdominal tenderness. There is no guarding or rebound. Musculoskeletal:         General: No tenderness. Normal range of motion. Cervical back: Normal range of motion and neck supple. Skin:     General: Skin is warm and dry. Findings: No rash. Neurological:      Mental Status: She is alert and oriented to person, place, and time. Cranial Nerves: No cranial nerve deficit. Motor: No weakness. Coordination: Coordination normal.      Gait: Gait normal.      Deep Tendon Reflexes: Reflexes are normal and symmetric. Comments: Negative Romberg. No tremor or past-pointing. Psychiatric:         Behavior: Behavior normal.            Diagnostic Study Results     Labs -     Recent Results (from the past 12 hour(s))   CBC WITH AUTOMATED DIFF    Collection Time: 03/27/22 11:22 AM   Result Value Ref Range    WBC 8.8 4.6 - 13.2 K/uL    RBC 5.00 4.20 - 5.30 M/uL    HGB 12.8 12.0 - 16.0 g/dL    HCT 40.2 35.0 - 45.0 %    MCV 80.4 78.0 - 100.0 FL    MCH 25.6 24.0 - 34.0 PG    MCHC 31.8 31.0 - 37.0 g/dL    RDW 13.7 11.6 - 14.5 %    PLATELET 349 487 - 810 K/uL    MPV 10.1 9.2 - 11.8 FL    NRBC 0.0 0  WBC    ABSOLUTE NRBC 0.00 0.00 - 0.01 K/uL    NEUTROPHILS 52 40 - 73 %    LYMPHOCYTES 40 21 - 52 %    MONOCYTES 7 3 - 10 %    EOSINOPHILS 0 0 - 5 %    BASOPHILS 0 0 - 2 %    IMMATURE GRANULOCYTES 0 0.0 - 0.5 %    ABS. NEUTROPHILS 4.6 1.8 - 8.0 K/UL    ABS. LYMPHOCYTES 3.5 0.9 - 3.6 K/UL    ABS. MONOCYTES 0.6 0.05 - 1.2 K/UL    ABS. EOSINOPHILS 0.0 0.0 - 0.4 K/UL    ABS. BASOPHILS 0.0 0.0 - 0.1 K/UL    ABS. IMM. GRANS. 0.0 0.00 - 0.04 K/UL    DF AUTOMATED     METABOLIC PANEL, COMPREHENSIVE    Collection Time: 03/27/22 11:22 AM   Result Value Ref Range    Sodium 132 (L) 136 - 145 mmol/L    Potassium 4.0 3.5 - 5.5 mmol/L    Chloride 103 100 - 111 mmol/L    CO2 24 21 - 32 mmol/L    Anion gap 5 3.0 - 18 mmol/L    Glucose 323 (H) 74 - 99 mg/dL    BUN 26 (H) 7.0 - 18 MG/DL    Creatinine 1.18 0.6 - 1.3 MG/DL    BUN/Creatinine ratio 22 (H) 12 - 20      GFR est AA >60 >60 ml/min/1.73m2    GFR est non-AA 52 (L) >60 ml/min/1.73m2    Calcium 9.3 8.5 - 10.1 MG/DL    Bilirubin, total 0.4 0.2 - 1.0 MG/DL    ALT (SGPT) 28 13 - 56 U/L    AST (SGOT) 18 10 - 38 U/L    Alk. phosphatase 103 45 - 117 U/L    Protein, total 7.8 6.4 - 8.2 g/dL    Albumin 2.9 (L) 3.4 - 5.0 g/dL    Globulin 4.9 (H) 2.0 - 4.0 g/dL    A-G Ratio 0.6 (L) 0.8 - 1.7         Radiologic Studies -   CT HEAD WO CONT   Final Result      No acute findings or interval change. CT Results  (Last 48 hours)               03/27/22 1212  CT HEAD WO CONT Final result    Impression:      No acute findings or interval change. Narrative:  EXAM: CT HEAD WO CONT       INDICATION: headache s/p head injury       COMPARISON: 8/15/2020. TECHNIQUE: Unenhanced CT of the head was performed using 5 mm images. Brain and   bone windows were generated. CT dose reduction was achieved through use of a   standardized protocol tailored for this examination and automatic exposure   control for dose modulation. FINDINGS:   The ventricles and sulci are normal in size, shape and configuration and   midline. There is no significant white matter disease. There is no intracranial   hemorrhage, extra-axial collection, mass, mass effect or midline shift. The   basilar cisterns are open. No acute infarct is identified.  The bone windows   demonstrate no abnormalities. Bilateral mastoid effusions again noted, chronic. [place] CXR Results  (Last 48 hours)    None            Medical Decision Making   I am the first provider for this patient. I reviewed the vital signs, available nursing notes, past medical history, past surgical history, family history and social history. Vital Signs-Reviewed the patient's vital signs. Records Reviewed: Nursing Notes    Procedures:  Procedures    Provider Notes (Medical Decision Making): HA has resolved. No acute intracranial process s/p head trauma. Labs stable. MED RECONCILIATION:  No current facility-administered medications for this encounter. Current Outpatient Medications   Medication Sig    pantoprazole (PROTONIX) 40 mg tablet     Lantus U-100 Insulin 100 unit/mL injection     topiramate (TOPAMAX) 50 mg tablet     insulin aspart U-100 (NovoLOG U-100 Insulin aspart) 100 unit/mL injection by SubCUTAneous route.  lidocaine (LIDODERM) 5 % Apply patch to the affected area for 12 hours a day and remove for 12 hours a day.  insulin glulisine U-100 (APIDRA SOLOSTAR U-100 INSULIN) 100 unit/mL pen 10 Units by SubCUTAneous route Before breakfast, lunch, and dinner.  liothyronine (CYTOMEL) 25 mcg tablet Take 75 mcg by mouth daily.  meloxicam (MOBIC) 15 mg tablet Take 15 mg by mouth daily as needed for Pain.  metFORMIN (GLUCOPHAGE) 1,000 mg tablet Take 1,000 mg by mouth two (2) times daily (with meals).  metoprolol tartrate (LOPRESSOR) 25 mg tablet Take 25 mg by mouth daily.  atorvastatin (LIPITOR) 40 mg tablet Take  by mouth daily.  furosemide (LASIX) 20 mg tablet Take  by mouth daily.  lisinopril (PRINIVIL, ZESTRIL) 5 mg tablet Take 40 mg by mouth daily. Disposition:  home    DISCHARGE NOTE:   2:05 PM    Pt has been reexamined. Patient has no new complaints, changes, or physical findings. Care plan outlined and precautions discussed.   Results of labs, CT were reviewed with the patient. All medications were reviewed with the patient. All of pt's questions and concerns were addressed. Patient was instructed and agrees to follow up with PCP, as well as to return to the ED upon further deterioration. Patient is ready to go home. Follow-up Information     Follow up With Specialties Details Why Contact Info    Skyla Curtis MD Internal Medicine Schedule an appointment as soon as possible for a visit in 2 days As needed     [facility] EMERGENCY DEPT Emergency Medicine  If symptoms worsen return immediately [address]  [phone]          Current Discharge Medication List            Diagnosis     Clinical Impression:   1.  Acute post-traumatic headache, not intractable

## 2022-03-27 NOTE — ED TRIAGE NOTES
Pt reports headache times 3 days.  Pt states she hit her heas on a shelf at work 2 days prior to her headache

## 2022-05-02 ENCOUNTER — HOSPITAL ENCOUNTER (EMERGENCY)
Age: 37
Discharge: HOME | End: 2022-05-02
Attending: STUDENT IN AN ORGANIZED HEALTH CARE EDUCATION/TRAINING PROGRAM
Payer: MEDICAID

## 2022-05-02 ENCOUNTER — APPOINTMENT (OUTPATIENT)
Dept: GENERAL RADIOLOGY | Age: 37
End: 2022-05-02
Attending: STUDENT IN AN ORGANIZED HEALTH CARE EDUCATION/TRAINING PROGRAM
Payer: MEDICAID

## 2022-05-02 VITALS
DIASTOLIC BLOOD PRESSURE: 82 MMHG | HEART RATE: 110 BPM | WEIGHT: 260 LBS | TEMPERATURE: 98.4 F | SYSTOLIC BLOOD PRESSURE: 146 MMHG | RESPIRATION RATE: 18 BRPM | OXYGEN SATURATION: 99 % | HEIGHT: 69 IN | BODY MASS INDEX: 38.51 KG/M2

## 2022-05-02 DIAGNOSIS — R10.9 FLANK PAIN: Primary | ICD-10-CM

## 2022-05-02 DIAGNOSIS — R56.9 SEIZURE (HCC): ICD-10-CM

## 2022-05-02 LAB
ALBUMIN SERPL-MCNC: 2.7 G/DL (ref 3.4–5)
ALBUMIN/GLOB SERPL: 0.7 {RATIO} (ref 0.8–1.7)
ALP SERPL-CCNC: 84 U/L (ref 45–117)
ALT SERPL-CCNC: 24 U/L (ref 13–56)
ANION GAP SERPL CALC-SCNC: 7 MMOL/L (ref 3–18)
APPEARANCE UR: CLEAR
AST SERPL-CCNC: 11 U/L (ref 10–38)
ATRIAL RATE: 103 BPM
BACTERIA URNS QL MICRO: ABNORMAL /HPF
BASOPHILS # BLD: 0 K/UL (ref 0–0.1)
BASOPHILS NFR BLD: 0 % (ref 0–2)
BILIRUB SERPL-MCNC: 0.4 MG/DL (ref 0.2–1)
BILIRUB UR QL: NEGATIVE
BUN SERPL-MCNC: 13 MG/DL (ref 7–18)
BUN/CREAT SERPL: 12 (ref 12–20)
CALCIUM SERPL-MCNC: 9 MG/DL (ref 8.5–10.1)
CALCULATED P AXIS, ECG09: 70 DEGREES
CALCULATED R AXIS, ECG10: 30 DEGREES
CALCULATED T AXIS, ECG11: 59 DEGREES
CHLORIDE SERPL-SCNC: 103 MMOL/L (ref 100–111)
CO2 SERPL-SCNC: 24 MMOL/L (ref 21–32)
COLOR UR: YELLOW
CREAT SERPL-MCNC: 1.11 MG/DL (ref 0.6–1.3)
D DIMER PPP FEU-MCNC: <0.27 UG/ML(FEU)
DIAGNOSIS, 93000: NORMAL
DIFFERENTIAL METHOD BLD: ABNORMAL
EOSINOPHIL # BLD: 0 K/UL (ref 0–0.4)
EOSINOPHIL NFR BLD: 0 % (ref 0–5)
EPITH CASTS URNS QL MICRO: ABNORMAL /LPF (ref 0–5)
ERYTHROCYTE [DISTWIDTH] IN BLOOD BY AUTOMATED COUNT: 13.6 % (ref 11.6–14.5)
GLOBULIN SER CALC-MCNC: 3.7 G/DL (ref 2–4)
GLUCOSE BLD STRIP.AUTO-MCNC: 268 MG/DL (ref 70–110)
GLUCOSE SERPL-MCNC: 348 MG/DL (ref 74–99)
GLUCOSE UR STRIP.AUTO-MCNC: >1000 MG/DL
HCG UR QL: NEGATIVE
HCT VFR BLD AUTO: 37.4 % (ref 35–45)
HGB BLD-MCNC: 12.2 G/DL (ref 12–16)
HGB UR QL STRIP: ABNORMAL
IMM GRANULOCYTES # BLD AUTO: 0.1 K/UL (ref 0–0.04)
IMM GRANULOCYTES NFR BLD AUTO: 1 % (ref 0–0.5)
INR PPP: 1 (ref 0.8–1.2)
KETONES UR QL STRIP.AUTO: NEGATIVE MG/DL
LEUKOCYTE ESTERASE UR QL STRIP.AUTO: NEGATIVE
LIPASE SERPL-CCNC: 68 U/L (ref 73–393)
LYMPHOCYTES # BLD: 3.2 K/UL (ref 0.9–3.6)
LYMPHOCYTES NFR BLD: 37 % (ref 21–52)
MAGNESIUM SERPL-MCNC: 2 MG/DL (ref 1.6–2.6)
MCH RBC QN AUTO: 26.6 PG (ref 24–34)
MCHC RBC AUTO-ENTMCNC: 32.6 G/DL (ref 31–37)
MCV RBC AUTO: 81.5 FL (ref 78–100)
MONOCYTES # BLD: 0.6 K/UL (ref 0.05–1.2)
MONOCYTES NFR BLD: 7 % (ref 3–10)
NEUTS SEG # BLD: 4.7 K/UL (ref 1.8–8)
NEUTS SEG NFR BLD: 55 % (ref 40–73)
NITRITE UR QL STRIP.AUTO: NEGATIVE
NRBC # BLD: 0 K/UL (ref 0–0.01)
NRBC BLD-RTO: 0 PER 100 WBC
P-R INTERVAL, ECG05: 144 MS
PH UR STRIP: 6.5 [PH] (ref 5–8)
PLATELET # BLD AUTO: 298 K/UL (ref 135–420)
PMV BLD AUTO: 10 FL (ref 9.2–11.8)
POTASSIUM SERPL-SCNC: 4.3 MMOL/L (ref 3.5–5.5)
PROT SERPL-MCNC: 6.4 G/DL (ref 6.4–8.2)
PROT UR STRIP-MCNC: >1000 MG/DL
PROTHROMBIN TIME: 13.6 SEC (ref 11.5–15.2)
Q-T INTERVAL, ECG07: 338 MS
QRS DURATION, ECG06: 78 MS
QTC CALCULATION (BEZET), ECG08: 442 MS
RBC # BLD AUTO: 4.59 M/UL (ref 4.2–5.3)
RBC #/AREA URNS HPF: ABNORMAL /HPF (ref 0–5)
SODIUM SERPL-SCNC: 134 MMOL/L (ref 136–145)
SP GR UR REFRACTOMETRY: 1.02 (ref 1–1.03)
TROPONIN-HIGH SENSITIVITY: 8 NG/L (ref 0–54)
UROBILINOGEN UR QL STRIP.AUTO: 0.2 EU/DL (ref 0.2–1)
VENTRICULAR RATE, ECG03: 103 BPM
WBC # BLD AUTO: 8.6 K/UL (ref 4.6–13.2)
WBC URNS QL MICRO: ABNORMAL /HPF (ref 0–4)

## 2022-05-02 PROCEDURE — 85025 COMPLETE CBC W/AUTO DIFF WBC: CPT

## 2022-05-02 PROCEDURE — 71045 X-RAY EXAM CHEST 1 VIEW: CPT

## 2022-05-02 PROCEDURE — 81001 URINALYSIS AUTO W/SCOPE: CPT

## 2022-05-02 PROCEDURE — 80053 COMPREHEN METABOLIC PANEL: CPT

## 2022-05-02 PROCEDURE — 83690 ASSAY OF LIPASE: CPT

## 2022-05-02 PROCEDURE — 82962 GLUCOSE BLOOD TEST: CPT

## 2022-05-02 PROCEDURE — 74011000250 HC RX REV CODE- 250: Performed by: STUDENT IN AN ORGANIZED HEALTH CARE EDUCATION/TRAINING PROGRAM

## 2022-05-02 PROCEDURE — 85379 FIBRIN DEGRADATION QUANT: CPT

## 2022-05-02 PROCEDURE — 96374 THER/PROPH/DIAG INJ IV PUSH: CPT

## 2022-05-02 PROCEDURE — 96361 HYDRATE IV INFUSION ADD-ON: CPT

## 2022-05-02 PROCEDURE — 84484 ASSAY OF TROPONIN QUANT: CPT

## 2022-05-02 PROCEDURE — 93005 ELECTROCARDIOGRAM TRACING: CPT

## 2022-05-02 PROCEDURE — 74011250636 HC RX REV CODE- 250/636: Performed by: STUDENT IN AN ORGANIZED HEALTH CARE EDUCATION/TRAINING PROGRAM

## 2022-05-02 PROCEDURE — 99285 EMERGENCY DEPT VISIT HI MDM: CPT

## 2022-05-02 PROCEDURE — 85610 PROTHROMBIN TIME: CPT

## 2022-05-02 PROCEDURE — 83735 ASSAY OF MAGNESIUM: CPT

## 2022-05-02 PROCEDURE — 81025 URINE PREGNANCY TEST: CPT

## 2022-05-02 PROCEDURE — 74011250637 HC RX REV CODE- 250/637: Performed by: STUDENT IN AN ORGANIZED HEALTH CARE EDUCATION/TRAINING PROGRAM

## 2022-05-02 RX ORDER — IBUPROFEN 600 MG/1
600 TABLET ORAL ONCE
Status: COMPLETED | OUTPATIENT
Start: 2022-05-02 | End: 2022-05-02

## 2022-05-02 RX ORDER — METHOCARBAMOL 500 MG/1
1000 TABLET, FILM COATED ORAL ONCE
Status: COMPLETED | OUTPATIENT
Start: 2022-05-02 | End: 2022-05-02

## 2022-05-02 RX ORDER — KETOROLAC TROMETHAMINE 15 MG/ML
15 INJECTION, SOLUTION INTRAMUSCULAR; INTRAVENOUS ONCE
Status: COMPLETED | OUTPATIENT
Start: 2022-05-02 | End: 2022-05-02

## 2022-05-02 RX ORDER — LIDOCAINE 4 G/100G
1 PATCH TOPICAL ONCE
Status: DISCONTINUED | OUTPATIENT
Start: 2022-05-02 | End: 2022-05-02 | Stop reason: HOSPADM

## 2022-05-02 RX ORDER — ACETAMINOPHEN 500 MG
1000 TABLET ORAL ONCE
Status: COMPLETED | OUTPATIENT
Start: 2022-05-02 | End: 2022-05-02

## 2022-05-02 RX ORDER — INSULIN LISPRO 100 [IU]/ML
6 INJECTION, SOLUTION INTRAVENOUS; SUBCUTANEOUS
Status: DISCONTINUED | OUTPATIENT
Start: 2022-05-02 | End: 2022-05-02 | Stop reason: HOSPADM

## 2022-05-02 RX ORDER — LIDOCAINE 4 G/100G
PATCH TOPICAL
Qty: 15 EACH | Refills: 0 | OUTPATIENT
Start: 2022-05-02 | End: 2022-07-05

## 2022-05-02 RX ORDER — METHOCARBAMOL 500 MG/1
500 TABLET, FILM COATED ORAL
Qty: 20 TABLET | Refills: 0 | OUTPATIENT
Start: 2022-05-02 | End: 2022-07-05

## 2022-05-02 RX ORDER — IBUPROFEN 600 MG/1
600 TABLET ORAL
Qty: 20 TABLET | Refills: 0 | OUTPATIENT
Start: 2022-05-02 | End: 2022-07-05

## 2022-05-02 RX ADMIN — ACETAMINOPHEN 1000 MG: 500 TABLET ORAL at 14:57

## 2022-05-02 RX ADMIN — SODIUM CHLORIDE 1000 ML: 9 INJECTION, SOLUTION INTRAVENOUS at 14:35

## 2022-05-02 RX ADMIN — KETOROLAC TROMETHAMINE 15 MG: 15 INJECTION, SOLUTION INTRAMUSCULAR; INTRAVENOUS at 13:43

## 2022-05-02 RX ADMIN — SODIUM CHLORIDE 1000 ML: 900 INJECTION, SOLUTION INTRAVENOUS at 13:35

## 2022-05-02 RX ADMIN — METHOCARBAMOL TABLETS 1000 MG: 500 TABLET, COATED ORAL at 14:57

## 2022-05-02 RX ADMIN — IBUPROFEN 600 MG: 600 TABLET ORAL at 14:56

## 2022-05-02 NOTE — DISCHARGE INSTRUCTIONS
Take pain medications as needed for pain or discomfort. Consider alternative therapy such as stretching, ice and massage to help with your flank pain. Follow-up with your primary care physician if symptoms continue. Also recommends following up with a neurologist.  We will refer you to our neurologist here however please see any neurologist to follow-up for your seizures.

## 2022-05-02 NOTE — DIABETES MGMT
Diabetes Patient/Family Education Record    Factors That May Influence Patients Ability to Learn or Comply with Recommendations   []   Language barrier    []   Cultural needs   []   Motivation    []   Cognitive limitation    []   Physical   [x]   Education    []   Physiological factors   []   Hearing/vision/speaking impairment   []   Mandaen beliefs    []   Financial factors   []  Other:   []  No factors identified at this time. Person Instructed:   [x]   Patient   []   Family   []  Other     Preference for Learning:   [x]   Verbal   []   Written   []  Demonstration     Level of Comprehension & Competence:    [x]  Good                                      [] Fair                                     []  Poor                             []  Needs Reinforcement   [x]  Teach back completed    Education Component: Seen patient prior to discharge to home. [x]  Medication management, including how to administer insulin (if appropriate) and potential medication interactions:  Patient reported that she's under the care of a diabetes specialist, Dr. Disha Mensah, and takes the following diabetes medications as prescribed:  Lantus insulin (vial) 90 units twice daily: morning and  Evening  Novolog (pen) insulin 10 units 3 times daily before meals: breakfast, lunch, dinner  Metformin 1000 mg twice daily with meals    Patient reported last dose of lantus insulin taken was at 8 AM on 5/01/2022. [x]  Nutritional management - [x] Obtained usual meal pattern   []   Basic carbohydrate counting  []  Plate method  [x]  Limit concentrated sweets and avoid sweetened beverages  [x]  Portion control  [x]    Avoid skipping meals     [x]  Exercise: Patient stated that she's able to tolerate walking exercise regularly. Recommended 30 minutes daily 5 days a week as tolerated.      [x]  Signs, symptoms, and treatment of hyperglycemia and hypoglycemia: Discussed high blood sugar in detail with patient including elevated readings of 348 (lab at 13:22). Patient explained that the high blood sugar is probably due to not having taken her bedtime lantus insulin last night and she didn't take any fast acting insulin, novolog, today. [x] Prevention, recognition and treatment of hyperglycemia and hypoglycemia: Discussed low blood sugar less than 70 in detail with patient. She's able to recognize signs and symptoms and correctly stated how to treat low blood sugar to above 70. Patient reported no recent problems with low blood sugar. [x]  Importance of blood glucose monitoring  [x] Fasting Blood Glucose    []   Provided patient with blood glucose meter  [x]  Has glucometer and supplies at home   []  Instruction on use of the blood glucose meter and recommended monitoring schedule   [x]  Discuss the importance of HbA1C monitoring. Patients A1c is ___ %. This is equivalent to average glucose of ___ mg/dl for the past 2-3 months. Patient reported recent A1 check at Dr. Cristin Avery's office but she cannot recall the result at this time. Discussed with patient the recommended A1c level of less than 7%. []  Sick day guidelines   []  Proper use and disposal of lancets, needles, syringes or insulin pens (if appropriate)   [x]  Potential long-term complications (retinopathy, kidney disease, neuropathy, foot care)   [x] Information about whom to contact in case of emergency or for more information    [x]  Goal:  Patient/family will demonstrate understanding of Diabetes Self- Management Skills by: 5/09/2022  Plan for post-discharge education or self-management support:    [x] Outpatient class schedule provided            [] Patient Declined    [] Scheduled for outpatient classes (date) _______    [] Written information provided  Verify: [] Prior to admission Diabetes medications    Does patient understand how diabetes medications work? Yes.   Does patient have difficulty obtaining diabetes medications and testing supplies?  No.    Mitzi Bazan RN [facility]  Pager: [phone]

## 2022-05-02 NOTE — DIABETES MGMT
Diabetes/ Glycemic Control Plan of Care    Patient presented to ED today with report of witnessed seizure x2 with last known seizure was about 3 months ago and takes Topamax as prescribed. She also reported sharp, left flank pain x4 days. Recommendations:  None. Patient seen prior to disch this afternoon and noted one time dose of humalog insulin 6 units ordered. 5/02/2022:  Seen patient in ED prior to disch this afternoon. Discussed high blood glucose of 348 and patient explained that she's on lantus insulin 90 units twice daily but only took her morning dose yesteday. Patient will resume lantus at home and plan to follow-up with ru Long. Completed assessment of home diabetes management and education. See separate notes. Assessment:   DX: No diagnosis found. Fasting/ Morning blood glucose:   Lab Results   Component Value Date/Time    Glucose 348 (H) 05/02/2022 01:22 PM    Glucose (POC) 234 (H) 02/02/2022 03:12 PM     IV Fluids containing dextrose:  None    Steroids:   None    Blood glucose values: Within target range (70-180mg/dL):  No.    Current insulin orders:   Humalog insulin 6 units x1 ordered today    Total Daily Dose previous 24 hours: N/A. Patient presented to ED this morning, 5/02/2022    Current A1c:   Lab Results   Component Value Date/Time    Hemoglobin A1c 11.4 (H) 03/24/2010 10:25 AM      equivalent  to ave Blood Glucose of (date of result: 3/24/2010) mg/dl for 2-3 months prior to admission  Patient reported recent A1c lab from Dr. Florentino Avery's office but she's unable to recall the result at this time. Adequate glycemic control PTA: No.    Nutrition/Diet:   Active Orders   There are no active orders of the following types: Diet. Meal Intake:  No data found. Supplement Intake:  No data found. Home diabetes medications: Verified with patient med list below and she clarified lantus insulin: 90 units twice daily: morning and bedtime.   Key Antihyperglycemic Medications             Lantus U-100 Insulin 100 unit/mL injection     insulin aspart U-100 (NovoLOG U-100 Insulin aspart) 100 unit/mL injection by SubCUTAneous route. insulin glulisine U-100 (APIDRA SOLOSTAR U-100 INSULIN) 100 unit/mL pen 10 Units by SubCUTAneous route Before breakfast, lunch, and dinner. metFORMIN (GLUCOPHAGE) 1,000 mg tablet Take 1,000 mg by mouth two (2) times daily (with meals).           Plan/Goals:   Blood glucose will be within target of 70 - 180 mg/dl within 72 hours    Education:  [x] Refer to Diabetes Education Record: 5/02/2022                       [] Education not indicated at this time     Ngoc Oliver RN [facility]  Pager: [phone]

## 2022-05-02 NOTE — ED PROVIDER NOTES
HPI   Patient is a 39-year-old female who presents after a breakthrough seizure also complaining of left flank pain. Patient states that over the last 4 days she has been having pain to her left flank rating around to her anterior abdomen. It is worse with movement and when she tries to move around. Denies any dysuria, hematuria or increased urinary frequency. States that she no longer periods and does not think that she is pregnant. Denies any associated chest pain or difficulty breathing. Denies any trauma or injury. Has not had any nausea or vomiting. Is been eating and drinking normally. Denies any GI symptoms. Does not have any known history of any musculoskeletal issues in this area. Today she was talking to her neighbor when she started to feel funny like a seizure was about to come on. States he has been compliant with her medications has been taking them regularly. She takes Topamax. Has not had any recent changes in her medications. Last breakthrough seizure was approximately 3 months ago. According to EMS bystanders witnessed 2 back-to-back seizures. Both lasting short period time. No fecal or urinary incontinence.     Past Medical History:   Diagnosis Date    Arthritis     Chest pain     Diabetes ([address]     Essential hypertension     Headache(784.0)     Hyperchloremia     Hypertension     Seizures ([address]     8/2020 last seizure    Seizures (HCC)     SOB (shortness of breath)     Thyroid cancer ([address]        Past Surgical History:   Procedure Laterality Date    HX PARTIAL HYSTERECTOMY      HX THYROIDECTOMY      HX TUBAL LIGATION           Family History:   Problem Relation Age of Onset    Hypertension Mother        Social History     Socioeconomic History    Marital status: SINGLE     Spouse name: Not on file    Number of children: Not on file    Years of education: Not on file    Highest education level: Not on file   Occupational History    Not on file   Tobacco Use    Smoking status: Former Smoker    Smokeless tobacco: Never Used   Substance and Sexual Activity    Alcohol use: No    Drug use: No    Sexual activity: Yes     Partners: Male     Birth control/protection: Surgical   Other Topics Concern    Not on file   Social History Narrative    Not on file     Social Determinants of Health     Financial Resource Strain:     Difficulty of Paying Living Expenses: Not on file   Food Insecurity:     Worried About Running Out of Food in the Last Year: Not on file    Mandie of Food in the Last Year: Not on file   Transportation Needs:     Lack of Transportation (Medical): Not on file    Lack of Transportation (Non-Medical): Not on file   Physical Activity:     Days of Exercise per Week: Not on file    Minutes of Exercise per Session: Not on file   Stress:     Feeling of Stress : Not on file   Social Connections:     Frequency of Communication with Friends and Family: Not on file    Frequency of Social Gatherings with Friends and Family: Not on file    Attends [employer] Services: Not on file    Active Member of [address] [place] [address] or Organizations: Not on file    Attends Club or Organization Meetings: Not on file    Marital Status: Not on file   Intimate Partner Violence:     Fear of Current or Ex-Partner: Not on file    Emotionally Abused: Not on file    Physically Abused: Not on file    Sexually Abused: Not on file   Housing Stability:     Unable to Pay for Housing in the Last Year: Not on file    Number of [place] in the Last Year: Not on file    Unstable Housing in the Last Year: Not on file         ALLERGIES: Patient has no known allergies.     Review of Systems  Constitutional: No fever  HENT: No ear pain  Eyes: No change in vision  Respiratory: No SOB  Cardio: No chest pain  GI: No blood in stool  : No hematuria  MSK: No back pain  Skin: No rashes  Neuro: No headache    Vitals:    05/02/22 1304   BP: (!) 120/96   Pulse: (!) 110   Resp: 18   Temp: 98.4 °F (36.9 °C)   SpO2: 99%   Weight: 117.9 kg (260 lb)   Height: 5' 9\" (1.753 m)            Physical Exam   General: No acute distress  Head: Normocephalic, atraumatic  Psych: Cooperative and alert  Eyes: No scleral icterus, normal conjunctiva  ENT: Moist oral mucosa  Neck: Supple  CV: Regular rate and rhythm, no pitting edema, palpable radial pulses, reproducible chest wall tenderness on the left  Pulm: Clear breath sounds bilaterally without any wheezing or rhonchi, normal respiratory rate  GI: Normal bowel sounds, soft, non-tender, no CVA tenderness  MSK: Moves all four extremities  Skin: No rashes  Neuro: Face is symmetrical, tongue protrudes midline, vision and hearing grossly intact, normal speech, 5/5 strength bilateral upper and lower extremities, no pronator drift, finger-to-nose cerebellar testing is within normal limits, no extinction, sensation grossly intact, can name common objects, alert and oriented x4        MDM   Patient is a 48-year-old female who presents with 2 issues. Patient EMS was called because that she had a breakthrough seizure. Does have a known history of seizures and does occasionally have breakthrough seizures. Felt like her typical seizure. No obvious focal neurologic deficits, appears at her baseline currently no concerning findings of status   Epilepticus. We will monitor her for any breakthrough seizures does not require any medications at this time. Patient to be compliant. No active signs of infection. Her second complaint is her left-sided flank pain. Based on exam most likely musculoskeletal however disc seem to be causing her significant discomfort. It is kind of in the lower lung/upper abdomen region so would like to rule out PE, kidney issue or intra-abdominal issue. She will be treated with Toradol and lidocaine patch. CBC, INR, D-dimer, CMP, troponin are within normal limits the exception of elevated glucose. She will be given fluids and insulin.   Pregnancy is negative, UA is a contaminated sample with glucose but no active signs of infection. EKG shows sinus tachycardia at a rate of 103 bpm.  QRS is narrow, axis is normal, R wave progression is pericardium is satisfactory. No specific ST elevation or depression noted. No abnormal T wave inversions. Overall shows no signs of ACS or arrhythmia. Chest x-ray shows no acute cardiopulmonary process. Patient is monitored for several hours. She does not have any recurrence of her seizures. Her flank pain has improved. We discussed the importance of following up with neurology to make sure that her seizure medications are appropriate. She has lost contact with her provider And will be referred to ours. Her sugar has improved after some fluids and insulin. We discussed following up with her PCP for better diabetic management. She does state that she did not take her diabetes medications today. Also discussed following up with her PCP if flank pain continues. Discussed regimens to use to help treat her symptoms at home. Patient stable for discharge at this time. Patient is in agreement with the plan to be discharged at this time. All the patient's questions were answered. Patient was given written instructions on the diagnosis, and states understanding of the plan moving forward. We did discuss important signs and symptoms that should prompt quick return to the emergency department. Disposition: Patient was discharged home in stable condition.   They will follow up with their primary care physician    Prescriptions: Ibuprofen, lidocaine patch, methocarbamol    Diagnosis: Acute flank pain  Acute breakthrough seizure        Procedures

## 2022-05-25 ENCOUNTER — HOSPITAL ENCOUNTER (EMERGENCY)
Age: 37
Discharge: HOME | End: 2022-05-25
Attending: EMERGENCY MEDICINE
Payer: MEDICAID

## 2022-05-25 VITALS
HEIGHT: 69 IN | BODY MASS INDEX: 38.51 KG/M2 | HEART RATE: 89 BPM | WEIGHT: 260 LBS | RESPIRATION RATE: 16 BRPM | SYSTOLIC BLOOD PRESSURE: 168 MMHG | DIASTOLIC BLOOD PRESSURE: 80 MMHG | OXYGEN SATURATION: 98 % | TEMPERATURE: 98.2 F

## 2022-05-25 DIAGNOSIS — G43.809 OTHER MIGRAINE WITHOUT STATUS MIGRAINOSUS, NOT INTRACTABLE: Primary | ICD-10-CM

## 2022-05-25 LAB
ANION GAP SERPL CALC-SCNC: 0 MMOL/L (ref 3–18)
BASOPHILS # BLD: 0 K/UL (ref 0–0.1)
BASOPHILS NFR BLD: 0 % (ref 0–2)
BUN SERPL-MCNC: 16 MG/DL (ref 7–18)
BUN/CREAT SERPL: 18 (ref 12–20)
CALCIUM SERPL-MCNC: 9.3 MG/DL (ref 8.5–10.1)
CHLORIDE SERPL-SCNC: 110 MMOL/L (ref 100–111)
CO2 SERPL-SCNC: 29 MMOL/L (ref 21–32)
CREAT SERPL-MCNC: 0.88 MG/DL (ref 0.6–1.3)
DIFFERENTIAL METHOD BLD: ABNORMAL
EOSINOPHIL # BLD: 0 K/UL (ref 0–0.4)
EOSINOPHIL NFR BLD: 0 % (ref 0–5)
ERYTHROCYTE [DISTWIDTH] IN BLOOD BY AUTOMATED COUNT: 13.2 % (ref 11.6–14.5)
GLUCOSE SERPL-MCNC: 207 MG/DL (ref 74–99)
HCG SERPL QL: NEGATIVE
HCT VFR BLD AUTO: 36.7 % (ref 35–45)
HGB BLD-MCNC: 11.6 G/DL (ref 12–16)
IMM GRANULOCYTES # BLD AUTO: 0.1 K/UL (ref 0–0.04)
IMM GRANULOCYTES NFR BLD AUTO: 1 % (ref 0–0.5)
LYMPHOCYTES # BLD: 2.7 K/UL (ref 0.9–3.6)
LYMPHOCYTES NFR BLD: 37 % (ref 21–52)
MCH RBC QN AUTO: 26.9 PG (ref 24–34)
MCHC RBC AUTO-ENTMCNC: 31.6 G/DL (ref 31–37)
MCV RBC AUTO: 85 FL (ref 78–100)
MONOCYTES # BLD: 0.7 K/UL (ref 0.05–1.2)
MONOCYTES NFR BLD: 9 % (ref 3–10)
NEUTS SEG # BLD: 3.8 K/UL (ref 1.8–8)
NEUTS SEG NFR BLD: 53 % (ref 40–73)
NRBC # BLD: 0 K/UL (ref 0–0.01)
NRBC BLD-RTO: 0 PER 100 WBC
PLATELET # BLD AUTO: 372 K/UL (ref 135–420)
PMV BLD AUTO: 10.4 FL (ref 9.2–11.8)
POTASSIUM SERPL-SCNC: 4.4 MMOL/L (ref 3.5–5.5)
RBC # BLD AUTO: 4.32 M/UL (ref 4.2–5.3)
SODIUM SERPL-SCNC: 139 MMOL/L (ref 136–145)
WBC # BLD AUTO: 7.3 K/UL (ref 4.6–13.2)

## 2022-05-25 PROCEDURE — 96374 THER/PROPH/DIAG INJ IV PUSH: CPT

## 2022-05-25 PROCEDURE — 74011250636 HC RX REV CODE- 250/636: Performed by: EMERGENCY MEDICINE

## 2022-05-25 PROCEDURE — 96375 TX/PRO/DX INJ NEW DRUG ADDON: CPT

## 2022-05-25 PROCEDURE — 80048 BASIC METABOLIC PNL TOTAL CA: CPT

## 2022-05-25 PROCEDURE — 99284 EMERGENCY DEPT VISIT MOD MDM: CPT

## 2022-05-25 PROCEDURE — 84703 CHORIONIC GONADOTROPIN ASSAY: CPT

## 2022-05-25 PROCEDURE — 85025 COMPLETE CBC W/AUTO DIFF WBC: CPT

## 2022-05-25 RX ORDER — DIPHENHYDRAMINE HYDROCHLORIDE 50 MG/ML
25 INJECTION, SOLUTION INTRAMUSCULAR; INTRAVENOUS ONCE
Status: COMPLETED | OUTPATIENT
Start: 2022-05-25 | End: 2022-05-25

## 2022-05-25 RX ORDER — METOCLOPRAMIDE HYDROCHLORIDE 5 MG/ML
10 INJECTION INTRAMUSCULAR; INTRAVENOUS
Status: COMPLETED | OUTPATIENT
Start: 2022-05-25 | End: 2022-05-25

## 2022-05-25 RX ORDER — KETOROLAC TROMETHAMINE 15 MG/ML
15 INJECTION, SOLUTION INTRAMUSCULAR; INTRAVENOUS
Status: COMPLETED | OUTPATIENT
Start: 2022-05-25 | End: 2022-05-25

## 2022-05-25 RX ADMIN — METOCLOPRAMIDE HYDROCHLORIDE 10 MG: 5 INJECTION INTRAMUSCULAR; INTRAVENOUS at 10:30

## 2022-05-25 RX ADMIN — KETOROLAC TROMETHAMINE 15 MG: 15 INJECTION, SOLUTION INTRAMUSCULAR; INTRAVENOUS at 10:30

## 2022-05-25 RX ADMIN — DIPHENHYDRAMINE HYDROCHLORIDE 25 MG: 50 INJECTION, SOLUTION INTRAMUSCULAR; INTRAVENOUS at 10:30

## 2022-05-25 NOTE — ED PROVIDER NOTES
EMERGENCY DEPARTMENT HISTORY AND PHYSICAL EXAM    10:11 AM patient seen at this time in [facility] on arrival on EMS stretcher to expedite care      Date: 5/25/2022  Patient Name: Earl Lagos    History of Presenting Illness     No chief complaint on file. History Provided By: patient    Additional History (Context): Earl Lagos is a 39 y.o. female presents with history of seizures and migraines, has a headache that started 3 days ago, typical migraine frontal photophobia vomiting. Does not go into her neck no trauma. Constant and severe intensity. Madelaine Lynn PCP: Katiuska Llanes MD    Chief Complaint:   Duration:    Timing:    Location:   Quality:   Severity:   Modifying Factors:   Associated Symptoms:       Current Outpatient Medications   Medication Sig Dispense Refill    methocarbamoL (ROBAXIN) 500 mg tablet Take 1 Tablet by mouth four (4) times daily as needed for Muscle Spasm(s) or Pain. 20 Tablet 0    lidocaine 4 % patch Apply to area of discomfort for 12 hours daily as needed for pain or discomfort. 15 Each 0    ibuprofen (MOTRIN) 600 mg tablet Take 1 Tablet by mouth every six (6) hours as needed for Pain. 20 Tablet 0    pantoprazole (PROTONIX) 40 mg tablet       Lantus U-100 Insulin 100 unit/mL injection       topiramate (TOPAMAX) 50 mg tablet       insulin aspart U-100 (NovoLOG U-100 Insulin aspart) 100 unit/mL injection by SubCUTAneous route.  lidocaine (LIDODERM) 5 % Apply patch to the affected area for 12 hours a day and remove for 12 hours a day. 15 Each 0    insulin glulisine U-100 (APIDRA SOLOSTAR U-100 INSULIN) 100 unit/mL pen 10 Units by SubCUTAneous route Before breakfast, lunch, and dinner.  liothyronine (CYTOMEL) 25 mcg tablet Take 75 mcg by mouth daily.  meloxicam (MOBIC) 15 mg tablet Take 15 mg by mouth daily as needed for Pain.  metFORMIN (GLUCOPHAGE) 1,000 mg tablet Take 1,000 mg by mouth two (2) times daily (with meals).       metoprolol tartrate (LOPRESSOR) 25 mg tablet Take 25 mg by mouth daily.  atorvastatin (LIPITOR) 40 mg tablet Take  by mouth daily.  furosemide (LASIX) 20 mg tablet Take  by mouth daily.  lisinopril (PRINIVIL, ZESTRIL) 5 mg tablet Take 40 mg by mouth daily. Past History     Past Medical History:  Past Medical History:   Diagnosis Date    Arthritis     Chest pain     [facility]     Essential hypertension     Headache(784.0)     Hyperchloremia     Hypertension     Seizures ([facility]     8/2020 last seizure    Seizures (HCC)     SOB (shortness of breath)     Thyroid cancer ([facility]        Past Surgical History:  Past Surgical History:   Procedure Laterality Date    HX PARTIAL HYSTERECTOMY      HX THYROIDECTOMY      HX TUBAL LIGATION         Family History:  Family History   Problem Relation Age of Onset    Hypertension Mother        Social History:  Social History     Tobacco Use    Smoking status: Former Smoker    Smokeless tobacco: Never Used   Substance Use Topics    Alcohol use: No    Drug use: No       Allergies:  No Known Allergies      Review of Systems     Review of Systems   Constitutional: Negative for diaphoresis and fever. HENT: Negative for congestion and sore throat. Eyes: Negative for pain and itching. Respiratory: Negative for cough and shortness of breath. Cardiovascular: Negative for chest pain and palpitations. Gastrointestinal: Negative for abdominal pain and diarrhea. Endocrine: Negative for polydipsia and polyuria. Genitourinary: Negative for dysuria and hematuria. Musculoskeletal: Negative for arthralgias and myalgias. Skin: Negative for rash and wound. Neurological: Negative for seizures and syncope. Hematological: Does not bruise/bleed easily. Psychiatric/Behavioral: Negative for agitation and hallucinations. Physical Exam       No data found. IPVITALS  No data found. Physical Exam  Vitals and nursing note reviewed.    Constitutional: General: She is in acute distress. Appearance: She is well-developed. She is obese. Comments: Shielding her eyes from the light   HENT:      Head: Normocephalic and atraumatic. Eyes:      General: No scleral icterus. Extraocular Movements: Extraocular movements intact. Conjunctiva/sclera: Conjunctivae normal.      Pupils: Pupils are equal, round, and reactive to light. Neck:      Vascular: No JVD. Cardiovascular:      Rate and Rhythm: Normal rate and regular rhythm. Heart sounds: Normal heart sounds. Comments: 4 intact extremity pulses  Pulmonary:      Effort: Pulmonary effort is normal.      Breath sounds: Normal breath sounds. Abdominal:      Palpations: Abdomen is soft. There is no mass. Tenderness: There is no abdominal tenderness. Musculoskeletal:         General: Normal range of motion. Cervical back: Normal range of motion and neck supple. Lymphadenopathy:      Cervical: No cervical adenopathy. Skin:     General: Skin is warm and dry. Neurological:      General: No focal deficit present. Mental Status: She is alert. Cranial Nerves: No cranial nerve deficit. Diagnostic Study Results   Labs -  No results found for this or any previous visit (from the past 24 hour(s)). Radiologic Studies -   No orders to display     No results found. Medications ordered:   Medications - No data to display      Medical Decision Making   Initial Medical Decision Making and DDx:  Do not suspect meningitis or SAH. Consistent with typical migraine she has the photophobia and vomiting, doubt angle closure glaucoma. Not suggestive of trigeminal neuralgia tension pattern or cluster. We will treat medically    ED Course: Progress Notes, Reevaluation, and Consults:         I am the first provider for this patient. I reviewed the vital signs, available nursing notes, past medical history, past surgical history, family history and social history.     No data found. Vital Signs-Reviewed the patient's vital signs. Pulse Oximetry Analysis, Cardiac Monitor, 12 lead ekg:      Interpreted by the EP. Records Reviewed: Nursing notes reviewed (Time of Review: 10:11 AM)    Procedures:   Critical Care Time:   Aspirin: (was aspirin given for stroke?)    Diagnosis     Clinical Impression: No diagnosis found. Disposition:       Follow-up Information    None          Patient's Medications   Start Taking    No medications on file   Continue Taking    ATORVASTATIN (LIPITOR) 40 MG TABLET    Take  by mouth daily. FUROSEMIDE (LASIX) 20 MG TABLET    Take  by mouth daily. IBUPROFEN (MOTRIN) 600 MG TABLET    Take 1 Tablet by mouth every six (6) hours as needed for Pain. INSULIN ASPART U-100 (NOVOLOG U-100 INSULIN ASPART) 100 UNIT/ML INJECTION    by SubCUTAneous route. INSULIN GLULISINE U-100 (APIDRA SOLOSTAR U-100 INSULIN) 100 UNIT/ML PEN    10 Units by SubCUTAneous route Before breakfast, lunch, and dinner. LANTUS U-100 INSULIN 100 UNIT/ML INJECTION        LIDOCAINE (LIDODERM) 5 %    Apply patch to the affected area for 12 hours a day and remove for 12 hours a day. LIDOCAINE 4 % PATCH    Apply to area of discomfort for 12 hours daily as needed for pain or discomfort. LIOTHYRONINE (CYTOMEL) 25 MCG TABLET    Take 75 mcg by mouth daily. LISINOPRIL (PRINIVIL, ZESTRIL) 5 MG TABLET    Take 40 mg by mouth daily. MELOXICAM (MOBIC) 15 MG TABLET    Take 15 mg by mouth daily as needed for Pain. METFORMIN (GLUCOPHAGE) 1,000 MG TABLET    Take 1,000 mg by mouth two (2) times daily (with meals). METHOCARBAMOL (ROBAXIN) 500 MG TABLET    Take 1 Tablet by mouth four (4) times daily as needed for Muscle Spasm(s) or Pain. METOPROLOL TARTRATE (LOPRESSOR) 25 MG TABLET    Take 25 mg by mouth daily.     PANTOPRAZOLE (PROTONIX) 40 MG TABLET        TOPIRAMATE (TOPAMAX) 50 MG TABLET       These Medications have changed    No medications on file   Stop Taking    No medications on file     _______________________________    Notes:    Melvinia Edmond, MD using Dragon dictation      _______________________________

## 2022-07-05 ENCOUNTER — HOSPITAL ENCOUNTER (EMERGENCY)
Age: 37
Discharge: HOME | End: 2022-07-05
Attending: STUDENT IN AN ORGANIZED HEALTH CARE EDUCATION/TRAINING PROGRAM
Payer: MEDICAID

## 2022-07-05 ENCOUNTER — APPOINTMENT (OUTPATIENT)
Dept: GENERAL RADIOLOGY | Age: 37
End: 2022-07-05
Attending: PHYSICIAN ASSISTANT
Payer: MEDICAID

## 2022-07-05 ENCOUNTER — APPOINTMENT (OUTPATIENT)
Dept: VASCULAR SURGERY | Age: 37
End: 2022-07-05
Attending: PHYSICIAN ASSISTANT
Payer: MEDICAID

## 2022-07-05 VITALS
RESPIRATION RATE: 18 BRPM | DIASTOLIC BLOOD PRESSURE: 75 MMHG | HEART RATE: 109 BPM | WEIGHT: 250 LBS | SYSTOLIC BLOOD PRESSURE: 108 MMHG | HEIGHT: 69 IN | TEMPERATURE: 98.4 F | BODY MASS INDEX: 37.03 KG/M2 | OXYGEN SATURATION: 96 %

## 2022-07-05 DIAGNOSIS — R73.9 HYPERGLYCEMIA: ICD-10-CM

## 2022-07-05 DIAGNOSIS — M79.671 RIGHT FOOT PAIN: Primary | ICD-10-CM

## 2022-07-05 LAB
ADMINISTERED INITIALS, ADMINIT: NORMAL
ANION GAP SERPL CALC-SCNC: 8 MMOL/L (ref 3–18)
BASOPHILS # BLD: 0 K/UL (ref 0–0.1)
BASOPHILS NFR BLD: 0 % (ref 0–2)
BUN SERPL-MCNC: 26 MG/DL (ref 7–18)
BUN/CREAT SERPL: 19 (ref 12–20)
CALCIUM SERPL-MCNC: 9.1 MG/DL (ref 8.5–10.1)
CHLORIDE SERPL-SCNC: 98 MMOL/L (ref 100–111)
CO2 SERPL-SCNC: 24 MMOL/L (ref 21–32)
CREAT SERPL-MCNC: 1.39 MG/DL (ref 0.6–1.3)
D50 ADMINISTERED, D50ADM: 0 ML
D50 ORDER, D50ORD: 0 ML
DIFFERENTIAL METHOD BLD: ABNORMAL
EOSINOPHIL # BLD: 0 K/UL (ref 0–0.4)
EOSINOPHIL NFR BLD: 0 % (ref 0–5)
ERYTHROCYTE [DISTWIDTH] IN BLOOD BY AUTOMATED COUNT: 12.8 % (ref 11.6–14.5)
EST. AVERAGE GLUCOSE BLD GHB EST-MCNC: 312 MG/DL
GLUCOSE BLD STRIP.AUTO-MCNC: 262 MG/DL (ref 70–110)
GLUCOSE BLD STRIP.AUTO-MCNC: 329 MG/DL (ref 70–110)
GLUCOSE BLD STRIP.AUTO-MCNC: 362 MG/DL (ref 70–110)
GLUCOSE SERPL-MCNC: 465 MG/DL (ref 74–99)
GLUCOSE, GLC: 262 MG/DL
GLUCOSE, GLC: 280 MG/DL
GLUCOSE, GLC: 329 MG/DL
GLUCOSE, GLC: 362 MG/DL
GLUCOSE, GLC: 465 MG/DL
HBA1C MFR BLD: 12.5 % (ref 4.2–5.6)
HCT VFR BLD AUTO: 40.1 % (ref 35–45)
HGB BLD-MCNC: 12.8 G/DL (ref 12–16)
HIGH TARGET, HITG: 180 MG/DL
IMM GRANULOCYTES # BLD AUTO: 0.1 K/UL (ref 0–0.04)
IMM GRANULOCYTES NFR BLD AUTO: 1 % (ref 0–0.5)
INSULIN ADMINSTERED, INSADM: 3 UNITS/HOUR
INSULIN ADMINSTERED, INSADM: 5.4 UNITS/HOUR
INSULIN ADMINSTERED, INSADM: 6.1 UNITS/HOUR
INSULIN ADMINSTERED, INSADM: 8.1 UNITS/HOUR
INSULIN ADMINSTERED, INSADM: 8.8 UNITS/HOUR
INSULIN ORDER, INSORD: 3 UNITS/HOUR
INSULIN ORDER, INSORD: 5.4 UNITS/HOUR
INSULIN ORDER, INSORD: 6.1 UNITS/HOUR
INSULIN ORDER, INSORD: 8.1 UNITS/HOUR
INSULIN ORDER, INSORD: 8.8 UNITS/HOUR
LACTATE BLD-SCNC: 1.2 MMOL/L (ref 0.4–2)
LOW TARGET, LOT: 140 MG/DL
LYMPHOCYTES # BLD: 2.9 K/UL (ref 0.9–3.6)
LYMPHOCYTES NFR BLD: 34 % (ref 21–52)
MAGNESIUM SERPL-MCNC: 2.4 MG/DL (ref 1.6–2.6)
MCH RBC QN AUTO: 26.4 PG (ref 24–34)
MCHC RBC AUTO-ENTMCNC: 31.9 G/DL (ref 31–37)
MCV RBC AUTO: 82.7 FL (ref 78–100)
MINUTES UNTIL NEXT BG, NBG: 60 MIN
MONOCYTES # BLD: 0.7 K/UL (ref 0.05–1.2)
MONOCYTES NFR BLD: 8 % (ref 3–10)
MULTIPLIER, MUL: 0.01
MULTIPLIER, MUL: 0.02
MULTIPLIER, MUL: 0.02
MULTIPLIER, MUL: 0.03
MULTIPLIER, MUL: 0.04
NEUTS SEG # BLD: 5 K/UL (ref 1.8–8)
NEUTS SEG NFR BLD: 58 % (ref 40–73)
NRBC # BLD: 0 K/UL (ref 0–0.01)
NRBC BLD-RTO: 0 PER 100 WBC
ORDER INITIALS, ORDINIT: NORMAL
PHOSPHATE SERPL-MCNC: 4.1 MG/DL (ref 2.5–4.9)
PLATELET # BLD AUTO: 356 K/UL (ref 135–420)
PMV BLD AUTO: 10.2 FL (ref 9.2–11.8)
POTASSIUM SERPL-SCNC: 4.9 MMOL/L (ref 3.5–5.5)
RBC # BLD AUTO: 4.85 M/UL (ref 4.2–5.3)
SODIUM SERPL-SCNC: 130 MMOL/L (ref 136–145)
URATE SERPL-MCNC: 6.2 MG/DL (ref 2.6–7.2)
WBC # BLD AUTO: 8.7 K/UL (ref 4.6–13.2)

## 2022-07-05 PROCEDURE — 74011000258 HC RX REV CODE- 258: Performed by: STUDENT IN AN ORGANIZED HEALTH CARE EDUCATION/TRAINING PROGRAM

## 2022-07-05 PROCEDURE — 83605 ASSAY OF LACTIC ACID: CPT

## 2022-07-05 PROCEDURE — 74011250637 HC RX REV CODE- 250/637: Performed by: PHYSICIAN ASSISTANT

## 2022-07-05 PROCEDURE — 83036 HEMOGLOBIN GLYCOSYLATED A1C: CPT

## 2022-07-05 PROCEDURE — 74011636637 HC RX REV CODE- 636/637: Performed by: STUDENT IN AN ORGANIZED HEALTH CARE EDUCATION/TRAINING PROGRAM

## 2022-07-05 PROCEDURE — 99284 EMERGENCY DEPT VISIT MOD MDM: CPT

## 2022-07-05 PROCEDURE — 85025 COMPLETE CBC W/AUTO DIFF WBC: CPT

## 2022-07-05 PROCEDURE — 84550 ASSAY OF BLOOD/URIC ACID: CPT

## 2022-07-05 PROCEDURE — 84100 ASSAY OF PHOSPHORUS: CPT

## 2022-07-05 PROCEDURE — 73630 X-RAY EXAM OF FOOT: CPT

## 2022-07-05 PROCEDURE — 83735 ASSAY OF MAGNESIUM: CPT

## 2022-07-05 PROCEDURE — 74011250636 HC RX REV CODE- 250/636: Performed by: PHYSICIAN ASSISTANT

## 2022-07-05 PROCEDURE — 80048 BASIC METABOLIC PNL TOTAL CA: CPT

## 2022-07-05 PROCEDURE — 96365 THER/PROPH/DIAG IV INF INIT: CPT

## 2022-07-05 PROCEDURE — 93971 EXTREMITY STUDY: CPT

## 2022-07-05 PROCEDURE — 96366 THER/PROPH/DIAG IV INF ADDON: CPT

## 2022-07-05 PROCEDURE — 82962 GLUCOSE BLOOD TEST: CPT

## 2022-07-05 RX ORDER — SODIUM CHLORIDE 9 MG/ML
125 INJECTION, SOLUTION INTRAVENOUS CONTINUOUS
Status: DISCONTINUED | OUTPATIENT
Start: 2022-07-05 | End: 2022-07-05 | Stop reason: HOSPADM

## 2022-07-05 RX ORDER — HYDROCODONE BITARTRATE AND ACETAMINOPHEN 5; 325 MG/1; MG/1
1 TABLET ORAL
Qty: 6 TABLET | Refills: 0 | Status: SHIPPED | OUTPATIENT
Start: 2022-07-05 | End: 2022-07-08

## 2022-07-05 RX ORDER — MAGNESIUM SULFATE 100 %
4 CRYSTALS MISCELLANEOUS AS NEEDED
Status: DISCONTINUED | OUTPATIENT
Start: 2022-07-05 | End: 2022-07-05 | Stop reason: HOSPADM

## 2022-07-05 RX ORDER — HYDROCODONE BITARTRATE AND ACETAMINOPHEN 5; 325 MG/1; MG/1
1 TABLET ORAL
Status: COMPLETED | OUTPATIENT
Start: 2022-07-05 | End: 2022-07-05

## 2022-07-05 RX ORDER — DEXTROSE MONOHYDRATE 100 MG/ML
0-250 INJECTION, SOLUTION INTRAVENOUS AS NEEDED
Status: DISCONTINUED | OUTPATIENT
Start: 2022-07-05 | End: 2022-07-05 | Stop reason: HOSPADM

## 2022-07-05 RX ADMIN — HYDROCODONE BITARTRATE AND ACETAMINOPHEN 1 TABLET: 5; 325 TABLET ORAL at 14:07

## 2022-07-05 RX ADMIN — SODIUM CHLORIDE 8.1 UNITS/HR: 9 INJECTION, SOLUTION INTRAVENOUS at 14:44

## 2022-07-05 RX ADMIN — SODIUM CHLORIDE 125 ML/HR: 9 INJECTION, SOLUTION INTRAVENOUS at 17:13

## 2022-07-05 NOTE — Clinical Note
[address] [place] [address] [address] [facility] EMERGENCY DEPT  [address] [address] [phone]    Work/School Note    Date: 7/5/2022    To Whom It May concern:    Leydi Hermosillo was seen and treated today in the emergency room by the following provider(s):  Attending Provider: Kajal Moscoso DO  Physician Assistant: Gordo Keita. Leydi Hermosillo is excused from work/school on 07/05/22 and 07/06/22. She is medically clear to return to work/school on 7/7/2022.        Sincerely,          Shyrl Bosworth, PA

## 2022-07-05 NOTE — DISCHARGE INSTRUCTIONS
Take medication as prescribed. Follow-up with the podiatrist within 2 days for reassessment. Bring the results from this visit with you for their review. Return to the ED immediately for any new, worsening, or persistent symptoms, including fever, leg swelling, or any other medical concerns.

## 2022-07-05 NOTE — DIABETES MGMT
Diabetes/ Glycemic Control Plan of Care      Patient wit history of T2DM presented to ED today, 7/05/2022, with report of right foot swelling x2 days. Elevated BG    Results for Jennifer Najera (MRN [id number]) as of 7/5/2022 16:35   Ref. Range 7/5/2022 13:50   Glucose Latest Ref Range: 74 - 99 mg/dL [address] [facility])     Results for Jennifer Najera (MRN [id number]) as of 7/5/2022 16:35   Ref. Range 7/5/2022 15:49   GLUCOSE,FAST - POC Latest Ref Range: 70 - 110 mg/dL 362 (H)     Recommendations:   1.) follow insulin drip protocol via GlucoStabilizer. 2.) IVF: Order obtained NS cont infusion 125 ml/hr  3.) follow criteria for transition to SC insulin. SEE MAR:  Stable BG/no increase for at least 4 hours  Stable insulin drip requirement/no increase for at least 4 hours  4.) administer first dose of daily basal lantus insulin 10 units at least two hours before d/c insulin drip. There should be an overlap of insulin drip and first dose of lantus for at least two hours before Levora Battles is discontinued. 5.) add correctional lispro insulin once patient is off Levora Battles. Discussed above with assigned nurse. Assessment:   DX: No diagnosis found. Noted:  Hyperglycemia    Fasting/ Morning blood glucose:   Lab Results   Component Value Date/Time    Glucose 465 (HH) 07/05/2022 01:50 PM    Glucose (POC) 362 (H) 07/05/2022 03:49 PM     IV Fluids containing dextrose: None    Steroids:  None    Blood glucose values: Within target range (70-180mg/dL): No    Current insulin orders:   Regular insulin drip via GlucoStabilzer.  Drip rate at time of review 3 units/hr    Total Daily Dose previous 24 hours =   Current A1c:   Lab Results   Component Value Date/Time    Hemoglobin A1c 12.5 (H) 07/05/2022 01:50 PM      equivalent  to ave Blood Glucose of 312 mg/dl for 2-3 months prior to admission    Adequate glycemic control PTA: No    Nutrition/Diet:   Active Orders   There are no active orders of the following types: Diet. Meal Intake:  No data found. Supplement Intake:  No data found. Home diabetes medications: Pending verification  Key Antihyperglycemic Medications             Lantus U-100 Insulin 100 unit/mL injection     insulin aspart U-100 (NovoLOG U-100 Insulin aspart) 100 unit/mL injection by SubCUTAneous route. insulin glulisine U-100 (APIDRA SOLOSTAR U-100 INSULIN) 100 unit/mL pen 10 Units by SubCUTAneous route Before breakfast, lunch, and dinner. metFORMIN (GLUCOPHAGE) 1,000 mg tablet Take 1,000 mg by mouth two (2) times daily (with meals). Plan/Goals:   Blood glucose will be within target of 70 - 180 mg/dl within 72 hours  Reinforce dietary and medication compliance at home.      *    Education: Pending assessment of home diabetes management and education    [] Refer to Diabetes Education Record    [] Education not indicated at this time     Trudy Elliott RN [facility]  Pager: [phone]

## 2022-07-05 NOTE — Clinical Note
[address] [place] [address] [address] [facility] EMERGENCY DEPT  [address] [address] [phone]    Work/School Note    Date: 7/5/2022    To Whom It May concern:    Roberta Rebolledo was seen and treated today in the emergency room by the following provider(s):  Attending Provider: Fortino Brittle, DO  Physician Assistant: Alyssa Mejia, [address]. Roberta Rebolledo is excused from work/school on 07/05/22 and 07/06/22. She is medically clear to return to work/school on 7/7/2022.        Sincerely,          ESTEBAN Starr

## 2022-07-05 NOTE — ED TRIAGE NOTES
Pt endorses right foot pain x2 days, swelling got worse throughout the night, denies trauma, has been trying cream and tylenol without relief.

## 2022-07-05 NOTE — ED PROVIDER NOTES
EMERGENCY DEPARTMENT HISTORY AND PHYSICAL EXAM    1:26 PM      Date: 7/5/2022  Patient Name: Odilon Vazquez    History of Presenting Illness     Chief Complaint   Patient presents with    Foot Swelling         History Provided By: Patient    Additional History (Context): Odilon Vazquez is a 44 y.o. female with hx of HTN, DM, HLD, seizures, and other noted PMH who presents with c/o R foot pain x 2 days. Pt notes associated edema. Pt notes \"I was playing with my grandkids and may have hurt it\". Denies fever or chills, fall or trauma, numbness or tingling, calf pain or swelling, history of IVDA or MRSA, history of gout. Patient notes she has tried Tylenol and topical cream for symptoms without relief. PCP: Ba Mendez MD    Current Facility-Administered Medications   Medication Dose Route Frequency Provider Last Rate Last Admin    glucose chewable tablet 16 g  4 Tablet Oral PRN Linwood Ip, PA        glucagon (GLUCAGEN) injection 1 mg  1 mg IntraMUSCular PRN Linwood Ip, PA        dextrose 10% infusion 0-250 mL  0-250 mL IntraVENous PRN Linwood Ip, PA        insulin regular (MYXREDLIN, NOVOLIN, HUMULIN) 100 units/100 ml NS infusion (premix)  0-50 Units/hr IntraVENous TITRATE [facility], DO   IV Completed at 07/05/22 1929    0.9% sodium chloride infusion  125 mL/hr IntraVENous CONTINUOUS Linwood Ip, PA   IV Completed at 07/05/22 1929     Current Outpatient Medications   Medication Sig Dispense Refill    HYDROcodone-acetaminophen (NORCO) 5-325 mg per tablet Take 1 Tablet by mouth every six (6) hours as needed for Pain for up to 3 days. Max Daily Amount: 4 Tablets. 6 Tablet 0    pantoprazole (PROTONIX) 40 mg tablet       Lantus U-100 Insulin 100 unit/mL injection       topiramate (TOPAMAX) 50 mg tablet       insulin aspart U-100 (NovoLOG U-100 Insulin aspart) 100 unit/mL injection by SubCUTAneous route.       insulin glulisine U-100 (APIDRA SOLOSTAR U-100 INSULIN) 100 unit/mL pen 10 Units by SubCUTAneous route Before breakfast, lunch, and dinner.  liothyronine (CYTOMEL) 25 mcg tablet Take 75 mcg by mouth daily.  metFORMIN (GLUCOPHAGE) 1,000 mg tablet Take 1,000 mg by mouth two (2) times daily (with meals).  metoprolol tartrate (LOPRESSOR) 25 mg tablet Take 25 mg by mouth daily.  atorvastatin (LIPITOR) 40 mg tablet Take  by mouth daily.  furosemide (LASIX) 20 mg tablet Take  by mouth daily.  lisinopril (PRINIVIL, ZESTRIL) 5 mg tablet Take 40 mg by mouth daily. Past History     Past Medical History:  Past Medical History:   Diagnosis Date    Arthritis     Chest pain     [facility]     Essential hypertension     Headache(784.0)     Hyperchloremia     Hypertension     Seizures ([facility]     8/2020 last seizure    Seizures (HCC)     SOB (shortness of breath)     Thyroid cancer ([facility] [address] [facility]        Past Surgical History:  Past Surgical History:   Procedure Laterality Date    HX PARTIAL HYSTERECTOMY      HX THYROIDECTOMY      HX TUBAL LIGATION         Family History:  Family History   Problem Relation Age of Onset    Hypertension Mother        Social History:  Social History     Tobacco Use    Smoking status: Former Smoker    Smokeless tobacco: Never Used   Substance Use Topics    Alcohol use: No    Drug use: No       Allergies:  No Known Allergies      Review of Systems       Review of Systems   Constitutional: Negative for chills and fever. Respiratory: Negative for shortness of breath. Cardiovascular: Negative for chest pain. Gastrointestinal: Negative for abdominal pain, nausea and vomiting. Musculoskeletal: Positive for arthralgias, joint swelling and myalgias. Skin: Negative for rash. Neurological: Negative for weakness. All other systems reviewed and are negative.         Physical Exam     Visit Vitals  /75   Pulse (!) 109   Temp 98.4 °F (36.9 °C)   Resp 18   Ht 5' 9\" (1.753 m)   Wt 113.4 kg (250 lb)   SpO2 96% BMI 36.92 kg/m²         Physical Exam  Vitals and nursing note reviewed. Constitutional:       General: She is not in acute distress. Appearance: Normal appearance. She is well-developed. She is obese. She is not ill-appearing, toxic-appearing or diaphoretic. HENT:      Head: Normocephalic and atraumatic. Cardiovascular:      Rate and Rhythm: Normal rate and regular rhythm. Heart sounds: Normal heart sounds. No murmur heard. No friction rub. No gallop. Pulmonary:      Effort: Pulmonary effort is normal. No respiratory distress. Breath sounds: Normal breath sounds. No wheezing or rales. Musculoskeletal:         General: Normal range of motion. Cervical back: Normal range of motion and neck supple. Feet:      Right foot:      Skin integrity: Skin integrity normal. No ulcer, blister, skin breakdown or erythema. Toenail Condition: Right toenails are abnormally thick. Comments: Moderate edema of R foot without extension into ankle or calf, dorsalis pedis 2+   Skin:     General: Skin is warm. Findings: No rash. Neurological:      Mental Status: She is alert. Diagnostic Study Results     Labs -  Recent Results (from the past 12 hour(s))   CBC WITH AUTOMATED DIFF    Collection Time: 07/05/22  1:50 PM   Result Value Ref Range    WBC 8.7 4.6 - 13.2 K/uL    RBC 4.85 4.20 - 5.30 M/uL    HGB 12.8 12.0 - 16.0 g/dL    HCT 40.1 35.0 - 45.0 %    MCV 82.7 78.0 - 100.0 FL    MCH 26.4 24.0 - 34.0 PG    MCHC 31.9 31.0 - 37.0 g/dL    RDW 12.8 11.6 - 14.5 %    PLATELET 429 948 - 378 K/uL    MPV 10.2 9.2 - 11.8 FL    NRBC 0.0 0  WBC    ABSOLUTE NRBC 0.00 0.00 - 0.01 K/uL    NEUTROPHILS 58 40 - 73 %    LYMPHOCYTES 34 21 - 52 %    MONOCYTES 8 3 - 10 %    EOSINOPHILS 0 0 - 5 %    BASOPHILS 0 0 - 2 %    IMMATURE GRANULOCYTES 1 (H) 0.0 - 0.5 %    ABS. NEUTROPHILS 5.0 1.8 - 8.0 K/UL    ABS. LYMPHOCYTES 2.9 0.9 - 3.6 K/UL    ABS. MONOCYTES 0.7 0.05 - 1.2 K/UL    ABS.  EOSINOPHILS 0.0 0.0 - 0.4 K/UL    ABS. BASOPHILS 0.0 0.0 - 0.1 K/UL    ABS. IMM.  GRANS. 0.1 (H) 0.00 - 0.04 K/UL    DF AUTOMATED     METABOLIC PANEL, BASIC    Collection Time: 07/05/22  1:50 PM   Result Value Ref Range    Sodium 130 (L) 136 - 145 mmol/L    Potassium 4.9 3.5 - 5.5 mmol/L    Chloride 98 (L) 100 - 111 mmol/L    CO2 24 21 - 32 mmol/L    Anion gap 8 3.0 - 18 mmol/L    Glucose 465 (HH) 74 - 99 mg/dL    BUN 26 (H) 7.0 - 18 MG/DL    Creatinine 1.39 (H) 0.6 - 1.3 MG/DL    BUN/Creatinine ratio 19 12 - 20      GFR est AA 51 (L) >60 ml/min/1.73m2    GFR est non-AA 42 (L) >60 ml/min/1.73m2    Calcium 9.1 8.5 - 10.1 MG/DL   URIC ACID    Collection Time: 07/05/22  1:50 PM   Result Value Ref Range    Uric acid 6.2 2.6 - 7.2 MG/DL   MAGNESIUM    Collection Time: 07/05/22  1:50 PM   Result Value Ref Range    Magnesium 2.4 1.6 - 2.6 mg/dL   PHOSPHORUS    Collection Time: 07/05/22  1:50 PM   Result Value Ref Range    Phosphorus 4.1 2.5 - 4.9 MG/DL   HEMOGLOBIN A1C WITH EAG    Collection Time: 07/05/22  1:50 PM   Result Value Ref Range    Hemoglobin A1c 12.5 (H) 4.2 - 5.6 %    Est. average glucose 312 mg/dL   POC LACTIC ACID    Collection Time: 07/05/22  1:54 PM   Result Value Ref Range    Lactic Acid (POC) 1.20 0.40 - 2.00 mmol/L   GLUCOSTABILIZER    Collection Time: 07/05/22  2:46 PM   Result Value Ref Range    Glucose 465 mg/dL    Insulin order 8.1 units/hour    Insulin adminstered 8.1 units/hour    Multiplier 0.020     Low target 140 mg/dL    High target 180 mg/dL    D50 order 0.0 ml    D50 administered 0.00 ml    Minutes until next BG 60 min    Order initials AL     Administered initials AL    GLUCOSE, POC    Collection Time: 07/05/22  3:49 PM   Result Value Ref Range    Glucose (POC) 362 (H) 70 - 110 mg/dL   GLUCOSTABILIZER    Collection Time: 07/05/22  3:50 PM   Result Value Ref Range    Glucose 362 mg/dL    Insulin order 3.0 units/hour    Insulin adminstered 3.0 units/hour    Multiplier 0.010     Low target 140 mg/dL High target 180 mg/dL    D50 order 0.0 ml    D50 administered 0.00 ml    Minutes until next BG 60 min    Order initials kk     Administered initials kk    GLUCOSE, POC    Collection Time: 07/05/22  4:54 PM   Result Value Ref Range    Glucose (POC) 329 (H) 70 - 110 mg/dL   GLUCOSTABILIZER    Collection Time: 07/05/22  4:55 PM   Result Value Ref Range    Glucose 329 mg/dL    Insulin order 5.4 units/hour    Insulin adminstered 5.4 units/hour    Multiplier 0.020     Low target 140 mg/dL    High target 180 mg/dL    D50 order 0.0 ml    D50 administered 0.00 ml    Minutes until next BG 60 min    Order initials kk     Administered initials kk    GLUCOSE, POC    Collection Time: 07/05/22  6:03 PM   Result Value Ref Range    Glucose (POC) 262 (H) 70 - 110 mg/dL   GLUCOSTABILIZER    Collection Time: 07/05/22  6:04 PM   Result Value Ref Range    Glucose 262 mg/dL    Insulin order 6.1 units/hour    Insulin adminstered 6.1 units/hour    Multiplier 0.030     Low target 140 mg/dL    High target 180 mg/dL    D50 order 0.0 ml    D50 administered 0.00 ml    Minutes until next BG 60 min    Order initials kk     Administered initials kk    GLUCOSTABILIZER    Collection Time: 07/05/22  7:22 PM   Result Value Ref Range    Glucose 280 mg/dL    Insulin order 8.8 units/hour    Insulin adminstered 8.8 units/hour    Multiplier 0.040     Low target 140 mg/dL    High target 180 mg/dL    D50 order 0.0 ml    D50 administered 0.00 ml    Minutes until next BG 60 min    Order initials kk     Administered initials kk        Radiologic Studies -   XR FOOT RT MIN 3 V   Final Result   1. Age-indeterminate base of the second proximal phalanx medial corner fracture      DUPLEX LOWER EXT VENOUS RIGHT    (Results Pending)     · No evidence of deep vein thrombosis in the right lower extremity. Medical Decision Making   I am the first provider for this patient.     I reviewed the vital signs, available nursing notes, past medical history, past surgical history, family history and social history. Vital Signs-Reviewed the patient's vital signs. Records Reviewed: Nursing Notes and Old Medical Records (Time of Review: 1:26 PM)    ED Course: Progress Notes, Reevaluation, and Consults:  3:00 PM: Pt brought back to room for glucostabilizer. 6:00 PM Reviewed results and plan with patient. Discussed need for close outpatient follow-up this week for reassessment, pt notes she has follow-up with Dr. Obi Man scheduled for Thursday. Discussed strict return precautions, including fever, increased swelling, or any other medical concerns. Pt in agreement with plan, ready to go home. Pending ace wrap, post-op shoe, crutches for disposition. Provider Notes (Medical Decision Making): 79-year-old female with history of diabetes, hypertension, seizures who presents to the ED due to right foot pain and edema x2 days. Afebrile, nontoxic-appearing, looks well. No evidence of cellulitis, ulceration, DVT, gout. X-ray demonstrates age-indeterminate second proximal phalanx fracture. Ace wrap, postop shoe, crutches provided. Patient also with hyperglycemia without evidence of DKA. Glucosestabilizer initiated, blood glucose lowered to 280. Patient is stable for discharge with close outpatient follow-up with PMD and podiatrist for further assessment. Strict return precautions provided      Diagnosis     Clinical Impression:   1. Right foot pain    2.  Hyperglycemia        Disposition: home     Follow-up Information     Follow up With Specialties Details Why [address] [facility] EMERGENCY DEPT Emergency Medicine  If symptoms worsen [address] [place] [id number]  [phone]    China Price DPM Podiatry Schedule an appointment as soon as possible for a visit  [address] [place] [id number] [phone]             Discharge Medication List as of 7/5/2022  6:15 PM      START taking these medications    Details   HYDROcodone-acetaminophen (NORCO) 5-325 mg per tablet Take 1 Tablet by mouth every six (6) hours as needed for Pain for up to 3 days. Max Daily Amount: 4 Tablets., Normal, Disp-6 Tablet, R-0         CONTINUE these medications which have NOT CHANGED    Details   pantoprazole (PROTONIX) 40 mg tablet Historical Med      Lantus U-100 Insulin 100 unit/mL injection Historical Med, JULIO      topiramate (TOPAMAX) 50 mg tablet Historical Med      insulin aspart U-100 (NovoLOG U-100 Insulin aspart) 100 unit/mL injection by SubCUTAneous route., Historical Med      insulin glulisine U-100 (APIDRA SOLOSTAR U-100 INSULIN) 100 unit/mL pen 10 Units by SubCUTAneous route Before breakfast, lunch, and dinner., Historical Med      liothyronine (CYTOMEL) 25 mcg tablet Take 75 mcg by mouth daily. , Historical Med      metFORMIN (GLUCOPHAGE) 1,000 mg tablet Take 1,000 mg by mouth two (2) times daily (with meals). , Historical Med      metoprolol tartrate (LOPRESSOR) 25 mg tablet Take 25 mg by mouth daily. , Historical Med      atorvastatin (LIPITOR) 40 mg tablet Take  by mouth daily. , Historical Med      furosemide (LASIX) 20 mg tablet Take  by mouth daily. , Historical Med      lisinopril (PRINIVIL, ZESTRIL) 5 mg tablet Take 40 mg by mouth daily. , Historical Med         STOP taking these medications       methocarbamoL (ROBAXIN) 500 mg tablet Comments:   Reason for Stopping:         lidocaine 4 % patch Comments:   Reason for Stopping:         ibuprofen (MOTRIN) 600 mg tablet Comments:   Reason for Stopping:         lidocaine (LIDODERM) 5 % Comments:   Reason for Stopping:         meloxicam (MOBIC) 15 mg tablet Comments:   Reason for Stopping:               Dictation disclaimer:  Please note that this dictation was completed with [employer], the [employer] voice recognition software. Quite often unanticipated grammatical, syntax, homophones, and other interpretive errors are inadvertently transcribed by the computer software. Please disregard these errors.   Please excuse any errors that have escaped final proofreading.

## 2022-07-06 LAB — GLUCOSE BLD STRIP.AUTO-MCNC: 280 MG/DL (ref 70–110)

## 2022-07-26 ENCOUNTER — APPOINTMENT (OUTPATIENT)
Dept: GENERAL RADIOLOGY | Age: 37
End: 2022-07-26
Attending: EMERGENCY MEDICINE
Payer: MEDICAID

## 2022-07-26 ENCOUNTER — HOSPITAL ENCOUNTER (EMERGENCY)
Age: 37
Discharge: HOME | End: 2022-07-26
Attending: STUDENT IN AN ORGANIZED HEALTH CARE EDUCATION/TRAINING PROGRAM
Payer: MEDICAID

## 2022-07-26 VITALS
HEART RATE: 100 BPM | RESPIRATION RATE: 19 BRPM | HEIGHT: 69 IN | OXYGEN SATURATION: 98 % | DIASTOLIC BLOOD PRESSURE: 113 MMHG | SYSTOLIC BLOOD PRESSURE: 156 MMHG | TEMPERATURE: 98.4 F | WEIGHT: 260 LBS | BODY MASS INDEX: 38.51 KG/M2

## 2022-07-26 DIAGNOSIS — T14.8XXA MUSCLE STRAIN: Primary | ICD-10-CM

## 2022-07-26 DIAGNOSIS — I87.8 VENOUS STASIS OF BOTH LOWER EXTREMITIES: ICD-10-CM

## 2022-07-26 LAB
ALBUMIN SERPL-MCNC: 2 G/DL (ref 3.4–5)
ALBUMIN/GLOB SERPL: 0.4 {RATIO} (ref 0.8–1.7)
ALP SERPL-CCNC: 108 U/L (ref 45–117)
ALT SERPL-CCNC: 23 U/L (ref 13–56)
ANION GAP SERPL CALC-SCNC: 4 MMOL/L (ref 3–18)
APPEARANCE UR: CLEAR
AST SERPL-CCNC: 24 U/L (ref 10–38)
BACTERIA URNS QL MICRO: ABNORMAL /HPF
BASOPHILS # BLD: 0 K/UL (ref 0–0.1)
BASOPHILS NFR BLD: 0 % (ref 0–2)
BILIRUB SERPL-MCNC: 0.3 MG/DL (ref 0.2–1)
BILIRUB UR QL: NEGATIVE
BUN SERPL-MCNC: 14 MG/DL (ref 7–18)
BUN/CREAT SERPL: 11 (ref 12–20)
CALCIUM SERPL-MCNC: 8.9 MG/DL (ref 8.5–10.1)
CHLORIDE SERPL-SCNC: 101 MMOL/L (ref 100–111)
CO2 SERPL-SCNC: 30 MMOL/L (ref 21–32)
COLOR UR: YELLOW
CREAT SERPL-MCNC: 1.29 MG/DL (ref 0.6–1.3)
DIFFERENTIAL METHOD BLD: ABNORMAL
EOSINOPHIL # BLD: 0 K/UL (ref 0–0.4)
EOSINOPHIL NFR BLD: 0 % (ref 0–5)
EPITH CASTS URNS QL MICRO: ABNORMAL /LPF (ref 0–5)
ERYTHROCYTE [DISTWIDTH] IN BLOOD BY AUTOMATED COUNT: 13.1 % (ref 11.6–14.5)
GLOBULIN SER CALC-MCNC: 5.3 G/DL (ref 2–4)
GLUCOSE SERPL-MCNC: 279 MG/DL (ref 74–99)
GLUCOSE UR STRIP.AUTO-MCNC: 500 MG/DL
HCT VFR BLD AUTO: 35.5 % (ref 35–45)
HGB BLD-MCNC: 11.3 G/DL (ref 12–16)
HGB UR QL STRIP: ABNORMAL
IMM GRANULOCYTES # BLD AUTO: 0.1 K/UL (ref 0–0.04)
IMM GRANULOCYTES NFR BLD AUTO: 1 % (ref 0–0.5)
KETONES UR QL STRIP.AUTO: NEGATIVE MG/DL
LEUKOCYTE ESTERASE UR QL STRIP.AUTO: NEGATIVE
LIPASE SERPL-CCNC: 127 U/L (ref 73–393)
LYMPHOCYTES # BLD: 2.7 K/UL (ref 0.9–3.6)
LYMPHOCYTES NFR BLD: 30 % (ref 21–52)
MCH RBC QN AUTO: 26.9 PG (ref 24–34)
MCHC RBC AUTO-ENTMCNC: 31.8 G/DL (ref 31–37)
MCV RBC AUTO: 84.5 FL (ref 78–100)
MONOCYTES # BLD: 0.7 K/UL (ref 0.05–1.2)
MONOCYTES NFR BLD: 8 % (ref 3–10)
NEUTS SEG # BLD: 5.5 K/UL (ref 1.8–8)
NEUTS SEG NFR BLD: 61 % (ref 40–73)
NITRITE UR QL STRIP.AUTO: NEGATIVE
NRBC # BLD: 0 K/UL (ref 0–0.01)
NRBC BLD-RTO: 0 PER 100 WBC
PH UR STRIP: 6 [PH] (ref 5–8)
PLATELET # BLD AUTO: 362 K/UL (ref 135–420)
PMV BLD AUTO: 9.6 FL (ref 9.2–11.8)
POTASSIUM SERPL-SCNC: 4.1 MMOL/L (ref 3.5–5.5)
PROT SERPL-MCNC: 7.3 G/DL (ref 6.4–8.2)
PROT UR STRIP-MCNC: >1000 MG/DL
RBC # BLD AUTO: 4.2 M/UL (ref 4.2–5.3)
RBC #/AREA URNS HPF: NEGATIVE /HPF (ref 0–5)
SODIUM SERPL-SCNC: 135 MMOL/L (ref 136–145)
SP GR UR REFRACTOMETRY: 1.02 (ref 1–1.03)
UROBILINOGEN UR QL STRIP.AUTO: 1 EU/DL (ref 0.2–1)
WBC # BLD AUTO: 9 K/UL (ref 4.6–13.2)
WBC URNS QL MICRO: ABNORMAL /HPF (ref 0–4)

## 2022-07-26 PROCEDURE — 83690 ASSAY OF LIPASE: CPT

## 2022-07-26 PROCEDURE — 74011000250 HC RX REV CODE- 250: Performed by: STUDENT IN AN ORGANIZED HEALTH CARE EDUCATION/TRAINING PROGRAM

## 2022-07-26 PROCEDURE — 73610 X-RAY EXAM OF ANKLE: CPT

## 2022-07-26 PROCEDURE — 74011250637 HC RX REV CODE- 250/637: Performed by: STUDENT IN AN ORGANIZED HEALTH CARE EDUCATION/TRAINING PROGRAM

## 2022-07-26 PROCEDURE — 99284 EMERGENCY DEPT VISIT MOD MDM: CPT

## 2022-07-26 PROCEDURE — 80053 COMPREHEN METABOLIC PANEL: CPT

## 2022-07-26 PROCEDURE — 81001 URINALYSIS AUTO W/SCOPE: CPT

## 2022-07-26 PROCEDURE — 85025 COMPLETE CBC W/AUTO DIFF WBC: CPT

## 2022-07-26 RX ORDER — METHOCARBAMOL 500 MG/1
500 TABLET, FILM COATED ORAL ONCE
Status: COMPLETED | OUTPATIENT
Start: 2022-07-26 | End: 2022-07-26

## 2022-07-26 RX ORDER — LIDOCAINE 4 G/100G
1 PATCH TOPICAL
Status: DISCONTINUED | OUTPATIENT
Start: 2022-07-26 | End: 2022-07-26 | Stop reason: HOSPADM

## 2022-07-26 RX ORDER — LIDOCAINE 50 MG/G
PATCH TOPICAL
Qty: 14 EACH | Refills: 0 | Status: SHIPPED | OUTPATIENT
Start: 2022-07-26

## 2022-07-26 RX ORDER — BISACODYL 10 MG
2 SUPPOSITORY, RECTAL RECTAL DAILY
Qty: 2 EACH | Refills: 1 | Status: SHIPPED | OUTPATIENT
Start: 2022-07-26

## 2022-07-26 RX ORDER — METHOCARBAMOL 750 MG/1
750 TABLET, FILM COATED ORAL
Qty: 14 TABLET | Refills: 0 | Status: SHIPPED | OUTPATIENT
Start: 2022-07-26 | End: 2022-08-09

## 2022-07-26 RX ORDER — ACETAMINOPHEN 500 MG
1000 TABLET ORAL
Qty: 24 TABLET | Refills: 0 | Status: SHIPPED | OUTPATIENT
Start: 2022-07-26

## 2022-07-26 RX ORDER — ACETAMINOPHEN 500 MG
1000 TABLET ORAL ONCE
Status: COMPLETED | OUTPATIENT
Start: 2022-07-26 | End: 2022-07-26

## 2022-07-26 RX ADMIN — ACETAMINOPHEN 1000 MG: 500 TABLET ORAL at 06:58

## 2022-07-26 RX ADMIN — METHOCARBAMOL 500 MG: 500 TABLET ORAL at 06:58

## 2022-07-26 NOTE — ED PROVIDER NOTES
EMERGENCY DEPARTMENT HISTORY AND PHYSICAL EXAM      Date: 7/26/2022  Patient Name: Cortez Stovall    History of Presenting Illness     Chief Complaint   Patient presents with    Flank Pain    Ankle swelling       History (Context): Cortez Stovall is a 44 y.o. female with a past medical history significant for arthritis, diabetes, hypertension, epilepsy, comes into the ED today due to left-sided back pain and peripheral edema. Patient states that pain began approximate 2 weeks ago and have worsened since onset. Denies any inciting event, trauma or falls or etiology for her symptoms. States that she woke up 1 day with symptoms today. Denies taking medication for treatment of her symptoms prior to arrival.  States pain is exacerbated with movement of the torso. Denies any fever, chills, chest pain, dyspnea, vomiting, melena, or diarrhea. She also admits to peripheral edema. States that she previously had injured her foot and had it \"wrapped. \"  Following taking down her Ace wraps she noticed swelling to the bilateral feet and ankles. States that she lives a mostly sedentary lifestyle. PCP: Ezekiel Gonzalez MD    Current Facility-Administered Medications   Medication Dose Route Frequency Provider Last Rate Last Admin    acetaminophen (TYLENOL) tablet 1,000 mg  1,000 mg Oral ONCE Erick Cha, DO        methocarbamoL (ROBAXIN) tablet 500 mg  500 mg Oral ONCE Erick Cha, DO        lidocaine 4 % patch 1 Patch  1 Patch TransDERmal NOW Lynda Galvez,          Current Outpatient Medications   Medication Sig Dispense Refill    pantoprazole (PROTONIX) 40 mg tablet       Lantus U-100 Insulin 100 unit/mL injection       topiramate (TOPAMAX) 50 mg tablet       insulin aspart U-100 (NovoLOG U-100 Insulin aspart) 100 unit/mL injection by SubCUTAneous route.       insulin glulisine U-100 (APIDRA SOLOSTAR U-100 INSULIN) 100 unit/mL pen 10 Units by SubCUTAneous route Before breakfast, lunch, and dinner.  liothyronine (CYTOMEL) 25 mcg tablet Take 75 mcg by mouth daily.  metFORMIN (GLUCOPHAGE) 1,000 mg tablet Take 1,000 mg by mouth two (2) times daily (with meals).  metoprolol tartrate (LOPRESSOR) 25 mg tablet Take 25 mg by mouth daily.  atorvastatin (LIPITOR) 40 mg tablet Take  by mouth daily.  furosemide (LASIX) 20 mg tablet Take  by mouth daily.  lisinopril (PRINIVIL, ZESTRIL) 5 mg tablet Take 40 mg by mouth daily. Past History     Past Medical History:   Past Medical History:   Diagnosis Date    Arthritis     Chest pain     Diabetes ([facility] [address]     Essential hypertension     Headache(784.0)     Hyperchloremia     Hypertension     Seizures ([facility] [address]     8/2020 last seizure    Seizures (HCC)     SOB (shortness of breath)     Thyroid cancer ([facility] [address]        Past Surgical History:  Past Surgical History:   Procedure Laterality Date    HX PARTIAL HYSTERECTOMY      HX THYROIDECTOMY      HX TUBAL LIGATION         Family History:  Family History   Problem Relation Age of Onset    Hypertension Mother        Social History:   Social History     Tobacco Use    Smoking status: Former    Smokeless tobacco: Never   Substance Use Topics    Alcohol use: No    Drug use: No       Allergies:  No Known Allergies    PMH, PSH, family history, social history, allergies reviewed with the patient with significant items noted above. Review of Systems   Review of Systems   Constitutional:  Negative for chills and fever. HENT:  Negative for sore throat. Eyes:  Negative for visual disturbance. Respiratory:  Negative for shortness of breath. Cardiovascular:  Positive for leg swelling. Negative for chest pain. Gastrointestinal:  Negative for abdominal pain, nausea and vomiting. Genitourinary:  Negative for difficulty urinating. Musculoskeletal:  Positive for back pain. Negative for myalgias. Skin:  Negative for rash. Neurological:  Negative for headaches.      Physical Exam     Vitals:    07/26/22 0521   BP: (!) 156/113   Pulse: 100   Resp: 19   Temp: 98.4 °F (36.9 °C)   SpO2: 98%   Weight: 117.9 kg (260 lb)   Height: 5' 9\" (1.753 m)       Physical Exam  Vitals and nursing note reviewed. Constitutional:       General: She is not in acute distress. Appearance: Normal appearance. She is not ill-appearing or toxic-appearing. HENT:      Head: Normocephalic and atraumatic. Mouth/Throat:      Mouth: Mucous membranes are moist.   Eyes:      General: No scleral icterus. Conjunctiva/sclera: Conjunctivae normal.   Cardiovascular:      Rate and Rhythm: Normal rate and regular rhythm. Comments: Normal peripheral perfusion  Pulmonary:      Effort: Pulmonary effort is normal. No respiratory distress. Abdominal:      General: There is no distension. Palpations: Abdomen is soft. Tenderness: There is no abdominal tenderness. Musculoskeletal:         General: Tenderness (To the left lumbar paraspinal musculature) present. No deformity. Normal range of motion. Cervical back: Normal range of motion and neck supple. Right lower leg: Edema (1+ pitting edema to the bilateral feet and ankles) present. Left lower leg: Edema present. Skin:     General: Skin is warm and dry. Findings: No rash. Neurological:      General: No focal deficit present. Mental Status: She is alert and oriented to person, place, and time. Mental status is at baseline. Psychiatric:         Mood and Affect: Mood normal.         Thought Content:  Thought content normal.       Diagnostic Study Results     Labs -     Recent Results (from the past 12 hour(s))   URINALYSIS W/ RFLX MICROSCOPIC    Collection Time: 07/26/22  5:30 AM   Result Value Ref Range    Color YELLOW      Appearance CLEAR      Specific gravity 1.020 1.005 - 1.030      pH (UA) 6.0 5.0 - 8.0      Protein >1,000 (A) NEG mg/dL    Glucose 500 (A) NEG mg/dL    Ketone Negative NEG mg/dL    Bilirubin Negative NEG      Blood SMALL (A) NEG      Urobilinogen 1.0 0.2 - 1.0 EU/dL    Nitrites Negative NEG      Leukocyte Esterase Negative NEG     CBC WITH AUTOMATED DIFF    Collection Time: 07/26/22  5:40 AM   Result Value Ref Range    WBC 9.0 4.6 - 13.2 K/uL    RBC 4.20 4. 20 - 5.30 M/uL    HGB 11.3 (L) 12.0 - 16.0 g/dL    HCT 35.5 35.0 - 45.0 %    MCV 84.5 78.0 - 100.0 FL    MCH 26.9 24.0 - 34.0 PG    MCHC 31.8 31.0 - 37.0 g/dL    RDW 13.1 11.6 - 14.5 %    PLATELET 938 112 - 877 K/uL    MPV 9.6 9.2 - 11.8 FL    NRBC 0.0 0  WBC    ABSOLUTE NRBC 0.00 0.00 - 0.01 K/uL    NEUTROPHILS 61 40 - 73 %    LYMPHOCYTES 30 21 - 52 %    MONOCYTES 8 3 - 10 %    EOSINOPHILS 0 0 - 5 %    BASOPHILS 0 0 - 2 %    IMMATURE GRANULOCYTES 1 (H) 0.0 - 0.5 %    ABS. NEUTROPHILS 5.5 1.8 - 8.0 K/UL    ABS. LYMPHOCYTES 2.7 0.9 - 3.6 K/UL    ABS. MONOCYTES 0.7 0.05 - 1.2 K/UL    ABS. EOSINOPHILS 0.0 0.0 - 0.4 K/UL    ABS. BASOPHILS 0.0 0.0 - 0.1 K/UL    ABS. IMM. GRANS. 0.1 (H) 0.00 - 0.04 K/UL    DF AUTOMATED     METABOLIC PANEL, COMPREHENSIVE    Collection Time: 07/26/22  5:40 AM   Result Value Ref Range    Sodium 135 (L) 136 - 145 mmol/L    Potassium 4.1 3.5 - 5.5 mmol/L    Chloride 101 100 - 111 mmol/L    CO2 30 21 - 32 mmol/L    Anion gap 4 3.0 - 18 mmol/L    Glucose 279 (H) 74 - 99 mg/dL    BUN 14 7.0 - 18 MG/DL    Creatinine 1.29 0.6 - 1.3 MG/DL    BUN/Creatinine ratio 11 (L) 12 - 20      GFR est AA 56 (L) >60 ml/min/1.73m2    GFR est non-AA 46 (L) >60 ml/min/1.73m2    Calcium 8.9 8.5 - 10.1 MG/DL    Bilirubin, total 0.3 0.2 - 1.0 MG/DL    ALT (SGPT) 23 13 - 56 U/L    AST (SGOT) 24 10 - 38 U/L    Alk.  phosphatase 108 45 - 117 U/L    Protein, total 7.3 6.4 - 8.2 g/dL    Albumin 2.0 (L) 3.4 - 5.0 g/dL    Globulin 5.3 (H) 2.0 - 4.0 g/dL    A-G Ratio 0.4 (L) 0.8 - 1.7     LIPASE    Collection Time: 07/26/22  5:40 AM   Result Value Ref Range    Lipase 127 73 - 393 U/L      Labs Reviewed   CBC WITH AUTOMATED DIFF - Abnormal; Notable for the following components:       Result Value    HGB 11.3 (*)     IMMATURE GRANULOCYTES 1 (*)     ABS. IMM. GRANS. 0.1 (*)     All other components within normal limits   METABOLIC PANEL, COMPREHENSIVE - Abnormal; Notable for the following components:    Sodium 135 (*)     Glucose 279 (*)     BUN/Creatinine ratio 11 (*)     GFR est AA 56 (*)     GFR est non-AA 46 (*)     Albumin 2.0 (*)     Globulin 5.3 (*)     A-G Ratio 0.4 (*)     All other components within normal limits   URINALYSIS W/ RFLX MICROSCOPIC - Abnormal; Notable for the following components:    Protein >1,000 (*)     Glucose 500 (*)     Blood SMALL (*)     All other components within normal limits   LIPASE   URINE MICROSCOPIC ONLY       Radiologic Studies -   XR ANKLE LT MIN 3 V    (Results Pending)   XR ANKLE RT MIN 3 V    (Results Pending)     CT Results  (Last 48 hours)      None          CXR Results  (Last 48 hours)      None            The laboratory results, imaging results, and other diagnostic exams were reviewed in the EMR. Medical Decision Making   I am the first provider for this patient. I reviewed the vital signs, available nursing notes, past medical history, past surgical history, family history and social history. Vital Signs-Reviewed the patient's vital signs. Records Reviewed: Personally, on initial evaluation    MDM:   Brendia Bound presents with complaint of peripheral edema and back pain  DDX includes but is not limited to: Muscle strain, lumbar myofascial strain, peripheral edema, venous stasis    Patient overall well-appearing, no acute distress, vital signs gross within normal limits. Will obtain lab work and imaging for further evaluation of patients complaint. Will continue to monitor and evaluate patient while in the ED.       Orders as below:  Orders Placed This Encounter    XR ANKLE LT MIN 3 V    XR ANKLE RT MIN 3 V    CBC WITH AUTOMATED DIFF    METABOLIC PANEL, COMPREHENSIVE    LIPASE    URINALYSIS W/ RFLX MICROSCOPIC    URINE MICROSCOPIC ONLY    acetaminophen (TYLENOL) tablet 1,000 mg    methocarbamoL (ROBAXIN) tablet 500 mg    lidocaine 4 % patch 1 Patch        ED Course:   ED Course as of 07/26/22 1844   Tue Jul 26, 2022   0641 Patient lower significant for hemoglobin 11.3, glucose 279, and albumin 2.0. Patient does have significant mount of protein in her urine with glucose but otherwise negative for infection. We will treat patient's symptoms and discharge patient home at this time. Patient verbalized understanding is without any further questions. Provided patient with strict return precautions and follow-up recommendations. [DV]      ED Course User Index  [DV] Demetrio Phelps DO           Procedures:  Procedures        Diagnosis and Disposition     CLINICAL IMPRESSION:  No diagnosis found. Current Discharge Medication List          Disposition: Home    Patient condition at time of disposition: Stable    DISCHARGE NOTE:   Pt has been reexamined. Patient has no new complaints, changes, or physical findings. Care plan outlined and precautions discussed. Results were reviewed with the patient. All medications were reviewed with the patient. All of pt's questions and concerns were addressed. Alarm symptoms and return precautions associated with chief complaint and evaluation were reviewed with the patient in detail. The patient demonstrated adequate understanding. Patient was instructed to follow up with PCP, as well as strict return precautions to the ED upon further deterioration. Patient is ready to go home. The patient is happy with this plan        Dragon Disclaimer     Please note that this dictation was completed with Nano[employer]ord, the computer voice recognition software. Quite often unanticipated grammatical, syntax, homophones, and other interpretive errors are inadvertently transcribed by the computer software. Please disregard these errors.   Please excuse any errors that have escaped final proofreading. Linda LOVE

## 2022-07-26 NOTE — ED TRIAGE NOTES
Pt reports left flank pain for a week now. Denies N/V. Denies any urinary problem. Pt also reports swelling on bilateral ankle for 4 weeks now. Pt was seen at another facility 4 weeks ago and dx with right foot fx.

## 2022-08-08 ENCOUNTER — APPOINTMENT (OUTPATIENT)
Dept: GENERAL RADIOLOGY | Age: 37
End: 2022-08-08
Attending: STUDENT IN AN ORGANIZED HEALTH CARE EDUCATION/TRAINING PROGRAM
Payer: MEDICAID

## 2022-08-08 ENCOUNTER — HOSPITAL ENCOUNTER (EMERGENCY)
Age: 37
Discharge: ANOTHER HOSPITAL | End: 2022-08-09
Attending: STUDENT IN AN ORGANIZED HEALTH CARE EDUCATION/TRAINING PROGRAM
Payer: MEDICAID

## 2022-08-08 DIAGNOSIS — R07.9 CHEST PAIN, UNSPECIFIED TYPE: Primary | ICD-10-CM

## 2022-08-08 DIAGNOSIS — I87.2 VENOUS INSUFFICIENCY: ICD-10-CM

## 2022-08-08 LAB
ALBUMIN SERPL-MCNC: 2.2 G/DL (ref 3.4–5)
ALBUMIN/GLOB SERPL: 0.4 {RATIO} (ref 0.8–1.7)
ALP SERPL-CCNC: 113 U/L (ref 45–117)
ALT SERPL-CCNC: 24 U/L (ref 13–56)
ANION GAP SERPL CALC-SCNC: 9 MMOL/L (ref 3–18)
AST SERPL-CCNC: 27 U/L (ref 10–38)
BASOPHILS # BLD: 0.1 K/UL (ref 0–0.1)
BASOPHILS NFR BLD: 1 % (ref 0–2)
BILIRUB SERPL-MCNC: 0.2 MG/DL (ref 0.2–1)
BNP SERPL-MCNC: 81 PG/ML (ref 0–450)
BUN SERPL-MCNC: 24 MG/DL (ref 7–18)
BUN/CREAT SERPL: 14 (ref 12–20)
CALCIUM SERPL-MCNC: 8.6 MG/DL (ref 8.5–10.1)
CHLORIDE SERPL-SCNC: 95 MMOL/L (ref 100–111)
CO2 SERPL-SCNC: 28 MMOL/L (ref 21–32)
CREAT SERPL-MCNC: 1.76 MG/DL (ref 0.6–1.3)
DIFFERENTIAL METHOD BLD: ABNORMAL
EOSINOPHIL # BLD: 0 K/UL (ref 0–0.4)
EOSINOPHIL NFR BLD: 0 % (ref 0–5)
ERYTHROCYTE [DISTWIDTH] IN BLOOD BY AUTOMATED COUNT: 13.6 % (ref 11.6–14.5)
GLOBULIN SER CALC-MCNC: 5 G/DL (ref 2–4)
GLUCOSE SERPL-MCNC: 367 MG/DL (ref 74–99)
HCT VFR BLD AUTO: 36 % (ref 35–45)
HGB BLD-MCNC: 11.8 G/DL (ref 12–16)
IMM GRANULOCYTES # BLD AUTO: 0.1 K/UL (ref 0–0.04)
IMM GRANULOCYTES NFR BLD AUTO: 1 % (ref 0–0.5)
LYMPHOCYTES # BLD: 3 K/UL (ref 0.9–3.6)
LYMPHOCYTES NFR BLD: 35 % (ref 21–52)
MCH RBC QN AUTO: 27.3 PG (ref 24–34)
MCHC RBC AUTO-ENTMCNC: 32.8 G/DL (ref 31–37)
MCV RBC AUTO: 83.1 FL (ref 78–100)
MONOCYTES # BLD: 0.8 K/UL (ref 0.05–1.2)
MONOCYTES NFR BLD: 9 % (ref 3–10)
NEUTS SEG # BLD: 4.7 K/UL (ref 1.8–8)
NEUTS SEG NFR BLD: 54 % (ref 40–73)
NRBC # BLD: 0 K/UL (ref 0–0.01)
NRBC BLD-RTO: 0 PER 100 WBC
PLATELET # BLD AUTO: 373 K/UL (ref 135–420)
PMV BLD AUTO: 9.7 FL (ref 9.2–11.8)
POTASSIUM SERPL-SCNC: 4.3 MMOL/L (ref 3.5–5.5)
PROT SERPL-MCNC: 7.2 G/DL (ref 6.4–8.2)
RBC # BLD AUTO: 4.33 M/UL (ref 4.2–5.3)
SODIUM SERPL-SCNC: 132 MMOL/L (ref 136–145)
TROPONIN-HIGH SENSITIVITY: 8 NG/L (ref 0–54)
WBC # BLD AUTO: 8.6 K/UL (ref 4.6–13.2)

## 2022-08-08 PROCEDURE — 93005 ELECTROCARDIOGRAM TRACING: CPT

## 2022-08-08 PROCEDURE — 84484 ASSAY OF TROPONIN QUANT: CPT

## 2022-08-08 PROCEDURE — 96374 THER/PROPH/DIAG INJ IV PUSH: CPT

## 2022-08-08 PROCEDURE — 80053 COMPREHEN METABOLIC PANEL: CPT

## 2022-08-08 PROCEDURE — 71045 X-RAY EXAM CHEST 1 VIEW: CPT

## 2022-08-08 PROCEDURE — 85025 COMPLETE CBC W/AUTO DIFF WBC: CPT

## 2022-08-08 PROCEDURE — 74011250636 HC RX REV CODE- 250/636: Performed by: STUDENT IN AN ORGANIZED HEALTH CARE EDUCATION/TRAINING PROGRAM

## 2022-08-08 PROCEDURE — 83880 ASSAY OF NATRIURETIC PEPTIDE: CPT

## 2022-08-08 PROCEDURE — 99285 EMERGENCY DEPT VISIT HI MDM: CPT

## 2022-08-08 RX ORDER — FUROSEMIDE 10 MG/ML
40 INJECTION INTRAMUSCULAR; INTRAVENOUS ONCE
Status: COMPLETED | OUTPATIENT
Start: 2022-08-08 | End: 2022-08-08

## 2022-08-08 RX ADMIN — FUROSEMIDE 40 MG: 10 INJECTION, SOLUTION INTRAMUSCULAR; INTRAVENOUS at 23:39

## 2022-08-09 VITALS
RESPIRATION RATE: 18 BRPM | DIASTOLIC BLOOD PRESSURE: 76 MMHG | TEMPERATURE: 98.8 F | OXYGEN SATURATION: 100 % | BODY MASS INDEX: 41.47 KG/M2 | HEIGHT: 69 IN | SYSTOLIC BLOOD PRESSURE: 124 MMHG | WEIGHT: 280 LBS | HEART RATE: 96 BPM

## 2022-08-09 LAB
ATRIAL RATE: 99 BPM
CALCULATED P AXIS, ECG09: 78 DEGREES
CALCULATED R AXIS, ECG10: 33 DEGREES
CALCULATED T AXIS, ECG11: 79 DEGREES
DIAGNOSIS, 93000: NORMAL
P-R INTERVAL, ECG05: 106 MS
Q-T INTERVAL, ECG07: 342 MS
QRS DURATION, ECG06: 88 MS
QTC CALCULATION (BEZET), ECG08: 438 MS
VENTRICULAR RATE, ECG03: 99 BPM

## 2022-08-09 NOTE — ED PROVIDER NOTES
EMERGENCY DEPARTMENT HISTORY AND PHYSICAL EXAM    I have evaluated the patient at 11:19 PM      Date: 8/8/2022  Patient Name: Isauro Colon    History of Presenting Illness     Chief Complaint   Patient presents with    Chest Pain    Leg Swelling         History Provided By: Patient  Location/Duration/Severity/Modifying factors   40-year-old female with history of diabetes, hypertension, hypercholesterolemia, venous insufficiency presenting to the emergency department for evaluation of chest pain and bilateral lower extremity edema. Patient reports sharp intermittent chest pain radiating to her left anterior chest wall for approximately 2 days now. No exacerbating or relieving factors. Denies any cardiac history. She also reports persistent bilateral lower extremity edema despite taking 20 mg Lasix at home daily. She also reports elevating her legs and wearing compression stockings but is feeling a tightness and more swelling in her lower legs. No weeping wounds. No fevers or chills, shortness of breath, abdominal pain, NVD. PCP: Kelley Mclean MD    Current Outpatient Medications   Medication Sig Dispense Refill    Compression Socks, X-Large misc 2 Units by Does Not Apply route daily. 2 Each 1    methocarbamoL (ROBAXIN) 750 mg tablet Take 1 Tablet by mouth nightly as needed for Muscle Spasm(s) for up to 14 days. 14 Tablet 0    lidocaine (Lidoderm) 5 % Apply patch to the affected area for 12 hours a day and remove for 12 hours a day. 14 Each 0    acetaminophen (TYLENOL) 500 mg tablet Take 2 Tablets by mouth every six (6) hours as needed for Pain. 24 Tablet 0    pantoprazole (PROTONIX) 40 mg tablet       Lantus U-100 Insulin 100 unit/mL injection       topiramate (TOPAMAX) 50 mg tablet       insulin aspart U-100 (NovoLOG U-100 Insulin aspart) 100 unit/mL injection by SubCUTAneous route.       insulin glulisine U-100 (APIDRA SOLOSTAR U-100 INSULIN) 100 unit/mL pen 10 Units by SubCUTAneous route Before breakfast, lunch, and dinner.  liothyronine (CYTOMEL) 25 mcg tablet Take 75 mcg by mouth daily.  metFORMIN (GLUCOPHAGE) 1,000 mg tablet Take 1,000 mg by mouth two (2) times daily (with meals).  metoprolol tartrate (LOPRESSOR) 25 mg tablet Take 25 mg by mouth daily.  atorvastatin (LIPITOR) 40 mg tablet Take  by mouth daily.  furosemide (LASIX) 20 mg tablet Take  by mouth daily.  lisinopril (PRINIVIL, ZESTRIL) 5 mg tablet Take 40 mg by mouth daily. Past History     Past Medical History:  Past Medical History:   Diagnosis Date    Arthritis     Chest pain     Diabetes ([facility] [address]     Essential hypertension     Headache(784.0)     Hyperchloremia     Hypertension     Seizures ([facility] [address]     8/2020 last seizure    Seizures (HCC)     SOB (shortness of breath)     Thyroid cancer ([facility] [address]        Past Surgical History:  Past Surgical History:   Procedure Laterality Date    HX PARTIAL HYSTERECTOMY      HX THYROIDECTOMY      HX TUBAL LIGATION         Family History:  Family History   Problem Relation Age of Onset    Hypertension Mother        Social History:  Social History     Tobacco Use    Smoking status: Former    Smokeless tobacco: Never   Substance Use Topics    Alcohol use: No    Drug use: No       Allergies:  No Known Allergies      Review of Systems       Review of Systems   Constitutional:  Negative for activity change, chills, diaphoresis, fatigue and fever. Respiratory:  Negative for cough, chest tightness, shortness of breath, wheezing and stridor. Cardiovascular:  Positive for chest pain and leg swelling (Bilateral). Negative for palpitations. Gastrointestinal:  Negative for abdominal distention, abdominal pain, constipation, diarrhea, nausea and vomiting. Genitourinary:  Negative for difficulty urinating, dysuria and hematuria. Musculoskeletal:  Negative for back pain, joint swelling and myalgias. Skin:  Negative for rash and wound. Neurological:  Negative for dizziness, weakness and headaches. Psychiatric/Behavioral:  Negative for agitation. The patient is not nervous/anxious. Physical Exam   Visit Vitals  /67 (BP Patient Position: At rest)   Pulse 98   Temp 98.8 °F (37.1 °C)   Resp 17   Ht 5' 9\" (1.753 m)   Wt 127 kg (280 lb)   SpO2 100%   BMI 41.35 kg/m²         Physical Exam  Constitutional:       General: She is not in acute distress. Appearance: She is not toxic-appearing. HENT:      Head: Normocephalic and atraumatic. Mouth/Throat:      Mouth: Mucous membranes are moist.   Eyes:      Extraocular Movements: Extraocular movements intact. Pupils: Pupils are equal, round, and reactive to light. Cardiovascular:      Rate and Rhythm: Normal rate and regular rhythm. Heart sounds: Normal heart sounds. No murmur heard. No friction rub. No gallop. Pulmonary:      Effort: Pulmonary effort is normal.      Breath sounds: Normal breath sounds. Chest:      Chest wall: No mass, deformity or tenderness. Abdominal:      General: There is no distension. Palpations: Abdomen is soft. There is no mass. Tenderness: There is no abdominal tenderness. There is no guarding. Hernia: No hernia is present. Musculoskeletal:         General: No swelling, tenderness or deformity. Cervical back: Normal range of motion and neck supple. Right lower leg: Edema present. Left lower leg: Edema present. Comments: 1+ pitting edema bilaterally   Skin:     General: Skin is warm and dry. Findings: No rash. Neurological:      General: No focal deficit present. Mental Status: She is alert and oriented to person, place, and time.    Psychiatric:         Mood and Affect: Mood normal.         Diagnostic Study Results     Labs -  Recent Results (from the past 12 hour(s))   EKG, 12 LEAD, INITIAL    Collection Time: 08/08/22 10:56 PM   Result Value Ref Range    Ventricular Rate 99 BPM    Atrial Rate 99 BPM    P-R Interval 106 ms    QRS Duration 88 ms    Q-T Interval 342 ms    QTC Calculation (Bezet) 438 ms    Calculated P Axis 78 degrees    Calculated R Axis 33 degrees    Calculated T Axis 79 degrees    Diagnosis       Sinus rhythm with short IN  Cannot rule out Anterior infarct , age undetermined  Abnormal ECG  When compared with ECG of 02-MAY-2022 13:48,  No significant change was found     CBC WITH AUTOMATED DIFF    Collection Time: 08/08/22 11:03 PM   Result Value Ref Range    WBC 8.6 4.6 - 13.2 K/uL    RBC 4.33 4.20 - 5.30 M/uL    HGB 11.8 (L) 12.0 - 16.0 g/dL    HCT 36.0 35.0 - 45.0 %    MCV 83.1 78.0 - 100.0 FL    MCH 27.3 24.0 - 34.0 PG    MCHC 32.8 31.0 - 37.0 g/dL    RDW 13.6 11.6 - 14.5 %    PLATELET 311 788 - 190 K/uL    MPV 9.7 9.2 - 11.8 FL    NRBC 0.0 0  WBC    ABSOLUTE NRBC 0.00 0.00 - 0.01 K/uL    NEUTROPHILS 54 40 - 73 %    LYMPHOCYTES 35 21 - 52 %    MONOCYTES 9 3 - 10 %    EOSINOPHILS 0 0 - 5 %    BASOPHILS 1 0 - 2 %    IMMATURE GRANULOCYTES 1 (H) 0.0 - 0.5 %    ABS. NEUTROPHILS 4.7 1.8 - 8.0 K/UL    ABS. LYMPHOCYTES 3.0 0.9 - 3.6 K/UL    ABS. MONOCYTES 0.8 0.05 - 1.2 K/UL    ABS. EOSINOPHILS 0.0 0.0 - 0.4 K/UL    ABS. BASOPHILS 0.1 0.0 - 0.1 K/UL    ABS. IMM. GRANS. 0.1 (H) 0.00 - 0.04 K/UL    DF AUTOMATED     METABOLIC PANEL, COMPREHENSIVE    Collection Time: 08/08/22 11:03 PM   Result Value Ref Range    Sodium 132 (L) 136 - 145 mmol/L    Potassium 4.3 3.5 - 5.5 mmol/L    Chloride 95 (L) 100 - 111 mmol/L    CO2 28 21 - 32 mmol/L    Anion gap 9 3.0 - 18 mmol/L    Glucose 367 (H) 74 - 99 mg/dL    BUN 24 (H) 7.0 - 18 MG/DL    Creatinine 1.76 (H) 0.6 - 1.3 MG/DL    BUN/Creatinine ratio 14 12 - 20      GFR est AA 39 (L) >60 ml/min/1.73m2    GFR est non-AA 32 (L) >60 ml/min/1.73m2    Calcium 8.6 8.5 - 10.1 MG/DL    Bilirubin, total 0.2 0.2 - 1.0 MG/DL    ALT (SGPT) 24 13 - 56 U/L    AST (SGOT) 27 10 - 38 U/L    Alk.  phosphatase 113 45 - 117 U/L    Protein, total 7.2 6.4 - 8.2 g/dL Albumin 2.2 (L) 3.4 - 5.0 g/dL    Globulin 5.0 (H) 2.0 - 4.0 g/dL    A-G Ratio 0.4 (L) 0.8 - 1.7     TROPONIN-HIGH SENSITIVITY    Collection Time: 08/08/22 11:03 PM   Result Value Ref Range    Troponin-High Sensitivity 8 0 - 54 ng/L   NT-PRO BNP    Collection Time: 08/08/22 11:03 PM   Result Value Ref Range    NT pro-BNP 81 0 - 450 PG/ML       Radiologic Studies -   XR CHEST PORT    (Results Pending)         Medical Decision Making   I am the first provider for this patient. I reviewed the vital signs, available nursing notes, past medical history, past surgical history, family history and social history. Vital Signs-Reviewed the patient's vital signs. EKG: Normal sinus rhythm without evidence of acute ischemia. No significant change from May 2022      Records Reviewed: Nursing Notes, Old Medical Records, Previous electrocardiograms, Previous Radiology Studies, and Previous Laboratory Studies (Time of Review: 11:19 PM)    ED Course: Progress Notes, Reevaluation, and Consults:         Provider Notes (Medical Decision Making):   MDM  Number of Diagnoses or Management Options  Chest pain, unspecified type  Venous insufficiency  Diagnosis management comments: Patient presents chest pain and lower extremity edema. Appears in no acute distress on exam.  Nontoxic-appearing. Vital signs are notable for hypertension of 153/103 and mild tachycardia 104. She is otherwise afebrile and saturating well on room air. Lungs are clear to auscultation. There is 1+ pitting edema bilaterally to her lower extremities. Obtain screening lab work, cardiac enzymes including BNP. Will obtain chest x-ray. Patient will be given IV Lasix and reassessed. 1224:  Patient currently chest pain free. Troponin and BNP are negative. Mild [place] appreciated with creatinine of 1.76. Patient advised to follow up with primary care doctor. Instruction on continued conservative measures for venous insufficiency reviewed.  Return precautions to ED established. Patient verbalizes good understanding and agreement with discharge plan. Procedures    Critical Care Time:       Diagnosis     Clinical Impression:   1. Chest pain, unspecified type    2. Venous insufficiency        Disposition: discharged    Follow-up Information       Follow up With Specialties Details Why [address] [facility]    [facility] EMERGENCY DEPT Emergency Medicine  As needed, If symptoms worsen [address] [facility]    Shannan Caldwell MD Internal Medicine Physician Call                Patient's Medications   Start Taking    No medications on file   Continue Taking    ACETAMINOPHEN (TYLENOL) 500 MG TABLET    Take 2 Tablets by mouth every six (6) hours as needed for Pain. ATORVASTATIN (LIPITOR) 40 MG TABLET    Take  by mouth daily. COMPRESSION SOCKS, X-LARGE MISC    2 Units by Does Not Apply route daily. FUROSEMIDE (LASIX) 20 MG TABLET    Take  by mouth daily. INSULIN ASPART U-100 (NOVOLOG U-100 INSULIN ASPART) 100 UNIT/ML INJECTION    by SubCUTAneous route. INSULIN GLULISINE U-100 (APIDRA SOLOSTAR U-100 INSULIN) 100 UNIT/ML PEN    10 Units by SubCUTAneous route Before breakfast, lunch, and dinner. LANTUS U-100 INSULIN 100 UNIT/ML INJECTION        LIDOCAINE (LIDODERM) 5 %    Apply patch to the affected area for 12 hours a day and remove for 12 hours a day. LIOTHYRONINE (CYTOMEL) 25 MCG TABLET    Take 75 mcg by mouth daily. LISINOPRIL (PRINIVIL, ZESTRIL) 5 MG TABLET    Take 40 mg by mouth daily. METFORMIN (GLUCOPHAGE) 1,000 MG TABLET    Take 1,000 mg by mouth two (2) times daily (with meals). METHOCARBAMOL (ROBAXIN) 750 MG TABLET    Take 1 Tablet by mouth nightly as needed for Muscle Spasm(s) for up to 14 days. METOPROLOL TARTRATE (LOPRESSOR) 25 MG TABLET    Take 25 mg by mouth daily.     PANTOPRAZOLE (PROTONIX) 40 MG TABLET        TOPIRAMATE (TOPAMAX) 50 MG TABLET       These Medications have changed    No medications on file   Stop Taking    No medications on file     Disclaimer: Sections of this note are dictated using utilizing voice recognition software. Minor typographical errors may be present. If questions arise, please do not hesitate to contact me or call our department.

## 2022-08-09 NOTE — DISCHARGE INSTRUCTIONS
Follow up with your primary care doctor to see if you need your lasix dosage adjusted. Keep compression stockings on your legs and keep them elevated.  Return to the emergency department for any new or worsening symptoms

## 2022-08-09 NOTE — ED TRIAGE NOTES
Three days of chest pain and tightness. Pain wraps around from left lower chest, to left upper abdomen and back. Also bilateral ankle and feet swelling. Ankles and feet at very tight swollen and discolored.

## 2022-08-26 ENCOUNTER — TRANSCRIBE ORDER (OUTPATIENT)
Dept: SCHEDULING | Age: 37
End: 2022-08-26

## 2022-08-26 DIAGNOSIS — Z12.39 BREAST CANCER SCREENING: Primary | ICD-10-CM

## 2022-10-12 ENCOUNTER — HOSPITAL ENCOUNTER (OUTPATIENT)
Dept: LAB | Age: 37
Discharge: HOME | End: 2022-10-12

## 2022-10-12 LAB — XX-LABCORP SPECIMEN COL,LCBCF: NORMAL

## 2022-10-12 PROCEDURE — 99001 SPECIMEN HANDLING PT-LAB: CPT

## 2022-10-24 ENCOUNTER — APPOINTMENT (OUTPATIENT)
Dept: CT IMAGING | Age: 37
End: 2022-10-24
Attending: EMERGENCY MEDICINE
Payer: MEDICAID

## 2022-10-24 ENCOUNTER — HOSPITAL ENCOUNTER (EMERGENCY)
Age: 37
Discharge: HOME | End: 2022-10-24
Attending: EMERGENCY MEDICINE
Payer: MEDICAID

## 2022-10-24 ENCOUNTER — APPOINTMENT (OUTPATIENT)
Dept: ULTRASOUND IMAGING | Age: 37
End: 2022-10-24
Attending: EMERGENCY MEDICINE
Payer: MEDICAID

## 2022-10-24 VITALS
OXYGEN SATURATION: 98 % | HEART RATE: 110 BPM | SYSTOLIC BLOOD PRESSURE: 141 MMHG | HEIGHT: 69 IN | BODY MASS INDEX: 41.47 KG/M2 | DIASTOLIC BLOOD PRESSURE: 91 MMHG | TEMPERATURE: 97 F | WEIGHT: 280 LBS | RESPIRATION RATE: 20 BRPM

## 2022-10-24 DIAGNOSIS — R10.9 LEFT FLANK PAIN: Primary | ICD-10-CM

## 2022-10-24 DIAGNOSIS — M79.18 MUSCULOSKELETAL PAIN: ICD-10-CM

## 2022-10-24 LAB
ALBUMIN SERPL-MCNC: 1.8 G/DL (ref 3.4–5)
ALBUMIN/GLOB SERPL: 0.3 {RATIO} (ref 0.8–1.7)
ALP SERPL-CCNC: 96 U/L (ref 45–117)
ALT SERPL-CCNC: 21 U/L (ref 13–56)
ANION GAP SERPL CALC-SCNC: 3 MMOL/L (ref 3–18)
APPEARANCE UR: CLEAR
AST SERPL-CCNC: 23 U/L (ref 10–38)
BACTERIA URNS QL MICRO: ABNORMAL /HPF
BASOPHILS # BLD: 0 K/UL (ref 0–0.1)
BASOPHILS NFR BLD: 1 % (ref 0–2)
BILIRUB SERPL-MCNC: 0.3 MG/DL (ref 0.2–1)
BILIRUB UR QL: NEGATIVE
BUN SERPL-MCNC: 18 MG/DL (ref 7–18)
BUN/CREAT SERPL: 13 (ref 12–20)
CALCIUM SERPL-MCNC: 9.6 MG/DL (ref 8.5–10.1)
CHLORIDE SERPL-SCNC: 103 MMOL/L (ref 100–111)
CO2 SERPL-SCNC: 27 MMOL/L (ref 21–32)
COLOR UR: YELLOW
CREAT SERPL-MCNC: 1.37 MG/DL (ref 0.6–1.3)
DIFFERENTIAL METHOD BLD: ABNORMAL
EOSINOPHIL # BLD: 0 K/UL (ref 0–0.4)
EOSINOPHIL NFR BLD: 0 % (ref 0–5)
EPITH CASTS URNS QL MICRO: ABNORMAL /LPF (ref 0–5)
ERYTHROCYTE [DISTWIDTH] IN BLOOD BY AUTOMATED COUNT: 14 % (ref 11.6–14.5)
GLOBULIN SER CALC-MCNC: 5.9 G/DL (ref 2–4)
GLUCOSE BLD STRIP.AUTO-MCNC: 265 MG/DL (ref 74–106)
GLUCOSE SERPL-MCNC: 257 MG/DL (ref 74–99)
GLUCOSE UR STRIP.AUTO-MCNC: 500 MG/DL
HCG SERPL QL: NEGATIVE
HCT VFR BLD AUTO: 38.5 % (ref 35–45)
HGB BLD-MCNC: 12.5 G/DL (ref 12–16)
HGB UR QL STRIP: ABNORMAL
IMM GRANULOCYTES # BLD AUTO: 0.1 K/UL (ref 0–0.04)
IMM GRANULOCYTES NFR BLD AUTO: 1 % (ref 0–0.5)
KETONES UR QL STRIP.AUTO: NEGATIVE MG/DL
LEUKOCYTE ESTERASE UR QL STRIP.AUTO: NEGATIVE
LIPASE SERPL-CCNC: 90 U/L (ref 73–393)
LYMPHOCYTES # BLD: 3.4 K/UL (ref 0.9–3.6)
LYMPHOCYTES NFR BLD: 41 % (ref 21–52)
MCH RBC QN AUTO: 27.2 PG (ref 24–34)
MCHC RBC AUTO-ENTMCNC: 32.5 G/DL (ref 31–37)
MCV RBC AUTO: 83.9 FL (ref 78–100)
MONOCYTES # BLD: 0.5 K/UL (ref 0.05–1.2)
MONOCYTES NFR BLD: 6 % (ref 3–10)
NEUTS SEG # BLD: 4.3 K/UL (ref 1.8–8)
NEUTS SEG NFR BLD: 51 % (ref 40–73)
NITRITE UR QL STRIP.AUTO: NEGATIVE
NRBC # BLD: 0 K/UL (ref 0–0.01)
NRBC BLD-RTO: 0 PER 100 WBC
PH UR STRIP: 6 [PH] (ref 5–8)
PLATELET # BLD AUTO: 374 K/UL (ref 135–420)
PMV BLD AUTO: 9.7 FL (ref 9.2–11.8)
POTASSIUM SERPL-SCNC: 4.9 MMOL/L (ref 3.5–5.5)
PROT SERPL-MCNC: 7.7 G/DL (ref 6.4–8.2)
PROT UR STRIP-MCNC: >1000 MG/DL
RBC # BLD AUTO: 4.59 M/UL (ref 4.2–5.3)
RBC #/AREA URNS HPF: ABNORMAL /HPF (ref 0–5)
SERVICE CMNT-IMP: ABNORMAL
SODIUM SERPL-SCNC: 133 MMOL/L (ref 136–145)
SP GR UR REFRACTOMETRY: 1.01 (ref 1–1.03)
UROBILINOGEN UR QL STRIP.AUTO: 1 EU/DL (ref 0.2–1)
WBC # BLD AUTO: 8.3 K/UL (ref 4.6–13.2)
WBC URNS QL MICRO: ABNORMAL /HPF (ref 0–4)

## 2022-10-24 PROCEDURE — 96374 THER/PROPH/DIAG INJ IV PUSH: CPT

## 2022-10-24 PROCEDURE — 80053 COMPREHEN METABOLIC PANEL: CPT

## 2022-10-24 PROCEDURE — 74011000636 HC RX REV CODE- 636: Performed by: EMERGENCY MEDICINE

## 2022-10-24 PROCEDURE — 82962 GLUCOSE BLOOD TEST: CPT

## 2022-10-24 PROCEDURE — 84703 CHORIONIC GONADOTROPIN ASSAY: CPT

## 2022-10-24 PROCEDURE — 99285 EMERGENCY DEPT VISIT HI MDM: CPT

## 2022-10-24 PROCEDURE — 74177 CT ABD & PELVIS W/CONTRAST: CPT

## 2022-10-24 PROCEDURE — 74011250636 HC RX REV CODE- 250/636: Performed by: EMERGENCY MEDICINE

## 2022-10-24 PROCEDURE — 81001 URINALYSIS AUTO W/SCOPE: CPT

## 2022-10-24 PROCEDURE — 74011250637 HC RX REV CODE- 250/637: Performed by: EMERGENCY MEDICINE

## 2022-10-24 PROCEDURE — 83690 ASSAY OF LIPASE: CPT

## 2022-10-24 PROCEDURE — 85025 COMPLETE CBC W/AUTO DIFF WBC: CPT

## 2022-10-24 PROCEDURE — 76830 TRANSVAGINAL US NON-OB: CPT

## 2022-10-24 PROCEDURE — 96375 TX/PRO/DX INJ NEW DRUG ADDON: CPT

## 2022-10-24 RX ORDER — KETOROLAC TROMETHAMINE 15 MG/ML
15 INJECTION, SOLUTION INTRAMUSCULAR; INTRAVENOUS
Status: COMPLETED | OUTPATIENT
Start: 2022-10-24 | End: 2022-10-24

## 2022-10-24 RX ORDER — ONDANSETRON 2 MG/ML
4 INJECTION INTRAMUSCULAR; INTRAVENOUS
Status: COMPLETED | OUTPATIENT
Start: 2022-10-24 | End: 2022-10-24

## 2022-10-24 RX ORDER — HYDROMORPHONE HYDROCHLORIDE 1 MG/ML
0.5 INJECTION, SOLUTION INTRAMUSCULAR; INTRAVENOUS; SUBCUTANEOUS
Status: DISCONTINUED | OUTPATIENT
Start: 2022-10-24 | End: 2022-10-24 | Stop reason: HOSPADM

## 2022-10-24 RX ORDER — HYDROMORPHONE HYDROCHLORIDE 1 MG/ML
0.5 INJECTION, SOLUTION INTRAMUSCULAR; INTRAVENOUS; SUBCUTANEOUS
Status: COMPLETED | OUTPATIENT
Start: 2022-10-24 | End: 2022-10-24

## 2022-10-24 RX ORDER — ACETAMINOPHEN 325 MG/1
975 TABLET ORAL
Status: COMPLETED | OUTPATIENT
Start: 2022-10-24 | End: 2022-10-24

## 2022-10-24 RX ADMIN — SODIUM CHLORIDE 1000 ML: 9 INJECTION, SOLUTION INTRAVENOUS at 13:13

## 2022-10-24 RX ADMIN — ONDANSETRON 4 MG: 2 INJECTION INTRAMUSCULAR; INTRAVENOUS at 13:12

## 2022-10-24 RX ADMIN — HYDROMORPHONE HYDROCHLORIDE 0.5 MG: 1 INJECTION, SOLUTION INTRAMUSCULAR; INTRAVENOUS; SUBCUTANEOUS at 15:16

## 2022-10-24 RX ADMIN — IOPAMIDOL 80 ML: 612 INJECTION, SOLUTION INTRAVENOUS at 14:21

## 2022-10-24 RX ADMIN — ACETAMINOPHEN 975 MG: 325 TABLET, FILM COATED ORAL at 15:46

## 2022-10-24 RX ADMIN — KETOROLAC TROMETHAMINE 15 MG: 15 INJECTION, SOLUTION INTRAMUSCULAR; INTRAVENOUS at 15:46

## 2022-10-24 NOTE — Clinical Note
[address] [place] [address] [facility] EMERGENCY DEPT  [id number] [facility] [address] [address] [phone]    Work/School Note    Date: 10/24/2022    To Whom It May concern:      Rossi Mariano was seen and treated today in the emergency room by the following provider(s):  Attending Provider: Srini Nicholson MD.      Rossi Mariano is excused from work/school on 10/24/22. She is clear to return to work/school on 10/25/22.         Sincerely,          Joel Kelly MD

## 2022-10-24 NOTE — ED NOTES
5:06 PM  10/24/22     Discharge instructions given to patient (name) with verbalization of understanding. Patient accompanied by family. Patient discharged with the following prescriptions none. Patient discharged to home (destination).       Vasyl Awan

## 2022-10-24 NOTE — ED PROVIDER NOTES
EMERGENCY DEPARTMENT HISTORY AND PHYSICAL EXAM      Date: 10/24/2022  Patient Name: Delano Piper      History of Presenting Illness     Chief Complaint   Patient presents with    Abdominal Pain       History Provided By: Patient    Location/Duration/Severity/Modifying factors   65-year-old female with history of diabetes, hypertension, hyperlipidemia and partial hysterectomy \"years ago\" and she is unsure if she still has ovaries. Notes a relatively abrupt onset of left lower quadrant radiating to left flank pain approximately 5 days ago, the pain has been constant for 5 days though it waxes and wanes in intensity and seems to be unrelated to oral intake. Notes she is able to eat and drink but sometimes will vomit secondary to the pain. Denies any new vaginal discharge, dysuria, no fevers and no recent illness. Notes she has never had symptoms like this in the past, denies history of kidney stone. Abdominal Pain   Associated symptoms include nausea and vomiting. Pertinent negatives include no fever, no diarrhea, no dysuria and no chest pain. There are no other complaints, changes, or physical findings at this time. PCP: Aftab Vargas MD    Current Outpatient Medications   Medication Sig Dispense Refill    Compression Socks, X-Large misc 2 Units by Does Not Apply route daily. 2 Each 1    lidocaine (Lidoderm) 5 % Apply patch to the affected area for 12 hours a day and remove for 12 hours a day. 14 Each 0    acetaminophen (TYLENOL) 500 mg tablet Take 2 Tablets by mouth every six (6) hours as needed for Pain. 24 Tablet 0    pantoprazole (PROTONIX) 40 mg tablet       Lantus U-100 Insulin 100 unit/mL injection       topiramate (TOPAMAX) 50 mg tablet       insulin aspart U-100 (NovoLOG U-100 Insulin aspart) 100 unit/mL injection by SubCUTAneous route. insulin glulisine U-100 (APIDRA SOLOSTAR U-100 INSULIN) 100 unit/mL pen 10 Units by SubCUTAneous route Before breakfast, lunch, and dinner. liothyronine (CYTOMEL) 25 mcg tablet Take 75 mcg by mouth daily. metFORMIN (GLUCOPHAGE) 1,000 mg tablet Take 1,000 mg by mouth two (2) times daily (with meals). metoprolol tartrate (LOPRESSOR) 25 mg tablet Take 25 mg by mouth daily. atorvastatin (LIPITOR) 40 mg tablet Take  by mouth daily. furosemide (LASIX) 20 mg tablet Take  by mouth daily. lisinopril (PRINIVIL, ZESTRIL) 5 mg tablet Take 40 mg by mouth daily. Past History     Past Medical History:  Past Medical History:   Diagnosis Date    Arthritis     Chest pain     Diabetes ([facility] [address]     Essential hypertension     Headache(784.0)     Hyperchloremia     Hypertension     Seizures ([facility] [address]     8/2020 last seizure    Seizures ([facility])     SOB (shortness of breath)     Thyroid cancer ([facility] [address]        Past Surgical History:  Past Surgical History:   Procedure Laterality Date    HX PARTIAL HYSTERECTOMY      HX THYROIDECTOMY      HX TUBAL LIGATION         Family History:  Family History   Problem Relation Age of Onset    Hypertension Mother        Social History:  Social History     Tobacco Use    Smoking status: Former    Smokeless tobacco: Never   Substance Use Topics    Alcohol use: No    Drug use: No       Allergies:  No Known Allergies      Review of Systems     Review of Systems   Constitutional:  Positive for appetite change. Negative for fatigue and fever. HENT:  Negative for sore throat and trouble swallowing. Eyes:  Negative for photophobia and visual disturbance. Respiratory:  Negative for cough and shortness of breath. Cardiovascular:  Negative for chest pain and palpitations. Gastrointestinal:  Positive for abdominal pain, nausea and vomiting. Negative for diarrhea. Genitourinary:  Positive for flank pain. Negative for dysuria and menstrual problem. Musculoskeletal:  Negative for neck pain and neck stiffness. Skin:  Negative for pallor and rash. Neurological:  Negative for weakness and numbness.      Physical Exam Physical Exam  Vitals and nursing note reviewed. Constitutional:       General: She is in acute distress. Appearance: She is well-developed. She is obese. Comments: Mild distress secondary to abdominal pain. HENT:      Head: Normocephalic and atraumatic. Mouth/Throat:      Mouth: Mucous membranes are moist.      Pharynx: Oropharynx is clear. Eyes:      Extraocular Movements: Extraocular movements intact. Pupils: Pupils are equal, round, and reactive to light. Cardiovascular:      Rate and Rhythm: Normal rate and regular rhythm. Heart sounds: No murmur heard. Pulmonary:      Effort: Pulmonary effort is normal.      Breath sounds: Normal breath sounds. Abdominal:      General: Bowel sounds are normal. There is no distension. Palpations: Abdomen is soft. Tenderness: There is abdominal tenderness in the left lower quadrant. There is left CVA tenderness. There is no guarding or rebound. Skin:     General: Skin is warm and dry. Neurological:      General: No focal deficit present. Mental Status: She is alert. Lab and Diagnostic Study Results     Labs -  Recent Results (from the past 24 hour(s))   CBC WITH AUTOMATED DIFF    Collection Time: 10/24/22 11:55 AM   Result Value Ref Range    WBC 8.3 4.6 - 13.2 K/uL    RBC 4.59 4.20 - 5.30 M/uL    HGB 12.5 12.0 - 16.0 g/dL    HCT 38.5 35.0 - 45.0 %    MCV 83.9 78.0 - 100.0 FL    MCH 27.2 24.0 - 34.0 PG    MCHC 32.5 31.0 - 37.0 g/dL    RDW 14.0 11.6 - 14.5 %    PLATELET 814 238 - 576 K/uL    MPV 9.7 9.2 - 11.8 FL    NRBC 0.0 0  WBC    ABSOLUTE NRBC 0.00 0.00 - 0.01 K/uL    NEUTROPHILS 51 40 - 73 %    LYMPHOCYTES 41 21 - 52 %    MONOCYTES 6 3 - 10 %    EOSINOPHILS 0 0 - 5 %    BASOPHILS 1 0 - 2 %    IMMATURE GRANULOCYTES 1 (H) 0.0 - 0.5 %    ABS. NEUTROPHILS 4.3 1.8 - 8.0 K/UL    ABS. LYMPHOCYTES 3.4 0.9 - 3.6 K/UL    ABS. MONOCYTES 0.5 0.05 - 1.2 K/UL    ABS. EOSINOPHILS 0.0 0.0 - 0.4 K/UL    ABS.  BASOPHILS 0.0 0.0 - 0.1 K/UL    ABS. IMM. GRANS. 0.1 (H) 0.00 - 0.04 K/UL    DF AUTOMATED     METABOLIC PANEL, COMPREHENSIVE    Collection Time: 10/24/22 11:55 AM   Result Value Ref Range    Sodium 133 (L) 136 - 145 mmol/L    Potassium 4.9 3.5 - 5.5 mmol/L    Chloride 103 100 - 111 mmol/L    CO2 27 21 - 32 mmol/L    Anion gap 3 3.0 - 18 mmol/L    Glucose 257 (H) 74 - 99 mg/dL    BUN 18 7.0 - 18 MG/DL    Creatinine 1.37 (H) 0.6 - 1.3 MG/DL    BUN/Creatinine ratio 13 12 - 20      eGFR 51 (L) >60 ml/min/1.73m2    Calcium 9.6 8.5 - 10.1 MG/DL    Bilirubin, total 0.3 0.2 - 1.0 MG/DL    ALT (SGPT) 21 13 - 56 U/L    AST (SGOT) 23 10 - 38 U/L    Alk. phosphatase 96 45 - 117 U/L    Protein, total 7.7 6.4 - 8.2 g/dL    Albumin 1.8 (L) 3.4 - 5.0 g/dL    Globulin 5.9 (H) 2.0 - 4.0 g/dL    A-G Ratio 0.3 (L) 0.8 - 1.7     LIPASE    Collection Time: 10/24/22 11:55 AM   Result Value Ref Range    Lipase 90 73 - 393 U/L   HCG QL SERUM    Collection Time: 10/24/22 11:55 AM   Result Value Ref Range    HCG, Ql. Negative NEG     GLUCOSE, POC    Collection Time: 10/24/22 12:03 PM   Result Value Ref Range    Glucose,  (H) 74 - 106 MG/DL    Performed by Joselin Soliman    URINALYSIS W/ RFLX MICROSCOPIC    Collection Time: 10/24/22 12:25 PM   Result Value Ref Range    Color YELLOW      Appearance CLEAR      Specific gravity 1.015 1.005 - 1.030      pH (UA) 6.0 5.0 - 8.0      Protein >1,000 (A) NEG mg/dL    Glucose 500 (A) NEG mg/dL    Ketone Negative NEG mg/dL    Bilirubin Negative NEG      Blood SMALL (A) NEG      Urobilinogen 1.0 0.2 - 1.0 EU/dL    Nitrites Negative NEG      Leukocyte Esterase Negative NEG     URINE MICROSCOPIC ONLY    Collection Time: 10/24/22 12:25 PM   Result Value Ref Range    WBC 0 to 3 0 - 4 /hpf    RBC 0 to 3 0 - 5 /hpf    Epithelial cells 3+ 0 - 5 /lpf    Bacteria 1+ (A) NEG /hpf         Radiologic Studies -   CT ABD PELV W CONT   Final Result      1. No acute findings along the gastrointestinal tract.          2. Degenerative changes. US PELV NON OB W TV W DOPPLER   Final Result   1. No acute sonographic abnormality detected. 2.  Preserved vascular flow to the bilateral ovaries. No ovarian cyst detected. 3.  Status post hysterectomy. Medical Decision Making and ED Course   - I am the first and primary provider for this patient AND AM THE PRIMARY PROVIDER OF RECORD. - I reviewed the vital signs, available nursing notes, past medical history, past surgical history, family history and social history. - Initial assessment performed. The patients presenting problems have been discussed, and the staff are in agreement with the care plan formulated and outlined with them. I have encouraged them to ask questions as they arise throughout their visit. Vital Signs-Reviewed the patient's vital signs. Patient Vitals for the past 12 hrs:   Temp Pulse Resp BP SpO2   10/24/22 1149 97 °F (36.1 °C) (!) 110 20 (!) 141/91 98 %       Records Reviewed: Nursing Notes, Old Medical Records, Previous Radiology Studies, and Previous Laboratory Studies        Provider Notes (Medical Decision Making):     MDM  Number of Diagnoses or Management Options  Left flank pain  Musculoskeletal pain  Diagnosis management comments: Relatively well-appearing, appears to have fairly significant left flank pain, denies any lower abdominal pain or vaginal symptoms. Labs are mostly unremarkable, she is not pregnant. Will obtain ultrasound to evaluate the ovaries, CT of the abdomen pelvis as well. High suspicion for nephrolithiasis however she has no blood in her urine. Given pain medicine and fluids which has improved her symptoms. ED Course:       ED Course as of 10/24/22 2020   Mon Oct 24, 2022   1538 Symptoms much improved, work-up largely normal with some chronic findings including evidence of diabetes but no evidence of DKA.   CT and ultrasound were negative, no evidence of urolithiasis, no evidence of pelvic pathology. Patient's symptoms have improved greatly, she has not been taking typical NSAID or Tylenol therapy at home, given the findings on exam will discharge home for follow-up with PCP with the use of NSAIDs and Tylenol for pain control for likely musculoskeletal pain. [LK]      ED Course User Index  [LK] Leigh Chandler MD     ------------------------------------------------------------------------------------------------------------        Consultations:       Consultations: - NONE        Procedures and Critical Care       Performed by: Isis Ocampo MD    Procedures               Isis Ocampo MD        Disposition         Discharged      Diagnosis     Clinical Impression:   1. Left flank pain    2. Musculoskeletal pain        Attestations:    Isis Ocampo MD    Please note that this dictation was completed with [employer], the computer voice recognition software. Quite often unanticipated grammatical, syntax, homophones, and other interpretive errors are inadvertently transcribed by the computer software. Please disregard these errors. Please excuse any errors that have escaped final proofreading. Thank you.

## 2022-10-24 NOTE — DISCHARGE INSTRUCTIONS
Return to the ER for severe pain, difficulty breathing, inability tolerate food or fluids by mouth, new fever, any other concerns.

## 2022-10-24 NOTE — ED TRIAGE NOTES
Client reports having sharp intermitten LLQ abdominal pain with N/V X 5 days. Denies any urinary symptoms.

## 2022-11-01 ENCOUNTER — TRANSCRIBE ORDER (OUTPATIENT)
Dept: SCHEDULING | Age: 37
End: 2022-11-01

## 2022-11-01 DIAGNOSIS — N18.31 CHRONIC KIDNEY DISEASE (CKD) STAGE G3A/A1, MODERATELY DECREASED GLOMERULAR FILTRATION RATE (GFR) BETWEEN 45-59 ML/MIN/1.73 SQUARE METER AND ALBUMINURIA CREATININE RATIO LESS THAN 30 MG/G (HCC): Primary | ICD-10-CM

## 2022-11-21 ENCOUNTER — HOSPITAL ENCOUNTER (OUTPATIENT)
Dept: ULTRASOUND IMAGING | Age: 37
Discharge: HOME | End: 2022-11-21
Attending: INTERNAL MEDICINE
Payer: MEDICAID

## 2022-11-21 ENCOUNTER — HOSPITAL ENCOUNTER (OUTPATIENT)
Dept: GENERAL RADIOLOGY | Age: 37
Discharge: HOME | End: 2022-11-21
Attending: INTERNAL MEDICINE
Payer: MEDICAID

## 2022-11-21 ENCOUNTER — TRANSCRIBE ORDER (OUTPATIENT)
Dept: REGISTRATION | Age: 37
End: 2022-11-21

## 2022-11-21 DIAGNOSIS — N18.31 CHRONIC KIDNEY DISEASE (CKD) STAGE G3A/A1, MODERATELY DECREASED GLOMERULAR FILTRATION RATE (GFR) BETWEEN 45-59 ML/MIN/1.73 SQUARE METER AND ALBUMINURIA CREATININE RATIO LESS THAN 30 MG/G (HCC): ICD-10-CM

## 2022-11-21 DIAGNOSIS — M54.9 BACK PAIN: ICD-10-CM

## 2022-11-21 DIAGNOSIS — M54.9 BACK PAIN: Primary | ICD-10-CM

## 2022-11-21 DIAGNOSIS — R18.8 ASCITES: ICD-10-CM

## 2022-11-21 PROCEDURE — 76770 US EXAM ABDO BACK WALL COMP: CPT

## 2022-11-21 PROCEDURE — 72072 X-RAY EXAM THORAC SPINE 3VWS: CPT

## 2022-11-21 PROCEDURE — 76705 ECHO EXAM OF ABDOMEN: CPT

## 2022-11-21 PROCEDURE — 72050 X-RAY EXAM NECK SPINE 4/5VWS: CPT

## 2022-11-28 ENCOUNTER — APPOINTMENT (OUTPATIENT)
Dept: CT IMAGING | Age: 37
End: 2022-11-28
Attending: STUDENT IN AN ORGANIZED HEALTH CARE EDUCATION/TRAINING PROGRAM
Payer: MEDICAID

## 2022-11-28 ENCOUNTER — HOSPITAL ENCOUNTER (EMERGENCY)
Age: 37
Discharge: HOME | End: 2022-11-29
Attending: EMERGENCY MEDICINE
Payer: MEDICAID

## 2022-11-28 ENCOUNTER — APPOINTMENT (OUTPATIENT)
Dept: GENERAL RADIOLOGY | Age: 37
End: 2022-11-28
Attending: EMERGENCY MEDICINE
Payer: MEDICAID

## 2022-11-28 VITALS
BODY MASS INDEX: 36.73 KG/M2 | RESPIRATION RATE: 19 BRPM | SYSTOLIC BLOOD PRESSURE: 140 MMHG | OXYGEN SATURATION: 98 % | HEIGHT: 69 IN | DIASTOLIC BLOOD PRESSURE: 94 MMHG | HEART RATE: 110 BPM | WEIGHT: 248 LBS | TEMPERATURE: 98.4 F

## 2022-11-28 DIAGNOSIS — R73.09 ELEVATED GLUCOSE: ICD-10-CM

## 2022-11-28 DIAGNOSIS — N17.9 AKI (ACUTE KIDNEY INJURY) (HCC): ICD-10-CM

## 2022-11-28 DIAGNOSIS — R07.9 CHEST PAIN, UNSPECIFIED TYPE: Primary | ICD-10-CM

## 2022-11-28 LAB
ALBUMIN SERPL-MCNC: 2.3 G/DL (ref 3.4–5)
ALBUMIN/GLOB SERPL: 0.5 {RATIO} (ref 0.8–1.7)
ALP SERPL-CCNC: 80 U/L (ref 45–117)
ALT SERPL-CCNC: 19 U/L (ref 13–56)
ANION GAP SERPL CALC-SCNC: 5 MMOL/L (ref 3–18)
APPEARANCE UR: CLEAR
AST SERPL-CCNC: 12 U/L (ref 10–38)
BACTERIA URNS QL MICRO: NEGATIVE /HPF
BASOPHILS # BLD: 0 K/UL (ref 0–0.1)
BASOPHILS NFR BLD: 1 % (ref 0–2)
BILIRUB SERPL-MCNC: 0.2 MG/DL (ref 0.2–1)
BILIRUB UR QL: NEGATIVE
BNP SERPL-MCNC: 35 PG/ML (ref 0–450)
BUN SERPL-MCNC: 38 MG/DL (ref 7–18)
BUN/CREAT SERPL: 22 (ref 12–20)
CALCIUM SERPL-MCNC: 8.4 MG/DL (ref 8.5–10.1)
CHLORIDE SERPL-SCNC: 105 MMOL/L (ref 100–111)
CO2 SERPL-SCNC: 23 MMOL/L (ref 21–32)
COLOR UR: YELLOW
CREAT SERPL-MCNC: 1.69 MG/DL (ref 0.6–1.3)
D DIMER PPP FEU-MCNC: 0.59 UG/ML(FEU)
DIFFERENTIAL METHOD BLD: ABNORMAL
EOSINOPHIL # BLD: 0 K/UL (ref 0–0.4)
EOSINOPHIL NFR BLD: 0 % (ref 0–5)
EPITH CASTS URNS QL MICRO: NORMAL /LPF (ref 0–5)
ERYTHROCYTE [DISTWIDTH] IN BLOOD BY AUTOMATED COUNT: 14.1 % (ref 11.6–14.5)
GLOBULIN SER CALC-MCNC: 5 G/DL (ref 2–4)
GLUCOSE SERPL-MCNC: 328 MG/DL (ref 74–99)
GLUCOSE UR STRIP.AUTO-MCNC: 500 MG/DL
HCG UR QL: NEGATIVE
HCT VFR BLD AUTO: 36.8 % (ref 35–45)
HGB BLD-MCNC: 12 G/DL (ref 12–16)
HGB UR QL STRIP: ABNORMAL
IMM GRANULOCYTES # BLD AUTO: 0.1 K/UL (ref 0–0.04)
IMM GRANULOCYTES NFR BLD AUTO: 1 % (ref 0–0.5)
KETONES UR QL STRIP.AUTO: NEGATIVE MG/DL
LEUKOCYTE ESTERASE UR QL STRIP.AUTO: NEGATIVE
LYMPHOCYTES # BLD: 4.2 K/UL (ref 0.9–3.6)
LYMPHOCYTES NFR BLD: 49 % (ref 21–52)
MCH RBC QN AUTO: 27.5 PG (ref 24–34)
MCHC RBC AUTO-ENTMCNC: 32.6 G/DL (ref 31–37)
MCV RBC AUTO: 84.2 FL (ref 78–100)
MONOCYTES # BLD: 0.7 K/UL (ref 0.05–1.2)
MONOCYTES NFR BLD: 8 % (ref 3–10)
NEUTS SEG # BLD: 3.5 K/UL (ref 1.8–8)
NEUTS SEG NFR BLD: 41 % (ref 40–73)
NITRITE UR QL STRIP.AUTO: NEGATIVE
NRBC # BLD: 0 K/UL (ref 0–0.01)
NRBC BLD-RTO: 0 PER 100 WBC
PH UR STRIP: 6.5 [PH] (ref 5–8)
PLATELET # BLD AUTO: 345 K/UL (ref 135–420)
PMV BLD AUTO: 9.9 FL (ref 9.2–11.8)
POTASSIUM SERPL-SCNC: 4.5 MMOL/L (ref 3.5–5.5)
PROT SERPL-MCNC: 7.3 G/DL (ref 6.4–8.2)
PROT UR STRIP-MCNC: 300 MG/DL
RBC # BLD AUTO: 4.37 M/UL (ref 4.2–5.3)
RBC #/AREA URNS HPF: NORMAL /HPF (ref 0–5)
SODIUM SERPL-SCNC: 133 MMOL/L (ref 136–145)
SP GR UR REFRACTOMETRY: 1.01 (ref 1–1.03)
TROPONIN-HIGH SENSITIVITY: 11 NG/L (ref 0–54)
UROBILINOGEN UR QL STRIP.AUTO: 0.2 EU/DL (ref 0.2–1)
WBC # BLD AUTO: 8.5 K/UL (ref 4.6–13.2)
WBC URNS QL MICRO: NEGATIVE /HPF (ref 0–4)

## 2022-11-28 PROCEDURE — 80053 COMPREHEN METABOLIC PANEL: CPT

## 2022-11-28 PROCEDURE — 74011250636 HC RX REV CODE- 250/636: Performed by: STUDENT IN AN ORGANIZED HEALTH CARE EDUCATION/TRAINING PROGRAM

## 2022-11-28 PROCEDURE — 85379 FIBRIN DEGRADATION QUANT: CPT

## 2022-11-28 PROCEDURE — 71045 X-RAY EXAM CHEST 1 VIEW: CPT

## 2022-11-28 PROCEDURE — 99285 EMERGENCY DEPT VISIT HI MDM: CPT

## 2022-11-28 PROCEDURE — 83880 ASSAY OF NATRIURETIC PEPTIDE: CPT

## 2022-11-28 PROCEDURE — 85025 COMPLETE CBC W/AUTO DIFF WBC: CPT

## 2022-11-28 PROCEDURE — 93005 ELECTROCARDIOGRAM TRACING: CPT

## 2022-11-28 PROCEDURE — 84484 ASSAY OF TROPONIN QUANT: CPT

## 2022-11-28 PROCEDURE — 71275 CT ANGIOGRAPHY CHEST: CPT

## 2022-11-28 PROCEDURE — 74011000636 HC RX REV CODE- 636: Performed by: EMERGENCY MEDICINE

## 2022-11-28 PROCEDURE — 81001 URINALYSIS AUTO W/SCOPE: CPT

## 2022-11-28 PROCEDURE — 81025 URINE PREGNANCY TEST: CPT

## 2022-11-28 RX ADMIN — SODIUM CHLORIDE, SODIUM LACTATE, POTASSIUM CHLORIDE, AND CALCIUM CHLORIDE 500 ML: 600; 310; 30; 20 INJECTION, SOLUTION INTRAVENOUS at 22:03

## 2022-11-28 RX ADMIN — IOPAMIDOL 72 ML: 755 INJECTION, SOLUTION INTRAVENOUS at 23:52

## 2022-11-29 LAB
ATRIAL RATE: 110 BPM
CALCULATED P AXIS, ECG09: 70 DEGREES
CALCULATED R AXIS, ECG10: 37 DEGREES
CALCULATED T AXIS, ECG11: 62 DEGREES
DIAGNOSIS, 93000: NORMAL
P-R INTERVAL, ECG05: 140 MS
Q-T INTERVAL, ECG07: 338 MS
QRS DURATION, ECG06: 88 MS
QTC CALCULATION (BEZET), ECG08: 457 MS
TROPONIN-HIGH SENSITIVITY: 11 NG/L (ref 0–54)
VENTRICULAR RATE, ECG03: 110 BPM

## 2022-11-29 PROCEDURE — 84484 ASSAY OF TROPONIN QUANT: CPT

## 2022-11-29 NOTE — ED TRIAGE NOTES
Pt arrived via ems with complaint of chest pain for a couple of days. Pain tl left chest that is painful to touch.

## 2022-11-29 NOTE — ED PROVIDER NOTES
I independently examined and evaluated Jag García. History: This is a 76-year-old female brought to the emergency department for evaluation of left-sided chest pain and shortness of breath. Patient reports being in usual state of health until 24 hours ago when symptoms for started. No reported fevers no no swelling of legs or feet no history of DVTs or PEs and no trauma. She says that she was just sitting still when she developed acute onset of left-sided chest pain rating towards her back. Never had similar symptoms this in the past.  Has been taking over-the-counter medications for symptoms with only minimal improvement. Patient is non-smoker. Denies family history of cardiac disease at a young age. Brought to the emergency department for evaluation by EMS. Nothing makes her symptoms better or worse. Is not exacerbated by twisting turning bending or deep breaths.     Physical exam:  General well-appearing well-nourished female from no acute distress  Chest: Tachycardic no murmurs rubs or gallops  Abdomen: Soft nontender nondistended no rebound guarding or peritoneal signs  Lungs: Clear to auscultation bilaterally no wheezes rales rhonchi or stridor  Neuro: Cranial nerves II through XII grossly intact no apparent motor or sensory deficits  Extremities: No obvious deformities or dislocations no swelling behind either one of her calfs      Results for orders placed or performed during the hospital encounter of 11/28/22   CBC WITH AUTOMATED DIFF   Result Value Ref Range    WBC 8.5 4.6 - 13.2 K/uL    RBC 4.37 4.20 - 5.30 M/uL    HGB 12.0 12.0 - 16.0 g/dL    HCT 36.8 35.0 - 45.0 %    MCV 84.2 78.0 - 100.0 FL    MCH 27.5 24.0 - 34.0 PG    MCHC 32.6 31.0 - 37.0 g/dL    RDW 14.1 11.6 - 14.5 %    PLATELET 439 939 - 917 K/uL    MPV 9.9 9.2 - 11.8 FL    NRBC 0.0 0  WBC    ABSOLUTE NRBC 0.00 0.00 - 0.01 K/uL    NEUTROPHILS 41 40 - 73 %    LYMPHOCYTES 49 21 - 52 %    MONOCYTES 8 3 - 10 % EOSINOPHILS 0 0 - 5 %    BASOPHILS 1 0 - 2 %    IMMATURE GRANULOCYTES 1 (H) 0.0 - 0.5 %    ABS. NEUTROPHILS 3.5 1.8 - 8.0 K/UL    ABS. LYMPHOCYTES 4.2 (H) 0.9 - 3.6 K/UL    ABS. MONOCYTES 0.7 0.05 - 1.2 K/UL    ABS. EOSINOPHILS 0.0 0.0 - 0.4 K/UL    ABS. BASOPHILS 0.0 0.0 - 0.1 K/UL    ABS. IMM. GRANS. 0.1 (H) 0.00 - 0.04 K/UL    DF AUTOMATED     METABOLIC PANEL, COMPREHENSIVE   Result Value Ref Range    Sodium 133 (L) 136 - 145 mmol/L    Potassium 4.5 3.5 - 5.5 mmol/L    Chloride 105 100 - 111 mmol/L    CO2 23 21 - 32 mmol/L    Anion gap 5 3.0 - 18 mmol/L    Glucose 328 (H) 74 - 99 mg/dL    BUN 38 (H) 7.0 - 18 MG/DL    Creatinine 1.69 (H) 0.6 - 1.3 MG/DL    BUN/Creatinine ratio 22 (H) 12 - 20      eGFR 39 (L) >60 ml/min/1.73m2    Calcium 8.4 (L) 8.5 - 10.1 MG/DL    Bilirubin, total 0.2 0.2 - 1.0 MG/DL    ALT (SGPT) 19 13 - 56 U/L    AST (SGOT) 12 10 - 38 U/L    Alk.  phosphatase 80 45 - 117 U/L    Protein, total 7.3 6.4 - 8.2 g/dL    Albumin 2.3 (L) 3.4 - 5.0 g/dL    Globulin 5.0 (H) 2.0 - 4.0 g/dL    A-G Ratio 0.5 (L) 0.8 - 1.7     TROPONIN-HIGH SENSITIVITY   Result Value Ref Range    Troponin-High Sensitivity 11 0 - 54 ng/L   NT-PRO BNP   Result Value Ref Range    NT pro-BNP 35 0 - 450 PG/ML   URINALYSIS W/ RFLX MICROSCOPIC   Result Value Ref Range    Color YELLOW      Appearance CLEAR      Specific gravity 1.014 1.005 - 1.030      pH (UA) 6.5 5.0 - 8.0      Protein 300 (A) NEG mg/dL    Glucose 500 (A) NEG mg/dL    Ketone Negative NEG mg/dL    Bilirubin Negative NEG      Blood SMALL (A) NEG      Urobilinogen 0.2 0.2 - 1.0 EU/dL    Nitrites Negative NEG      Leukocyte Esterase Negative NEG     HCG URINE, QL   Result Value Ref Range    HCG urine, QL Negative NEG     D DIMER   Result Value Ref Range    D DIMER 0.59 (H) <0.46 ug/ml(FEU)   URINE MICROSCOPIC ONLY   Result Value Ref Range    WBC Negative 0 - 4 /hpf    RBC 1 to 4 0 - 5 /hpf    Epithelial cells 2+ 0 - 5 /lpf    Bacteria Negative NEG /hpf     No results found. Patient's EKG is interpreted by me sinus rhythm rate 110   no ST elevation no ST depression I appreciate no acute ischemia or infarction on this EKG no old EKGs with which to compare    Medical decision making: This is a 58-year-old female brought emerged department for evaluation of left-sided chest pain found to be tachycardic. Based on patient's history and physical would be concern for possible gastroesophageal reflux disease. Other possibility patient symptoms do include musculoskeletal in origin or intra-abdominal processes such as pancreatitis biliary disease or esophagitis. Patient is mildly tachycardic here. She does not have any fevers or cough or infectious type symptoms making pericarditis or myocarditis less likely. No reported history of DVTs or PEs and denies any risk factors such as recent travel immobilization or trauma. Do believe pulmonary emboli less likely although not completely excluded at this time. Clinical Impression:  1. Left-sided chest pain 2. Tachycardia        All diagnostic, treatment, and disposition decisions were made by myself in conjunction with the resident  I personally saw the patient and performed a substantive portion of the visit including all aspects of the medical decision making. I personally saw and examined the patient. I have reviewed and agree with the residents findings, including all diagnostic interpretations, and plans as written. I was present during the key portions of separately billed procedures.   Minus MD Magdiel

## 2022-11-29 NOTE — ED PROVIDER NOTES
EMERGENCY DEPARTMENT HISTORY AND PHYSICAL EXAM    9:06 PM      Date: 11/28/2022  Patient Name: Karen Gar    History of Presenting Illness     Chief Complaint   Patient presents with    Chest Pain    Shortness of Breath         History Provided By: Patient and EMS  Location/Duration/Severity/Modifying factors   HPI    76-year-old female with past medical history significant for diabetes, hypertension, CKD, seizures, and thyroid cancer presents to the ED with a chief complaint of chest pain. Patient states chest pain is ongoing for the past 2 days and worse with deep breathing. She states she is also experiencing shortness of breath. States symptoms are worse when she is up walking around. Denies any calf pain, leg swelling, palpitations, abdominal pain, fever, chills, or urinary symptoms. PCP: Josep Barragan MD    Current Outpatient Medications   Medication Sig Dispense Refill    Compression Socks, X-Large misc 2 Units by Does Not Apply route daily. 2 Each 1    lidocaine (Lidoderm) 5 % Apply patch to the affected area for 12 hours a day and remove for 12 hours a day. 14 Each 0    acetaminophen (TYLENOL) 500 mg tablet Take 2 Tablets by mouth every six (6) hours as needed for Pain. 24 Tablet 0    pantoprazole (PROTONIX) 40 mg tablet       Lantus U-100 Insulin 100 unit/mL injection       topiramate (TOPAMAX) 50 mg tablet       insulin aspart U-100 (NovoLOG U-100 Insulin aspart) 100 unit/mL injection by SubCUTAneous route. insulin glulisine U-100 (APIDRA SOLOSTAR U-100 INSULIN) 100 unit/mL pen 10 Units by SubCUTAneous route Before breakfast, lunch, and dinner. liothyronine (CYTOMEL) 25 mcg tablet Take 75 mcg by mouth daily. metFORMIN (GLUCOPHAGE) 1,000 mg tablet Take 1,000 mg by mouth two (2) times daily (with meals). metoprolol tartrate (LOPRESSOR) 25 mg tablet Take 25 mg by mouth daily. atorvastatin (LIPITOR) 40 mg tablet Take  by mouth daily.       furosemide (LASIX) 20 mg tablet Take  by mouth daily. lisinopril (PRINIVIL, ZESTRIL) 5 mg tablet Take 40 mg by mouth daily. Past History     Past Medical History:  Past Medical History:   Diagnosis Date    Arthritis     Chest pain     Diabetes ([facility]     Essential hypertension     Headache(784.0)     Hyperchloremia     Hypertension     Seizures ([facility]     8/2020 last seizure    Seizures ([facility])     SOB (shortness of breath)     Thyroid cancer ([facility]        Past Surgical History:  Past Surgical History:   Procedure Laterality Date    HX PARTIAL HYSTERECTOMY      HX THYROIDECTOMY      HX TUBAL LIGATION         Family History:  Family History   Problem Relation Age of Onset    Hypertension Mother        Social History:  Social History     Tobacco Use    Smoking status: Former    Smokeless tobacco: Never   Substance Use Topics    Alcohol use: No    Drug use: No       Allergies:  No Known Allergies      Review of Systems     Review of Systems   Constitutional:  Negative for chills, fatigue and fever. HENT:  Negative for congestion, sore throat and voice change. Eyes:  Negative for photophobia, redness and visual disturbance. Respiratory:  Positive for shortness of breath. Negative for cough, chest tightness, wheezing and stridor. Cardiovascular:  Positive for chest pain. Negative for palpitations and leg swelling. Gastrointestinal:  Negative for abdominal pain, constipation, diarrhea, nausea and vomiting. Endocrine: Negative for polydipsia and polyuria. Genitourinary:  Negative for dysuria, flank pain, frequency and urgency. Musculoskeletal:  Negative for back pain, myalgias and neck pain. Skin:  Negative for pallor and rash. Neurological:  Negative for dizziness, seizures, syncope, speech difficulty, weakness, numbness and headaches. Psychiatric/Behavioral:  Negative for confusion and suicidal ideas. The patient is not nervous/anxious.         Physical Exam   Visit Vitals  BP (!) 140/94 (BP 1 Location: Left upper arm)   Pulse (!) 110   Temp 98.4 °F (36.9 °C)   Resp 19   Ht 5' 9\" (1.753 m)   Wt 112.5 kg (248 lb)   SpO2 98%   BMI 36.62 kg/m²       Physical Exam  Vitals and nursing note reviewed. Constitutional:       General: She is not in acute distress. Appearance: She is well-developed. She is not ill-appearing, toxic-appearing or diaphoretic. HENT:      Head: Normocephalic and atraumatic. Eyes:      Pupils: Pupils are equal, round, and reactive to light. Cardiovascular:      Rate and Rhythm: Regular rhythm. Tachycardia present. Heart sounds: Normal heart sounds. Pulmonary:      Effort: Pulmonary effort is normal. No tachypnea or respiratory distress. Breath sounds: Normal breath sounds. Chest:      Chest wall: No tenderness or edema. Abdominal:      General: Bowel sounds are normal.      Palpations: Abdomen is soft. Musculoskeletal:         General: Normal range of motion. Cervical back: Normal range of motion and neck supple. Right lower leg: Edema (1+) present. Left lower leg: Edema (1+) present. Skin:     General: Skin is warm and dry. Capillary Refill: Capillary refill takes less than 2 seconds. Findings: No rash. Neurological:      General: No focal deficit present. Mental Status: She is alert and oriented to person, place, and time.    Psychiatric:         Mood and Affect: Mood normal.         Behavior: Behavior normal.         Diagnostic Study Results     Labs -  Recent Results (from the past 12 hour(s))   CBC WITH AUTOMATED DIFF    Collection Time: 11/28/22  9:26 PM   Result Value Ref Range    WBC 8.5 4.6 - 13.2 K/uL    RBC 4.37 4.20 - 5.30 M/uL    HGB 12.0 12.0 - 16.0 g/dL    HCT 36.8 35.0 - 45.0 %    MCV 84.2 78.0 - 100.0 FL    MCH 27.5 24.0 - 34.0 PG    MCHC 32.6 31.0 - 37.0 g/dL    RDW 14.1 11.6 - 14.5 %    PLATELET 082 978 - 001 K/uL    MPV 9.9 9.2 - 11.8 FL    NRBC 0.0 0  WBC    ABSOLUTE NRBC 0.00 0.00 - 0.01 K/uL    NEUTROPHILS 41 40 - 73 %    LYMPHOCYTES 49 21 - 52 %    MONOCYTES 8 3 - 10 %    EOSINOPHILS 0 0 - 5 %    BASOPHILS 1 0 - 2 %    IMMATURE GRANULOCYTES 1 (H) 0.0 - 0.5 %    ABS. NEUTROPHILS 3.5 1.8 - 8.0 K/UL    ABS. LYMPHOCYTES 4.2 (H) 0.9 - 3.6 K/UL    ABS. MONOCYTES 0.7 0.05 - 1.2 K/UL    ABS. EOSINOPHILS 0.0 0.0 - 0.4 K/UL    ABS. BASOPHILS 0.0 0.0 - 0.1 K/UL    ABS. IMM. GRANS. 0.1 (H) 0.00 - 0.04 K/UL    DF AUTOMATED     METABOLIC PANEL, COMPREHENSIVE    Collection Time: 11/28/22  9:26 PM   Result Value Ref Range    Sodium 133 (L) 136 - 145 mmol/L    Potassium 4.5 3.5 - 5.5 mmol/L    Chloride 105 100 - 111 mmol/L    CO2 23 21 - 32 mmol/L    Anion gap 5 3.0 - 18 mmol/L    Glucose 328 (H) 74 - 99 mg/dL    BUN 38 (H) 7.0 - 18 MG/DL    Creatinine 1.69 (H) 0.6 - 1.3 MG/DL    BUN/Creatinine ratio 22 (H) 12 - 20      eGFR 39 (L) >60 ml/min/1.73m2    Calcium 8.4 (L) 8.5 - 10.1 MG/DL    Bilirubin, total 0.2 0.2 - 1.0 MG/DL    ALT (SGPT) 19 13 - 56 U/L    AST (SGOT) 12 10 - 38 U/L    Alk.  phosphatase 80 45 - 117 U/L    Protein, total 7.3 6.4 - 8.2 g/dL    Albumin 2.3 (L) 3.4 - 5.0 g/dL    Globulin 5.0 (H) 2.0 - 4.0 g/dL    A-G Ratio 0.5 (L) 0.8 - 1.7     TROPONIN-HIGH SENSITIVITY    Collection Time: 11/28/22  9:26 PM   Result Value Ref Range    Troponin-High Sensitivity 11 0 - 54 ng/L   NT-PRO BNP    Collection Time: 11/28/22  9:26 PM   Result Value Ref Range    NT pro-BNP 35 0 - 450 PG/ML   D DIMER    Collection Time: 11/28/22  9:26 PM   Result Value Ref Range    D DIMER 0.59 (H) <0.46 ug/ml(FEU)   URINALYSIS W/ RFLX MICROSCOPIC    Collection Time: 11/28/22  9:40 PM   Result Value Ref Range    Color YELLOW      Appearance CLEAR      Specific gravity 1.014 1.005 - 1.030      pH (UA) 6.5 5.0 - 8.0      Protein 300 (A) NEG mg/dL    Glucose 500 (A) NEG mg/dL    Ketone Negative NEG mg/dL    Bilirubin Negative NEG      Blood SMALL (A) NEG      Urobilinogen 0.2 0.2 - 1.0 EU/dL    Nitrites Negative NEG      Leukocyte Esterase Negative NEG     HCG URINE, QL    Collection Time: 11/28/22  9:40 PM   Result Value Ref Range    HCG urine, QL Negative NEG     URINE MICROSCOPIC ONLY    Collection Time: 11/28/22  9:40 PM   Result Value Ref Range    WBC Negative 0 - 4 /hpf    RBC 1 to 4 0 - 5 /hpf    Epithelial cells 2+ 0 - 5 /lpf    Bacteria Negative NEG /hpf   TROPONIN-HIGH SENSITIVITY    Collection Time: 11/29/22  1:15 AM   Result Value Ref Range    Troponin-High Sensitivity 11 0 - 54 ng/L       Radiologic Studies -   CTA CHEST W OR W WO CONT   Final Result   No CT evidence of pulmonary embolus. XR CHEST PORT    (Results Pending)         Medical Decision Making   I am the first provider for this patient. I reviewed the vital signs, available nursing notes, past medical history, past surgical history, family history and social history. Vital Signs-Reviewed the patient's vital signs. EKG: Sinus tachycardia. 110 bpm.  Normal axis. Normal intervals. No evidence of acute ischemia or dysrhythmia. Records Reviewed: Nursing Notes, Old Medical Records, Previous Radiology Studies, and Previous Laboratory Studies (Time of Review: 9:06 PM)    ED Course: Progress Notes, Reevaluation, and Consults:  2:26 AM patient reevaluated. No acute distress. Denies any active chest pain. States symptoms resolved here in the ED. ED Course as of 11/29/22 0226 Mon Nov 28, 2022   2357 Troponin-High Sensitivity: 6 [RH]      ED Course User Index  [RH] Christina Dakin, MD       Provider Notes (Medical Decision Making):   MDM  65-year-old female with past medical history significant for diabetes, hypertension, CKD, seizures, and thyroid cancer presents to the ED with a chief complaint of chest pain. Chest pain onset 2 days ago. Describes as pleuritic. Tachycardic on arrival which did improve with fluid administration. Blood glucose elevated on arrival.  Lungs clear to auscultation bilaterally. Wells low risk. D-dimer elevated.   CTA without evidence of acute pulmonary embolism. EKG without of evidence of ischemia or dysrhythmia. Troponin normal.  Delta troponin normal. HEART score 2, low risk. Low clinical concern for ACS, MI, PE, or dysrhythmia. Patient does have acute kidney injury as well as hyperglycemia. Discussed importance of fluid hydration and tighter control of blood glucose with the patient who is in agreement. Should return precautions discussed. Discharged to home. Procedures      Diagnosis     Clinical Impression:   1. Chest pain, unspecified type    2. [place] (acute kidney injury) ([address]    3. Elevated glucose        Disposition: Admission    Follow-up Information       Follow up With Specialties Details Why Contact Info    David Schmidt MD Internal Medicine Physician Schedule an appointment as soon as possible for a visit in 1 week      [facility] EMERGENCY DEPT Emergency Medicine  If symptoms worsen [address] [place] [id number]  [phone]             Current Discharge Medication List        CONTINUE these medications which have NOT CHANGED    Details   Compression Socks, X-Large misc 2 Units by Does Not Apply route daily. Qty: 2 Each, Refills: 1      lidocaine (Lidoderm) 5 % Apply patch to the affected area for 12 hours a day and remove for 12 hours a day. Qty: 14 Each, Refills: 0      acetaminophen (TYLENOL) 500 mg tablet Take 2 Tablets by mouth every six (6) hours as needed for Pain. Qty: 24 Tablet, Refills: 0      pantoprazole (PROTONIX) 40 mg tablet       Lantus U-100 Insulin 100 unit/mL injection       topiramate (TOPAMAX) 50 mg tablet       insulin aspart U-100 (NovoLOG U-100 Insulin aspart) 100 unit/mL injection by SubCUTAneous route. insulin glulisine U-100 (APIDRA SOLOSTAR U-100 INSULIN) 100 unit/mL pen 10 Units by SubCUTAneous route Before breakfast, lunch, and dinner. liothyronine (CYTOMEL) 25 mcg tablet Take 75 mcg by mouth daily.       metFORMIN (GLUCOPHAGE) 1,000 mg tablet Take 1,000 mg by mouth two (2) times daily (with meals). metoprolol tartrate (LOPRESSOR) 25 mg tablet Take 25 mg by mouth daily. atorvastatin (LIPITOR) 40 mg tablet Take  by mouth daily. furosemide (LASIX) 20 mg tablet Take  by mouth daily. lisinopril (PRINIVIL, ZESTRIL) 5 mg tablet Take 40 mg by mouth daily. Associated Diagnoses: Migraines; Seizures ([facility]           Disclaimer: Sections of this note are dictated using utilizing voice recognition software. Minor typographical errors may be present. If questions arise, please do not hesitate to contact me or call our department.

## 2022-11-29 NOTE — DISCHARGE INSTRUCTIONS
Today you are seen in the ER for chest pain. Your CT scan showed no evidence of a blood clot. Your lab work showed evidence of an acute kidney injury, please drink plenty of fluids. Your blood glucose was elevated, please take diabetes medications as prescribed. Follow-up with your primary care provider. Return to the ER if you develop any worsening chest pain, shortness of breath, or symptoms concerning to you.

## 2022-12-30 ENCOUNTER — APPOINTMENT (OUTPATIENT)
Dept: PHYSICAL THERAPY | Age: 37
End: 2022-12-30

## 2023-01-01 ENCOUNTER — APPOINTMENT (OUTPATIENT)
Dept: CT IMAGING | Age: 38
End: 2023-01-01
Attending: EMERGENCY MEDICINE
Payer: MEDICAID

## 2023-01-01 ENCOUNTER — APPOINTMENT (OUTPATIENT)
Dept: GENERAL RADIOLOGY | Age: 38
End: 2023-01-01
Attending: EMERGENCY MEDICINE
Payer: MEDICAID

## 2023-01-01 ENCOUNTER — HOSPITAL ENCOUNTER (EMERGENCY)
Age: 38
Discharge: HOME | End: 2023-01-02
Attending: EMERGENCY MEDICINE
Payer: MEDICAID

## 2023-01-01 VITALS
HEART RATE: 105 BPM | WEIGHT: 230 LBS | HEIGHT: 69 IN | DIASTOLIC BLOOD PRESSURE: 140 MMHG | BODY MASS INDEX: 34.07 KG/M2 | SYSTOLIC BLOOD PRESSURE: 154 MMHG | OXYGEN SATURATION: 99 % | TEMPERATURE: 98.2 F | RESPIRATION RATE: 18 BRPM

## 2023-01-01 DIAGNOSIS — J18.9 COMMUNITY ACQUIRED PNEUMONIA OF RIGHT LOWER LOBE OF LUNG: Primary | ICD-10-CM

## 2023-01-01 LAB
ALBUMIN SERPL-MCNC: 2.2 G/DL (ref 3.4–5)
ALBUMIN/GLOB SERPL: 0.5 {RATIO} (ref 0.8–1.7)
ALP SERPL-CCNC: 92 U/L (ref 45–117)
ALT SERPL-CCNC: 19 U/L (ref 13–56)
ANION GAP SERPL CALC-SCNC: 6 MMOL/L (ref 3–18)
AST SERPL-CCNC: 14 U/L (ref 10–38)
BASOPHILS # BLD: 0 K/UL (ref 0–0.1)
BASOPHILS NFR BLD: 0 % (ref 0–2)
BILIRUB SERPL-MCNC: 0.3 MG/DL (ref 0.2–1)
BUN SERPL-MCNC: 18 MG/DL (ref 7–18)
BUN/CREAT SERPL: 14 (ref 12–20)
CALCIUM SERPL-MCNC: 8.5 MG/DL (ref 8.5–10.1)
CHLORIDE SERPL-SCNC: 106 MMOL/L (ref 100–111)
CO2 SERPL-SCNC: 26 MMOL/L (ref 21–32)
CREAT SERPL-MCNC: 1.3 MG/DL (ref 0.6–1.3)
D DIMER PPP FEU-MCNC: 0.55 UG/ML(FEU)
DIFFERENTIAL METHOD BLD: NORMAL
EOSINOPHIL # BLD: 0 K/UL (ref 0–0.4)
EOSINOPHIL NFR BLD: 0 % (ref 0–5)
ERYTHROCYTE [DISTWIDTH] IN BLOOD BY AUTOMATED COUNT: 13.8 % (ref 11.6–14.5)
GLOBULIN SER CALC-MCNC: 4.6 G/DL (ref 2–4)
GLUCOSE SERPL-MCNC: 265 MG/DL (ref 74–99)
HCG SERPL QL: NEGATIVE
HCT VFR BLD AUTO: 37 % (ref 35–45)
HGB BLD-MCNC: 12.3 G/DL (ref 12–16)
IMM GRANULOCYTES # BLD AUTO: 0 K/UL (ref 0–0.04)
IMM GRANULOCYTES NFR BLD AUTO: 0 % (ref 0–0.5)
LYMPHOCYTES # BLD: 3.6 K/UL (ref 0.9–3.6)
LYMPHOCYTES NFR BLD: 41 % (ref 21–52)
MAGNESIUM SERPL-MCNC: 2.3 MG/DL (ref 1.6–2.6)
MCH RBC QN AUTO: 27.8 PG (ref 24–34)
MCHC RBC AUTO-ENTMCNC: 33.2 G/DL (ref 31–37)
MCV RBC AUTO: 83.7 FL (ref 78–100)
MONOCYTES # BLD: 0.7 K/UL (ref 0.05–1.2)
MONOCYTES NFR BLD: 8 % (ref 3–10)
NEUTS SEG # BLD: 4.5 K/UL (ref 1.8–8)
NEUTS SEG NFR BLD: 51 % (ref 40–73)
NRBC # BLD: 0 K/UL (ref 0–0.01)
NRBC BLD-RTO: 0 PER 100 WBC
PLATELET # BLD AUTO: 329 K/UL (ref 135–420)
PMV BLD AUTO: 9.8 FL (ref 9.2–11.8)
POTASSIUM SERPL-SCNC: 4 MMOL/L (ref 3.5–5.5)
PROT SERPL-MCNC: 6.8 G/DL (ref 6.4–8.2)
RBC # BLD AUTO: 4.42 M/UL (ref 4.2–5.3)
SODIUM SERPL-SCNC: 138 MMOL/L (ref 136–145)
TROPONIN-HIGH SENSITIVITY: 13 NG/L (ref 0–54)
WBC # BLD AUTO: 8.9 K/UL (ref 4.6–13.2)

## 2023-01-01 PROCEDURE — 85025 COMPLETE CBC W/AUTO DIFF WBC: CPT

## 2023-01-01 PROCEDURE — 80053 COMPREHEN METABOLIC PANEL: CPT

## 2023-01-01 PROCEDURE — 84703 CHORIONIC GONADOTROPIN ASSAY: CPT

## 2023-01-01 PROCEDURE — 71275 CT ANGIOGRAPHY CHEST: CPT

## 2023-01-01 PROCEDURE — 99285 EMERGENCY DEPT VISIT HI MDM: CPT

## 2023-01-01 PROCEDURE — 83735 ASSAY OF MAGNESIUM: CPT

## 2023-01-01 PROCEDURE — 71046 X-RAY EXAM CHEST 2 VIEWS: CPT

## 2023-01-01 PROCEDURE — 93005 ELECTROCARDIOGRAM TRACING: CPT

## 2023-01-01 PROCEDURE — 74011000636 HC RX REV CODE- 636: Performed by: EMERGENCY MEDICINE

## 2023-01-01 PROCEDURE — 85379 FIBRIN DEGRADATION QUANT: CPT

## 2023-01-01 PROCEDURE — 84484 ASSAY OF TROPONIN QUANT: CPT

## 2023-01-01 RX ADMIN — IOPAMIDOL 100 ML: 755 INJECTION, SOLUTION INTRAVENOUS at 23:22

## 2023-01-01 NOTE — Clinical Note
[address] [place] [address] Dr [facility] EMERGENCY DEPT  [id number] [id number] [phone]    Work/School Note    Date: 1/1/2023    To Whom It May concern:    Purnima Haney was seen and treated today in the emergency room by the following provider(s):  Attending Provider: Mayra Herrera MD  Physician Assistant: ESTEBAN Burks. Purnima Haney is excused from work/school on 1/2/2023 through 1/4/2023. She is medically clear to return to work/school on 1/5/2023.          Sincerely,          ESTEBAN Fierro

## 2023-01-02 LAB
ATRIAL RATE: 100 BPM
CALCULATED P AXIS, ECG09: 55 DEGREES
CALCULATED R AXIS, ECG10: 14 DEGREES
CALCULATED T AXIS, ECG11: 67 DEGREES
DIAGNOSIS, 93000: NORMAL
FLUAV RNA SPEC QL NAA+PROBE: NOT DETECTED
FLUBV RNA SPEC QL NAA+PROBE: NOT DETECTED
P-R INTERVAL, ECG05: 126 MS
Q-T INTERVAL, ECG07: 346 MS
QRS DURATION, ECG06: 84 MS
QTC CALCULATION (BEZET), ECG08: 446 MS
SARS-COV-2, COV2: NOT DETECTED
VENTRICULAR RATE, ECG03: 100 BPM

## 2023-01-02 PROCEDURE — 87636 SARSCOV2 & INF A&B AMP PRB: CPT

## 2023-01-02 RX ORDER — ACETAMINOPHEN 500 MG
1000 TABLET ORAL
Qty: 24 TABLET | Refills: 0 | Status: SHIPPED | OUTPATIENT
Start: 2023-01-02

## 2023-01-02 RX ORDER — AZITHROMYCIN 250 MG/1
TABLET, FILM COATED ORAL
Qty: 6 TABLET | Refills: 0 | Status: SHIPPED | OUTPATIENT
Start: 2023-01-02 | End: 2023-01-07

## 2023-01-02 NOTE — ED PROVIDER NOTES
EMERGENCY DEPARTMENT HISTORY AND PHYSICAL EXAM    Date: 1/1/2023  Patient Name: Leena An    History of Presenting Illness     Chief Complaint   Patient presents with    Chest Pain (Angina)     Patient reports left sided chest pain for 1 month and was seen here and had a negative work up. Patient states that the pain is worse. She is hypertensive in triage and states that she took her meds today. Denies cardiac hx. History Provided By: Patient      Additional History (Context): Leena An is a 44 y.o. female with diabetes, hypertension, hyperlipidemia, and obesity who presents with complaint of left-sided chest pain constant for almost a month. Feels like it is getting worse. Pain is not pleuritic but she says they keep evaluating her for blood clots and she is never had one. Denies tobacco or alcohol. Denies family history of an early MI. Patient was referred by her primary care doctor for musculoskeletal pain to physical therapy she said but is not evaluating her chest but rather her back. PCP: Makenzie Peters MD    Current Outpatient Medications   Medication Sig Dispense Refill    azithromycin (Zithromax Z-Farooq) 250 mg tablet Take two tablets on day one; then take one tablet daily until gone. 6 Tablet 0    acetaminophen (Tylenol Extra Strength) 500 mg tablet Take 2 Tablets by mouth every six (6) hours as needed for Pain. 24 Tablet 0    Compression Socks, X-Large misc 2 Units by Does Not Apply route daily. 2 Each 1    lidocaine (Lidoderm) 5 % Apply patch to the affected area for 12 hours a day and remove for 12 hours a day. 14 Each 0    acetaminophen (TYLENOL) 500 mg tablet Take 2 Tablets by mouth every six (6) hours as needed for Pain. 24 Tablet 0    pantoprazole (PROTONIX) 40 mg tablet       Lantus U-100 Insulin 100 unit/mL injection       topiramate (TOPAMAX) 50 mg tablet       insulin aspart U-100 (NovoLOG U-100 Insulin aspart) 100 unit/mL injection by SubCUTAneous route. insulin glulisine U-100 (APIDRA SOLOSTAR U-100 INSULIN) 100 unit/mL pen 10 Units by SubCUTAneous route Before breakfast, lunch, and dinner. liothyronine (CYTOMEL) 25 mcg tablet Take 75 mcg by mouth daily. metFORMIN (GLUCOPHAGE) 1,000 mg tablet Take 1,000 mg by mouth two (2) times daily (with meals). metoprolol tartrate (LOPRESSOR) 25 mg tablet Take 25 mg by mouth daily. atorvastatin (LIPITOR) 40 mg tablet Take  by mouth daily. furosemide (LASIX) 20 mg tablet Take  by mouth daily. lisinopril (PRINIVIL, ZESTRIL) 5 mg tablet Take 40 mg by mouth daily. Past History     Past Medical History:  Past Medical History:   Diagnosis Date    Arthritis     Chest pain     Diabetes ([facility] [address]     Essential hypertension     Headache(784.0)     Hyperchloremia     Hypertension     Seizures ([facility] [address]     8/2020 last seizure    Seizures ([facility])     SOB (shortness of breath)     Thyroid cancer ([facility] [address]        Past Surgical History:  Past Surgical History:   Procedure Laterality Date    HX PARTIAL HYSTERECTOMY      HX THYROIDECTOMY      HX TUBAL LIGATION         Family History:  Family History   Problem Relation Age of Onset    Hypertension Mother        Social History:  Social History     Tobacco Use    Smoking status: Former    Smokeless tobacco: Never   Substance Use Topics    Alcohol use: No    Drug use: No       Allergies:  No Known Allergies      Review of Systems   Review of Systems   Constitutional: Negative. HENT: Negative. Eyes: Negative. Respiratory:  Negative for shortness of breath. Cardiovascular:  Positive for chest pain. Gastrointestinal: Negative. Endocrine: Negative. Genitourinary: Negative. Musculoskeletal:  Positive for back pain. Skin: Negative. Allergic/Immunologic: Negative. Neurological: Negative. Hematological: Negative. Psychiatric/Behavioral: Negative.      All Other Systems Negative  Physical Exam     Vitals:    01/01/23 2042   BP: (!) 154/140 Pulse: (!) 105   Resp: 18   Temp: 98.2 °F (36.8 °C)   SpO2: 99%   Weight: 104.3 kg (230 lb)   Height: 5' 9\" (1.753 m)     Physical Exam  Vitals and nursing note reviewed. Constitutional:       Appearance: She is well-developed. HENT:      Head: Normocephalic and atraumatic. Right Ear: External ear normal.      Left Ear: External ear normal.      Nose: Nose normal.   Eyes:      Conjunctiva/sclera: Conjunctivae normal.      Pupils: Pupils are equal, round, and reactive to light. Neck:      Vascular: No JVD. Trachea: No tracheal deviation. Cardiovascular:      Rate and Rhythm: Regular rhythm. Tachycardia present. Pulses: Normal pulses. Heart sounds: Normal heart sounds. No murmur heard. No friction rub. No gallop. Comments: Equal radial pulses  Pulmonary:      Effort: Pulmonary effort is normal. No respiratory distress. Breath sounds: Normal breath sounds. No wheezing or rales. Abdominal:      General: Bowel sounds are normal. There is no distension. Palpations: Abdomen is soft. There is no mass. Tenderness: There is no abdominal tenderness. There is no guarding or rebound. Musculoskeletal:         General: No tenderness. Normal range of motion. Cervical back: Normal range of motion and neck supple. Skin:     General: Skin is warm and dry. Findings: No rash. Neurological:      Mental Status: She is alert and oriented to person, place, and time. Cranial Nerves: No cranial nerve deficit. Deep Tendon Reflexes: Reflexes are normal and symmetric.    Psychiatric:         Behavior: Behavior normal.          Diagnostic Study Results     Labs -     Recent Results (from the past 12 hour(s))   EKG, 12 LEAD, INITIAL    Collection Time: 01/01/23  8:46 PM   Result Value Ref Range    Ventricular Rate 100 BPM    Atrial Rate 100 BPM    P-R Interval 126 ms    QRS Duration 84 ms    Q-T Interval 346 ms    QTC Calculation (Bezet) 446 ms    Calculated P Axis 55 degrees    Calculated R Axis 14 degrees    Calculated T Axis 67 degrees    Diagnosis       Normal sinus rhythm  Normal ECG  When compared with ECG of 28-NOV-2022 21:18,  No significant change was found     CBC WITH AUTOMATED DIFF    Collection Time: 01/01/23  9:03 PM   Result Value Ref Range    WBC 8.9 4.6 - 13.2 K/uL    RBC 4.42 4.20 - 5.30 M/uL    HGB 12.3 12.0 - 16.0 g/dL    HCT 37.0 35.0 - 45.0 %    MCV 83.7 78.0 - 100.0 FL    MCH 27.8 24.0 - 34.0 PG    MCHC 33.2 31.0 - 37.0 g/dL    RDW 13.8 11.6 - 14.5 %    PLATELET 778 642 - 413 K/uL    MPV 9.8 9.2 - 11.8 FL    NRBC 0.0 0  WBC    ABSOLUTE NRBC 0.00 0.00 - 0.01 K/uL    NEUTROPHILS 51 40 - 73 %    LYMPHOCYTES 41 21 - 52 %    MONOCYTES 8 3 - 10 %    EOSINOPHILS 0 0 - 5 %    BASOPHILS 0 0 - 2 %    IMMATURE GRANULOCYTES 0 0.0 - 0.5 %    ABS. NEUTROPHILS 4.5 1.8 - 8.0 K/UL    ABS. LYMPHOCYTES 3.6 0.9 - 3.6 K/UL    ABS. MONOCYTES 0.7 0.05 - 1.2 K/UL    ABS. EOSINOPHILS 0.0 0.0 - 0.4 K/UL    ABS. BASOPHILS 0.0 0.0 - 0.1 K/UL    ABS. IMM. GRANS. 0.0 0.00 - 0.04 K/UL    DF AUTOMATED     METABOLIC PANEL, COMPREHENSIVE    Collection Time: 01/01/23  9:03 PM   Result Value Ref Range    Sodium 138 136 - 145 mmol/L    Potassium 4.0 3.5 - 5.5 mmol/L    Chloride 106 100 - 111 mmol/L    CO2 26 21 - 32 mmol/L    Anion gap 6 3.0 - 18 mmol/L    Glucose 265 (H) 74 - 99 mg/dL    BUN 18 7.0 - 18 MG/DL    Creatinine 1.30 0.6 - 1.3 MG/DL    BUN/Creatinine ratio 14 12 - 20      eGFR 54 (L) >60 ml/min/1.73m2    Calcium 8.5 8.5 - 10.1 MG/DL    Bilirubin, total 0.3 0.2 - 1.0 MG/DL    ALT (SGPT) 19 13 - 56 U/L    AST (SGOT) 14 10 - 38 U/L    Alk.  phosphatase 92 45 - 117 U/L    Protein, total 6.8 6.4 - 8.2 g/dL    Albumin 2.2 (L) 3.4 - 5.0 g/dL    Globulin 4.6 (H) 2.0 - 4.0 g/dL    A-G Ratio 0.5 (L) 0.8 - 1.7     TROPONIN-HIGH SENSITIVITY    Collection Time: 01/01/23  9:03 PM   Result Value Ref Range    Troponin-High Sensitivity 13 0 - 54 ng/L   D DIMER    Collection Time: 01/01/23 9:03 PM   Result Value Ref Range    D DIMER 0.55 (H) <0.46 ug/ml(FEU)   HCG QL SERUM    Collection Time: 01/01/23  9:03 PM   Result Value Ref Range    HCG, Ql. Negative NEG     MAGNESIUM    Collection Time: 01/01/23  9:03 PM   Result Value Ref Range    Magnesium 2.3 1.6 - 2.6 mg/dL       Radiologic Studies -   CTA CHEST W OR W WO CONT   Final Result   No evidence for pulmonary emboli. Small amount groundglass in the medial right lower lobe. This suggests a mild   infectious or inflammatory process. XR CHEST PA LAT   Final Result      No acute pulmonary disease. CT Results  (Last 48 hours)                 01/01/23 2322  CTA CHEST W OR W WO CONT Final result    Impression:  No evidence for pulmonary emboli. Small amount groundglass in the medial right lower lobe. This suggests a mild   infectious or inflammatory process. Narrative:  CTA CHEST PULMONARY EMBOLISM PROTOCOL             INDICATION: Shortness of breath and chest pain. Question pulmonary embolism. TECHNIQUE: Thin collimation axial images obtained through the level of the   pulmonary arteries with additional imaging through the chest following the   uneventful administration of nonionic intravenous contrast.  Images   reconstructed into three dimensional coronal and sagittal projections for   complete evaluation of the tortuous and overlapping pulmonary vascular   structures and to reduce patient radiation dose. All CT scans at this facility are performed using dose optimization technique as   appropriate to a performed exam, to include automated exposure control,   adjustment of the mA and/or kV according to patient size (including appropriate   matching first site-specific examinations), or use of iterative reconstruction   technique. COMPARISON: November 28, 2022.        FINDINGS:       No filling defects are appreciated within the main, left, right, lobar or   visualized segmental pulmonary arteries to suggest embolism. Thyroid: Prior thyroidectomy   Pericardium/ Heart: No significant effusion. Aorta/ Vessels: No aneurysm or dissection. Lymph Nodes: Unremarkable. .       Lungs: Small amount groundglass in the medial right lower lobe. Upper Abdomen: Unremarkable. Bones/soft tissues: No acute finding. CXR Results  (Last 48 hours)                 01/01/23 2100  XR CHEST PA LAT Final result    Impression:      No acute pulmonary disease. Narrative:  TWO VIEW CHEST RADIOGRAPH        CPT CODE: 74912       INDICATION: Left-sided chest pain for one month, worse, hypertension. Negative   CT on 11/28/2022       COMPARISON: 11/28/2022       FINDINGS:        The cardiomediastinal silhouette is within normal limits. The pulmonary   vascular markings are unremarkable. There is no evidence of focal   consolidation, pleural effusion or pneumothorax. Evaluation of the osseous   structures demonstrates no focal abnormality. Medical Decision Making   I am the first provider for this patient. I reviewed the vital signs, available nursing notes, past medical history, past surgical history, family history and social history. Vital Signs-Reviewed the patient's vital signs. Records Reviewed: Nursing Notes    Procedures:  Procedures    Provider Notes (Medical Decision Making): Patient CTA was performed because she is having persistent chest pain particularly with breathing. There is no PE but there is a pneumonia. We will write for azithromycin for her comfort also add on Tylenol extra strength. She is not hypoxic. Plan for discharge and follow-up with her PCP. MED RECONCILIATION:  No current facility-administered medications for this encounter. Current Outpatient Medications   Medication Sig    azithromycin (Zithromax Z-Farooq) 250 mg tablet Take two tablets on day one; then take one tablet daily until gone.     acetaminophen (Tylenol Extra Strength) 500 mg tablet Take 2 Tablets by mouth every six (6) hours as needed for Pain. Compression Socks, X-Large misc 2 Units by Does Not Apply route daily. lidocaine (Lidoderm) 5 % Apply patch to the affected area for 12 hours a day and remove for 12 hours a day. acetaminophen (TYLENOL) 500 mg tablet Take 2 Tablets by mouth every six (6) hours as needed for Pain.    pantoprazole (PROTONIX) 40 mg tablet     Lantus U-100 Insulin 100 unit/mL injection     topiramate (TOPAMAX) 50 mg tablet     insulin aspart U-100 (NovoLOG U-100 Insulin aspart) 100 unit/mL injection by SubCUTAneous route. insulin glulisine U-100 (APIDRA SOLOSTAR U-100 INSULIN) 100 unit/mL pen 10 Units by SubCUTAneous route Before breakfast, lunch, and dinner. liothyronine (CYTOMEL) 25 mcg tablet Take 75 mcg by mouth daily. metFORMIN (GLUCOPHAGE) 1,000 mg tablet Take 1,000 mg by mouth two (2) times daily (with meals). metoprolol tartrate (LOPRESSOR) 25 mg tablet Take 25 mg by mouth daily. atorvastatin (LIPITOR) 40 mg tablet Take  by mouth daily. furosemide (LASIX) 20 mg tablet Take  by mouth daily. lisinopril (PRINIVIL, ZESTRIL) 5 mg tablet Take 40 mg by mouth daily. Disposition:  home    DISCHARGE NOTE:   12:41 AM    Pt has been reexamined. Patient has no new complaints, changes, or physical findings. Care plan outlined and precautions discussed. Results of labs, CXR, CTA were reviewed with the patient. All medications were reviewed with the patient; will d/c home with azithromycin, tylenol. All of pt's questions and concerns were addressed. Patient was instructed and agrees to follow up with PCP, as well as to return to the ED upon further deterioration. Patient is ready to go home.     Follow-up Information       Follow up With Specialties Details Why Contact Info    Shannan Caldwell MD Internal Medicine Physician Schedule an appointment as soon as possible for a visit in 1 day      [facility] EMERGENCY DEPT Emergency Medicine  If symptoms worsen return immediately [address] [facility] [address]  [phone]            Current Discharge Medication List        START taking these medications    Details   azithromycin (Zithromax Z-Farooq) 250 mg tablet Take two tablets on day one; then take one tablet daily until gone. Qty: 6 Tablet, Refills: 0  Start date: 1/2/2023, End date: 1/7/2023      !! acetaminophen (Tylenol Extra Strength) 500 mg tablet Take 2 Tablets by mouth every six (6) hours as needed for Pain. Qty: 24 Tablet, Refills: 0  Start date: 1/2/2023       !! - Potential duplicate medications found. Please discuss with provider. CONTINUE these medications which have NOT CHANGED    Details   !! acetaminophen (TYLENOL) 500 mg tablet Take 2 Tablets by mouth every six (6) hours as needed for Pain. Qty: 24 Tablet, Refills: 0       !! - Potential duplicate medications found. Please discuss with provider. Diagnosis     Clinical Impression:   1.  Community acquired pneumonia of right lower lobe of lung

## 2023-01-09 ENCOUNTER — HOSPITAL ENCOUNTER (OUTPATIENT)
Dept: LAB | Age: 38
Discharge: HOME | End: 2023-01-09

## 2023-01-09 LAB — XX-LABCORP SPECIMEN COL,LCBCF: NORMAL

## 2023-01-09 PROCEDURE — 99001 SPECIMEN HANDLING PT-LAB: CPT

## 2023-02-20 ENCOUNTER — HOSPITAL ENCOUNTER (OUTPATIENT)
Facility: HOSPITAL | Age: 38
Discharge: HOME | End: 2023-02-23

## 2023-02-20 LAB — LABCORP SPECIMEN COLLECTION: NORMAL

## 2023-02-20 PROCEDURE — 99001 SPECIMEN HANDLING PT-LAB: CPT

## 2023-03-04 ENCOUNTER — APPOINTMENT (OUTPATIENT)
Facility: HOSPITAL | Age: 38
DRG: 420 | End: 2023-03-04
Payer: MEDICAID

## 2023-03-04 ENCOUNTER — HOSPITAL ENCOUNTER (INPATIENT)
Facility: HOSPITAL | Age: 38
LOS: 13 days | Discharge: LEFT BEFORE DISCHARGE | DRG: 420 | End: 2023-03-17
Attending: EMERGENCY MEDICINE | Admitting: FAMILY MEDICINE
Payer: MEDICAID

## 2023-03-04 DIAGNOSIS — Z79.4 TYPE 2 DIABETES MELLITUS WITH HYPOGLYCEMIA WITHOUT COMA, WITH LONG-TERM CURRENT USE OF INSULIN (HCC): ICD-10-CM

## 2023-03-04 DIAGNOSIS — E03.5 MYXEDEMA COMA (HCC): ICD-10-CM

## 2023-03-04 DIAGNOSIS — E11.649 TYPE 2 DIABETES MELLITUS WITH HYPOGLYCEMIA WITHOUT COMA, WITH LONG-TERM CURRENT USE OF INSULIN (HCC): ICD-10-CM

## 2023-03-04 DIAGNOSIS — R06.01 ORTHOPNEA: ICD-10-CM

## 2023-03-04 DIAGNOSIS — E16.2 HYPOGLYCEMIA: ICD-10-CM

## 2023-03-04 DIAGNOSIS — M79.661 RIGHT CALF PAIN: ICD-10-CM

## 2023-03-04 DIAGNOSIS — R10.9 FLANK PAIN: Primary | ICD-10-CM

## 2023-03-04 DIAGNOSIS — G62.9 NEUROPATHY: ICD-10-CM

## 2023-03-04 LAB
ALBUMIN SERPL-MCNC: 2.3 G/DL (ref 3.4–5)
ALBUMIN/GLOB SERPL: 0.5 (ref 0.8–1.7)
ALP SERPL-CCNC: 77 U/L (ref 45–117)
ALT SERPL-CCNC: 24 U/L (ref 13–56)
ANION GAP SERPL CALC-SCNC: 3 MMOL/L (ref 3–18)
APPEARANCE UR: CLEAR
AST SERPL-CCNC: 25 U/L (ref 10–38)
B PERT DNA SPEC QL NAA+PROBE: NOT DETECTED
BACTERIA URNS QL MICRO: ABNORMAL /HPF
BASOPHILS # BLD: 0 K/UL (ref 0–0.1)
BASOPHILS NFR BLD: 0 % (ref 0–2)
BILIRUB SERPL-MCNC: 0.3 MG/DL (ref 0.2–1)
BILIRUB UR QL: NEGATIVE
BORDETELLA PARAPERTUSSIS BY PCR: NOT DETECTED
BUN SERPL-MCNC: 10 MG/DL (ref 7–18)
BUN/CREAT SERPL: 9 (ref 12–20)
C PNEUM DNA SPEC QL NAA+PROBE: NOT DETECTED
CALCIUM SERPL-MCNC: 8.5 MG/DL (ref 8.5–10.1)
CHLORIDE SERPL-SCNC: 109 MMOL/L (ref 100–111)
CO2 SERPL-SCNC: 26 MMOL/L (ref 21–32)
COLOR UR: YELLOW
CREAT SERPL-MCNC: 1.1 MG/DL (ref 0.6–1.3)
DIFFERENTIAL METHOD BLD: ABNORMAL
EKG ATRIAL RATE: 93 BPM
EKG DIAGNOSIS: NORMAL
EKG P AXIS: 70 DEGREES
EKG P-R INTERVAL: 146 MS
EKG Q-T INTERVAL: 368 MS
EKG QRS DURATION: 86 MS
EKG QTC CALCULATION (BAZETT): 457 MS
EKG R AXIS: 46 DEGREES
EKG T AXIS: 77 DEGREES
EKG VENTRICULAR RATE: 93 BPM
EOSINOPHIL # BLD: 0 K/UL (ref 0–0.4)
EOSINOPHIL NFR BLD: 0 % (ref 0–5)
EPITH CASTS URNS QL MICRO: ABNORMAL /LPF (ref 0–5)
ERYTHROCYTE [DISTWIDTH] IN BLOOD BY AUTOMATED COUNT: 14.6 % (ref 11.6–14.5)
FLUAV H1 2009 PAND RNA SPEC QL NAA+PROBE: NOT DETECTED
FLUAV H1 RNA SPEC QL NAA+PROBE: NOT DETECTED
FLUAV H3 RNA SPEC QL NAA+PROBE: NOT DETECTED
FLUAV SUBTYP SPEC NAA+PROBE: NOT DETECTED
FLUBV RNA SPEC QL NAA+PROBE: NOT DETECTED
GLOBULIN SER CALC-MCNC: 4.4 G/DL (ref 2–4)
GLUCOSE BLD STRIP.AUTO-MCNC: 44 MG/DL (ref 70–110)
GLUCOSE BLD STRIP.AUTO-MCNC: 45 MG/DL (ref 70–110)
GLUCOSE BLD STRIP.AUTO-MCNC: 53 MG/DL (ref 70–110)
GLUCOSE BLD STRIP.AUTO-MCNC: 64 MG/DL (ref 70–110)
GLUCOSE BLD STRIP.AUTO-MCNC: 64 MG/DL (ref 70–110)
GLUCOSE BLD STRIP.AUTO-MCNC: 66 MG/DL (ref 70–110)
GLUCOSE BLD STRIP.AUTO-MCNC: 71 MG/DL (ref 70–110)
GLUCOSE BLD STRIP.AUTO-MCNC: 98 MG/DL (ref 70–110)
GLUCOSE SERPL-MCNC: 71 MG/DL (ref 74–99)
GLUCOSE UR STRIP.AUTO-MCNC: NEGATIVE MG/DL
HADV DNA SPEC QL NAA+PROBE: NOT DETECTED
HCG UR QL: NEGATIVE
HCOV 229E RNA SPEC QL NAA+PROBE: NOT DETECTED
HCOV HKU1 RNA SPEC QL NAA+PROBE: NOT DETECTED
HCOV NL63 RNA SPEC QL NAA+PROBE: NOT DETECTED
HCOV OC43 RNA SPEC QL NAA+PROBE: NOT DETECTED
HCT VFR BLD AUTO: 39.2 % (ref 35–45)
HGB BLD-MCNC: 12.5 G/DL (ref 12–16)
HGB UR QL STRIP: ABNORMAL
HMPV RNA SPEC QL NAA+PROBE: NOT DETECTED
HPIV1 RNA SPEC QL NAA+PROBE: NOT DETECTED
HPIV2 RNA SPEC QL NAA+PROBE: NOT DETECTED
HPIV3 RNA SPEC QL NAA+PROBE: NOT DETECTED
HPIV4 RNA SPEC QL NAA+PROBE: NOT DETECTED
IMM GRANULOCYTES # BLD AUTO: 0.1 K/UL (ref 0–0.04)
IMM GRANULOCYTES NFR BLD AUTO: 1 % (ref 0–0.5)
KETONES UR QL STRIP.AUTO: NEGATIVE MG/DL
LEUKOCYTE ESTERASE UR QL STRIP.AUTO: NEGATIVE
LIPASE SERPL-CCNC: 110 U/L (ref 73–393)
LYMPHOCYTES # BLD: 3.5 K/UL (ref 0.9–3.6)
LYMPHOCYTES NFR BLD: 36 % (ref 21–52)
M PNEUMO DNA SPEC QL NAA+PROBE: NOT DETECTED
MCH RBC QN AUTO: 26.9 PG (ref 24–34)
MCHC RBC AUTO-ENTMCNC: 31.9 G/DL (ref 31–37)
MCV RBC AUTO: 84.5 FL (ref 78–100)
MONOCYTES # BLD: 0.6 K/UL (ref 0.05–1.2)
MONOCYTES NFR BLD: 7 % (ref 3–10)
NEUTS SEG # BLD: 5.5 K/UL (ref 1.8–8)
NEUTS SEG NFR BLD: 57 % (ref 40–73)
NITRITE UR QL STRIP.AUTO: NEGATIVE
NRBC # BLD: 0 K/UL (ref 0–0.01)
NRBC BLD-RTO: 0 PER 100 WBC
NT PRO BNP: 49 PG/ML (ref 0–450)
PH UR STRIP: 6.5 (ref 5–8)
PLATELET # BLD AUTO: 376 K/UL (ref 135–420)
PMV BLD AUTO: 9.5 FL (ref 9.2–11.8)
POTASSIUM SERPL-SCNC: 3.8 MMOL/L (ref 3.5–5.5)
PROT SERPL-MCNC: 6.7 G/DL (ref 6.4–8.2)
PROT UR STRIP-MCNC: >1000 MG/DL
RBC # BLD AUTO: 4.64 M/UL (ref 4.2–5.3)
RBC #/AREA URNS HPF: ABNORMAL /HPF (ref 0–5)
RSV RNA SPEC QL NAA+PROBE: NOT DETECTED
RV+EV RNA SPEC QL NAA+PROBE: NOT DETECTED
SARS-COV-2 RNA RESP QL NAA+PROBE: NOT DETECTED
SODIUM SERPL-SCNC: 138 MMOL/L (ref 136–145)
SP GR UR REFRACTOMETRY: 1.01 (ref 1–1.03)
TROPONIN I SERPL HS-MCNC: 11 NG/L (ref 0–54)
UROBILINOGEN UR QL STRIP.AUTO: 0.2 EU/DL (ref 0.2–1)
WBC # BLD AUTO: 9.8 K/UL (ref 4.6–13.2)
WBC URNS QL MICRO: ABNORMAL /HPF (ref 0–4)

## 2023-03-04 PROCEDURE — 71045 X-RAY EXAM CHEST 1 VIEW: CPT

## 2023-03-04 PROCEDURE — 83690 ASSAY OF LIPASE: CPT

## 2023-03-04 PROCEDURE — 93005 ELECTROCARDIOGRAM TRACING: CPT | Performed by: PHYSICIAN ASSISTANT

## 2023-03-04 PROCEDURE — 6370000000 HC RX 637 (ALT 250 FOR IP)

## 2023-03-04 PROCEDURE — 81025 URINE PREGNANCY TEST: CPT

## 2023-03-04 PROCEDURE — 71250 CT THORAX DX C-: CPT

## 2023-03-04 PROCEDURE — 82962 GLUCOSE BLOOD TEST: CPT

## 2023-03-04 PROCEDURE — 87086 URINE CULTURE/COLONY COUNT: CPT

## 2023-03-04 PROCEDURE — 6370000000 HC RX 637 (ALT 250 FOR IP): Performed by: PHYSICIAN ASSISTANT

## 2023-03-04 PROCEDURE — 94761 N-INVAS EAR/PLS OXIMETRY MLT: CPT

## 2023-03-04 PROCEDURE — 81001 URINALYSIS AUTO W/SCOPE: CPT

## 2023-03-04 PROCEDURE — 0202U NFCT DS 22 TRGT SARS-COV-2: CPT

## 2023-03-04 PROCEDURE — 2580000003 HC RX 258

## 2023-03-04 PROCEDURE — 2140000001 HC CVICU INTERMEDIATE R&B

## 2023-03-04 PROCEDURE — 96374 THER/PROPH/DIAG INJ IV PUSH: CPT | Performed by: EMERGENCY MEDICINE

## 2023-03-04 PROCEDURE — 96375 TX/PRO/DX INJ NEW DRUG ADDON: CPT | Performed by: EMERGENCY MEDICINE

## 2023-03-04 PROCEDURE — 85025 COMPLETE CBC W/AUTO DIFF WBC: CPT

## 2023-03-04 PROCEDURE — 84484 ASSAY OF TROPONIN QUANT: CPT

## 2023-03-04 PROCEDURE — 6360000002 HC RX W HCPCS: Performed by: PHYSICIAN ASSISTANT

## 2023-03-04 PROCEDURE — 99285 EMERGENCY DEPT VISIT HI MDM: CPT | Performed by: EMERGENCY MEDICINE

## 2023-03-04 PROCEDURE — 83880 ASSAY OF NATRIURETIC PEPTIDE: CPT

## 2023-03-04 PROCEDURE — 80053 COMPREHEN METABOLIC PANEL: CPT

## 2023-03-04 PROCEDURE — 2580000003 HC RX 258: Performed by: PHYSICIAN ASSISTANT

## 2023-03-04 RX ORDER — SODIUM CHLORIDE 9 MG/ML
INJECTION, SOLUTION INTRAVENOUS PRN
Status: DISCONTINUED | OUTPATIENT
Start: 2023-03-04 | End: 2023-03-17 | Stop reason: HOSPADM

## 2023-03-04 RX ORDER — ACETAMINOPHEN 325 MG/1
650 TABLET ORAL EVERY 6 HOURS PRN
Status: DISCONTINUED | OUTPATIENT
Start: 2023-03-04 | End: 2023-03-17 | Stop reason: HOSPADM

## 2023-03-04 RX ORDER — DEXTROSE MONOHYDRATE 100 MG/ML
INJECTION, SOLUTION INTRAVENOUS CONTINUOUS
Status: DISCONTINUED | OUTPATIENT
Start: 2023-03-04 | End: 2023-03-05

## 2023-03-04 RX ORDER — SODIUM CHLORIDE 0.9 % (FLUSH) 0.9 %
5-40 SYRINGE (ML) INJECTION EVERY 12 HOURS SCHEDULED
Status: DISCONTINUED | OUTPATIENT
Start: 2023-03-04 | End: 2023-03-17 | Stop reason: HOSPADM

## 2023-03-04 RX ORDER — DEXTROSE AND SODIUM CHLORIDE 5; .45 G/100ML; G/100ML
INJECTION, SOLUTION INTRAVENOUS CONTINUOUS
Status: DISCONTINUED | OUTPATIENT
Start: 2023-03-04 | End: 2023-03-04

## 2023-03-04 RX ORDER — ENOXAPARIN SODIUM 100 MG/ML
40 INJECTION SUBCUTANEOUS DAILY
Status: DISCONTINUED | OUTPATIENT
Start: 2023-03-05 | End: 2023-03-06

## 2023-03-04 RX ORDER — MORPHINE SULFATE 4 MG/ML
4 INJECTION, SOLUTION INTRAMUSCULAR; INTRAVENOUS
Status: COMPLETED | OUTPATIENT
Start: 2023-03-04 | End: 2023-03-04

## 2023-03-04 RX ORDER — POLYETHYLENE GLYCOL 3350 17 G/17G
17 POWDER, FOR SOLUTION ORAL DAILY PRN
Status: DISCONTINUED | OUTPATIENT
Start: 2023-03-04 | End: 2023-03-17 | Stop reason: HOSPADM

## 2023-03-04 RX ORDER — PANTOPRAZOLE SODIUM 40 MG/1
40 TABLET, DELAYED RELEASE ORAL
Status: DISCONTINUED | OUTPATIENT
Start: 2023-03-05 | End: 2023-03-17 | Stop reason: HOSPADM

## 2023-03-04 RX ORDER — ASPIRIN 81 MG/1
81 TABLET, CHEWABLE ORAL DAILY
Status: DISCONTINUED | OUTPATIENT
Start: 2023-03-05 | End: 2023-03-17 | Stop reason: HOSPADM

## 2023-03-04 RX ORDER — ASPIRIN 81 MG/1
81 TABLET, CHEWABLE ORAL DAILY
Status: DISCONTINUED | OUTPATIENT
Start: 2023-03-05 | End: 2023-03-04

## 2023-03-04 RX ORDER — INSULIN LISPRO 100 [IU]/ML
10 INJECTION, SOLUTION INTRAVENOUS; SUBCUTANEOUS
Status: DISCONTINUED | OUTPATIENT
Start: 2023-03-05 | End: 2023-03-11

## 2023-03-04 RX ORDER — ONDANSETRON 2 MG/ML
4 INJECTION INTRAMUSCULAR; INTRAVENOUS EVERY 6 HOURS PRN
Status: DISCONTINUED | OUTPATIENT
Start: 2023-03-04 | End: 2023-03-17 | Stop reason: HOSPADM

## 2023-03-04 RX ORDER — ATORVASTATIN CALCIUM 40 MG/1
40 TABLET, FILM COATED ORAL DAILY
Status: DISCONTINUED | OUTPATIENT
Start: 2023-03-05 | End: 2023-03-04

## 2023-03-04 RX ORDER — PANTOPRAZOLE SODIUM 40 MG/1
40 TABLET, DELAYED RELEASE ORAL
Status: DISCONTINUED | OUTPATIENT
Start: 2023-03-05 | End: 2023-03-04

## 2023-03-04 RX ORDER — GABAPENTIN 400 MG/1
400 CAPSULE ORAL 3 TIMES DAILY
Status: DISCONTINUED | OUTPATIENT
Start: 2023-03-04 | End: 2023-03-04

## 2023-03-04 RX ORDER — TOPIRAMATE 25 MG/1
50 TABLET ORAL 2 TIMES DAILY
Status: DISCONTINUED | OUTPATIENT
Start: 2023-03-04 | End: 2023-03-17 | Stop reason: HOSPADM

## 2023-03-04 RX ORDER — ACETAMINOPHEN 500 MG
1000 TABLET ORAL EVERY 6 HOURS PRN
Status: DISCONTINUED | OUTPATIENT
Start: 2023-03-04 | End: 2023-03-04

## 2023-03-04 RX ORDER — INSULIN GLARGINE 100 [IU]/ML
90 INJECTION, SOLUTION SUBCUTANEOUS 2 TIMES DAILY
Status: DISCONTINUED | OUTPATIENT
Start: 2023-03-04 | End: 2023-03-11

## 2023-03-04 RX ORDER — SODIUM CHLORIDE 0.9 % (FLUSH) 0.9 %
5-40 SYRINGE (ML) INJECTION PRN
Status: DISCONTINUED | OUTPATIENT
Start: 2023-03-04 | End: 2023-03-17 | Stop reason: HOSPADM

## 2023-03-04 RX ORDER — FUROSEMIDE 10 MG/ML
40 INJECTION INTRAMUSCULAR; INTRAVENOUS
Status: COMPLETED | OUTPATIENT
Start: 2023-03-04 | End: 2023-03-04

## 2023-03-04 RX ORDER — ATORVASTATIN CALCIUM 40 MG/1
40 TABLET, FILM COATED ORAL DAILY
Status: DISCONTINUED | OUTPATIENT
Start: 2023-03-05 | End: 2023-03-17 | Stop reason: HOSPADM

## 2023-03-04 RX ORDER — LEVOTHYROXINE SODIUM 0.1 MG/1
100 TABLET ORAL DAILY
Status: DISCONTINUED | OUTPATIENT
Start: 2023-03-05 | End: 2023-03-05

## 2023-03-04 RX ORDER — ACETAMINOPHEN 650 MG/1
650 SUPPOSITORY RECTAL EVERY 6 HOURS PRN
Status: DISCONTINUED | OUTPATIENT
Start: 2023-03-04 | End: 2023-03-17 | Stop reason: HOSPADM

## 2023-03-04 RX ORDER — ONDANSETRON 4 MG/1
4 TABLET, ORALLY DISINTEGRATING ORAL EVERY 8 HOURS PRN
Status: DISCONTINUED | OUTPATIENT
Start: 2023-03-04 | End: 2023-03-17 | Stop reason: HOSPADM

## 2023-03-04 RX ORDER — GABAPENTIN 400 MG/1
400 CAPSULE ORAL 3 TIMES DAILY
Status: DISCONTINUED | OUTPATIENT
Start: 2023-03-04 | End: 2023-03-05

## 2023-03-04 RX ORDER — DEXTROSE MONOHYDRATE 100 MG/ML
INJECTION, SOLUTION INTRAVENOUS CONTINUOUS PRN
Status: DISCONTINUED | OUTPATIENT
Start: 2023-03-04 | End: 2023-03-11 | Stop reason: SDUPTHER

## 2023-03-04 RX ORDER — TOPIRAMATE 25 MG/1
50 TABLET ORAL 2 TIMES DAILY
Status: DISCONTINUED | OUTPATIENT
Start: 2023-03-04 | End: 2023-03-04

## 2023-03-04 RX ORDER — LEVOTHYROXINE SODIUM 0.1 MG/1
100 TABLET ORAL DAILY
Status: DISCONTINUED | OUTPATIENT
Start: 2023-03-05 | End: 2023-03-04

## 2023-03-04 RX ADMIN — DEXTROSE MONOHYDRATE: 100 INJECTION, SOLUTION INTRAVENOUS at 21:59

## 2023-03-04 RX ADMIN — DEXTROSE MONOHYDRATE 250 ML: 100 INJECTION, SOLUTION INTRAVENOUS at 21:59

## 2023-03-04 RX ADMIN — TOPIRAMATE 50 MG: 100 TABLET, FILM COATED ORAL at 23:30

## 2023-03-04 RX ADMIN — GABAPENTIN 400 MG: 400 CAPSULE ORAL at 23:30

## 2023-03-04 RX ADMIN — MORPHINE SULFATE 4 MG: 4 INJECTION, SOLUTION INTRAMUSCULAR; INTRAVENOUS at 16:11

## 2023-03-04 RX ADMIN — SODIUM CHLORIDE, PRESERVATIVE FREE 10 ML: 5 INJECTION INTRAVENOUS at 23:44

## 2023-03-04 RX ADMIN — Medication 16 G: at 20:21

## 2023-03-04 RX ADMIN — DEXTROSE MONOHYDRATE 125 ML: 10 INJECTION, SOLUTION INTRAVENOUS at 21:02

## 2023-03-04 RX ADMIN — FUROSEMIDE 40 MG: 10 INJECTION, SOLUTION INTRAMUSCULAR; INTRAVENOUS at 16:11

## 2023-03-04 RX ADMIN — DEXTROSE MONOHYDRATE: 10 INJECTION, SOLUTION INTRAVENOUS at 22:30

## 2023-03-04 ASSESSMENT — ENCOUNTER SYMPTOMS
COUGH: 1
VOMITING: 0
ABDOMINAL PAIN: 0
EYE REDNESS: 0
NAUSEA: 0
WHEEZING: 0
SORE THROAT: 0
SHORTNESS OF BREATH: 1
RHINORRHEA: 0
BACK PAIN: 0
EYE DISCHARGE: 0
STRIDOR: 0

## 2023-03-04 NOTE — ED PROVIDER NOTES
EMERGENCY DEPARTMENT HISTORY AND PHYSICAL EXAM    Date: 3/4/2023  Patient Name: Darci Kingston    History of Presenting Illness     Chief Complaint   Patient presents with    Shortness of Breath    Flank Pain         History Provided By: patient     Chief Complaint: SOB, flank pain   Duration: 1 week   Timing:  acute   Location: R flank, chest   Quality: tightness   Severity: moderate   Modifying Factors: none   Associated Symptoms: leg swelling      Additional History (Context): Darci Kingston is a 44 y.o. female with PMH hypertension, seizures, and diabetes who presents with complaints of 1 week of increased leg swelling, shortness of breath worse on exertion and with lying flat as well as right-sided flank pain. Patient denies urinary symptoms and abdominal complaints. No known sick exposures. Denies taking any medications for symptoms prior to arrival.  No other complaints reported at this time.     PCP: Jacklyn Nguyen MD    Current Facility-Administered Medications   Medication Dose Route Frequency Provider Last Rate Last Admin    glucose chewable tablet 16 g  4 tablet Oral PRN Nanci Dejesus PA-C   16 g at 03/04/23 2021    dextrose bolus 10% 125 mL  125 mL IntraVENous PRN Nanci Dejesus PA-C        Or    dextrose bolus 10% 250 mL  250 mL IntraVENous PRN Nanci Dejesus PA-C 937.5 mL/hr at 03/04/23 2159 250 mL at 03/04/23 2159    glucagon (rDNA) injection 1 mg  1 mg SubCUTAneous PRN Nanci Dejesus PA-C        dextrose 10 % infusion   IntraVENous Continuous PRN Nanci Dejesus PA-C 100 mL/hr at 03/04/23 2159 New Bag at 03/04/23 2159    dextrose 10 % infusion   IntraVENous Continuous Ron Maya MD         Current Outpatient Medications   Medication Sig Dispense Refill    acetaminophen (TYLENOL) 500 MG tablet Take 1,000 mg by mouth every 6 hours as needed      atorvastatin (LIPITOR) 40 MG tablet Take by mouth daily      furosemide (LASIX) 20 MG tablet Take by mouth daily      insulin aspart (NOVOLOG) 100 UNIT/ML injection vial Inject into the skin      insulin glargine (LANTUS) 100 UNIT/ML injection vial ceived the following from [facility]: Outside name: Lantus U-100 Insulin 100 unit/mL injection      insulin glulisine (APIDRA) 100 UNIT/ML injection pen Inject 10 Units into the skin 3 times daily (before meals)      lidocaine (LIDODERM) 5 % Apply patch to the affected area for 12 hours a day and remove for 12 hours a day.      liothyronine (CYTOMEL) 25 MCG tablet Take 75 mcg by mouth daily      lisinopril (PRINIVIL;ZESTRIL) 5 MG tablet Take 40 mg by mouth daily      metFORMIN (GLUCOPHAGE) 1000 MG tablet Take 1,000 mg by mouth 2 times daily (with meals)      metoprolol tartrate (LOPRESSOR) 25 MG tablet Take 25 mg by mouth daily      pantoprazole (PROTONIX) 40 MG tablet ceived the following from [facility]: Outside name: pantoprazole (PROTONIX) 40 mg tablet      topiramate (TOPAMAX) 50 MG tablet ceived the following from [facility]: Outside name: topiramate (TOPAMAX) 50 mg tablet         Past History     Past Medical History:  Past Medical History:   Diagnosis Date    Arthritis     Chest pain     Diabetes (HCC)     Essential hypertension     Headache(784.0)     Hyperchloremia     Hypertension     Seizures (HCC)     8/2020 last seizure    Seizures (HCC)     SOB (shortness of breath)     Thyroid cancer (HCC)        Past Surgical History:  Past Surgical History:   Procedure Laterality Date    CT BIOPSY RENAL  7/30/2020    CT BIOPSY RENAL 7/30/2020 [facility] RAD CT    PARTIAL HYSTERECTOMY (CERVIX NOT REMOVED)      THYROIDECTOMY      TUBAL LIGATION         Family History:  Family History   Problem Relation Age of Onset    Hypertension Mother        Social History:  Social History     Tobacco Use    Smoking status: Former    Smokeless tobacco: Never   Substance Use Topics    Alcohol use: No    Drug use: No       Allergies:  No Known Allergies      Review of  Systems   Review of Systems   Constitutional:  Negative for chills and fever. HENT:  Negative for congestion, rhinorrhea and sore throat. Eyes:  Negative for discharge and redness. Respiratory:  Positive for cough and shortness of breath. Negative for wheezing and stridor. Orthopnea   Cardiovascular:  Positive for leg swelling. Negative for chest pain. Gastrointestinal:  Negative for abdominal pain, nausea and vomiting. Genitourinary:  Positive for flank pain. Negative for dysuria and frequency. Musculoskeletal:  Negative for back pain and neck pain. Skin:  Negative for rash and wound. Neurological:  Negative for seizures, syncope and headaches. All other systems reviewed and are negative. All Other Systems Negative  Physical Exam     Vitals:    03/04/23 1615 03/04/23 1700 03/04/23 1800 03/04/23 2100   BP: (!) 144/88 (!) 142/79 114/64 125/73   Pulse: 100 97 93 94   Resp: 14 17 12 15   Temp:       TempSrc:       SpO2: 100% 99% 98% 99%     Physical Exam  Vitals and nursing note reviewed. Constitutional:       General: She is in acute distress. Appearance: Normal appearance. She is obese. She is not ill-appearing, toxic-appearing or diaphoretic. Comments: Moderately distressed   HENT:      Head: Normocephalic and atraumatic. Mouth/Throat:      Mouth: Mucous membranes are moist.   Eyes:      General: No scleral icterus. Right eye: No discharge. Left eye: No discharge. Conjunctiva/sclera: Conjunctivae normal.   Cardiovascular:      Rate and Rhythm: Normal rate and regular rhythm. Heart sounds: No murmur heard. Pulmonary:      Effort: Pulmonary effort is normal. No respiratory distress. Breath sounds: Normal breath sounds. No stridor. No wheezing or rhonchi. Abdominal:      General: Bowel sounds are normal. There is no distension. Palpations: Abdomen is soft. Tenderness: There is no abdominal tenderness. There is right CVA tenderness. There is no left CVA tenderness or guarding. Musculoskeletal:         General: No deformity or signs of injury. Normal range of motion. Cervical back: Normal range of motion and neck supple. Right lower leg: Edema present. Left lower leg: Edema present. Comments: Mild nonpitting BLE edema noted, intact distal pulses   Skin:     General: Skin is warm and dry. Findings: No rash. Neurological:      General: No focal deficit present. Mental Status: She is alert and oriented to person, place, and time. Mental status is at baseline. Motor: No weakness. Comments: No focal neurological deficit noted. No facial droop, slurred speech, or evidence of altered mentation noted on exam.     Psychiatric:         Mood and Affect: Mood normal.         Behavior: Behavior normal.         Thought Content:  Thought content normal.              Diagnostic Study Results     Labs -     Recent Results (from the past 12 hour(s))   EKG 12 Lead    Collection Time: 03/04/23  2:37 PM   Result Value Ref Range    Ventricular Rate 93 BPM    Atrial Rate 93 BPM    P-R Interval 146 ms    QRS Duration 86 ms    Q-T Interval 368 ms    QTc Calculation (Bazett) 457 ms    P Axis 70 degrees    R Axis 46 degrees    T Axis 77 degrees    Diagnosis Normal sinus rhythm    Troponin    Collection Time: 03/04/23  2:38 PM   Result Value Ref Range    Troponin, High Sensitivity 11 0 - 54 ng/L   Brain Natriuretic Peptide    Collection Time: 03/04/23  2:38 PM   Result Value Ref Range    NT Pro-BNP 49 0 - 450 PG/ML   CBC with Auto Differential    Collection Time: 03/04/23  2:38 PM   Result Value Ref Range    WBC 9.8 4.6 - 13.2 K/uL    RBC 4.64 4.20 - 5.30 M/uL    Hemoglobin 12.5 12.0 - 16.0 g/dL    Hematocrit 39.2 35.0 - 45.0 %    MCV 84.5 78.0 - 100.0 FL    MCH 26.9 24.0 - 34.0 PG    MCHC 31.9 31.0 - 37.0 g/dL    RDW 14.6 (H) 11.6 - 14.5 %    Platelets 546 960 - 018 K/uL    MPV 9.5 9.2 - 11.8 FL    Nucleated RBCs 0.0 0  WBC    nRBC 0.00 0.00 - 0.01 K/uL    Seg Neutrophils 57 40 - 73 %    Lymphocytes 36 21 - 52 %    Monocytes 7 3 - 10 %    Eosinophils % 0 0 - 5 %    Basophils 0 0 - 2 %    Immature Granulocytes 1 (H) 0.0 - 0.5 %    Segs Absolute 5.5 1.8 - 8.0 K/UL    Absolute Lymph # 3.5 0.9 - 3.6 K/UL    Absolute Mono # 0.6 0.05 - 1.2 K/UL    Absolute Eos # 0.0 0.0 - 0.4 K/UL    Basophils Absolute 0.0 0.0 - 0.1 K/UL    Absolute Immature Granulocyte 0.1 (H) 0.00 - 0.04 K/UL    Differential Type AUTOMATED     Comprehensive Metabolic Panel    Collection Time: 03/04/23  2:38 PM   Result Value Ref Range    Sodium 138 136 - 145 mmol/L    Potassium 3.8 3.5 - 5.5 mmol/L    Chloride 109 100 - 111 mmol/L    CO2 26 21 - 32 mmol/L    Anion Gap 3 3.0 - 18 mmol/L    Glucose 71 (L) 74 - 99 mg/dL    BUN 10 7.0 - 18 MG/DL    Creatinine 1.10 0.6 - 1.3 MG/DL    Bun/Cre Ratio 9 (L) 12 - 20      Est, Glom Filt Rate >60 >60 ml/min/1.73m2    Calcium 8.5 8.5 - 10.1 MG/DL    Total Bilirubin 0.3 0.2 - 1.0 MG/DL    ALT 24 13 - 56 U/L    AST 25 10 - 38 U/L    Alk Phosphatase 77 45 - 117 U/L    Total Protein 6.7 6.4 - 8.2 g/dL    Albumin 2.3 (L) 3.4 - 5.0 g/dL    Globulin 4.4 (H) 2.0 - 4.0 g/dL    Albumin/Globulin Ratio 0.5 (L) 0.8 - 1.7     Urinalysis    Collection Time: 03/04/23  3:48 PM   Result Value Ref Range    Color, UA YELLOW      Appearance CLEAR      Specific Gravity, UA 1.012 1.005 - 1.030      pH, Urine 6.5 5.0 - 8.0      Protein, UA >1000 (A) NEG mg/dL    Glucose, UA Negative NEG mg/dL    Ketones, Urine Negative NEG mg/dL    Bilirubin Urine Negative NEG      Blood, Urine SMALL (A) NEG      Urobilinogen, Urine 0.2 0.2 - 1.0 EU/dL    Nitrite, Urine Negative NEG      Leukocyte Esterase, Urine Negative NEG     Pregnancy, Urine    Collection Time: 03/04/23  3:48 PM   Result Value Ref Range    HCG(Urine) Pregnancy Test Negative NEG     Urinalysis, Micro    Collection Time: 03/04/23  3:48 PM   Result Value Ref Range    WBC, UA NONE 0 - 4 /hpf    RBC, UA 0 to 2 0 - 5 /hpf    Epithelial Cells UA FEW 0 - 5 /lpf    BACTERIA, URINE FEW (A) NEG /hpf   Respiratory Panel, Molecular, with COVID-19 (Restricted: peds pts or suitable admitted adults)    Collection Time: 03/04/23  4:15 PM    Specimen: Nasopharyngeal   Result Value Ref Range    Adenovirus by PCR Not detected NOTD      Coronavirus 229E by PCR Not detected NOTD      Coronavirus HKU1 by PCR Not detected NOTD      Coronavirus NL63 by PCR Not detected NOTD      Coronavirus OC43 by PCR Not detected NOTD      SARS-CoV-2, PCR Not detected NOTD      Human Metapneumovirus by PCR Not detected NOTD      Rhinovirus Enterovirus PCR Not detected NOTD      Influenza A by PCR Not detected NOTD      Influenza A H1 by PCR Not detected NOTD      Influenza A H3 by PCR Not detected NOTD      Influenza A H1-2009 by PCR Not detected NOTD      Influenza B PCR Not detected NOTD      Parainfluenza 1 PCR Not detected NOTD      Parainfluenza 2 PCR Not detected NOTD      Parainfluenza 3 PCR Not detected NOTD      Parainfluenza 4 PCR Not detected NOTD      Respiratory Syncytial Virus by PCR Not detected NOTD      Bordetella parapertussis by PCR Not detected NOTD      Bordetella pertussis by PCR Not detected NOTD      Chlamydophila Pneumonia PCR Not detected NOTD      Mycoplasma pneumo by PCR Not detected NOTD     POCT Glucose    Collection Time: 03/04/23  4:34 PM   Result Value Ref Range    POC Glucose 53 (LL) 70 - 110 mg/dL   POCT Glucose    Collection Time: 03/04/23  4:35 PM   Result Value Ref Range    POC Glucose 53 (LL) 70 - 110 mg/dL   POCT Glucose    Collection Time: 03/04/23  5:16 PM   Result Value Ref Range    POC Glucose 71 70 - 110 mg/dL   POCT Glucose    Collection Time: 03/04/23  6:30 PM   Result Value Ref Range    POC Glucose 64 (L) 70 - 110 mg/dL   POCT Glucose    Collection Time: 03/04/23  8:11 PM   Result Value Ref Range    POC Glucose 53 (LL) 70 - 110 mg/dL   POCT Glucose    Collection Time: 03/04/23  8:44 PM   Result Value Ref Range    POC Glucose 64 (L) 70 - 110 mg/dL   POCT Glucose    Collection Time: 03/04/23  9:51 PM   Result Value Ref Range    POC Glucose 45 (LL) 70 - 110 mg/dL       Radiologic Studies -   XR CHEST PORTABLE   Final Result      No evidence of acute pulmonary disease. [unfilled]  @[email or web address]@      Medical Decision Making   I am the first provider for this patient. I reviewed the vital signs, available nursing notes, past medical history, past surgical history, family history and social history. Vital Signs-Reviewed the patient's vital signs. Records Reviewed:  Nanci Dejesus PA-C     Procedures:  Procedures    Provider Notes (Medical Decision Making): Impression: flank pain, hypoglycemia, SOB, orthopnea       Glucose 71 metabolic panel otherwise unremarkable, patient was given oral juice and peanut butter/jelly sandwich. Repeat glucose improving. Albumin 2.3, CBC unremarkable, troponin and pro bnp normal, respiratory viral panel negative. Chest x-ray negative for definite acute process. EKG: normal sinus, rate 93, no STEMI , no acute changed when compared to prior   Single dose of lasix given in the ED as well as morphine for pain control   Pt fed multiple times in the ED but continues to experience rebound hypoglycemia. Hypoglycemia protocol ordered as well as IP consult for diabetes management. PFM resident consulted, agrees to accept the pt for admission. Nanci Dejesus PA-C       MED RECONCILIATION:  Current Facility-Administered Medications   Medication Dose Route Frequency    glucose chewable tablet 16 g  4 tablet Oral PRN    dextrose bolus 10% 125 mL  125 mL IntraVENous PRN    Or    dextrose bolus 10% 250 mL  250 mL IntraVENous PRN    glucagon (rDNA) injection 1 mg  1 mg SubCUTAneous PRN    dextrose 10 % infusion   IntraVENous Continuous PRN    dextrose 10 % infusion   IntraVENous Continuous     Current Outpatient Medications   Medication Sig    acetaminophen (TYLENOL) 500 MG tablet Take 1,000 mg by mouth every 6 hours as needed    atorvastatin (LIPITOR) 40 MG tablet Take by mouth daily    furosemide (LASIX) 20 MG tablet Take by mouth daily    insulin aspart (NOVOLOG) 100 UNIT/ML injection vial Inject into the skin    insulin glargine (LANTUS) 100 UNIT/ML injection vial ceived the following from [facility]: Outside name: Lantus U-100 Insulin 100 unit/mL injection    insulin glulisine (APIDRA) 100 UNIT/ML injection pen Inject 10 Units into the skin 3 times daily (before meals)    lidocaine (LIDODERM) 5 % Apply patch to the affected area for 12 hours a day and remove for 12 hours a day.     liothyronine (CYTOMEL) 25 MCG tablet Take 75 mcg by mouth daily    lisinopril (PRINIVIL;ZESTRIL) 5 MG tablet Take 40 mg by mouth daily    metFORMIN (GLUCOPHAGE) 1000 MG tablet Take 1,000 mg by mouth 2 times daily (with meals)    metoprolol tartrate (LOPRESSOR) 25 MG tablet Take 25 mg by mouth daily    pantoprazole (PROTONIX) 40 MG tablet ceived the following from [facility]: Outside name: pantoprazole (PROTONIX) 40 mg tablet    topiramate (TOPAMAX) 50 MG tablet ceived the following from [facility]: Outside name: topiramate (TOPAMAX) 50 mg tablet       Disposition:  admitted       Medication List        ASK your doctor about these medications      acetaminophen 500 MG tablet  Commonly known as: TYLENOL     atorvastatin 40 MG tablet  Commonly known as: LIPITOR     furosemide 20 MG tablet  Commonly known as: LASIX     insulin aspart 100 UNIT/ML injection vial  Commonly known as: NOVOLOG     insulin glulisine 100 UNIT/ML injection pen  Commonly known as: APIDRA     Lantus 100 UNIT/ML injection vial  Generic drug: insulin glargine     lidocaine 5 %  Commonly known as: LIDODERM     liothyronine 25 MCG tablet  Commonly known as: CYTOMEL     lisinopril 5 MG tablet  Commonly known as: PRINIVIL;ZESTRIL     metFORMIN 1000 MG tablet  Commonly known as: GLUCOPHAGE     metoprolol tartrate 25 MG tablet  Commonly known as: LOPRESSOR     pantoprazole 40 MG tablet  Commonly known as: PROTONIX     topiramate 50 MG tablet  Commonly known as: TOPAMAX                  Diagnosis     Clinical Impression:   1. Flank pain    2. Orthopnea    3.  Hypoglycemia                 [place], [place]  03/04/23 3593

## 2023-03-04 NOTE — ED TRIAGE NOTES
Pt c/c SOB and R flank pain x 1 week. Denies cardiac/respiratory hx. Denies urinary sx. Good respiratory effort w/ 99% sat on RA noted.

## 2023-03-04 NOTE — DISCHARGE INSTRUCTIONS
Your appointments with Mohan Gleason are:  3/20 at 2 PM with Dr. Anders Carmen  3/27 at 2pm with Dr. Sheldon Naranjo  4/11 at 2:30 PM with Dr. Royal Schaefer  4/24 at 2pm with Dr. Royal Schaefer  5/15 at 2pm with Dr. Royal Schaefer    Please call Dr. Danya Garcia office to schedule an appointment as soon as possible. Do not take any medications that lower blood sugar: no insulin, no metformin, none of the once-a-week medicine  For blood pressure, take lisinopril, but hold off on amlodipine, furosemide, and metoprolol  Take 16g of glucose (4 tablets) every 4 hours, even at night.   Take a carb-heavy meal before bed  Return to the emergency department for persistent low blood sugar  Have friends and family to check on you frequently and take you to the emergency department if you do not wake up

## 2023-03-05 ENCOUNTER — APPOINTMENT (OUTPATIENT)
Facility: HOSPITAL | Age: 38
DRG: 420 | End: 2023-03-05
Payer: MEDICAID

## 2023-03-05 LAB
ANION GAP SERPL CALC-SCNC: 3 MMOL/L (ref 3–18)
ANION GAP SERPL CALC-SCNC: 5 MMOL/L (ref 3–18)
B-OH-BUTYR SERPL-SCNC: 0.07 MMOL/L
BUN SERPL-MCNC: 11 MG/DL (ref 7–18)
BUN SERPL-MCNC: 11 MG/DL (ref 7–18)
BUN/CREAT SERPL: 10 (ref 12–20)
BUN/CREAT SERPL: 8 (ref 12–20)
CA-I SERPL-SCNC: 1.08 MMOL/L (ref 1.15–1.33)
CALCIUM SERPL-MCNC: 8.3 MG/DL (ref 8.5–10.1)
CALCIUM SERPL-MCNC: 8.3 MG/DL (ref 8.5–10.1)
CHLORIDE SERPL-SCNC: 108 MMOL/L (ref 100–111)
CHLORIDE SERPL-SCNC: 109 MMOL/L (ref 100–111)
CO2 SERPL-SCNC: 22 MMOL/L (ref 21–32)
CO2 SERPL-SCNC: 26 MMOL/L (ref 21–32)
CREAT SERPL-MCNC: 1.09 MG/DL (ref 0.6–1.3)
CREAT SERPL-MCNC: 1.44 MG/DL (ref 0.6–1.3)
ERYTHROCYTE [DISTWIDTH] IN BLOOD BY AUTOMATED COUNT: 14.6 % (ref 11.6–14.5)
GLUCOSE BLD STRIP.AUTO-MCNC: 101 MG/DL (ref 70–110)
GLUCOSE BLD STRIP.AUTO-MCNC: 106 MG/DL (ref 70–110)
GLUCOSE BLD STRIP.AUTO-MCNC: 107 MG/DL (ref 70–110)
GLUCOSE BLD STRIP.AUTO-MCNC: 108 MG/DL (ref 70–110)
GLUCOSE BLD STRIP.AUTO-MCNC: 113 MG/DL (ref 70–110)
GLUCOSE BLD STRIP.AUTO-MCNC: 114 MG/DL (ref 70–110)
GLUCOSE BLD STRIP.AUTO-MCNC: 41 MG/DL (ref 70–110)
GLUCOSE BLD STRIP.AUTO-MCNC: 45 MG/DL (ref 70–110)
GLUCOSE BLD STRIP.AUTO-MCNC: 48 MG/DL (ref 70–110)
GLUCOSE BLD STRIP.AUTO-MCNC: 52 MG/DL (ref 70–110)
GLUCOSE BLD STRIP.AUTO-MCNC: 52 MG/DL (ref 70–110)
GLUCOSE BLD STRIP.AUTO-MCNC: 56 MG/DL (ref 70–110)
GLUCOSE BLD STRIP.AUTO-MCNC: 56 MG/DL (ref 70–110)
GLUCOSE BLD STRIP.AUTO-MCNC: 59 MG/DL (ref 70–110)
GLUCOSE BLD STRIP.AUTO-MCNC: 59 MG/DL (ref 70–110)
GLUCOSE BLD STRIP.AUTO-MCNC: 61 MG/DL (ref 70–110)
GLUCOSE BLD STRIP.AUTO-MCNC: 62 MG/DL (ref 70–110)
GLUCOSE BLD STRIP.AUTO-MCNC: 63 MG/DL (ref 70–110)
GLUCOSE BLD STRIP.AUTO-MCNC: 67 MG/DL (ref 70–110)
GLUCOSE BLD STRIP.AUTO-MCNC: 71 MG/DL (ref 70–110)
GLUCOSE BLD STRIP.AUTO-MCNC: 73 MG/DL (ref 70–110)
GLUCOSE BLD STRIP.AUTO-MCNC: 81 MG/DL (ref 70–110)
GLUCOSE BLD STRIP.AUTO-MCNC: 82 MG/DL (ref 70–110)
GLUCOSE BLD STRIP.AUTO-MCNC: 83 MG/DL (ref 70–110)
GLUCOSE BLD STRIP.AUTO-MCNC: 84 MG/DL (ref 70–110)
GLUCOSE BLD STRIP.AUTO-MCNC: 85 MG/DL (ref 70–110)
GLUCOSE BLD STRIP.AUTO-MCNC: 94 MG/DL (ref 70–110)
GLUCOSE SERPL-MCNC: 50 MG/DL (ref 74–99)
GLUCOSE SERPL-MCNC: 62 MG/DL (ref 74–99)
HCT VFR BLD AUTO: 37.4 % (ref 35–45)
HGB BLD-MCNC: 11.6 G/DL (ref 12–16)
MAGNESIUM SERPL-MCNC: 2.4 MG/DL (ref 1.6–2.6)
MCH RBC QN AUTO: 27 PG (ref 24–34)
MCHC RBC AUTO-ENTMCNC: 31 G/DL (ref 31–37)
MCV RBC AUTO: 87 FL (ref 78–100)
NRBC # BLD: 0 K/UL (ref 0–0.01)
NRBC BLD-RTO: 0 PER 100 WBC
PHOSPHATE SERPL-MCNC: 2.6 MG/DL (ref 2.5–4.9)
PLATELET # BLD AUTO: 373 K/UL (ref 135–420)
PMV BLD AUTO: 10 FL (ref 9.2–11.8)
POTASSIUM SERPL-SCNC: 4 MMOL/L (ref 3.5–5.5)
POTASSIUM SERPL-SCNC: 4.1 MMOL/L (ref 3.5–5.5)
RBC # BLD AUTO: 4.3 M/UL (ref 4.2–5.3)
SODIUM SERPL-SCNC: 135 MMOL/L (ref 136–145)
SODIUM SERPL-SCNC: 138 MMOL/L (ref 136–145)
T4 FREE SERPL-MCNC: 0.2 NG/DL (ref 0.7–1.5)
TSH SERPL DL<=0.05 MIU/L-ACNC: 49.5 UIU/ML (ref 0.36–3.74)
WBC # BLD AUTO: 10.5 K/UL (ref 4.6–13.2)

## 2023-03-05 PROCEDURE — 2000000000 HC ICU R&B

## 2023-03-05 PROCEDURE — 2500000003 HC RX 250 WO HCPCS

## 2023-03-05 PROCEDURE — 2580000003 HC RX 258: Performed by: INTERNAL MEDICINE

## 2023-03-05 PROCEDURE — 73600 X-RAY EXAM OF ANKLE: CPT

## 2023-03-05 PROCEDURE — 2580000003 HC RX 258

## 2023-03-05 PROCEDURE — 84206 ASSAY OF PROINSULIN: CPT

## 2023-03-05 PROCEDURE — 6370000000 HC RX 637 (ALT 250 FOR IP)

## 2023-03-05 PROCEDURE — 6360000002 HC RX W HCPCS: Performed by: INTERNAL MEDICINE

## 2023-03-05 PROCEDURE — 85027 COMPLETE CBC AUTOMATED: CPT

## 2023-03-05 PROCEDURE — 84439 ASSAY OF FREE THYROXINE: CPT

## 2023-03-05 PROCEDURE — A4216 STERILE WATER/SALINE, 10 ML: HCPCS | Performed by: INTERNAL MEDICINE

## 2023-03-05 PROCEDURE — 82010 KETONE BODYS QUAN: CPT

## 2023-03-05 PROCEDURE — 84681 ASSAY OF C-PEPTIDE: CPT

## 2023-03-05 PROCEDURE — 84100 ASSAY OF PHOSPHORUS: CPT

## 2023-03-05 PROCEDURE — 36415 COLL VENOUS BLD VENIPUNCTURE: CPT

## 2023-03-05 PROCEDURE — 2500000003 HC RX 250 WO HCPCS: Performed by: STUDENT IN AN ORGANIZED HEALTH CARE EDUCATION/TRAINING PROGRAM

## 2023-03-05 PROCEDURE — 6370000000 HC RX 637 (ALT 250 FOR IP): Performed by: PHYSICIAN ASSISTANT

## 2023-03-05 PROCEDURE — 80048 BASIC METABOLIC PNL TOTAL CA: CPT

## 2023-03-05 PROCEDURE — 82330 ASSAY OF CALCIUM: CPT

## 2023-03-05 PROCEDURE — 83527 ASSAY OF INSULIN: CPT

## 2023-03-05 PROCEDURE — 2500000003 HC RX 250 WO HCPCS: Performed by: INTERNAL MEDICINE

## 2023-03-05 PROCEDURE — 94761 N-INVAS EAR/PLS OXIMETRY MLT: CPT

## 2023-03-05 PROCEDURE — 2580000003 HC RX 258: Performed by: PHYSICIAN ASSISTANT

## 2023-03-05 PROCEDURE — 82962 GLUCOSE BLOOD TEST: CPT

## 2023-03-05 PROCEDURE — 84443 ASSAY THYROID STIM HORMONE: CPT

## 2023-03-05 PROCEDURE — 6360000002 HC RX W HCPCS

## 2023-03-05 PROCEDURE — 80377 DRUG/SUBSTANCE NOS 7/MORE: CPT

## 2023-03-05 PROCEDURE — 83735 ASSAY OF MAGNESIUM: CPT

## 2023-03-05 RX ORDER — LIDOCAINE 40 MG/G
CREAM TOPICAL PRN
Status: DISCONTINUED | OUTPATIENT
Start: 2023-03-05 | End: 2023-03-17 | Stop reason: HOSPADM

## 2023-03-05 RX ORDER — DEXTROSE MONOHYDRATE 25 G/50ML
25 INJECTION, SOLUTION INTRAVENOUS PRN
Status: DISCONTINUED | OUTPATIENT
Start: 2023-03-05 | End: 2023-03-17 | Stop reason: HOSPADM

## 2023-03-05 RX ORDER — CALCIUM GLUCONATE 20 MG/ML
1000 INJECTION, SOLUTION INTRAVENOUS
Status: COMPLETED | OUTPATIENT
Start: 2023-03-05 | End: 2023-03-05

## 2023-03-05 RX ORDER — LEVOTHYROXINE SODIUM 0.12 MG/1
125 TABLET ORAL DAILY
Status: DISCONTINUED | OUTPATIENT
Start: 2023-03-06 | End: 2023-03-05

## 2023-03-05 RX ORDER — DEXTROSE 20 G/100ML
INJECTION, SOLUTION INTRAVENOUS CONTINUOUS
Status: DISCONTINUED | OUTPATIENT
Start: 2023-03-05 | End: 2023-03-05

## 2023-03-05 RX ORDER — DEXTROSE MONOHYDRATE 100 MG/ML
INJECTION, SOLUTION INTRAVENOUS CONTINUOUS
Status: DISCONTINUED | OUTPATIENT
Start: 2023-03-05 | End: 2023-03-05

## 2023-03-05 RX ORDER — DEXTROSE AND SODIUM CHLORIDE 5; .45 G/100ML; G/100ML
INJECTION, SOLUTION INTRAVENOUS CONTINUOUS
Status: DISCONTINUED | OUTPATIENT
Start: 2023-03-05 | End: 2023-03-05

## 2023-03-05 RX ORDER — CALCIUM GLUCONATE 20 MG/ML
2000 INJECTION, SOLUTION INTRAVENOUS ONCE
Status: DISCONTINUED | OUTPATIENT
Start: 2023-03-05 | End: 2023-03-05

## 2023-03-05 RX ORDER — FUROSEMIDE 10 MG/ML
20 INJECTION INTRAMUSCULAR; INTRAVENOUS ONCE
Status: COMPLETED | OUTPATIENT
Start: 2023-03-05 | End: 2023-03-05

## 2023-03-05 RX ORDER — LIDOCAINE 4 G/G
1 PATCH TOPICAL DAILY
Status: DISCONTINUED | OUTPATIENT
Start: 2023-03-05 | End: 2023-03-17 | Stop reason: HOSPADM

## 2023-03-05 RX ADMIN — CALCIUM GLUCONATE 1000 MG: 20 INJECTION, SOLUTION INTRAVENOUS at 21:06

## 2023-03-05 RX ADMIN — ACETAMINOPHEN 650 MG: 325 TABLET ORAL at 03:37

## 2023-03-05 RX ADMIN — SODIUM CHLORIDE, PRESERVATIVE FREE 10 ML: 5 INJECTION INTRAVENOUS at 08:17

## 2023-03-05 RX ADMIN — SODIUM CHLORIDE, PRESERVATIVE FREE 10 ML: 5 INJECTION INTRAVENOUS at 20:12

## 2023-03-05 RX ADMIN — DEXTROSE AND SODIUM CHLORIDE: 5; 450 INJECTION, SOLUTION INTRAVENOUS at 03:30

## 2023-03-05 RX ADMIN — LEVOTHYROXINE SODIUM 100 MCG: 100 TABLET ORAL at 06:28

## 2023-03-05 RX ADMIN — DEXTROSE MONOHYDRATE: 25 INJECTION, SOLUTION INTRAVENOUS at 20:15

## 2023-03-05 RX ADMIN — PANTOPRAZOLE 40 MG: 40 TABLET, DELAYED RELEASE ORAL at 06:28

## 2023-03-05 RX ADMIN — DEXTROSE MONOHYDRATE: 100 INJECTION, SOLUTION INTRAVENOUS at 10:48

## 2023-03-05 RX ADMIN — ATORVASTATIN CALCIUM 40 MG: 40 TABLET, FILM COATED ORAL at 08:15

## 2023-03-05 RX ADMIN — LEVOTHYROXINE SODIUM ANHYDROUS 75 MCG: 100 INJECTION, POWDER, LYOPHILIZED, FOR SOLUTION INTRAVENOUS at 20:46

## 2023-03-05 RX ADMIN — CALCIUM GLUCONATE 1000 MG: 20 INJECTION, SOLUTION INTRAVENOUS at 19:49

## 2023-03-05 RX ADMIN — GABAPENTIN 500 MG: 100 CAPSULE ORAL at 20:34

## 2023-03-05 RX ADMIN — GABAPENTIN 500 MG: 100 CAPSULE ORAL at 13:44

## 2023-03-05 RX ADMIN — DEXTROSE MONOHYDRATE 250 ML: 100 INJECTION, SOLUTION INTRAVENOUS at 13:59

## 2023-03-05 RX ADMIN — Medication 16 G: at 20:02

## 2023-03-05 RX ADMIN — HYDROCORTISONE SODIUM SUCCINATE 100 MG: 100 INJECTION, POWDER, FOR SOLUTION INTRAMUSCULAR; INTRAVENOUS at 17:21

## 2023-03-05 RX ADMIN — FUROSEMIDE 20 MG: 10 INJECTION, SOLUTION INTRAMUSCULAR; INTRAVENOUS at 17:20

## 2023-03-05 RX ADMIN — DEXTROSE MONOHYDRATE: 100 INJECTION, SOLUTION INTRAVENOUS at 00:30

## 2023-03-05 RX ADMIN — TOPIRAMATE 50 MG: 100 TABLET, FILM COATED ORAL at 20:34

## 2023-03-05 RX ADMIN — ASPIRIN 81 MG CHEWABLE TABLET 81 MG: 81 TABLET CHEWABLE at 08:15

## 2023-03-05 RX ADMIN — DEXTROSE MONOHYDRATE 25 G: 25 INJECTION, SOLUTION INTRAVENOUS at 11:14

## 2023-03-05 RX ADMIN — GABAPENTIN 500 MG: 100 CAPSULE ORAL at 08:14

## 2023-03-05 RX ADMIN — TOPIRAMATE 50 MG: 100 TABLET, FILM COATED ORAL at 08:14

## 2023-03-05 RX ADMIN — ENOXAPARIN SODIUM 40 MG: 100 INJECTION SUBCUTANEOUS at 08:15

## 2023-03-05 RX ADMIN — DEXTROSE MONOHYDRATE 250 ML: 100 INJECTION, SOLUTION INTRAVENOUS at 15:41

## 2023-03-05 ASSESSMENT — PAIN SCALES - GENERAL
PAINLEVEL_OUTOF10: 0
PAINLEVEL_OUTOF10: 10

## 2023-03-05 ASSESSMENT — PAIN DESCRIPTION - ORIENTATION: ORIENTATION: ANTERIOR

## 2023-03-05 ASSESSMENT — PAIN DESCRIPTION - DESCRIPTORS: DESCRIPTORS: ACHING

## 2023-03-05 ASSESSMENT — PAIN DESCRIPTION - LOCATION: LOCATION: HEAD

## 2023-03-05 NOTE — PROGRESS NOTES
[facility]  Brief Progress Note  SUBJECTIVE  Continues to be symptomatic, feeling poorly overall with similar symptoms as before.     OBJECTIVE  Vitals:    03/05/23 1620   BP: 106/68   Pulse: (!) 101   Resp: 20   Temp: 98.5 °F (36.9 °C)   SpO2: 100%       Recent Results (from the past 12 hour(s))   POCT Glucose    Collection Time: 03/05/23  5:30 AM   Result Value Ref Range    POC Glucose 59 (L) 70 - 110 mg/dL   TSH    Collection Time: 03/05/23  5:40 AM   Result Value Ref Range    TSH, 3RD GENERATION 49.50 (H) 0.36 - 3.74 uIU/mL   T4, Free    Collection Time: 03/05/23  5:40 AM   Result Value Ref Range    T4 Free 0.2 (L) 0.7 - 1.5 NG/DL   Basic Metabolic Panel    Collection Time: 03/05/23  5:40 AM   Result Value Ref Range    Sodium 138 136 - 145 mmol/L    Potassium 4.1 3.5 - 5.5 mmol/L    Chloride 109 100 - 111 mmol/L    CO2 26 21 - 32 mmol/L    Anion Gap 3 3.0 - 18 mmol/L    Glucose 50 (LL) 74 - 99 mg/dL    BUN 11 7.0 - 18 MG/DL    Creatinine 1.09 0.6 - 1.3 MG/DL    Bun/Cre Ratio 10 (L) 12 - 20      Est, Glom Filt Rate >60 >60 ml/min/1.73m2    Calcium 8.3 (L) 8.5 - 10.1 MG/DL   POCT Glucose    Collection Time: 03/05/23  5:57 AM   Result Value Ref Range    POC Glucose 56 (L) 70 - 110 mg/dL   POCT Glucose    Collection Time: 03/05/23  6:37 AM   Result Value Ref Range    POC Glucose 71 70 - 110 mg/dL   POCT Glucose    Collection Time: 03/05/23  7:23 AM   Result Value Ref Range    POC Glucose 85 70 - 110 mg/dL   POCT Glucose    Collection Time: 03/05/23  7:57 AM   Result Value Ref Range    POC Glucose 59 (L) 70 - 110 mg/dL   Beta-Hydroxybutyrate    Collection Time: 03/05/23  8:05 AM   Result Value Ref Range    Beta-Hydroxybutyrate 0.07 <0.40 mmol/L   POCT Glucose    Collection Time: 03/05/23 10:03 AM   Result Value Ref Range    POC Glucose 67 (L) 70 - 110 mg/dL   POCT Glucose    Collection Time: 03/05/23 10:52 AM   Result Value Ref Range    POC Glucose 56 (L) 70 - 110 mg/dL   POCT Glucose    Collection Time: 03/05/23 11:13 AM   Result Value Ref Range    POC Glucose 61 (L) 70 - 110 mg/dL   POCT Glucose    Collection Time: 03/05/23 11:37 AM   Result Value Ref Range    POC Glucose 106 70 - 110 mg/dL   POCT Glucose    Collection Time: 03/05/23  1:10 PM   Result Value Ref Range    POC Glucose 52 (LL) 70 - 110 mg/dL   POCT Glucose    Collection Time: 03/05/23  1:51 PM   Result Value Ref Range    POC Glucose 63 (L) 70 - 110 mg/dL   POCT Glucose    Collection Time: 03/05/23  2:39 PM   Result Value Ref Range    POC Glucose 84 70 - 110 mg/dL   POCT Glucose    Collection Time: 03/05/23  3:39 PM   Result Value Ref Range    POC Glucose 52 (LL) 70 - 110 mg/dL       Physical examination  Unchanged from previous    ASSESSMENT/PLAN    Despite D50 bolus, D10 boluses, D10 at 100 an hour, changing IV sites, patient continues to be hypoglycemic with symptoms.  Spoke with pharmacy about potential for starting D20, however this would require central venous access.  Spoke with patient about possibility of CVL, however patient would prefer not to have CVL at this time.  Given this, safest alternative would be transfer to ICU for closer monitoring, glucose checks, possible increased rate of glucose administration or other medications as necessary.  Spoke with ICU intensivist who accepts.  Will transfer to ICU.    John Tamayo MD, PGY-3   [facility]   March 5, 2023, 4:52 PM

## 2023-03-05 NOTE — PROGRESS NOTES
Repeat blood glucose is 71. Patient is in bed, SR up x2. Skin is dry and warm to the touch. No diaphoresis noted at this time. States that her vision is slightly blurry. Patient is eating mitesh crackers at this time.

## 2023-03-05 NOTE — PROGRESS NOTES
[facility]   POST-ROUNDING NOTE    Assessment & Plan:    Further history  -diabetic retinopathy, receiving intravitreal injections ~monthly  -has a podiatrist, was supposed to get an EMG/NCS outpatient but has not gotten yet   -saw nephro most recently 11/2022, proteinuria 2/2 diabetic nephropathy  -had kidney biopsy in 2020 which revealed severe glomerular sclerosis with FSGS  -per patient has been taking this insulin regimen for lantus 90u BID for 2 years   -over the past week despite having sugars down to the 50's she states she continued to take all her medications/insulins as prescribed because she was afraid of deviating from her regimen     On Exam Today  -on exam has no sensation to soft touch bilateral feet no position sense, sensation begins mid shin RLE and high ankle/low skin LLE  -patient visibly uncomfortable this morning secondary to pain   -increased gabapentin to 500 TID   -lidocaine patch for back   -PRN lidocaine cream to chest wall for costochondritis    Update:  -s/p 1 amp D50 glucose   -POC glu decreased to 52  -given 250cc bolus D10  -POC now up to 84  -if drops again, will increase infusion to D20W  -increased levothyroxine to 125mcg       The above patient and plan were discussed with my supervising physician. See daily progress note for full assessment/plan.       Orly Gonzalez DO, PGY-1  [facility]  3/5/2023, 6:56 AM

## 2023-03-05 NOTE — H&P
[facility]  Admission History and Physical      Patient:    Lewis Bloch      44 y.o. female            MRN:       [id number]                                                                                    Admission Date:         3/4/2023  Code status:                Full    Lewis Bloch is a 44y.o. year old female admitted for Hypoglycemia [E16.2]. ASSESSMENT AND PLAN  Problem List Items Addressed This Visit          Endocrine    * (Principal) Hypoglycemia     Other Visit Diagnoses       Flank pain    -  Primary    Orthopnea                Persistent Hypoglycemia  -Blood glucose in the ED between 45s and 70s with minimal improvement after eating. Consistent with BG at home per patient  -Patient claims adherence with insulin regimen as below  -Home meds: Lantus 90u twice daily, Humalog 10 units before every meal, metformin 500 mg twice daily, Farxiga 5 mg daily, Trulicity 1.5 units weekly    Assessment:  Differential is broad. Most likely etiology is iatrogenic. Patient may be incorrectly dosing insulin, taking short acting insulin without eating, or her prescribed regimen might be too aggressive. Patient alternating injections between arm, abdomen, and thighs. No evidence on exam of lipodystrophy, therefore low concern for ineffectual insulin dosing. Also possible is an infectious process increasing metabolic rate. Urinalysis significant for small blood and few bacteria, patient also demonstrating right CVA tenderness on exam.  Therefore, consider UTI, pyelonephritis, nephrolithiasis. Pregnancy test negative. Lipase within normal limits, low concern for pancreatitis as etiology. Of note, patient's mother  of pancreatic cancer. Plan:  -Admit to stepdown for frequent blood glucose monitoring.   We will begin at every 15 minutes  -Hypoglycemia protocol in place  -Temporize hypoglycemia with D10 drip  -Hold home insulin regimen  -Obtain CT chest / abdomen / pelvis to rule out infectious/neoplastic processes  -Consider endocrinology consult. Has previously followed with endocrinology outpatient but was fired from practice after multiple no-shows. Has recently been rereferred to endocrinology     Questionable hematuria  -UA demonstrates small blood but microscopy negative for red blood cells  -Given minimal hematuria, nephrolithiasis less likely  Plan:  -Rule out nephrolithiasis with CT as above     Proteinuria  -UA significant for protein greater than 1000  -Urine microalbumin creatinine ratio 4098 02/10/2023  -Assessment: Nephrotic range proteinuria suggestive of progressive CKD  Plan:  -Must optimize diabetic and antihypertensive regimen to halt progression of CKD     Hypothyroidism  -Status post total thyroidectomy for papillary thyroid cancer  -Home regimen: Levothyroxine 100 mcg daily  -Thyroid studies 02/10/2023: TSH 48.3, T3 4.0, FT4 0.1  -Thyroid hormone derangement could contribute to disordered blood glucose  Plan:  -Obtain TSH and free T4  -Continue home levothyroxine     Chest pain, exertional dyspnea  -Patient reports chest pain and shortness of breath with exertion and has had to ask family to wait out for her so she can catch her breath  -Patient has diagnoses of hypertension and metabolic syndrome  -Evaluated at ED 1/1/2023 for the symptoms. Work-up was negative for MI and PE. Patient was diagnosed with costochondritis  -EKG this presentation demonstrates NSR  -Given prior negative work-up, normal EKG, and pain reproducible with palpation of chest wall, low concern for ACS.   Most likely costochondritis  Plan:  -Continue home amlodipine 10 mg daily atorvastatin 40 mg daily  -Consider lidocaine cream or patches for symptomatic management    Seizure disorder  -Continue home topiramate 50 mg twice daily    GERD  -Continue home pantoprazole 40 mg daily        Global:  Code: Full  IVF/Drips: D10  I/O / Wt: Strict  Diet: Diabetic  Bowel Regimen: Miralax  DVT/AC: Lovenox  Mobility: per protocol   Disposition: Home w/ outpatient follow-up  Anticipated LOS: 2 days     Point of Contact (relationship, number): [facility] [phone], Izabel Bradley (Son): [phone]      SUBJECTIVE:  History of Present Illness:    Promise Mazariegos is a 44 y.o.  female with PMHx of type 2 diabetes on insulin, papillary thyroid carcinoma status post total thyroidectomy with subsequent hypothyroidism, chronic back pain, and chronic kidney disease stage III, who presented to [facility] ED on 3/4/2023 with complaint of 1 week of persistently low blood sugar readings accompanied by increasing fatigue and lightheadedness. Patient reports blood sugar readings in the 50s and 60s which do not appropriately increase with eating. Patient states she has been compliant with her medications as prescribed and that she takes her blood sugar before and after eating daily. She also complains of exertional chest pain and shortness of breath. Denies recent illness, nausea, vomiting, diarrhea. ED Course:  -Afebrile, VSS on room air  -Labs: POC BG, CBC, CMP, Covid, Flu, UA  -Imaging: CXR  -EKG: NSR  -Meds: 1x 40 mg Lasix, 1x 4 mg morphine  -IVF: D10  -Procedures: None  -Consults:None     Did non-adherence with patient's outpatient treatment plan contribute to this admission?  No  If yes, please explain          PMHx, PSHx, SHx, FHx, MEDS, ALLERGIES:   Past Medical History:   Diagnosis Date    Arthritis     Chest pain     Diabetes ([address]     Essential hypertension     Headache(784.0)     Hyperchloremia     Hypertension     Seizures ([address]     8/2020 last seizure    Seizures ([facility] [address]     SOB (shortness of breath)     Thyroid cancer [facility])       Past Surgical History:   Procedure Laterality Date    CT BIOPSY RENAL  7/30/2020    CT BIOPSY RENAL 7/30/2020 [facility] RAD CT    PARTIAL HYSTERECTOMY (CERVIX NOT REMOVED)      THYROIDECTOMY      TUBAL LIGATION        Social History     Tobacco Use    Smoking status: Former Smokeless tobacco: Never   Substance Use Topics    Alcohol use: No    Drug use: No     Family History   Problem Relation Age of Onset    Hypertension Mother      No Known Allergies    Current Facility-Administered Medications   Medication Dose Route Frequency Provider Last Rate Last Admin    glucose chewable tablet 16 g  4 tablet Oral PRN Nanci Dejesus PA-C   16 g at 03/04/23 2021    dextrose bolus 10% 125 mL  125 mL IntraVENous PRN Nanci Dejesus PA-C        Or    dextrose bolus 10% 250 mL  250 mL IntraVENous PRN Nanci Dejesus PA-C 937.5 mL/hr at 03/04/23 2159 250 mL at 03/04/23 2159    glucagon (rDNA) injection 1 mg  1 mg SubCUTAneous PRN Nanci Dejesus PA-C        dextrose 10 % infusion   IntraVENous Continuous PRN Nanci Dejesus PA-C   Stopped at 03/04/23 2231    sodium chloride flush 0.9 % injection 5-40 mL  5-40 mL IntraVENous 2 times per day Meg Esqueda MD        sodium chloride flush 0.9 % injection 5-40 mL  5-40 mL IntraVENous PRN Meg Esqueda MD        0.9 % sodium chloride infusion   IntraVENous PRN MD Sarah Babber ON 3/5/2023] enoxaparin (LOVENOX) injection 40 mg  40 mg SubCUTAneous Daily Meg Esqueda MD        ondansetron (ZOFRAN-ODT) disintegrating tablet 4 mg  4 mg Oral Q8H PRN Meg Esqueda MD        Or    ondansetron [facility]) injection 4 mg  4 mg IntraVENous Q6H PRN Meg Esqueda MD        acetaminophen (TYLENOL) tablet 650 mg  650 mg Oral Q6H PRN Meg Esqueda MD        Or    acetaminophen (TYLENOL) suppository 650 mg  650 mg Rectal Q6H PRN Meg Esqueda MD        polyethylene glycol [facility]) packet 17 g  17 g Oral Daily PRN Meg Esqueda MD        dextrose 10 % infusion   IntraVENous Continuous Meg Esqueda  mL/hr at 03/04/23 2230 New Bag at 03/04/23 2230    [Held by provider] insulin lispro (HUMALOG) injection vial 10 Units  10 Units SubCUTAneous TID WC Nona Crawley MD        [Held by provider] insulin glargine (LANTUS) injection vial 90 Units  90 Units SubCUTAneous BID Nona Crawley MD        [facility] by [facility]] empagliflozin (JARDIANCE) tablet 10 mg  10 mg Oral Daily Nona Crawley MD        gabapentin (NEURONTIN) capsule 400 mg  400 mg Oral TID Nona Crawley MD        [START ON 3/5/2023] aspirin chewable tablet 81 mg  81 mg Oral Daily Nona Crawley MD        [START ON 3/5/2023] atorvastatin (LIPITOR) tablet 40 mg  40 mg Oral Daily Nona Crawley MD        [START ON 3/5/2023] metFORMIN (GLUCOPHAGE) tablet 500 mg  500 mg Oral BID WC Nona Crawley MD        [START ON 3/5/2023] levothyroxine (SYNTHROID) tablet 100 mcg  100 mcg Oral Daily MD Alva Osullivan ON 3/5/2023] pantoprazole (PROTONIX) tablet 40 mg  40 mg Oral QAM AC Nona Crawley MD        topiramate (TOPAMAX) tablet 50 mg  50 mg Oral BID Nona Crawley MD         Current Outpatient Medications   Medication Sig Dispense Refill    acetaminophen (TYLENOL) 500 MG tablet Take 1,000 mg by mouth every 6 hours as needed      atorvastatin (LIPITOR) 40 MG tablet Take by mouth daily      insulin aspart (NOVOLOG) 100 UNIT/ML injection vial Inject into the skin      insulin glargine (LANTUS) 100 UNIT/ML injection vial ceived the following from [facility]: Outside name: Lantus U-100 Insulin 100 unit/mL injection      lidocaine (LIDODERM) 5 % Apply patch to the affected area for 12 hours a day and remove for 12 hours a day.       metFORMIN (GLUCOPHAGE) 1000 MG tablet Take 1,000 mg by mouth 2 times daily (with meals)      pantoprazole (PROTONIX) 40 MG tablet ceived the following from [facility]: Outside name: pantoprazole (PROTONIX) 40 mg tablet      topiramate (TOPAMAX) 50 MG tablet ceived the following from [facility]: Outside name: topiramate (TOPAMAX) 50 mg tablet          [unfilled]  (positive findings are in BOLD; negative findings are in regular font)  Constitutional: fevers, chills, fatigue  HEENT: changes in vision,   Cardiovascular: chest pain, palpitations, PND, orthopnea, edema  Pulmonary: SOB, cough, sputum production, wheezing, chest tightness  Gastrointestinal: abdominal pain, nausea/vomiting, diarrhe  Genitourinary: frequency, dysuria  Skin: rash, itching  Neurological: sensory changes, motor changes, headache  Psychiatric: mood changes  Endocrine: heat/cold intolerance     OBJECTIVE:  Patient Vitals for the past 24 hrs:   BP Temp Temp src Pulse Resp SpO2   03/04/23 2100 125/73 -- -- 94 15 99 %   03/04/23 1800 114/64 -- -- 93 12 98 %   03/04/23 1700 (!) 142/79 -- -- 97 17 99 %   03/04/23 1615 (!) 144/88 -- -- 100 14 100 %   03/04/23 1410 121/75 99.3 °F (37.4 °C) Oral 96 17 100 %     There is no height or weight on file to calculate BMI. PHYSICAL EXAM:  General: The patient appears in no acute distress.  Obese  HEENT: Dry mucous membranes  Neck: JVD difficult to assess due to obesity  CVS: regular rate and rhythm and systolic murmur: early systolic 3/6, blowing at 2nd left intercostal space, at lower left sternal border  Lungs: chest clear, no wheezing, rales, normal symmetric air entry    Abdomen: Soft, distended, non TTP, no rebound or guarding  Ext: No calf tenderness, peripheral pulses present, 1+ edema BLE  MSK: R CVA tenderness present, midline chest wall tender to palpation  Skin: Warm, Dry, Intact ,   Neuro: No focal neurologic deficits or gross abnormalities  Psych: appropriate mood and affect     RECENT LABS AND IMAGING ON ADMISSION   Recent Results (from the past 24 hour(s))   EKG 12 Lead    Collection Time: 03/04/23  2:37 PM   Result Value Ref Range    Ventricular Rate 93 BPM    Atrial Rate 93 BPM    P-R Interval 146 ms    QRS Duration 86 ms    Q-T Interval 368 ms    QTc Calculation (Bazett) 457 ms    P Axis 70 degrees    R Axis 46 degrees    T Axis 77 degrees    Diagnosis Normal sinus rhythm    Troponin    Collection Time: 03/04/23  2:38 PM   Result Value Ref Range    Troponin, High Sensitivity 11 0 - 54 ng/L   Brain Natriuretic Peptide    Collection Time: 03/04/23  2:38 PM   Result Value Ref Range    NT Pro-BNP 49 0 - 450 PG/ML   CBC with Auto Differential    Collection Time: 03/04/23  2:38 PM   Result Value Ref Range    WBC 9.8 4.6 - 13.2 K/uL    RBC 4.64 4.20 - 5.30 M/uL    Hemoglobin 12.5 12.0 - 16.0 g/dL    Hematocrit 39.2 35.0 - 45.0 %    MCV 84.5 78.0 - 100.0 FL    MCH 26.9 24.0 - 34.0 PG    MCHC 31.9 31.0 - 37.0 g/dL    RDW 14.6 (H) 11.6 - 14.5 %    Platelets 075 168 - 398 K/uL    MPV 9.5 9.2 - 11.8 FL    Nucleated RBCs 0.0 0  WBC    nRBC 0.00 0.00 - 0.01 K/uL    Seg Neutrophils 57 40 - 73 %    Lymphocytes 36 21 - 52 %    Monocytes 7 3 - 10 %    Eosinophils % 0 0 - 5 %    Basophils 0 0 - 2 %    Immature Granulocytes 1 (H) 0.0 - 0.5 %    Segs Absolute 5.5 1.8 - 8.0 K/UL    Absolute Lymph # 3.5 0.9 - 3.6 K/UL    Absolute Mono # 0.6 0.05 - 1.2 K/UL    Absolute Eos # 0.0 0.0 - 0.4 K/UL    Basophils Absolute 0.0 0.0 - 0.1 K/UL    Absolute Immature Granulocyte 0.1 (H) 0.00 - 0.04 K/UL    Differential Type AUTOMATED     Comprehensive Metabolic Panel    Collection Time: 03/04/23  2:38 PM   Result Value Ref Range    Sodium 138 136 - 145 mmol/L    Potassium 3.8 3.5 - 5.5 mmol/L    Chloride 109 100 - 111 mmol/L    CO2 26 21 - 32 mmol/L    Anion Gap 3 3.0 - 18 mmol/L    Glucose 71 (L) 74 - 99 mg/dL    BUN 10 7.0 - 18 MG/DL    Creatinine 1.10 0.6 - 1.3 MG/DL    Bun/Cre Ratio 9 (L) 12 - 20      Est, Glom Filt Rate >60 >60 ml/min/1.73m2    Calcium 8.5 8.5 - 10.1 MG/DL    Total Bilirubin 0.3 0.2 - 1.0 MG/DL    ALT 24 13 - 56 U/L    AST 25 10 - 38 U/L    Alk Phosphatase 77 45 - 117 U/L    Total Protein 6.7 6.4 - 8.2 g/dL    Albumin 2.3 (L) 3.4 - 5.0 g/dL    Globulin 4.4 (H) 2.0 - 4.0 g/dL    Albumin/Globulin Ratio 0.5 (L) 0.8 - 1.7     Lipase Collection Time: 03/04/23  2:38 PM   Result Value Ref Range    Lipase 110 73 - 393 U/L   Urinalysis    Collection Time: 03/04/23  3:48 PM   Result Value Ref Range    Color, UA YELLOW      Appearance CLEAR      Specific Gravity, UA 1.012 1.005 - 1.030      pH, Urine 6.5 5.0 - 8.0      Protein, UA >1000 (A) NEG mg/dL    Glucose, UA Negative NEG mg/dL    Ketones, Urine Negative NEG mg/dL    Bilirubin Urine Negative NEG      Blood, Urine SMALL (A) NEG      Urobilinogen, Urine 0.2 0.2 - 1.0 EU/dL    Nitrite, Urine Negative NEG      Leukocyte Esterase, Urine Negative NEG     Pregnancy, Urine    Collection Time: 03/04/23  3:48 PM   Result Value Ref Range    HCG(Urine) Pregnancy Test Negative NEG     Urinalysis, Micro    Collection Time: 03/04/23  3:48 PM   Result Value Ref Range    WBC, UA NONE 0 - 4 /hpf    RBC, UA 0 to 2 0 - 5 /hpf    Epithelial Cells UA FEW 0 - 5 /lpf    BACTERIA, URINE FEW (A) NEG /hpf   Respiratory Panel, Molecular, with COVID-19 (Restricted: peds pts or suitable admitted adults)    Collection Time: 03/04/23  4:15 PM    Specimen: Nasopharyngeal   Result Value Ref Range    Adenovirus by PCR Not detected NOTD      Coronavirus 229E by PCR Not detected NOTD      Coronavirus HKU1 by PCR Not detected NOTD      Coronavirus NL63 by PCR Not detected NOTD      Coronavirus OC43 by PCR Not detected NOTD      SARS-CoV-2, PCR Not detected NOTD      Human Metapneumovirus by PCR Not detected NOTD      Rhinovirus Enterovirus PCR Not detected NOTD      Influenza A by PCR Not detected NOTD      Influenza A H1 by PCR Not detected NOTD      Influenza A H3 by PCR Not detected NOTD      Influenza A H1-2009 by PCR Not detected NOTD      Influenza B PCR Not detected NOTD      Parainfluenza 1 PCR Not detected NOTD      Parainfluenza 2 PCR Not detected NOTD      Parainfluenza 3 PCR Not detected NOTD      Parainfluenza 4 PCR Not detected NOTD      Respiratory Syncytial Virus by PCR Not detected NOTD      Bordetella parapertussis by PCR Not detected NOTD      Bordetella pertussis by PCR Not detected NOTD      Chlamydophila Pneumonia PCR Not detected NOTD      Mycoplasma pneumo by PCR Not detected NOTD     POCT Glucose    Collection Time: 03/04/23  4:34 PM   Result Value Ref Range    POC Glucose 53 (LL) 70 - 110 mg/dL   POCT Glucose    Collection Time: 03/04/23  4:35 PM   Result Value Ref Range    POC Glucose 53 (LL) 70 - 110 mg/dL   POCT Glucose    Collection Time: 03/04/23  5:16 PM   Result Value Ref Range    POC Glucose 71 70 - 110 mg/dL   POCT Glucose    Collection Time: 03/04/23  6:30 PM   Result Value Ref Range    POC Glucose 64 (L) 70 - 110 mg/dL   POCT Glucose    Collection Time: 03/04/23  8:11 PM   Result Value Ref Range    POC Glucose 53 (LL) 70 - 110 mg/dL   POCT Glucose    Collection Time: 03/04/23  8:44 PM   Result Value Ref Range    POC Glucose 64 (L) 70 - 110 mg/dL   POCT Glucose    Collection Time: 03/04/23  9:51 PM   Result Value Ref Range    POC Glucose 45 (LL) 70 - 110 mg/dL   POCT Glucose    Collection Time: 03/04/23 10:19 PM   Result Value Ref Range    POC Glucose 66 (L) 70 - 110 mg/dL        XR (Most Recent). @BSHSILASTIMGCAT([id number]:1)@     CT (Most Recent) @BSHSILASTIMGCAT(NAL2222:1)@     ECHO @LASTPROCAMB([id number])@     EKG @LASTPROCAMB([id number])@         I have discussed Ms. Elvis Desouza case with my attending who agrees with the plan of care. Chu Duenas MD , PGY-1   [facility]   March 4, 2023, 10:41 PM     [facility]  Senior Addendum to History and Physical    I have also seen and independently evaluated the patient. I agree with the plan as noted above. Additional HPI, A/P:     In brief, pt is a 45 yo F with uncontrolled resistant T2DM on high dose BID lantus, MT humalog, SGLT2, GLP1, and metformin who presents with intractable hypoglycemia. Pt also has iatrogenic hypothyroidism on levothyroxine s/p thyroidectomy for papillary thyroid cancer.  PT with low BG sxs for 1 week. Right lower flank/back and epigastric pain, persistently low BG in the 50s and 60s despite eating and drinking when symptomatic. Has been taking all meds as prescribed. Last took lantus and humalog this morning, no evening lantus. Has had persistently low BG since arrival here earlier this afternoon despite multiple administrations of d10 and initiation of d10 with fluids. Labs mostly unremarkable but did show potential blood in the urine though no RBC's on micro, proteinuria which is likely 2/2 to diabetic kidney disease. No GAP, no leukocytosis, Ua with small bacteria but neg LE/nitrites and no sick sxs including N/V, cough, runny nose, diarrhea, dysuria so doubt infectious insult is contributing. Did have low t4 last month and was switched to levothyroxine but doubt that would cause such persistent hypoglycemia. Pt did have epigastric pain on palpation and R CVA TTP so ordered CT C/A/P w/o cont which was unremarkable which with normal lipase makes both pancreatic and kidney pathology unlikely. Appears as though this is from medication administration. She has been on 90u BID for some time and she is rotating injection sites and taking meds as directed but she may be giving improper doses or mixing up her medications. Her medical literacy is poor and it looks as though her kids help manage her DM. Her 12 yo daughter takes her BG and it looks like her older son helps as well. We will admit for intractable hypoglycemia and add sugar to her fluids until we can keep her BG stable. Will need to remain cognizant of DKA risk while off insulin but there is a strong possibility there is significant long acting insulin in her system now so will add in scheduled long acting when hypoglycemia appears resolved and give significantly less than the currently prescribed 90u.      Intractable hypoglycemia, likely iatrogenic  - admit to SDU  - hypoglycemia protocol  - q15 POC BG until stable x4 at least  - will give 20u Lantus later in the morning if stable  - strongly consider endo consult in the AM    Hypothyroidism  - TSH/T4  - adjust meds if needed     Large Proteinuria  - protein > 1000 on UA and last microalb/Cr in clinic Feb 2023 was > 4000  - has outpatient nephro however recently stopped lisinopril? May have had hypotension issues  - favor etiology 2/2 DM  - cont to optimize DM, consider low dose ace/arb, already on farxiga, pt could benefit from easier med regimen though DM is usually resistant    Vitals, labs and imaging reviewed     For additional problem list, assessment, and plan see intern note.     Senior Resident Romana Saucedo MD , PGY-2   [facility]   March 4, 2023, 10:41 PM

## 2023-03-05 NOTE — CONSULTS
[facility]  Pulmonary, Critical Care, and Sleep Medicine    Name: Erica Arthur MRN: [id number]   : 1985 Hospital: [address] [place] [address] Dr   Date: 3/5/2023        Critical Care Consult/Transfer      IMPRESSION:   Severe Hypoglycemia, in the setting of possible underlying myxedema with no coma +/- hashimoto's disease vs incidental overdose vs insulinoma vs Tumor   Severe hypothyroidism with likely myxedema without coma  D2DM on Lantus 90u twice daily, Humalog 10 units before every meal, metformin 500 mg twice daily, Farxiga 5 mg daily, Trulicity 1.5 units weekly  CDKD with nephrotic range proteinuria  Foot pain, need to r/o Charcot-Celestina-tooth dz  Abnormal UA without sings of infection   Seizure disorder   GERD      Patient Active Problem List   Diagnosis    Obesity (BMI 30-39. 9)    Seizures (HCC)    Hyperglycemia    Vaginosis    Obesity, morbid (HCC)    Hypoglycemia    Migraines        RECOMMENDATIONS:   Neuro:   - Continue Topamax 50 mg qday   Pulm:   -Aspiration precautions, Keep HOB >30 degrees  -ICS q1h  CVS :  -JEFFRY  Fluids:  -Currently running d10 at 100 ml/hr. Once midline placed (ordered) will start d20 infusion at 50 ml/hr  GI:   -SUP  -Given hypoglycemia will change diet from carb restricted to regular diet  Renal:    -Trend Renal indices, Strict Is/Os,   Hem/Onc:   -Monitor for s/o active bleeding. I/D:  -NA  -Negative viral panel   Endocrine:  -Start synthroid 75 mcg IV now  -Follow up c-peptide, proinsulin, insulin free and total, and beta-hydroxybutyrate   -Start stress dose steroids  -Will engage endocrine tomorrow    -Accu checks L0H  Metabolic:    -Daily BMP, mag, phos. Trend lytes, replace as needed. Musc/Skin:   -F/u RLE imaging   OB/GYN: HCG negative  Full Code       Midline ordered for d20 infusion. Patient was not amendable to CVL     Best practice : APPLICABLE TO PATIENT    Glycemic control  Sress ulcer prophylaxis.    Protonix  DVT prophylaxis. Lovenox  Need for Lines, smith assessed. Palliative care evaluation. Subjective/History:        Janice Rodriguez is a 44 y.o.  female with PMHx of type 2 diabetes on insulin, papillary thyroid carcinoma status post total thyroidectomy with hypothyroidism, chronic back pain, and chronic kidney disease stage III with proteinuria. Patient admitted 3/4 for c/o of hypoglycemia at home for the past week. She has not changed her current regimen recently other than a reported change to her thyroid regimen, which she cannot recall. Patient was initially admitted to SDU for d10 gtt. Over the day she has required multiple pushes of d50 and runs of d10 with persistent BG in the 50s. Subsequently patient was transferred to ICU for further care and close monitoring of BG. Notable labs of TSH of 49.5 and T4 free is 0.2    Last dose of lantus 90 units on the AM of 3/4      Past Medical History:   Diagnosis Date    Arthritis     Chest pain     Diabetes ([facility]     Essential hypertension     Headache(784.0)     Hyperchloremia     Hypertension     Seizures ([facility]     8/2020 last seizure    Seizures (HCC)     SOB (shortness of breath)     Thyroid cancer [facility])         Past Surgical History:   Procedure Laterality Date    CT BIOPSY RENAL  7/30/2020    CT BIOPSY RENAL 7/30/2020 [facility] RAD CT    PARTIAL HYSTERECTOMY (CERVIX NOT REMOVED)      THYROIDECTOMY      TUBAL LIGATION          Prior to Admission medications    Medication Sig Start Date End Date Taking?  Authorizing Provider   acetaminophen (TYLENOL) 500 MG tablet Take 1,000 mg by mouth every 6 hours as needed 7/26/22   Ar Automatic Reconciliation   atorvastatin (LIPITOR) 40 MG tablet Take by mouth daily    Ar Automatic Reconciliation   insulin aspart (NOVOLOG) 100 UNIT/ML injection vial Inject into the skin    Ar Automatic Reconciliation   insulin glargine (LANTUS) 100 UNIT/ML injection vial ceived the following from [facility]: Outside name: Lantus U-100 Insulin 100 unit/mL injection 20   Ar Automatic Reconciliation   lidocaine (LIDODERM) 5 % Apply patch to the affected area for 12 hours a day and remove for 12 hours a day. 22   Ar Automatic Reconciliation   metFORMIN (GLUCOPHAGE) 1000 MG tablet Take 1,000 mg by mouth 2 times daily (with meals)    Ar Automatic Reconciliation   pantoprazole (PROTONIX) 40 MG tablet ceived the following from [facility]: Outside name: pantoprazole (PROTONIX) 40 mg tablet 10/26/20   Ar Automatic Reconciliation   topiramate (TOPAMAX) 50 MG tablet ceived the following from [facility]: Outside name: topiramate (TOPAMAX) 50 mg tablet 20   Ar Automatic Reconciliation       @[facility]@    No Known Allergies     Social History     Tobacco Use    Smoking status: Former    Smokeless tobacco: Never   Substance Use Topics    Alcohol use: No        Family History   Problem Relation Age of Onset    Hypertension Mother           Review of Systems:  Constitutional: negative  Eyes: positive for visual disturbance  Ears, nose, mouth, throat, and face: negative  Respiratory: negative  Cardiovascular: positive for lower extremity edema  Gastrointestinal: negative  Genitourinary:negative  Integument/breast: negative  Hematologic/lymphatic: negative  Musculoskeletal:positive for back pain and bone pain  Neurological: negative  Endocrine: positive for diabetic symptoms including none, blurry vision, and neuropathy    Objective:   Vital Signs:    /68   Pulse (!) 101   Temp 98.5 °F (36.9 °C) (Oral)   Resp 20   SpO2 100%             Temp (24hrs), Av °F (36.7 °C), Min:97.4 °F (36.3 °C), Max:98.5 °F (36.9 °C)       Intake/Output:   Last shift:      No intake/output data recorded. Last 3 shifts:  1901 -  0700  In: 604 [P. O.:604]  Out: -     Intake/Output Summary (Last 24 hours) at 3/5/2023 1706  Last data filed at 3/4/2023 2354  Gross per 24 hour   Intake 604 ml   Output --   Net 604 ml           Physical Exam:     General:  Alert, cooperative, no distress.   Head:  Normocephalic, without obvious abnormality, atraumatic.   Eyes:  Conjunctivae/corneas clear. PERRL,   Nose: Nares normal. Septum midline. Mucosa normal. No drainage or sinus tenderness.   Throat: Lips, mucosa, and tongue normal. Teeth and gums normal.   Neck: Supple, symmetrical, trachea midline, no adenopathy, thyroid: no enlargment/tenderness/nodules, no carotid bruit and no JVD.   Back:   Symmetric, no curvature. ROM normal.   Lungs:   Clear to auscultation bilaterally.   Chest wall:  No tenderness or deformity.   Heart:  Regular rate and rhythm, S1, S2 normal, no murmur, click, rub or gallop.   Abdomen:   Soft, non-tender. Bowel sounds normal. No masses,  No organomegaly.   Extremities: Extremities normal, atraumatic, no cyanosis. + BLE edema    Pulses: 2+ and symmetric all extremities.   Skin: Skin color, texture, turgor normal. No rashes or lesions   Lymph nodes:  Cervical, supraclavicular, and axillary nodes normal.   Neurologic: Grossly nonfocal         Data:   I have independently reviewed and interpreted all labs, imaging data and additional testing relevant to patient's care and clinical decision making.      Lab Results   Component Value Date    WBC 10.5 03/05/2023    HGB 11.6 (L) 03/05/2023    HCT 37.4 03/05/2023    MCV 87.0 03/05/2023     03/05/2023    LYMPHOPCT 36 03/04/2023    RBC 4.30 03/05/2023    MCH 27.0 03/05/2023    MCHC 31.0 03/05/2023    RDW 14.6 (H) 03/05/2023     Lab Results   Component Value Date     03/05/2023    K 4.1 03/05/2023     03/05/2023    CO2 26 03/05/2023    BUN 11 03/05/2023    CREATININE 1.09 03/05/2023    GLUCOSE 50 (LL) 03/05/2023    CALCIUM 8.3 (L) 03/05/2023    PROT 6.7 03/04/2023    LABALBU 2.3 (L) 03/04/2023    BILITOT 0.3 03/04/2023    ALKPHOS 77 03/04/2023    AST 25 03/04/2023    ALT 24 03/04/2023    LABGLOM >60 03/05/2023    GFRAA 39 (L) 08/08/2022    AGRATIO 0.5 (L)  01/01/2023    GLOB 4.4 (H) 03/04/2023        Lab Results   Component Value Date    ALT 24 03/04/2023    AST 25 03/04/2023    ALKPHOS 77 03/04/2023    BILITOT 0.3 03/04/2023      Lab Results   Component Value Date    TSH 0.04 (L) 05/16/2021    MSP9DDA 49.50 (H) 03/05/2023      Hemoglobin A1C   Date Value Ref Range Status   07/05/2022 12.5 (H) 4.2 - 5.6 % Final     Comment:     (NOTE)  HbA1C Interpretive Ranges  <5.7              Normal  5.7 - 6.4         Consider Prediabetes  >6.5              Consider Diabetes         Lab Results   Component Value Date    LABALBU 2.3 (L) 03/04/2023       Results       Procedure Component Value Units Date/Time    Respiratory Panel, Molecular, with COVID-19 (Restricted: peds pts or suitable admitted adults) [[id number]] Collected: 03/04/23 1615    Order Status: Completed Specimen: Nasopharyngeal Updated: 03/04/23 1724     Adenovirus by PCR Not detected        Coronavirus 229E by PCR Not detected        Coronavirus HKU1 by PCR Not detected        Coronavirus NL63 by PCR Not detected        Coronavirus OC43 by PCR Not detected        SARS-CoV-2, PCR Not detected        Human Metapneumovirus by PCR Not detected        Rhinovirus Enterovirus PCR Not detected        Influenza A by PCR Not detected        Influenza A H1 by PCR Not detected        Influenza A H3 by PCR Not detected        Influenza A H1-2009 by PCR Not detected        Influenza B PCR Not detected        Parainfluenza 1 PCR Not detected        Parainfluenza 2 PCR Not detected        Parainfluenza 3 PCR Not detected        Parainfluenza 4 PCR Not detected        Respiratory Syncytial Virus by PCR Not detected        Bordetella parapertussis by PCR Not detected        Bordetella pertussis by PCR Not detected        Chlamydophila Pneumonia PCR Not detected        Mycoplasma pneumo by PCR Not detected       Culture, Urine [[id number]] Collected: 03/04/23 1548    Order Status: Sent Specimen: Urine, clean catch Updated: 03/05/23  1119              Telemetry:normal sinus rhythm    Imaging:  I have personally reviewed the patients radiographs and have reviewed the reports:    Recent image results  XR CHEST PORTABLE    Result Date: 3/4/2023  No evidence of acute pulmonary disease. CT CHEST ABDOMEN PELVIS WO CONTRAST Additional Contrast? None    Result Date: 3/4/2023  No acute findings. Incidentals as above. No results found for this or any previous visit. Ultrasound Result (most recent):  US ABDOMEN LIMITED 11/21/2022    Narrative  EXAM: US ABD LTD    CLINICAL INDICATION/HISTORY: abdominal distention, r/o ascites    TECHNIQUE: Selected images from limited abdominal ultrasound acquired,  interrogating the 4 quadrants for ascites    COMPARISON: Body CT October 2022    FINDINGS:    Surveying the 4 quadrants of the abdomen, and no ascites. No grossly concerning  features. Impression  1. No ascites      No results found for this or any previous visit.               GUILLERMO Scott NP  03/05/23  Pulmonary, Critical Care Medicine  [facility]

## 2023-03-05 NOTE — PLAN OF CARE
Problem: Discharge Planning  Goal: Discharge to home or other facility with appropriate resources  3/5/2023 1041 by Luke Qiu RN  Outcome: Progressing  3/5/2023 0915 by Rachelle Calle RN  Outcome: Progressing     Problem: Chronic Conditions and Co-morbidities  Goal: Patient's chronic conditions and co-morbidity symptoms are monitored and maintained or improved  3/5/2023 1041 by Luke Qiu RN  Outcome: Progressing  3/5/2023 0915 by Rachelle Calle RN  Outcome: Progressing     Problem: Safety - Adult  Goal: Free from fall injury  3/5/2023 1041 by Luke Qiu RN  Outcome: Progressing  3/5/2023 0915 by Rachelle Calle RN  Outcome: Progressing     Problem: Pain  Goal: Verbalizes/displays adequate comfort level or baseline comfort level  3/5/2023 1041 by Luke Qiu RN  Outcome: Progressing  3/5/2023 0915 by Rachelle Calle RN  Outcome: Progressing     Problem: Skin/Tissue Integrity  Goal: Absence of new skin breakdown  Description: 1. Monitor for areas of redness and/or skin breakdown  2. Assess vascular access sites hourly  3. Every 4-6 hours minimum:  Change oxygen saturation probe site  4. Every 4-6 hours:  If on nasal continuous positive airway pressure, respiratory therapy assess nares and determine need for appliance change or resting period.   3/5/2023 1041 by Luke Qiu RN  Outcome: Progressing  3/5/2023 0915 by Rachelle Calle RN  Outcome: Progressing

## 2023-03-05 NOTE — ED NOTES
TRANSFER - OUT REPORT:    Verbal report given to [employer] RN on [employer]  being transferred to [facility] for routine progression of patient care       Report consisted of patient's Situation, Background, Assessment and   Recommendations(SBAR). Information from the following report(s) ED SBAR was reviewed with the receiving nurse. [place] Assessment: No data recorded  Lines:   Peripheral IV 03/04/23 Right Antecubital (Active)   Site Assessment Clean, dry & intact 03/04/23 1446   Line Status Blood return noted 03/04/23 1446   Phlebitis Assessment No symptoms 03/04/23 1446   Infiltration Assessment 0 03/04/23 1446   Alcohol Cap Used No 03/04/23 1446   Dressing Status Clean, dry & intact 03/04/23 1446   Dressing Type Transparent 03/04/23 1446        Opportunity for questions and clarification was provided.              Keny Conte RN  03/04/23 1308

## 2023-03-05 NOTE — PROGRESS NOTES
Checked patients BG at 0615 it is 58. Justine Chin from the lab called at 0612 to make nurse aware that patients blood glucose that was checked at 0540 is 50. Patient received 480 cc orange juice. Patient is without s/s of hypoglycemia at this time. Will continue to monitor and recheck BG.

## 2023-03-05 NOTE — ED NOTES
Pt hypoglycemic; alert and oriented; gave sandwich and juice, plan to recheck BGL in 30 min.      Tonya Tejada RN  03/04/23 [id number]

## 2023-03-05 NOTE — ED NOTES
Attempted to call report to Stepdown, RN unable to take report at this time.     Jeannine Martinez RN  03/04/23 5323

## 2023-03-05 NOTE — PROGRESS NOTES
Pt transferred to room 303 for continuity of care. Family at bedside. No distress noted. Skin assessed. Skin clean dry and intact w/ 2 PIV. D10 gtt line traced and verified w/ RN during transport. Pt connected to bedside monitor 303. NSR. Pink Dom Denies pain and SOB. BP (!) 145/87   Pulse 99   Temp 97.6 °F (36.4 °C) (Oral)   Resp 14   SpO2 100% .

## 2023-03-05 NOTE — PROGRESS NOTES
Paged and notified [address] about pt's ineffective breathing while sleeping secondary to sleep apnea as evidence by loud snoring. MD stated that he will order a CPAP for pt.

## 2023-03-06 PROBLEM — E03.9 MYXEDEMA: Status: ACTIVE | Noted: 2023-03-06

## 2023-03-06 PROBLEM — E88.810 METABOLIC SYNDROME: Status: ACTIVE | Noted: 2023-03-06

## 2023-03-06 PROBLEM — E88.81 METABOLIC SYNDROME: Status: ACTIVE | Noted: 2023-03-06

## 2023-03-06 PROBLEM — Z79.4 TYPE 2 DIABETES MELLITUS WITH HYPERGLYCEMIA, WITH LONG-TERM CURRENT USE OF INSULIN (HCC): Status: ACTIVE | Noted: 2023-03-06

## 2023-03-06 PROBLEM — E11.65 TYPE 2 DIABETES MELLITUS WITH HYPERGLYCEMIA, WITH LONG-TERM CURRENT USE OF INSULIN (HCC): Status: ACTIVE | Noted: 2023-03-06

## 2023-03-06 LAB
ANION GAP SERPL CALC-SCNC: 5 MMOL/L (ref 3–18)
BACTERIA SPEC CULT: NORMAL
BASOPHILS # BLD: 0 K/UL (ref 0–0.1)
BASOPHILS NFR BLD: 0 % (ref 0–2)
BUN SERPL-MCNC: 13 MG/DL (ref 7–18)
BUN SERPL-MCNC: 13 MG/DL (ref 7–18)
BUN SERPL-MCNC: 16 MG/DL (ref 7–18)
BUN/CREAT SERPL: 10 (ref 12–20)
BUN/CREAT SERPL: 11 (ref 12–20)
BUN/CREAT SERPL: 9 (ref 12–20)
CALCIUM SERPL-MCNC: 8.5 MG/DL (ref 8.5–10.1)
CALCIUM SERPL-MCNC: 8.6 MG/DL (ref 8.5–10.1)
CALCIUM SERPL-MCNC: 8.9 MG/DL (ref 8.5–10.1)
CC UR VC: NORMAL
CHLORIDE SERPL-SCNC: 109 MMOL/L (ref 100–111)
CHLORIDE SERPL-SCNC: 109 MMOL/L (ref 100–111)
CHLORIDE SERPL-SCNC: 110 MMOL/L (ref 100–111)
CO2 SERPL-SCNC: 21 MMOL/L (ref 21–32)
CO2 SERPL-SCNC: 21 MMOL/L (ref 21–32)
CO2 SERPL-SCNC: 23 MMOL/L (ref 21–32)
CREAT SERPL-MCNC: 1.33 MG/DL (ref 0.6–1.3)
CREAT SERPL-MCNC: 1.44 MG/DL (ref 0.6–1.3)
CREAT SERPL-MCNC: 1.45 MG/DL (ref 0.6–1.3)
DIFFERENTIAL METHOD BLD: ABNORMAL
EOSINOPHIL # BLD: 0 K/UL (ref 0–0.4)
EOSINOPHIL NFR BLD: 0 % (ref 0–5)
ERYTHROCYTE [DISTWIDTH] IN BLOOD BY AUTOMATED COUNT: 14.6 % (ref 11.6–14.5)
GLUCOSE BLD STRIP.AUTO-MCNC: 108 MG/DL (ref 70–110)
GLUCOSE BLD STRIP.AUTO-MCNC: 109 MG/DL (ref 70–110)
GLUCOSE BLD STRIP.AUTO-MCNC: 109 MG/DL (ref 70–110)
GLUCOSE BLD STRIP.AUTO-MCNC: 123 MG/DL (ref 70–110)
GLUCOSE BLD STRIP.AUTO-MCNC: 127 MG/DL (ref 70–110)
GLUCOSE BLD STRIP.AUTO-MCNC: 130 MG/DL (ref 70–110)
GLUCOSE BLD STRIP.AUTO-MCNC: 132 MG/DL (ref 70–110)
GLUCOSE BLD STRIP.AUTO-MCNC: 141 MG/DL (ref 70–110)
GLUCOSE BLD STRIP.AUTO-MCNC: 143 MG/DL (ref 70–110)
GLUCOSE BLD STRIP.AUTO-MCNC: 144 MG/DL (ref 70–110)
GLUCOSE BLD STRIP.AUTO-MCNC: 149 MG/DL (ref 70–110)
GLUCOSE BLD STRIP.AUTO-MCNC: 150 MG/DL (ref 70–110)
GLUCOSE BLD STRIP.AUTO-MCNC: 152 MG/DL (ref 70–110)
GLUCOSE BLD STRIP.AUTO-MCNC: 159 MG/DL (ref 70–110)
GLUCOSE BLD STRIP.AUTO-MCNC: 168 MG/DL (ref 70–110)
GLUCOSE BLD STRIP.AUTO-MCNC: 178 MG/DL (ref 70–110)
GLUCOSE BLD STRIP.AUTO-MCNC: 180 MG/DL (ref 70–110)
GLUCOSE BLD STRIP.AUTO-MCNC: 192 MG/DL (ref 70–110)
GLUCOSE BLD STRIP.AUTO-MCNC: 203 MG/DL (ref 70–110)
GLUCOSE BLD STRIP.AUTO-MCNC: 233 MG/DL (ref 70–110)
GLUCOSE BLD STRIP.AUTO-MCNC: 97 MG/DL (ref 70–110)
GLUCOSE SERPL-MCNC: 146 MG/DL (ref 74–99)
GLUCOSE SERPL-MCNC: 159 MG/DL (ref 74–99)
GLUCOSE SERPL-MCNC: 169 MG/DL (ref 74–99)
HCT VFR BLD AUTO: 36.3 % (ref 35–45)
HGB BLD-MCNC: 11.4 G/DL (ref 12–16)
IMM GRANULOCYTES # BLD AUTO: 0.1 K/UL (ref 0–0.04)
IMM GRANULOCYTES NFR BLD AUTO: 1 % (ref 0–0.5)
LYMPHOCYTES # BLD: 2.6 K/UL (ref 0.9–3.6)
LYMPHOCYTES NFR BLD: 24 % (ref 21–52)
MAGNESIUM SERPL-MCNC: 2.5 MG/DL (ref 1.6–2.6)
MCH RBC QN AUTO: 26.8 PG (ref 24–34)
MCHC RBC AUTO-ENTMCNC: 31.4 G/DL (ref 31–37)
MCV RBC AUTO: 85.4 FL (ref 78–100)
MONOCYTES # BLD: 0.6 K/UL (ref 0.05–1.2)
MONOCYTES NFR BLD: 5 % (ref 3–10)
NEUTS SEG # BLD: 7.7 K/UL (ref 1.8–8)
NEUTS SEG NFR BLD: 70 % (ref 40–73)
NRBC # BLD: 0 K/UL (ref 0–0.01)
NRBC BLD-RTO: 0 PER 100 WBC
PHOSPHATE SERPL-MCNC: 2.7 MG/DL (ref 2.5–4.9)
PLATELET # BLD AUTO: 406 K/UL (ref 135–420)
PMV BLD AUTO: 10.6 FL (ref 9.2–11.8)
POTASSIUM SERPL-SCNC: 4 MMOL/L (ref 3.5–5.5)
POTASSIUM SERPL-SCNC: 4 MMOL/L (ref 3.5–5.5)
POTASSIUM SERPL-SCNC: 4.1 MMOL/L (ref 3.5–5.5)
RBC # BLD AUTO: 4.25 M/UL (ref 4.2–5.3)
SERVICE CMNT-IMP: NORMAL
SODIUM SERPL-SCNC: 135 MMOL/L (ref 136–145)
SODIUM SERPL-SCNC: 135 MMOL/L (ref 136–145)
SODIUM SERPL-SCNC: 138 MMOL/L (ref 136–145)
T3FREE SERPL-MCNC: 0.9 PG/ML (ref 2.18–3.98)
TSH SERPL DL<=0.05 MIU/L-ACNC: 29.5 UIU/ML (ref 0.36–3.74)
WBC # BLD AUTO: 11 K/UL (ref 4.6–13.2)

## 2023-03-06 PROCEDURE — 6360000002 HC RX W HCPCS: Performed by: INTERNAL MEDICINE

## 2023-03-06 PROCEDURE — 80048 BASIC METABOLIC PNL TOTAL CA: CPT

## 2023-03-06 PROCEDURE — 2500000003 HC RX 250 WO HCPCS: Performed by: STUDENT IN AN ORGANIZED HEALTH CARE EDUCATION/TRAINING PROGRAM

## 2023-03-06 PROCEDURE — 2580000003 HC RX 258: Performed by: NURSE PRACTITIONER

## 2023-03-06 PROCEDURE — 6370000000 HC RX 637 (ALT 250 FOR IP)

## 2023-03-06 PROCEDURE — 2580000003 HC RX 258: Performed by: INTERNAL MEDICINE

## 2023-03-06 PROCEDURE — 2500000003 HC RX 250 WO HCPCS: Performed by: INTERNAL MEDICINE

## 2023-03-06 PROCEDURE — 2500000003 HC RX 250 WO HCPCS

## 2023-03-06 PROCEDURE — 6360000002 HC RX W HCPCS

## 2023-03-06 PROCEDURE — A4216 STERILE WATER/SALINE, 10 ML: HCPCS | Performed by: INTERNAL MEDICINE

## 2023-03-06 PROCEDURE — 83735 ASSAY OF MAGNESIUM: CPT

## 2023-03-06 PROCEDURE — 99291 CRITICAL CARE FIRST HOUR: CPT | Performed by: INTERNAL MEDICINE

## 2023-03-06 PROCEDURE — 82962 GLUCOSE BLOOD TEST: CPT

## 2023-03-06 PROCEDURE — 36415 COLL VENOUS BLD VENIPUNCTURE: CPT

## 2023-03-06 PROCEDURE — 94761 N-INVAS EAR/PLS OXIMETRY MLT: CPT

## 2023-03-06 PROCEDURE — 84481 FREE ASSAY (FT-3): CPT

## 2023-03-06 PROCEDURE — 2500000003 HC RX 250 WO HCPCS: Performed by: NURSE PRACTITIONER

## 2023-03-06 PROCEDURE — 84443 ASSAY THYROID STIM HORMONE: CPT

## 2023-03-06 PROCEDURE — 2000000000 HC ICU R&B

## 2023-03-06 PROCEDURE — 6370000000 HC RX 637 (ALT 250 FOR IP): Performed by: PHYSICIAN ASSISTANT

## 2023-03-06 PROCEDURE — 84681 ASSAY OF C-PEPTIDE: CPT

## 2023-03-06 PROCEDURE — 84100 ASSAY OF PHOSPHORUS: CPT

## 2023-03-06 PROCEDURE — 85025 COMPLETE CBC W/AUTO DIFF WBC: CPT

## 2023-03-06 PROCEDURE — 2580000003 HC RX 258

## 2023-03-06 PROCEDURE — 6370000000 HC RX 637 (ALT 250 FOR IP): Performed by: INTERNAL MEDICINE

## 2023-03-06 PROCEDURE — 2580000003 HC RX 258: Performed by: STUDENT IN AN ORGANIZED HEALTH CARE EDUCATION/TRAINING PROGRAM

## 2023-03-06 RX ORDER — THIAMINE HYDROCHLORIDE 100 MG/ML
200 INJECTION, SOLUTION INTRAMUSCULAR; INTRAVENOUS DAILY
Status: DISCONTINUED | OUTPATIENT
Start: 2023-03-06 | End: 2023-03-06

## 2023-03-06 RX ORDER — THIAMINE HYDROCHLORIDE 100 MG/ML
200 INJECTION, SOLUTION INTRAMUSCULAR; INTRAVENOUS ONCE
Status: DISCONTINUED | OUTPATIENT
Start: 2023-03-06 | End: 2023-03-06

## 2023-03-06 RX ORDER — THIAMINE HYDROCHLORIDE 100 MG/ML
400 INJECTION, SOLUTION INTRAMUSCULAR; INTRAVENOUS EVERY 8 HOURS
Status: DISCONTINUED | OUTPATIENT
Start: 2023-03-06 | End: 2023-03-06

## 2023-03-06 RX ORDER — LIOTHYRONINE SODIUM 5 UG/1
10 TABLET ORAL
Status: COMPLETED | OUTPATIENT
Start: 2023-03-06 | End: 2023-03-06

## 2023-03-06 RX ORDER — LIOTHYRONINE SODIUM 5 UG/1
5 TABLET ORAL EVERY 8 HOURS
Status: DISCONTINUED | OUTPATIENT
Start: 2023-03-06 | End: 2023-03-09

## 2023-03-06 RX ORDER — THIAMINE HYDROCHLORIDE 100 MG/ML
400 INJECTION, SOLUTION INTRAMUSCULAR; INTRAVENOUS ONCE
Status: DISCONTINUED | OUTPATIENT
Start: 2023-03-08 | End: 2023-03-06

## 2023-03-06 RX ORDER — HEPARIN SODIUM 5000 [USP'U]/ML
5000 INJECTION, SOLUTION INTRAVENOUS; SUBCUTANEOUS EVERY 8 HOURS SCHEDULED
Status: DISCONTINUED | OUTPATIENT
Start: 2023-03-06 | End: 2023-03-17 | Stop reason: HOSPADM

## 2023-03-06 RX ADMIN — GLUCAGON HYDROCHLORIDE 1 MG: 1 INJECTION, POWDER, FOR SOLUTION INTRAMUSCULAR; INTRAVENOUS; SUBCUTANEOUS at 10:56

## 2023-03-06 RX ADMIN — Medication 16 G: at 06:30

## 2023-03-06 RX ADMIN — DEXTROSE MONOHYDRATE: 25 INJECTION, SOLUTION INTRAVENOUS at 14:36

## 2023-03-06 RX ADMIN — HEPARIN SODIUM 5000 UNITS: 5000 INJECTION INTRAVENOUS; SUBCUTANEOUS at 21:09

## 2023-03-06 RX ADMIN — THIAMINE HYDROCHLORIDE 200 MG: 100 INJECTION, SOLUTION INTRAMUSCULAR; INTRAVENOUS at 04:55

## 2023-03-06 RX ADMIN — ATORVASTATIN CALCIUM 40 MG: 40 TABLET, FILM COATED ORAL at 08:49

## 2023-03-06 RX ADMIN — THIAMINE HYDROCHLORIDE 400 MG: 100 INJECTION, SOLUTION INTRAMUSCULAR; INTRAVENOUS at 14:37

## 2023-03-06 RX ADMIN — LIOTHYRONINE SODIUM 10 MCG: 5 TABLET ORAL at 14:37

## 2023-03-06 RX ADMIN — GABAPENTIN 500 MG: 100 CAPSULE ORAL at 08:49

## 2023-03-06 RX ADMIN — GABAPENTIN 500 MG: 100 CAPSULE ORAL at 21:09

## 2023-03-06 RX ADMIN — DEXTROSE MONOHYDRATE: 25 INJECTION, SOLUTION INTRAVENOUS at 23:15

## 2023-03-06 RX ADMIN — TOPIRAMATE 50 MG: 100 TABLET, FILM COATED ORAL at 08:50

## 2023-03-06 RX ADMIN — TOPIRAMATE 50 MG: 100 TABLET, FILM COATED ORAL at 21:09

## 2023-03-06 RX ADMIN — DEXTROSE MONOHYDRATE: 25 INJECTION, SOLUTION INTRAVENOUS at 08:47

## 2023-03-06 RX ADMIN — THIAMINE HYDROCHLORIDE 200 MG: 100 INJECTION, SOLUTION INTRAMUSCULAR; INTRAVENOUS at 05:47

## 2023-03-06 RX ADMIN — LEVOTHYROXINE SODIUM ANHYDROUS 75 MCG: 100 INJECTION, POWDER, LYOPHILIZED, FOR SOLUTION INTRAVENOUS at 21:13

## 2023-03-06 RX ADMIN — Medication 16 G: at 22:36

## 2023-03-06 RX ADMIN — GABAPENTIN 500 MG: 100 CAPSULE ORAL at 14:38

## 2023-03-06 RX ADMIN — HEPARIN SODIUM 5000 UNITS: 5000 INJECTION INTRAVENOUS; SUBCUTANEOUS at 08:50

## 2023-03-06 RX ADMIN — SODIUM CHLORIDE, PRESERVATIVE FREE 10 ML: 5 INJECTION INTRAVENOUS at 08:56

## 2023-03-06 RX ADMIN — SODIUM CHLORIDE, PRESERVATIVE FREE 10 ML: 5 INJECTION INTRAVENOUS at 21:15

## 2023-03-06 RX ADMIN — LIOTHYRONINE SODIUM 5 MCG: 5 TABLET ORAL at 21:09

## 2023-03-06 RX ADMIN — HYDROCORTISONE SODIUM SUCCINATE 100 MG: 100 INJECTION, POWDER, FOR SOLUTION INTRAMUSCULAR; INTRAVENOUS at 18:40

## 2023-03-06 RX ADMIN — HYDROCORTISONE SODIUM SUCCINATE 50 MG: 100 INJECTION, POWDER, FOR SOLUTION INTRAMUSCULAR; INTRAVENOUS at 08:50

## 2023-03-06 RX ADMIN — PANTOPRAZOLE 40 MG: 40 TABLET, DELAYED RELEASE ORAL at 06:33

## 2023-03-06 RX ADMIN — HYDROCORTISONE SODIUM SUCCINATE 50 MG: 100 INJECTION, POWDER, FOR SOLUTION INTRAMUSCULAR; INTRAVENOUS at 02:43

## 2023-03-06 RX ADMIN — ASPIRIN 81 MG CHEWABLE TABLET 81 MG: 81 TABLET CHEWABLE at 08:50

## 2023-03-06 RX ADMIN — HEPARIN SODIUM 5000 UNITS: 5000 INJECTION INTRAVENOUS; SUBCUTANEOUS at 14:38

## 2023-03-06 RX ADMIN — THIAMINE HYDROCHLORIDE 400 MG: 100 INJECTION, SOLUTION INTRAMUSCULAR; INTRAVENOUS at 21:17

## 2023-03-06 RX ADMIN — Medication 16 G: at 04:51

## 2023-03-06 ASSESSMENT — PAIN SCALES - GENERAL
PAINLEVEL_OUTOF10: 0

## 2023-03-06 NOTE — INTERDISCIPLINARY ROUNDS
[facility]  Pulmonary, Critical Care, and Sleep Medicine  Interdisciplinary and Ventilator Weaning Rounds    Patient discussed in morning walking rounds and interdisciplinary rounds. ICU admission day: 03/05/23    Overnight events:   Refractory hypoglycemia  Myxedema without comma  Needed 4  glucose tabs overnight,  Taking PO diet    Assessments and best practice:  Ventilator  Room air  Sedation  N/a  Other pertinent drips  D20 125/hr  Lines noted  PIV  Critical labs assessed  Yes  Antibiotics  N/a  Medications reviewed  Yes  Pending imaging  none  Pending send out labs  Yes  Pending Procedures  None  Glycemic control:  Steroids, D20 gtt  Stress ulcer prophylaxis. Protonix  DVT prophylaxis. SQH  Need for Lines, smith assessed.   Yes  Restraint Reevaluation     Not required      Family contact/MPOA:   Family updated n/a    Palliative consult within 3 days of admission to ICU-  Ethics Guidance: 21 days      Daily Plans:  Decrease D20 rate 125 to 100/hs  PO glucagon 1dose  Hourly BG      VIDYA time 5 minutes        Cas Barros PA-C  03/06/23  Pulmonary, Critical Care Medicine  [facility]

## 2023-03-06 NOTE — PROGRESS NOTES
Urine collection 'hat' placed in pt restroom; pt advised on usage & intent to monitor urine output. Pt verbalized understanding and agreement.

## 2023-03-06 NOTE — PROGRESS NOTES
[facility]. Pulmonary, Critical Care, and Sleep Medicine    Name: Darci Kingston MRN: [id number]   : 1985 Hospital: [address] [place] [address] Dr   Date: 3/6/2023  Admission Date: 3/4/2023     Chart and notes reviewed. Data reviewed. I have evaluated all findings. [x]I have reviewed the flowsheet and previous days notes. []The patient is unable to give any meaningful history or review of systems because the patient is:  []Intubated []Sedated   []Unresponsive      [x]The patient is critically ill on      []Mechanical ventilation []Pressors   []BiPAP []         Interval HPI:  Darci Kingston is a 44 y.o.  female with PMHx of type 2 diabetes on insulin, papillary thyroid carcinoma status post total thyroidectomy with hypothyroidism, chronic back pain, and chronic kidney disease stage III with proteinuria. Patient admitted on 3/4 /2023 to family medicine service after presenting to the ED with flank pain and severe refractory hypoglycemia. The patient had been having hypoglycemia for the prior week. She had not changed her current home medication regimen recently other than a reported change to her thyroid regimen, which she cannot recall. Patient was initially admitted to SDU for d10 gtt. Over the day of her admission she required multiple pushes of d50 and runs of d10 with persistent BG in the 50s. Subsequently patient was transferred to ICU for further care and close monitoring of BG. Notable labs of TSH of 49.5 and T4 free is 0.2        Subjective 23  Hospital Day: 2, ICU Day 1  Vent Day: N/A    Overnight events: The patient was having numerous episodes of hypoglycemia, requiring PO glucagon and increased rate of her D10 to 125ml/hr.       Mentation/Activity: Awake, alert, no requirement of sedation  Respiratory/ Secretions: Room air, no hypoxia  Hemodynamics: HDS  Urine output, bowel: patient has bathroom privileges and urine output was not measured, however, patient urinated x5   Diet: Regular  Need for procedures: none              ROS:Constitutional: negative, no fevers/chills  Ears, nose, mouth, throat, and face: no sore throat, negative  Respiratory: no shortness of breath  Cardiovascular: no chest pain, swelling of lower extremities  Gastrointestinal: no abdominal pain, no nausea/vomiting/diarrhea, patient eating well  Genitourinary:no urinary urgency, no incontinence, no dysuria, no difficulty urinating  Hematologic/lymphatic: negative  Musculoskeletal:pain in her bilateral lower feet that is chronic for her  Neurological: numbness in her feet that is chronic for her  Behavioral/Psych: negative  Endocrine: negative    Events, medications, imaging, and notes from last 24 hours reviewed.     Patient Active Problem List   Diagnosis    Obesity (BMI 30-39.9)    Seizures (HCC)    Hyperglycemia    Vaginosis    Obesity, morbid (HCC)    Hypoglycemia    Migraines       Vital Signs:  /69   Pulse (!) 101   Temp 98.7 °F (37.1 °C) (Oral)   Resp 22   Wt 286 lb 9.6 oz (130 kg)   SpO2 98%   BMI 42.32 kg/m²             Temp (24hrs), Av.2 °F (36.8 °C), Min:97.5 °F (36.4 °C), Max:98.7 °F (37.1 °C)       Intake/Output:   Last shift:      No intake/output data recorded.  Last 3 shifts:  1901 -  0700  In: 4479 [P.O.:604; I.V.:3575]  Out: -     Intake/Output Summary (Last 24 hours) at 3/6/2023 0736  Last data filed at 3/6/2023 0614  Gross per 24 hour   Intake 3875 ml   Output --   Net 3875 ml            Telemetry: [x]Sinus []A-flutter []Paced    []A-fib []Multiple PVC’s                  Physical Exam:      General: Awake, alert, no acute distress, sitting up on edge of bed with crackers and peanut butter at bedside  HEENT:  Anicteric sclerae; pink palpebral conjunctivae; mucosa moist  Resp:  Symmetrical chest expansion, no accessory muscle use; good airway entry; no rales/ wheezing/ rhonchi noted  CV:  S1, S2 present; regular rate and rhythm  GI:  Abdomen soft,  non-tender; (+) active bowel sounds  Extremities:  +2 pulses upper extremities and lower extremities difficult to palpate secondary to swelling although palpable; bilateral lower feet with swelling, good capillary refill, no rashes or cyanosis  Skin:  Warm; no rashes/ lesions noted, normal turgor/cap refill   Neurologic:  Awake, alert and oriented to person, place, and time. No focal deficits.    Devices:  PICC line in place      DATA:  MAR reviewed and pertinent medications noted or modified as needed    Labs:    Recent Labs     03/06/23  0109 03/05/23  0231 03/04/23  1438   WBC 11.0 10.5 9.8   HGB 11.4* 11.6* 12.5   HCT 36.3 37.4 39.2   MCV 85.4 87.0 84.5    373 376      Lab Results   Component Value Date/Time     03/06/2023 01:09 AM    K 4.0 03/06/2023 01:09 AM     03/06/2023 01:09 AM    CO2 21 03/06/2023 01:09 AM    BUN 13 03/06/2023 01:09 AM    CREATININE 1.45 03/06/2023 01:09 AM    GLUCOSE 159 03/06/2023 01:09 AM    CALCIUM 8.5 03/06/2023 01:09 AM       Lab Results   Component Value Date    ALT 24 03/04/2023    AST 25 03/04/2023    ALKPHOS 77 03/04/2023    BILITOT 0.3 03/04/2023      Lab Results   Component Value Date    DDIMER 0.55 (H) 01/01/2023      Lab Results   Component Value Date    TSH 0.04 (L) 05/16/2021    RZB0AKW 49.50 (H) 03/05/2023      Hemoglobin A1C   Date Value Ref Range Status   07/05/2022 12.5 (H) 4.2 - 5.6 % Final     Comment:     (NOTE)  HbA1C Interpretive Ranges  <5.7              Normal  5.7 - 6.4         Consider Prediabetes  >6.5              Consider Diabetes        Lab Results   Component Value Date    APTT 26.6 07/30/2020      Lab Results   Component Value Date    INR 1.0 05/02/2022    INR 1.0 07/30/2020    PROTIME 13.6 05/02/2022    PROTIME 13.1 07/30/2020      Results       Procedure Component Value Units Date/Time    Respiratory Panel, Molecular, with COVID-19 (Restricted: peds pts or suitable admitted adults) [[id number]] Collected: 03/04/23 1615    Order Status: Completed Specimen: Nasopharyngeal Updated: 03/04/23 1724     Adenovirus by PCR Not detected        Coronavirus 229E by PCR Not detected        Coronavirus HKU1 by PCR Not detected        Coronavirus NL63 by PCR Not detected        Coronavirus OC43 by PCR Not detected        SARS-CoV-2, PCR Not detected        Human Metapneumovirus by PCR Not detected        Rhinovirus Enterovirus PCR Not detected        Influenza A by PCR Not detected        Influenza A H1 by PCR Not detected        Influenza A H3 by PCR Not detected        Influenza A H1-2009 by PCR Not detected        Influenza B PCR Not detected        Parainfluenza 1 PCR Not detected        Parainfluenza 2 PCR Not detected        Parainfluenza 3 PCR Not detected        Parainfluenza 4 PCR Not detected        Respiratory Syncytial Virus by PCR Not detected        Bordetella parapertussis by PCR Not detected        Bordetella pertussis by PCR Not detected        Chlamydophila Pneumonia PCR Not detected        Mycoplasma pneumo by PCR Not detected       Culture, Urine [[id number]] Collected: 03/04/23 1548    Order Status: Completed Specimen: Urine, clean catch Updated: 03/06/23 0643     Special Requests NO SPECIAL REQUESTS        [place] count --        01959  COLONIES/mL       Culture       MIXED UROGENITAL DINA ISOLATED                       Imaging:  [x]   I have personally reviewed the patients radiographs and reports  Most recent imaging:  XR CHEST PORTABLE    Result Date: 3/4/2023  No evidence of acute pulmonary disease. CT CHEST ABDOMEN PELVIS WO CONTRAST Additional Contrast? None    Result Date: 3/4/2023  No acute findings. Incidentals as above. No results found for this or any previous visit. No valid procedures specified.        IMPRESSION:   Severe refractory hypoglycemia in setting of possible myxedema, without coma, versus medication induced versus thyroid dysfunction versus insulinoma/tumor  Severe hypothyroidism secondary to myxedema without coma versus s/p thyroidectomy 2/2 papillary thyroid CA versus hashimoto's   Nephrotic range proteinuria, suggestive of progressive CKD, and likely 2/2 diabetic nephropathy  History of kidney biopsy with diagnosis of severe glomerular sclerosis with FSGS  Abnormal UA without sings of infection   History of DM Type 2, home regimen Lantus 90u BID, Humalog 10 units before every meal, metformin 500 mg BID, Farxiga 5 mg daily, Trulicity 1.5 units weekly  Right foot pain secondary to diabetic neuropathy versus Charcot foot  History of seizure disorder, home medication Topiramate 50mg BID  History of GERD, home medication Pantoprazole 40mg daily     Patient Active Problem List   Diagnosis    Obesity (BMI 30-39. 9)    Seizures (HCC)    Hyperglycemia    Vaginosis    Obesity, morbid ([address]    Hypoglycemia    Migraines        RECOMMENDATIONS:   Neuro: No sedation requirement. Continue home Topamax 50mg daily for seizures  Pulm: Aspiration precautions, HOB>30 degrees. Incentive spirometer q1h  CVS:Monitor HD, MAP goal >65. Fluids: reduce D10 to 100ml/hr  GI: Regular diet. Home Pantoprazole 40mg daily  Renal: Trend Cr, Strict I/Os  Hem/Onc: Trend H/H, monitor for s/o active bleeding. Daily CBC. I/D:Trend WBCs and temperature curve. Negative viral panel. Metabolic: Thiamine Y1O, Daily BMP, mag, phos. Trend lytes and replace per protocol. Endocrine:Start synthroid IV. Stress dose steroids. Accu checks q1h. Glucagon 1mg PRN  - Consult endocrine. Follow up results c-peptide, proinsulin, insulin free and total, and beta-hydroxybutyrate   Musc/Skin: Pending RLE XR, PT/OT/SLP  Full Code No additional code details  Discussed in interdisciplinary rounds     Best practice :    Glycemic control  IHI ICU bundles:   Central Line Bundle Followed   [facility] Vent patients-    N/A  Sress ulcer prophylaxis. Protonix  DVT prophylaxis. [facility]  Need for Lines assessed.       Attending Non-violent Restraint Reevaluation     The patient does not require restraints at this time. Billie Lindsay MD  03/06/23  PGY-1 EVMS EM Resident        Attending Attestation:  I saw and evaluated the patient, performing the key elements of the service. I discussed the findings, assessment and plan with the resident and agree with the resident's findings and plan as documented in the resident's note. Total of 39 min critical care time spent at bedside (personally) during the course of care providing evaluation,management and care decisions and ordering appropriate treatment related to critical care problems exclusive of procedures. The reason for providing this level of medical care for this critically ill patient was due a critical illness that impaired one or more vital organ systems such that there was a high probability of imminent or life threatening deterioration in the patients condition. This care involved high complexity decision making to assess, manipulate, and support vital system functions, to treat this degree vital organ system failure and to prevent further life threatening deterioration of the patients condition. This time was independent of other practitioners. 36y/o morbidly obese female with PMH of DM on insulin, papillary thyroid carcinoma s/p thyroidectomy, CKD 3, presented to [address] [place] [address] on 3/4 with complaints of flank pain and refractory hypoglycemia. On the floor, patient became more hypoglycemic despite being on D10 drip, patient required multiple pushes of D50 and glucagon, transferred to ICU for management of severe refractory hypoglycemia. Of note, patient has apparent myxedema - T4 near undetectable and TSH of 50. Pt on D20 gtt currently, started on stress dose steroids, will continue 100mg q8h, as well as IV synthroid, and added PO cytomel given ongoing refractory hypoglycemia -- will consult endocrinology for further management. Pt able to tolerate PO, but remains refractory hypoglycemic.   Adjusting insulin - pt on home regimen of 95u of insulin at home. Pt also reports she had new thyroid medication adjustment and switched to taking her thyroid medication is in the evening instead of morning. Will continue to monitor, unlikely has insulinoma given current picture. Continue supportive care with frequent glucose checks -- high risk for hypoglycemic event.     Guarded prognosis      Santana Vincent MD/MPH     Pulmonary, Critical Care Medicine  [facility]

## 2023-03-06 NOTE — PROGRESS NOTES
conducted an initial consultation and Spiritual Assessment for Promise Mazariegos, who is a 44 y.o.,female. Patient's Primary Language is: [place]. According to the patient's EMR Restorationist Affiliation is: [facility].     The reason the Patient came to the hospital is:   Patient Active Problem List    Diagnosis Date Noted    Myxedema 95/16/4750    Metabolic syndrome 40/20/3624    Type 2 diabetes mellitus with hyperglycemia, with long-term current use of insulin ([facility] 03/06/2023    Obesity, morbid ([facility] 11/05/2018    Hypoglycemia 05/13/2016    Hyperglycemia 09/01/2013    Vaginosis 09/01/2013    Obesity (BMI 30-39.9) 07/12/2012    Seizures ([facility] 07/12/2012    Migraines 07/12/2012        The  provided the following Interventions:  Initiated a relationship of care and support. Listened empathically. Provided information about Spiritual Care Services. Chart reviewed. The following outcomes where achieved:   confirmed Patient's Restorationist Affiliation. Patient expressed gratitude for 's visit. Assessment:  Patient does not have any Yarsanism/cultural needs that will affect patient's preferences in health care. There are no spiritual or Yarsanism issues which require intervention at this time. Plan:  Chaplains will continue to follow and will provide pastoral care on an as needed/requested basis.  recommends bedside caregivers page  on duty if patient shows signs of acute spiritual or emotional distress.     [address]   [phone]

## 2023-03-06 NOTE — PLAN OF CARE
Problem: Discharge Planning  Goal: Discharge to home or other facility with appropriate resources  3/5/2023 2251 by Barbara Flanagan RN  Outcome: Progressing  3/5/2023 1041 by Yari Recio RN  Outcome: Progressing  3/5/2023 0915 by Suzanne Acuna RN  Outcome: Progressing     Problem: Chronic Conditions and Co-morbidities  Goal: Patient's chronic conditions and co-morbidity symptoms are monitored and maintained or improved  3/5/2023 2251 by Barbara Flanagan RN  Outcome: Progressing  3/5/2023 1041 by Yari Recio RN  Outcome: Progressing  3/5/2023 0915 by Suzanne Acuna RN  Outcome: Progressing     Problem: Safety - Adult  Goal: Free from fall injury  3/5/2023 2251 by Barbara Flanagan RN  Outcome: Progressing  3/5/2023 1041 by Yari Recio RN  Outcome: Progressing  3/5/2023 0915 by Suzanne Acuna RN  Outcome: Progressing     Problem: Pain  Goal: Verbalizes/displays adequate comfort level or baseline comfort level  3/5/2023 2251 by Barbara Flanagan RN  Outcome: Progressing  3/5/2023 1041 by Yari Recio RN  Outcome: Progressing  3/5/2023 0915 by Suzanne Acuna RN  Outcome: Progressing     Problem: Skin/Tissue Integrity  Goal: Absence of new skin breakdown  Description: 1. Monitor for areas of redness and/or skin breakdown  2. Assess vascular access sites hourly  3. Every 4-6 hours minimum:  Change oxygen saturation probe site  4. Every 4-6 hours:  If on nasal continuous positive airway pressure, respiratory therapy assess nares and determine need for appliance change or resting period.   3/5/2023 2251 by Barbara Flanagan RN  Outcome: Progressing  3/5/2023 1041 by Yari Recio RN  Outcome: Progressing  3/5/2023 0915 by Suzanne Acuna RN  Outcome: Progressing

## 2023-03-07 LAB
ANION GAP SERPL CALC-SCNC: 3 MMOL/L (ref 3–18)
ANION GAP SERPL CALC-SCNC: 4 MMOL/L (ref 3–18)
BASOPHILS # BLD: 0.1 K/UL (ref 0–0.1)
BASOPHILS NFR BLD: 0 % (ref 0–2)
BUN SERPL-MCNC: 14 MG/DL (ref 7–18)
BUN SERPL-MCNC: 16 MG/DL (ref 7–18)
BUN/CREAT SERPL: 11 (ref 12–20)
BUN/CREAT SERPL: 14 (ref 12–20)
C PEPTIDE SERPL-MCNC: 0.4 NG/ML (ref 1.1–4.4)
C PEPTIDE SERPL-MCNC: 0.9 NG/ML (ref 1.1–4.4)
CALCIUM SERPL-MCNC: 8 MG/DL (ref 8.5–10.1)
CALCIUM SERPL-MCNC: 8.2 MG/DL (ref 8.5–10.1)
CHLORIDE SERPL-SCNC: 109 MMOL/L (ref 100–111)
CHLORIDE SERPL-SCNC: 112 MMOL/L (ref 100–111)
CO2 SERPL-SCNC: 23 MMOL/L (ref 21–32)
CO2 SERPL-SCNC: 23 MMOL/L (ref 21–32)
CREAT SERPL-MCNC: 1.17 MG/DL (ref 0.6–1.3)
CREAT SERPL-MCNC: 1.32 MG/DL (ref 0.6–1.3)
DIFFERENTIAL METHOD BLD: ABNORMAL
EOSINOPHIL # BLD: 0 K/UL (ref 0–0.4)
EOSINOPHIL NFR BLD: 0 % (ref 0–5)
ERYTHROCYTE [DISTWIDTH] IN BLOOD BY AUTOMATED COUNT: 14.6 % (ref 11.6–14.5)
GLUCOSE BLD STRIP.AUTO-MCNC: 104 MG/DL (ref 70–110)
GLUCOSE BLD STRIP.AUTO-MCNC: 112 MG/DL (ref 70–110)
GLUCOSE BLD STRIP.AUTO-MCNC: 112 MG/DL (ref 70–110)
GLUCOSE BLD STRIP.AUTO-MCNC: 113 MG/DL (ref 70–110)
GLUCOSE BLD STRIP.AUTO-MCNC: 117 MG/DL (ref 70–110)
GLUCOSE BLD STRIP.AUTO-MCNC: 118 MG/DL (ref 70–110)
GLUCOSE BLD STRIP.AUTO-MCNC: 121 MG/DL (ref 70–110)
GLUCOSE BLD STRIP.AUTO-MCNC: 124 MG/DL (ref 70–110)
GLUCOSE BLD STRIP.AUTO-MCNC: 128 MG/DL (ref 70–110)
GLUCOSE BLD STRIP.AUTO-MCNC: 141 MG/DL (ref 70–110)
GLUCOSE BLD STRIP.AUTO-MCNC: 157 MG/DL (ref 70–110)
GLUCOSE BLD STRIP.AUTO-MCNC: 165 MG/DL (ref 70–110)
GLUCOSE BLD STRIP.AUTO-MCNC: 166 MG/DL (ref 70–110)
GLUCOSE BLD STRIP.AUTO-MCNC: 174 MG/DL (ref 70–110)
GLUCOSE BLD STRIP.AUTO-MCNC: 174 MG/DL (ref 70–110)
GLUCOSE BLD STRIP.AUTO-MCNC: 179 MG/DL (ref 70–110)
GLUCOSE BLD STRIP.AUTO-MCNC: 180 MG/DL (ref 70–110)
GLUCOSE BLD STRIP.AUTO-MCNC: 195 MG/DL (ref 70–110)
GLUCOSE BLD STRIP.AUTO-MCNC: 198 MG/DL (ref 70–110)
GLUCOSE BLD STRIP.AUTO-MCNC: 209 MG/DL (ref 70–110)
GLUCOSE BLD STRIP.AUTO-MCNC: 226 MG/DL (ref 70–110)
GLUCOSE BLD STRIP.AUTO-MCNC: 79 MG/DL (ref 70–110)
GLUCOSE SERPL-MCNC: 118 MG/DL (ref 74–99)
GLUCOSE SERPL-MCNC: 222 MG/DL (ref 74–99)
HCT VFR BLD AUTO: 34.6 % (ref 35–45)
HGB BLD-MCNC: 10.7 G/DL (ref 12–16)
IMM GRANULOCYTES # BLD AUTO: 0.2 K/UL (ref 0–0.04)
IMM GRANULOCYTES NFR BLD AUTO: 1 % (ref 0–0.5)
LYMPHOCYTES # BLD: 5.1 K/UL (ref 0.9–3.6)
LYMPHOCYTES NFR BLD: 34 % (ref 21–52)
MAGNESIUM SERPL-MCNC: 2.4 MG/DL (ref 1.6–2.6)
MCH RBC QN AUTO: 27.3 PG (ref 24–34)
MCHC RBC AUTO-ENTMCNC: 30.9 G/DL (ref 31–37)
MCV RBC AUTO: 88.3 FL (ref 78–100)
MONOCYTES # BLD: 1 K/UL (ref 0.05–1.2)
MONOCYTES NFR BLD: 7 % (ref 3–10)
NEUTS SEG # BLD: 8.5 K/UL (ref 1.8–8)
NEUTS SEG NFR BLD: 57 % (ref 40–73)
NRBC # BLD: 0 K/UL (ref 0–0.01)
NRBC BLD-RTO: 0 PER 100 WBC
PHOSPHATE SERPL-MCNC: 2.5 MG/DL (ref 2.5–4.9)
PLATELET # BLD AUTO: 400 K/UL (ref 135–420)
PMV BLD AUTO: 10 FL (ref 9.2–11.8)
POTASSIUM SERPL-SCNC: 3.3 MMOL/L (ref 3.5–5.5)
POTASSIUM SERPL-SCNC: 4.4 MMOL/L (ref 3.5–5.5)
RBC # BLD AUTO: 3.92 M/UL (ref 4.2–5.3)
SODIUM SERPL-SCNC: 135 MMOL/L (ref 136–145)
SODIUM SERPL-SCNC: 139 MMOL/L (ref 136–145)
T3FREE SERPL-MCNC: 1.1 PG/ML (ref 2.18–3.98)
WBC # BLD AUTO: 14.8 K/UL (ref 4.6–13.2)

## 2023-03-07 PROCEDURE — 84100 ASSAY OF PHOSPHORUS: CPT

## 2023-03-07 PROCEDURE — 2000000000 HC ICU R&B

## 2023-03-07 PROCEDURE — A4216 STERILE WATER/SALINE, 10 ML: HCPCS

## 2023-03-07 PROCEDURE — 83735 ASSAY OF MAGNESIUM: CPT

## 2023-03-07 PROCEDURE — 6370000000 HC RX 637 (ALT 250 FOR IP): Performed by: PHYSICIAN ASSISTANT

## 2023-03-07 PROCEDURE — 2580000003 HC RX 258: Performed by: NURSE PRACTITIONER

## 2023-03-07 PROCEDURE — 6370000000 HC RX 637 (ALT 250 FOR IP)

## 2023-03-07 PROCEDURE — 80048 BASIC METABOLIC PNL TOTAL CA: CPT

## 2023-03-07 PROCEDURE — 2580000003 HC RX 258

## 2023-03-07 PROCEDURE — 2500000003 HC RX 250 WO HCPCS: Performed by: NURSE PRACTITIONER

## 2023-03-07 PROCEDURE — 6360000002 HC RX W HCPCS: Performed by: PHYSICIAN ASSISTANT

## 2023-03-07 PROCEDURE — 2580000003 HC RX 258: Performed by: INTERNAL MEDICINE

## 2023-03-07 PROCEDURE — 99291 CRITICAL CARE FIRST HOUR: CPT | Performed by: INTERNAL MEDICINE

## 2023-03-07 PROCEDURE — 84481 FREE ASSAY (FT-3): CPT

## 2023-03-07 PROCEDURE — 85025 COMPLETE CBC W/AUTO DIFF WBC: CPT

## 2023-03-07 PROCEDURE — 94761 N-INVAS EAR/PLS OXIMETRY MLT: CPT

## 2023-03-07 PROCEDURE — 6360000002 HC RX W HCPCS: Performed by: INTERNAL MEDICINE

## 2023-03-07 PROCEDURE — 86376 MICROSOMAL ANTIBODY EACH: CPT

## 2023-03-07 PROCEDURE — 2500000003 HC RX 250 WO HCPCS

## 2023-03-07 PROCEDURE — 82962 GLUCOSE BLOOD TEST: CPT

## 2023-03-07 PROCEDURE — 36415 COLL VENOUS BLD VENIPUNCTURE: CPT

## 2023-03-07 PROCEDURE — 83525 ASSAY OF INSULIN: CPT

## 2023-03-07 PROCEDURE — 6360000002 HC RX W HCPCS

## 2023-03-07 PROCEDURE — 6370000000 HC RX 637 (ALT 250 FOR IP): Performed by: INTERNAL MEDICINE

## 2023-03-07 RX ORDER — DEXTROSE 20 G/100ML
INJECTION, SOLUTION INTRAVENOUS CONTINUOUS
Status: DISCONTINUED | OUTPATIENT
Start: 2023-03-07 | End: 2023-03-11

## 2023-03-07 RX ORDER — DIPHENHYDRAMINE HYDROCHLORIDE 50 MG/ML
25 INJECTION INTRAMUSCULAR; INTRAVENOUS ONCE
Status: COMPLETED | OUTPATIENT
Start: 2023-03-07 | End: 2023-03-07

## 2023-03-07 RX ORDER — LEVOTHYROXINE SODIUM 0.12 MG/1
125 TABLET ORAL DAILY
Status: DISCONTINUED | OUTPATIENT
Start: 2023-03-08 | End: 2023-03-07

## 2023-03-07 RX ORDER — POTASSIUM CHLORIDE 7.45 MG/ML
10 INJECTION INTRAVENOUS
Status: COMPLETED | OUTPATIENT
Start: 2023-03-07 | End: 2023-03-07

## 2023-03-07 RX ORDER — KETOROLAC TROMETHAMINE 15 MG/ML
15 INJECTION, SOLUTION INTRAMUSCULAR; INTRAVENOUS ONCE
Status: COMPLETED | OUTPATIENT
Start: 2023-03-07 | End: 2023-03-07

## 2023-03-07 RX ADMIN — POTASSIUM CHLORIDE 10 MEQ: 7.45 INJECTION INTRAVENOUS at 04:44

## 2023-03-07 RX ADMIN — LEVOTHYROXINE SODIUM ANHYDROUS 50 MCG: 100 INJECTION, POWDER, LYOPHILIZED, FOR SOLUTION INTRAVENOUS at 13:57

## 2023-03-07 RX ADMIN — DIPHENHYDRAMINE HYDROCHLORIDE 25 MG: 50 INJECTION, SOLUTION INTRAMUSCULAR; INTRAVENOUS at 20:21

## 2023-03-07 RX ADMIN — POTASSIUM CHLORIDE 10 MEQ: 7.45 INJECTION INTRAVENOUS at 06:21

## 2023-03-07 RX ADMIN — LIOTHYRONINE SODIUM 5 MCG: 5 TABLET ORAL at 04:45

## 2023-03-07 RX ADMIN — KETOROLAC TROMETHAMINE 15 MG: 15 INJECTION, SOLUTION INTRAMUSCULAR; INTRAVENOUS at 16:13

## 2023-03-07 RX ADMIN — GABAPENTIN 500 MG: 100 CAPSULE ORAL at 08:24

## 2023-03-07 RX ADMIN — SODIUM CHLORIDE, PRESERVATIVE FREE 10 ML: 5 INJECTION INTRAVENOUS at 08:27

## 2023-03-07 RX ADMIN — GABAPENTIN 500 MG: 100 CAPSULE ORAL at 13:02

## 2023-03-07 RX ADMIN — ACETAMINOPHEN 650 MG: 325 TABLET ORAL at 20:32

## 2023-03-07 RX ADMIN — SODIUM CHLORIDE, PRESERVATIVE FREE 10 ML: 5 INJECTION INTRAVENOUS at 20:31

## 2023-03-07 RX ADMIN — HYDROCORTISONE SODIUM SUCCINATE 100 MG: 100 INJECTION, POWDER, FOR SOLUTION INTRAMUSCULAR; INTRAVENOUS at 17:12

## 2023-03-07 RX ADMIN — GABAPENTIN 500 MG: 100 CAPSULE ORAL at 20:30

## 2023-03-07 RX ADMIN — POTASSIUM BICARBONATE 40 MEQ: 782 TABLET, EFFERVESCENT ORAL at 11:53

## 2023-03-07 RX ADMIN — HEPARIN SODIUM 5000 UNITS: 5000 INJECTION INTRAVENOUS; SUBCUTANEOUS at 13:01

## 2023-03-07 RX ADMIN — HEPARIN SODIUM 5000 UNITS: 5000 INJECTION INTRAVENOUS; SUBCUTANEOUS at 04:45

## 2023-03-07 RX ADMIN — POTASSIUM CHLORIDE 10 MEQ: 7.45 INJECTION INTRAVENOUS at 05:17

## 2023-03-07 RX ADMIN — HYDROCORTISONE SODIUM SUCCINATE 100 MG: 100 INJECTION, POWDER, FOR SOLUTION INTRAMUSCULAR; INTRAVENOUS at 09:54

## 2023-03-07 RX ADMIN — ACETAMINOPHEN 650 MG: 325 TABLET ORAL at 13:02

## 2023-03-07 RX ADMIN — ATORVASTATIN CALCIUM 40 MG: 40 TABLET, FILM COATED ORAL at 08:24

## 2023-03-07 RX ADMIN — THIAMINE HYDROCHLORIDE 400 MG: 100 INJECTION, SOLUTION INTRAMUSCULAR; INTRAVENOUS at 04:47

## 2023-03-07 RX ADMIN — ASPIRIN 81 MG CHEWABLE TABLET 81 MG: 81 TABLET CHEWABLE at 08:24

## 2023-03-07 RX ADMIN — Medication 16 G: at 04:35

## 2023-03-07 RX ADMIN — POTASSIUM CHLORIDE 10 MEQ: 7.45 INJECTION INTRAVENOUS at 08:22

## 2023-03-07 RX ADMIN — LIOTHYRONINE SODIUM 5 MCG: 5 TABLET ORAL at 13:02

## 2023-03-07 RX ADMIN — DEXTROSE MONOHYDRATE: 25 INJECTION, SOLUTION INTRAVENOUS at 12:55

## 2023-03-07 RX ADMIN — PANTOPRAZOLE 40 MG: 40 TABLET, DELAYED RELEASE ORAL at 08:24

## 2023-03-07 RX ADMIN — HYDROCORTISONE SODIUM SUCCINATE 100 MG: 100 INJECTION, POWDER, FOR SOLUTION INTRAMUSCULAR; INTRAVENOUS at 02:25

## 2023-03-07 RX ADMIN — LIOTHYRONINE SODIUM 5 MCG: 5 TABLET ORAL at 22:00

## 2023-03-07 RX ADMIN — HEPARIN SODIUM 5000 UNITS: 5000 INJECTION INTRAVENOUS; SUBCUTANEOUS at 22:02

## 2023-03-07 RX ADMIN — DEXTROSE: 20 INJECTION, SOLUTION INTRAVENOUS at 21:30

## 2023-03-07 RX ADMIN — TOPIRAMATE 50 MG: 100 TABLET, FILM COATED ORAL at 08:24

## 2023-03-07 RX ADMIN — ACETAMINOPHEN 650 MG: 325 TABLET ORAL at 02:25

## 2023-03-07 RX ADMIN — TOPIRAMATE 50 MG: 100 TABLET, FILM COATED ORAL at 20:30

## 2023-03-07 ASSESSMENT — PAIN SCALES - GENERAL
PAINLEVEL_OUTOF10: 0
PAINLEVEL_OUTOF10: 2
PAINLEVEL_OUTOF10: 0

## 2023-03-07 ASSESSMENT — PAIN DESCRIPTION - ORIENTATION: ORIENTATION: ANTERIOR

## 2023-03-07 ASSESSMENT — PAIN DESCRIPTION - DESCRIPTORS: DESCRIPTORS: ACHING

## 2023-03-07 ASSESSMENT — PAIN DESCRIPTION - LOCATION: LOCATION: HEAD

## 2023-03-07 NOTE — PROGRESS NOTES
Ketorolac 15 mg was therapeutically interchanged for ketorolac 30 mg per the P&T Committee approved Therapeutic Interchanges Policy.

## 2023-03-07 NOTE — INTERDISCIPLINARY ROUNDS
[facility]  Pulmonary, Critical Care, and Sleep Medicine  Interdisciplinary and Ventilator Weaning Rounds    Patient discussed in morning walking rounds and interdisciplinary rounds. ICU admission day: 03/05/23    Overnight events:   Refractory hypoglycemia,   Stable BG overnight on D20 at 100/hr  Myxedema without comma  Needed  glucose tabs overnight,  Taking PO diet    Assessments and best practice:  Ventilator  Room air  Sedation  N/a  Other pertinent drips  D20 125/hr  Lines noted  PIV  Critical labs assessed  Yes  Antibiotics  N/a  Medications reviewed  Yes  Pending imaging  none  Pending send out labs  Yes  Pending Procedures  None  Glycemic control:  Steroids, D20 gtt  Stress ulcer prophylaxis. Protonix  DVT prophylaxis. SQH  Need for Lines, smith assessed.   Yes  Restraint Reevaluation     Not required      Family contact/MPOA:   Family updated n/a    Palliative consult within 3 days of admission to ICU-  Ethics Guidance: 21 days      Daily Plans:  T4 daily  Additional levothyroxine 50 today and 125 daily per endo  40 EfferK  Hourly BG      VIDYA time 5 minutes        Alaina Allen PA-C  03/07/23  Pulmonary, Critical Care Medicine  [facility]

## 2023-03-07 NOTE — PROGRESS NOTES
edside and Verbal shift change report given to Emily Patton (oncoming nurse) by Jody Marquez RN (offgoing nurse). Report included the following information SBAR, Kardex, MAR and Recent Results. SITUATION:   Code Status: Full Code  Reason for Admission: Orthopnea [R06.01]  Hypoglycemia [E16.2]  Flank pain [R10.9]    Hospital day: 3  Problem List:     [unfilled]    BACKGROUND:    Past Medical History:   Past Medical History:   Diagnosis Date    Arthritis     Chest pain     Diabetes ([facility]     Essential hypertension     Headache(784.0)     Hyperchloremia     Hypertension     Seizures ([facility]     8/2020 last seizure    Seizures ([facility]     SOB (shortness of breath)     Thyroid cancer ([facility]          Patient taking anticoagulants  Yes      ASSESSMENT:   Changes in Assessment Throughout Shift: No    Patient has Central Line:  Midline  Reasons if yes: Access required  Patient has Marshall Cath:  No  Reasons if yes: Bathroom   Last Vitals:   [unfilled]    IV and DRAINS (will only show if present)        WOUND (if present)   Wound Type:   No   Dressing present     Wound Concerns/Notes:   No    PAIN    Pain Assessment                      Interventions for Pain:   No  Intervention effective:  N/A  Time of last intervention: No   Reassessment Completed:  No      Last 3 Weights:  [unfilled]  Weight change:     INTAKE/OUPUT    Current Shift: No intake/output data recorded. Last three shifts: 03/05 1901 - 03/07 0700  In: 5410 [P.O.:500;  I.V.:4210]  Out: 1900 [Urine:1900]    LAB RESULTS     Recent Labs     03/05/23  0231 03/06/23  0109 03/07/23  0219   WBC 10.5 11.0 14.8*   HGB 11.6* 11.4* 10.7*   HCT 37.4 36.3 34.6*    406 400        Recent Labs     03/05/23  1728 03/06/23  0109 03/06/23  0956 03/06/23  1640 03/07/23  0219   * 135* 135* 138 139   K 4.0 4.0 4.0 4.1 3.3*   BUN 11 13 13 16 16   MG 2.4 2.5  --   --  2.4       RECOMMENDATIONS AND DISCHARGE PLANNING     Pending tests/procedures/ Plan of Care or Other Needs: See Provider Notes     Discharge plan for patient and Needs/Barriers: TBD    Estimated Discharge Date: TBD Posted on Whiteboard in Patients Room:  No       4. The patient's care plan was reviewed with the oncoming nurse. \"HEALS\" SAFETY CHECK      Fall Risk         Safety Measures:      A safety check occurred in the patient's room between off going nurse and oncoming nurse listed above. The safety check included the below items  Area Items   H  High Alert Medications Verify all high alert medication drips (heparin, PCA, etc.)   E  Equipment Suction is set up for ALL patients (with jorge)  Red plugs utilized for all equipment (IV pumps, etc.)  WOWs wiped down at end of shift. Room stocked with oxygen, suction, and other unit-specific supplies   A  Alarms Bed alarm is set for fall risk patients  Ensure chair alarm is in place and activated if patient is up in a chair   L  Lines Check IV for any infiltration  Marshall bag is empty if patient has a Marshall   Tubing and IV bags are labeled   S  Safety   Room is clean, patient is clean, and equipment is clean. Hallways are clear from equipment besides carts. Fall bracelet on for fall risk patients  Ensure room is clear and free of clutter  Suction is set up for ALL patients (with jorge)  Hallways are clear from equipment besides carts.    Isolation precautions followed, supplies available outside room, sign posted     Elio Mei RN

## 2023-03-07 NOTE — CONSULTS
Pt examined. Chart reviewed. I think the patient's hypoglycemia can be attributed to her hypothyroidism and stacking of her insulin. Hypothyroidism will usually decrease the insulin requirement. She has been on either too low a dose of thyroxine, or has not been taking it reliably. Recommend:  Synthroid  mcg daily. Check free T4 daily to monitor treatment. Taper off dextrose based on the blood sugar. Restart insulin gingerly with low doses once hyperglycemia returns. Full consult to follow.

## 2023-03-07 NOTE — PROGRESS NOTES
[facility]. Pulmonary, Critical Care, and Sleep Medicine    Name: Margie Sutherland MRN: [id number]   : 1985 Hospital: [address] [place] [address] Dr   Date: 3/7/2023  Admission Date: 3/4/2023     Chart and notes reviewed. Data reviewed. I have evaluated all findings. [x]I have reviewed the flowsheet and previous days notes. []The patient is unable to give any meaningful history or review of systems because the patient is:  []Intubated []Sedated   []Unresponsive      [x]The patient is critically ill on      []Mechanical ventilation []Pressors   []BiPAP [x]D20 drip         Interval HPI:  Margie Sutherland is a 44 y.o.  female with PMHx of type 2 diabetes on insulin, papillary thyroid carcinoma status post total thyroidectomy with hypothyroidism, chronic back pain, and chronic kidney disease stage III with proteinuria. Patient admitted on 3/4 /2023 to family medicine service after presenting to the ED with flank pain and severe refractory hypoglycemia. The patient had been having hypoglycemia for the prior week. She had not changed her current home medication regimen recently other than a reported change to her thyroid regimen, which she cannot recall. Patient was initially admitted to SDU for d10 gtt. Over the day of her admission she required multiple pushes of d50 and runs of d10 with persistent BG in the 50s. Subsequently patient was transferred to ICU for further care and close monitoring of BG. Notable labs of TSH of 49.5 and T4 free is 0.2    On 3/6 the patient was started on increased dose of levothyroxine for concerns of myxedema since history obtained revealed the patient had her home levothyroxine dose decreased in the past month. Subjective 23  Hospital Day: 3, ICU Day 2  Vent Day: N/A    Overnight events: Her D20 rate was decreased to 100ml/hr and she tolerated this well. BGT overnight were in the 100s.     Mentation/Activity: Awake, alert, no requirement of sedation  Respiratory/ Secretions: Room air, no hypoxia  Hemodynamics: HDS  Urine output, bowel: 1900cc (0.6ml/kg/hr)  Diet: Regular  Need for procedures: none                ROS:Constitutional: negative, no fevers/chills  Ears, nose, mouth, throat, and face: no sore throat, negative  Respiratory: no shortness of breath  Cardiovascular: no chest pain, swelling of lower extremities  Gastrointestinal: no abdominal pain, no nausea/vomiting/diarrhea, patient eating well  Genitourinary:no urinary urgency, no incontinence, no dysuria, no difficulty urinating  Hematologic/lymphatic: negative  Musculoskeletal:pain in her bilateral lower feet that is chronic for her  Neurological: numbness in her feet that is chronic for her  Behavioral/Psych: negative  Endocrine: negative    Events, medications, imaging, and notes from last 24 hours reviewed. Patient Active Problem List   Diagnosis    Obesity (BMI 30-39. 9)    Seizures ([facility] [address]    Hyperglycemia    Vaginosis    Obesity, morbid ([facility] [address]    Hypoglycemia    Migraines    Myxedema    Metabolic syndrome    Type 2 diabetes mellitus with hyperglycemia, with long-term current use of insulin ([facility])       Vital Signs:  BP (!) 159/90   Pulse (!) 101   Temp 98.4 °F (36.9 °C) (Oral)   Resp 20   Wt 286 lb 9.6 oz (130 kg)   SpO2 100%   BMI 42.32 kg/m²             Temp (24hrs), Av.1 °F (36.7 °C), Min:97.6 °F (36.4 °C), Max:98.4 °F (36.9 °C)       Intake/Output:   Last shift:      701 -  190  In: 430 [P.O.:220; I.V.:110]  Out: 600 [Urine:600]  Last 3 shifts:  190 -  0700  In: 2922 [P.O.:2100;  I.V.:4280]  Out: 1900 [Urine:1900]    Intake/Output Summary (Last 24 hours) at 3/7/2023 1116  Last data filed at 3/7/2023 0827  Gross per 24 hour   Intake 4445 ml   Output 2500 ml   Net 1945 ml            Telemetry: [x]Sinus []A-flutter []Paced    []A-fib []Multiple PVCs                  Physical Exam:      General: Awake, alert, no acute distress, sitting up on edge of bed with crackers and juice   HEENT:  Anicteric sclerae; pink palpebral conjunctivae; mucosa moist  Resp:  Symmetrical chest expansion, no accessory muscle use; good airway entry; no rales/ wheezing/ rhonchi noted, no respiratory distress, no hypoxia  CV:  S1, S2 present; regular rate and rhythm  GI:  Abdomen soft, non-distended, non-tender  Extremities:  +2 pulses upper extremities and lower extremities difficult to palpate secondary to swelling although palpable; bilateral lower feet with swelling, good capillary refill, no rashes or cyanosis  Skin:  Warm; no rashes/ lesions noted, normal turgor/cap refill   Neurologic:  Awake, alert and oriented to person, place, and time. No focal deficits.    Devices:  PICC line in place      DATA:  MAR reviewed and pertinent medications noted or modified as needed    Labs:    Recent Labs     03/07/23  0219 03/06/23  0109 03/05/23  0231   WBC 14.8* 11.0 10.5   HGB 10.7* 11.4* 11.6*   HCT 34.6* 36.3 37.4   MCV 88.3 85.4 87.0    406 373      Lab Results   Component Value Date/Time     03/07/2023 02:19 AM    K 3.3 03/07/2023 02:19 AM     03/07/2023 02:19 AM    CO2 23 03/07/2023 02:19 AM    BUN 16 03/07/2023 02:19 AM    CREATININE 1.17 03/07/2023 02:19 AM    GLUCOSE 118 03/07/2023 02:19 AM    CALCIUM 8.2 03/07/2023 02:19 AM       Lab Results   Component Value Date    ALT 24 03/04/2023    AST 25 03/04/2023    ALKPHOS 77 03/04/2023    BILITOT 0.3 03/04/2023      Lab Results   Component Value Date    DDIMER 0.55 (H) 01/01/2023      Lab Results   Component Value Date    TSH 0.04 (L) 05/16/2021    UIU5DVS 29.50 (H) 03/06/2023      Hemoglobin A1C   Date Value Ref Range Status   07/05/2022 12.5 (H) 4.2 - 5.6 % Final     Comment:     (NOTE)  HbA1C Interpretive Ranges  <5.7              Normal  5.7 - 6.4         Consider Prediabetes  >6.5              Consider Diabetes        Lab Results   Component Value Date    APTT 26.6 07/30/2020      Lab Results   Component Value Date INR 1.0 05/02/2022    INR 1.0 07/30/2020    PROTIME 13.6 05/02/2022    PROTIME 13.1 07/30/2020      Results       Procedure Component Value Units Date/Time    Respiratory Panel, Molecular, with COVID-19 (Restricted: peds pts or suitable admitted adults) [[id number]] Collected: 03/04/23 1615    Order Status: Completed Specimen: Nasopharyngeal Updated: 03/04/23 1724     Adenovirus by PCR Not detected        Coronavirus 229E by PCR Not detected        Coronavirus HKU1 by PCR Not detected        Coronavirus NL63 by PCR Not detected        Coronavirus OC43 by PCR Not detected        SARS-CoV-2, PCR Not detected        Human Metapneumovirus by PCR Not detected        Rhinovirus Enterovirus PCR Not detected        Influenza A by PCR Not detected        Influenza A H1 by PCR Not detected        Influenza A H3 by PCR Not detected        Influenza A H1-2009 by PCR Not detected        Influenza B PCR Not detected        Parainfluenza 1 PCR Not detected        Parainfluenza 2 PCR Not detected        Parainfluenza 3 PCR Not detected        Parainfluenza 4 PCR Not detected        Respiratory Syncytial Virus by PCR Not detected        Bordetella parapertussis by PCR Not detected        Bordetella pertussis by PCR Not detected        Chlamydophila Pneumonia PCR Not detected        Mycoplasma pneumo by PCR Not detected       Culture, Urine [[id number]] Collected: 03/04/23 1548    Order Status: Completed Specimen: Urine, clean catch Updated: 03/06/23 0643     Special Requests NO SPECIAL REQUESTS        [place] count --        48035  COLONIES/mL       Culture       MIXED UROGENITAL DINA ISOLATED                       Imaging:  [x]   I have personally reviewed the patients radiographs and reports  Most recent imaging:  XR CHEST PORTABLE    Result Date: 3/4/2023  No evidence of acute pulmonary disease. CT CHEST ABDOMEN PELVIS WO CONTRAST Additional Contrast? None    Result Date: 3/4/2023  No acute findings.  Incidentals as above.      No results found for this or any previous visit. No valid procedures specified. IMPRESSION:   Severe refractory hypoglycemia in setting of possible myxedema, without coma, versus medication induced versus thyroid dysfunction versus less likely insulinoma/tumor - improving  Severe hypothyroidism secondary to myxedema without coma s/p thyroidectomy 2/2 papillary thyroid CA versus subtherapeutic medication dosing   Nephrotic range proteinuria, suggestive of progressive CKD, and likely 2/2 diabetic nephropathy  History of kidney biopsy with diagnosis of severe glomerular sclerosis with FSGS  Abnormal UA without signs of infection   History of thyroid cancer s/p thyroidectomy (2013), taking home synthroid   History of DM Type 2, home regimen Lantus 90u BID, Humalog 10 units before every meal, metformin 500 mg BID, Farxiga 5 mg daily, Trulicity 1.5 units weekly  Atraumatic Right foot pain and swelling secondary to diabetic neuropathy versus Charcot foot with XR demonstrating soft tissue swelling without acute bony injury  History of seizure disorder, home medication Topiramate 50mg BID  History of GERD, home medication Pantoprazole 40mg daily     Patient Active Problem List   Diagnosis    Obesity (BMI 30-39. 9)    Seizures (HCC)    Hyperglycemia    Vaginosis    Obesity, morbid ([address]    Hypoglycemia    Migraines    Myxedema    Metabolic syndrome    Type 2 diabetes mellitus with hyperglycemia, with long-term current use of insulin (HCC)        RECOMMENDATIONS:   Neuro: No sedation requirement. Continue home Topamax 50mg daily for seizures  Pulm: Aspiration precautions, HOB>30 degrees. Incentive spirometer q1h  CVS:Monitor HD, MAP goal >65. Fluids: D20 to 100ml/hr, titrate as tolerated  GI: Regular diet. Home Pantoprazole 40mg daily  Renal: Trend Cr, Strict I/Os  Hem/Onc: Trend H/H, monitor for s/o active bleeding. Daily CBC. I/D:Trend WBCs and temperature curve. Negative viral panel.    Metabolic: Thiamine q8h, Daily BMP, mag, phos. Trend lytes and replace per protocol. Endocrine:Start synthroid IV. Stress dose steroids. Accu checks q1h. Glucagon 1mg PRN  - Consulted endocrine. Appreciate recommendations  -- Give additional 50mcg Synthroid today, start 125mcg Synthroid IV starting tomorrow (3/8) and follow free T4 level  Musc/Skin: no wound concerns, PT/OT/SLP  Full Code No additional code details  Discussed in interdisciplinary rounds     Best practice :    Glycemic control  [facility] ICU bundles:   Central Line Bundle Followed   [facility] Vent patients-    N/A  Sress ulcer prophylaxis. Protonix  DVT prophylaxis. SQH  Need for Lines assessed. Attending Non-violent Restraint Reevaluation     The patient does not require restraints at this time. Ai Hanley MD  03/07/23  PGY-1 EVMS EM Resident              Attending Attestation:  I saw and evaluated the patient, performing the key elements of the service. I discussed the findings, assessment and plan with the resident and agree with the resident's findings and plan as documented in the resident's note. Total of 34min critical care time spent at bedside (personally) during the course of care providing evaluation,management and care decisions and ordering appropriate treatment related to critical care problems exclusive of procedures. The reason for providing this level of medical care for this critically ill patient was due a critical illness that impaired one or more vital organ systems such that there was a high probability of imminent or life threatening deterioration in the patients condition. This care involved high complexity decision making to assess, manipulate, and support vital system functions, to treat this degree vital organ system failure and to prevent further life threatening deterioration of the patients condition. This time was independent of other practitioners.     38y/o morbidly obese female with PMH of DM on insulin, papillary thyroid carcinoma s/p thyroidectomy, CKD 3, presented to [facility] on 3/4 with complaints of flank pain and refractory hypoglycemia. On the floor, patient became more hypoglycemic despite being on D10 drip, patient required multiple pushes of D50 and glucagon, transferred to ICU for management of severe refractory hypoglycemia. Of note, patient has apparent myxedema - T4 near undetectable and TSH of 50. Pt on D20 gtt currently, on stress dose steroids, will continue 100mg q8h, as well as IV synthroid, and continue PO cytomel given ongoing refractory hypoglycemia -- appreciate Endocrinology input this morning - Dr. Carly Gaston. Pt able to tolerate PO, but remains refractory hypoglycemic. Holding insulin, titrating D20gtt currently. Pt tolerating PO well. Continue supportive care with frequent glucose checks -- high risk for hypoglycemic event.       Guarded prognosis      Joey Tovar MD/MPH     Pulmonary, Critical Care Medicine  [facility]

## 2023-03-08 LAB
ANION GAP SERPL CALC-SCNC: 5 MMOL/L (ref 3–18)
BASOPHILS # BLD: 0.1 K/UL (ref 0–0.1)
BASOPHILS NFR BLD: 0 % (ref 0–2)
BUN SERPL-MCNC: 18 MG/DL (ref 7–18)
BUN/CREAT SERPL: 13 (ref 12–20)
CALCIUM SERPL-MCNC: 8.7 MG/DL (ref 8.5–10.1)
CHLORIDE SERPL-SCNC: 111 MMOL/L (ref 100–111)
CO2 SERPL-SCNC: 20 MMOL/L (ref 21–32)
CREAT SERPL-MCNC: 1.35 MG/DL (ref 0.6–1.3)
DIFFERENTIAL METHOD BLD: ABNORMAL
EOSINOPHIL # BLD: 0 K/UL (ref 0–0.4)
EOSINOPHIL NFR BLD: 0 % (ref 0–5)
ERYTHROCYTE [DISTWIDTH] IN BLOOD BY AUTOMATED COUNT: 14.5 % (ref 11.6–14.5)
GLUCOSE BLD STRIP.AUTO-MCNC: 100 MG/DL (ref 70–110)
GLUCOSE BLD STRIP.AUTO-MCNC: 102 MG/DL (ref 70–110)
GLUCOSE BLD STRIP.AUTO-MCNC: 104 MG/DL (ref 70–110)
GLUCOSE BLD STRIP.AUTO-MCNC: 104 MG/DL (ref 70–110)
GLUCOSE BLD STRIP.AUTO-MCNC: 111 MG/DL (ref 70–110)
GLUCOSE BLD STRIP.AUTO-MCNC: 116 MG/DL (ref 70–110)
GLUCOSE BLD STRIP.AUTO-MCNC: 116 MG/DL (ref 70–110)
GLUCOSE BLD STRIP.AUTO-MCNC: 123 MG/DL (ref 70–110)
GLUCOSE BLD STRIP.AUTO-MCNC: 129 MG/DL (ref 70–110)
GLUCOSE BLD STRIP.AUTO-MCNC: 140 MG/DL (ref 70–110)
GLUCOSE BLD STRIP.AUTO-MCNC: 140 MG/DL (ref 70–110)
GLUCOSE BLD STRIP.AUTO-MCNC: 152 MG/DL (ref 70–110)
GLUCOSE BLD STRIP.AUTO-MCNC: 164 MG/DL (ref 70–110)
GLUCOSE BLD STRIP.AUTO-MCNC: 187 MG/DL (ref 70–110)
GLUCOSE BLD STRIP.AUTO-MCNC: 75 MG/DL (ref 70–110)
GLUCOSE BLD STRIP.AUTO-MCNC: 80 MG/DL (ref 70–110)
GLUCOSE BLD STRIP.AUTO-MCNC: 84 MG/DL (ref 70–110)
GLUCOSE BLD STRIP.AUTO-MCNC: 84 MG/DL (ref 70–110)
GLUCOSE BLD STRIP.AUTO-MCNC: 86 MG/DL (ref 70–110)
GLUCOSE BLD STRIP.AUTO-MCNC: 90 MG/DL (ref 70–110)
GLUCOSE BLD STRIP.AUTO-MCNC: 93 MG/DL (ref 70–110)
GLUCOSE BLD STRIP.AUTO-MCNC: 98 MG/DL (ref 70–110)
GLUCOSE SERPL-MCNC: 125 MG/DL (ref 74–99)
HCT VFR BLD AUTO: 35.7 % (ref 35–45)
HGB BLD-MCNC: 11.1 G/DL (ref 12–16)
IMM GRANULOCYTES # BLD AUTO: 0.5 K/UL (ref 0–0.04)
IMM GRANULOCYTES NFR BLD AUTO: 3 % (ref 0–0.5)
INSULIN SERPL-ACNC: 158 UIU/ML (ref 2.6–24.9)
LYMPHOCYTES # BLD: 4 K/UL (ref 0.9–3.6)
LYMPHOCYTES NFR BLD: 22 % (ref 21–52)
MAGNESIUM SERPL-MCNC: 2.5 MG/DL (ref 1.6–2.6)
MCH RBC QN AUTO: 27.4 PG (ref 24–34)
MCHC RBC AUTO-ENTMCNC: 31.1 G/DL (ref 31–37)
MCV RBC AUTO: 88.1 FL (ref 78–100)
MONOCYTES # BLD: 1.3 K/UL (ref 0.05–1.2)
MONOCYTES NFR BLD: 7 % (ref 3–10)
NEUTS SEG # BLD: 12.4 K/UL (ref 1.8–8)
NEUTS SEG NFR BLD: 68 % (ref 40–73)
NRBC # BLD: 0.02 K/UL (ref 0–0.01)
NRBC BLD-RTO: 0.1 PER 100 WBC
PHOSPHATE SERPL-MCNC: 3.3 MG/DL (ref 2.5–4.9)
PLATELET # BLD AUTO: 455 K/UL (ref 135–420)
PMV BLD AUTO: 10.2 FL (ref 9.2–11.8)
POTASSIUM SERPL-SCNC: 4 MMOL/L (ref 3.5–5.5)
PROINSULIN SERPL-SCNC: 4.1 PMOL/L (ref 0–10)
RBC # BLD AUTO: 4.05 M/UL (ref 4.2–5.3)
SODIUM SERPL-SCNC: 136 MMOL/L (ref 136–145)
T4 FREE SERPL-MCNC: 0.3 NG/DL (ref 0.7–1.5)
THYROPEROXIDASE AB SERPL-ACNC: <9 IU/ML (ref 0–34)
WBC # BLD AUTO: 18.2 K/UL (ref 4.6–13.2)

## 2023-03-08 PROCEDURE — A4216 STERILE WATER/SALINE, 10 ML: HCPCS | Performed by: INTERNAL MEDICINE

## 2023-03-08 PROCEDURE — 6360000002 HC RX W HCPCS: Performed by: PHYSICIAN ASSISTANT

## 2023-03-08 PROCEDURE — 94761 N-INVAS EAR/PLS OXIMETRY MLT: CPT

## 2023-03-08 PROCEDURE — 84100 ASSAY OF PHOSPHORUS: CPT

## 2023-03-08 PROCEDURE — 99291 CRITICAL CARE FIRST HOUR: CPT | Performed by: INTERNAL MEDICINE

## 2023-03-08 PROCEDURE — 2500000003 HC RX 250 WO HCPCS: Performed by: INTERNAL MEDICINE

## 2023-03-08 PROCEDURE — 85025 COMPLETE CBC W/AUTO DIFF WBC: CPT

## 2023-03-08 PROCEDURE — 6370000000 HC RX 637 (ALT 250 FOR IP)

## 2023-03-08 PROCEDURE — 2580000003 HC RX 258: Performed by: INTERNAL MEDICINE

## 2023-03-08 PROCEDURE — 80048 BASIC METABOLIC PNL TOTAL CA: CPT

## 2023-03-08 PROCEDURE — 84439 ASSAY OF FREE THYROXINE: CPT

## 2023-03-08 PROCEDURE — 2580000003 HC RX 258

## 2023-03-08 PROCEDURE — 82962 GLUCOSE BLOOD TEST: CPT

## 2023-03-08 PROCEDURE — 83735 ASSAY OF MAGNESIUM: CPT

## 2023-03-08 PROCEDURE — 84206 ASSAY OF PROINSULIN: CPT

## 2023-03-08 PROCEDURE — 6370000000 HC RX 637 (ALT 250 FOR IP): Performed by: INTERNAL MEDICINE

## 2023-03-08 PROCEDURE — 6360000002 HC RX W HCPCS

## 2023-03-08 PROCEDURE — 36415 COLL VENOUS BLD VENIPUNCTURE: CPT

## 2023-03-08 PROCEDURE — 2000000000 HC ICU R&B

## 2023-03-08 PROCEDURE — 6360000002 HC RX W HCPCS: Performed by: INTERNAL MEDICINE

## 2023-03-08 RX ORDER — KETOROLAC TROMETHAMINE 15 MG/ML
15 INJECTION, SOLUTION INTRAMUSCULAR; INTRAVENOUS ONCE
Status: COMPLETED | OUTPATIENT
Start: 2023-03-08 | End: 2023-03-08

## 2023-03-08 RX ORDER — ALBUMIN, HUMAN INJ 5% 5 %
SOLUTION INTRAVENOUS
Status: DISCONTINUED
Start: 2023-03-08 | End: 2023-03-08

## 2023-03-08 RX ADMIN — HYDROCORTISONE SODIUM SUCCINATE 100 MG: 100 INJECTION, POWDER, FOR SOLUTION INTRAMUSCULAR; INTRAVENOUS at 02:02

## 2023-03-08 RX ADMIN — HEPARIN SODIUM 5000 UNITS: 5000 INJECTION INTRAVENOUS; SUBCUTANEOUS at 05:53

## 2023-03-08 RX ADMIN — GABAPENTIN 500 MG: 100 CAPSULE ORAL at 20:30

## 2023-03-08 RX ADMIN — LEVOTHYROXINE SODIUM ANHYDROUS 125 MCG: 100 INJECTION, POWDER, LYOPHILIZED, FOR SOLUTION INTRAVENOUS at 07:33

## 2023-03-08 RX ADMIN — HEPARIN SODIUM 5000 UNITS: 5000 INJECTION INTRAVENOUS; SUBCUTANEOUS at 21:00

## 2023-03-08 RX ADMIN — TOPIRAMATE 50 MG: 100 TABLET, FILM COATED ORAL at 09:38

## 2023-03-08 RX ADMIN — SODIUM CHLORIDE, PRESERVATIVE FREE 10 ML: 5 INJECTION INTRAVENOUS at 20:31

## 2023-03-08 RX ADMIN — KETOROLAC TROMETHAMINE 15 MG: 15 INJECTION, SOLUTION INTRAMUSCULAR; INTRAVENOUS at 20:28

## 2023-03-08 RX ADMIN — TOPIRAMATE 50 MG: 100 TABLET, FILM COATED ORAL at 20:31

## 2023-03-08 RX ADMIN — LIOTHYRONINE SODIUM 5 MCG: 5 TABLET ORAL at 05:53

## 2023-03-08 RX ADMIN — HEPARIN SODIUM 5000 UNITS: 5000 INJECTION INTRAVENOUS; SUBCUTANEOUS at 15:20

## 2023-03-08 RX ADMIN — DEXTROSE 75 ML: 20 INJECTION, SOLUTION INTRAVENOUS at 12:16

## 2023-03-08 RX ADMIN — ACETAMINOPHEN 650 MG: 325 TABLET ORAL at 15:22

## 2023-03-08 RX ADMIN — GABAPENTIN 500 MG: 100 CAPSULE ORAL at 09:37

## 2023-03-08 RX ADMIN — PANTOPRAZOLE 40 MG: 40 TABLET, DELAYED RELEASE ORAL at 06:31

## 2023-03-08 RX ADMIN — DEXTROSE: 20 INJECTION, SOLUTION INTRAVENOUS at 19:50

## 2023-03-08 RX ADMIN — ATORVASTATIN CALCIUM 40 MG: 40 TABLET, FILM COATED ORAL at 09:42

## 2023-03-08 RX ADMIN — LIOTHYRONINE SODIUM 5 MCG: 5 TABLET ORAL at 21:00

## 2023-03-08 RX ADMIN — SODIUM CHLORIDE, PRESERVATIVE FREE 10 ML: 5 INJECTION INTRAVENOUS at 09:42

## 2023-03-08 RX ADMIN — LIOTHYRONINE SODIUM 5 MCG: 5 TABLET ORAL at 15:22

## 2023-03-08 RX ADMIN — GABAPENTIN 500 MG: 100 CAPSULE ORAL at 15:20

## 2023-03-08 RX ADMIN — DEXTROSE: 20 INJECTION, SOLUTION INTRAVENOUS at 02:48

## 2023-03-08 RX ADMIN — ASPIRIN 81 MG CHEWABLE TABLET 81 MG: 81 TABLET CHEWABLE at 09:41

## 2023-03-08 ASSESSMENT — PAIN SCALES - GENERAL
PAINLEVEL_OUTOF10: 0
PAINLEVEL_OUTOF10: 2
PAINLEVEL_OUTOF10: 10
PAINLEVEL_OUTOF10: 0

## 2023-03-08 ASSESSMENT — PAIN DESCRIPTION - ORIENTATION: ORIENTATION: RIGHT

## 2023-03-08 ASSESSMENT — PAIN DESCRIPTION - DESCRIPTORS: DESCRIPTORS: ACHING

## 2023-03-08 ASSESSMENT — PAIN DESCRIPTION - LOCATION: LOCATION: BACK

## 2023-03-08 NOTE — CARE COORDINATION
REEMA was informed to call [facility] with [facility] concerning the patients post discharge plans. REEMA left a voicemail for [facility] to return the call to further discuss the patient needs. REEMA will continue to monitor and provide updates as needed.        DORIAN Bravo    [facility]

## 2023-03-08 NOTE — PROGRESS NOTES
[address] [place] [address] [facility]. Pulmonary, Critical Care, and Sleep Medicine    Name: Tere Cortez MRN: [id number]   : 1985 Hospital: [address] [place] [address] Dr   Date: 3/8/2023  Admission Date: 3/4/2023     Chart and notes reviewed. Data reviewed. I have evaluated all findings. [x]I have reviewed the flowsheet and previous days notes. []The patient is unable to give any meaningful history or review of systems because the patient is:  []Intubated []Sedated   []Unresponsive      [x]The patient is critically ill on      []Mechanical ventilation []Pressors   []BiPAP [x]D20 drip         Interval HPI:  Tere Cortez is a 44 y.o.  female with PMHx of type 2 diabetes on insulin, papillary thyroid carcinoma status post total thyroidectomy with hypothyroidism, chronic back pain, and chronic kidney disease stage III with proteinuria. Patient admitted on 3/4 /2023 to family medicine service after presenting to the ED with flank pain and severe refractory hypoglycemia. The patient had been having hypoglycemia for the prior week. She had not changed her current home medication regimen recently other than a reported change to her thyroid regimen, which she cannot recall. Patient was initially admitted to SDU for d10 gtt. Over the day of her admission she required multiple pushes of d50 and runs of d10 with persistent BG in the 50s. Subsequently patient was transferred to ICU for further care and close monitoring of BG. Notable labs of TSH of 49.5 and T4 free is 0.2    On 3/6 the patient was started on increased dose of levothyroxine for concerns of myxedema since history obtained revealed the patient had her home levothyroxine dose decreased in the past month. On 3/7, Endocrine recommended the patient start 125mcg Levothyroxine and to follow free T4 levels to assess response, and suspects her refractory hypoglycemia is in the setting of hypothyroidism.    On 3/8 in the AM her glucose levels were in the 80s and resolved after she drank orange juice. The patient was concerned that she has sleep apnea and would like outpatient referral for sleep study. Endocrine also recommended increasing her Synthroid and decreasing her hydrocortisone. The patient was doing well otherwise. Subjective 03/08/23  Hospital Day: 4 ICU Day 3  Vent Day: N/A    Overnight events: She had one episode of glucose in the 80s, for which she was encouraged to drink juice with good response. Otherwise, her glucose levels remained stable on the D20 drip. Mentation/Activity: Awake, alert, no requirement of sedation  Respiratory/ Secretions: Room air, no hypoxia  Hemodynamics: HDS  Urine output, bowel: 2800cc, 1BM  Diet: Regular  Need for procedures: none                ROS:Constitutional: negative, no fevers/chills  Ears, nose, mouth, throat, and face: no sore throat, negative  Respiratory: no shortness of breath  Cardiovascular: no chest pain, swelling of lower extremities  Gastrointestinal: negative, she had some acid reflux this morning, resolved with sitting upright in bed, no vomiting, no diarrhea, no abdominal pain  Genitourinary:no difficulty urinating, no dysuria, no hematuria  Hematologic/lymphatic: negative, no bleeding  Musculoskeletal:negative except for chronic right foot pain  Neurological: negative except for chronic numbness on bilateral feet, unchanged  Behavioral/Psych: negative except for difficulty sleeping  Endocrine: negative    Events, medications, imaging, and notes from last 24 hours reviewed. Patient Active Problem List   Diagnosis    Obesity (BMI 30-39. 9)    Seizures (HCC)    Hyperglycemia    Vaginosis    Obesity, morbid ([address]    Hypoglycemia    Migraines    Myxedema    Metabolic syndrome    Type 2 diabetes mellitus with hyperglycemia, with long-term current use of insulin ([facility])       Vital Signs:  BP (!) 144/84   Pulse (!) 105   Temp 98 °F (36.7 °C) (Oral)   Resp 16   Wt 286 lb 9.6 oz (130 kg)   SpO2 99%   BMI 42.32 kg/m²             Temp (24hrs), Av °F (36.7 °C), Min:97.5 °F (36.4 °C), Max:98.3 °F (36.8 °C)       Intake/Output:   Last shift:      No intake/output data recorded. Last 3 shifts:  1901 -  0700  In: 6374.6 [P.O.:2420; I.V.:3254.6]  Out: 4100 [Urine:4100]    Intake/Output Summary (Last 24 hours) at 3/8/2023 4563  Last data filed at 3/8/2023 7031  Gross per 24 hour   Intake 3959.58 ml   Output 2800 ml   Net 1159.58 ml            Telemetry: [x]Sinus []A-flutter []Paced    []A-fib []Multiple PVCs                  Physical Exam:      General: Patient initially sleeping and snoring, easy arousal, Awake, alert, no acute distress  HEENT:  Anicteric sclerae; mucosa moist  Resp:  Symmetrical chest expansion, no accessory muscle use; good airway entry; no rales/ wheezing/ rhonchi noted, no respiratory distress, no hypoxia  CV:  S1, S2 present; regular rate and rhythm  GI:  Abdomen soft, non-distended, non-tender  Extremities:  +2 pulses upper extremities, no cyanosis  Skin:  Warm; no rashes/ lesions noted, normal turgor/cap refill   Neurologic:  Awake, alert and oriented to person, place, and time. No focal deficits.    Devices:  PICC line in place      DATA:  MAR reviewed and pertinent medications noted or modified as needed    Labs:    Recent Labs     23  0241 23  0219 23  0109   WBC 18.2* 14.8* 11.0   HGB 11.1* 10.7* 11.4*   HCT 35.7 34.6* 36.3   MCV 88.1 88.3 85.4   * 400 406      Lab Results   Component Value Date/Time     2023 02:41 AM    K 4.0 2023 02:41 AM     2023 02:41 AM    CO2 20 2023 02:41 AM    BUN 18 2023 02:41 AM    CREATININE 1.35 2023 02:41 AM    GLUCOSE 125 2023 02:41 AM    CALCIUM 8.7 2023 02:41 AM       Lab Results   Component Value Date    ALT 24 2023    AST 25 2023    ALKPHOS 77 2023    BILITOT 0.3 2023      Lab Results   Component Value Date    DDIMER 0.55 (H) 01/01/2023      Lab Results   Component Value Date    TSH 0.04 (L) 05/16/2021    STL2ZUJ 29.50 (H) 03/06/2023      Hemoglobin A1C   Date Value Ref Range Status   07/05/2022 12.5 (H) 4.2 - 5.6 % Final     Comment:     (NOTE)  HbA1C Interpretive Ranges  <5.7              Normal  5.7 - 6.4         Consider Prediabetes  >6.5              Consider Diabetes        Lab Results   Component Value Date    APTT 26.6 07/30/2020      Lab Results   Component Value Date    INR 1.0 05/02/2022    INR 1.0 07/30/2020    PROTIME 13.6 05/02/2022    PROTIME 13.1 07/30/2020      Results       Procedure Component Value Units Date/Time    Respiratory Panel, Molecular, with COVID-19 (Restricted: peds pts or suitable admitted adults) [[id number]] Collected: 03/04/23 1615    Order Status: Completed Specimen: Nasopharyngeal Updated: 03/04/23 1724     Adenovirus by PCR Not detected        Coronavirus 229E by PCR Not detected        Coronavirus HKU1 by PCR Not detected        Coronavirus NL63 by PCR Not detected        Coronavirus OC43 by PCR Not detected        SARS-CoV-2, PCR Not detected        Human Metapneumovirus by PCR Not detected        Rhinovirus Enterovirus PCR Not detected        Influenza A by PCR Not detected        Influenza A H1 by PCR Not detected        Influenza A H3 by PCR Not detected        Influenza A H1-2009 by PCR Not detected        Influenza B PCR Not detected        Parainfluenza 1 PCR Not detected        Parainfluenza 2 PCR Not detected        Parainfluenza 3 PCR Not detected        Parainfluenza 4 PCR Not detected        Respiratory Syncytial Virus by PCR Not detected        Bordetella parapertussis by PCR Not detected        Bordetella pertussis by PCR Not detected        Chlamydophila Pneumonia PCR Not detected        Mycoplasma pneumo by PCR Not detected       Culture, Urine [[id number]] Collected: 03/04/23 1548    Order Status: Completed Specimen: Urine, clean catch Updated: 03/06/23 7406     Special Requests NO SPECIAL REQUESTS        [place] count --        [id number]  COLONIES/mL       Culture       MIXED UROGENITAL DINA ISOLATED                       Imaging:  [x]   I have personally reviewed the patients radiographs and reports  Most recent imaging:  XR CHEST PORTABLE    Result Date: 3/4/2023  No evidence of acute pulmonary disease. CT CHEST ABDOMEN PELVIS WO CONTRAST Additional Contrast? None    Result Date: 3/4/2023  No acute findings. Incidentals as above. No results found for this or any previous visit. No valid procedures specified. IMPRESSION:   Severe refractory hypoglycemia in setting of possible myxedema, without coma, versus medication induced versus thyroid dysfunction versus less likely insulinoma/tumor   Severe hypothyroidism secondary to myxedema without coma s/p thyroidectomy 2/2 papillary thyroid CA versus subtherapeutic medication dosing   Nephrotic range proteinuria, suggestive of progressive CKD, and likely 2/2 diabetic nephropathy  History of kidney biopsy with diagnosis of severe glomerular sclerosis with FSGS  Abnormal UA without signs of infection   History of thyroid cancer s/p thyroidectomy (2013), taking home synthroid   History of DM Type 2, home regimen Lantus 90u BID, Humalog 10 units before every meal, metformin 500 mg BID, Farxiga 5 mg daily, Trulicity 1.5 units weekly  Atraumatic Right foot pain and swelling secondary to diabetic neuropathy versus Charcot foot with XR demonstrating soft tissue swelling without acute bony injury  History of seizure disorder, home medication Topiramate 50mg BID  History of GERD, home medication Pantoprazole 40mg daily     Patient Active Problem List   Diagnosis    Obesity (BMI 30-39. 9)    Seizures (HCC)    Hyperglycemia    Vaginosis    Obesity, morbid ([address]    Hypoglycemia    Migraines    Myxedema    Metabolic syndrome    Type 2 diabetes mellitus with hyperglycemia, with long-term current use of insulin (HCC)        RECOMMENDATIONS: Neuro: No sedation requirement. Continue home Topamax 50mg daily for seizures  Pulm: Aspiration precautions, HOB>30 degrees. Incentive spirometer q1h  CVS:Monitor HD, MAP goal >65. Fluids: D20 to 75ml/hr, titrate as tolerated  GI: Regular diet. Home Pantoprazole 40mg daily  Renal: Trend Cr, Strict I/Os  Hem/Onc: Trend H/H, monitor for s/o active bleeding. Daily CBC. I/D:Trend WBCs and temperature curve. Negative viral panel. Metabolic: Thiamine H4L, Daily BMP, mag, phos. Trend lytes and replace per protocol. Endocrine: Stress dose steroids. Accu checks q1h. Glucagon 1mg PRN  - Consulted endocrine. Appreciate recommendations  ---Increase Synthroid IV to 150mg. Decrease hydrocortisone to 100mg daily. Follow free T4 level  Musc/Skin: no wound concerns, PT/OT  Full Code No additional code details  Discussed in interdisciplinary rounds     Best practice :    Glycemic control  IHI ICU bundles:   Central Line Bundle Followed   [facility] Vent patients-    N/A  Sress ulcer prophylaxis. Protonix  DVT prophylaxis. H  Need for Lines assessed. Attending Non-violent Restraint Reevaluation     The patient does not require restraints at this time. Javier Carranza MD  03/08/23  PGY-1 EVMS EM Resident            Attending Attestation:  I saw and evaluated the patient, performing the key elements of the service. I discussed the findings, assessment and plan with the resident and agree with the resident's findings and plan as documented in the resident's note. Total of 32min critical care time spent at bedside (personally) during the course of care providing evaluation,management and care decisions and ordering appropriate treatment related to critical care problems exclusive of procedures.   The reason for providing this level of medical care for this critically ill patient was due a critical illness that impaired one or more vital organ systems such that there was a high probability of imminent or life threatening deterioration in the patients condition. This care involved high complexity decision making to assess, manipulate, and support vital system functions, to treat this degree vital organ system failure and to prevent further life threatening deterioration of the patients condition. This time was independent of other practitioners. 38y/o morbidly obese female with PMH of DM on insulin, papillary thyroid carcinoma s/p thyroidectomy, CKD 3, presented to [facility] on 3/4 with complaints of flank pain and refractory hypoglycemia. On the floor, patient became more hypoglycemic despite being on D10 drip, patient required multiple pushes of D50 and glucagon, transferred to ICU for management of severe refractory hypoglycemia. Of note, patient has apparent myxedema - T4 near undetectable and TSH of 50. Pt on D20 gtt currently, on stress dose steroids, will continue, as well as IV synthroid, and continue PO cytomel given ongoing refractory hypoglycemia -- appreciate Endocrinology input, Dr. Jacob Davalos, adjusted steroids to daily and increased synthroid dose. Pt able to tolerate PO, but remains refractory hypoglycemic. Holding insulin, titrating D20gtt currently - remains on . Pt tolerating PO well. Continue supportive care with frequent glucose checks -- high risk for hypoglycemic event.       Guarded prognosis      Sarah Carvajal MD/MPH     Pulmonary, Critical Care Medicine  [facility]

## 2023-03-08 NOTE — PROGRESS NOTES
Pt sleeping comfortably, snoring.     VS stsable. Glucose better    Recommend:    Increase levothyroxine to 150 mcg every morning.   Reduce hydrocortisone to 100 mg every morning.

## 2023-03-09 ENCOUNTER — APPOINTMENT (OUTPATIENT)
Facility: HOSPITAL | Age: 38
DRG: 420 | End: 2023-03-09
Payer: MEDICAID

## 2023-03-09 LAB
ANION GAP SERPL CALC-SCNC: 6 MMOL/L (ref 3–18)
BASOPHILS # BLD: 0 K/UL (ref 0–0.1)
BASOPHILS NFR BLD: 0 % (ref 0–2)
BUN SERPL-MCNC: 17 MG/DL (ref 7–18)
BUN/CREAT SERPL: 13 (ref 12–20)
CALCIUM SERPL-MCNC: 8 MG/DL (ref 8.5–10.1)
CHLORIDE SERPL-SCNC: 110 MMOL/L (ref 100–111)
CO2 SERPL-SCNC: 21 MMOL/L (ref 21–32)
CREAT SERPL-MCNC: 1.34 MG/DL (ref 0.6–1.3)
DIFFERENTIAL METHOD BLD: ABNORMAL
EOSINOPHIL # BLD: 0 K/UL (ref 0–0.4)
EOSINOPHIL NFR BLD: 0 % (ref 0–5)
ERYTHROCYTE [DISTWIDTH] IN BLOOD BY AUTOMATED COUNT: 14.7 % (ref 11.6–14.5)
GLUCOSE BLD STRIP.AUTO-MCNC: 105 MG/DL (ref 70–110)
GLUCOSE BLD STRIP.AUTO-MCNC: 121 MG/DL (ref 70–110)
GLUCOSE BLD STRIP.AUTO-MCNC: 134 MG/DL (ref 70–110)
GLUCOSE BLD STRIP.AUTO-MCNC: 136 MG/DL (ref 70–110)
GLUCOSE BLD STRIP.AUTO-MCNC: 142 MG/DL (ref 70–110)
GLUCOSE BLD STRIP.AUTO-MCNC: 188 MG/DL (ref 70–110)
GLUCOSE BLD STRIP.AUTO-MCNC: 232 MG/DL (ref 70–110)
GLUCOSE BLD STRIP.AUTO-MCNC: 278 MG/DL (ref 70–110)
GLUCOSE BLD STRIP.AUTO-MCNC: 279 MG/DL (ref 70–110)
GLUCOSE BLD STRIP.AUTO-MCNC: 298 MG/DL (ref 70–110)
GLUCOSE BLD STRIP.AUTO-MCNC: 46 MG/DL (ref 70–110)
GLUCOSE BLD STRIP.AUTO-MCNC: 67 MG/DL (ref 70–110)
GLUCOSE BLD STRIP.AUTO-MCNC: 73 MG/DL (ref 70–110)
GLUCOSE BLD STRIP.AUTO-MCNC: 74 MG/DL (ref 70–110)
GLUCOSE BLD STRIP.AUTO-MCNC: 79 MG/DL (ref 70–110)
GLUCOSE BLD STRIP.AUTO-MCNC: 79 MG/DL (ref 70–110)
GLUCOSE BLD STRIP.AUTO-MCNC: 81 MG/DL (ref 70–110)
GLUCOSE BLD STRIP.AUTO-MCNC: 86 MG/DL (ref 70–110)
GLUCOSE BLD STRIP.AUTO-MCNC: 86 MG/DL (ref 70–110)
GLUCOSE BLD STRIP.AUTO-MCNC: 87 MG/DL (ref 70–110)
GLUCOSE SERPL-MCNC: 88 MG/DL (ref 74–99)
HCT VFR BLD AUTO: 35.5 % (ref 35–45)
HGB BLD-MCNC: 11 G/DL (ref 12–16)
IMM GRANULOCYTES # BLD AUTO: 0 K/UL (ref 0–0.04)
IMM GRANULOCYTES NFR BLD AUTO: 0 % (ref 0–0.5)
LYMPHOCYTES # BLD: 6.4 K/UL (ref 0.9–3.6)
LYMPHOCYTES NFR BLD: 45 % (ref 21–52)
MAGNESIUM SERPL-MCNC: 2.4 MG/DL (ref 1.6–2.6)
MCH RBC QN AUTO: 27.4 PG (ref 24–34)
MCHC RBC AUTO-ENTMCNC: 31 G/DL (ref 31–37)
MCV RBC AUTO: 88.5 FL (ref 78–100)
MONOCYTES # BLD: 1 K/UL (ref 0.05–1.2)
MONOCYTES NFR BLD: 7 % (ref 3–10)
NEUTS SEG # BLD: 6.9 K/UL (ref 1.8–8)
NEUTS SEG NFR BLD: 48 % (ref 40–73)
NRBC # BLD: 0.02 K/UL (ref 0–0.01)
NRBC BLD-RTO: 0.1 PER 100 WBC
PHOSPHATE SERPL-MCNC: 3 MG/DL (ref 2.5–4.9)
PLATELET # BLD AUTO: 420 K/UL (ref 135–420)
PLATELET COMMENT: ABNORMAL
PMV BLD AUTO: 10.1 FL (ref 9.2–11.8)
POTASSIUM SERPL-SCNC: 3.5 MMOL/L (ref 3.5–5.5)
RBC # BLD AUTO: 4.01 M/UL (ref 4.2–5.3)
RBC MORPH BLD: ABNORMAL
SODIUM SERPL-SCNC: 137 MMOL/L (ref 136–145)
T4 FREE SERPL-MCNC: 0.4 NG/DL (ref 0.7–1.5)
TSH SERPL DL<=0.05 MIU/L-ACNC: 57.5 UIU/ML (ref 0.36–3.74)
WBC # BLD AUTO: 14.3 K/UL (ref 4.6–13.2)

## 2023-03-09 PROCEDURE — 6370000000 HC RX 637 (ALT 250 FOR IP)

## 2023-03-09 PROCEDURE — 2580000003 HC RX 258

## 2023-03-09 PROCEDURE — 97162 PT EVAL MOD COMPLEX 30 MIN: CPT

## 2023-03-09 PROCEDURE — 80048 BASIC METABOLIC PNL TOTAL CA: CPT

## 2023-03-09 PROCEDURE — 36415 COLL VENOUS BLD VENIPUNCTURE: CPT

## 2023-03-09 PROCEDURE — 2500000003 HC RX 250 WO HCPCS: Performed by: PHYSICIAN ASSISTANT

## 2023-03-09 PROCEDURE — 2580000003 HC RX 258: Performed by: PHYSICIAN ASSISTANT

## 2023-03-09 PROCEDURE — 6370000000 HC RX 637 (ALT 250 FOR IP): Performed by: PHYSICIAN ASSISTANT

## 2023-03-09 PROCEDURE — 84206 ASSAY OF PROINSULIN: CPT

## 2023-03-09 PROCEDURE — A4216 STERILE WATER/SALINE, 10 ML: HCPCS | Performed by: PHYSICIAN ASSISTANT

## 2023-03-09 PROCEDURE — 2580000003 HC RX 258: Performed by: INTERNAL MEDICINE

## 2023-03-09 PROCEDURE — 84100 ASSAY OF PHOSPHORUS: CPT

## 2023-03-09 PROCEDURE — 6360000002 HC RX W HCPCS

## 2023-03-09 PROCEDURE — 6370000000 HC RX 637 (ALT 250 FOR IP): Performed by: INTERNAL MEDICINE

## 2023-03-09 PROCEDURE — 99291 CRITICAL CARE FIRST HOUR: CPT | Performed by: INTERNAL MEDICINE

## 2023-03-09 PROCEDURE — 94761 N-INVAS EAR/PLS OXIMETRY MLT: CPT

## 2023-03-09 PROCEDURE — 84439 ASSAY OF FREE THYROXINE: CPT

## 2023-03-09 PROCEDURE — 74181 MRI ABDOMEN W/O CONTRAST: CPT

## 2023-03-09 PROCEDURE — 83735 ASSAY OF MAGNESIUM: CPT

## 2023-03-09 PROCEDURE — 2500000003 HC RX 250 WO HCPCS: Performed by: INTERNAL MEDICINE

## 2023-03-09 PROCEDURE — 97530 THERAPEUTIC ACTIVITIES: CPT

## 2023-03-09 PROCEDURE — 6360000002 HC RX W HCPCS: Performed by: PHYSICIAN ASSISTANT

## 2023-03-09 PROCEDURE — 85025 COMPLETE CBC W/AUTO DIFF WBC: CPT

## 2023-03-09 PROCEDURE — 84443 ASSAY THYROID STIM HORMONE: CPT

## 2023-03-09 PROCEDURE — 82962 GLUCOSE BLOOD TEST: CPT

## 2023-03-09 PROCEDURE — A4216 STERILE WATER/SALINE, 10 ML: HCPCS | Performed by: INTERNAL MEDICINE

## 2023-03-09 PROCEDURE — 6360000002 HC RX W HCPCS: Performed by: INTERNAL MEDICINE

## 2023-03-09 PROCEDURE — 2000000000 HC ICU R&B

## 2023-03-09 RX ORDER — LIOTHYRONINE SODIUM 5 UG/1
10 TABLET ORAL EVERY 8 HOURS
Status: DISCONTINUED | OUTPATIENT
Start: 2023-03-09 | End: 2023-03-11

## 2023-03-09 RX ORDER — POTASSIUM CHLORIDE 20 MEQ/1
40 TABLET, EXTENDED RELEASE ORAL ONCE
Status: COMPLETED | OUTPATIENT
Start: 2023-03-09 | End: 2023-03-09

## 2023-03-09 RX ADMIN — TOPIRAMATE 50 MG: 100 TABLET, FILM COATED ORAL at 08:23

## 2023-03-09 RX ADMIN — HEPARIN SODIUM 5000 UNITS: 5000 INJECTION INTRAVENOUS; SUBCUTANEOUS at 14:30

## 2023-03-09 RX ADMIN — ACETAMINOPHEN 650 MG: 325 TABLET ORAL at 03:00

## 2023-03-09 RX ADMIN — GABAPENTIN 500 MG: 100 CAPSULE ORAL at 14:30

## 2023-03-09 RX ADMIN — SODIUM CHLORIDE, PRESERVATIVE FREE 10 ML: 5 INJECTION INTRAVENOUS at 10:28

## 2023-03-09 RX ADMIN — DEXTROSE: 20 INJECTION, SOLUTION INTRAVENOUS at 00:52

## 2023-03-09 RX ADMIN — PANTOPRAZOLE 40 MG: 40 TABLET, DELAYED RELEASE ORAL at 06:11

## 2023-03-09 RX ADMIN — LIOTHYRONINE SODIUM 10 MCG: 5 TABLET ORAL at 15:28

## 2023-03-09 RX ADMIN — ATORVASTATIN CALCIUM 40 MG: 40 TABLET, FILM COATED ORAL at 08:26

## 2023-03-09 RX ADMIN — HYDROCORTISONE SODIUM SUCCINATE 100 MG: 100 INJECTION, POWDER, FOR SOLUTION INTRAMUSCULAR; INTRAVENOUS at 08:26

## 2023-03-09 RX ADMIN — TOPIRAMATE 50 MG: 100 TABLET, FILM COATED ORAL at 20:51

## 2023-03-09 RX ADMIN — DEXTROSE MONOHYDRATE 125 ML: 10 INJECTION, SOLUTION INTRAVENOUS at 09:25

## 2023-03-09 RX ADMIN — GABAPENTIN 500 MG: 100 CAPSULE ORAL at 20:50

## 2023-03-09 RX ADMIN — LEVOTHYROXINE SODIUM ANHYDROUS 150 MCG: 200 INJECTION, POWDER, LYOPHILIZED, FOR SOLUTION INTRAVENOUS at 06:11

## 2023-03-09 RX ADMIN — SODIUM CHLORIDE, PRESERVATIVE FREE 10 ML: 5 INJECTION INTRAVENOUS at 20:51

## 2023-03-09 RX ADMIN — GLUCAGON 1 MG/HR: KIT at 11:25

## 2023-03-09 RX ADMIN — POTASSIUM CHLORIDE 40 MEQ: 1500 TABLET, EXTENDED RELEASE ORAL at 08:02

## 2023-03-09 RX ADMIN — ASPIRIN 81 MG CHEWABLE TABLET 81 MG: 81 TABLET CHEWABLE at 08:26

## 2023-03-09 RX ADMIN — OCTREOTIDE ACETATE 50 MCG/HR: 500 INJECTION, SOLUTION INTRAVENOUS; SUBCUTANEOUS at 15:29

## 2023-03-09 RX ADMIN — LIOTHYRONINE SODIUM 5 MCG: 5 TABLET ORAL at 05:06

## 2023-03-09 RX ADMIN — GLUCAGON 1 MG/HR: KIT at 20:24

## 2023-03-09 RX ADMIN — LIOTHYRONINE SODIUM 10 MCG: 5 TABLET ORAL at 21:01

## 2023-03-09 RX ADMIN — DEXTROSE MONOHYDRATE 125 ML: 10 INJECTION, SOLUTION INTRAVENOUS at 00:48

## 2023-03-09 RX ADMIN — HEPARIN SODIUM 5000 UNITS: 5000 INJECTION INTRAVENOUS; SUBCUTANEOUS at 21:01

## 2023-03-09 RX ADMIN — GABAPENTIN 500 MG: 100 CAPSULE ORAL at 08:25

## 2023-03-09 RX ADMIN — HEPARIN SODIUM 5000 UNITS: 5000 INJECTION INTRAVENOUS; SUBCUTANEOUS at 05:06

## 2023-03-09 RX ADMIN — DEXTROSE MONOHYDRATE 125 ML: 10 INJECTION, SOLUTION INTRAVENOUS at 04:04

## 2023-03-09 RX ADMIN — LEVOTHYROXINE SODIUM ANHYDROUS 25 MCG: 100 INJECTION, POWDER, LYOPHILIZED, FOR SOLUTION INTRAVENOUS at 11:24

## 2023-03-09 ASSESSMENT — PAIN DESCRIPTION - DESCRIPTORS: DESCRIPTORS: ACHING

## 2023-03-09 ASSESSMENT — PAIN SCALES - GENERAL
PAINLEVEL_OUTOF10: 0
PAINLEVEL_OUTOF10: 0
PAINLEVEL_OUTOF10: 4

## 2023-03-09 ASSESSMENT — PAIN DESCRIPTION - LOCATION: LOCATION: BACK

## 2023-03-09 ASSESSMENT — PAIN SCALES - WONG BAKER: WONGBAKER_NUMERICALRESPONSE: 0

## 2023-03-09 ASSESSMENT — PAIN DESCRIPTION - ORIENTATION: ORIENTATION: RIGHT

## 2023-03-09 NOTE — INTERDISCIPLINARY ROUNDS
[employer] [facility]  Pulmonary, Critical Care, and Sleep Medicine  Interdisciplinary and Ventilator Weaning Rounds     Patient discussed in morning walking rounds and interdisciplinary rounds. ICU admission day: 03/05/23     Overnight events:   Refractory hypoglycemia - D20 increased to 100cc/hr     Assessments and best practice:  Ventilator  Room air  Sedation  N/a  Other pertinent drips  D20 100/hr  Lines noted  PIV  Critical labs assessed  Yes  Antibiotics  N/a  Medications reviewed  Yes  Pending imaging  none  Pending send out labs  Yes  Pending Procedures  None  Glycemic control:  D20 gtt  Stress ulcer prophylaxis. Protonix  DVT prophylaxis. SQH  Need for Lines, smith assessed.   Yes  Restraint Reevaluation      Not required        Family contact/MPOA:   Family updated      Palliative consult within 3 days of admission to ICU-  Ethics Guidance: 21 days        Daily Plans:  Start glucagon gtt  MRI abd w/ contrast to r/o insulinoma  Recheck TSH  Increase synthroid to 175mcg           VIDYA time 5 minutes           Vianney Vazquez PA-C  03/09/23  Pulmonary, Critical Care Medicine  [facility]

## 2023-03-09 NOTE — CARE COORDINATION
The SW met with the patient at bedside to discuss the transitional plan of care. The patient requested assistance with disability resources. The patient is not currently receiving disability based on her diagnosis, but states she has applied and does not know the status of her application.     The patient has been counseled and provided with educational paperwork.     DORIAN Whitaker    [facility]

## 2023-03-09 NOTE — PROGRESS NOTES
MRI screening form needs to be filled out and faxed to [phone] BEFORE MRI can be scheduled. If unable to obtain information from patient , [employer] needs to be contacted .  If patient is claustrophobic or will needs pain meds, please have ordered in advance in order to facilitate exam.

## 2023-03-09 NOTE — PROGRESS NOTES
New OT orders received and chart reviewed. Unable to see pt for OT evaluation at this time due to: Attempt x1 this am pt is with low blood sugar and just finished working with PT (09:38). Pt is off Unit for testing this pm (13:26)    Will follow up later as pt's schedule allows.  Thank you for this referral.    Fariba Kunz MS, OTR/L

## 2023-03-09 NOTE — PLAN OF CARE
Problem: Physical Therapy - Adult  Goal: By Discharge: Performs mobility at highest level of function for planned discharge setting.  See evaluation for individualized goals.  Description: Physical Therapy Goals  Initiated 3/9/2023 and to be accomplished within 7 day(s)  1.  Patient will move from supine to sit and sit to supine  in bed with modified independence.    2.  Patient will transfer from bed to chair and chair to bed with modified independence using the least restrictive device.  3.  Patient will perform sit to stand with modified independence.  4.  Patient will ambulate with modified independence for 150 feet with the least restrictive device.   5.  Patient will ascend/descend 8 stairs with handrail(s) with supervision/set-up.    PLOF: Lives with daughter. Two story home with bedroom/bathroom on second floor. History of falls.  Outcome: Progressing  PHYSICAL THERAPY EVALUATION    Patient: Eileen Dudley (37 y.o. female)  Date: 3/9/2023  Primary Diagnosis: Orthopnea [R06.01]  Hypoglycemia [E16.2]  Flank pain [R10.9]  Precautions: Fall Risk  ASSESSMENT :  Seated EOB upon entry. Blood glucose assessed by unit staff; 46. RN aware and present. Patient requests to use bathroom. Supervision for transfer to bedside commode. Good dynamic balance for clean-up post. Returned to seated EOB. RN agreeable to patient remaining seated at EOB. Educated patient on need for assistance with mobility d/t lines and blood glucose; verbalized understanding. Encouraged continued OOB/EOB for meals and toileting. Will follow 1-2 more visits to ensure safety with amb and trial of AD as patient with self reported fall history.     DEFICITS/IMPAIRMENTS:   Body Structures, Functions, Activity Limitations Requiring Skilled Therapeutic Intervention: Decreased functional mobility ;Decreased strength;Decreased balance;Decreased safe awareness;Decreased endurance    Patient will benefit from skilled intervention to address the above  impairments. Patient's rehabilitation potential: Fair  Factors which may influence rehabilitation potential include:   []         None noted  []         Mental ability/status  [x]         Medical condition  []         Home/family situation and support systems  []         Safety awareness  []         Pain tolerance/management  []         Other:      PLAN :  Recommendations and Planned Interventions:   [x]           Bed Mobility Training             [x]    Neuromuscular Re-Education  [x]           Transfer Training                   []    Orthotic/Prosthetic Training  [x]           Gait Training                          []    Modalities  [x]           Therapeutic Exercises           []    Edema Management/Control  [x]           Therapeutic Activities            [x]    Family Training/Education  [x]           Patient Education  []           Other (comment):    Frequency/Duration: Patient will be followed by physical therapy to address goals, 1-2 times per day/3-5 days per week to address goals. Further Equipment Recommendations for Discharge: bedside commode and rolling walker    AMPAC: 18/24  This AMPAC score should be considered in conjunction with interdisciplinary team recommendations to determine the most appropriate discharge setting. Patient's social support, diagnosis, medical stability, and prior level of function should also be taken into consideration. Current research shows that an AM-PAC score of 18 (14 without stairs) or greater is associated with a discharge to the patient's home setting. Based on an AM-PAC score of 18/24 and their current functional mobility deficits, it is recommended that the patient have 2-3 sessions per week of Physical Therapy at d/c to increase the patient's independence. SUBJECTIVE:   Patient stated \"I was suppose to get therapy.     OBJECTIVE DATA SUMMARY:     Past Medical History:   Diagnosis Date    Arthritis     Chest pain     Diabetes ([facility]     Essential hypertension     Headache(784.0)     Hyperchloremia     Hypertension     Seizures ([address]     8/2020 last seizure    Seizures (HCC)     SOB (shortness of breath)     Thyroid cancer [facility])      Past Surgical History:   Procedure Laterality Date    CT BIOPSY RENAL  7/30/2020    CT BIOPSY RENAL 7/30/2020 [facility] RAD CT    PARTIAL HYSTERECTOMY (CERVIX NOT REMOVED)      THYROIDECTOMY      TUBAL LIGATION       Home Situation:  Social/Functional History  Lives With: Daughter  Type of Home: House  Home Layout: Two level  Critical Behavior:  Orientation  Orientation Level: Oriented X4    Strength:    Strength: Generally decreased, functional    Range Of Motion:  AROM: Within functional limits    Functional Mobility:  Transfers:  Transfer Training  Transfer Training: Yes  Sit to Stand: Supervision  Stand to Sit: Supervision  Stand Pivot Transfers: Supervision  Balance:   Balance  Sitting: Intact  Standing: Impaired  Standing - Static: Good  Standing - Dynamic: Good    Pain:  Pain level pre-treatment: 0/10   Pain level post-treatment: 0/10     Activity Tolerance:   Activity Tolerance: Treatment limited secondary to medical complications    After treatment:   []         Patient left in no apparent distress sitting up in chair  [x]         Patient left in no apparent distress in bed; seated EOB  [x]         Call bell left within reach  [x]         Nursing notified and present  []         Caregiver present  []         Bed alarm activated  []         SCDs applied    COMMUNICATION/EDUCATION:   Patient Education  Education Given To: Patient  Education Provided: Role of Therapy; Energy Conservation; Fall Prevention Strategies; Plan of Care;Transfer Training  Education Method: Demonstration;Verbal;Teach Back  Barriers to Learning: None  Education Outcome: Verbalized understanding;Demonstrated understanding;Continued education needed    Thank you for this referral.  Corita Gilford, PT  Minutes: 18      Eval Complexity: Medium complexity    [address] [facility] [address] [address] AM-PAC® Basic Mobility Inpatient Short Form (6-Clicks) Version 2    How much HELP from another person does the patient currently need    (If the patient hasn't done an activity recently, how much help from another person do you think he/she would need if he/she tried?)   Total (Total A or Dep)   A Lot  (Mod to Max A)   A Little (Sup or Min A)   None (Mod I to I)   Turning from your back to your side while in a flat bed without using bedrails? [] 1 [] 2 [x] 3 [] 4   2. Moving from lying on your back to sitting on the side of a flat bed without using bedrails? [] 1 [] 2 [x] 3 [] 4   3. Moving to and from a bed to a chair (including a wheelchair)? [] 1 [] 2 [x] 3 [] 4   4. Standing up from a chair using your arms (e.g., wheelchair, or bedside chair)? [] 1 [] 2 [x] 3 [] 4   5. Walking in hospital room? [] 1 [] 2 [x] 3 [] 4   6. Climbing 3-5 steps with a railing?+  Clinical judgement  [] 1 [] 2 [x] 3 [] 4   +If stair climbing cannot be assessed, skip item #6. Sum responses from items 1-5.

## 2023-03-09 NOTE — PROGRESS NOTES
[facility]. Pulmonary, Critical Care, and Sleep Medicine    Name: Karlene Lunsford MRN: [id number]   : 1985 Hospital: [facility]   Date: 3/9/2023  Admission Date: 3/4/2023     Chart and notes reviewed. Data reviewed. I have evaluated all findings. [x]I have reviewed the flowsheet and previous days notes. []The patient is unable to give any meaningful history or review of systems because the patient is:  []Intubated []Sedated   []Unresponsive      []The patient is critically ill on      []Mechanical ventilation []Pressors   []BiPAP []         Interval HPI:Eileen Pierson is a 44 y.o.  female with PMHx of type 2 diabetes on insulin, papillary thyroid carcinoma status post total thyroidectomy with hypothyroidism, chronic back pain, and chronic kidney disease stage III with proteinuria. Patient admitted on 3/4 /2023 to family medicine service after presenting to the ED with flank pain and severe refractory hypoglycemia. The patient had been having hypoglycemia for the prior week. She had not changed her current home medication regimen recently other than a reported change to her thyroid regimen, which she cannot recall. Patient was initially admitted to SDU for d10 gtt. Over the day of her admission she required multiple pushes of d50 and runs of d10 with persistent BG in the 50s. Subsequently patient was transferred to ICU for further care and close monitoring of BG. Notable labs of TSH of 49.5 and T4 free is 0.2     On 3/6 the patient was started on increased dose of levothyroxine for concerns of myxedema since history obtained revealed the patient had her home levothyroxine dose decreased in the past month. On 3/7, Endocrine recommended the patient start 125mcg Levothyroxine and to follow free T4 levels to assess response, and suspects her refractory hypoglycemia is in the setting of hypothyroidism.    On 3/8 in the AM her glucose levels were in the 80s and resolved after she drank orange juice. The patient was concerned that she has sleep apnea and would like outpatient referral for sleep study. Endocrine also recommended increasing her Synthroid and decreasing her hydrocortisone. The patient was doing well otherwise. Subjective 23  Hospital Day: 5  Vent Day: N/A  Overnight events: Episodes of hypoglycemia - D20 increased to 100cc/hr  Mentation/Activity: Awake, alert, no requirement of sedation  Respiratory/ Secretions: Room air, no hypoxia  Hemodynamics: HDS  Urine output, bowel: adequate UOP  Diet: Regular  Need for procedures: none                           ROS:Constitutional: negative, no fevers/chills  Ears, nose, mouth, throat, and face: no sore throat, negative  Respiratory: no shortness of breath  Cardiovascular: no chest pain, swelling of lower extremities  Gastrointestinal: negative, she had some acid reflux this morning, resolved with sitting upright in bed, no vomiting, no diarrhea, no abdominal pain  Genitourinary:no difficulty urinating, no dysuria, no hematuria  Hematologic/lymphatic: negative, no bleeding  Musculoskeletal:negative except for chronic right foot pain  Neurological: negative except for chronic numbness on bilateral feet, unchanged  Behavioral/Psych: negative except for difficulty sleeping  Endocrine: negative    Events, medications, imaging, and notes from last 24 hours reviewed. Patient Active Problem List   Diagnosis    Obesity (BMI 30-39. 9)    Seizures ([facility])    Hyperglycemia    Vaginosis    Obesity, morbid ([address]    Hypoglycemia    Migraines    Myxedema    Metabolic syndrome    Type 2 diabetes mellitus with hyperglycemia, with long-term current use of insulin ([facility])       Vital Signs:  /69   Pulse 93   Temp 98 °F (36.7 °C) (Axillary)   Resp 14   Wt 286 lb 9.6 oz (130 kg)   SpO2 91%   BMI 42.32 kg/m²             Temp (24hrs), Av.5 °F (36.9 °C), Min:98 °F (36.7 °C), Max:98.8 °F (37.1 °C) Intake/Output:   Last shift:      No intake/output data recorded. Last 3 shifts: 03/07 1901 - 03/09 0700  In: 5698.9 [P.O.:3021; I.V.:2677.9]  Out: 4750 [Urine:4750]    Intake/Output Summary (Last 24 hours) at 3/9/2023 0851  Last data filed at 3/9/2023 0609  Gross per 24 hour   Intake 3822.67 ml   Output 2250 ml   Net 1572.67 ml            Telemetry: [x]Sinus []A-flutter []Paced    []A-fib []Multiple PVCs                  Physical Exam:      General: Patient initially sleeping and snoring, easy arousal, Awake, alert, no acute distress  HEENT:  Anicteric sclerae; mucosa moist  Resp:  Symmetrical chest expansion, no accessory muscle use; good airway entry; no rales/ wheezing/ rhonchi noted, no respiratory distress, no hypoxia  CV:  S1, S2 present; regular rate and rhythm  GI:  Abdomen soft, non-distended, non-tender  Extremities:  +2 pulses upper extremities, no cyanosis  Skin:  Warm; no rashes/ lesions noted, normal turgor/cap refill   Neurologic:  Awake, alert and oriented to person, place, and time. No focal deficits.    Devices:  PICC line in place      DATA:  MAR reviewed and pertinent medications noted or modified as needed    Labs:    Recent Labs     03/09/23  0158 03/08/23  0241 03/07/23  0219   WBC 14.3* 18.2* 14.8*   HGB 11.0* 11.1* 10.7*   HCT 35.5 35.7 34.6*   MCV 88.5 88.1 88.3    455* 400      Lab Results   Component Value Date/Time     03/09/2023 01:58 AM    K 3.5 03/09/2023 01:58 AM     03/09/2023 01:58 AM    CO2 21 03/09/2023 01:58 AM    BUN 17 03/09/2023 01:58 AM    CREATININE 1.34 03/09/2023 01:58 AM    GLUCOSE 88 03/09/2023 01:58 AM    CALCIUM 8.0 03/09/2023 01:58 AM       Lab Results   Component Value Date    ALT 24 03/04/2023    AST 25 03/04/2023    ALKPHOS 77 03/04/2023    BILITOT 0.3 03/04/2023      Lab Results   Component Value Date    DDIMER 0.55 (H) 01/01/2023      Lab Results   Component Value Date    TSH 0.04 (L) 05/16/2021    HQA4BLB 29.50 (H) 03/06/2023 Hemoglobin A1C   Date Value Ref Range Status   07/05/2022 12.5 (H) 4.2 - 5.6 % Final     Comment:     (NOTE)  HbA1C Interpretive Ranges  <5.7              Normal  5.7 - 6.4         Consider Prediabetes  >6.5              Consider Diabetes        Lab Results   Component Value Date    APTT 26.6 07/30/2020      Lab Results   Component Value Date    INR 1.0 05/02/2022    INR 1.0 07/30/2020    PROTIME 13.6 05/02/2022    PROTIME 13.1 07/30/2020      Results       Procedure Component Value Units Date/Time    Respiratory Panel, Molecular, with COVID-19 (Restricted: peds pts or suitable admitted adults) [[id number]] Collected: 03/04/23 1615    Order Status: Completed Specimen: Nasopharyngeal Updated: 03/04/23 1724     Adenovirus by PCR Not detected        Coronavirus 229E by PCR Not detected        Coronavirus HKU1 by PCR Not detected        Coronavirus NL63 by PCR Not detected        Coronavirus OC43 by PCR Not detected        SARS-CoV-2, PCR Not detected        Human Metapneumovirus by PCR Not detected        Rhinovirus Enterovirus PCR Not detected        Influenza A by PCR Not detected        Influenza A H1 by PCR Not detected        Influenza A H3 by PCR Not detected        Influenza A H1-2009 by PCR Not detected        Influenza B PCR Not detected        Parainfluenza 1 PCR Not detected        Parainfluenza 2 PCR Not detected        Parainfluenza 3 PCR Not detected        Parainfluenza 4 PCR Not detected        Respiratory Syncytial Virus by PCR Not detected        Bordetella parapertussis by PCR Not detected        Bordetella pertussis by PCR Not detected        Chlamydophila Pneumonia PCR Not detected        Mycoplasma pneumo by PCR Not detected       Culture, Urine [[id number]] Collected: 03/04/23 1548    Order Status: Completed Specimen: Urine, clean catch Updated: 03/06/23 0643     Special Requests NO SPECIAL REQUESTS        [place] count --        [id number]  COLONIES/mL       Culture       MIXED UROGENITAL DINA ISOLATED                       Imaging:  [x]   I have personally reviewed the patients radiographs and reports  Most recent imaging:  No results found. No results found for this or any previous visit. No valid procedures specified. IMPRESSION:   Severe refractory hypoglycemia in setting of possible myxedema, without coma, versus medication induced versus thyroid dysfunction versus less likely insulinoma/tumor   Severe hypothyroidism secondary to myxedema without coma s/p thyroidectomy 2/2 papillary thyroid CA versus subtherapeutic medication dosing   Nephrotic range proteinuria, suggestive of progressive CKD, and likely 2/2 diabetic nephropathy  History of kidney biopsy with diagnosis of severe glomerular sclerosis with FSGS  Abnormal UA without signs of infection   History of thyroid cancer s/p thyroidectomy (2013), taking home synthroid   History of DM Type 2, home regimen Lantus 90u BID, Humalog 10 units before every meal, metformin 500 mg BID, Farxiga 5 mg daily, Trulicity 1.5 units weekly  Atraumatic Right foot pain and swelling secondary to diabetic neuropathy versus Charcot foot with XR demonstrating soft tissue swelling without acute bony injury  History of seizure disorder, home medication Topiramate 50mg BID  History of GERD, home medication Pantoprazole 40mg daily     Patient Active Problem List   Diagnosis    Obesity (BMI 30-39. 9)    Seizures ([facility])    Hyperglycemia    Vaginosis    Obesity, morbid ([address]    Hypoglycemia    Migraines    Myxedema    Metabolic syndrome    Type 2 diabetes mellitus with hyperglycemia, with long-term current use of insulin ([facility])        RECOMMENDATIONS:   Neuro: No sedation requirement. Continue home Topamax 50mg daily for seizures  Pulm: Aspiration precautions, HOB>30 degrees. Incentive spirometer q1h  CVS:Monitor HD, MAP goal >65. Fluids: D20 to 100ml/hr, titrate as tolerated  GI: Regular diet.  Home Pantoprazole 40mg daily  Renal: Trend Cr, Strict I/Os  Hem/Onc: Trend H/H, monitor for s/o active bleeding. Daily CBC. I/D:Trend WBCs and temperature curve. Negative viral panel. Metabolic: Thiamine E9B, Daily BMP, mag, phos. Trend lytes and replace per protocol. Endocrine: Stress dose steroids. Accu checks q1h. Glucagon 1mg PRN  - Consulted endocrine. Appreciate recommendations  --Increase Synthroid IV to 175mg. Decrease hydrocortisone to 100mg daily. Follow free T4 level  -Start glucagon gtt  -Obtain MRI abd  to r/o insulinoma  Musc/Skin: no wound concerns, PT/OT  Full Code No additional code details  Discussed in interdisciplinary rounds     Best practice :    Glycemic control  Sress ulcer prophylaxis. Protonix  DVT prophylaxis. SQH  Need for Lines, smith assessed. Palliative care evaluation. Restraints none needed         Total of 11 minutes were spent with the patient at the bedside. This care involved high complexity decision making to assess, manipulate, and support vital system functions, to treat this degreee vital organ system failure and to prevent further life threatening deterioration of the patients condition  The services I provided to this patient were to treat and/or prevent clinically significant deterioration that could result in the failure of one or more body systems and/or organ systems due to respiratory distress, hypoxia, cardiac dysrhythmia. Paulette Sol PA-C  03/09/23  Pulmonary, Critical Care Medicine  [facility]            Attending Note:  I saw and evaluated the patient, performing all elements of service personally. I discussed the findings, assessment and plan with the PA and agree with the PA's findings and plan as documented in the PA's note. All edits and changes made above or are mentioned in my attending note which was independently assessed as well as written.     Total of 30 min critical care time spent at bedside (personally) during the course of care providing evaluation,management and care decisions and ordering appropriate treatment related to critical care problems exclusive of procedures. I performed the substantive portion of this split shared encounter (greater than 50% time)  The reason for providing this level of medical care for this critically ill patient was due a critical illness that impaired one or more vital organ systems such that there was a high probability of imminent or life threatening deterioration in the patients condition. This care involved high complexity decision making to assess, manipulate, and support vital system functions, to treat this degree vital organ system failure and to prevent further life threatening deterioration of the patients condition. This time was independent of other practitioners. 36y/o morbidly obese female with PMH of DM on insulin, papillary thyroid carcinoma s/p thyroidectomy, CKD 3, presented to [facility] on 3/4 with complaints of flank pain and refractory hypoglycemia. On the floor, patient became more hypoglycemic despite being on D10 drip, patient required multiple pushes of D50 and glucagon, transferred to ICU for management of severe refractory hypoglycemia. Of note, patient has apparent myxedema - T4 near undetectable and TSH of 50. Pt on D20 gtt currently, on stress dose steroids, will continue, as well as IV synthroid, and continue PO cytomel given ongoing refractory hypoglycemia -- appreciate Endocrinology input, Dr. Wilfrido Talavera, adjusted steroids to daily and increased synthroid dose. Pt able to tolerate PO, but remains refractory hypoglycemic with additional glucose given -- adding glucagon and octreotide drips -- wean off D20 as tolerated. Pt tolerating PO well. Continue supportive care with frequent glucose checks -- high risk for hypoglycemic event.    Although risk of insulinoma low given labwork, due to refractory nature, will obtain abdominal imaging to rule out insulinoma - MRI abd w/wo contrast     Guarded prognosis      Radha Chen MD/MPH     Pulmonary, Critical Care Medicine  [facility]  -

## 2023-03-09 NOTE — CARE COORDINATION
Case Management Assessment  Initial Evaluation    Date/Time of Evaluation: 3/9/2023 12:59 PM  Assessment Completed by: Loan Baker    If patient is discharged prior to next notation, then this note serves as note for discharge by case management. Patient Name: Shelly Smith                   YOB: 1985  Diagnosis: Orthopnea [R06.01]  Hypoglycemia [E16.2]  Flank pain [R10.9]                   Date / Time: 3/4/2023  2:00 PM    Patient Admission Status: Inpatient   Readmission Risk (Low < 19, Mod (19-27), High > 27): Readmission Risk Score: 7.2    Current PCP: Anna Gómez MD  PCP verified by CM? (P) Yes    Chart Reviewed: Yes      History Provided by: (P) Patient  Patient Orientation: (P) Alert and Oriented    Patient Cognition: Alert    Hospitalization in the last 30 days (Readmission):  No    If yes, Readmission Assessment in CM Navigator will be completed. Advance Directives:      Code Status: Full Code   Patient's Primary Decision Maker is: (P) Patient Declined (Legal Next of Kin Remains as Decision Maker)      Discharge Planning:    Patient lives with: (P) Children Type of Home: (P) House  Primary Care Giver: (P) Self  Patient Support Systems include: (P) Family Members, Friends/Neighbors   Current Financial resources: (P) Medicaid  Current community resources: (P) None  Current services prior to admission: Other (Comment) (N/A)            Current DME:              Type of Home Care services:  (P) None    ADLS  Prior functional level: (P) Independent in ADLs/IADLs  Current functional level: (P) Assistance with the following:, Bathing, Dressing, Toileting    PT AM-PAC: 18 /24- It is recommended the patient has 2-3 sessions per week of physical therapy to increase the patients independence.    OT AM-PAC:   /24    Family can provide assistance at DC: (P) Yes  Would you like Case Management to discuss the discharge plan with any other family members/significant others, and if so, who? (P) No  Plans to Return to Present Housing: (P) Yes  Other Identified Issues/Barriers to RETURNING to current housing: There are no barriers for the patient returning to their current housing. Potential Assistance needed at discharge: (P) Other (Comment) (C-pap)            Potential DME: The patient may assistance with a C-pap machine to assist with the patients breathing at night. Patient expects to discharge to: (P) House  Plan for transportation at discharge: (P) Family    Financial    Payor: Reina Longo / Plan: Aditya Gvairia / Product Type: *No Product type* /     Does insurance require precert for SNF: Yes    Potential assistance Purchasing Medications: (P) No  Meds-to-Beds request:      No Pharmacies Listed    Notes:    Factors facilitating achievement of predicted outcomes: Family support, Cooperative, and Good insight into deficits    Barriers to discharge: There are no barriers identified that would prevent the patient from discharging. Additional Case Management Notes: The patients home is suitable for home health services. The Plan for Transition of Care is related to the following treatment goals of Orthopnea [R06.01], Hypoglycemia [E16.2], Flank pain [R10.9] of continued support with medication management & chronic disease management. Physical therapy for increased mobility and safety & Occupational therapy for maintaining current level of functioning and perfroming ADL's/IADL's. The Patient and/or patient representative Eileen and her family were provided with a choice of provider and agrees with the discharge plan. Freedom of choice list with basic dialogue that supports the patient's individualized plan of care/goals and shares the quality data associated with the providers was provided to: (P) Patient       The Patient and/or Patient Representative Agree with the Discharge Plan?  (P) Yes       03/09/23 5956   Service Assessment   Patient Orientation Alert and Oriented   Cognition Alert   History Provided By Patient   Primary [address] Family Members;Friends/Neighbors   Patient's Healthcare Decision Maker is: Patient Declined (Legal Next of Kin Remains as Decision Maker)   PCP Verified by CM Yes   Last Visit to PCP Within last 3 months   Prior Functional Level Independent in ADLs/IADLs   Current Functional Level Assistance with the following:;Bathing;Dressing; Toileting   Can patient return to prior living arrangement Yes   Ability to make needs known: Good   Family able to assist with home care needs: Yes   Would you like for me to discuss the discharge plan with any other family members/significant others, and if so, who? No   Financial Resources [place] None   CM/SW Referral Financial   Social/Functional History   Lives With Daughter   Type of [address] Two level   Home Access Level entry   Bathroom Shower/Tub Tub/Shower unit   [address] Responsibilities No   Ambulation Assistance Independent   Transfer Assistance Independent   Active  No   Mode of Transportation Family   Occupation Part time employment   Type of Occupation [employer]   Discharge [place] Other ([place])  (C-pap)   DME Ordered? No   Potential Assistance Purchasing Medications No   Type of Home Care Services None   Patient expects to be discharged to: House   One/Two Story Residence Two story   # of Interior Steps 14   Lift Chair Available No   History of falls? 1   Services At/After Discharge   Transition of Care Consult (CM Consult) [address] Provided?  No   Mode of Transport at Discharge Other (see comment)  (Family)   Confirm Follow Up Transport Family   Condition of Participation: Discharge Planning   The Plan for Transition of Care is related to the following treatment goals: Continued support with medication management & chronic disease management. Physical therapy for increased mobility and safety & Occupational therapy for maintaining current level of functioning and perfroming ADL's/IADL's. The Patient and/or Patient Representative was provided with a Choice of Provider? Patient   The Patient and/Or Patient Representative agree with the Discharge Plan? Yes   Freedom of Choice list was provided with basic dialogue that supports the patient's individualized plan of care/goals, treatment preferences, and shares the quality data associated with the providers?   Yes     DORIAN Pepe    Care Management Group

## 2023-03-10 ENCOUNTER — APPOINTMENT (OUTPATIENT)
Facility: HOSPITAL | Age: 38
DRG: 420 | End: 2023-03-10
Payer: MEDICAID

## 2023-03-10 PROBLEM — R10.9 FLANK PAIN: Status: ACTIVE | Noted: 2023-03-10

## 2023-03-10 PROBLEM — T38.3X1A INSULIN OVERDOSE: Status: ACTIVE | Noted: 2023-03-10

## 2023-03-10 LAB
ANION GAP SERPL CALC-SCNC: 3 MMOL/L (ref 3–18)
BASOPHILS # BLD: 0 K/UL (ref 0–0.1)
BASOPHILS # BLD: 0 K/UL (ref 0–0.1)
BASOPHILS NFR BLD: 0 % (ref 0–2)
BASOPHILS NFR BLD: 0 % (ref 0–2)
BUN SERPL-MCNC: 15 MG/DL (ref 7–18)
BUN/CREAT SERPL: 15 (ref 12–20)
CALCIUM SERPL-MCNC: 8.2 MG/DL (ref 8.5–10.1)
CHLORIDE SERPL-SCNC: 110 MMOL/L (ref 100–111)
CO2 SERPL-SCNC: 24 MMOL/L (ref 21–32)
CREAT SERPL-MCNC: 1.01 MG/DL (ref 0.6–1.3)
DIFFERENTIAL METHOD BLD: ABNORMAL
DIFFERENTIAL METHOD BLD: ABNORMAL
EOSINOPHIL # BLD: 0 K/UL (ref 0–0.4)
EOSINOPHIL # BLD: 0 K/UL (ref 0–0.4)
EOSINOPHIL NFR BLD: 0 % (ref 0–5)
EOSINOPHIL NFR BLD: 0 % (ref 0–5)
ERYTHROCYTE [DISTWIDTH] IN BLOOD BY AUTOMATED COUNT: 14.6 % (ref 11.6–14.5)
ERYTHROCYTE [DISTWIDTH] IN BLOOD BY AUTOMATED COUNT: 14.8 % (ref 11.6–14.5)
GLUCOSE BLD STRIP.AUTO-MCNC: 102 MG/DL (ref 70–110)
GLUCOSE BLD STRIP.AUTO-MCNC: 108 MG/DL (ref 70–110)
GLUCOSE BLD STRIP.AUTO-MCNC: 111 MG/DL (ref 70–110)
GLUCOSE BLD STRIP.AUTO-MCNC: 120 MG/DL (ref 70–110)
GLUCOSE BLD STRIP.AUTO-MCNC: 123 MG/DL (ref 70–110)
GLUCOSE BLD STRIP.AUTO-MCNC: 126 MG/DL (ref 70–110)
GLUCOSE BLD STRIP.AUTO-MCNC: 132 MG/DL (ref 70–110)
GLUCOSE BLD STRIP.AUTO-MCNC: 135 MG/DL (ref 70–110)
GLUCOSE BLD STRIP.AUTO-MCNC: 139 MG/DL (ref 70–110)
GLUCOSE BLD STRIP.AUTO-MCNC: 148 MG/DL (ref 70–110)
GLUCOSE BLD STRIP.AUTO-MCNC: 169 MG/DL (ref 70–110)
GLUCOSE BLD STRIP.AUTO-MCNC: 175 MG/DL (ref 70–110)
GLUCOSE BLD STRIP.AUTO-MCNC: 195 MG/DL (ref 70–110)
GLUCOSE BLD STRIP.AUTO-MCNC: 199 MG/DL (ref 70–110)
GLUCOSE BLD STRIP.AUTO-MCNC: 201 MG/DL (ref 70–110)
GLUCOSE BLD STRIP.AUTO-MCNC: 202 MG/DL (ref 70–110)
GLUCOSE BLD STRIP.AUTO-MCNC: 257 MG/DL (ref 70–110)
GLUCOSE BLD STRIP.AUTO-MCNC: 259 MG/DL (ref 70–110)
GLUCOSE BLD STRIP.AUTO-MCNC: 278 MG/DL (ref 70–110)
GLUCOSE BLD STRIP.AUTO-MCNC: 283 MG/DL (ref 70–110)
GLUCOSE BLD STRIP.AUTO-MCNC: 290 MG/DL (ref 70–110)
GLUCOSE BLD STRIP.AUTO-MCNC: 291 MG/DL (ref 70–110)
GLUCOSE BLD STRIP.AUTO-MCNC: 309 MG/DL (ref 70–110)
GLUCOSE BLD STRIP.AUTO-MCNC: 96 MG/DL (ref 70–110)
GLUCOSE SERPL-MCNC: 111 MG/DL (ref 74–99)
HCT VFR BLD AUTO: 35.1 % (ref 35–45)
HCT VFR BLD AUTO: 35.6 % (ref 35–45)
HGB BLD-MCNC: 10.9 G/DL (ref 12–16)
HGB BLD-MCNC: 11.2 G/DL (ref 12–16)
IMM GRANULOCYTES # BLD AUTO: 0 K/UL (ref 0–0.04)
IMM GRANULOCYTES # BLD AUTO: 0.4 K/UL (ref 0–0.04)
IMM GRANULOCYTES NFR BLD AUTO: 0 % (ref 0–0.5)
IMM GRANULOCYTES NFR BLD AUTO: 3 % (ref 0–0.5)
LYMPHOCYTES # BLD: 4.4 K/UL (ref 0.9–3.6)
LYMPHOCYTES # BLD: 5.4 K/UL (ref 0.9–3.6)
LYMPHOCYTES NFR BLD: 30 % (ref 21–52)
LYMPHOCYTES NFR BLD: 32 % (ref 21–52)
MAGNESIUM SERPL-MCNC: 2.5 MG/DL (ref 1.6–2.6)
MCH RBC QN AUTO: 26.9 PG (ref 24–34)
MCH RBC QN AUTO: 27.3 PG (ref 24–34)
MCHC RBC AUTO-ENTMCNC: 31.1 G/DL (ref 31–37)
MCHC RBC AUTO-ENTMCNC: 31.5 G/DL (ref 31–37)
MCV RBC AUTO: 85.6 FL (ref 78–100)
MCV RBC AUTO: 88 FL (ref 78–100)
MONOCYTES # BLD: 0.9 K/UL (ref 0.05–1.2)
MONOCYTES # BLD: 2.2 K/UL (ref 0.05–1.2)
MONOCYTES NFR BLD: 12 % (ref 3–10)
MONOCYTES NFR BLD: 7 % (ref 3–10)
MYELOCYTES NFR BLD MANUAL: 2 %
NEUTS SEG # BLD: 10.1 K/UL (ref 1.8–8)
NEUTS SEG # BLD: 8.1 K/UL (ref 1.8–8)
NEUTS SEG NFR BLD: 56 % (ref 40–73)
NEUTS SEG NFR BLD: 59 % (ref 40–73)
NRBC # BLD: 0 K/UL (ref 0–0.01)
NRBC # BLD: 0.02 K/UL (ref 0–0.01)
NRBC BLD-RTO: 0 PER 100 WBC
NRBC BLD-RTO: 0.1 PER 100 WBC
PHOSPHATE SERPL-MCNC: 3.3 MG/DL (ref 2.5–4.9)
PLATELET # BLD AUTO: 454 K/UL (ref 135–420)
PLATELET # BLD AUTO: 484 K/UL (ref 135–420)
PLATELET COMMENT: ABNORMAL
PMV BLD AUTO: 10 FL (ref 9.2–11.8)
PMV BLD AUTO: 9.4 FL (ref 9.2–11.8)
POTASSIUM SERPL-SCNC: 4 MMOL/L (ref 3.5–5.5)
PROINSULIN SERPL-SCNC: 8.1 PMOL/L (ref 0–10)
RBC # BLD AUTO: 3.99 M/UL (ref 4.2–5.3)
RBC # BLD AUTO: 4.16 M/UL (ref 4.2–5.3)
RBC MORPH BLD: ABNORMAL
RBC MORPH BLD: ABNORMAL
SODIUM SERPL-SCNC: 137 MMOL/L (ref 136–145)
T4 FREE SERPL-MCNC: 0.4 NG/DL (ref 0.7–1.5)
WBC # BLD AUTO: 13.8 K/UL (ref 4.6–13.2)
WBC # BLD AUTO: 18.1 K/UL (ref 4.6–13.2)

## 2023-03-10 PROCEDURE — 71045 X-RAY EXAM CHEST 1 VIEW: CPT

## 2023-03-10 PROCEDURE — 2500000003 HC RX 250 WO HCPCS: Performed by: REGISTERED NURSE

## 2023-03-10 PROCEDURE — 6370000000 HC RX 637 (ALT 250 FOR IP)

## 2023-03-10 PROCEDURE — 6360000002 HC RX W HCPCS

## 2023-03-10 PROCEDURE — 80377 DRUG/SUBSTANCE NOS 7/MORE: CPT

## 2023-03-10 PROCEDURE — 82962 GLUCOSE BLOOD TEST: CPT

## 2023-03-10 PROCEDURE — 84443 ASSAY THYROID STIM HORMONE: CPT

## 2023-03-10 PROCEDURE — A4216 STERILE WATER/SALINE, 10 ML: HCPCS | Performed by: PHYSICIAN ASSISTANT

## 2023-03-10 PROCEDURE — 97116 GAIT TRAINING THERAPY: CPT

## 2023-03-10 PROCEDURE — 84484 ASSAY OF TROPONIN QUANT: CPT

## 2023-03-10 PROCEDURE — 85025 COMPLETE CBC W/AUTO DIFF WBC: CPT

## 2023-03-10 PROCEDURE — 2580000003 HC RX 258: Performed by: PHYSICIAN ASSISTANT

## 2023-03-10 PROCEDURE — 2580000003 HC RX 258: Performed by: REGISTERED NURSE

## 2023-03-10 PROCEDURE — 84100 ASSAY OF PHOSPHORUS: CPT

## 2023-03-10 PROCEDURE — 94761 N-INVAS EAR/PLS OXIMETRY MLT: CPT

## 2023-03-10 PROCEDURE — 2500000003 HC RX 250 WO HCPCS: Performed by: PHYSICIAN ASSISTANT

## 2023-03-10 PROCEDURE — 6360000002 HC RX W HCPCS: Performed by: INTERNAL MEDICINE

## 2023-03-10 PROCEDURE — 83735 ASSAY OF MAGNESIUM: CPT

## 2023-03-10 PROCEDURE — 80048 BASIC METABOLIC PNL TOTAL CA: CPT

## 2023-03-10 PROCEDURE — 36415 COLL VENOUS BLD VENIPUNCTURE: CPT

## 2023-03-10 PROCEDURE — 83605 ASSAY OF LACTIC ACID: CPT

## 2023-03-10 PROCEDURE — 93005 ELECTROCARDIOGRAM TRACING: CPT

## 2023-03-10 PROCEDURE — 84439 ASSAY OF FREE THYROXINE: CPT

## 2023-03-10 PROCEDURE — 2580000003 HC RX 258: Performed by: INTERNAL MEDICINE

## 2023-03-10 PROCEDURE — 6360000002 HC RX W HCPCS: Performed by: PHYSICIAN ASSISTANT

## 2023-03-10 PROCEDURE — 97535 SELF CARE MNGMENT TRAINING: CPT

## 2023-03-10 PROCEDURE — 2580000003 HC RX 258

## 2023-03-10 PROCEDURE — 2000000000 HC ICU R&B

## 2023-03-10 PROCEDURE — 97165 OT EVAL LOW COMPLEX 30 MIN: CPT

## 2023-03-10 PROCEDURE — 6370000000 HC RX 637 (ALT 250 FOR IP): Performed by: PHYSICIAN ASSISTANT

## 2023-03-10 PROCEDURE — 99291 CRITICAL CARE FIRST HOUR: CPT | Performed by: INTERNAL MEDICINE

## 2023-03-10 RX ORDER — TRAMADOL HYDROCHLORIDE 50 MG/1
25 TABLET ORAL ONCE
Status: COMPLETED | OUTPATIENT
Start: 2023-03-10 | End: 2023-03-10

## 2023-03-10 RX ADMIN — TOPIRAMATE 50 MG: 100 TABLET, FILM COATED ORAL at 08:52

## 2023-03-10 RX ADMIN — PANTOPRAZOLE 40 MG: 40 TABLET, DELAYED RELEASE ORAL at 06:21

## 2023-03-10 RX ADMIN — DEXTROSE: 20 INJECTION, SOLUTION INTRAVENOUS at 09:05

## 2023-03-10 RX ADMIN — GABAPENTIN 500 MG: 100 CAPSULE ORAL at 20:51

## 2023-03-10 RX ADMIN — LIOTHYRONINE SODIUM 10 MCG: 5 TABLET ORAL at 06:21

## 2023-03-10 RX ADMIN — GLUCAGON 1 MG/HR: KIT at 01:55

## 2023-03-10 RX ADMIN — ATORVASTATIN CALCIUM 40 MG: 40 TABLET, FILM COATED ORAL at 08:52

## 2023-03-10 RX ADMIN — HEPARIN SODIUM 5000 UNITS: 5000 INJECTION INTRAVENOUS; SUBCUTANEOUS at 21:00

## 2023-03-10 RX ADMIN — GLUCAGON 1 MG/HR: 1 INJECTION, POWDER, LYOPHILIZED, FOR SOLUTION INTRAMUSCULAR; INTRAVENOUS at 10:40

## 2023-03-10 RX ADMIN — GABAPENTIN 500 MG: 100 CAPSULE ORAL at 08:52

## 2023-03-10 RX ADMIN — SODIUM CHLORIDE, PRESERVATIVE FREE 10 ML: 5 INJECTION INTRAVENOUS at 08:54

## 2023-03-10 RX ADMIN — ASPIRIN 81 MG CHEWABLE TABLET 81 MG: 81 TABLET CHEWABLE at 08:52

## 2023-03-10 RX ADMIN — LIOTHYRONINE SODIUM 10 MCG: 5 TABLET ORAL at 14:05

## 2023-03-10 RX ADMIN — LEVOTHYROXINE SODIUM ANHYDROUS 175 MCG: 100 INJECTION, POWDER, LYOPHILIZED, FOR SOLUTION INTRAVENOUS at 06:22

## 2023-03-10 RX ADMIN — LIOTHYRONINE SODIUM 10 MCG: 5 TABLET ORAL at 21:00

## 2023-03-10 RX ADMIN — GABAPENTIN 500 MG: 100 CAPSULE ORAL at 14:05

## 2023-03-10 RX ADMIN — TOPIRAMATE 50 MG: 100 TABLET, FILM COATED ORAL at 20:51

## 2023-03-10 RX ADMIN — OCTREOTIDE ACETATE 50 MCG/HR: 500 INJECTION, SOLUTION INTRAVENOUS; SUBCUTANEOUS at 10:12

## 2023-03-10 RX ADMIN — HYDROCORTISONE SODIUM SUCCINATE 100 MG: 100 INJECTION, POWDER, FOR SOLUTION INTRAMUSCULAR; INTRAVENOUS at 08:52

## 2023-03-10 RX ADMIN — TRAMADOL HYDROCHLORIDE 25 MG: 50 TABLET, COATED ORAL at 21:42

## 2023-03-10 RX ADMIN — HEPARIN SODIUM 5000 UNITS: 5000 INJECTION INTRAVENOUS; SUBCUTANEOUS at 14:05

## 2023-03-10 RX ADMIN — HEPARIN SODIUM 5000 UNITS: 5000 INJECTION INTRAVENOUS; SUBCUTANEOUS at 06:21

## 2023-03-10 RX ADMIN — OCTREOTIDE ACETATE 50 MCG/HR: 500 INJECTION, SOLUTION INTRAVENOUS; SUBCUTANEOUS at 01:54

## 2023-03-10 RX ADMIN — SODIUM CHLORIDE, PRESERVATIVE FREE 10 ML: 5 INJECTION INTRAVENOUS at 20:52

## 2023-03-10 RX ADMIN — ACETAMINOPHEN 650 MG: 325 TABLET ORAL at 12:21

## 2023-03-10 ASSESSMENT — PAIN SCALES - GENERAL
PAINLEVEL_OUTOF10: 8
PAINLEVEL_OUTOF10: 0
PAINLEVEL_OUTOF10: 0
PAINLEVEL_OUTOF10: 10

## 2023-03-10 ASSESSMENT — PAIN DESCRIPTION - LOCATION: LOCATION: FLANK

## 2023-03-10 ASSESSMENT — PAIN DESCRIPTION - ORIENTATION: ORIENTATION: RIGHT

## 2023-03-10 ASSESSMENT — PAIN DESCRIPTION - DESCRIPTORS: DESCRIPTORS: ACHING

## 2023-03-10 NOTE — INTERDISCIPLINARY ROUNDS
[facility]  Pulmonary, Critical Care, and Sleep Medicine  Interdisciplinary and Ventilator Weaning Rounds     Patient discussed in morning walking rounds and interdisciplinary rounds. ICU admission day: 03/05/23     Overnight events:   Refractory hypoglycemia      Assessments and best practice:  Ventilator  Room air  Sedation  N/a  Other pertinent drips  D20, Glucagon, Octreotide  Lines noted  PIV  Critical labs assessed  Yes  Antibiotics  N/a  Medications reviewed  Yes  Pending imaging  none  Pending send out labs  Yes  Pending Procedures  None  Glycemic control:  D20 gtt  Stress ulcer prophylaxis. Protonix  DVT prophylaxis. SQH  Need for Lines, smith assessed.   Yes  Restraint Reevaluation      Not required        Family contact/MPOA:   Family updated      Palliative consult within 3 days of admission to ICU-  Ethics Guidance: 21 days        Daily Plans:   -Decrease Glucagon ggt to 1mg     VIDYA time 5 minutes           GUILLERMO Veronica - NP    Pulmonary, [address] [facility]

## 2023-03-10 NOTE — PLAN OF CARE
Problem: Physical Therapy - Adult  Goal: By Discharge: Performs mobility at highest level of function for planned discharge setting. See evaluation for individualized goals. Description: Physical Therapy Goals  Initiated 3/9/2023 and to be accomplished within 7 day(s)  1. Patient will move from supine to sit and sit to supine  in bed with modified independence. 2.  Patient will transfer from bed to chair and chair to bed with modified independence using the least restrictive device. 3.  Patient will perform sit to stand with modified independence. 4.  Patient will ambulate with modified independence for 150 feet with the least restrictive device. 5.  Patient will ascend/descend 8 stairs with handrail(s) with supervision/set-up. PLOF: Lives with daughter. Two story home with bedroom/bathroom on second floor. History of falls. Outcome: Completed   PHYSICAL THERAPY TREATMENT/DISCHARGE    Patient: Lewis Bloch (6 y.o. female)  Date: 3/10/2023  Diagnosis: Orthopnea [R06.01]  Hypoglycemia [E16.2]  Flank pain [R10.9] Hypoglycemia  Precautions: Fall Risk  ASSESSMENT:  INDRA Coon cleared patient for session. Mod I for supine to sit. Seated EOB with good balance. Mod I for sit to stand. Voices complaints of right ankle/foot from prior condition; would recommend outpatient follow up for appropriate bracing/intervention as patient reports having a boot prior. Amb 150ft with ww. Steady gait. Seated in recliner for short rest. Completed alternating toe taps to 8inch box with bilateral handrails to simulate stairs. Steady balance. Educated on activity pacing. Seated in recliner. OT present at end of session. PLAN:  Maximum therapeutic gains met at current level of care and patient will be discharged from physical therapy at this time.   Rationale for discharge:  [x]     Goals Achieved  []     [address]  []     Patient not participating in therapy  []     Other:    Further Equipment Recommendations for Discharge: rolling walker, bedside commode    Discharge Recommendations: Home with Home health PT    AMPAC: 23/24  This [facility] score should be considered in conjunction with interdisciplinary team recommendations to determine the most appropriate discharge setting. Patient's social support, diagnosis, medical stability, and prior level of function should also be taken into consideration. Current research shows that an AM-PAC score of 18 (14 without stairs) or greater is associated with a discharge to the patient's home setting. Based on an AM-PAC score of 23/24 and their current functional mobility deficits, it is recommended that the patient have 2-3 sessions per week of Physical Therapy at d/c to increase the patient's independence. SUBJECTIVE:   Patient stated, \"I need a bedside commode. \"    OBJECTIVE DATA SUMMARY:   Critical Behavior:  Orientation  Orientation Level: Oriented X4    Functional Mobility Training:  Bed Mobility:  Bed Mobility Training  Supine to Sit: Modified independent  Sit to Supine: Modified independent  Transfers:  Transfer Training  Transfer Training: Yes  Sit to Stand: Modified independent  Stand to Sit: Modified independent  Balance:  Balance  Sitting: Intact  Standing: Impaired  Standing - Static: Good  Standing - Dynamic: Good   Ambulation/Gait Training:  Gait  Overall Level of Assistance: Modified independent  Distance (ft): 150 Feet  Assistive Device: Walker, rolling    Therapeutic Exercises:   Alternating toe taps x10 to 8 inch box     Pain:  Pain level pre-treatment: 0/10  Pain level post-treatment: 0/10     Activity Tolerance:   Activity Tolerance: Patient tolerated treatment well    After treatment:   [x] Patient left in no apparent distress sitting up in chair  [] Patient left in no apparent distress in bed  [x] Call bell left within reach  [] Nursing notified  [] Caregiver present  [] Bed alarm activated  [] SCDs applied      COMMUNICATION/EDUCATION:   Patient Education  Education Given To: Patient  Education Provided: Role of Therapy; Energy Conservation; Fall Prevention Strategies; Plan of Care;Transfer Training  Education Method: Demonstration;Verbal;Teach Back  Barriers to Learning: None  Education Outcome: Verbalized understanding;Demonstrated understanding;Continued education needed      Sherly Marquez, PT  Minutes: [address] AM-PAC® Basic Mobility Inpatient Short Form (6-Clicks) Version 2    How much HELP from another person does the patient currently need    (If the patient hasn't done an activity recently, how much help from another person do you think he/she would need if he/she tried?)   Total (Total A or Dep)   A Lot  (Mod to Max A)   A Little (Sup or Min A)   None (Mod I to I)   Turning from your back to your side while in a flat bed without using bedrails? [] 1 [] 2 [] 3 [x] 4   2. Moving from lying on your back to sitting on the side of a flat bed without using bedrails? [] 1 [] 2 [] 3 [x] 4   3. Moving to and from a bed to a chair (including a wheelchair)? [] 1 [] 2 [] 3 [x] 4   4. Standing up from a chair using your arms (e.g., wheelchair, or bedside chair)? [] 1 [] 2 [] 3 [x] 4   5. Walking in hospital room? [] 1 [] 2 [] 3 [x] 4   6. Climbing 3-5 steps with a railing?+   [] 1 [] 2 [x] 3 [] 4   +If stair climbing cannot be assessed, skip item #6. Sum responses from items 1-5.

## 2023-03-10 NOTE — PROGRESS NOTES
attended the interdisciplinary rounds for [employer], who is a 44 y.o.,female. Patient's Primary Language is: [place]. According to the patient's EMR Holiness Affiliation is: [facility].     The reason the Patient came to the hospital is:   Patient Active Problem List    Diagnosis Date Noted    Myxedema 30/04/2762    Metabolic syndrome 46/17/1160    Type 2 diabetes mellitus with hyperglycemia, with long-term current use of insulin ([address] 03/06/2023    Obesity, morbid ([address] 11/05/2018    Hypoglycemia 05/13/2016    Hyperglycemia 09/01/2013    Vaginosis 09/01/2013    Obesity (BMI 30-39.9) 07/12/2012    Seizures ([facility] [address] 07/12/2012    Migraines 07/12/2012          Plan:  Chaplains will continue to follow and will provide pastoral care on an as needed/requested basis.  recommends bedside caregivers page  on duty if patient shows signs of acute spiritual or emotional distress.     [address]   [phone]

## 2023-03-10 NOTE — PROGRESS NOTES
PCCM Update:  I updated the pt regarding plan of care, questions and concerns addressed.     GUILLERMO Funk - NP

## 2023-03-10 NOTE — PROGRESS NOTES
OCCUPATIONAL THERAPY EVALUATION/DISCHARGE    Patient: Janice Rodriguez (81 y.o. female)  Date: 3/10/2023  Primary Diagnosis: Orthopnea [R06.01]  Hypoglycemia [E16.2]  Flank pain [R10.9]       Precautions: Fall Risk  PLOF: Pt lives with daughter, reports her daughter assists her with LB ADLs, and IADLs. ASSESSMENT AND RECOMMENDATIONS:    Pt is finishing up PT session upon entry, agreeable to participate in OT. Pt demos functional mobility with FWW with Mod Ind, reports difficulty with LB ADLs. Pt educated on and issued AE for LB ADLs (reacher, long handled sponge, sock aid). Following education pt was able to perform LB dressing and simulate bathing with Supervision. Pt educated on mult energy conservation techniques to utilize in home environment and while at the hospital, including pacing and deep breathing to prevent SOB and fatigue, and increase activity tolerance and safety w/ADLs and functional mobility, pt verbalized understanding. Maximum therapeutic gains met at current level of care and patient will be discharged from acute occupational therapy at this time. Recommend HHOT to maximize independence with higher level ADLs at home environment. Further Equipment Recommendations for Discharge: bedside commode    Discharge Recommendations: Home with assist PRN;Home with Home health OT    AMPAC: Current research shows that an AM-PAC score of 18 or greater is associated with a discharge to the patient's home setting. Based on an AM-PAC score of 19/24 and their current ADL deficits; it is recommended that the patient have 2-3 sessions per week of Occupational Therapy at d/c to increase the patient's independence. This AMPAC score should be considered in conjunction with interdisciplinary team recommendations to determine the most appropriate discharge setting. Patient's social support, diagnosis, medical stability, and prior level of function should also be taken into consideration.      SUBJECTIVE: Patient stated My daughter is great.     OBJECTIVE DATA SUMMARY:     Past Medical History:   Diagnosis Date    Arthritis     Chest pain     Diabetes ([facility] [address]     Essential hypertension     Headache(784.0)     Hyperchloremia     Hypertension     Seizures ([facility] [address]     8/2020 last seizure    Seizures (HCC)     SOB (shortness of breath)     Thyroid cancer [facility])      Past Surgical History:   Procedure Laterality Date    CT BIOPSY RENAL  7/30/2020    CT BIOPSY RENAL 7/30/2020 [facility] RAD CT    PARTIAL HYSTERECTOMY (CERVIX NOT REMOVED)      THYROIDECTOMY      TUBAL LIGATION         Home Situation:   Social/Functional History  Lives With: Daughter  Type of Home: House  Home Layout: Two level  Home Access: Level entry  Bathroom Shower/Tub: Tub/Shower unit  Bathroom Toilet: Standard  Bathroom Accessibility: Accessible  Home Equipment: None  Receives Help From: Family  ADL Assistance: Independent  Homemaking Assistance: Independent  Homemaking Responsibilities: No  Ambulation Assistance: Independent  Transfer Assistance: Independent  Active : No  Mode of Transportation: Family  Occupation: Part time employment  Type of Occupation: [employer]  []  Right hand dominant   []  Left hand dominant    Cognitive/Behavioral Status:  Orientation  Overall Orientation Status: Within Functional Limits  Orientation Level: Oriented X4  Cognition  Overall Cognitive Status: WFL    Skin: visible skin intact  Edema: BLE    Vision/Perceptual:    Vision  Vision: Impaired  Vision Exceptions: Wears glasses at all times       Coordination: BUE  Coordination: Generally decreased, functional   Balance:     Balance  Sitting: Intact  Standing: Impaired  Standing - Static: Good  Standing - Dynamic: Good    Strength: BUE  Strength: Generally decreased, functional    Tone & Sensation: BUE  Tone: Normal  Sensation: Intact    Range of Motion: BUE  AROM: Generally decreased, functional   Functional Mobility and Transfers for ADLs:  Bed Mobility:     Bed Mobility Training  Supine to Sit: Modified independent  Sit to Supine: Modified independent  Transfers:   Transfer Training  Transfer Training: Yes  Sit to Stand: Modified independent  Stand to Sit: Modified independent    ADL Assessment:   Feeding: Supervision  Grooming: Supervision  UE Bathing: Supervision  LE Bathing: Supervision; Adaptive equipment  UE Dressing: Supervision  LE Dressing: Supervision; Adaptive equipment  Toileting: Supervision    ADL Intervention:  AE training for LB ADLs    Pain:  Pain level pre-treatment: not rated  Pain level post-treatment: not rated    Activity Tolerance:   Activity Tolerance: Patient Tolerated treatment well  Please refer to the flowsheet for vital signs taken during this treatment. After treatment:   [x] Patient left in no apparent distress sitting up in chair  [] Patient left in no apparent distress in bed  [x] Call bell left within reach  [x] Nursing notified  [] Caregiver present  [] Bed alarm activated    COMMUNICATION/EDUCATION:   Patient Education  Education Given To: Patient  Education Provided: Role of Therapy;Plan of Care;Energy Conservation;Equipment; Fall Prevention Strategies; ADL Adaptive Strategies  Education Method: Demonstration;Verbal;Teach Back  Education Outcome: Verbalized understanding;Continued education needed    Thank you for this referral.  Jesus Scott OTR/L  Minutes: 26    Eval Complexity: Decision [facility]in [facility] AM-PAC® Daily Activity Inpatient Short Form (6-Clicks)*    How much HELP from another person does the patient currently need    (If the patient hasn't done an activity recently, how much help from another person do you think he/she would need if he/she tried?)   Total (Total A or Dep)   A Lot  (Mod to Max A)   A Little (Sup or Min A)   None (Mod I to I)   Putting on and taking off regular lower body clothing? [] 1 [] 2 [x] 3 [] 4   2. Bathing (including washing, rinsing,      drying)?     [] 1 [] 2 [x] 3 [] 4   3. Toileting, which includes using toilet, bedpan or urinal?   [] 1 [] 2 [x] 3 [] 4   4. Putting on and taking off regular upper body clothing? [] 1 [] 2 [x] 3 [] 4   5. Taking care of personal grooming such as brushing teeth? [] 1 [] 2 [x] 3 [] 4   6. Eating meals? [] 1 [] 2 [] 3 [x] 4       Current research shows that an AM-PAC score of 18 or greater is associated with a discharge to the patient's home setting. Based on an AM-PAC score of 19/24 and their current ADL deficits; it is recommended that the patient have 2-3 sessions per week of Occupational Therapy at d/c to increase the patient's independence.

## 2023-03-10 NOTE — PROGRESS NOTES
[address] [place] [address] [facility]. Pulmonary, Critical Care, and Sleep Medicine    Name: Alton Lopez MRN: [id number]   : 1985 Hospital: [address] [place] [address] Dr   Date: 3/10/2023  Admission Date: 3/4/2023     Chart and notes reviewed. Data reviewed. I have evaluated all findings. [x]I have reviewed the flowsheet and previous days notes. []The patient is unable to give any meaningful history or review of systems because the patient is:  []Intubated []Sedated   []Unresponsive      []The patient is critically ill on      []Mechanical ventilation []Pressors   []BiPAP []         Interval HPI:    Alton Lopez is a 44 y.o.  female with PMHx of type 2 diabetes on insulin, papillary thyroid carcinoma status post total thyroidectomy () with hypothyroidism, chronic back pain, and chronic kidney disease stage III with proteinuria. Patient admitted on 3/4/2023 to family medicine service after presenting to the ED with flank pain and severe refractory hypoglycemia. The patient had been having hypoglycemia for the prior week. She had not changed her current home medication regimen recently other than a reported change to her thyroid regimen, which she cannot recall. Patient was initially admitted to SDU for d10 gtt. Over the day of her admission she required multiple pushes of d50 and runs of d10 with persistent BG in the 50s. Subsequently patient was transferred to ICU for further care and close monitoring of BG. Notable labs of TSH of 49.5 and T4 free is 0.2     On 3/6 the patient was started on increased dose of levothyroxine for concerns of myxedema since history obtained revealed the patient had her home levothyroxine dose decreased in the past month. On 3/7, Endocrine recommended the patient start 125mcg Levothyroxine and to follow free T4 levels to assess response, and suspects her refractory hypoglycemia is in the setting of hypothyroidism.      On 3/8 in the AM her glucose levels were in the 80s and resolved after she drank orange juice. The patient was concerned that she has sleep apnea and would like outpatient referral for sleep study. Endocrine also recommended increasing her Synthroid and decreasing her hydrocortisone. The patient was doing well otherwise. On 3/9 the patient was having ongoing hypoglycemia despite additional glucose, therefore, glucagon and octreotide drips were added as well as synthroid increased and patient referred to MRI for insulinoma work-up. The patient was unable to complete the exam given significant anxiety during the test.     Subjective 03/10/23  Hospital Day: 6, ICU day 4  Vent Day: N/A  Overnight events: Episodes of hypoglycemia, requiring increase in her D20 drip rate  Mentation/Activity: No sedation required, awake and alert x4  Respiratory/ Secretions: No oxygen supplementation required, no hypoxia on room air  Hemodynamics: HDS without requirement of stable  Urine output, bowel: 4350cc (1.4ml/kg/hr)  Diet: Regular diet with additional juice for hypoglycemic episodes  Need for procedures: MRI              ROS:Constitutional: negative for anorexia, chills, and fevers  Ears, nose, mouth, throat, and face: negative for nasal congestion and sore throat  Respiratory: negative for cough and shortness of breath  Cardiovascular: negative for chest pain, dyspnea, and palpitations  Gastrointestinal: negative for abdominal pain, change in bowel habits, nausea, vomiting, and notes some reflux symptoms  Genitourinary:negative for dysuria, hematuria, and urinary incontinence  Musculoskeletal:negative for muscle weakness  Neurological: negative for dizziness  Behavioral/Psych: positive for anxiety and sleep disturbance    Events, medications, imaging, and notes from last 24 hours reviewed. Patient Active Problem List   Diagnosis    Obesity (BMI 30-39. 9)    Seizures (HCC)    Hyperglycemia    Vaginosis    Obesity, morbid (HCC)    Hypoglycemia    Migraines Myxedema    Metabolic syndrome    Type 2 diabetes mellitus with hyperglycemia, with long-term current use of insulin (HCC)       Vital Signs:  BP (!) 142/82   Pulse 91   Temp 98 °F (36.7 °C) (Axillary)   Resp 17   Wt 286 lb 9.6 oz (130 kg)   SpO2 99%   BMI 42.32 kg/m²             Temp (24hrs), Av °F (36.7 °C), Min:97.9 °F (36.6 °C), Max:98.2 °F (36.8 °C)       Intake/Output:   Last shift:      03/10 0701 - 03/10 1900  In: 149 [I.V.:149]  Out: -   Last 3 shifts: 1901 - 03/10 07  In: 5106.6 [P.O.:2595; I.V.:2511.6]  Out: 6250 [Urine:6250]    Intake/Output Summary (Last 24 hours) at 3/10/2023 9658  Last data filed at 3/10/2023 0900  Gross per 24 hour   Intake 2195.91 ml   Output 3700 ml   Net -1504.09 ml            Telemetry: [x]Sinus []A-flutter []Paced    []A-fib []Multiple PVCs                  Physical Exam:      General: Patient sleeping in room, loud snoring, however, easily arouses to voice   HEENT:  EOMI, mucosa moist, airway patent  Resp:  Symmetrical chest expansion, no accessory muscle use; good airway entry; no rales/ wheezing/ rhonchi noted, no hypoxia on room-air  CV:  S1, S2 present; regular rate and rhythm  GI:  Abdomen soft, non-tender, non-distended  Extremities:  +2 pulses on all extremities; no edema/ cyanosis/ clubbing noted   Skin:  Warm; no rashes/ lesions noted, normal turgor/cap refill   Neurologic:  Non-focal  Devices:  PICC line      DATA:  MAR reviewed and pertinent medications noted or modified as needed    Labs:    Recent Labs     03/10/23  0125 23  0158 23  0241   WBC 18.1* 14.3* 18.2*   HGB 10.9* 11.0* 11.1*   HCT 35.1 35.5 35.7   MCV 88.0 88.5 88.1   * 420 455*      Lab Results   Component Value Date/Time     03/10/2023 01:25 AM    K 4.0 03/10/2023 01:25 AM     03/10/2023 01:25 AM    CO2 24 03/10/2023 01:25 AM    BUN 15 03/10/2023 01:25 AM    CREATININE 1.01 03/10/2023 01:25 AM    GLUCOSE 111 03/10/2023 01:25 AM    CALCIUM 8.2 03/10/2023 01:25 AM       Lab Results   Component Value Date    ALT 24 03/04/2023    AST 25 03/04/2023    ALKPHOS 77 03/04/2023    BILITOT 0.3 03/04/2023      Lab Results   Component Value Date    DDIMER 0.55 (H) 01/01/2023      Lab Results   Component Value Date    TSH 0.04 (L) 05/16/2021    [id number] 57.50 (H) 03/09/2023      Hemoglobin A1C   Date Value Ref Range Status   07/05/2022 12.5 (H) 4.2 - 5.6 % Final     Comment:     (NOTE)  HbA1C Interpretive Ranges  <5.7              Normal  5.7 - 6.4         Consider Prediabetes  >6.5              Consider Diabetes        Lab Results   Component Value Date    APTT 26.6 07/30/2020      Lab Results   Component Value Date    INR 1.0 05/02/2022    INR 1.0 07/30/2020    PROTIME 13.6 05/02/2022    PROTIME 13.1 07/30/2020      Results       Procedure Component Value Units Date/Time    Respiratory Panel, Molecular, with COVID-19 (Restricted: peds pts or suitable admitted adults) [[id number]] Collected: 03/04/23 1615    Order Status: Completed Specimen: Nasopharyngeal Updated: 03/04/23 1724     Adenovirus by PCR Not detected        Coronavirus 229E by PCR Not detected        Coronavirus HKU1 by PCR Not detected        Coronavirus NL63 by PCR Not detected        Coronavirus OC43 by PCR Not detected        SARS-CoV-2, PCR Not detected        Human Metapneumovirus by PCR Not detected        Rhinovirus Enterovirus PCR Not detected        Influenza A by PCR Not detected        Influenza A H1 by PCR Not detected        Influenza A H3 by PCR Not detected        Influenza A H1-2009 by PCR Not detected        Influenza B PCR Not detected        Parainfluenza 1 PCR Not detected        Parainfluenza 2 PCR Not detected        Parainfluenza 3 PCR Not detected        Parainfluenza 4 PCR Not detected        Respiratory Syncytial Virus by PCR Not detected        Bordetella parapertussis by PCR Not detected        Bordetella pertussis by PCR Not detected        Chlamydophila Pneumonia PCR Not detected Mycoplasma pneumo by PCR Not detected       Culture, Urine [[id number]] Collected: 03/04/23 1548    Order Status: Completed Specimen: Urine, clean catch Updated: 03/06/23 0643     Special Requests NO SPECIAL REQUESTS        Tiff count --        18466  COLONIES/mL       Culture       MIXED UROGENITAL DINA ISOLATED                       Imaging:  [x]   I have personally reviewed the patients radiographs and reports  Most recent imaging:  No results found. No results found for this or any previous visit. No valid procedures specified. IMPRESSION:   Severe refractory hypoglycemia in setting of possible myxedema, without coma, versus medication induced versus thyroid dysfunction versus less likely insulinoma/tumor   Severe hypothyroidism secondary to myxedema without coma s/p thyroidectomy 2/2 papillary thyroid CA versus subtherapeutic medication dosing   Nephrotic range proteinuria, suggestive of progressive CKD, and likely 2/2 diabetic nephropathy  History of kidney biopsy with diagnosis of severe glomerular sclerosis with FSGS  Abnormal UA without signs of infection   History of thyroid cancer s/p thyroidectomy (2013), taking home synthroid   History of DM Type 2, home regimen Lantus 90u BID, Humalog 10 units before every meal, metformin 500 mg BID, Farxiga 5 mg daily, Trulicity 1.5 units weekly  Atraumatic Right foot pain and swelling secondary to diabetic neuropathy versus Charcot foot with XR demonstrating soft tissue swelling without acute bony injury  History of seizure disorder, home medication Topiramate 50mg BID  History of GERD, home medication Pantoprazole 40mg daily     Patient Active Problem List   Diagnosis    Obesity (BMI 30-39. 9)    Seizures (HCC)    Hyperglycemia    Vaginosis    Obesity, morbid ([address]    Hypoglycemia    Migraines    Myxedema    Metabolic syndrome    Type 2 diabetes mellitus with hyperglycemia, with long-term current use of insulin ([address]        RECOMMENDATIONS:   Neuro: No sedation requirement. Continue home Topamax 50mg daily for seizures  Pulm: Aspiration precautions, HOB>30 degrees. Incentive spirometer q1h  CVS:Monitor HD, MAP goal >65. Fluids: D20 drip, titrate as tolerated  GI: Regular diet. Home Pantoprazole 40mg daily  Renal: Trend Cr, Strict I/Os  Hem/Onc: Trend H/H, monitor for s/o active bleeding. Daily CBC. I/D:Trend WBCs and temperature curve. Metabolic: Thiamine T0Q, Daily BMP, mag, phos. Trend lytes and replace per protocol. Endocrine: Stress dose steroids. Accu checks q1h.   - Consulted endocrine. Appreciate recommendations  --Continue Synthroid IV to 175mg and hydrocortisone to 100mg daily. Follow free T4 level  -Wean glucagon drip and wean octreotide as tolerated  -Obtain MRI abd to r/o insulinoma  Musc/Skin: no wound concerns  Full Code No additional code details  Discussed in interdisciplinary rounds     Best practice :    Glycemic control  IHI ICU bundles:   Central Line Bundle Followed   Sress ulcer prophylaxis. Protonix  DVT prophylaxis. SQH  Need for Lines. Attending Non-violent Restraint Reevaluation     The patient does not need restraints at this time.        Kb Crawford MD  03/10/23  PGY-1 [facility] EM Resident

## 2023-03-11 LAB
ANION GAP SERPL CALC-SCNC: 3 MMOL/L (ref 3–18)
ANION GAP SERPL CALC-SCNC: 5 MMOL/L (ref 3–18)
BASOPHILS # BLD: 0 K/UL (ref 0–0.1)
BASOPHILS NFR BLD: 0 % (ref 0–2)
BUN SERPL-MCNC: 19 MG/DL (ref 7–18)
BUN SERPL-MCNC: 20 MG/DL (ref 7–18)
BUN/CREAT SERPL: 17 (ref 12–20)
BUN/CREAT SERPL: 18 (ref 12–20)
CALCIUM SERPL-MCNC: 8.2 MG/DL (ref 8.5–10.1)
CALCIUM SERPL-MCNC: 8.2 MG/DL (ref 8.5–10.1)
CHLORIDE SERPL-SCNC: 106 MMOL/L (ref 100–111)
CHLORIDE SERPL-SCNC: 110 MMOL/L (ref 100–111)
CK SERPL-CCNC: 299 U/L (ref 26–192)
CO2 SERPL-SCNC: 24 MMOL/L (ref 21–32)
CO2 SERPL-SCNC: 24 MMOL/L (ref 21–32)
CREAT SERPL-MCNC: 1.06 MG/DL (ref 0.6–1.3)
CREAT SERPL-MCNC: 1.2 MG/DL (ref 0.6–1.3)
DIFFERENTIAL METHOD BLD: ABNORMAL
EOSINOPHIL # BLD: 0 K/UL (ref 0–0.4)
EOSINOPHIL NFR BLD: 0 % (ref 0–5)
ERYTHROCYTE [DISTWIDTH] IN BLOOD BY AUTOMATED COUNT: 14.6 % (ref 11.6–14.5)
EST. AVERAGE GLUCOSE BLD GHB EST-MCNC: 171 MG/DL
GLUCOSE BLD STRIP.AUTO-MCNC: 102 MG/DL (ref 70–110)
GLUCOSE BLD STRIP.AUTO-MCNC: 103 MG/DL (ref 70–110)
GLUCOSE BLD STRIP.AUTO-MCNC: 106 MG/DL (ref 70–110)
GLUCOSE BLD STRIP.AUTO-MCNC: 107 MG/DL (ref 70–110)
GLUCOSE BLD STRIP.AUTO-MCNC: 109 MG/DL (ref 70–110)
GLUCOSE BLD STRIP.AUTO-MCNC: 116 MG/DL (ref 70–110)
GLUCOSE BLD STRIP.AUTO-MCNC: 119 MG/DL (ref 70–110)
GLUCOSE BLD STRIP.AUTO-MCNC: 134 MG/DL (ref 70–110)
GLUCOSE BLD STRIP.AUTO-MCNC: 140 MG/DL (ref 70–110)
GLUCOSE BLD STRIP.AUTO-MCNC: 194 MG/DL (ref 70–110)
GLUCOSE BLD STRIP.AUTO-MCNC: 195 MG/DL (ref 70–110)
GLUCOSE BLD STRIP.AUTO-MCNC: 197 MG/DL (ref 70–110)
GLUCOSE BLD STRIP.AUTO-MCNC: 205 MG/DL (ref 70–110)
GLUCOSE BLD STRIP.AUTO-MCNC: 205 MG/DL (ref 70–110)
GLUCOSE BLD STRIP.AUTO-MCNC: 217 MG/DL (ref 70–110)
GLUCOSE BLD STRIP.AUTO-MCNC: 232 MG/DL (ref 70–110)
GLUCOSE BLD STRIP.AUTO-MCNC: 275 MG/DL (ref 70–110)
GLUCOSE BLD STRIP.AUTO-MCNC: 77 MG/DL (ref 70–110)
GLUCOSE BLD STRIP.AUTO-MCNC: 94 MG/DL (ref 70–110)
GLUCOSE BLD STRIP.AUTO-MCNC: 96 MG/DL (ref 70–110)
GLUCOSE SERPL-MCNC: 115 MG/DL (ref 74–99)
GLUCOSE SERPL-MCNC: 79 MG/DL (ref 74–99)
HBA1C MFR BLD: 7.6 % (ref 4.2–5.6)
HCT VFR BLD AUTO: 34.3 % (ref 35–45)
HGB BLD-MCNC: 10.8 G/DL (ref 12–16)
IMM GRANULOCYTES # BLD AUTO: 0 K/UL (ref 0–0.04)
IMM GRANULOCYTES NFR BLD AUTO: 0 % (ref 0–0.5)
LACTATE SERPL-SCNC: 0.7 MMOL/L (ref 0.4–2)
LYMPHOCYTES # BLD: 5.7 K/UL (ref 0.9–3.6)
LYMPHOCYTES NFR BLD: 41 % (ref 21–52)
MAGNESIUM SERPL-MCNC: 2.8 MG/DL (ref 1.6–2.6)
MAGNESIUM SERPL-MCNC: 2.9 MG/DL (ref 1.6–2.6)
MCH RBC QN AUTO: 27.1 PG (ref 24–34)
MCHC RBC AUTO-ENTMCNC: 31.5 G/DL (ref 31–37)
MCV RBC AUTO: 86.2 FL (ref 78–100)
MONOCYTES # BLD: 0.6 K/UL (ref 0.05–1.2)
MONOCYTES NFR BLD: 4 % (ref 3–10)
NEUTS SEG # BLD: 7.6 K/UL (ref 1.8–8)
NEUTS SEG NFR BLD: 55 % (ref 40–73)
NRBC # BLD: 0 K/UL (ref 0–0.01)
NRBC BLD-RTO: 0 PER 100 WBC
PHOSPHATE SERPL-MCNC: 3.6 MG/DL (ref 2.5–4.9)
PHOSPHATE SERPL-MCNC: 3.7 MG/DL (ref 2.5–4.9)
PLATELET # BLD AUTO: 454 K/UL (ref 135–420)
PLATELET COMMENT: ABNORMAL
PMV BLD AUTO: 9.9 FL (ref 9.2–11.8)
POTASSIUM SERPL-SCNC: 3.8 MMOL/L (ref 3.5–5.5)
POTASSIUM SERPL-SCNC: 4.1 MMOL/L (ref 3.5–5.5)
RBC # BLD AUTO: 3.98 M/UL (ref 4.2–5.3)
RBC MORPH BLD: ABNORMAL
SODIUM SERPL-SCNC: 135 MMOL/L (ref 136–145)
SODIUM SERPL-SCNC: 137 MMOL/L (ref 136–145)
T4 FREE SERPL-MCNC: 0.5 NG/DL (ref 0.7–1.5)
TROPONIN I SERPL HS-MCNC: 7 NG/L (ref 0–54)
TROPONIN I SERPL HS-MCNC: 9 NG/L (ref 0–54)
TSH SERPL DL<=0.05 MIU/L-ACNC: 14.5 UIU/ML (ref 0.36–3.74)
TSH SERPL DL<=0.05 MIU/L-ACNC: 9.58 UIU/ML (ref 0.36–3.74)
WBC # BLD AUTO: 13.9 K/UL (ref 4.6–13.2)
WBC MORPH BLD: ABNORMAL

## 2023-03-11 PROCEDURE — 84443 ASSAY THYROID STIM HORMONE: CPT

## 2023-03-11 PROCEDURE — 6370000000 HC RX 637 (ALT 250 FOR IP): Performed by: PHYSICIAN ASSISTANT

## 2023-03-11 PROCEDURE — 6370000000 HC RX 637 (ALT 250 FOR IP)

## 2023-03-11 PROCEDURE — 6360000002 HC RX W HCPCS: Performed by: INTERNAL MEDICINE

## 2023-03-11 PROCEDURE — 84439 ASSAY OF FREE THYROXINE: CPT

## 2023-03-11 PROCEDURE — 83735 ASSAY OF MAGNESIUM: CPT

## 2023-03-11 PROCEDURE — 2500000003 HC RX 250 WO HCPCS: Performed by: PHYSICIAN ASSISTANT

## 2023-03-11 PROCEDURE — A4216 STERILE WATER/SALINE, 10 ML: HCPCS | Performed by: PHYSICIAN ASSISTANT

## 2023-03-11 PROCEDURE — 5A09357 ASSISTANCE WITH RESPIRATORY VENTILATION, LESS THAN 24 CONSECUTIVE HOURS, CONTINUOUS POSITIVE AIRWAY PRESSURE: ICD-10-PCS | Performed by: INTERNAL MEDICINE

## 2023-03-11 PROCEDURE — 2580000003 HC RX 258: Performed by: PHYSICIAN ASSISTANT

## 2023-03-11 PROCEDURE — 94660 CPAP INITIATION&MGMT: CPT

## 2023-03-11 PROCEDURE — 93005 ELECTROCARDIOGRAM TRACING: CPT | Performed by: PHYSICIAN ASSISTANT

## 2023-03-11 PROCEDURE — 2580000003 HC RX 258

## 2023-03-11 PROCEDURE — 36415 COLL VENOUS BLD VENIPUNCTURE: CPT

## 2023-03-11 PROCEDURE — 94761 N-INVAS EAR/PLS OXIMETRY MLT: CPT

## 2023-03-11 PROCEDURE — 2000000000 HC ICU R&B

## 2023-03-11 PROCEDURE — 82962 GLUCOSE BLOOD TEST: CPT

## 2023-03-11 PROCEDURE — 6360000002 HC RX W HCPCS

## 2023-03-11 PROCEDURE — 82550 ASSAY OF CK (CPK): CPT

## 2023-03-11 PROCEDURE — 84100 ASSAY OF PHOSPHORUS: CPT

## 2023-03-11 PROCEDURE — 80048 BASIC METABOLIC PNL TOTAL CA: CPT

## 2023-03-11 PROCEDURE — 84484 ASSAY OF TROPONIN QUANT: CPT

## 2023-03-11 PROCEDURE — 99291 CRITICAL CARE FIRST HOUR: CPT | Performed by: INTERNAL MEDICINE

## 2023-03-11 PROCEDURE — 83036 HEMOGLOBIN GLYCOSYLATED A1C: CPT

## 2023-03-11 PROCEDURE — 85025 COMPLETE CBC W/AUTO DIFF WBC: CPT

## 2023-03-11 RX ORDER — INSULIN LISPRO 100 [IU]/ML
0-4 INJECTION, SOLUTION INTRAVENOUS; SUBCUTANEOUS NIGHTLY
Status: DISCONTINUED | OUTPATIENT
Start: 2023-03-11 | End: 2023-03-13

## 2023-03-11 RX ORDER — DEXTROSE MONOHYDRATE 100 MG/ML
INJECTION, SOLUTION INTRAVENOUS CONTINUOUS PRN
Status: DISCONTINUED | OUTPATIENT
Start: 2023-03-11 | End: 2023-03-17 | Stop reason: HOSPADM

## 2023-03-11 RX ORDER — INSULIN GLARGINE 100 [IU]/ML
60 INJECTION, SOLUTION SUBCUTANEOUS NIGHTLY
Status: DISCONTINUED | OUTPATIENT
Start: 2023-03-11 | End: 2023-03-13

## 2023-03-11 RX ORDER — INSULIN LISPRO 100 [IU]/ML
0-16 INJECTION, SOLUTION INTRAVENOUS; SUBCUTANEOUS
Status: DISCONTINUED | OUTPATIENT
Start: 2023-03-11 | End: 2023-03-13

## 2023-03-11 RX ORDER — INSULIN LISPRO 100 [IU]/ML
14 INJECTION, SOLUTION INTRAVENOUS; SUBCUTANEOUS
Status: DISCONTINUED | OUTPATIENT
Start: 2023-03-11 | End: 2023-03-13

## 2023-03-11 RX ORDER — FUROSEMIDE 20 MG/1
20 TABLET ORAL DAILY
Status: DISCONTINUED | OUTPATIENT
Start: 2023-03-11 | End: 2023-03-17 | Stop reason: HOSPADM

## 2023-03-11 RX ADMIN — DEXTROSE: 20 INJECTION, SOLUTION INTRAVENOUS at 01:17

## 2023-03-11 RX ADMIN — LEVOTHYROXINE SODIUM ANHYDROUS 175 MCG: 100 INJECTION, POWDER, LYOPHILIZED, FOR SOLUTION INTRAVENOUS at 06:38

## 2023-03-11 RX ADMIN — GABAPENTIN 500 MG: 100 CAPSULE ORAL at 08:38

## 2023-03-11 RX ADMIN — Medication 16 G: at 01:14

## 2023-03-11 RX ADMIN — TOPIRAMATE 50 MG: 100 TABLET, FILM COATED ORAL at 20:48

## 2023-03-11 RX ADMIN — SODIUM CHLORIDE, PRESERVATIVE FREE 10 ML: 5 INJECTION INTRAVENOUS at 20:51

## 2023-03-11 RX ADMIN — LIOTHYRONINE SODIUM 10 MCG: 5 TABLET ORAL at 13:04

## 2023-03-11 RX ADMIN — TOPIRAMATE 50 MG: 100 TABLET, FILM COATED ORAL at 08:38

## 2023-03-11 RX ADMIN — Medication 16 G: at 02:49

## 2023-03-11 RX ADMIN — HEPARIN SODIUM 5000 UNITS: 5000 INJECTION INTRAVENOUS; SUBCUTANEOUS at 13:04

## 2023-03-11 RX ADMIN — ASPIRIN 81 MG CHEWABLE TABLET 81 MG: 81 TABLET CHEWABLE at 08:39

## 2023-03-11 RX ADMIN — ATORVASTATIN CALCIUM 40 MG: 40 TABLET, FILM COATED ORAL at 08:38

## 2023-03-11 RX ADMIN — GABAPENTIN 500 MG: 100 CAPSULE ORAL at 20:49

## 2023-03-11 RX ADMIN — GABAPENTIN 500 MG: 100 CAPSULE ORAL at 13:04

## 2023-03-11 RX ADMIN — HYDROCORTISONE SODIUM SUCCINATE 100 MG: 100 INJECTION, POWDER, FOR SOLUTION INTRAMUSCULAR; INTRAVENOUS at 08:39

## 2023-03-11 RX ADMIN — FUROSEMIDE 20 MG: 20 TABLET ORAL at 18:38

## 2023-03-11 RX ADMIN — LIOTHYRONINE SODIUM 10 MCG: 5 TABLET ORAL at 06:38

## 2023-03-11 RX ADMIN — HEPARIN SODIUM 5000 UNITS: 5000 INJECTION INTRAVENOUS; SUBCUTANEOUS at 21:31

## 2023-03-11 RX ADMIN — PANTOPRAZOLE 40 MG: 40 TABLET, DELAYED RELEASE ORAL at 06:38

## 2023-03-11 RX ADMIN — ONDANSETRON 4 MG: 4 TABLET, ORALLY DISINTEGRATING ORAL at 19:33

## 2023-03-11 RX ADMIN — HEPARIN SODIUM 5000 UNITS: 5000 INJECTION INTRAVENOUS; SUBCUTANEOUS at 06:38

## 2023-03-11 RX ADMIN — SODIUM CHLORIDE, PRESERVATIVE FREE 10 ML: 5 INJECTION INTRAVENOUS at 08:39

## 2023-03-11 ASSESSMENT — PAIN SCALES - GENERAL
PAINLEVEL_OUTOF10: 0
PAINLEVEL_OUTOF10: 0

## 2023-03-11 NOTE — PROGRESS NOTES
[facility] update:       2252 - informed by nurse that patient is c/o chest pain. Upon my exam patient was tearful and stated her chest hurts and radiates to left arm and back. Chest pain reproducible upon palpation. Denies N/V, AAOx4. VS stable. STAT ECG unremarkable. CXR pending. Labs pending (troponin, lytes, bmp, lA, TSH). Blood glucose 126.     2315 - went to check on patient who was resting comfortably in bed sleeping, in no acute distress. VS stable: HR 90, /117, RR 12, O2 sat 99%. 1226 - BMP, lytes WNL, troponin 7, LA 0.7, TSH 9.58. CXR pending. Checked on patient, reports pain improved without any intervention.        David Bauer PA-C  03/11/23  Pulmonary, Critical Care Medicine  [facility]

## 2023-03-11 NOTE — PROGRESS NOTES
2300: Patient woke up complaining of left sided chest pain radiating to the the left arm. Stat EKG done that showed a normal sinus rhythm. Bijal MESSER in attendance. Stat CXR and labs ordered.

## 2023-03-11 NOTE — PROGRESS NOTES
Pt awake, alert, comfortable. Denia Anthony now running in 200's. Free 4 level has only risen to 0.5 in spite of 175 mcg per day of levothyroxine. Hypoglycemia has resolved. She will need insulin now. Will start a basal/bolus regimen. Will increase Synthroid to 200 mcg per day. Will discontinue Cytomel now.

## 2023-03-11 NOTE — PROGRESS NOTES
[address] [place] [address] [facility]. Pulmonary, Critical Care, and Sleep Medicine    Name: Janice Rodriguez MRN: [id number]   : 1985 Hospital: [address] [place] [address] Dr   Date: 3/11/2023  Admission Date: 3/4/2023     Chart and notes reviewed. Data reviewed. I have evaluated all findings. [x]I have reviewed the flowsheet and previous days notes. [x]The patient is critically ill     []Mechanical ventilation []Pressors   []BiPAP [x]Refractory hypoglycemia         Interval HPI:    Janice Rodriguez is a 44 y.o.  female with PMHx of type 2 diabetes on insulin, papillary thyroid carcinoma status post total thyroidectomy () with hypothyroidism, chronic back pain, and chronic kidney disease stage III with proteinuria. Patient admitted on 3/4/2023 to family medicine service after presenting to the ED with flank pain and severe refractory hypoglycemia. The patient had been having hypoglycemia for the prior week. She had not changed her current home medication regimen recently other than a reported change to her thyroid regimen, which she cannot recall. Patient was initially admitted to SDU for d10 gtt. Over the day of her admission she required multiple pushes of d50 and runs of d10 with persistent BG in the 50s. Subsequently patient was transferred to ICU for further care and close monitoring of BG. Notable labs of TSH of 49.5 and T4 free is 0.2     On 3/6 the patient was started on increased dose of levothyroxine for concerns of myxedema since history obtained revealed the patient had her home levothyroxine dose decreased in the past month. On 3/7, Endocrine recommended the patient start 125mcg Levothyroxine and to follow free T4 levels to assess response, and suspects her refractory hypoglycemia is in the setting of hypothyroidism. On 3/8 in the AM her glucose levels were in the 80s and resolved after she drank orange juice.  The patient was concerned that she has sleep apnea and would like outpatient referral for sleep study. Endocrine also recommended increasing her Synthroid and decreasing her hydrocortisone. The patient was doing well otherwise. On 3/9 the patient was having ongoing hypoglycemia despite additional glucose, therefore, glucagon and octreotide drips were added as well as synthroid increased and patient referred to MRI for insulinoma work-up. The patient was unable to complete the exam given significant anxiety during the test.     3/11 - Patient's blood glucose improve during the day during po intake and decline at night. D20 had to be restarted overnight and 2x glucagon tabs given. TSH improving from 57 to 14.5, T4 min change from 0.4 to 0.5. Endocrinology following. Noted to have self limiting chest pain overnight that resolved within 20 min. Lytes, BMP, LA were WNL, troponin (-), ECG unremarkable. CXR read pending. On SQH for DVT prophy. MRI of abd on 3/9 limited due to patient inability to tolerate but non-contrast read did not reveal insulinoma; mild CBD dialation, mild hepatomegaly. Subjective 03/11/23  Hospital Day: 6, ICU day 6  Vent Day: N/A  Overnight events: Episodes of hypoglycemia, requiring restart of D20 at 25, then 50ml/hr and 2x glucagon tabs overnight.   Mentation/Activity: No sedation required, awake and alert x4  Respiratory/ Secretions: No oxygen supplementation required, no hypoxia on room air  Hemodynamics: HDS without requirement of stable  Urine output, bowel: see I&O  Diet: Regular diet with additional juice for hypoglycemic episodes  Need for procedures: none              ROS:Constitutional: negative for anorexia, chills, and fevers  Ears, nose, mouth, throat, and face: negative for nasal congestion and sore throat  Respiratory: negative for cough and shortness of breath  Cardiovascular: negative for chest pain, dyspnea, and palpitations  Gastrointestinal: negative for abdominal pain, change in bowel habits, nausea, vomiting, and notes some reflux symptoms  Genitourinary:negative for dysuria, hematuria, and urinary incontinence  Musculoskeletal:negative for muscle weakness  Neurological: negative for dizziness  Behavioral/Psych: positive for anxiety and sleep disturbance    Events, medications, imaging, and notes from last 24 hours reviewed.     Patient Active Problem List   Diagnosis    Obesity (BMI 30-39.9)    Seizures (HCC)    Hyperglycemia    Vaginosis    Obesity, morbid (HCC)    Hypoglycemia    Migraines    Myxedema    Metabolic syndrome    Type 2 diabetes mellitus with hyperglycemia, with long-term current use of insulin (HCC)    Flank pain    Insulin overdose       Vital Signs:  BP (!) 149/91   Pulse (!) 104   Temp 98.4 °F (36.9 °C) (Axillary)   Resp 16   Wt 286 lb 9.6 oz (130 kg)   SpO2 97%   BMI 42.32 kg/m²             Temp (24hrs), Av.1 °F (36.7 °C), Min:98 °F (36.7 °C), Max:98.4 °F (36.9 °C)       Intake/Output:   Last shift:      03/10 1901 -  0700  In: 247 [P.O.:237; I.V.:10]  Out: -   Last 3 shifts:  0701 - 03/10 1900  In: 2952.9 [P.O.:774; I.V.:2178.9]  Out: 4350 [Urine:4350]    Intake/Output Summary (Last 24 hours) at 3/11/2023 0347  Last data filed at 3/10/2023 2213  Gross per 24 hour   Intake 2415.91 ml   Output 500 ml   Net 1915.91 ml            Telemetry: [x]Sinus []A-flutter []Paced    []A-fib []Multiple PVC’s                  Physical Exam:      General: Patient sleeping in room, loud snoring, however, easily arouses to voice   HEENT:  EOMI, mucosa moist, airway patent  Resp:  Symmetrical chest expansion, no accessory muscle use; good airway entry; no rales/ wheezing/ rhonchi noted, no hypoxia on room-air  CV:  S1, S2 present; regular rate and rhythm  GI:  Abdomen obese,   Extremities:  +2 pulses on all extremities; no edema/ cyanosis/ clubbing noted   Skin:  Warm; no rashes/ lesions noted, normal turgor/cap refill   Neurologic:  AAOx4  Devices:  PICC line      DATA:  MAR reviewed and pertinent medications noted or  modified as needed    Labs:    Recent Labs     03/11/23  0136 03/10/23  2300 03/10/23  0125   WBC 13.9* 13.8* 18.1*   HGB 10.8* 11.2* 10.9*   HCT 34.3* 35.6 35.1   MCV 86.2 85.6 88.0   * 484* 454*      Lab Results   Component Value Date/Time     03/11/2023 01:36 AM    K 3.8 03/11/2023 01:36 AM     03/11/2023 01:36 AM    CO2 24 03/11/2023 01:36 AM    BUN 19 03/11/2023 01:36 AM    CREATININE 1.06 03/11/2023 01:36 AM    GLUCOSE 79 03/11/2023 01:36 AM    CALCIUM 8.2 03/11/2023 01:36 AM       Lab Results   Component Value Date    ALT 24 03/04/2023    AST 25 03/04/2023    ALKPHOS 77 03/04/2023    BILITOT 0.3 03/04/2023      Lab Results   Component Value Date    DDIMER 0.55 (H) 01/01/2023      Lab Results   Component Value Date    TSH 0.04 (L) 05/16/2021    [id number] 14.50 (H) 03/11/2023      Hemoglobin A1C   Date Value Ref Range Status   07/05/2022 12.5 (H) 4.2 - 5.6 % Final     Comment:     (NOTE)  HbA1C Interpretive Ranges  <5.7              Normal  5.7 - 6.4         Consider Prediabetes  >6.5              Consider Diabetes        Lab Results   Component Value Date    APTT 26.6 07/30/2020      Lab Results   Component Value Date    INR 1.0 05/02/2022    INR 1.0 07/30/2020    PROTIME 13.6 05/02/2022    PROTIME 13.1 07/30/2020      Results       Procedure Component Value Units Date/Time    Respiratory Panel, Molecular, with COVID-19 (Restricted: peds pts or suitable admitted adults) [[id number]] Collected: 03/04/23 1615    Order Status: Completed Specimen: Nasopharyngeal Updated: 03/04/23 1724     Adenovirus by PCR Not detected        Coronavirus 229E by PCR Not detected        Coronavirus HKU1 by PCR Not detected        Coronavirus NL63 by PCR Not detected        Coronavirus OC43 by PCR Not detected        SARS-CoV-2, PCR Not detected        Human Metapneumovirus by PCR Not detected        Rhinovirus Enterovirus PCR Not detected        Influenza A by PCR Not detected        Influenza A H1 by PCR Not detected        Influenza A H3 by PCR Not detected        Influenza A H1-2009 by PCR Not detected        Influenza B PCR Not detected        Parainfluenza 1 PCR Not detected        Parainfluenza 2 PCR Not detected        Parainfluenza 3 PCR Not detected        Parainfluenza 4 PCR Not detected        Respiratory Syncytial Virus by PCR Not detected        Bordetella parapertussis by PCR Not detected        Bordetella pertussis by PCR Not detected        Chlamydophila Pneumonia PCR Not detected        Mycoplasma pneumo by PCR Not detected       Culture, Urine [[id number]] Collected: 03/04/23 1548    Order Status: Completed Specimen: Urine, clean catch Updated: 03/06/23 0643     Special Requests NO SPECIAL REQUESTS        [place] count --        [id number]  COLONIES/mL       Culture       MIXED UROGENITAL DINA ISOLATED                       Imaging:  [x]   I have personally reviewed the patients radiographs and reports  Most recent imaging:  No results found. No results found for this or any previous visit. No valid procedures specified.      IMPRESSION:   Severe refractory hypoglycemia in setting of possible myxedema, without coma, versus medication induced versus thyroid dysfunction versus less likely insulinoma/tumor   Severe hypothyroidism secondary to myxedema without coma s/p thyroidectomy 2/2 papillary thyroid CA versus subtherapeutic medication dosing   Nephrotic range proteinuria, suggestive of progressive CKD, and likely 2/2 diabetic nephropathy  History of kidney biopsy with diagnosis of severe glomerular sclerosis with FSGS  Abnormal UA without signs of infection   History of thyroid cancer s/p thyroidectomy (2013), taking home synthroid   History of DM Type 2, home regimen Lantus 90u BID, Humalog 10 units before every meal, metformin 500 mg BID, Farxiga 5 mg daily, Trulicity 1.5 units weekly  Atraumatic Right foot pain and swelling secondary to diabetic neuropathy versus Charcot foot with XR demonstrating soft tissue swelling without acute bony injury  History of seizure disorder, home medication Topiramate 50mg BID  History of GERD, home medication Pantoprazole 40mg daily     Patient Active Problem List   Diagnosis    Obesity (BMI 30-39. 9)    Seizures (HCC)    Hyperglycemia    Vaginosis    Obesity, morbid ([address]    Hypoglycemia    Migraines    Myxedema    Metabolic syndrome    Type 2 diabetes mellitus with hyperglycemia, with long-term current use of insulin (HCC)    Flank pain    Insulin overdose        RECOMMENDATIONS:   Neuro: No sedation requirement. Continue home Topamax 50mg daily for seizures  Pulm: Aspiration precautions, HOB>30 degrees. Incentive spirometer q1h  CVS:Monitor HD, MAP goal >65. Intermittently hypertensive, takes lasix and metoprolol OP, will consider restarting if HTN persists. Fluids: D20 drip 75ml/hr, titrate down as tolerated and as po intake progresses during the day. GI: Regular diet. Home Pantoprazole 40mg daily  Renal: Trend Cr, Strict I/Os  Hem/Onc: Trend H/H, monitor for s/o active bleeding. Daily CBC. I/D:Trend WBCs and temperature curve. Metabolic: Daily BMP, mag, phos. Trend lytes and replace per protocol. Endocrine: Stress dose steroids. Accu checks q1h.   - Consulted endocrine. Appreciate recommendations  --Continue Synthroid IV to 175mg and hydrocortisone to 100mg daily. Follow free T4 level  -Glucagon and octreotide weaned. - MRI abd did not show insulinoma. Musc/Skin: no wound concerns  Full Code No additional code details  Discussed in interdisciplinary rounds     Best practice :    Glycemic control  IHI ICU bundles:   Central Line Bundle Followed   Sress ulcer prophylaxis. Protonix  DVT prophylaxis. SQH  Need for Lines. Attending Non-violent Restraint Reevaluation     The patient does not need restraints at this time. Total of 11 minutes were spent with the patient at the bedside.  This care involved high complexity decision making to assess, manipulate, and support vital system functions, to treat this degreee vital organ system failure and to prevent further life threatening deterioration of the patients condition  The services I provided to this patient were to treat and/or prevent clinically significant deterioration that could result in the failure of one or more body systems and/or organ systems due to respiratory distress, hypoxia, cardiac dysrhythmia.           Tiffanie Diamond PA-C  03/11/23  Pulmonary, Critical Care Medicine  [facility]

## 2023-03-11 NOTE — PROGRESS NOTES
Substitution Information per the P&T Committee approved Therapeutic Interchanges Policy     Medication Ordered Preferred Medication Dispensed    Empagliflozin (Jardiance)* *Discontinue Dapagliflozin/Empagliflozin IF used for treatment of diabetes management. Notify provider that this medication will be discontinued since it is recommended per diabetes care of hospitalized patients guidelines that patients with diabetes as their sole indication for SGLT2 inhibitor therapy not be prescribed / administered an SGLT2 inhibitor during hospital admission. Recommend basal/bolus insulin strategy.  Use INSULIN SUBCUTANEOUS ORDER SET (patient started on insulin)

## 2023-03-11 NOTE — INTERDISCIPLINARY ROUNDS
[facility]  Pulmonary, Critical Care, and Sleep Medicine  Interdisciplinary and Ventilator Weaning Rounds     Patient discussed in morning walking rounds and interdisciplinary rounds. ICU admission day: 03/05/23     Overnight events:   Some lower BS overnight, D20 restarted  Cont' to c/o left sided CP, EKG x 2 normal, repeat trop pending      Assessments and best practice:  Ventilator  Room air  Sedation  N/a  Other pertinent drips  D20  Lines noted  PIV  Critical labs assessed  Yes  Antibiotics  N/a  Medications reviewed  Yes  Pending imaging  none  Pending send out labs  Yes  Pending Procedures  None  Glycemic control:  D20 gtt  Stress ulcer prophylaxis. Protonix  DVT prophylaxis. SQH  Need for Lines, smith assessed.   Yes  Restraint Reevaluation      Not required        Family contact/MPOA: patient updated   Family updated      Palliative consult within 3 days of admission to ICU-  Ethics Guidance: 21 days        Daily Plans:  Decrease D20 gtt, goal to D10 at 50 and transfer, reeval later today   Echo, troponin      VIDYA time 5 minutes           Charlee Davila PA-C    Pulmonary, [address] [place] [address],[address] [facility]

## 2023-03-12 ENCOUNTER — APPOINTMENT (OUTPATIENT)
Facility: HOSPITAL | Age: 38
DRG: 420 | End: 2023-03-12
Payer: MEDICAID

## 2023-03-12 LAB
ANION GAP SERPL CALC-SCNC: 3 MMOL/L (ref 3–18)
BASOPHILS # BLD: 0 K/UL (ref 0–0.1)
BASOPHILS NFR BLD: 0 % (ref 0–2)
BUN SERPL-MCNC: 18 MG/DL (ref 7–18)
BUN/CREAT SERPL: 18 (ref 12–20)
CALCIUM SERPL-MCNC: 8.3 MG/DL (ref 8.5–10.1)
CHLORIDE SERPL-SCNC: 111 MMOL/L (ref 100–111)
CO2 SERPL-SCNC: 24 MMOL/L (ref 21–32)
CREAT SERPL-MCNC: 0.98 MG/DL (ref 0.6–1.3)
DIFFERENTIAL METHOD BLD: ABNORMAL
ECHO AO ASC DIAM: 3.5 CM
ECHO AO ASCENDING AORTA INDEX: 1.45 CM/M2
ECHO AO ROOT DIAM: 2.9 CM
ECHO AO ROOT INDEX: 1.2 CM/M2
ECHO AV AREA PEAK VELOCITY: 2.5 CM2
ECHO AV AREA VTI: 2.8 CM2
ECHO AV AREA/BSA PEAK VELOCITY: 1 CM2/M2
ECHO AV AREA/BSA VTI: 1.2 CM2/M2
ECHO AV MEAN GRADIENT: 3 MMHG
ECHO AV MEAN VELOCITY: 0.9 M/S
ECHO AV PEAK GRADIENT: 5 MMHG
ECHO AV PEAK VELOCITY: 1.1 M/S
ECHO AV VELOCITY RATIO: 0.82
ECHO AV VTI: 22.7 CM
ECHO BSA: 2.53 M2
ECHO EST RA PRESSURE: 8 MMHG
ECHO LA VOL 2C: 55 ML (ref 22–52)
ECHO LA VOL 2C: 59 ML (ref 22–52)
ECHO LA VOL 4C: 53 ML (ref 22–52)
ECHO LA VOL 4C: 58 ML (ref 22–52)
ECHO LA VOLUME AREA LENGTH: 62 ML
ECHO LA VOLUME INDEX AREA LENGTH: 26 ML/M2 (ref 16–34)
ECHO LV E' LATERAL VELOCITY: 10 CM/S
ECHO LV E' SEPTAL VELOCITY: 9 CM/S
ECHO LV EDV A2C: 138 ML
ECHO LV EDV A4C: 139 ML
ECHO LV EDV BP: 141 ML (ref 56–104)
ECHO LV EDV INDEX A4C: 57 ML/M2
ECHO LV EDV INDEX BP: 58 ML/M2
ECHO LV EDV NDEX A2C: 57 ML/M2
ECHO LV EJECTION FRACTION A2C: 56 %
ECHO LV EJECTION FRACTION A4C: 56 %
ECHO LV EJECTION FRACTION BIPLANE: 57 % (ref 55–100)
ECHO LV ESV A2C: 61 ML
ECHO LV ESV A4C: 61 ML
ECHO LV ESV BP: 61 ML (ref 19–49)
ECHO LV ESV INDEX A2C: 25 ML/M2
ECHO LV ESV INDEX A4C: 25 ML/M2
ECHO LV ESV INDEX BP: 25 ML/M2
ECHO LV FRACTIONAL SHORTENING: 28 % (ref 28–44)
ECHO LV INTERNAL DIMENSION DIASTOLE INDEX: 1.65 CM/M2
ECHO LV INTERNAL DIMENSION DIASTOLIC: 4 CM (ref 3.9–5.3)
ECHO LV INTERNAL DIMENSION SYSTOLIC INDEX: 1.2 CM/M2
ECHO LV INTERNAL DIMENSION SYSTOLIC: 2.9 CM
ECHO LV IVSD: 1.3 CM (ref 0.6–0.9)
ECHO LV MASS 2D: 186.5 G (ref 67–162)
ECHO LV MASS INDEX 2D: 77.1 G/M2 (ref 43–95)
ECHO LV POSTERIOR WALL DIASTOLIC: 1.3 CM (ref 0.6–0.9)
ECHO LV RELATIVE WALL THICKNESS RATIO: 0.65
ECHO LVOT AREA: 3.1 CM2
ECHO LVOT AV VTI INDEX: 0.9
ECHO LVOT DIAM: 2 CM
ECHO LVOT MEAN GRADIENT: 2 MMHG
ECHO LVOT PEAK GRADIENT: 3 MMHG
ECHO LVOT PEAK VELOCITY: 0.9 M/S
ECHO LVOT STROKE VOLUME INDEX: 26.6 ML/M2
ECHO LVOT SV: 64.4 ML
ECHO LVOT VTI: 20.5 CM
ECHO MV A VELOCITY: 0.7 M/S
ECHO MV E DECELERATION TIME (DT): 96.9 MS
ECHO MV E VELOCITY: 0.72 M/S
ECHO MV E/A RATIO: 1.03
ECHO MV E/E' LATERAL: 7.2
ECHO MV E/E' RATIO (AVERAGED): 7.6
ECHO MV E/E' SEPTAL: 8
ECHO PV MAX VELOCITY: 1 M/S
ECHO PV PEAK GRADIENT: 4 MMHG
ECHO RIGHT VENTRICULAR SYSTOLIC PRESSURE (RVSP): 37 MMHG
ECHO RV BASAL DIMENSION: 4.5 CM
ECHO RV FREE WALL PEAK S': 14 CM/S
ECHO RV LONGITUDINAL DIMENSION: 6.6 CM
ECHO RV MID DIMENSION: 4 CM
ECHO RV TAPSE: 2.6 CM (ref 1.7–?)
ECHO TV REGURGITANT MAX VELOCITY: 2.68 M/S
ECHO TV REGURGITANT PEAK GRADIENT: 29 MMHG
EOSINOPHIL # BLD: 0 K/UL (ref 0–0.4)
EOSINOPHIL NFR BLD: 0 % (ref 0–5)
ERYTHROCYTE [DISTWIDTH] IN BLOOD BY AUTOMATED COUNT: 14.9 % (ref 11.6–14.5)
GLUCOSE BLD STRIP.AUTO-MCNC: 174 MG/DL (ref 70–110)
GLUCOSE BLD STRIP.AUTO-MCNC: 252 MG/DL (ref 70–110)
GLUCOSE SERPL-MCNC: 114 MG/DL (ref 74–99)
HCT VFR BLD AUTO: 33.8 % (ref 35–45)
HGB BLD-MCNC: 10.7 G/DL (ref 12–16)
IMM GRANULOCYTES # BLD AUTO: 0.1 K/UL (ref 0–0.04)
IMM GRANULOCYTES NFR BLD AUTO: 1 % (ref 0–0.5)
LV EF: 58 %
LVEF MODALITY: ABNORMAL
LYMPHOCYTES # BLD: 5 K/UL (ref 0.9–3.6)
LYMPHOCYTES NFR BLD: 39 % (ref 21–52)
MAGNESIUM SERPL-MCNC: 2.6 MG/DL (ref 1.6–2.6)
MCH RBC QN AUTO: 27.4 PG (ref 24–34)
MCHC RBC AUTO-ENTMCNC: 31.7 G/DL (ref 31–37)
MCV RBC AUTO: 86.4 FL (ref 78–100)
MONOCYTES # BLD: 0.9 K/UL (ref 0.05–1.2)
MONOCYTES NFR BLD: 7 % (ref 3–10)
NEUTS SEG # BLD: 6.7 K/UL (ref 1.8–8)
NEUTS SEG NFR BLD: 52 % (ref 40–73)
NRBC # BLD: 0.02 K/UL (ref 0–0.01)
NRBC BLD-RTO: 0.2 PER 100 WBC
PHOSPHATE SERPL-MCNC: 3.4 MG/DL (ref 2.5–4.9)
PLATELET # BLD AUTO: 443 K/UL (ref 135–420)
PMV BLD AUTO: 9.3 FL (ref 9.2–11.8)
POTASSIUM SERPL-SCNC: 3.7 MMOL/L (ref 3.5–5.5)
RBC # BLD AUTO: 3.91 M/UL (ref 4.2–5.3)
SODIUM SERPL-SCNC: 138 MMOL/L (ref 136–145)
T4 FREE SERPL-MCNC: 0.5 NG/DL (ref 0.7–1.5)
TSH SERPL DL<=0.05 MIU/L-ACNC: 27.1 UIU/ML (ref 0.36–3.74)
WBC # BLD AUTO: 12.8 K/UL (ref 4.6–13.2)

## 2023-03-12 PROCEDURE — 94660 CPAP INITIATION&MGMT: CPT

## 2023-03-12 PROCEDURE — 83735 ASSAY OF MAGNESIUM: CPT

## 2023-03-12 PROCEDURE — 84439 ASSAY OF FREE THYROXINE: CPT

## 2023-03-12 PROCEDURE — 84100 ASSAY OF PHOSPHORUS: CPT

## 2023-03-12 PROCEDURE — 99291 CRITICAL CARE FIRST HOUR: CPT | Performed by: INTERNAL MEDICINE

## 2023-03-12 PROCEDURE — 36415 COLL VENOUS BLD VENIPUNCTURE: CPT

## 2023-03-12 PROCEDURE — 6370000000 HC RX 637 (ALT 250 FOR IP)

## 2023-03-12 PROCEDURE — 2580000003 HC RX 258: Performed by: INTERNAL MEDICINE

## 2023-03-12 PROCEDURE — 94761 N-INVAS EAR/PLS OXIMETRY MLT: CPT

## 2023-03-12 PROCEDURE — 93306 TTE W/DOPPLER COMPLETE: CPT | Performed by: INTERNAL MEDICINE

## 2023-03-12 PROCEDURE — 1100000000 HC RM PRIVATE

## 2023-03-12 PROCEDURE — 82962 GLUCOSE BLOOD TEST: CPT

## 2023-03-12 PROCEDURE — APPSS15 APP SPLIT SHARED TIME 0-15 MINUTES: Performed by: NURSE PRACTITIONER

## 2023-03-12 PROCEDURE — 80048 BASIC METABOLIC PNL TOTAL CA: CPT

## 2023-03-12 PROCEDURE — 6360000002 HC RX W HCPCS: Performed by: INTERNAL MEDICINE

## 2023-03-12 PROCEDURE — 6360000002 HC RX W HCPCS

## 2023-03-12 PROCEDURE — 85025 COMPLETE CBC W/AUTO DIFF WBC: CPT

## 2023-03-12 PROCEDURE — 93306 TTE W/DOPPLER COMPLETE: CPT

## 2023-03-12 PROCEDURE — 6370000000 HC RX 637 (ALT 250 FOR IP): Performed by: PHYSICIAN ASSISTANT

## 2023-03-12 PROCEDURE — A4216 STERILE WATER/SALINE, 10 ML: HCPCS | Performed by: INTERNAL MEDICINE

## 2023-03-12 PROCEDURE — 84443 ASSAY THYROID STIM HORMONE: CPT

## 2023-03-12 PROCEDURE — 2580000003 HC RX 258

## 2023-03-12 PROCEDURE — 6370000000 HC RX 637 (ALT 250 FOR IP): Performed by: NURSE PRACTITIONER

## 2023-03-12 PROCEDURE — 2500000003 HC RX 250 WO HCPCS: Performed by: INTERNAL MEDICINE

## 2023-03-12 RX ORDER — PREDNISONE 20 MG/1
20 TABLET ORAL DAILY
Status: COMPLETED | OUTPATIENT
Start: 2023-03-13 | End: 2023-03-14

## 2023-03-12 RX ADMIN — TOPIRAMATE 50 MG: 100 TABLET, FILM COATED ORAL at 10:33

## 2023-03-12 RX ADMIN — HEPARIN SODIUM 5000 UNITS: 5000 INJECTION INTRAVENOUS; SUBCUTANEOUS at 05:53

## 2023-03-12 RX ADMIN — FUROSEMIDE 20 MG: 20 TABLET ORAL at 10:35

## 2023-03-12 RX ADMIN — GABAPENTIN 500 MG: 100 CAPSULE ORAL at 20:31

## 2023-03-12 RX ADMIN — ACETAMINOPHEN 650 MG: 325 TABLET ORAL at 20:26

## 2023-03-12 RX ADMIN — ASPIRIN 81 MG CHEWABLE TABLET 81 MG: 81 TABLET CHEWABLE at 10:34

## 2023-03-12 RX ADMIN — TOPIRAMATE 50 MG: 100 TABLET, FILM COATED ORAL at 20:33

## 2023-03-12 RX ADMIN — SODIUM CHLORIDE, PRESERVATIVE FREE 10 ML: 5 INJECTION INTRAVENOUS at 10:36

## 2023-03-12 RX ADMIN — SODIUM CHLORIDE, PRESERVATIVE FREE 10 ML: 5 INJECTION INTRAVENOUS at 20:35

## 2023-03-12 RX ADMIN — ONDANSETRON 4 MG: 4 TABLET, ORALLY DISINTEGRATING ORAL at 20:26

## 2023-03-12 RX ADMIN — GABAPENTIN 500 MG: 100 CAPSULE ORAL at 13:33

## 2023-03-12 RX ADMIN — PANTOPRAZOLE 40 MG: 40 TABLET, DELAYED RELEASE ORAL at 10:33

## 2023-03-12 RX ADMIN — LEVOTHYROXINE SODIUM ANHYDROUS 200 MCG: 100 INJECTION, POWDER, LYOPHILIZED, FOR SOLUTION INTRAVENOUS at 10:34

## 2023-03-12 RX ADMIN — HYDROCORTISONE SODIUM SUCCINATE 50 MG: 100 INJECTION, POWDER, FOR SOLUTION INTRAMUSCULAR; INTRAVENOUS at 10:35

## 2023-03-12 RX ADMIN — ATORVASTATIN CALCIUM 40 MG: 40 TABLET, FILM COATED ORAL at 10:34

## 2023-03-12 RX ADMIN — HEPARIN SODIUM 5000 UNITS: 5000 INJECTION INTRAVENOUS; SUBCUTANEOUS at 13:33

## 2023-03-12 RX ADMIN — HEPARIN SODIUM 5000 UNITS: 5000 INJECTION INTRAVENOUS; SUBCUTANEOUS at 22:05

## 2023-03-12 RX ADMIN — METOPROLOL TARTRATE 25 MG: 25 TABLET, FILM COATED ORAL at 10:34

## 2023-03-12 RX ADMIN — GABAPENTIN 500 MG: 100 CAPSULE ORAL at 10:34

## 2023-03-12 NOTE — PROGRESS NOTES
[facility]. Pulmonary, Critical Care, and Sleep Medicine    Name: Ben Goldman MRN: [id number]   : 1985 Hospital: [address] [place] [address] Dr   Date: 3/12/2023  Admission Date: 3/4/2023     Chart and notes reviewed. Data reviewed. I have evaluated all findings. [x]I have reviewed the flowsheet and previous days notes. []The patient is critically ill     []Mechanical ventilation []Pressors   []BiPAP []         Interval HPI:    Ben Goldman is a 44 y.o.  female with PMHx of type 2 diabetes on insulin, papillary thyroid carcinoma status post total thyroidectomy () with hypothyroidism, chronic back pain, and chronic kidney disease stage III with proteinuria. Patient admitted on 3/4/2023 to family medicine service after presenting to the ED with flank pain and severe refractory hypoglycemia. The patient had been having hypoglycemia for the prior week. She had not changed her current home medication regimen recently other than a reported change to her thyroid regimen, which she cannot recall. Patient was initially admitted to SDU for d10 gtt. Over the day of her admission she required multiple pushes of d50 and runs of d10 with persistent BG in the 50s. Subsequently patient was transferred to ICU for further care and close monitoring of BG. Notable labs of TSH of 49.5 and T4 free is 0.2     On 3/6 the patient was started on increased dose of levothyroxine for concerns of myxedema since history obtained revealed the patient had her home levothyroxine dose decreased in the past month. On 3/7, Endocrine recommended the patient start 125mcg Levothyroxine and to follow free T4 levels to assess response, and suspects her refractory hypoglycemia is in the setting of hypothyroidism. On 3/8 in the AM her glucose levels were in the 80s and resolved after she drank orange juice.  The patient was concerned that she has sleep apnea and would like outpatient referral for sleep study. Endocrine also recommended increasing her Synthroid and decreasing her hydrocortisone. The patient was doing well otherwise.     On 3/9 the patient was having ongoing hypoglycemia despite additional glucose, therefore, glucagon and octreotide drips were added as well as synthroid increased and patient referred to MRI for insulinoma work-up. The patient was unable to complete the exam given significant anxiety during the test.     3/11 - Patient's blood glucose improve during the day during po intake and decline at night. D20 had to be restarted overnight and 2x glucagon tabs given. TSH improving from 57 to 14.5, T4 min change from 0.4 to 0.5. Endocrinology following. Noted to have self limiting chest pain overnight that resolved within 20 min. Lytes, BMP, LA were WNL, troponin (-), ECG unremarkable. CXR read pending. On SQ for DVT prophy. MRI of abd on 3/9 limited due to patient inability to tolerate but non-contrast read did not reveal insulinoma; mild CBD dialation, mild hepatomegaly.     3/12- Weaned off dextrose IVF successfully and maintaining normal blood glucose levels. Synthroid now 200 mcg, Hydrocortisone 50 mg daily, placed on SSI.     Subjective 03/12/23  Hospital Day: Transferred to ICU 3/5   Vent Day: N/A  Overnight events: No acute events overnight. Restated Metoprolol for hypertension   Mentation/Activity: No sedation required, awake and alert x4  Respiratory/ Secretions: No oxygen supplementation required, no hypoxia on room air  Hemodynamics: HDS without requirement of stable  Urine output, bowel: see I&O  Diet: Regular diet   Need for procedures: none              ROS:Constitutional: negative for anorexia, chills, and fevers  Ears, nose, mouth, throat, and face: negative for nasal congestion and sore throat  Respiratory: negative for cough and shortness of breath  Cardiovascular: negative for chest pain, dyspnea, and palpitations  Gastrointestinal: negative for abdominal pain, change  in bowel habits, nausea, vomiting, and notes some reflux symptoms  Genitourinary:negative for dysuria, hematuria, and urinary incontinence  Musculoskeletal:negative for muscle weakness  Neurological: negative for dizziness  Behavioral/Psych: positive for anxiety and sleep disturbance    Events, medications, imaging, and notes from last 24 hours reviewed.     Patient Active Problem List   Diagnosis    Obesity (BMI 30-39.9)    Seizures (HCC)    Hyperglycemia    Vaginosis    Obesity, morbid (HCC)    Hypoglycemia    Migraines    Myxedema    Metabolic syndrome    Type 2 diabetes mellitus with hyperglycemia, with long-term current use of insulin (HCC)    Flank pain    Insulin overdose       Vital Signs:  BP (!) 155/90   Pulse 91   Temp 97.6 °F (36.4 °C) (Oral)   Resp 13   Wt 287 lb 11.2 oz (130.5 kg)   SpO2 99%   BMI 42.49 kg/m²             Temp (24hrs), Av.9 °F (36.6 °C), Min:97.6 °F (36.4 °C), Max:98.3 °F (36.8 °C)       Intake/Output:   Last shift:      1901 -  0700  In: 120 [P.O.:120]  Out: 150 [Urine:150]  Last 3 shifts: 03/10 0701 -  1900  In: 1688.5 [P.O.:237; I.V.:1451.5]  Out: 2200 [Urine:2200]    Intake/Output Summary (Last 24 hours) at 3/12/2023 0509  Last data filed at 3/11/2023 2005  Gross per 24 hour   Intake 992.5 ml   Output 1450 ml   Net -457.5 ml            Telemetry: [x]Sinus []A-flutter []Paced    []A-fib []Multiple PVC’s                  Physical Exam:      General: Patient sleeping in room,easily arouses to voice   HEENT:  EOMI, mucosa moist, airway patent  Resp:  Symmetrical chest expansion, no accessory muscle use; good airway entry; no rales/ wheezing/ rhonchi noted, no hypoxia on room-air  CV:  S1, S2 present; regular rate and rhythm  GI:  Abdomen obese,   Extremities:  +2 pulses on all extremities; no edema/ cyanosis/ clubbing noted   Skin:  Warm; no rashes/ lesions noted, normal turgor/cap refill   Neurologic:  AAOx4  Devices:  midline       DATA:  MAR reviewed and  pertinent medications noted or modified as needed    Labs:    Recent Labs     03/11/23  0136 03/10/23  2300 03/10/23  0125   WBC 13.9* 13.8* 18.1*   HGB 10.8* 11.2* 10.9*   HCT 34.3* 35.6 35.1   MCV 86.2 85.6 88.0   * 484* 454*      Lab Results   Component Value Date/Time     03/12/2023 04:21 AM    K 3.7 03/12/2023 04:21 AM     03/12/2023 04:21 AM    CO2 24 03/12/2023 04:21 AM    BUN 18 03/12/2023 04:21 AM    CREATININE 0.98 03/12/2023 04:21 AM    GLUCOSE 114 03/12/2023 04:21 AM    CALCIUM 8.3 03/12/2023 04:21 AM       Lab Results   Component Value Date    ALT 24 03/04/2023    AST 25 03/04/2023    ALKPHOS 77 03/04/2023    BILITOT 0.3 03/04/2023      Lab Results   Component Value Date    DDIMER 0.55 (H) 01/01/2023      Lab Results   Component Value Date    TSH 0.04 (L) 05/16/2021    CJN2KOJ 14.50 (H) 03/11/2023      Hemoglobin A1C   Date Value Ref Range Status   03/11/2023 7.6 (H) 4.2 - 5.6 % Final     Comment:     (NOTE)  HbA1C Interpretive Ranges  <5.7              Normal  5.7 - 6.4         Consider Prediabetes  >6.5              Consider Diabetes        Lab Results   Component Value Date    APTT 26.6 07/30/2020      Lab Results   Component Value Date    INR 1.0 05/02/2022    INR 1.0 07/30/2020    PROTIME 13.6 05/02/2022    PROTIME 13.1 07/30/2020      Results       Procedure Component Value Units Date/Time    Respiratory Panel, Molecular, with COVID-19 (Restricted: peds pts or suitable admitted adults) [[id number]] Collected: 03/04/23 1615    Order Status: Completed Specimen: Nasopharyngeal Updated: 03/04/23 1724     Adenovirus by PCR Not detected        Coronavirus 229E by PCR Not detected        Coronavirus HKU1 by PCR Not detected        Coronavirus NL63 by PCR Not detected        Coronavirus OC43 by PCR Not detected        SARS-CoV-2, PCR Not detected        Human Metapneumovirus by PCR Not detected        Rhinovirus Enterovirus PCR Not detected        Influenza A by PCR Not detected Influenza A H1 by PCR Not detected        Influenza A H3 by PCR Not detected        Influenza A H1-2009 by PCR Not detected        Influenza B PCR Not detected        Parainfluenza 1 PCR Not detected        Parainfluenza 2 PCR Not detected        Parainfluenza 3 PCR Not detected        Parainfluenza 4 PCR Not detected        Respiratory Syncytial Virus by PCR Not detected        Bordetella parapertussis by PCR Not detected        Bordetella pertussis by PCR Not detected        Chlamydophila Pneumonia PCR Not detected        Mycoplasma pneumo by PCR Not detected       Culture, Urine [[id number]] Collected: 03/04/23 1548    Order Status: Completed Specimen: Urine, clean catch Updated: 03/06/23 0643     Special Requests NO SPECIAL REQUESTS        [place] count --        52215  COLONIES/mL       Culture       MIXED UROGENITAL DINA ISOLATED                       Imaging:  [x]   I have personally reviewed the patients radiographs and reports  Most recent imaging:  No results found. No results found for this or any previous visit. No valid procedures specified.      IMPRESSION:   Severe refractory hypoglycemia in setting of possible myxedema, without coma, versus medication induced versus thyroid dysfunction versus less likely insulinoma/tumor, resolved   Severe hypothyroidism secondary to myxedema without coma s/p thyroidectomy 2/2 papillary thyroid CA versus subtherapeutic medication dosing   Nephrotic range proteinuria, suggestive of progressive CKD, and likely 2/2 diabetic nephropathy  History of kidney biopsy with diagnosis of severe glomerular sclerosis with FSGS  Abnormal UA without signs of infection   History of thyroid cancer s/p thyroidectomy (2013), taking home synthroid   History of DM Type 2, home regimen Lantus 90u BID, Humalog 10 units before every meal, metformin 500 mg BID, Farxiga 5 mg daily, Trulicity 1.5 units weekly  Atraumatic Right foot pain and swelling secondary to diabetic neuropathy versus Charcot foot with XR demonstrating soft tissue swelling without acute bony injury  History of seizure disorder, home medication Topiramate 50mg BID  History of GERD, home medication Pantoprazole 40mg daily     Patient Active Problem List   Diagnosis    Obesity (BMI 30-39. 9)    Seizures (HCC)    Hyperglycemia    Vaginosis    Obesity, morbid ([address]    Hypoglycemia    Migraines    Myxedema    Metabolic syndrome    Type 2 diabetes mellitus with hyperglycemia, with long-term current use of insulin (HCC)    Flank pain    Insulin overdose        RECOMMENDATIONS:   Neuro: No sedation requirement. Continue home Topamax 50mg daily for seizures  Pulm: Aspiration precautions, HOB>30 degrees. Incentive spirometer q1h  CVS:Monitor HD, MAP goal >65. Intermittently hypertensive, takes lasix and metoprolol OP, Metoprolol will restart today   Fluids: Weaned off dextrose IVF's   GI: Regular diet. Home Pantoprazole 40mg daily  Renal: Trend Cr, Strict I/Os  Hem/Onc: Trend H/H, monitor for s/o active bleeding. Daily CBC. I/D:Trend WBCs and temperature curve. Metabolic: Daily BMP, mag, phos. Trend lytes and replace per protocol. Endocrine: Stress dose steroids. Accu checks q4h. Cont SSI and add Lantus as tolerated   - Consulted endocrine. Appreciate recommendations  --Continue Synthroid IV to 200 mg and hydrocortisone to 50 mg daily. Follow free T4 level  -Glucagon and octreotide weaned. - MRI abd did not show insulinoma. Musc/Skin: no wound concerns  Full Code No additional code details  Discussed in interdisciplinary rounds     Best practice :    Glycemic control  IHI ICU bundles:   Central Line Bundle Followed   Sress ulcer prophylaxis. Protonix  DVT prophylaxis. SQH  Need for Lines. Attending Non-violent Restraint Reevaluation     The patient does not need restraints at this time. Total of 10 minutes were spent with the patient at the bedside.  This care involved high complexity decision making to assess, manipulate, and support vital system functions, to treat this degreee vital organ system failure and to prevent further life threatening deterioration of the patients condition  The services I provided to this patient were to treat and/or prevent clinically significant deterioration that could result in the failure of one or more body systems and/or organ systems due to respiratory distress, hypoxia, cardiac dysrhythmia.           Claude Serrano, AGACNP-BC  03/12/23  Pulmonary, Critical Care Medicine  [facility]

## 2023-03-12 NOTE — PROGRESS NOTES
43 y/o Female full code  Admitted 03/04/23   DX SOB flank pain Right side, cough and SOB, hypoglycemia    History HTN seizures DM increased leg swelling X 1 week, Papillary thyroid cancer, severe neuropathy bilaterally to shin, migraines, insulin overdose, vaginosis      Neuro AO x 4 no pain medications no fevers  Respiratory RA during day and wore CPAP overnight for the first time this admission  Cardiac BP elevated nsr  GI Adult regular diet   up to bedside commode or restroom, bathed self with wipes in the bathroom this morning  Lines 20 gauge Left AC, Left brachial midline  Skin intact  1930 received bedside report from [facility]. Pt is in bed on the cardiac telemetry monitor, bed is in the lowest position with the wheels locked and bed alarm on for safety. SBAR< MAR< ED summary given with a chance to ask questions. 2030 reassessment, vitals pain and needs  2100 given medications, diet ginger ale and crackers for bedtime snack  2200 Glucose was assessed   2300 watching tv and talking on her phone  0000 in bed, got up to make her bed , I offered, she wanted to do it. She was scratching her arms, given warm bath wipes to use where she was scratching.

## 2023-03-12 NOTE — PROGRESS NOTES
Pt awake, alert, visiting with family. VS stable. Glucose 114 this morning, 174 before lunch. Insulin is on hold for the moment. I suspect this will need to be restarted. Free T4 level remains low at 0.5 mcg. This suggests that she has an unusually high thyroid hormone requirement. I suspect she needs 300 mcg per day. Will give this starting tomorrow.

## 2023-03-13 ENCOUNTER — APPOINTMENT (OUTPATIENT)
Facility: HOSPITAL | Age: 38
DRG: 420 | End: 2023-03-13
Payer: MEDICAID

## 2023-03-13 PROBLEM — E03.8 HYPOTHYROIDISM DUE TO HASHIMOTO'S THYROIDITIS: Status: ACTIVE | Noted: 2023-03-13

## 2023-03-13 PROBLEM — E78.5 HYPERLIPIDEMIA: Status: ACTIVE | Noted: 2023-03-13

## 2023-03-13 PROBLEM — E06.3 HYPOTHYROIDISM DUE TO HASHIMOTO'S THYROIDITIS: Status: ACTIVE | Noted: 2023-03-13

## 2023-03-13 PROBLEM — E11.649 TYPE 2 DIABETES MELLITUS WITH HYPOGLYCEMIA WITHOUT COMA, WITH LONG-TERM CURRENT USE OF INSULIN (HCC): Status: ACTIVE | Noted: 2023-03-13

## 2023-03-13 PROBLEM — Z79.4 TYPE 2 DIABETES MELLITUS WITH HYPOGLYCEMIA WITHOUT COMA, WITH LONG-TERM CURRENT USE OF INSULIN (HCC): Status: ACTIVE | Noted: 2023-03-13

## 2023-03-13 PROBLEM — I10 HYPERTENSION: Status: ACTIVE | Noted: 2023-03-13

## 2023-03-13 LAB
ANION GAP SERPL CALC-SCNC: 4 MMOL/L (ref 3–18)
BASOPHILS # BLD: 0 K/UL (ref 0–0.1)
BASOPHILS NFR BLD: 0 % (ref 0–2)
BUN SERPL-MCNC: 20 MG/DL (ref 7–18)
BUN/CREAT SERPL: 18 (ref 12–20)
CALCIUM SERPL-MCNC: 8.4 MG/DL (ref 8.5–10.1)
CHLORIDE SERPL-SCNC: 111 MMOL/L (ref 100–111)
CO2 SERPL-SCNC: 22 MMOL/L (ref 21–32)
CREAT SERPL-MCNC: 1.11 MG/DL (ref 0.6–1.3)
DIFFERENTIAL METHOD BLD: ABNORMAL
EKG ATRIAL RATE: 87 BPM
EKG ATRIAL RATE: 88 BPM
EKG DIAGNOSIS: NORMAL
EKG DIAGNOSIS: NORMAL
EKG P AXIS: 62 DEGREES
EKG P AXIS: 77 DEGREES
EKG P-R INTERVAL: 146 MS
EKG P-R INTERVAL: 148 MS
EKG Q-T INTERVAL: 362 MS
EKG Q-T INTERVAL: 376 MS
EKG QRS DURATION: 78 MS
EKG QRS DURATION: 86 MS
EKG QTC CALCULATION (BAZETT): 438 MS
EKG QTC CALCULATION (BAZETT): 452 MS
EKG R AXIS: 32 DEGREES
EKG R AXIS: 48 DEGREES
EKG T AXIS: 56 DEGREES
EKG T AXIS: 70 DEGREES
EKG VENTRICULAR RATE: 87 BPM
EKG VENTRICULAR RATE: 88 BPM
EOSINOPHIL # BLD: 0 K/UL (ref 0–0.4)
EOSINOPHIL NFR BLD: 0 % (ref 0–5)
ERYTHROCYTE [DISTWIDTH] IN BLOOD BY AUTOMATED COUNT: 14.7 % (ref 11.6–14.5)
GLUCOSE BLD STRIP.AUTO-MCNC: 118 MG/DL (ref 70–110)
GLUCOSE BLD STRIP.AUTO-MCNC: 138 MG/DL (ref 70–110)
GLUCOSE BLD STRIP.AUTO-MCNC: 145 MG/DL (ref 70–110)
GLUCOSE BLD STRIP.AUTO-MCNC: 41 MG/DL (ref 70–110)
GLUCOSE BLD STRIP.AUTO-MCNC: 43 MG/DL (ref 70–110)
GLUCOSE BLD STRIP.AUTO-MCNC: 44 MG/DL (ref 70–110)
GLUCOSE BLD STRIP.AUTO-MCNC: 51 MG/DL (ref 70–110)
GLUCOSE BLD STRIP.AUTO-MCNC: 57 MG/DL (ref 70–110)
GLUCOSE BLD STRIP.AUTO-MCNC: 59 MG/DL (ref 70–110)
GLUCOSE BLD STRIP.AUTO-MCNC: 92 MG/DL (ref 70–110)
GLUCOSE BLD STRIP.AUTO-MCNC: 92 MG/DL (ref 70–110)
GLUCOSE BLD STRIP.AUTO-MCNC: 97 MG/DL (ref 70–110)
GLUCOSE SERPL-MCNC: 114 MG/DL (ref 74–99)
HCT VFR BLD AUTO: 33.3 % (ref 35–45)
HGB BLD-MCNC: 10.3 G/DL (ref 12–16)
IMM GRANULOCYTES # BLD AUTO: 0.1 K/UL (ref 0–0.04)
IMM GRANULOCYTES NFR BLD AUTO: 1 % (ref 0–0.5)
LYMPHOCYTES # BLD: 4.8 K/UL (ref 0.9–3.6)
LYMPHOCYTES NFR BLD: 42 % (ref 21–52)
MAGNESIUM SERPL-MCNC: 2.5 MG/DL (ref 1.6–2.6)
MCH RBC QN AUTO: 27.1 PG (ref 24–34)
MCHC RBC AUTO-ENTMCNC: 30.9 G/DL (ref 31–37)
MCV RBC AUTO: 87.6 FL (ref 78–100)
MONOCYTES # BLD: 0.7 K/UL (ref 0.05–1.2)
MONOCYTES NFR BLD: 6 % (ref 3–10)
NEUTS SEG # BLD: 5.6 K/UL (ref 1.8–8)
NEUTS SEG NFR BLD: 50 % (ref 40–73)
NRBC # BLD: 0 K/UL (ref 0–0.01)
NRBC BLD-RTO: 0 PER 100 WBC
PHOSPHATE SERPL-MCNC: 3.8 MG/DL (ref 2.5–4.9)
PLATELET # BLD AUTO: 408 K/UL (ref 135–420)
PMV BLD AUTO: 9.4 FL (ref 9.2–11.8)
POTASSIUM SERPL-SCNC: 3.7 MMOL/L (ref 3.5–5.5)
PROINSULIN SERPL-SCNC: 4.2 PMOL/L (ref 0–10)
RBC # BLD AUTO: 3.8 M/UL (ref 4.2–5.3)
SODIUM SERPL-SCNC: 137 MMOL/L (ref 136–145)
TSH SERPL DL<=0.05 MIU/L-ACNC: 22.1 UIU/ML (ref 0.36–3.74)
WBC # BLD AUTO: 11.2 K/UL (ref 4.6–13.2)

## 2023-03-13 PROCEDURE — 82962 GLUCOSE BLOOD TEST: CPT

## 2023-03-13 PROCEDURE — 93010 ELECTROCARDIOGRAM REPORT: CPT | Performed by: INTERNAL MEDICINE

## 2023-03-13 PROCEDURE — 85025 COMPLETE CBC W/AUTO DIFF WBC: CPT

## 2023-03-13 PROCEDURE — 93971 EXTREMITY STUDY: CPT

## 2023-03-13 PROCEDURE — 6370000000 HC RX 637 (ALT 250 FOR IP): Performed by: PHYSICIAN ASSISTANT

## 2023-03-13 PROCEDURE — 83735 ASSAY OF MAGNESIUM: CPT

## 2023-03-13 PROCEDURE — A4216 STERILE WATER/SALINE, 10 ML: HCPCS | Performed by: INTERNAL MEDICINE

## 2023-03-13 PROCEDURE — 84443 ASSAY THYROID STIM HORMONE: CPT

## 2023-03-13 PROCEDURE — 6370000000 HC RX 637 (ALT 250 FOR IP)

## 2023-03-13 PROCEDURE — 6360000002 HC RX W HCPCS

## 2023-03-13 PROCEDURE — 93971 EXTREMITY STUDY: CPT | Performed by: STUDENT IN AN ORGANIZED HEALTH CARE EDUCATION/TRAINING PROGRAM

## 2023-03-13 PROCEDURE — 94761 N-INVAS EAR/PLS OXIMETRY MLT: CPT

## 2023-03-13 PROCEDURE — 2500000003 HC RX 250 WO HCPCS: Performed by: INTERNAL MEDICINE

## 2023-03-13 PROCEDURE — 84100 ASSAY OF PHOSPHORUS: CPT

## 2023-03-13 PROCEDURE — 6370000000 HC RX 637 (ALT 250 FOR IP): Performed by: INTERNAL MEDICINE

## 2023-03-13 PROCEDURE — 2580000003 HC RX 258: Performed by: INTERNAL MEDICINE

## 2023-03-13 PROCEDURE — 94660 CPAP INITIATION&MGMT: CPT

## 2023-03-13 PROCEDURE — 6370000000 HC RX 637 (ALT 250 FOR IP): Performed by: NURSE PRACTITIONER

## 2023-03-13 PROCEDURE — 2580000003 HC RX 258

## 2023-03-13 PROCEDURE — 6370000000 HC RX 637 (ALT 250 FOR IP): Performed by: STUDENT IN AN ORGANIZED HEALTH CARE EDUCATION/TRAINING PROGRAM

## 2023-03-13 PROCEDURE — 1100000000 HC RM PRIVATE

## 2023-03-13 PROCEDURE — 36415 COLL VENOUS BLD VENIPUNCTURE: CPT

## 2023-03-13 PROCEDURE — 80048 BASIC METABOLIC PNL TOTAL CA: CPT

## 2023-03-13 RX ORDER — AMLODIPINE BESYLATE 5 MG/1
5 TABLET ORAL DAILY
Status: DISCONTINUED | OUTPATIENT
Start: 2023-03-13 | End: 2023-03-13

## 2023-03-13 RX ORDER — AMLODIPINE BESYLATE 10 MG/1
10 TABLET ORAL DAILY
Status: DISCONTINUED | OUTPATIENT
Start: 2023-03-14 | End: 2023-03-13

## 2023-03-13 RX ORDER — SIMETHICONE 80 MG
80 TABLET,CHEWABLE ORAL 4 TIMES DAILY PRN
Status: DISCONTINUED | OUTPATIENT
Start: 2023-03-13 | End: 2023-03-17 | Stop reason: HOSPADM

## 2023-03-13 RX ORDER — LISINOPRIL 40 MG/1
40 TABLET ORAL
Status: DISCONTINUED | OUTPATIENT
Start: 2023-03-13 | End: 2023-03-17 | Stop reason: HOSPADM

## 2023-03-13 RX ORDER — AMLODIPINE BESYLATE 10 MG/1
10 TABLET ORAL NIGHTLY
Status: DISCONTINUED | OUTPATIENT
Start: 2023-03-13 | End: 2023-03-17 | Stop reason: HOSPADM

## 2023-03-13 RX ADMIN — ASPIRIN 81 MG CHEWABLE TABLET 81 MG: 81 TABLET CHEWABLE at 11:00

## 2023-03-13 RX ADMIN — HEPARIN SODIUM 5000 UNITS: 5000 INJECTION INTRAVENOUS; SUBCUTANEOUS at 05:56

## 2023-03-13 RX ADMIN — SIMETHICONE 80 MG: 80 TABLET, CHEWABLE ORAL at 22:45

## 2023-03-13 RX ADMIN — SODIUM CHLORIDE, PRESERVATIVE FREE 10 ML: 5 INJECTION INTRAVENOUS at 22:45

## 2023-03-13 RX ADMIN — ATORVASTATIN CALCIUM 40 MG: 40 TABLET, FILM COATED ORAL at 11:00

## 2023-03-13 RX ADMIN — TOPIRAMATE 50 MG: 100 TABLET, FILM COATED ORAL at 11:00

## 2023-03-13 RX ADMIN — PANTOPRAZOLE 40 MG: 40 TABLET, DELAYED RELEASE ORAL at 11:00

## 2023-03-13 RX ADMIN — GABAPENTIN 500 MG: 100 CAPSULE ORAL at 14:22

## 2023-03-13 RX ADMIN — SODIUM CHLORIDE, PRESERVATIVE FREE 10 ML: 5 INJECTION INTRAVENOUS at 11:00

## 2023-03-13 RX ADMIN — ONDANSETRON 4 MG: 4 TABLET, ORALLY DISINTEGRATING ORAL at 21:44

## 2023-03-13 RX ADMIN — LEVOTHYROXINE SODIUM ANHYDROUS 300 MCG: 100 INJECTION, POWDER, LYOPHILIZED, FOR SOLUTION INTRAVENOUS at 11:00

## 2023-03-13 RX ADMIN — METOPROLOL TARTRATE 25 MG: 25 TABLET, FILM COATED ORAL at 11:00

## 2023-03-13 RX ADMIN — HEPARIN SODIUM 5000 UNITS: 5000 INJECTION INTRAVENOUS; SUBCUTANEOUS at 14:22

## 2023-03-13 RX ADMIN — TOPIRAMATE 50 MG: 100 TABLET, FILM COATED ORAL at 20:38

## 2023-03-13 RX ADMIN — HEPARIN SODIUM 5000 UNITS: 5000 INJECTION INTRAVENOUS; SUBCUTANEOUS at 21:46

## 2023-03-13 RX ADMIN — DEXTROSE MONOHYDRATE 250 ML: 100 INJECTION, SOLUTION INTRAVENOUS at 14:23

## 2023-03-13 RX ADMIN — PREDNISONE 20 MG: 20 TABLET ORAL at 11:00

## 2023-03-13 RX ADMIN — Medication 16 G: at 13:48

## 2023-03-13 RX ADMIN — LISINOPRIL 40 MG: 20 TABLET ORAL at 21:41

## 2023-03-13 RX ADMIN — DEXTROSE MONOHYDRATE 250 ML: 100 INJECTION, SOLUTION INTRAVENOUS at 15:55

## 2023-03-13 RX ADMIN — GABAPENTIN 500 MG: 100 CAPSULE ORAL at 20:38

## 2023-03-13 RX ADMIN — GABAPENTIN 500 MG: 100 CAPSULE ORAL at 11:00

## 2023-03-13 RX ADMIN — ACETAMINOPHEN 650 MG: 325 TABLET ORAL at 11:00

## 2023-03-13 RX ADMIN — DEXTROSE MONOHYDRATE: 100 INJECTION, SOLUTION INTRAVENOUS at 14:24

## 2023-03-13 RX ADMIN — FUROSEMIDE 20 MG: 20 TABLET ORAL at 11:00

## 2023-03-13 RX ADMIN — ACETAMINOPHEN 650 MG: 325 TABLET ORAL at 20:07

## 2023-03-13 RX ADMIN — Medication 16 G: at 15:13

## 2023-03-13 ASSESSMENT — PAIN DESCRIPTION - LOCATION: LOCATION: HEAD

## 2023-03-13 ASSESSMENT — PAIN SCALES - GENERAL: PAINLEVEL_OUTOF10: 4

## 2023-03-13 NOTE — PROGRESS NOTES
Overnight a few times pt asked me to check her blood glucose levels, felt hot no fever, felt like it was low however with each check it was WNL. She was given a few juices when it was below 150 as she got up to use the restroom she wobbled with an unsteady gait, I gave her some crackers also.

## 2023-03-13 NOTE — PROGRESS NOTES
[facility]  TRANSFER NOTE      Patient:    Geovani Saldaña , 44 y.o. female   MRN:  [id number]  Room/Bed:  303/01  Admission Date:   3/4/2023  Code status:  Full Code    Reason for Admission: refractory hypoglycemia    ASSESSMENT AND PLAN:   Problem List Items Addressed This Visit          Endocrine    * (Principal) Hypoglycemia     Other Visit Diagnoses       Flank pain    -  Primary    Orthopnea        Myxedema coma ([address]        Relevant Orders    Transthoracic echocardiogram (TTE) complete with contrast, bubble, strain, and 3D PRN (Completed)          43-year-old female with past medical history of type 2 diabetes on insulin, papillary thyroid carcinoma status post total thyroidectomy with subsequent hypothyroidism, chronic back pain, and chronic kidney disease stage III, who presented to [facility] on 3/4/23 w/ low BS readings w/ accompanied fatigue and lightheadedness, was transferred to ICU for closer monitoring    Severe Refractory Hypoglycemia, DMT2 Hx on insulin (resolved)  Possible etiologies include: myxedema without coma, medication induced, thyroid dysfunction, insulinoma  -high insulin regimen with persistent use despite low blood glucose levels for 1 week, no evidence of mass on repeated imaging, high insulin levels with low C-peptide - leading differential diagnosis at this time would be iatrogenic refractory hypoglycemia   -home DM regimen at time of admission: lantus 90u BID, humalog 10u TID with meals, metformin 500 BID, farxiga 5mg, trulicity 1.5 weekly   -HbA1c at admission 7.6  -has been off dextrose gtt, octreotide gtt & solucortef since 3/11  -has high dose correctional insulin ordered  -endocrine is following, for both blood glucose and thyroid management   Plan:  -Transfer to Medical floor from ICU  -Hold Lantus until hyperglycemia returns  -continue close monitoring of blood glucose: ACHS, 0200  -high dose correctional insulin, if insulin needs increase can consider adding back low dose lantus   -continue to appreciate endocrine recs  -hypoglycemia protocol in place   -daily BMP, CBC, Mg, Phos    Severe Hypothyroidism s/p Thyroidectomy 2/2 Papillary Thyroid Cancer (2013)  Etiology: myxedema without coma vs subtherapeutic dosing   -at time of admission TSH 49.5 T4 0.2, fairly unchanged from previous month (2/10/23): TSH 48.3, T3 4.0, Free T4 0.1, despite starting levothyroxine 100mcg at that time   -concern for thyroid derangement contributing to hypoglycemia   -endocrine following, doing well with IV synthroid, TSH improving to 27.1 on 3/12, with Free T4 0.5  Plan:  -continue monitoring with daily TSH  -endocrine following, continue to appreciate recs   - IV synthroid 300mcg, prednisone 20mg    CKD III  -h/o kidney biopsy with diagnosis of severe glomerular sclerosis with FSGS  -nephrotic range proteinuria at admit (>1000 on UA), with last urine microalb:cr ratio 4098 (on 2/10/23), likely 2/2 diabetes   Plan:  -monitor renal function with daily BMP  -avoid nephrotoxic medications  -optimize glucose and BP control to prevent further progression of kidney disease    HTN  HLD  Chest pain/exertional dyspnea  -home meds: amlodipine 10 mg daily atorvastatin 40 mg daily  -Patient reports chest pain and shortness of breath with exertion, seems to be an ongoing issue   -Evaluated at ED 1/1/2023 for the symptoms. Work-up was negative for MI and PE.   Patient was diagnosed with costochondritis  -pain continues to be reproducible on exam   -EKGs throughout this admission have been NSR  -trops wnl   -Echo on 3/12 w/ normal LVEF  Plan:  -Continue home amlodipine, atorvastatin  -lasix added this admission, BP have been wnl, would continue at this time  -has PRN lidocaine cream to be applied to chest wall     Seizure Disorder  -home med: topiramate 50mg BID  Plan:  -continue topiramate    Atraumatic R Foot Pain   -XR showed soft tissue edema without acute bony injury  -does also have diabetic neuropathy on gabapentin 400mg TID - has no sensation to light touch of bilateral feet/ankles   Plan:  -increased gabapentin to 500mg TID this admission, would continue this dosing    Possible DICKSON  -patient reported, was started on CPAP in ICU  Plan:  -will need OP sleep study for home CPAP     GERD  -home med: pantoprazole 40mg daily  Plan:  -continue pantoprazole      Global:  Code: full  Diet: carb controlled (60g/meal)  DVT/AC: SQH  Mobility: per protocol  Disposition: telemetry     Point of Contact: Miriam HARO [facility]): [phone], Marylen Abernethy (Son): [phone]    SUBJECTIVE:   Patient seen at beside. Has some right achilles tenderness and bilateral leg edema but overall feels well. Had 2 episodes of subjective hypoglycemia overnight that presented wit headache, but per RN glucose was greater than 100 both times.     ROS:  Positive for right achilles tenderness  Negative for diarrhea, constipation, abdominal pain, chest pain, SOB     CURRENT INPATIENT MEDICATIONS:  Current Facility-Administered Medications   Medication Dose Route Frequency Provider Last Rate Last Admin    metoprolol tartrate (LOPRESSOR) tablet 25 mg  25 mg Oral Daily Jessica Aguirre, APRN - NP   25 mg at 03/12/23 1034    perflutren lipid microspheres (DEFINITY) injection 2 mL  2 mL IntraVENous Once Hillary Martínez MD        levothyroxine (SYNTHROID) 300 mcg in sodium chloride (PF) 0.9 % 15 mL IV syringe  300 mcg IntraVENous Daily Rosangela aGrcia MD        predniSONE (DELTASONE) tablet 20 mg  20 mg Oral Daily Tiffany Leonard MD        glucose chewable tablet 16 g  4 tablet Oral PRN Rosangela Garcia MD        dextrose bolus 10% 125 mL  125 mL IntraVENous PRN Rosangela Garcia MD        Or    dextrose bolus 10% 250 mL  250 mL IntraVENous PRN Rosangela Garcia MD        glucagon (rDNA) injection 1 mg  1 mg SubCUTAneous PRN Rosangela Garcia MD        dextrose 10 % infusion   IntraVENous Continuous PRN Rosangela Garcia MD [Held by provider] insulin glargine (LANTUS) injection vial 60 Units  60 Units SubCUTAneous Nightly Rhys Bright MD        [facility] by [facility]] insulin lispro (HUMALOG) injection vial 14 Units  14 Units SubCUTAneous TID  Rhys Bright MD        insulin lispro (HUMALOG) injection vial 0-16 Units  0-16 Units SubCUTAneous TID  Rhys Bright MD        insulin lispro (HUMALOG) injection vial 0-4 Units  0-4 Units SubCUTAneous Nightly Rhys Bright MD        furosemide (LASIX) tablet 20 mg  20 mg Oral Daily Charlee Hicks PA-C   20 mg at 03/12/23 1035    heparin (porcine) injection 5,000 Units  5,000 Units SubCUTAneous 3 times per day Leena Nunez PA-C   5,000 Units at 03/13/23 0556    gabapentin (NEURONTIN) capsule 500 mg  500 mg Oral TID Miguel Diaz DO   500 mg at 03/12/23 2031    lidocaine 4 % external patch 1 patch  1 patch TransDERmal Daily Miguel Diaz DO   1 patch at 03/12/23 1035    lidocaine (LMX) 4 % cream   Topical PRN Miguel Diaz DO        dextrose 50 % IV solution  25 g IntraVENous PRN Yobani Mckinley MD   25 g at 03/05/23 1114    sodium chloride flush 0.9 % injection 5-40 mL  5-40 mL IntraVENous 2 times per day Louise Rodrigez MD   10 mL at 03/12/23 2035    sodium chloride flush 0.9 % injection 5-40 mL  5-40 mL IntraVENous PRN Louise Rodrigez MD        0.9 % sodium chloride infusion   IntraVENous PRN Louise Rodrigez MD        ondansetron (ZOFRAN-ODT) disintegrating tablet 4 mg  4 mg Oral Q8H PRN Louise Rodrigez MD   4 mg at 03/12/23 2026    Or    ondansetron (ZOFRAN) injection 4 mg  4 mg IntraVENous Q6H PRN Louise Rodrigez MD        acetaminophen (TYLENOL) tablet 650 mg  650 mg Oral Q6H PRN Louise Rodrigez MD   650 mg at 03/12/23 2026    Or    acetaminophen (TYLENOL) suppository 650 mg  650 mg Rectal Q6H PRN Louise Rodrigez MD        polyethylene glycol (GLYCOLAX) packet 17 g  17 g Oral Daily PRN Prabha Foreman MD        aspirin chewable tablet 81 mg  81 mg Oral Daily Prabha Foreman MD   81 mg at 03/12/23 1034    atorvastatin (LIPITOR) tablet 40 mg  40 mg Oral Daily Prabha Foreman MD   40 mg at 03/12/23 1034    [Held by provider] metFORMIN (GLUCOPHAGE) tablet 500 mg  500 mg Oral BID WC Prabha Foreman MD        pantoprazole (PROTONIX) tablet 40 mg  40 mg Oral QAM AC Prabha Foreman MD   40 mg at 03/12/23 1033    topiramate (TOPAMAX) tablet 50 mg  50 mg Oral BID Prabha Foreman MD   50 mg at 03/12/23 2033       Allergies  Allergies   Allergen Reactions    Blueberry Itching    Mushroom Extract Complex Hives and Itching    Tomato Itching       OBJECTIVE:    Intake/Output Summary (Last 24 hours) at 3/13/2023 0826  Last data filed at 3/13/2023 0334  Gross per 24 hour   Intake 395 ml   Output 700 ml   Net -305 ml         /79   Pulse 92   Temp 97.8 °F (36.6 °C) (Oral)   Resp 22   Ht 5' 9\" (1.753 m)   Wt 290 lb 5.5 oz (131.7 kg)   SpO2 98%   BMI 42.88 kg/m²     PHYSICAL EXAM  Gen: NAD, comfortable, obese   HEENT: normocephalic, atraumatic, MMM  CV: RRR, S1/S2 present without M/R/G  Pulm: CTAB, no wheezes, no crackles  Abd: S/NT/ND  MSK: no clubbing, 2+ BLE pitting edema bilaterally   Skin: warm, dry, intact, no rash  Neuro: CN II-XII grossly intact  Psych: appropriate, alert, oriented x3    RESULTS     No results found for: LACTA     Lab results last 24 hrs:  Recent Results (from the past 24 hour(s))   Transthoracic echocardiogram (TTE) complete with contrast, bubble, strain, and 3D PRN    Collection Time: 03/12/23 12:17 PM   Result Value Ref Range    Body Surface Area 2.53 m2    IVSd 1.3 (A) 0.6 - 0.9 cm    LVIDd 4.0 3.9 - 5.3 cm    LVIDs 2.9 cm    LVOT Diameter 2.0 cm    LVPWd 1.3 (A) 0.6 - 0.9 cm    EF BP 57 55 - 100 %    LV Ejection Fraction A2C 56 %    LV Ejection Fraction A4C 56 %    LV EDV A2C 138 mL    LV EDV A4C 139 mL    LV EDV  (A) 56 - 104 mL    LV ESV A2C 61 mL    LV ESV A4C 61 mL    LV ESV BP 61 (A) 19 - 49 mL    LVOT Peak Gradient 3 mmHg    LVOT Mean Gradient 2 mmHg    LVOT SV 64.4 ml    LVOT Peak Velocity 0.9 m/s    LVOT VTI 20.5 cm    RV Basal Dimension 4.5 cm    RV Free Wall Peak S' 14 cm/s    LA Volume A/L 62 mL    LA Volume 2C 59 (A) 22 - 52 mL    LA Volume 4C 58 (A) 22 - 52 mL    LA Volume 2C 55 (A) 22 - 52 mL    LA Volume 4C 53 (A) 22 - 52 mL    AV Area by Peak Velocity 2.5 cm2    AV Area by VTI 2.8 cm2    AV Peak Gradient 5 mmHg    AV Mean Gradient 3 mmHg    AV Peak Velocity 1.1 m/s    AV Mean Velocity 0.9 m/s    AV VTI 22.7 cm    MV A Velocity 0.70 m/s    MV E Wave Deceleration Time 96.9 ms    MV E Velocity 0.72 m/s    LV E' Lateral Velocity 10 cm/s    LV E' Septal Velocity 9 cm/s    PV Peak Gradient 4 mmHg    PV Max Velocity 1.0 m/s    TAPSE 2.6 1.7 cm    TR Peak Gradient 29 mmHg    TR Max Velocity 2.68 m/s    Ascending Aorta 3.5 cm    Aortic Root 2.9 cm    Fractional Shortening 2D 28 28 - 44 %    LV ESV Index BP 25 mL/m2    LV EDV Index BP 58 mL/m2    LV ESV Index A4C 25 mL/m2    LV EDV Index A4C 57 mL/m2    LV ESV Index A2C 25 mL/m2    LV EDV Index A2C 57 mL/m2    LVIDd Index 1.65 cm/m2    LVIDs Index 1.20 cm/m2    LV RWT Ratio 0.65     LV Mass 2D 186.5 (A) 67 - 162 g    LV Mass 2D Index 77.1 43 - 95 g/m2    MV E/A 1.03     E/E' Ratio (Averaged) 7.60     E/E' Lateral 7.20     E/E' Septal 8.00     LA Volume Index A/L 26 16 - 34 mL/m2    LVOT Stroke Volume Index 26.6 mL/m2    LVOT Area 3.1 cm2    Ao Root Index 1.20 cm/m2    Ascending Aorta Index 1.45 cm/m2    AV Velocity Ratio 0.82     LVOT:AV VTI Index 0.90     RASHEL/BSA VTI 1.2 cm2/m2    RASHEL/BSA Peak Velocity 1.0 cm2/m2    Est. RA Pressure 8 mmHg    RVSP 37 mmHg    RV Mid Dimension 4.0 cm    RV Longitudinal Dimension 6.6 cm   POCT Glucose    Collection Time: 03/12/23 12:23 PM   Result Value Ref Range    POC Glucose 174 (H) 70 - 110 mg/dL   POCT Glucose    Collection Time: 03/12/23  8:24 PM   Result Value Ref Range    POC Glucose 252 (H) 70 - 110 mg/dL   POCT Glucose    Collection Time: 03/13/23 12:04 AM   Result Value Ref Range    POC Glucose 138 (H) 70 - 110 mg/dL   POCT Glucose    Collection Time: 03/13/23  3:34 AM   Result Value Ref Range    POC Glucose 145 (H) 70 - 110 mg/dL   CBC with Auto Differential    Collection Time: 03/13/23  3:35 AM   Result Value Ref Range    WBC 11.2 4.6 - 13.2 K/uL    RBC 3.80 (L) 4.20 - 5.30 M/uL    Hemoglobin 10.3 (L) 12.0 - 16.0 g/dL    Hematocrit 33.3 (L) 35.0 - 45.0 %    MCV 87.6 78.0 - 100.0 FL    MCH 27.1 24.0 - 34.0 PG    MCHC 30.9 (L) 31.0 - 37.0 g/dL    RDW 14.7 (H) 11.6 - 14.5 %    Platelets 168 457 - 864 K/uL    MPV 9.4 9.2 - 11.8 FL    Nucleated RBCs 0.0 0  WBC    nRBC 0.00 0.00 - 0.01 K/uL    Seg Neutrophils 50 40 - 73 %    Lymphocytes 42 21 - 52 %    Monocytes 6 3 - 10 %    Eosinophils % 0 0 - 5 %    Basophils 0 0 - 2 %    Immature Granulocytes 1 (H) 0.0 - 0.5 %    Segs Absolute 5.6 1.8 - 8.0 K/UL    Absolute Lymph # 4.8 (H) 0.9 - 3.6 K/UL    Absolute Mono # 0.7 0.05 - 1.2 K/UL    Absolute Eos # 0.0 0.0 - 0.4 K/UL    Basophils Absolute 0.0 0.0 - 0.1 K/UL    Absolute Immature Granulocyte 0.1 (H) 0.00 - 0.04 K/UL    Differential Type AUTOMATED     Basic Metabolic Panel    Collection Time: 03/13/23  3:35 AM   Result Value Ref Range    Sodium 137 136 - 145 mmol/L    Potassium 3.7 3.5 - 5.5 mmol/L    Chloride 111 100 - 111 mmol/L    CO2 22 21 - 32 mmol/L    Anion Gap 4 3.0 - 18 mmol/L    Glucose 114 (H) 74 - 99 mg/dL    BUN 20 (H) 7.0 - 18 MG/DL    Creatinine 1.11 0.6 - 1.3 MG/DL    Bun/Cre Ratio 18 12 - 20      Est, Glom Filt Rate >60 >60 ml/min/1.73m2    Calcium 8.4 (L) 8.5 - 10.1 MG/DL   Magnesium    Collection Time: 03/13/23  3:35 AM   Result Value Ref Range    Magnesium 2.5 1.6 - 2.6 mg/dL   Phosphorus    Collection Time: 03/13/23  3:35 AM   Result Value Ref Range    Phosphorus 3.8 2.5 - 4.9 MG/DL   TSH    Collection Time: 03/13/23  3:35 AM   Result Value Ref Range    TSH, 3RD GENERATION 22.10 (H) 0.36 - 3.74 uIU/mL   POCT Glucose    Collection Time: 03/13/23  7:59 AM   Result Value Ref Range    POC Glucose 97 70 - 110 mg/dL         Imaging results last 24hrs (Impression only):  XR ANKLE RIGHT (2 VIEWS)    Result Date: 3/6/2023  Limited two-view study. Mild midfoot arthropathy. Diffuse nonspecific soft tissue swelling. XR CHEST PORTABLE    Result Date: 3/11/2023  No active or acute cardiopulmonary process is evident. MRI ABDOMEN WO CONTRAST MRCP    Result Date: 3/10/2023  1. Please note this is a noncontrast exam as the patient terminated exam early. 2.  No insulinoma is identified by noncontrast imaging. If the patient cannot tolerate MR imaging, consider a follow-up pancreatic protocol CT with and without IV contrast in attempt to detect a small enhancing occult nodule. 3.  Mild CBD dilation. No cause identified. 4.  Mild hepatomegaly. Otherwise unremarkable.           ================================================================  Further management for Ms. Karon Del Rio will be discussed on rounds with my attending.       Raven Courtney MD, PGY-1  [facility]  March 13, 2023 8:26 AM

## 2023-03-13 NOTE — PROGRESS NOTES
Comprehensive Nutrition Assessment    Type and Reason for Visit:  Initial, RD Nutrition Re-Screen/LOS    Nutrition Recommendations/Plan:   Continue current diet as tolerated. Add AM/PM snacks, update food preferences. Continue to monitor tolerance of PO, weight, labs, and plan of care during admission. Malnutrition Assessment:  Malnutrition Status:  No malnutrition (03/13/23 6972)      Nutrition History and Allergies: PMHx:  type 2 diabetes on insulin, papillary thyroid carcinoma status post total thyroidectomy with subsequent hypothyroidism, HTN, HLD, seizure disorder, GERD, CKD III. Wt hx: 250 lb (8/16/21), 260 lb (7/26/22), 248 lb (11/28/22), 230 lb (1/1/23), 286 lb (3/5/23). Pt appears to have fluctuated? Pt reports  lb, states maybe losing wt in-house, denies any significant changes. Reports good intake/appetite PTA. Allergy to blueberry, mushroom and tomato. Nutrition Assessment:    Admitted for hypoglycemia. Transferred out of the ICU. Only 4 PO documented in flowsheet, eating % of meals. Pt reports eating well in-house, majority of meals. States being hungry, requesting AM/PM snacks. Food preferences taken. Pt had no diet related questions at this time. Nutrition Related Findings:    Last BM (including prior to admit): 03/12/23. Pertinent Meds: lipitor, lasix, gabapentin, humalog (held), synthroid, prednisone Pertinent Labs: POC Glucose 138-252 mg/dl x 24 hrs; 3/11: Hgb A1C 7.6 Wound Type: None       Current Nutrition Intake & Therapies:    Average Meal Intake: %  Average Supplements Intake: None Ordered  ADULT DIET; Regular; 4 carb choices (60 gm/meal)    Anthropometric Measures:  Height: 5' 9\" (175.3 cm)  Ideal Body Weight (IBW): 145 lbs (66 kg)    Admission Body Weight: 286 lb 9.6 oz (130 kg) (130 kg)  Current Body Weight: 290 lb 5.5 oz (131.7 kg), 200.2 % IBW.     Current BMI (kg/m2): 42.9  BMI Categories: Obese Class 3 (BMI 40.0 or greater)    Estimated Daily Nutrient Needs:  Energy Requirements Based On: Formula  Weight Used for Energy Requirements: Admission  Energy (kcal/day): 4833-5851 (MSJ 1-1.1)  Weight Used for Protein Requirements: Ideal  Protein (g/day): 132 (2 g/day)  Method Used for Fluid Requirements: 1 ml/kcal  Fluid (ml/day): 2444-1762    Nutrition Diagnosis:   No nutrition diagnosis at this time      Nutrition Interventions:   Food and/or Nutrient Delivery: Continue Current Diet, Snacks (Comment) (AM/PM)  Nutrition Education/Counseling: No recommendation at this time  Coordination of Nutrition Care: Continue to monitor while inpatient  Plan of Care discussed with: pt    Goals:     Goals: Meet at least 75% of estimated needs, by next RD assessment       Nutrition Monitoring and Evaluation:   Behavioral-Environmental Outcomes: None Identified  Food/Nutrient Intake Outcomes: Food and Nutrient Intake  Physical Signs/Symptoms Outcomes: Biochemical Data, Hemodynamic Status, Fluid Status or Edema, GI Status, Meal Time Behavior, Weight    Discharge Planning:    Continue current diet     John Young [address] [place] [address]   Contact: [phone]

## 2023-03-13 NOTE — CARE COORDINATION
Chart reviewed. If the patient should transfer to another floor the attending  should follow up with home health needs. The patient was provided a list of Home Health providers and selected [facility]. HERBER CONWAY [facility] reviewed the patient and in is in network with the patients insurance provider. Pending home health orders.      DORIAN Eastman    [facility]

## 2023-03-14 ENCOUNTER — APPOINTMENT (OUTPATIENT)
Facility: HOSPITAL | Age: 38
DRG: 420 | End: 2023-03-14
Payer: MEDICAID

## 2023-03-14 LAB
ANION GAP SERPL CALC-SCNC: 5 MMOL/L (ref 3–18)
APPEARANCE UR: CLEAR
BACTERIA URNS QL MICRO: ABNORMAL /HPF
BASOPHILS # BLD: 0.1 K/UL (ref 0–0.1)
BASOPHILS NFR BLD: 0 % (ref 0–2)
BILIRUB UR QL: NEGATIVE
BUN SERPL-MCNC: 23 MG/DL (ref 7–18)
BUN/CREAT SERPL: 21 (ref 12–20)
CALCIUM SERPL-MCNC: 8.3 MG/DL (ref 8.5–10.1)
CHLORIDE SERPL-SCNC: 108 MMOL/L (ref 100–111)
CO2 SERPL-SCNC: 23 MMOL/L (ref 21–32)
COLOR UR: YELLOW
CREAT SERPL-MCNC: 1.09 MG/DL (ref 0.6–1.3)
DIFFERENTIAL METHOD BLD: ABNORMAL
ECHO BSA: 2.53 M2
EOSINOPHIL # BLD: 0 K/UL (ref 0–0.4)
EOSINOPHIL NFR BLD: 0 % (ref 0–5)
EPITH CASTS URNS QL MICRO: ABNORMAL /LPF (ref 0–5)
ERYTHROCYTE [DISTWIDTH] IN BLOOD BY AUTOMATED COUNT: 15.2 % (ref 11.6–14.5)
GLUCOSE BLD STRIP.AUTO-MCNC: 128 MG/DL (ref 70–110)
GLUCOSE BLD STRIP.AUTO-MCNC: 148 MG/DL (ref 70–110)
GLUCOSE BLD STRIP.AUTO-MCNC: 195 MG/DL (ref 70–110)
GLUCOSE BLD STRIP.AUTO-MCNC: 232 MG/DL (ref 70–110)
GLUCOSE BLD STRIP.AUTO-MCNC: 41 MG/DL (ref 70–110)
GLUCOSE BLD STRIP.AUTO-MCNC: 45 MG/DL (ref 70–110)
GLUCOSE BLD STRIP.AUTO-MCNC: 47 MG/DL (ref 70–110)
GLUCOSE BLD STRIP.AUTO-MCNC: 67 MG/DL (ref 70–110)
GLUCOSE BLD STRIP.AUTO-MCNC: 81 MG/DL (ref 70–110)
GLUCOSE SERPL-MCNC: 116 MG/DL (ref 74–99)
GLUCOSE UR STRIP.AUTO-MCNC: NEGATIVE MG/DL
HCT VFR BLD AUTO: 37.6 % (ref 35–45)
HGB BLD-MCNC: 11.7 G/DL (ref 12–16)
HGB UR QL STRIP: ABNORMAL
IMM GRANULOCYTES # BLD AUTO: 0.3 K/UL (ref 0–0.04)
IMM GRANULOCYTES NFR BLD AUTO: 2 % (ref 0–0.5)
KETONES UR QL STRIP.AUTO: NEGATIVE MG/DL
LEUKOCYTE ESTERASE UR QL STRIP.AUTO: NEGATIVE
LIPASE SERPL-CCNC: 127 U/L (ref 73–393)
LYMPHOCYTES # BLD: 4.1 K/UL (ref 0.9–3.6)
LYMPHOCYTES NFR BLD: 29 % (ref 21–52)
MAGNESIUM SERPL-MCNC: 2.3 MG/DL (ref 1.6–2.6)
MCH RBC QN AUTO: 27.2 PG (ref 24–34)
MCHC RBC AUTO-ENTMCNC: 31.1 G/DL (ref 31–37)
MCV RBC AUTO: 87.4 FL (ref 78–100)
MONOCYTES # BLD: 1.1 K/UL (ref 0.05–1.2)
MONOCYTES NFR BLD: 8 % (ref 3–10)
NEUTS SEG # BLD: 8.5 K/UL (ref 1.8–8)
NEUTS SEG NFR BLD: 60 % (ref 40–73)
NITRITE UR QL STRIP.AUTO: NEGATIVE
NRBC # BLD: 0 K/UL (ref 0–0.01)
NRBC BLD-RTO: 0 PER 100 WBC
PH UR STRIP: 7 (ref 5–8)
PHOSPHATE SERPL-MCNC: 4.1 MG/DL (ref 2.5–4.9)
PLATELET # BLD AUTO: 482 K/UL (ref 135–420)
PMV BLD AUTO: 9.5 FL (ref 9.2–11.8)
POTASSIUM SERPL-SCNC: 4.4 MMOL/L (ref 3.5–5.5)
PROT UR STRIP-MCNC: 300 MG/DL
RBC # BLD AUTO: 4.3 M/UL (ref 4.2–5.3)
RBC #/AREA URNS HPF: ABNORMAL /HPF (ref 0–5)
SODIUM SERPL-SCNC: 136 MMOL/L (ref 136–145)
SP GR UR REFRACTOMETRY: 1.01 (ref 1–1.03)
TSH SERPL DL<=0.05 MIU/L-ACNC: 11.7 UIU/ML (ref 0.36–3.74)
UROBILINOGEN UR QL STRIP.AUTO: 1 EU/DL (ref 0.2–1)
WBC # BLD AUTO: 14 K/UL (ref 4.6–13.2)
WBC URNS QL MICRO: ABNORMAL /HPF (ref 0–4)

## 2023-03-14 PROCEDURE — 74018 RADEX ABDOMEN 1 VIEW: CPT

## 2023-03-14 PROCEDURE — 85025 COMPLETE CBC W/AUTO DIFF WBC: CPT

## 2023-03-14 PROCEDURE — 6360000002 HC RX W HCPCS

## 2023-03-14 PROCEDURE — 36415 COLL VENOUS BLD VENIPUNCTURE: CPT

## 2023-03-14 PROCEDURE — 6370000000 HC RX 637 (ALT 250 FOR IP): Performed by: STUDENT IN AN ORGANIZED HEALTH CARE EDUCATION/TRAINING PROGRAM

## 2023-03-14 PROCEDURE — 6370000000 HC RX 637 (ALT 250 FOR IP): Performed by: NURSE PRACTITIONER

## 2023-03-14 PROCEDURE — 84100 ASSAY OF PHOSPHORUS: CPT

## 2023-03-14 PROCEDURE — 2500000003 HC RX 250 WO HCPCS: Performed by: INTERNAL MEDICINE

## 2023-03-14 PROCEDURE — 6370000000 HC RX 637 (ALT 250 FOR IP)

## 2023-03-14 PROCEDURE — 84443 ASSAY THYROID STIM HORMONE: CPT

## 2023-03-14 PROCEDURE — 1100000000 HC RM PRIVATE

## 2023-03-14 PROCEDURE — A4216 STERILE WATER/SALINE, 10 ML: HCPCS | Performed by: INTERNAL MEDICINE

## 2023-03-14 PROCEDURE — 84681 ASSAY OF C-PEPTIDE: CPT

## 2023-03-14 PROCEDURE — 83735 ASSAY OF MAGNESIUM: CPT

## 2023-03-14 PROCEDURE — 81001 URINALYSIS AUTO W/SCOPE: CPT

## 2023-03-14 PROCEDURE — 82962 GLUCOSE BLOOD TEST: CPT

## 2023-03-14 PROCEDURE — 80048 BASIC METABOLIC PNL TOTAL CA: CPT

## 2023-03-14 PROCEDURE — 94761 N-INVAS EAR/PLS OXIMETRY MLT: CPT

## 2023-03-14 PROCEDURE — 2580000003 HC RX 258: Performed by: INTERNAL MEDICINE

## 2023-03-14 PROCEDURE — 87086 URINE CULTURE/COLONY COUNT: CPT

## 2023-03-14 PROCEDURE — 2580000003 HC RX 258

## 2023-03-14 PROCEDURE — 6370000000 HC RX 637 (ALT 250 FOR IP): Performed by: PHYSICIAN ASSISTANT

## 2023-03-14 PROCEDURE — 83690 ASSAY OF LIPASE: CPT

## 2023-03-14 RX ADMIN — LISINOPRIL 40 MG: 20 TABLET ORAL at 21:48

## 2023-03-14 RX ADMIN — SODIUM CHLORIDE, PRESERVATIVE FREE 10 ML: 5 INJECTION INTRAVENOUS at 21:49

## 2023-03-14 RX ADMIN — ATORVASTATIN CALCIUM 40 MG: 40 TABLET, FILM COATED ORAL at 08:16

## 2023-03-14 RX ADMIN — FUROSEMIDE 20 MG: 20 TABLET ORAL at 08:15

## 2023-03-14 RX ADMIN — HEPARIN SODIUM 5000 UNITS: 5000 INJECTION INTRAVENOUS; SUBCUTANEOUS at 06:01

## 2023-03-14 RX ADMIN — GABAPENTIN 500 MG: 100 CAPSULE ORAL at 21:48

## 2023-03-14 RX ADMIN — ONDANSETRON 4 MG: 4 TABLET, ORALLY DISINTEGRATING ORAL at 04:58

## 2023-03-14 RX ADMIN — GABAPENTIN 500 MG: 100 CAPSULE ORAL at 08:15

## 2023-03-14 RX ADMIN — SODIUM CHLORIDE, PRESERVATIVE FREE 10 ML: 5 INJECTION INTRAVENOUS at 08:17

## 2023-03-14 RX ADMIN — HEPARIN SODIUM 5000 UNITS: 5000 INJECTION INTRAVENOUS; SUBCUTANEOUS at 21:49

## 2023-03-14 RX ADMIN — SIMETHICONE 80 MG: 80 TABLET, CHEWABLE ORAL at 05:00

## 2023-03-14 RX ADMIN — HEPARIN SODIUM 5000 UNITS: 5000 INJECTION INTRAVENOUS; SUBCUTANEOUS at 14:25

## 2023-03-14 RX ADMIN — TOPIRAMATE 50 MG: 100 TABLET, FILM COATED ORAL at 21:49

## 2023-03-14 RX ADMIN — ASPIRIN 81 MG CHEWABLE TABLET 81 MG: 81 TABLET CHEWABLE at 08:16

## 2023-03-14 RX ADMIN — PREDNISONE 20 MG: 20 TABLET ORAL at 08:17

## 2023-03-14 RX ADMIN — GABAPENTIN 500 MG: 100 CAPSULE ORAL at 14:29

## 2023-03-14 RX ADMIN — POLYETHYLENE GLYCOL 3350 17 G: 17 POWDER, FOR SOLUTION ORAL at 05:03

## 2023-03-14 RX ADMIN — PANTOPRAZOLE 40 MG: 40 TABLET, DELAYED RELEASE ORAL at 08:17

## 2023-03-14 RX ADMIN — METOPROLOL TARTRATE 25 MG: 25 TABLET, FILM COATED ORAL at 08:17

## 2023-03-14 RX ADMIN — TOPIRAMATE 50 MG: 100 TABLET, FILM COATED ORAL at 08:15

## 2023-03-14 RX ADMIN — LEVOTHYROXINE SODIUM ANHYDROUS 300 MCG: 100 INJECTION, POWDER, LYOPHILIZED, FOR SOLUTION INTRAVENOUS at 08:16

## 2023-03-14 ASSESSMENT — PAIN DESCRIPTION - ORIENTATION: ORIENTATION: UPPER

## 2023-03-14 ASSESSMENT — PAIN DESCRIPTION - LOCATION: LOCATION: ABDOMEN

## 2023-03-14 ASSESSMENT — PAIN DESCRIPTION - DESCRIPTORS: DESCRIPTORS: ACHING;HYPERSENSITIVITY

## 2023-03-14 ASSESSMENT — PAIN SCALES - GENERAL: PAINLEVEL_OUTOF10: 10

## 2023-03-14 NOTE — PLAN OF CARE
Problem: Discharge Planning  Goal: Discharge to home or other facility with appropriate resources  3/14/2023 0159 by García Perez RN  Outcome: Progressing  3/14/2023 0157 by García Perez RN  Outcome: Progressing     Problem: Chronic Conditions and Co-morbidities  Goal: Patient's chronic conditions and co-morbidity symptoms are monitored and maintained or improved  3/14/2023 0159 by García Perez RN  Outcome: Progressing  Flowsheets (Taken 3/12/2023 1956)  Care Plan - Patient's Chronic Conditions and Co-Morbidity Symptoms are Monitored and Maintained or Improved: Monitor and assess patient's chronic conditions and comorbid symptoms for stability, deterioration, or improvement  3/14/2023 0157 by García Perez RN  Outcome: Progressing     Problem: Safety - Adult  Goal: Free from fall injury  3/14/2023 0159 by García Perez RN  Outcome: Progressing  3/14/2023 0159 by García Perez RN  Outcome: Progressing  3/14/2023 0157 by García Perez RN  Outcome: Progressing     Problem: Pain  Goal: Verbalizes/displays adequate comfort level or baseline comfort level  3/14/2023 0159 by García Perez RN  Outcome: Progressing  3/14/2023 0157 by García Perez RN  Outcome: Progressing  Flowsheets (Taken 3/13/2023 2000)  Verbalizes/displays adequate comfort level or baseline comfort level:   Encourage patient to monitor pain and request assistance   Assess pain using appropriate pain scale     Problem: Skin/Tissue Integrity  Goal: Absence of new skin breakdown  Description: 1. Monitor for areas of redness and/or skin breakdown  2. Assess vascular access sites hourly  3. Every 4-6 hours minimum:  Change oxygen saturation probe site  4. Every 4-6 hours:  If on nasal continuous positive airway pressure, respiratory therapy assess nares and determine need for appliance change or resting period.   3/14/2023 0159 by García Perez RN  Outcome: Progressing  3/14/2023 0157 by García Perez RN  Outcome: Progressing     Problem: Nutrition Deficit:  Goal: Optimize nutritional status  3/14/2023 0159 by Gretchen Guzman RN  Outcome: Progressing  3/14/2023 0157 by Gretchen Guzman RN  Outcome: Progressing

## 2023-03-14 NOTE — PROGRESS NOTES
[facility]  DAILY PROGRESS NOTE    *ATTENTION:  This note has been created by a medical student for educational purposes only. Please do not refer to the content of this note for clinical decision-making, billing, or other purposes. Please see attending physicians note to obtain clinical information on this patient. *         **MEDICAL STUDENT DAILY PROGRESS NOTE - FOR EDUCATIONAL PURPOSES ONLY**    Patient:    Merlinda Pata , 44 y.o. female   MRN:  [id number]  Room/Bed:  303/01  Admission Date:   3/4/2023  Code status:  Full Code    Reason for Admission: Hypoglycemia    ASSESSMENT AND PLAN:   Problem List Items Addressed This Visit          Endocrine    * (Principal) Hypoglycemia     Other Visit Diagnoses       Flank pain    -  Primary    Orthopnea        Myxedema coma ([facility] [address]        Relevant Orders    Transthoracic echocardiogram (TTE) complete with contrast, bubble, strain, and 3D PRN (Completed)    Right calf pain        Relevant Orders    Vascular duplex lower extremity venous right          Ms. Blayne Rosario is a 44year old female with PMHx of T2DM on insulin, papillary thyroid cancer s/p thyroidectomy and CKD Stage 3 admitted on 3/5 for 1 week of persistent hypoglycemia. She was originally admitted to the ICU but has now been transferred to [place] [address] and has new complaints of upper abdominal pain and bilateral flank pain.      Refractory hypoglycemia  -Home insulin regimen: lantus 90u BID, humalog 10u TID with meals, metformin 500 BID, farxiga 5mg, trulicity 1.5 weekly   -A1C 7.6 upon admission   Plan:  -Endocrinology following  -Insulin d/c'ed  -Monitoring blood glucose (POC glucose @ 0800 - 67)    Hypertension  -Lisinopril 40 mg was restarted 3/13  -Metoprolol 25 mg  -Lasix 20 mg  -Amlodipine held  Plan:  -Consider starting amlodipine in 2-3 days after monitoring on lisinopril    Hypothyroidism  -s/p thyroidectomy 2/2 papillary thyroid cancer   -TSH trending downward 11.7 3/14  -Endocrine increased levo dose to 300 mg  Plan:  -Daily TSH    Abdominal pain   -Upper abdominal pain, flank pain radiating down to uterus   Plan:  -KUB, UA, consider lipase?    CKD Stage 3  -BUN 23 (increasing from 18-20)  Plan:  -Monitor renal function      Code: full   Diet: carb controlled (60 g/meal)  DVT/AC: SQH  Mobility: per protocol  Disposition: telemetry    Point of Contact (relationship): Candy Santoyo (Aunt): [phone], Chao Reddy (Son): [phone]        SUBJECTIVE:   Events of the last 24 hours:  New onset of abdominal pain last night. Pain is 10/10 and described as achy. Pain is in upper abdomen, flanks, and uterus. Endorses achy pressure-like pain with urination.     ROS (positive findings are in BOLD; negative findings are in regular font)  Constitutional: fevers, chills, appetite changes, weight changes, fatigue  HEENT: changes in vision, changes in hearing, sore throat, dysphagia  Cardiovascular: chest pain, palpitations, PND, orthopnea, edema  Pulmonary: SOB, cough, sputum production, wheezing, chest tightness  Gastrointestinal: abdominal pain, nausea/vomiting, diarrhea, constipation, melena, hematochezia  Genitourinary: dysuria, hesitation, dribbling, urgency, hematuria  Musculoskeletal: arthralgias, myalgias  Skin: rash, itching  Neurological: sensory changes, motor changes, headache  Psychiatric: mood changes  Endocrine: heat/cold intolerance  Heme: easy bruising/easy bleeding, LAD    CURRENT INPATIENT MEDICATIONS:  Current Facility-Administered Medications   Medication Dose Route Frequency Provider Last Rate Last Admin    [Held by provider] amLODIPine (NORVASC) tablet 10 mg  10 mg Oral Nightly Tara P Bohlmann, DO        lisinopril (PRINIVIL;ZESTRIL) tablet 40 mg  40 mg Oral QHS Tara P Bohbrandan, DO   40 mg at 03/13/23 2141    simethicone (MYLICON) chewable tablet 80 mg  80 mg Oral 4x Daily PRN Tara P Bohlmann, DO   80 mg at 03/14/23 0500    bismuth subsalicylate (PEPTO BISMOL) 262  MG/15ML suspension 30 mL  30 mL Oral Q6H PRN Trent Aguilar DO        metoprolol tartrate (LOPRESSOR) tablet 25 mg  25 mg Oral Daily John CalvertGUILLERMO NP   25 mg at 03/14/23 8147    perflutren lipid microspheres (DEFINITY) injection 2 mL  2 mL IntraVENous Once Brittney Arceo MD        levothyroxine (SYNTHROID) 300 mcg in sodium chloride (PF) 0.9 % 15 mL IV syringe  300 mcg IntraVENous Daily Jannie Raya MD   300 mcg at 03/14/23 0816    glucose chewable tablet 16 g  4 tablet Oral PRN Jannie Raya MD   16 g at 03/13/23 1513    dextrose bolus 10% 125 mL  125 mL IntraVENous PRN Jannie Raya MD        Or    dextrose bolus 10% 250 mL  250 mL IntraVENous PRN Jannie Raya MD   Stopped at 03/13/23 2146    glucagon (rDNA) injection 1 mg  1 mg SubCUTAneous PRN Jannie Raya MD        dextrose 10 % infusion   IntraVENous Continuous PRN Jannie Raya  mL/hr at 03/14/23 0818 Restarted at 03/14/23 0818    furosemide (LASIX) tablet 20 mg  20 mg Oral Daily Charlee Hicks PA-C   20 mg at 03/14/23 0815    heparin (porcine) injection 5,000 Units  5,000 Units SubCUTAneous 3 times per day Stephane Cage PA-C   5,000 Units at 03/14/23 0601    gabapentin (NEURONTIN) capsule 500 mg  500 mg Oral TID Renelle Radha, DO   500 mg at 03/14/23 0815    lidocaine 4 % external patch 1 patch  1 patch TransDERmal Daily Renelle Guernsey, DO   1 patch at 03/10/23 0852    lidocaine (LMX) 4 % cream   Topical PRN Rengaurav Guernsey, DO        dextrose 50 % IV solution  25 g IntraVENous PRN Sho Hernandez MD   25 g at 03/05/23 1114    sodium chloride flush 0.9 % injection 5-40 mL  5-40 mL IntraVENous 2 times per day Jessica Negron MD   10 mL at 03/14/23 0817    sodium chloride flush 0.9 % injection 5-40 mL  5-40 mL IntraVENous PRN Jessica Negron MD        0.9 % sodium chloride infusion   IntraVENous PRN Jessica Negron MD        ondansetron (ZOFRAN-ODT) disintegrating tablet 4 mg  4 mg Oral Q8H PRN Teagan Lin MD   4 mg at 03/14/23 0458    Or    ondansetron (ZOFRAN) injection 4 mg  4 mg IntraVENous Q6H PRN Teagan Lin MD        acetaminophen (TYLENOL) tablet 650 mg  650 mg Oral Q6H PRN Teagan Lin MD   650 mg at 03/13/23 2007    Or    acetaminophen (TYLENOL) suppository 650 mg  650 mg Rectal Q6H PRN Teagan Lin MD        polyethylene glycol [facility]) packet 17 g  17 g Oral Daily PRN Teagan Lin MD   17 g at 03/14/23 0503    aspirin chewable tablet 81 mg  81 mg Oral Daily Teagan Lin MD   81 mg at 03/14/23 0816    atorvastatin (LIPITOR) tablet 40 mg  40 mg Oral Daily Teagan Lin MD   40 mg at 03/14/23 0816    [Held by provider] metFORMIN (GLUCOPHAGE) tablet 500 mg  500 mg Oral BID WC Teagan Lin MD        pantoprazole (PROTONIX) tablet 40 mg  40 mg Oral QAM AC Teagan Lin MD   40 mg at 03/14/23 0817    topiramate (TOPAMAX) tablet 50 mg  50 mg Oral BID Teagan Lin MD   50 mg at 03/14/23 0815       Allergies  Allergies   Allergen Reactions    Blueberry Itching    Mushroom Extract Complex Hives and Itching    Tomato Itching       OBJECTIVE:    Intake/Output Summary (Last 24 hours) at 3/14/2023 0846  Last data filed at 3/14/2023 0221  Gross per 24 hour   Intake 1560 ml   Output 475 ml   Net 1085 ml       BP (!) 145/99   Pulse 98   Temp 97.7 °F (36.5 °C) (Oral)   Resp 24   Ht 5' 9\" (1.753 m)   Wt 292 lb 15.9 oz (132.9 kg)   SpO2 99%   BMI 43.27 kg/m²     PHYSICAL EXAM  Gen: NAD, comfortable  CV: RRR  Pulm: CTAB  Abd: TTP in upper R and L quadrants, CVA tenderness bilaterally   MSK: 1+ pitting edema in bilateral LE  Skin: warm, dry, intact, no rash  Neuro: CN II-XII grossly intact, no focal deficits appreciated   Psych: appropriate, alert, oriented x3       No results found for: LACTA     Lab results last 24 hrs:  Recent Results (from the past 24 hour(s))   POCT Glucose    Collection Time: 03/13/23 11:28 AM   Result Value Ref Range    POC Glucose 92 70 - 110 mg/dL   POCT Glucose    Collection Time: 03/13/23  1:43 PM   Result Value Ref Range    POC Glucose 41 (LL) 70 - 110 mg/dL   POCT Glucose    Collection Time: 03/13/23  1:44 PM   Result Value Ref Range    POC Glucose 43 (LL) 70 - 110 mg/dL   POCT Glucose    Collection Time: 03/13/23  2:08 PM   Result Value Ref Range    POC Glucose 51 (LL) 70 - 110 mg/dL   POCT Glucose    Collection Time: 03/13/23  2:11 PM   Result Value Ref Range    POC Glucose 44 (LL) 70 - 110 mg/dL   POCT Glucose    Collection Time: 03/13/23  3:10 PM   Result Value Ref Range    POC Glucose 59 (L) 70 - 110 mg/dL   Vascular duplex lower extremity venous right    Collection Time: 03/13/23  3:28 PM   Result Value Ref Range    Body Surface Area 2.53 m2   POCT Glucose    Collection Time: 03/13/23  3:47 PM   Result Value Ref Range    POC Glucose 57 (L) 70 - 110 mg/dL   POCT Glucose    Collection Time: 03/13/23  5:18 PM   Result Value Ref Range    POC Glucose 92 70 - 110 mg/dL   POCT Glucose    Collection Time: 03/13/23  8:41 PM   Result Value Ref Range    POC Glucose 118 (H) 70 - 110 mg/dL   POCT Glucose    Collection Time: 03/14/23 12:39 AM   Result Value Ref Range    POC Glucose 41 (LL) 70 - 110 mg/dL   POCT Glucose    Collection Time: 03/14/23 12:41 AM   Result Value Ref Range    POC Glucose 47 (LL) 70 - 110 mg/dL   POCT Glucose    Collection Time: 03/14/23 12:54 AM   Result Value Ref Range    POC Glucose 45 (LL) 70 - 110 mg/dL   POCT Glucose    Collection Time: 03/14/23  1:13 AM   Result Value Ref Range    POC Glucose 81 70 - 110 mg/dL   POCT Glucose    Collection Time: 03/14/23  1:39 AM   Result Value Ref Range    POC Glucose 128 (H) 70 - 110 mg/dL   CBC with Auto Differential    Collection Time: 03/14/23  2:06 AM   Result Value Ref Range    WBC 14.0 (H) 4.6 - 13.2 K/uL    RBC 4.30 4.20 - 5.30 M/uL    Hemoglobin 11.7 (L) 12.0 - 16.0 g/dL Hematocrit 37.6 35.0 - 45.0 %    MCV 87.4 78.0 - 100.0 FL    MCH 27.2 24.0 - 34.0 PG    MCHC 31.1 31.0 - 37.0 g/dL    RDW 15.2 (H) 11.6 - 14.5 %    Platelets 879 (H) 135 - 420 K/uL    MPV 9.5 9.2 - 11.8 FL    Nucleated RBCs 0.0 0  WBC    nRBC 0.00 0.00 - 0.01 K/uL    Seg Neutrophils 60 40 - 73 %    Lymphocytes 29 21 - 52 %    Monocytes 8 3 - 10 %    Eosinophils % 0 0 - 5 %    Basophils 0 0 - 2 %    Immature Granulocytes 2 (H) 0.0 - 0.5 %    Segs Absolute 8.5 (H) 1.8 - 8.0 K/UL    Absolute Lymph # 4.1 (H) 0.9 - 3.6 K/UL    Absolute Mono # 1.1 0.05 - 1.2 K/UL    Absolute Eos # 0.0 0.0 - 0.4 K/UL    Basophils Absolute 0.1 0.0 - 0.1 K/UL    Absolute Immature Granulocyte 0.3 (H) 0.00 - 0.04 K/UL    Differential Type AUTOMATED     Basic Metabolic Panel    Collection Time: 03/14/23  2:06 AM   Result Value Ref Range    Sodium 136 136 - 145 mmol/L    Potassium 4.4 3.5 - 5.5 mmol/L    Chloride 108 100 - 111 mmol/L    CO2 23 21 - 32 mmol/L    Anion Gap 5 3.0 - 18 mmol/L    Glucose 116 (H) 74 - 99 mg/dL    BUN 23 (H) 7.0 - 18 MG/DL    Creatinine 1.09 0.6 - 1.3 MG/DL    Bun/Cre Ratio 21 (H) 12 - 20      Est, Glom Filt Rate >60 >60 ml/min/1.73m2    Calcium 8.3 (L) 8.5 - 10.1 MG/DL   Magnesium    Collection Time: 03/14/23  2:06 AM   Result Value Ref Range    Magnesium 2.3 1.6 - 2.6 mg/dL   Phosphorus    Collection Time: 03/14/23  2:06 AM   Result Value Ref Range    Phosphorus 4.1 2.5 - 4.9 MG/DL   TSH    Collection Time: 03/14/23  2:06 AM   Result Value Ref Range    TSH, 3RD GENERATION 11.70 (H) 0.36 - 3.74 uIU/mL   POCT Glucose    Collection Time: 03/14/23  8:09 AM   Result Value Ref Range    POC Glucose 67 (L) 70 - 110 mg/dL         Imaging results last 24hrs (Impression only):  XR CHEST PORTABLE    Result Date: 3/11/2023  No active or acute cardiopulmonary process is evident. MRI ABDOMEN WO CONTRAST MRCP    Result Date: 3/10/2023  1. Please note this is a noncontrast exam as the patient terminated exam early.  2. No insulinoma is identified by noncontrast imaging. If the patient cannot tolerate MR imaging, consider a follow-up pancreatic protocol CT with and without IV contrast in attempt to detect a small enhancing occult nodule. 3.  Mild CBD dilation. No cause identified. 4.  Mild hepatomegaly. Otherwise unremarkable.          ================================================================  Further management for Ms. Edmund Moses will be discussed on rounds with my attending.       Yoshi Powell, MS4  [facility]  March 14, 2023 8:46 AM    **MEDICAL STUDENT DAILY PROGRESS NOTE - FOR EDUCATIONAL PURPOSES ONLY**

## 2023-03-14 NOTE — PLAN OF CARE
Problem: Discharge Planning  Goal: Discharge to home or other facility with appropriate resources  Outcome: Progressing     Problem: Chronic Conditions and Co-morbidities  Goal: Patient's chronic conditions and co-morbidity symptoms are monitored and maintained or improved  Outcome: Progressing     Problem: Safety - Adult  Goal: Free from fall injury  Outcome: Progressing     Problem: Pain  Goal: Verbalizes/displays adequate comfort level or baseline comfort level  Outcome: Progressing  Flowsheets (Taken 3/13/2023 2000)  Verbalizes/displays adequate comfort level or baseline comfort level:   Encourage patient to monitor pain and request assistance   Assess pain using appropriate pain scale     Problem: Skin/Tissue Integrity  Goal: Absence of new skin breakdown  Description: 1. Monitor for areas of redness and/or skin breakdown  2. Assess vascular access sites hourly  3. Every 4-6 hours minimum:  Change oxygen saturation probe site  4. Every 4-6 hours:  If on nasal continuous positive airway pressure, respiratory therapy assess nares and determine need for appliance change or resting period.   Outcome: Progressing     Problem: Nutrition Deficit:  Goal: Optimize nutritional status  Outcome: Progressing

## 2023-03-14 NOTE — PLAN OF CARE
Problem: Safety - Adult  Goal: Free from fall injury  3/14/2023 0159 by Kathleen Wagner RN  Outcome: Progressing  3/14/2023 0157 by Kathleen Wagner RN  Outcome: Progressing

## 2023-03-14 NOTE — CARE COORDINATION
SW met the pt at bedside and the pt voiced concerns on how to get assistance to pay for her utilities due to being out of work. SW provided community resources such as energy assistance programs with the Department of , [address] [place] [address], and [facility] energy [facility] so that she could apply for those programs. The SW reviewed the information that different programs offered through various organizations. The pt stated she is currently receiving assistance from [employer] for rental assistance.  The SW advised the patient to reach out to the utility companies to check on the status of her utilities while in the hospital.       DORIAN Bravo    [facility]

## 2023-03-14 NOTE — PROGRESS NOTES
[facility]  BRIEF UPDATE NOTE    Pt hypertensive overnight, Bps have been intermittently elevated thus far. Last /101. Home meds: amlodipine 10 mg QHS and lisinopril 40 mg QHS at home. She was started on metoprolol and lasix in ICU for hypertension, which has not improved. Will reevaluate home meds vs antihypertensives in hospital.     Will resume home LISINOPRIL 40 mg tonight and reevaluate BP. Would consider discontinuing metoprolol and lasix -- these are not active medicines in allscripts. No acute concern for worsening LE edema from my understanding, myxedema without coma was on early differential -- not necessarily a contraindication for amlodipine, will order and hold her home dose.      Katherine Crockett DO, PGY-1  [facility]  3/13/2023, 8:33 PM

## 2023-03-14 NOTE — PROGRESS NOTES
0120 called and spoke with [facility] medicine physician informed of low blood glucose X 2 she took 4 oral glucose tabs, had some sprite. Repeated a third time result 81 on the floor only sugar free snacks. RN traveled down to dietary and made her a plate, turkey sand, vanilla wafers and a fruit cup. She ate it all and glucose increased to 118 will reassess shortly    On reassessment glucose check results were WNL    C/o upset stomach with reproducible pain, given mylicon and Zofran two times over night.  C/o not having a BM in two days, given mirilax            45 y/o Female full code  Admitted 03/04/23   DX SOB flank pain Right side, cough and SOB, hypoglycemia     History HTN seizures DM increased leg swelling X 1 week, Papillary thyroid cancer, severe neuropathy bilaterally to shin, migraines, insulin overdose, vaginosis        Neuro AO x 4 no pain medications no fevers  Respiratory RA during day and wore CPAP overnight for the first time this admission  Cardiac BP elevated nsr  GI Adult regular diet with low blood sugars occuring   up to bedside commode or restroom, bathed self with wipes in the bathroom this morning  Lines 20 gauge Left AC, Left brachial midline  Skin intact

## 2023-03-14 NOTE — PROGRESS NOTES
[facility]  POST-ROUNDING NOTE    Assessment & Plan:    Patient has had ongoing hypoglycemic events despite not having had any insulin for 8 days. Spoke to Dr. Gian Woods regarding this. Ordered serum insulin and glucose at 4am and 6am tomorrow morning, as well as insulin antibodies, cortisone, ACTH, and c-peptide. The above patient and plan were discussed with my supervising physician. See daily progress note for full assessment/plan.       Nikolai Segovia MD, PGY-1  [facility]  3/14/2023, 1:49 PM

## 2023-03-14 NOTE — PROGRESS NOTES
[facility]  TRANSFER NOTE      Patient:    Fred Bates , 44 y.o. female   MRN:  [id number]  Room/Bed:  303/01  Admission Date:   3/4/2023  Code status:  Full Code    Reason for Admission: refractory hypoglycemia    ASSESSMENT AND PLAN:   Problem List Items Addressed This Visit          Endocrine    * (Principal) Hypoglycemia     Other Visit Diagnoses       Flank pain    -  Primary    Orthopnea        Myxedema coma ([address]        Relevant Orders    Transthoracic echocardiogram (TTE) complete with contrast, bubble, strain, and 3D PRN (Completed)    Right calf pain        Relevant Orders    Vascular duplex lower extremity venous right          69-year-old female with past medical history of type 2 diabetes on insulin, papillary thyroid carcinoma status post total thyroidectomy with subsequent hypothyroidism, chronic back pain, and chronic kidney disease stage III, who presented to [facility] on 3/4/23 w/ low BS readings w/ accompanied fatigue and lightheadedness, was transferred to ICU for closer monitoring    Severe Refractory Hypoglycemia, DMT2 Hx on insulin  -Etiology unclear. Initially thought iatrogenic versus insulinoma, however MRCP did not demonstrate mass, low c-peptide, and patient has had ongoing hypoglycemia despite no additional insulin this admission.  Could perhaps be a pocket of subcutaneous insulin which recently opened up to systemic circulation  -home DM regimen at time of admission: lantus 90u BID, humalog 10u TID with meals, metformin 500 BID, farxiga 5mg, trulicity 1.5 weekly   -HbA1c at admission 7.6  -Required additional dextrose overnight, insulin held  Plan:  -Dr. Milagros Heck with endocrinology following, appreciate the help  -continue to hold insulin  -continue close monitoring of blood glucose: ACHS, 0200  -hypoglycemia protocol in place and being followed  -daily BMP, CBC, Mg, Phos    Severe Hypothyroidism s/p Thyroidectomy 2/2 Papillary Thyroid Cancer (2013)  Etiology: myxedema without coma vs subtherapeutic dosing   -at time of admission TSH 49.5 T4 0.2, fairly unchanged from previous month (2/10/23): TSH 48.3, T3 4.0, Free T4 0.1, despite starting levothyroxine 100mcg at that time   -concern for thyroid derangement contributing to hypoglycemia   -endocrine following, doing well with IV synthroid, TSH improving to 27.1 on 3/12, with Free T4 0.5  -Outpatient thyroglobulin was 1.3mg/mL on 2/9/23, no concern for cancer recurrence at this time  Plan:  -continue monitoring with daily TSH  -endocrine following, continue to appreciate recs   - IV synthroid 300mcg, prednisone 20mg    New onset abdominal pain  -Also with elevated white count with slight left shift, flank pain  -Refractory to ondansetron and simethicone  -Ddx to include UTI, constipation, nephrolithiasis  Plan:  -KUB, UA, urine culture  -PRN acetaminophen    CKD III  -h/o kidney biopsy with diagnosis of severe glomerular sclerosis with FSGS  -nephrotic range proteinuria at admit (>1000 on UA), with last urine microalb:cr ratio 4098 (on 2/10/23), likely 2/2 diabetes   Plan:  -monitor renal function with daily BMP  -avoid nephrotoxic medications  -optimize glucose and BP control to prevent further progression of kidney disease    HTN  -Lisinopril 40mg  -Metoprolol 25mg QD (new inpatient)  -Amlodipine 10mg held    HLD  -Atorvastatin 40mg    Chest pain  -Numerous ED workups over past few months with no concern for cardiopulmonary etiology, most likely costochondritis  -Lidocaine patch    Seizure Disorder  -home med: topiramate 50mg BID  Plan:  -continue topiramate    Atraumatic R Foot Pain   -XR showed soft tissue edema without acute bony injury  -does also have diabetic neuropathy on gabapentin 400mg TID - has no sensation to light touch of bilateral feet/ankles   Plan:  -increased gabapentin to 500mg TID this admission, would continue this dosing    Possible DICKSON  -patient reported, was started on CPAP in ICU  Plan:  -Already referred to [facility] outpatient    GERD  -home med: pantoprazole 40mg daily  Plan:  -continue pantoprazole      Global:  Code: full  Diet: carb controlled (60g/meal)  DVT/AC: SQH  Mobility: per protocol  Disposition: telemetry     Point of Contact: Arabella HARO [facility]): [phone], Cynthia Lebron (Son): [phone]    SUBJECTIVE:   Overnight, had additional episodes of hypoglycemia despite no administration of insulin, high requirement of both IV and PO sugar.  Also had new-onset abdominal pain starting last night, 10/10 and achy in quality    ROS:  Positive for diffuse abdominal pain  Negative for constipation    CURRENT INPATIENT MEDICATIONS:  Current Facility-Administered Medications   Medication Dose Route Frequency Provider Last Rate Last Admin    [Held by provider] amLODIPine (NORVASC) tablet 10 mg  10 mg Oral Nightly Tara P Olivia,         lisinopril (PRINIVIL;ZESTRIL) tablet 40 mg  40 mg Oral QHS [place] Joe Baker, DO   40 mg at 03/13/23 2141    simethicone (MYLICON) chewable tablet 80 mg  80 mg Oral 4x Daily PRN Renzo Baker, DO   80 mg at 03/14/23 0500    bismuth subsalicylate (PEPTO BISMOL) 262 MG/15ML suspension 30 mL  30 mL Oral Q6H PRN Renzo Baker, DO        metoprolol tartrate (LOPRESSOR) tablet 25 mg  25 mg Oral Daily GUILLERMO Reyes NP   25 mg at 03/13/23 1100    perflutren lipid microspheres (DEFINITY) injection 2 mL  2 mL IntraVENous Once Max Mireles MD        levothyroxine (SYNTHROID) 300 mcg in sodium chloride (PF) 0.9 % 15 mL IV syringe  300 mcg IntraVENous Daily Gretchen Ramsey MD   300 mcg at 03/13/23 1100    predniSONE (DELTASONE) tablet 20 mg  20 mg Oral Daily Leonardo Munson MD   20 mg at 03/13/23 1100    glucose chewable tablet 16 g  4 tablet Oral PRN Gretchen Ramsey MD   16 g at 03/13/23 1513    dextrose bolus 10% 125 mL  125 mL IntraVENous PRN Gretchen Ramsey MD        Or    dextrose bolus 10% 250 mL  250 mL IntraVENous PRN Gretchen Ramsey MD   Stopped at 03/13/23 2146    glucagon (rDNA) injection 1 mg  1 mg SubCUTAneous PRN Hill Hernandez MD        dextrose 10 % infusion   IntraVENous Continuous PRN iHll Hernandez  mL/hr at 03/13/23 1424 New Bag at 03/13/23 1424    furosemide (LASIX) tablet 20 mg  20 mg Oral Daily Charlee Hicks PA-C   20 mg at 03/13/23 1100    heparin (porcine) injection 5,000 Units  5,000 Units SubCUTAneous 3 times per day Kaylah Richards PA-C   5,000 Units at 03/13/23 2146    gabapentin (NEURONTIN) capsule 500 mg  500 mg Oral TID Adonica Potrobbie, DO   500 mg at 03/13/23 2038    lidocaine 4 % external patch 1 patch  1 patch TransDERmal Daily Adonictigre Guevara DO   1 patch at 03/10/23 0852    lidocaine (LMX) 4 % cream   Topical PRN Adonictigre Guevara,         dextrose 50 % IV solution  25 g IntraVENous PRN Sanju Suárez MD   25 g at 03/05/23 1114    sodium chloride flush 0.9 % injection 5-40 mL  5-40 mL IntraVENous 2 times per day Yelena Reyez MD   10 mL at 03/13/23 2245    sodium chloride flush 0.9 % injection 5-40 mL  5-40 mL IntraVENous PRN Yelena Reyez MD        0.9 % sodium chloride infusion   IntraVENous PRN Yelena Reyez MD        ondansetron (ZOFRAN-ODT) disintegrating tablet 4 mg  4 mg Oral Q8H PRN Yelena Reyez MD   4 mg at 03/14/23 0458    Or    ondansetron (ZOFRAN) injection 4 mg  4 mg IntraVENous Q6H PRN Yelena Reyez MD        acetaminophen (TYLENOL) tablet 650 mg  650 mg Oral Q6H PRN Yelena Reyez MD   650 mg at 03/13/23 2007    Or    acetaminophen (TYLENOL) suppository 650 mg  650 mg Rectal Q6H PRN Yelena Reyez MD        polyethylene glycol (GLYCOLAX) packet 17 g  17 g Oral Daily PRN Yelena Reyez MD   17 g at 03/14/23 0503    aspirin chewable tablet 81 mg  81 mg Oral Daily eYlena Reyez MD   81 mg at 03/13/23 1100    atorvastatin (LIPITOR) tablet 40 mg  40 mg Oral Daily Yelena MD Aissatou 40 mg at 03/13/23 1100    [Held by provider] metFORMIN (GLUCOPHAGE) tablet 500 mg  500 mg Oral BID WC Lee Ann Bolanos MD        pantoprazole (PROTONIX) tablet 40 mg  40 mg Oral QAM AC Lee Ann Bolanos MD   40 mg at 03/13/23 1100    topiramate (TOPAMAX) tablet 50 mg  50 mg Oral BID Lee Ann Bolanos MD   50 mg at 03/13/23 2038       Allergies  Allergies   Allergen Reactions    Blueberry Itching    Mushroom Extract Complex Hives and Itching    Tomato Itching       OBJECTIVE:    Intake/Output Summary (Last 24 hours) at 3/14/2023 8649  Last data filed at 3/14/2023 0221  Gross per 24 hour   Intake 1560 ml   Output 475 ml   Net 1085 ml       BP (!) 145/99   Pulse 88   Temp 97.7 °F (36.5 °C) (Oral)   Resp 14   Ht 5' 9\" (1.753 m)   Wt 292 lb 15.9 oz (132.9 kg)   SpO2 99%   BMI 43.27 kg/m²     PHYSICAL EXAM  Gen: NAD, comfortable, obese   HEENT: normocephalic, atraumatic, MMM  CV: RRR, S1/S2 present without M/R/G  Pulm: CTAB, no wheezes, no crackles  Abd: Soft, diffusely tender, no rebound  MSK: no clubbing, 2+ BLE pitting edema bilaterally   Skin: warm, dry, intact, no rash  Neuro: CN II-XII grossly intact  Psych: appropriate, alert, oriented x3    RESULTS     No results found for: LACTA     Lab results last 24 hrs:  Recent Results (from the past 24 hour(s))   POCT Glucose    Collection Time: 03/13/23  7:59 AM   Result Value Ref Range    POC Glucose 97 70 - 110 mg/dL   POCT Glucose    Collection Time: 03/13/23 11:28 AM   Result Value Ref Range    POC Glucose 92 70 - 110 mg/dL   POCT Glucose    Collection Time: 03/13/23  1:43 PM   Result Value Ref Range    POC Glucose 41 (LL) 70 - 110 mg/dL   POCT Glucose    Collection Time: 03/13/23  1:44 PM   Result Value Ref Range    POC Glucose 43 (LL) 70 - 110 mg/dL   POCT Glucose    Collection Time: 03/13/23  2:08 PM   Result Value Ref Range    POC Glucose 51 (LL) 70 - 110 mg/dL   POCT Glucose    Collection Time: 03/13/23  2:11 PM   Result Value Ref Range POC Glucose 44 (LL) 70 - 110 mg/dL   POCT Glucose    Collection Time: 03/13/23  3:10 PM   Result Value Ref Range    POC Glucose 59 (L) 70 - 110 mg/dL   Vascular duplex lower extremity venous right    Collection Time: 03/13/23  3:28 PM   Result Value Ref Range    Body Surface Area 2.53 m2   POCT Glucose    Collection Time: 03/13/23  3:47 PM   Result Value Ref Range    POC Glucose 57 (L) 70 - 110 mg/dL   POCT Glucose    Collection Time: 03/13/23  5:18 PM   Result Value Ref Range    POC Glucose 92 70 - 110 mg/dL   POCT Glucose    Collection Time: 03/13/23  8:41 PM   Result Value Ref Range    POC Glucose 118 (H) 70 - 110 mg/dL   POCT Glucose    Collection Time: 03/14/23 12:39 AM   Result Value Ref Range    POC Glucose 41 (LL) 70 - 110 mg/dL   POCT Glucose    Collection Time: 03/14/23 12:41 AM   Result Value Ref Range    POC Glucose 47 (LL) 70 - 110 mg/dL   POCT Glucose    Collection Time: 03/14/23 12:54 AM   Result Value Ref Range    POC Glucose 45 (LL) 70 - 110 mg/dL   POCT Glucose    Collection Time: 03/14/23  1:13 AM   Result Value Ref Range    POC Glucose 81 70 - 110 mg/dL   POCT Glucose    Collection Time: 03/14/23  1:39 AM   Result Value Ref Range    POC Glucose 128 (H) 70 - 110 mg/dL   CBC with Auto Differential    Collection Time: 03/14/23  2:06 AM   Result Value Ref Range    WBC 14.0 (H) 4.6 - 13.2 K/uL    RBC 4.30 4.20 - 5.30 M/uL    Hemoglobin 11.7 (L) 12.0 - 16.0 g/dL    Hematocrit 37.6 35.0 - 45.0 %    MCV 87.4 78.0 - 100.0 FL    MCH 27.2 24.0 - 34.0 PG    MCHC 31.1 31.0 - 37.0 g/dL    RDW 15.2 (H) 11.6 - 14.5 %    Platelets 455 (H) 580 - 420 K/uL    MPV 9.5 9.2 - 11.8 FL    Nucleated RBCs 0.0 0  WBC    nRBC 0.00 0.00 - 0.01 K/uL    Seg Neutrophils 60 40 - 73 %    Lymphocytes 29 21 - 52 %    Monocytes 8 3 - 10 %    Eosinophils % 0 0 - 5 %    Basophils 0 0 - 2 %    Immature Granulocytes 2 (H) 0.0 - 0.5 %    Segs Absolute 8.5 (H) 1.8 - 8.0 K/UL    Absolute Lymph # 4.1 (H) 0.9 - 3.6 K/UL    Absolute Mono # 1.1 0.05 - 1.2 K/UL    Absolute Eos # 0.0 0.0 - 0.4 K/UL    Basophils Absolute 0.1 0.0 - 0.1 K/UL    Absolute Immature Granulocyte 0.3 (H) 0.00 - 0.04 K/UL    Differential Type AUTOMATED     Basic Metabolic Panel    Collection Time: 03/14/23  2:06 AM   Result Value Ref Range    Sodium 136 136 - 145 mmol/L    Potassium 4.4 3.5 - 5.5 mmol/L    Chloride 108 100 - 111 mmol/L    CO2 23 21 - 32 mmol/L    Anion Gap 5 3.0 - 18 mmol/L    Glucose 116 (H) 74 - 99 mg/dL    BUN 23 (H) 7.0 - 18 MG/DL    Creatinine 1.09 0.6 - 1.3 MG/DL    Bun/Cre Ratio 21 (H) 12 - 20      Est, Glom Filt Rate >60 >60 ml/min/1.73m2    Calcium 8.3 (L) 8.5 - 10.1 MG/DL   Magnesium    Collection Time: 03/14/23  2:06 AM   Result Value Ref Range    Magnesium 2.3 1.6 - 2.6 mg/dL   Phosphorus    Collection Time: 03/14/23  2:06 AM   Result Value Ref Range    Phosphorus 4.1 2.5 - 4.9 MG/DL   TSH    Collection Time: 03/14/23  2:06 AM   Result Value Ref Range    TSH, 3RD GENERATION 11.70 (H) 0.36 - 3.74 uIU/mL         Imaging results last 24hrs (Impression only):  XR ANKLE RIGHT (2 VIEWS)    Result Date: 3/6/2023  Limited two-view study. Mild midfoot arthropathy. Diffuse nonspecific soft tissue swelling. XR CHEST PORTABLE    Result Date: 3/11/2023  No active or acute cardiopulmonary process is evident. MRI ABDOMEN WO CONTRAST MRCP    Result Date: 3/10/2023  1. Please note this is a noncontrast exam as the patient terminated exam early. 2.  No insulinoma is identified by noncontrast imaging. If the patient cannot tolerate MR imaging, consider a follow-up pancreatic protocol CT with and without IV contrast in attempt to detect a small enhancing occult nodule. 3.  Mild CBD dilation. No cause identified. 4.  Mild hepatomegaly. Otherwise unremarkable.           ================================================================  Further management for Ms. Margie Sutherland will be discussed on rounds with my attending.       Patricia Saravia MD, PGY-1  [address]  March 14, 2023 6:38 AM

## 2023-03-14 NOTE — PROGRESS NOTES
[facility] Medicine  TRANSFER NOTE      Patient:    Franklin Reeves , 44 y.o. female   MRN:  [id number]  Room/Bed:  303/01  Admission Date:   3/4/2023  Code status:  Full Code    Reason for Admission: refractory hypoglycemia    ASSESSMENT AND PLAN:   Problem List Items Addressed This Visit          Endocrine    * (Principal) Hypoglycemia     Other Visit Diagnoses       Flank pain    -  Primary    Orthopnea        Myxedema coma ([address]        Relevant Orders    Transthoracic echocardiogram (TTE) complete with contrast, bubble, strain, and 3D PRN (Completed)    Right calf pain        Relevant Orders    Vascular duplex lower extremity venous right (Completed)          34-year-old female with past medical history of type 2 diabetes on insulin, papillary thyroid carcinoma status post total thyroidectomy with subsequent hypothyroidism, chronic back pain, and chronic kidney disease stage III, who presented to [facility] on 3/4/23 w/ low BS readings w/ accompanied fatigue and lightheadedness, was transferred to ICU for closer monitoring    Severe Refractory Hypoglycemia, DMT2 Hx on insulin  -Etiology unclear. Initially thought iatrogenic versus insulinoma, however MRCP did not demonstrate mass, low c-peptide, and patient has had ongoing hypoglycemia despite no additional insulin this admission.  Could perhaps be a pocket of subcutaneous insulin which recently opened up to systemic circulation  -home DM regimen at time of admission: lantus 90u BID, humalog 10u TID with meals, metformin 500 BID, farxiga 5mg, trulicity 1.5 weekly   -HbA1c at admission 7.6  -Required additional dextrose overnight, insulin held  Plan:  -Dr. Luisa Arcos with endocrinology following, appreciate the help  -continue to hold insulin  -continue close monitoring of blood glucose: ACHS, 0200  -hypoglycemia protocol in place and being followed  -daily BMP, CBC, Mg, Phos    Severe Hypothyroidism s/p Thyroidectomy 2/2 Papillary Thyroid Cancer (2013)  Etiology: myxedema without coma vs subtherapeutic dosing   -at time of admission TSH 49.5 T4 0.2, fairly unchanged from previous month (2/10/23): TSH 48.3, T3 4.0, Free T4 0.1, despite starting levothyroxine 100mcg at that time   -concern for thyroid derangement contributing to hypoglycemia   -endocrine following, doing well with IV synthroid, TSH improving to 27.1 on 3/12, with Free T4 0.5  -Outpatient thyroglobulin was 1.3mg/mL on 2/9/23, no concern for cancer recurrence at this time  Plan:  -continue monitoring with daily TSH  -endocrine following, continue to appreciate recs   - IV synthroid 300mcg, prednisone 20mg    New onset abdominal pain  -Also with elevated white count with slight left shift, flank pain  -Refractory to ondansetron and simethicone  -Ddx to include UTI, constipation, nephrolithiasis  Plan:  -KUB, UA, urine culture  -PRN acetaminophen    CKD III  -h/o kidney biopsy with diagnosis of severe glomerular sclerosis with FSGS  -nephrotic range proteinuria at admit (>1000 on UA), with last urine microalb:cr ratio 4098 (on 2/10/23), likely 2/2 diabetes   Plan:  -monitor renal function with daily BMP  -avoid nephrotoxic medications  -optimize glucose and BP control to prevent further progression of kidney disease    HTN  -Lisinopril 40mg  -Metoprolol 25mg QD (new inpatient)  -Amlodipine 10mg held    HLD  -Atorvastatin 40mg    Chest pain  -Numerous ED workups over past few months with no concern for cardiopulmonary etiology, most likely costochondritis  -Lidocaine patch    Seizure Disorder  -home med: topiramate 50mg BID  Plan:  -continue topiramate    Atraumatic R Foot Pain   -XR showed soft tissue edema without acute bony injury  -does also have diabetic neuropathy on gabapentin 400mg TID - has no sensation to light touch of bilateral feet/ankles   Plan:  -increased gabapentin to 500mg TID this admission, would continue this dosing    Possible DICKSON  -patient reported, was started on CPAP in ICU  Plan:  -Already referred to [facility] outpatient    GERD  -home med: pantoprazole 40mg daily  Plan:  -continue pantoprazole      Global:  Code: full  Diet: carb controlled (60g/meal)  DVT/AC: SQH  Mobility: per protocol  Disposition: telemetry     Point of Contact: Joselin RAMÍREZ [facility]): [phone], Elisha Huang (Son): [phone]    SUBJECTIVE:   Overnight, had additional episodes of hypoglycemia despite no administration of insulin, high requirement of both IV and PO sugar.  Also had new-onset abdominal pain starting last night, 10/10 and achy in quality    ROS:  Positive for diffuse abdominal pain  Negative for constipation    CURRENT INPATIENT MEDICATIONS:  Current Facility-Administered Medications   Medication Dose Route Frequency Provider Last Rate Last Admin    [Held by provider] amLODIPine (NORVASC) tablet 10 mg  10 mg Oral Nightly Tara P DO Olivia        lisinopril (PRINIVIL;ZESTRIL) tablet 40 mg  40 mg Oral QHS Tammy Yudelka Baker DO   40 mg at 03/14/23 2148    simethicone (MYLICON) chewable tablet 80 mg  80 mg Oral 4x Daily PRN Tammy Baker DO   80 mg at 03/14/23 0500    bismuth subsalicylate (PEPTO BISMOL) 262 MG/15ML suspension 30 mL  30 mL Oral Q6H PRN Tammy Baker DO        metoprolol tartrate (LOPRESSOR) tablet 25 mg  25 mg Oral Daily GUILLERMO Singer NP   25 mg at 03/14/23 0705    perflutren lipid microspheres (DEFINITY) injection 2 mL  2 mL IntraVENous Once Kyle Lr MD        levothyroxine (SYNTHROID) 300 mcg in sodium chloride (PF) 0.9 % 15 mL IV syringe  300 mcg IntraVENous Daily Preethi Rodriguez MD   300 mcg at 03/15/23 2602    glucose chewable tablet 16 g  4 tablet Oral PRN Preethi Rodriguez MD   16 g at 03/13/23 1513    dextrose bolus 10% 125 mL  125 mL IntraVENous PRN Preethi Rodriguez MD   Stopped at 03/15/23 0449    Or    dextrose bolus 10% 250 mL  250 mL IntraVENous PRN Preethi Rodriguez .5 mL/hr at 03/15/23 0749 250 mL at 03/15/23 0749 glucagon (rDNA) injection 1 mg  1 mg SubCUTAneous PRN Keron Damon MD   1 mg at 03/15/23 0746    dextrose 10 % infusion   IntraVENous Continuous PRN Keron Damon  mL/hr at 03/15/23 0508 New Bag at 03/15/23 0508    furosemide (LASIX) tablet 20 mg  20 mg Oral Daily Charlee Hicks PA-C   20 mg at 03/14/23 0815    heparin (porcine) injection 5,000 Units  5,000 Units SubCUTAneous 3 times per day Bijal Bloom PA-C   5,000 Units at 03/15/23 0613    gabapentin (NEURONTIN) capsule 500 mg  500 mg Oral TID Haleigh Stone DO   500 mg at 03/14/23 2148    lidocaine 4 % external patch 1 patch  1 patch TransDERmal Daily Haleigh Stone DO   1 patch at 03/10/23 0852    lidocaine (LMX) 4 % cream   Topical PRN Haleigh Stone DO        dextrose 50 % IV solution  25 g IntraVENous PRN John Tamayo MD   25 g at 03/05/23 1114    sodium chloride flush 0.9 % injection 5-40 mL  5-40 mL IntraVENous 2 times per day Kane Raines MD   10 mL at 03/14/23 2149    sodium chloride flush 0.9 % injection 5-40 mL  5-40 mL IntraVENous PRN Kane Raines MD        0.9 % sodium chloride infusion   IntraVENous PRN Kane Raines MD        ondansetron (ZOFRAN-ODT) disintegrating tablet 4 mg  4 mg Oral Q8H PRN Kane Raines MD   4 mg at 03/14/23 0458    Or    ondansetron (ZOFRAN) injection 4 mg  4 mg IntraVENous Q6H PRN Kane Raines MD        acetaminophen (TYLENOL) tablet 650 mg  650 mg Oral Q6H PRN Kane Raines MD   650 mg at 03/13/23 2007    Or    acetaminophen (TYLENOL) suppository 650 mg  650 mg Rectal Q6H PRN Kane Raines MD        polyethylene glycol (GLYCOLAX) packet 17 g  17 g Oral Daily PRN Kane Raines MD   17 g at 03/14/23 0503    aspirin chewable tablet 81 mg  81 mg Oral Daily Kane Raines MD   81 mg at 03/14/23 0816    atorvastatin (LIPITOR) tablet 40 mg  40 mg Oral Daily Kane Raines MD   40 mg at  03/14/23 0816    [Held by provider] metFORMIN (GLUCOPHAGE) tablet 500 mg  500 mg Oral BID WC Lorena Jin MD        pantoprazole (PROTONIX) tablet 40 mg  40 mg Oral QAM AC Lorena Jin MD   40 mg at 03/15/23 5085    topiramate (TOPAMAX) tablet 50 mg  50 mg Oral BID Lorena Jin MD   50 mg at 03/14/23 0729       Allergies  Allergies   Allergen Reactions    Blueberry Itching    Mushroom Extract Complex Hives and Itching    Tomato Itching       OBJECTIVE:    Intake/Output Summary (Last 24 hours) at 3/15/2023 0824  Last data filed at 3/15/2023 0700  Gross per 24 hour   Intake 1275 ml   Output 1350 ml   Net -75 ml       BP 97/63   Pulse 89   Temp 98.1 °F (36.7 °C) (Oral)   Resp 17   Ht 5' 9\" (1.753 m)   Wt 294 lb 1.6 oz (133.4 kg)   SpO2 100%   BMI 43.43 kg/m²     PHYSICAL EXAM  Gen: NAD, comfortable, obese   HEENT: normocephalic, atraumatic, MMM  CV: RRR, S1/S2 present without M/R/G  Pulm: CTAB, no wheezes, no crackles  Abd: Soft, diffusely tender, no rebound  MSK: no clubbing, 2+ BLE pitting edema bilaterally   Skin: warm, dry, intact, no rash  Neuro: CN II-XII grossly intact  Psych: appropriate, alert, oriented x3    RESULTS     No results found for: LACTA     Lab results last 24 hrs:  Recent Results (from the past 24 hour(s))   POCT Glucose    Collection Time: 03/14/23  9:32 AM   Result Value Ref Range    POC Glucose 232 (H) 70 - 110 mg/dL   POCT Glucose    Collection Time: 03/14/23 11:38 AM   Result Value Ref Range    POC Glucose 148 (H) 70 - 110 mg/dL   Urinalysis    Collection Time: 03/14/23  2:51 PM   Result Value Ref Range    Color, UA YELLOW      Appearance CLEAR      Specific Gravity, UA 1.010 1.005 - 1.030      pH, Urine 7.0 5.0 - 8.0      Protein,  (A) NEG mg/dL    Glucose, UA Negative NEG mg/dL    Ketones, Urine Negative NEG mg/dL    Bilirubin Urine Negative NEG      Blood, Urine TRACE (A) NEG      Urobilinogen, Urine 1.0 0.2 - 1.0 EU/dL    Nitrite, Urine Negative NEG      Leukocyte Esterase, Urine Negative NEG     Urinalysis, Micro    Collection Time: 03/14/23  2:51 PM   Result Value Ref Range    WBC, UA NONE 0 - 4 /hpf    RBC, UA 0 to 2 0 - 5 /hpf    Epithelial Cells UA FEW 0 - 5 /lpf    BACTERIA, URINE FEW (A) NEG /hpf   POCT Glucose    Collection Time: 03/14/23  5:07 PM   Result Value Ref Range    POC Glucose 195 (H) 70 - 110 mg/dL   POCT Glucose    Collection Time: 03/15/23 12:45 AM   Result Value Ref Range    POC Glucose 65 (L) 70 - 110 mg/dL   CBC with Auto Differential    Collection Time: 03/15/23  1:06 AM   Result Value Ref Range    WBC 15.4 (H) 4.6 - 13.2 K/uL    RBC 3.92 (L) 4.20 - 5.30 M/uL    Hemoglobin 10.8 (L) 12.0 - 16.0 g/dL    Hematocrit 34.8 (L) 35.0 - 45.0 %    MCV 88.8 78.0 - 100.0 FL    MCH 27.6 24.0 - 34.0 PG    MCHC 31.0 31.0 - 37.0 g/dL    RDW 15.3 (H) 11.6 - 14.5 %    Platelets 756 (H) 882 - 420 K/uL    MPV 9.8 9.2 - 11.8 FL    Nucleated RBCs 0.0 0  WBC    nRBC 0.00 0.00 - 0.01 K/uL    Seg Neutrophils 59 40 - 73 %    Lymphocytes 34 21 - 52 %    Monocytes 6 3 - 10 %    Eosinophils % 0 0 - 5 %    Basophils 1 0 - 2 %    Immature Granulocytes 0 0.0 - 0.5 %    Segs Absolute 9.1 (H) 1.8 - 8.0 K/UL    Absolute Lymph # 5.2 (H) 0.9 - 3.6 K/UL    Absolute Mono # 0.9 0.05 - 1.2 K/UL    Absolute Eos # 0.0 0.0 - 0.4 K/UL    Basophils Absolute 0.2 (H) 0.0 - 0.1 K/UL    Absolute Immature Granulocyte 0.0 0.00 - 0.04 K/UL    Differential Type MANUAL      Platelet Comment Increased Platelets      RBC Comment ANISOCYTOSIS  1+        RBC Comment POLYCHROMASIA  1+       Basic Metabolic Panel    Collection Time: 03/15/23  1:06 AM   Result Value Ref Range    Sodium 139 136 - 145 mmol/L    Potassium 3.9 3.5 - 5.5 mmol/L    Chloride 112 (H) 100 - 111 mmol/L    CO2 23 21 - 32 mmol/L    Anion Gap 4 3.0 - 18 mmol/L    Glucose 108 (H) 74 - 99 mg/dL    BUN 18 7.0 - 18 MG/DL    Creatinine 1.02 0.6 - 1.3 MG/DL    Bun/Cre Ratio 18 12 - 20      Est, Glom Filt Rate >60 >60 ml/min/1.73m2    Calcium 8.4 (L) 8.5 - 10.1 MG/DL   Magnesium    Collection Time: 03/15/23  1:06 AM   Result Value Ref Range    Magnesium 2.6 1.6 - 2.6 mg/dL   Phosphorus    Collection Time: 03/15/23  1:06 AM   Result Value Ref Range    Phosphorus 3.2 2.5 - 4.9 MG/DL   TSH    Collection Time: 03/15/23  1:06 AM   Result Value Ref Range    TSH, 3RD GENERATION 13.30 (H) 0.36 - 3.74 uIU/mL   POCT Glucose    Collection Time: 03/15/23  1:08 AM   Result Value Ref Range    POC Glucose 109 70 - 110 mg/dL   POCT Glucose    Collection Time: 03/15/23  2:19 AM   Result Value Ref Range    POC Glucose 61 (L) 70 - 110 mg/dL   POCT Glucose    Collection Time: 03/15/23  2:45 AM   Result Value Ref Range    POC Glucose 112 (H) 70 - 110 mg/dL   Glucose, Random    Collection Time: 03/15/23  4:00 AM   Result Value Ref Range    Glucose 50 (LL) 74 - 99 mg/dL   POCT Glucose    Collection Time: 03/15/23  4:11 AM   Result Value Ref Range    POC Glucose 50 (LL) 70 - 110 mg/dL   POCT Glucose    Collection Time: 03/15/23  4:12 AM   Result Value Ref Range    POC Glucose 49 (LL) 70 - 110 mg/dL   POCT Glucose    Collection Time: 03/15/23  4:41 AM   Result Value Ref Range    POC Glucose 69 (L) 70 - 110 mg/dL   POCT Glucose    Collection Time: 03/15/23  5:01 AM   Result Value Ref Range    POC Glucose 94 70 - 110 mg/dL   POCT Glucose    Collection Time: 03/15/23  5:56 AM   Result Value Ref Range    POC Glucose 79 70 - 110 mg/dL   Glucose, Random    Collection Time: 03/15/23  5:59 AM   Result Value Ref Range    Glucose 71 (L) 74 - 99 mg/dL   POCT Glucose    Collection Time: 03/15/23  7:45 AM   Result Value Ref Range    POC Glucose 44 (LL) 70 - 110 mg/dL   POCT Glucose    Collection Time: 03/15/23  8:15 AM   Result Value Ref Range    POC Glucose 183 (H) 70 - 110 mg/dL         Imaging results last 24hrs (Impression only):  XR ANKLE RIGHT (2 VIEWS)    Result Date: 3/6/2023  Limited two-view study. Mild midfoot arthropathy.  Diffuse nonspecific soft tissue swelling. XR CHEST PORTABLE    Result Date: 3/11/2023  No active or acute cardiopulmonary process is evident. MRI ABDOMEN WO CONTRAST MRCP    Result Date: 3/10/2023  1. Please note this is a noncontrast exam as the patient terminated exam early. 2.  No insulinoma is identified by noncontrast imaging. If the patient cannot tolerate MR imaging, consider a follow-up pancreatic protocol CT with and without IV contrast in attempt to detect a small enhancing occult nodule. 3.  Mild CBD dilation. No cause identified. 4.  Mild hepatomegaly. Otherwise unremarkable.           ================================================================  Further management for Ms. Edith Segovia will be discussed on rounds with my attending.       Jaky Higuera MD, PGY-1  [facility]  March 15, 2023 8:24 AM

## 2023-03-14 NOTE — PROGRESS NOTES
0410 pt asked this RN to ask if the dr can do an ultrasound to find out why her stomach is aching, does not want tylenol, Zofran or gas medication, stated \"They aren't working\"

## 2023-03-15 ENCOUNTER — HOME HEALTH ADMISSION (OUTPATIENT)
Age: 38
End: 2023-03-15

## 2023-03-15 ENCOUNTER — APPOINTMENT (OUTPATIENT)
Facility: HOSPITAL | Age: 38
DRG: 420 | End: 2023-03-15
Payer: MEDICAID

## 2023-03-15 LAB
ACETOHEXAMIDE SERPL-MCNC: NEGATIVE UG/ML (ref 20–60)
ANION GAP SERPL CALC-SCNC: 4 MMOL/L (ref 3–18)
BACTERIA SPEC CULT: NORMAL
BASOPHILS # BLD: 0.2 K/UL (ref 0–0.1)
BASOPHILS NFR BLD: 1 % (ref 0–2)
BUN SERPL-MCNC: 18 MG/DL (ref 7–18)
BUN/CREAT SERPL: 18 (ref 12–20)
C PEPTIDE SERPL-MCNC: 1.8 NG/ML (ref 1.1–4.4)
CALCIUM SERPL-MCNC: 8.4 MG/DL (ref 8.5–10.1)
CHLORIDE SERPL-SCNC: 112 MMOL/L (ref 100–111)
CHLORPROPAMIDE SERPL-MCNC: NEGATIVE UG/ML (ref 75–250)
CO2 SERPL-SCNC: 23 MMOL/L (ref 21–32)
CORTIS AM PEAK SERPL-MCNC: 2 UG/DL (ref 4.3–22.45)
CREAT SERPL-MCNC: 1.02 MG/DL (ref 0.6–1.3)
DIFFERENTIAL METHOD BLD: ABNORMAL
EOSINOPHIL # BLD: 0 K/UL (ref 0–0.4)
EOSINOPHIL NFR BLD: 0 % (ref 0–5)
ERYTHROCYTE [DISTWIDTH] IN BLOOD BY AUTOMATED COUNT: 15.3 % (ref 11.6–14.5)
GLIMEPIRIDE SERPL-MCNC: NEGATIVE NG/ML (ref 80–250)
GLIPIZIDE SERPL-MCNC: NEGATIVE NG/ML (ref 200–1000)
GLUCOSE BLD STRIP.AUTO-MCNC: 109 MG/DL (ref 70–110)
GLUCOSE BLD STRIP.AUTO-MCNC: 112 MG/DL (ref 70–110)
GLUCOSE BLD STRIP.AUTO-MCNC: 122 MG/DL (ref 70–110)
GLUCOSE BLD STRIP.AUTO-MCNC: 136 MG/DL (ref 70–110)
GLUCOSE BLD STRIP.AUTO-MCNC: 140 MG/DL (ref 70–110)
GLUCOSE BLD STRIP.AUTO-MCNC: 149 MG/DL (ref 70–110)
GLUCOSE BLD STRIP.AUTO-MCNC: 176 MG/DL (ref 70–110)
GLUCOSE BLD STRIP.AUTO-MCNC: 183 MG/DL (ref 70–110)
GLUCOSE BLD STRIP.AUTO-MCNC: 44 MG/DL (ref 70–110)
GLUCOSE BLD STRIP.AUTO-MCNC: 49 MG/DL (ref 70–110)
GLUCOSE BLD STRIP.AUTO-MCNC: 50 MG/DL (ref 70–110)
GLUCOSE BLD STRIP.AUTO-MCNC: 61 MG/DL (ref 70–110)
GLUCOSE BLD STRIP.AUTO-MCNC: 65 MG/DL (ref 70–110)
GLUCOSE BLD STRIP.AUTO-MCNC: 69 MG/DL (ref 70–110)
GLUCOSE BLD STRIP.AUTO-MCNC: 79 MG/DL (ref 70–110)
GLUCOSE BLD STRIP.AUTO-MCNC: 94 MG/DL (ref 70–110)
GLUCOSE SERPL-MCNC: 108 MG/DL (ref 74–99)
GLUCOSE SERPL-MCNC: 50 MG/DL (ref 74–99)
GLUCOSE SERPL-MCNC: 71 MG/DL (ref 74–99)
GLYBURIDE SERPL-MCNC: NEGATIVE NG/ML
HCT VFR BLD AUTO: 34.8 % (ref 35–45)
HGB BLD-MCNC: 10.8 G/DL (ref 12–16)
IMM GRANULOCYTES # BLD AUTO: 0 K/UL (ref 0–0.04)
IMM GRANULOCYTES NFR BLD AUTO: 0 % (ref 0–0.5)
INSULIN FREE SERPL-ACNC: 753 UU/ML
INSULIN SERPL-ACNC: 753 UU/ML
LYMPHOCYTES # BLD: 5.2 K/UL (ref 0.9–3.6)
LYMPHOCYTES NFR BLD: 34 % (ref 21–52)
MAGNESIUM SERPL-MCNC: 2.6 MG/DL (ref 1.6–2.6)
MCH RBC QN AUTO: 27.6 PG (ref 24–34)
MCHC RBC AUTO-ENTMCNC: 31 G/DL (ref 31–37)
MCV RBC AUTO: 88.8 FL (ref 78–100)
MONOCYTES # BLD: 0.9 K/UL (ref 0.05–1.2)
MONOCYTES NFR BLD: 6 % (ref 3–10)
NATEGLINIDE SERPL-MCNC: NEGATIVE NG/ML
NEUTS SEG # BLD: 9.1 K/UL (ref 1.8–8)
NEUTS SEG NFR BLD: 59 % (ref 40–73)
NRBC # BLD: 0 K/UL (ref 0–0.01)
NRBC BLD-RTO: 0 PER 100 WBC
PHOSPHATE SERPL-MCNC: 3.2 MG/DL (ref 2.5–4.9)
PLATELET # BLD AUTO: 471 K/UL (ref 135–420)
PLATELET COMMENT: ABNORMAL
PMV BLD AUTO: 9.8 FL (ref 9.2–11.8)
POTASSIUM SERPL-SCNC: 3.9 MMOL/L (ref 3.5–5.5)
RBC # BLD AUTO: 3.92 M/UL (ref 4.2–5.3)
RBC MORPH BLD: ABNORMAL
RBC MORPH BLD: ABNORMAL
REPAGLINIDE SERPL-MCNC: NEGATIVE NG/ML
SERVICE CMNT-IMP: NORMAL
SODIUM SERPL-SCNC: 139 MMOL/L (ref 136–145)
TOLAZAMIDE SERPL-MCNC: NEGATIVE UG/ML
TOLBUTAMIDE SERPL-MCNC: NEGATIVE UG/ML (ref 40–100)
TSH SERPL DL<=0.05 MIU/L-ACNC: 13.3 UIU/ML (ref 0.36–3.74)
WBC # BLD AUTO: 15.4 K/UL (ref 4.6–13.2)

## 2023-03-15 PROCEDURE — 2500000003 HC RX 250 WO HCPCS: Performed by: INTERNAL MEDICINE

## 2023-03-15 PROCEDURE — 85025 COMPLETE CBC W/AUTO DIFF WBC: CPT

## 2023-03-15 PROCEDURE — 6370000000 HC RX 637 (ALT 250 FOR IP)

## 2023-03-15 PROCEDURE — 86337 INSULIN ANTIBODIES: CPT

## 2023-03-15 PROCEDURE — 36415 COLL VENOUS BLD VENIPUNCTURE: CPT

## 2023-03-15 PROCEDURE — 6370000000 HC RX 637 (ALT 250 FOR IP): Performed by: PHYSICIAN ASSISTANT

## 2023-03-15 PROCEDURE — 83525 ASSAY OF INSULIN: CPT

## 2023-03-15 PROCEDURE — 84443 ASSAY THYROID STIM HORMONE: CPT

## 2023-03-15 PROCEDURE — 80048 BASIC METABOLIC PNL TOTAL CA: CPT

## 2023-03-15 PROCEDURE — 82962 GLUCOSE BLOOD TEST: CPT

## 2023-03-15 PROCEDURE — 2580000003 HC RX 258: Performed by: INTERNAL MEDICINE

## 2023-03-15 PROCEDURE — A4216 STERILE WATER/SALINE, 10 ML: HCPCS | Performed by: INTERNAL MEDICINE

## 2023-03-15 PROCEDURE — 84681 ASSAY OF C-PEPTIDE: CPT

## 2023-03-15 PROCEDURE — 2580000003 HC RX 258

## 2023-03-15 PROCEDURE — 82947 ASSAY GLUCOSE BLOOD QUANT: CPT

## 2023-03-15 PROCEDURE — 94761 N-INVAS EAR/PLS OXIMETRY MLT: CPT

## 2023-03-15 PROCEDURE — 84100 ASSAY OF PHOSPHORUS: CPT

## 2023-03-15 PROCEDURE — 6370000000 HC RX 637 (ALT 250 FOR IP): Performed by: NURSE PRACTITIONER

## 2023-03-15 PROCEDURE — 82530 CORTISOL FREE: CPT

## 2023-03-15 PROCEDURE — 1100000000 HC RM PRIVATE

## 2023-03-15 PROCEDURE — 6360000002 HC RX W HCPCS

## 2023-03-15 PROCEDURE — 83735 ASSAY OF MAGNESIUM: CPT

## 2023-03-15 PROCEDURE — 74176 CT ABD & PELVIS W/O CONTRAST: CPT

## 2023-03-15 PROCEDURE — 82533 TOTAL CORTISOL: CPT

## 2023-03-15 PROCEDURE — 82024 ASSAY OF ACTH: CPT

## 2023-03-15 RX ORDER — POLYETHYLENE GLYCOL 3350 17 G/17G
17 POWDER, FOR SOLUTION ORAL 3 TIMES DAILY
Status: DISCONTINUED | OUTPATIENT
Start: 2023-03-15 | End: 2023-03-17 | Stop reason: HOSPADM

## 2023-03-15 RX ADMIN — DEXTROSE MONOHYDRATE: 100 INJECTION, SOLUTION INTRAVENOUS at 21:12

## 2023-03-15 RX ADMIN — GABAPENTIN 500 MG: 100 CAPSULE ORAL at 10:08

## 2023-03-15 RX ADMIN — LISINOPRIL 40 MG: 20 TABLET ORAL at 21:11

## 2023-03-15 RX ADMIN — DEXTROSE MONOHYDRATE 125 ML: 100 INJECTION, SOLUTION INTRAVENOUS at 04:14

## 2023-03-15 RX ADMIN — DEXTROSE MONOHYDRATE 250 ML: 100 INJECTION, SOLUTION INTRAVENOUS at 07:49

## 2023-03-15 RX ADMIN — LEVOTHYROXINE SODIUM ANHYDROUS 300 MCG: 100 INJECTION, POWDER, LYOPHILIZED, FOR SOLUTION INTRAVENOUS at 06:13

## 2023-03-15 RX ADMIN — ASPIRIN 81 MG CHEWABLE TABLET 81 MG: 81 TABLET CHEWABLE at 10:08

## 2023-03-15 RX ADMIN — DEXTROSE MONOHYDRATE 125 ML: 100 INJECTION, SOLUTION INTRAVENOUS at 00:49

## 2023-03-15 RX ADMIN — GABAPENTIN 500 MG: 100 CAPSULE ORAL at 21:11

## 2023-03-15 RX ADMIN — ATORVASTATIN CALCIUM 40 MG: 40 TABLET, FILM COATED ORAL at 10:09

## 2023-03-15 RX ADMIN — GLUCAGON HYDROCHLORIDE 1 MG: 1 INJECTION, POWDER, FOR SOLUTION INTRAMUSCULAR; INTRAVENOUS; SUBCUTANEOUS at 07:46

## 2023-03-15 RX ADMIN — DEXTROSE MONOHYDRATE: 100 INJECTION, SOLUTION INTRAVENOUS at 05:08

## 2023-03-15 RX ADMIN — TOPIRAMATE 50 MG: 100 TABLET, FILM COATED ORAL at 10:10

## 2023-03-15 RX ADMIN — POLYETHYLENE GLYCOL 3350 17 G: 17 POWDER, FOR SOLUTION ORAL at 15:50

## 2023-03-15 RX ADMIN — SODIUM CHLORIDE, PRESERVATIVE FREE 10 ML: 5 INJECTION INTRAVENOUS at 21:12

## 2023-03-15 RX ADMIN — SODIUM CHLORIDE, PRESERVATIVE FREE 10 ML: 5 INJECTION INTRAVENOUS at 10:11

## 2023-03-15 RX ADMIN — DEXTROSE MONOHYDRATE 125 ML: 100 INJECTION, SOLUTION INTRAVENOUS at 04:42

## 2023-03-15 RX ADMIN — HEPARIN SODIUM 5000 UNITS: 5000 INJECTION INTRAVENOUS; SUBCUTANEOUS at 06:13

## 2023-03-15 RX ADMIN — HEPARIN SODIUM 5000 UNITS: 5000 INJECTION INTRAVENOUS; SUBCUTANEOUS at 15:51

## 2023-03-15 RX ADMIN — TOPIRAMATE 50 MG: 100 TABLET, FILM COATED ORAL at 21:12

## 2023-03-15 RX ADMIN — DEXTROSE MONOHYDRATE 125 ML: 100 INJECTION, SOLUTION INTRAVENOUS at 02:20

## 2023-03-15 RX ADMIN — HEPARIN SODIUM 5000 UNITS: 5000 INJECTION INTRAVENOUS; SUBCUTANEOUS at 21:12

## 2023-03-15 RX ADMIN — PANTOPRAZOLE 40 MG: 40 TABLET, DELAYED RELEASE ORAL at 06:13

## 2023-03-15 RX ADMIN — GABAPENTIN 500 MG: 100 CAPSULE ORAL at 15:50

## 2023-03-15 RX ADMIN — FUROSEMIDE 20 MG: 20 TABLET ORAL at 10:09

## 2023-03-15 RX ADMIN — METOPROLOL TARTRATE 25 MG: 25 TABLET, FILM COATED ORAL at 10:10

## 2023-03-15 NOTE — PROGRESS NOTES
Pt off floor for study. C-peptide and proinsulin levels do not support hyperinsulinism as the couse of hypoglycemia. High insulin in the face of such levels would suggest factitious hypoglycemia due to exogenous insulin injection. Free T4 still has to be normalized, and because of her prednisone therapy she may have short erm adrenal insufficiency. Plan:   Continue Synthroid 300 mcg IV daily  Measure free T4 daily. Repeat  Cortisol and ACTH tomorrow.

## 2023-03-15 NOTE — HOME CARE
Received home health referral for [facility] for SN,PT,OT. Spoke to patient in room,verified demographics,explained [facility] services and answered all questions and provided patient with [facility] contact card; Patient states she lives at  \"[address] [facility]", address updated to [facility] referral; Patient states she use to have a glucometer,but now her daughter can not find glucometer at home since when she was brought in via the ambulance ; Notified  Fortino Baez) via [address] [place] [address] that patient will need a  new order for a glucometer prior to discharge ; [facility] referral updated and [facility] liaisons will continue to follow. LEIF GARCIA.

## 2023-03-15 NOTE — PROGRESS NOTES
[facility] [address]  DAILY PROGRESS NOTE      Patient:    Karlene Lunsford , 44 y.o. female   MRN:  [id number]  Room/Bed:  303/01  Admission Date:   3/4/2023  Code status:  Full Code    Reason for Admission: refractory hypoglycemia    ASSESSMENT AND PLAN:   Problem List Items Addressed This Visit          Endocrine    * (Principal) Hypoglycemia     Other Visit Diagnoses       Flank pain    -  Primary    Orthopnea        Myxedema coma ([facility] [address]        Relevant Orders    Transthoracic echocardiogram (TTE) complete with contrast, bubble, strain, and 3D PRN (Completed)    Right calf pain        Relevant Orders    Vascular duplex lower extremity venous right (Completed)            79-year-old female with past medical history of type 2 diabetes on insulin, papillary thyroid carcinoma status post total thyroidectomy with subsequent hypothyroidism, chronic back pain, and chronic kidney disease stage III, who presented to [address] [place], [place] [address]in [place] on 3/4/23 w/ low BS readings w/ accompanied fatigue and lightheadedness, was transferred to ICU for closer monitoring     Severe Refractory Hypoglycemia, DMT2 Hx on insulin  -Etiology unclear. Initially thought iatrogenic versus insulinoma, however MRCP did not demonstrate mass, low c-peptide, and patient has had ongoing hypoglycemia despite no additional insulin this admission.  Could perhaps be a pocket of subcutaneous insulin which recently opened up to systemic circulation or insulin antibodies  -home DM regimen at time of admission: lantus 90u BID, humalog 10u TID with meals, metformin 500 BID, farxiga 5mg, trulicity 1.5 weekly   -HbA1c at admission 7.6  -Required additional dextrose overnight, insulin held  Plan:  -Dr. Deborah Andres with endocrinology following, appreciate the help  -Pending AM cortisol, ACTH, serum insulin, c-peptide  -continue to hold insulin  -continue close monitoring of blood glucose: ACHS, 0200  -hypoglycemia protocol in place and being followed  -daily BMP, CBC, Mg, Phos Severe Hypothyroidism s/p Thyroidectomy 2/2 Papillary Thyroid Cancer (2013)  Etiology: myxedema without coma vs subtherapeutic dosing   -at time of admission TSH 49.5 T4 0.2, fairly unchanged from previous month (2/10/23): TSH 48.3, T3 4.0, Free T4 0.1, despite starting levothyroxine 100mcg at that time   -concern for thyroid derangement contributing to hypoglycemia   -endocrine following, doing well with IV synthroid, TSH improving to 27.1 on 3/12, with Free T4 0.5  -Outpatient thyroglobulin was 1.3mg/mL on 2/9/23, no concern for cancer recurrence at this time  Plan:  -continue monitoring with daily TSH  -endocrine following, continue to appreciate recs   - IV synthroid 300mcg, prednisone 20mg     New onset abdominal pain  -Also with elevated white count with slight left shift, flank pain  -Refractory to ondansetron and simethicone  -Ddx to include UTI, constipation, nephrolithiasis  -KUB not demonstrating air under diaphragm or obstruction, but positive stool burden  Plan:  -Pending urine culture and CT abd/pelvis  -PRN acetaminophen  -Advance bowel regimen     CKD III  -h/o kidney biopsy with diagnosis of severe glomerular sclerosis with FSGS  -nephrotic range proteinuria at admit (>1000 on UA), with last urine microalb:cr ratio 4098 (on 2/10/23), likely 2/2 diabetes   Plan:  -monitor renal function with daily BMP  -avoid nephrotoxic medications  -optimize glucose and BP control to prevent further progression of kidney disease     HTN  -Lisinopril 40mg  -Metoprolol 25mg QD (new inpatient)  -Amlodipine 10mg held     HLD  -Atorvastatin 40mg     Chest pain  -Numerous ED workups over past few months with no concern for cardiopulmonary etiology, most likely costochondritis  -Lidocaine patch     Seizure Disorder  -home med: topiramate 50mg BID  Plan:  -continue topiramate     Atraumatic R Foot Pain   -XR showed soft tissue edema without acute bony injury  -does also have diabetic neuropathy on gabapentin 400mg TID - has no sensation to light touch of bilateral feet/ankles   Plan:  -increased gabapentin to 500mg TID this admission, would continue this dosing     Possible DICKSON  -patient reported, was started on CPAP in ICU  Plan:  -Already referred to [facility] outpatient     GERD  -home med: pantoprazole 40mg daily  Plan:  -continue pantoprazole        Global:  Code: full  Diet: carb controlled (60g/meal)  DVT/AC: SQH  Mobility: per protocol  Disposition: telemetry      Point of Contact: Shavonne Cordero [facility]): [phone], Izabel Bradley (Son): [phone]      SUBJECTIVE:   Events of the last 24 hours:  Still having hypoglycemia requiring IV dextrose and glucagon. Had a bowel movement yesterday which did result in some but not total relief of her abdominal pain.      ROS:  Positive for right-sided flank/abdominal pain radiating to back, symptomatic low blood sugar  Negative for SOB     CURRENT INPATIENT MEDICATIONS:  Current Facility-Administered Medications   Medication Dose Route Frequency Provider Last Rate Last Admin    [Held by provider] amLODIPine (NORVASC) tablet 10 mg  10 mg Oral Nightly Tara P DO Olivia        lisinopril (PRINIVIL;ZESTRIL) tablet 40 mg  40 mg Oral QHS Shirin Baker, DO   40 mg at 03/14/23 2148    simethicone (MYLICON) chewable tablet 80 mg  80 mg Oral 4x Daily PRN Shirin Baker DO   80 mg at 03/14/23 0500    bismuth subsalicylate (PEPTO BISMOL) 262 MG/15ML suspension 30 mL  30 mL Oral Q6H PRN Shirin Baker, DO        metoprolol tartrate (LOPRESSOR) tablet 25 mg  25 mg Oral Daily Myriam Petties, APRN - NP   25 mg at 03/14/23 9348    perflutren lipid microspheres (DEFINITY) injection 2 mL  2 mL IntraVENous Once Jade Mancilla MD        levothyroxine (SYNTHROID) 300 mcg in sodium chloride (PF) 0.9 % 15 mL IV syringe  300 mcg IntraVENous Daily Zina Geller MD   300 mcg at 03/15/23 7073    glucose chewable tablet 16 g  4 tablet Oral PRN Zina Geller MD   16 g at 03/13/23 1513    dextrose bolus 10% 125 mL  125 mL IntraVENous PRN Gary Carver MD   Stopped at 03/15/23 0449    Or    dextrose bolus 10% 250 mL  250 mL IntraVENous PRN Gary Carver .5 mL/hr at 03/15/23 0749 250 mL at 03/15/23 0749    glucagon (rDNA) injection 1 mg  1 mg SubCUTAneous PRN Gary Carver MD   1 mg at 03/15/23 0746    dextrose 10 % infusion   IntraVENous Continuous PRN Gary Carver  mL/hr at 03/15/23 0508 New Bag at 03/15/23 0508    furosemide (LASIX) tablet 20 mg  20 mg Oral Daily Charlee Hicks PA-C   20 mg at 03/14/23 0815    heparin (porcine) injection 5,000 Units  5,000 Units SubCUTAneous 3 times per day Estelita Bass PA-C   5,000 Units at 03/15/23 0635    gabapentin (NEURONTIN) capsule 500 mg  500 mg Oral TID Lao Bias, DO   500 mg at 03/14/23 2148    lidocaine 4 % external patch 1 patch  1 patch TransDERmal Daily Lao Bias, DO   1 patch at 03/10/23 0852    lidocaine (LMX) 4 % cream   Topical PRN Lao Bias, DO        dextrose 50 % IV solution  25 g IntraVENous PRN Favian Fuentes MD   25 g at 03/05/23 1114    sodium chloride flush 0.9 % injection 5-40 mL  5-40 mL IntraVENous 2 times per day Shayla Puga MD   10 mL at 03/14/23 2149    sodium chloride flush 0.9 % injection 5-40 mL  5-40 mL IntraVENous PRN Shayla Puga MD        0.9 % sodium chloride infusion   IntraVENous PRN Shayla Puga MD        ondansetron (ZOFRAN-ODT) disintegrating tablet 4 mg  4 mg Oral Q8H PRN Shayla Puga MD   4 mg at 03/14/23 0458    Or    ondansetron (ZOFRAN) injection 4 mg  4 mg IntraVENous Q6H PRN Shayla Puga MD        acetaminophen (TYLENOL) tablet 650 mg  650 mg Oral Q6H PRN Shayla Puga MD   650 mg at 03/13/23 2007    Or    acetaminophen (TYLENOL) suppository 650 mg  650 mg Rectal Q6H PRN Shayla Puga MD        polyethylene glycol (GLYCOLAX) packet 17 g  17 g Oral Daily PRN Elzbieta Pemberton MD   17 g at 03/14/23 0503    aspirin chewable tablet 81 mg  81 mg Oral Daily Elzbieta Pemberton MD   81 mg at 03/14/23 0816    atorvastatin (LIPITOR) tablet 40 mg  40 mg Oral Daily Elzbieta Pemberton MD   40 mg at 03/14/23 0816    [Held by provider] metFORMIN (GLUCOPHAGE) tablet 500 mg  500 mg Oral BID WC Elzbieta Pemberton MD        pantoprazole (PROTONIX) tablet 40 mg  40 mg Oral QAM AC Elzbieta Pemberton MD   40 mg at 03/15/23 2483    topiramate (TOPAMAX) tablet 50 mg  50 mg Oral BID Elzbieta Pemberton MD   50 mg at 03/14/23 2149       Allergies  Allergies   Allergen Reactions    Blueberry Itching    Mushroom Extract Complex Hives and Itching    Tomato Itching       OBJECTIVE:    Intake/Output Summary (Last 24 hours) at 3/15/2023 0817  Last data filed at 3/15/2023 0700  Gross per 24 hour   Intake 1275 ml   Output 1350 ml   Net -75 ml       BP 97/63   Pulse 89   Temp 98.1 °F (36.7 °C) (Oral)   Resp 17   Ht 5' 9\" (1.753 m)   Wt 294 lb 1.6 oz (133.4 kg)   SpO2 100%   BMI 43.43 kg/m²     PHYSICAL EXAM  Gen: NAD, comfortable  HEENT: normocephalic, atraumatic, MMM, surgical scar from thyroidectomy  CV: RRR, S1/S2 present without M/R/G, +2 radial pulses, well-perfused  Pulm: No increased work of breathing, speaking full sentences  Abd: S//ND, mildly tender diffusely  MSK: no clubbing, no edema  Skin: warm, dry  Neuro: CN II-XII grossly intact, no focal deficits appreciated   Psych: appropriate, alert, oriented x3    LABWORK (LAST 24 HOURS)  Recent Results (from the past 24 hour(s))   POCT Glucose    Collection Time: 03/14/23  9:32 AM   Result Value Ref Range    POC Glucose 232 (H) 70 - 110 mg/dL   POCT Glucose    Collection Time: 03/14/23 11:38 AM   Result Value Ref Range    POC Glucose 148 (H) 70 - 110 mg/dL   Urinalysis    Collection Time: 03/14/23  2:51 PM   Result Value Ref Range    Color, UA YELLOW      Appearance CLEAR      Specific [place], UA 1.010 1.005 - 1.030      pH, Urine 7.0 5.0 - 8.0      Protein,  (A) NEG mg/dL    Glucose, UA Negative NEG mg/dL    Ketones, Urine Negative NEG mg/dL    Bilirubin Urine Negative NEG      Blood, Urine TRACE (A) NEG      Urobilinogen, Urine 1.0 0.2 - 1.0 EU/dL    Nitrite, Urine Negative NEG      Leukocyte Esterase, Urine Negative NEG     Urinalysis, Micro    Collection Time: 03/14/23  2:51 PM   Result Value Ref Range    WBC, UA NONE 0 - 4 /hpf    RBC, UA 0 to 2 0 - 5 /hpf    Epithelial Cells UA FEW 0 - 5 /lpf    BACTERIA, URINE FEW (A) NEG /hpf   POCT Glucose    Collection Time: 03/14/23  5:07 PM   Result Value Ref Range    POC Glucose 195 (H) 70 - 110 mg/dL   POCT Glucose    Collection Time: 03/15/23 12:45 AM   Result Value Ref Range    POC Glucose 65 (L) 70 - 110 mg/dL   CBC with Auto Differential    Collection Time: 03/15/23  1:06 AM   Result Value Ref Range    WBC 15.4 (H) 4.6 - 13.2 K/uL    RBC 3.92 (L) 4.20 - 5.30 M/uL    Hemoglobin 10.8 (L) 12.0 - 16.0 g/dL    Hematocrit 34.8 (L) 35.0 - 45.0 %    MCV 88.8 78.0 - 100.0 FL    MCH 27.6 24.0 - 34.0 PG    MCHC 31.0 31.0 - 37.0 g/dL    RDW 15.3 (H) 11.6 - 14.5 %    Platelets 392 (H) 212 - 420 K/uL    MPV 9.8 9.2 - 11.8 FL    Nucleated RBCs 0.0 0  WBC    nRBC 0.00 0.00 - 0.01 K/uL    Seg Neutrophils 59 40 - 73 %    Lymphocytes 34 21 - 52 %    Monocytes 6 3 - 10 %    Eosinophils % 0 0 - 5 %    Basophils 1 0 - 2 %    Immature Granulocytes 0 0.0 - 0.5 %    Segs Absolute 9.1 (H) 1.8 - 8.0 K/UL    Absolute Lymph # 5.2 (H) 0.9 - 3.6 K/UL    Absolute Mono # 0.9 0.05 - 1.2 K/UL    Absolute Eos # 0.0 0.0 - 0.4 K/UL    Basophils Absolute 0.2 (H) 0.0 - 0.1 K/UL    Absolute Immature Granulocyte 0.0 0.00 - 0.04 K/UL    Differential Type MANUAL      Platelet Comment Increased Platelets      RBC Comment ANISOCYTOSIS  1+        RBC Comment POLYCHROMASIA  1+       Basic Metabolic Panel    Collection Time: 03/15/23  1:06 AM   Result Value Ref Range    Sodium 139 136 - 145 mmol/L    Potassium 3.9 3.5 - 5.5 mmol/L    Chloride 112 (H) 100 - 111 mmol/L    CO2 23 21 - 32 mmol/L    Anion Gap 4 3.0 - 18 mmol/L    Glucose 108 (H) 74 - 99 mg/dL    BUN 18 7.0 - 18 MG/DL    Creatinine 1.02 0.6 - 1.3 MG/DL    Bun/Cre Ratio 18 12 - 20      Est, Glom Filt Rate >60 >60 ml/min/1.73m2    Calcium 8.4 (L) 8.5 - 10.1 MG/DL   Magnesium    Collection Time: 03/15/23  1:06 AM   Result Value Ref Range    Magnesium 2.6 1.6 - 2.6 mg/dL   Phosphorus    Collection Time: 03/15/23  1:06 AM   Result Value Ref Range    Phosphorus 3.2 2.5 - 4.9 MG/DL   TSH    Collection Time: 03/15/23  1:06 AM   Result Value Ref Range    TSH, 3RD GENERATION 13.30 (H) 0.36 - 3.74 uIU/mL   POCT Glucose    Collection Time: 03/15/23  1:08 AM   Result Value Ref Range    POC Glucose 109 70 - 110 mg/dL   POCT Glucose    Collection Time: 03/15/23  2:19 AM   Result Value Ref Range    POC Glucose 61 (L) 70 - 110 mg/dL   POCT Glucose    Collection Time: 03/15/23  2:45 AM   Result Value Ref Range    POC Glucose 112 (H) 70 - 110 mg/dL   Glucose, Random    Collection Time: 03/15/23  4:00 AM   Result Value Ref Range    Glucose 50 (LL) 74 - 99 mg/dL   POCT Glucose    Collection Time: 03/15/23  4:11 AM   Result Value Ref Range    POC Glucose 50 (LL) 70 - 110 mg/dL   POCT Glucose    Collection Time: 03/15/23  4:12 AM   Result Value Ref Range    POC Glucose 49 (LL) 70 - 110 mg/dL   POCT Glucose    Collection Time: 03/15/23  4:41 AM   Result Value Ref Range    POC Glucose 69 (L) 70 - 110 mg/dL   POCT Glucose    Collection Time: 03/15/23  5:01 AM   Result Value Ref Range    POC Glucose 94 70 - 110 mg/dL   POCT Glucose    Collection Time: 03/15/23  5:56 AM   Result Value Ref Range    POC Glucose 79 70 - 110 mg/dL   Glucose, Random    Collection Time: 03/15/23  5:59 AM   Result Value Ref Range    Glucose 71 (L) 74 - 99 mg/dL   POCT Glucose    Collection Time: 03/15/23  7:45 AM   Result Value Ref Range    POC Glucose 44 (LL) 70 - 110 mg/dL   POCT Glucose Collection Time: 03/15/23  8:15 AM   Result Value Ref Range    POC Glucose 183 (H) 70 - 110 mg/dL       IMAGING AND PROCEDURES (LAST 24 HOURS)  XR ABDOMEN (KUB) (SINGLE AP VIEW)    Result Date: 3/14/2023  1. Limited study due to body habitus/underpenetration. 2.  Mild to moderate colonic stool burden. 3.  Nonspecific bowel gas pattern without evidence of bowel obstruction. XR CHEST PORTABLE    Result Date: 3/11/2023  No active or acute cardiopulmonary process is evident. MRI ABDOMEN WO CONTRAST MRCP    Result Date: 3/10/2023  1. Please note this is a noncontrast exam as the patient terminated exam early. 2.  No insulinoma is identified by noncontrast imaging. If the patient cannot tolerate MR imaging, consider a follow-up pancreatic protocol CT with and without IV contrast in attempt to detect a small enhancing occult nodule. 3.  Mild CBD dilation. No cause identified. 4.  Mild hepatomegaly. Otherwise unremarkable.       ================================================================  Further management for Ms. Herminio Chavez will be discussed on rounds with my attending.       Susanne Allen MD, PGY-1  [facility]  March 15, 2023 8:17 AM

## 2023-03-15 NOTE — DISCHARGE SUMMARY
Alex Carmichael SUMMARY      Name:   Erica Arthur 44 y.o. female  MRN:   [id number]  CSN:   [id number]  Admission Date:  3/4/2023  Discharge Date:  3/17/714530  Attending:             No att. providers found   PCP:              Linda Chu MD   ================================================================  Reason for Admission:  Orthopnea [R06.01]  Hypoglycemia [E16.2]  Flank pain [R10.9]    Discharge Diagnosis:    Severe refractory hypoglycemia  T2DM  Iatrogenic hypothyroidism S/P total thyroidectomy for papillary thyroid cancer in 2013  Constipation  CKD3 with nephropathy  HTN  HLD  Seizure disorder  Probable DICKSON  GERD    Follow-up studies/evaluations for PCP/Important Notes to PCP:  Patient difficult-to-control hypoglycemia, etiology unknown. Intended to keep her inpatient for frequent monitoring and ongoing workup, but patient left AMA after getting call from her daughter's school that she was cutting classes. Differential currently includes autoimmune hypoglycemia, glycogen storage disease 3, McArdle's syndrome, paraneoplastic insulin-like peptide 2, cushing's syndrome, melo's disease  Recommend repeat TSH 3-4 weeks after discharge  Ensure that she has outpatient follow-up with Dr. Carly Gaston. Pending labs/investigations to follow up as below:  Urine cortisol  Insulin antibody  Sulfonylurea hypoglycemics serum  Medication reconciliation:  Discontinued Medications: All insulin (held due to hypoglycemia), metformin, previous Liothyronine (replaced with levothyroxine, dose now 150mcg), furosemide metoprolol  New Medications: Gabapentin 400 mg TID, glucose tabs, glucagon    JACK Follow Up Appointment: Monday, 3/20/23 at 2:00 PM with Dr. Timur Welch at [facility]. Recommended follow-up after JACK visit: Follow up appointments have already been scheduled for every 2 weeks with [facility].      Readmission prevention plan:   Regular follow up with Endocrinology  Regular follow up with [facility]    GOALS OF CARE (including Code Status, Advanced Care Plan):   FULL CODE    Pending labs/ investigations at discharge to follow up:   Urine cortisol  Insulin antibody  Sulfonylurea hypoglycemics serum    Operative Procedures:   None    Consultants:    Endocrinology, ICU    Condition at discharge: Eating, Drinking, Voiding, unfortunately blood sugar still unstable. Patient aware of risks and had capacity. Disposition at Discharge:  Home, against medical advice    Functional Status at Discharge: Ambulates independently    Diet: High carbohydrate load with frequent snacks    Discharge Medications:     Medication List        START taking these medications      * blood glucose monitor kit and supplies  Dispense sufficient amount for indicated testing frequency plus additional to accommodate PRN testing needs. Dispense all needed supplies to include: monitor, strips, lancing device, lancets, control solutions, alcohol swabs. * blood glucose monitor kit and supplies  Dispense sufficient amount for indicated testing frequency plus additional to accommodate PRN testing needs. Dispense all needed supplies to include: monitor, strips, lancing device, lancets, control solutions, alcohol swabs.     gabapentin 100 MG capsule  Commonly known as: NEURONTIN  Take 4 capsules by mouth 3 times daily for 30 days. Max Daily Amount: 1,200 mg     glucagon (rDNA) 1 MG injection  Inject 1 mg into the skin as needed for Low blood sugar (Blood glucose LESS THAN 60 mg/dL and glucose tabs did not bring it up enough) For BS under 60, take 4 glucose tabs, re-check in 1 hour. If glucose is still <60, inject glucagon. Recheck again in 1 hour.  If glucose is still under 60, go to ER     glucose 4 g chewable tablet  Take 4 tablets by mouth every 4 hours for 14 days Also okay to take additional 4 tablets as needed for hypoglycemia     levothyroxine 150 MCG tablet  Commonly known as: SYNTHROID  Take 1 tablet by mouth daily Take half an hour before food or medications. Do not take at the same time as pantoprazole or omeprazole     polyethylene glycol 17 g packet  Commonly known as: GLYCOLAX  Take 17 g by mouth daily as needed for Constipation           * This list has 2 medication(s) that are the same as other medications prescribed for you. Read the directions carefully, and ask your doctor or other care provider to review them with you.                 CHANGE how you take these medications      lisinopril 40 MG tablet  Commonly known as: PRINIVIL;ZESTRIL  Take 1 tablet by mouth every morning  What changed:   medication strength  when to take this            CONTINUE taking these medications      acetaminophen 500 MG tablet  Commonly known as: TYLENOL     atorvastatin 40 MG tablet  Commonly known as: LIPITOR     lidocaine 5 %  Commonly known as: LIDODERM     pantoprazole 40 MG tablet  Commonly known as: PROTONIX     topiramate 50 MG tablet  Commonly known as: TOPAMAX            STOP taking these medications      furosemide 20 MG tablet  Commonly known as: LASIX     insulin aspart 100 UNIT/ML injection vial  Commonly known as: NOVOLOG     insulin glulisine 100 UNIT/ML injection pen  Commonly known as: APIDRA     Lantus 100 UNIT/ML injection vial  Generic drug: insulin glargine     liothyronine 25 MCG tablet  Commonly known as: CYTOMEL     metFORMIN 1000 MG tablet  Commonly known as: GLUCOPHAGE     metoprolol tartrate 25 MG tablet  Commonly known as: LOPRESSOR               Where to Get Your Medications        These medications were sent to [facility] [address], [address] [place] [address]      Phone: [phone]   blood glucose monitor kit and supplies  glucagon (rDNA) 1 MG injection  glucose 4 g chewable tablet  levothyroxine 150 MCG tablet  polyethylene glycol 17 g packet       Information about where to get these medications is not yet available    Ask your nurse or doctor about these medications  blood glucose monitor kit and supplies  gabapentin 100 MG capsule  lisinopril 40 MG tablet          Hospital Course:   18-year-old female with T2DM, iatrogenic hypothyroidism S/P total thyroidectomy for papillary thyroid cancer in 2013, constipation, CKD3 with nephropathy, HTN, HLD, seizure disorder, probable DICKSON, and GERD who presented to [facility] on 3/4/23 for approx. 2 weeks of symptomatic hypoglycemia. She was initially treated in the ICU for closer monitoring. A1c was 9.6 on Feb 13, and 7.6 on March 11    Initially thought to be insulinoma vs iatrogenic, but she continued to have hypoglycemic episodes even after a week without insulin, c-peptide was low, and MRCP did not demonstrate pancreatic mass. Therefore ordered fasting serum insulin and c-peptide as well as cortisol, ACTH, and insulin antibodies. Unfortunately cortisol was drawn at 1AM so results in unreliable, therefore 24-hour urine cortisol was drawn, result pending at time of discharge. Insulin antibodies also pending at discharge. Serum insulin and c-peptide within normal limits at a time where she was hypoglycemic, suggesting an etiology other than insulinoma or surreptitious insulin use. Unfortunately, after trial off dextrose drip, she became hypoglycemic again. Despite this, she wanted to go home to take care of her 13year-old daughter to keep her from getting in trouble at school. She was aware of the risks and had capacity. She was provided with thorough discharge instructions on how to avoid and treat hypoglycemia as well as ED precautions. See AMA note on 3/17.      Iatrogenic Hypothyroidism s/p Total Thyroidectomy 2/2 Papillary Thyroid Cancer (2013)  -Very symptomatic, levothyroxine titrated to daily TSH and T4  -Discharge levothyroxine dosing 150mcg  -Resulting constipation treated with miralx  -Outpatient thyroglobulin was 1.3mg/mL on 2/9/23, no concern for cancer recurrence at this time      CKD3 with nephropathy  -h/o kidney biopsy with diagnosis of severe glomerular sclerosis with FSGS  -nephrotic range proteinuria at admit (>1000 on UA), with last urine microalb:cr ratio 4098 (on 2/10/23), likely 2/2 diabetes   Plan:  -avoid nephrotoxic medications  -dose medications to GFR     HTN  -Continue lisinopril 40mg  -Holding furosemide, metoprolol, and amlodipine due to soft pressures while hypoglycemic     HLD  -Atorvastatin 40mg     Chest pain  -Numerous ED workups over past few months with no concern for cardiopulmonary etiology, most likely costochondritis  -Lidocaine patch     Seizure Disorder  -Topiramate 50mg BID      Possible DICKSON  -Patient had CPAP in ICU which she found very helpful  -Already referred to [facility] outpatient, given phone number while inpatient     GERD  -Pantoprazole 40mg daily      Pertinent Results:      CURRENT ADMISSION IMAGING RESULTS   [unfilled]       Cardiology Procedures/Testing:  MODALITY RESULTS   EKG [unfilled]    ECHO No valid procedures specified. IR @BSHSILASTIMGCATIO([id number])@   CATH No valid procedures specified.       Special Testing/Procedures:  MODALITY RESULTS   MICRO [unfilled]   ABG No results found for: PH, PCO2, PO2, HCO3, FIO2   UA @LASTPROCAMB([id number])@      Laboratory Results:  LABORATORY RESULTS   HEMATOLOGY Lab Results   Component Value Date/Time    WBC 11.0 03/17/2023 05:51 AM    HGB 11.4 03/17/2023 05:51 AM    HCT 37.4 03/17/2023 05:51 AM     03/17/2023 05:51 AM    MCV 88.6 03/17/2023 05:51 AM       CHEMISTRIES Lab Results   Component Value Date/Time     03/17/2023 05:51 AM    K 3.9 03/17/2023 05:51 AM     03/17/2023 05:51 AM    CO2 23 03/17/2023 05:51 AM    BUN 25 03/17/2023 05:51 AM    GFRAA 39 08/08/2022 11:03 PM      HEPATIC FUNCTION Lab Results   Component Value Date/Time    GLOB 4.4 03/04/2023 02:38 PM    ALT 24 03/04/2023 02:38 PM       LACTIC ACID No components found for: LAC   CARDIAC PANEL Lab Results   Component Value Date/Time    CKMB 11.0 03/24/2020 01:30 PM    BNP 35 11/28/2022 09:26 PM      NT-proBNP Lab Results   Component Value Date/Time    BNP 35 11/28/2022 09:26 PM    BNP 81 08/08/2022 11:03 PM      THYROID Lab Results   Component Value Date/Time    TSH 0.04 05/16/2021 08:35 AM    FT3 1.1 03/07/2023 02:19 AM        Readmission Risk              Risk of Unplanned Readmission:  0        %        Pedro Dumont MD, PGY-1  [facility]  3/19/2023 8:58 AM

## 2023-03-15 NOTE — CARE COORDINATION
Home Health orders acknowledged. [facility] were provided to the patient. FOC was selected by the patient for [facility]. [address] [facility] Referral to [facility] who is in network with the patients insurance provider.       DORIAN Negrete    [facility] Management [facility]

## 2023-03-16 LAB
ACTH PLAS-MCNC: 36.4 PG/ML (ref 7.2–63.3)
ANION GAP SERPL CALC-SCNC: 4 MMOL/L (ref 3–18)
BASOPHILS # BLD: 0 K/UL (ref 0–0.1)
BASOPHILS NFR BLD: 0 % (ref 0–2)
BUN SERPL-MCNC: 16 MG/DL (ref 7–18)
BUN/CREAT SERPL: 13 (ref 12–20)
C PEPTIDE SERPL-MCNC: 1.5 NG/ML (ref 1.1–4.4)
CALCIUM SERPL-MCNC: 8.8 MG/DL (ref 8.5–10.1)
CHLORIDE SERPL-SCNC: 109 MMOL/L (ref 100–111)
CO2 SERPL-SCNC: 24 MMOL/L (ref 21–32)
CREAT SERPL-MCNC: 1.21 MG/DL (ref 0.6–1.3)
DIFFERENTIAL METHOD BLD: ABNORMAL
EOSINOPHIL # BLD: 0.2 K/UL (ref 0–0.4)
EOSINOPHIL NFR BLD: 1 % (ref 0–5)
ERYTHROCYTE [DISTWIDTH] IN BLOOD BY AUTOMATED COUNT: 15.4 % (ref 11.6–14.5)
GLUCOSE BLD STRIP.AUTO-MCNC: 106 MG/DL (ref 70–110)
GLUCOSE BLD STRIP.AUTO-MCNC: 151 MG/DL (ref 70–110)
GLUCOSE BLD STRIP.AUTO-MCNC: 183 MG/DL (ref 70–110)
GLUCOSE BLD STRIP.AUTO-MCNC: 187 MG/DL (ref 70–110)
GLUCOSE BLD STRIP.AUTO-MCNC: 224 MG/DL (ref 70–110)
GLUCOSE SERPL-MCNC: 155 MG/DL (ref 74–99)
HCT VFR BLD AUTO: 38.7 % (ref 35–45)
HGB BLD-MCNC: 11.7 G/DL (ref 12–16)
IMM GRANULOCYTES # BLD AUTO: 0 K/UL (ref 0–0.04)
IMM GRANULOCYTES NFR BLD AUTO: 0 % (ref 0–0.5)
INSULIN SERPL-ACNC: 20.6 UIU/ML (ref 2.6–24.9)
INSULIN SERPL-ACNC: 21.7 UIU/ML (ref 2.6–24.9)
LYMPHOCYTES # BLD: 6 K/UL (ref 0.9–3.6)
LYMPHOCYTES NFR BLD: 39 % (ref 21–52)
MAGNESIUM SERPL-MCNC: 2.6 MG/DL (ref 1.6–2.6)
MCH RBC QN AUTO: 27.5 PG (ref 24–34)
MCHC RBC AUTO-ENTMCNC: 30.2 G/DL (ref 31–37)
MCV RBC AUTO: 90.8 FL (ref 78–100)
MONOCYTES # BLD: 1.1 K/UL (ref 0.05–1.2)
MONOCYTES NFR BLD: 7 % (ref 3–10)
NEUTS SEG # BLD: 8 K/UL (ref 1.8–8)
NEUTS SEG NFR BLD: 53 % (ref 40–73)
NRBC # BLD: 0 K/UL (ref 0–0.01)
NRBC BLD-RTO: 0 PER 100 WBC
PHOSPHATE SERPL-MCNC: 4.1 MG/DL (ref 2.5–4.9)
PLATELET # BLD AUTO: 461 K/UL (ref 135–420)
PLATELET COMMENT: ABNORMAL
PMV BLD AUTO: 9.7 FL (ref 9.2–11.8)
POTASSIUM SERPL-SCNC: 4.1 MMOL/L (ref 3.5–5.5)
RBC # BLD AUTO: 4.26 M/UL (ref 4.2–5.3)
RBC MORPH BLD: ABNORMAL
SODIUM SERPL-SCNC: 137 MMOL/L (ref 136–145)
TSH SERPL DL<=0.05 MIU/L-ACNC: 16.3 UIU/ML (ref 0.36–3.74)
WBC # BLD AUTO: 15.3 K/UL (ref 4.6–13.2)

## 2023-03-16 PROCEDURE — 83735 ASSAY OF MAGNESIUM: CPT

## 2023-03-16 PROCEDURE — 6370000000 HC RX 637 (ALT 250 FOR IP)

## 2023-03-16 PROCEDURE — 36415 COLL VENOUS BLD VENIPUNCTURE: CPT

## 2023-03-16 PROCEDURE — 6370000000 HC RX 637 (ALT 250 FOR IP): Performed by: NURSE PRACTITIONER

## 2023-03-16 PROCEDURE — 2500000003 HC RX 250 WO HCPCS: Performed by: INTERNAL MEDICINE

## 2023-03-16 PROCEDURE — 6360000002 HC RX W HCPCS

## 2023-03-16 PROCEDURE — 84443 ASSAY THYROID STIM HORMONE: CPT

## 2023-03-16 PROCEDURE — 80048 BASIC METABOLIC PNL TOTAL CA: CPT

## 2023-03-16 PROCEDURE — 84100 ASSAY OF PHOSPHORUS: CPT

## 2023-03-16 PROCEDURE — 2580000003 HC RX 258: Performed by: FAMILY MEDICINE

## 2023-03-16 PROCEDURE — 85025 COMPLETE CBC W/AUTO DIFF WBC: CPT

## 2023-03-16 PROCEDURE — 2580000003 HC RX 258: Performed by: INTERNAL MEDICINE

## 2023-03-16 PROCEDURE — 82962 GLUCOSE BLOOD TEST: CPT

## 2023-03-16 PROCEDURE — 2580000003 HC RX 258

## 2023-03-16 PROCEDURE — A4216 STERILE WATER/SALINE, 10 ML: HCPCS | Performed by: INTERNAL MEDICINE

## 2023-03-16 PROCEDURE — 1100000000 HC RM PRIVATE

## 2023-03-16 PROCEDURE — 6370000000 HC RX 637 (ALT 250 FOR IP): Performed by: PHYSICIAN ASSISTANT

## 2023-03-16 RX ADMIN — METOPROLOL TARTRATE 25 MG: 25 TABLET, FILM COATED ORAL at 09:45

## 2023-03-16 RX ADMIN — SODIUM CHLORIDE, PRESERVATIVE FREE 10 ML: 5 INJECTION INTRAVENOUS at 20:33

## 2023-03-16 RX ADMIN — SODIUM CHLORIDE, PRESERVATIVE FREE 10 ML: 5 INJECTION INTRAVENOUS at 09:45

## 2023-03-16 RX ADMIN — GABAPENTIN 500 MG: 100 CAPSULE ORAL at 20:30

## 2023-03-16 RX ADMIN — GABAPENTIN 500 MG: 100 CAPSULE ORAL at 14:00

## 2023-03-16 RX ADMIN — LEVOTHYROXINE SODIUM ANHYDROUS 300 MCG: 100 INJECTION, POWDER, LYOPHILIZED, FOR SOLUTION INTRAVENOUS at 09:45

## 2023-03-16 RX ADMIN — POLYETHYLENE GLYCOL 3350 17 G: 17 POWDER, FOR SOLUTION ORAL at 20:31

## 2023-03-16 RX ADMIN — TOPIRAMATE 50 MG: 100 TABLET, FILM COATED ORAL at 09:45

## 2023-03-16 RX ADMIN — GABAPENTIN 500 MG: 100 CAPSULE ORAL at 09:45

## 2023-03-16 RX ADMIN — ACETAMINOPHEN 650 MG: 325 TABLET ORAL at 04:54

## 2023-03-16 RX ADMIN — ATORVASTATIN CALCIUM 40 MG: 40 TABLET, FILM COATED ORAL at 09:45

## 2023-03-16 RX ADMIN — HEPARIN SODIUM 5000 UNITS: 5000 INJECTION INTRAVENOUS; SUBCUTANEOUS at 14:00

## 2023-03-16 RX ADMIN — SIMETHICONE 80 MG: 80 TABLET, CHEWABLE ORAL at 19:55

## 2023-03-16 RX ADMIN — LISINOPRIL 40 MG: 20 TABLET ORAL at 20:27

## 2023-03-16 RX ADMIN — DEXTROSE MONOHYDRATE 100 ML/HR: 100 INJECTION, SOLUTION INTRAVENOUS at 10:00

## 2023-03-16 RX ADMIN — ACETAMINOPHEN 650 MG: 325 TABLET ORAL at 19:54

## 2023-03-16 RX ADMIN — FUROSEMIDE 20 MG: 20 TABLET ORAL at 09:45

## 2023-03-16 RX ADMIN — TOPIRAMATE 50 MG: 100 TABLET, FILM COATED ORAL at 20:31

## 2023-03-16 RX ADMIN — HEPARIN SODIUM 5000 UNITS: 5000 INJECTION INTRAVENOUS; SUBCUTANEOUS at 22:06

## 2023-03-16 RX ADMIN — HEPARIN SODIUM 5000 UNITS: 5000 INJECTION INTRAVENOUS; SUBCUTANEOUS at 05:58

## 2023-03-16 RX ADMIN — ASPIRIN 81 MG CHEWABLE TABLET 81 MG: 81 TABLET CHEWABLE at 09:45

## 2023-03-16 RX ADMIN — PANTOPRAZOLE 40 MG: 40 TABLET, DELAYED RELEASE ORAL at 09:45

## 2023-03-16 ASSESSMENT — PAIN DESCRIPTION - DESCRIPTORS: DESCRIPTORS: ACHING

## 2023-03-16 ASSESSMENT — PAIN SCALES - GENERAL: PAINLEVEL_OUTOF10: 3

## 2023-03-16 ASSESSMENT — PAIN DESCRIPTION - LOCATION: LOCATION: HEAD

## 2023-03-16 NOTE — PROGRESS NOTES
[facility] [address]  DAILY PROGRESS NOTE      Patient:    Janice Rodriguez , 44 y.o. female   MRN:  [id number]  Room/Bed:  303/  Admission Date:   3/4/2023  Code status:  Full Code    Reason for Admission: refractory hypoglycemia    ASSESSMENT AND PLAN:   Problem List Items Addressed This Visit          Endocrine    * (Principal) Hypoglycemia     Other Visit Diagnoses       Flank pain    -  Primary    Orthopnea        Myxedema coma ([facility] [address]        Relevant Orders    Transthoracic echocardiogram (TTE) complete with contrast, bubble, strain, and 3D PRN (Completed)    Right calf pain        Relevant Orders    Vascular duplex lower extremity venous right (Completed)            78-year-old female with past medical history of type 2 diabetes on insulin, papillary thyroid carcinoma status post total thyroidectomy with subsequent hypothyroidism, chronic back pain, and chronic kidney disease stage III, who presented to [facility] on 3/4/23 w/ low BS readings w/ accompanied fatigue and lightheadedness, was transferred to ICU for closer monitoring     Severe Refractory Hypoglycemia, DMT2 Hx on insulin  -Etiology unclear. Initially thought iatrogenic versus insulinoma, however MRCP did not demonstrate mass, low c-peptide, and patient has had ongoing hypoglycemia despite no additional insulin this admission.  Could perhaps be a pocket of subcutaneous insulin which recently opened up to systemic circulation or insulin antibodies  -home DM regimen at time of admission: lantus 90u BID, humalog 10u TID with meals, metformin 500 BID, farxiga 5mg, trulicity 1.5 weekly   -HbA1c at admission 7.6  -Required additional dextrose overnight, insulin held  Plan:  -Dr. Artemio Block with endocrinology following, appreciate the help  -Pending AM cortisol, ACTH, serum insulin, c-peptide  -continue to hold insulin  -continue close monitoring of blood glucose: ACHS, 0200  -hypoglycemia protocol in place and being followed  -D10 at 100mL/hr  -daily BMP, CBC, Mg, Phos     Severe Hypothyroidism s/p Thyroidectomy 2/2 Papillary Thyroid Cancer (2013)  Etiology: myxedema without coma vs subtherapeutic dosing   -at time of admission TSH 49.5 T4 0.2, fairly unchanged from previous month (2/10/23): TSH 48.3, T3 4.0, Free T4 0.1, despite starting levothyroxine 100mcg at that time   -concern for thyroid derangement contributing to hypoglycemia   -endocrine following, doing well with IV synthroid, TSH improving to 27.1 on 3/12, with Free T4 0.5  -Outpatient thyroglobulin was 1.3mg/mL on 2/9/23, no concern for cancer recurrence at this time  Plan:  -continue monitoring with daily TSH  -endocrine following, continue to appreciate recs   - IV synthroid 300mcg, prednisone 20mg     New onset abdominal pain  -Also with elevated white count with slight left shift, flank pain  -Refractory to ondansetron and simethicone  -UA, CT abd/pelv, KUB reassuring, suspect constipation  Plan:  -Pending urine culture  -PRN acetaminophen  -Advance bowel regimen     CKD III  -h/o kidney biopsy with diagnosis of severe glomerular sclerosis with FSGS  -nephrotic range proteinuria at admit (>1000 on UA), with last urine microalb:cr ratio 4098 (on 2/10/23), likely 2/2 diabetes   Plan:  -monitor renal function with daily BMP  -avoid nephrotoxic medications  -optimize glucose and BP control to prevent further progression of kidney disease     HTN  -Lisinopril 40mg  -Metoprolol 25mg QD (new inpatient)  -Amlodipine 10mg held     HLD  -Atorvastatin 40mg     Chest pain  -Numerous ED workups over past few months with no concern for cardiopulmonary etiology, most likely costochondritis  -Lidocaine patch     Seizure Disorder  -home med: topiramate 50mg BID  Plan:  -continue topiramate     Atraumatic R Foot Pain   -XR showed soft tissue edema without acute bony injury  -does also have diabetic neuropathy on gabapentin 400mg TID - has no sensation to light touch of bilateral feet/ankles   Plan:  -increased gabapentin to 500mg TID this admission, would continue this dosing     Possible DICKSON  -patient reported, was started on CPAP in ICU  Plan:  -Already referred to [facility] Sleep outpatient     GERD  -home med: pantoprazole 40mg daily  Plan:  -continue pantoprazole        Global:  Code: full  Diet: carb controlled (60g/meal)  DVT/AC: SQH  Mobility: per protocol  Disposition: telemetry      Point of Contact: Eugenio RAMÍREZ [facility]): [phone], Ashli Landaverde (Son): [phone]      SUBJECTIVE:   Events of the last 24 hours:  No hypoglycemia overnight    ROS:  Positive for mild abdominal and back pain  Negative for SOB, hypoglycemia    CURRENT INPATIENT MEDICATIONS:  Current Facility-Administered Medications   Medication Dose Route Frequency Provider Last Rate Last Admin    polyethylene glycol (GLYCOLAX) packet 17 g  17 g Oral TID Luisana Haile MD   17 g at 03/15/23 1550    [Held by provider] amLODIPine (NORVASC) tablet 10 mg  10 mg Oral Nightly Tara P Kourtneyann, DO        lisinopril (PRINIVIL;ZESTRIL) tablet 40 mg  40 mg Oral QHS Irl Miguel Bohlmann, DO   40 mg at 03/15/23 2111    simethicone (MYLICON) chewable tablet 80 mg  80 mg Oral 4x Daily PRN Irl [place] Bohlmann, DO   80 mg at 03/14/23 0500    bismuth subsalicylate (PEPTO BISMOL) 262 MG/15ML suspension 30 mL  30 mL Oral Q6H PRN Irl Miguel Bohlmann, DO        metoprolol tartrate (LOPRESSOR) tablet 25 mg  25 mg Oral Daily GUILLERMO Bernstein - NP   25 mg at 03/15/23 1010    perflutren lipid microspheres (DEFINITY) injection 2 mL  2 mL IntraVENous Once Taylor Wilkins MD        levothyroxine (SYNTHROID) 300 mcg in sodium chloride (PF) 0.9 % 15 mL IV syringe  300 mcg IntraVENous Daily Cassandra Khoury MD   300 mcg at 03/15/23 0613    glucose chewable tablet 16 g  4 tablet Oral PRN Cassandra Khoury MD   16 g at 03/13/23 1513    dextrose bolus 10% 125 mL  125 mL IntraVENous PRN Cassandra Khoury MD   Stopped at 03/15/23 0449    Or    dextrose bolus 10% 250 mL  250 mL IntraVENous PRN Rosangela Garcia MD   Stopped at 03/15/23 0805    glucagon (rDNA) injection 1 mg  1 mg SubCUTAneous PRN Rosangela Garcia MD   1 mg at 03/15/23 0746    dextrose 10 % infusion   IntraVENous Continuous PRN Rosangela Garcia  mL/hr at 03/15/23 2112 New Bag at 03/15/23 2112    furosemide (LASIX) tablet 20 mg  20 mg Oral Daily Charlee Hicks PA-C   20 mg at 03/15/23 1009    heparin (porcine) injection 5,000 Units  5,000 Units SubCUTAneous 3 times per day Judith Peña PA-C   5,000 Units at 03/16/23 0558    gabapentin (NEURONTIN) capsule 500 mg  500 mg Oral TID Francisca Sams DO   500 mg at 03/15/23 2111    lidocaine 4 % external patch 1 patch  1 patch TransDERmal Daily Francisca Sams DO   1 patch at 03/10/23 0852    lidocaine (LMX) 4 % cream   Topical PRN Francisca Sams DO        dextrose 50 % IV solution  25 g IntraVENous PRN Carlton Guerrero MD   25 g at 03/05/23 1114    sodium chloride flush 0.9 % injection 5-40 mL  5-40 mL IntraVENous 2 times per day Anoop Knapp MD   10 mL at 03/15/23 2112    sodium chloride flush 0.9 % injection 5-40 mL  5-40 mL IntraVENous PRN Anoop Knapp MD        0.9 % sodium chloride infusion   IntraVENous PRN Anoop Knapp MD        ondansetron (ZOFRAN-ODT) disintegrating tablet 4 mg  4 mg Oral Q8H PRN Anoop Knapp MD   4 mg at 03/14/23 0458    Or    ondansetron (ZOFRAN) injection 4 mg  4 mg IntraVENous Q6H PRN Anoop Knapp MD        acetaminophen (TYLENOL) tablet 650 mg  650 mg Oral Q6H PRN Anoop Knapp MD   650 mg at 03/16/23 0454    Or    acetaminophen (TYLENOL) suppository 650 mg  650 mg Rectal Q6H PRN Anoop Knapp MD        polyethylene glycol (GLYCOLAX) packet 17 g  17 g Oral Daily PRN Anoop Knapp MD   17 g at 03/14/23 0503    aspirin chewable tablet 81 mg  81 mg Oral Daily Anoop Knapp MD   81 mg at 03/15/23 1008    atorvastatin (LIPITOR) tablet 40 mg  40 mg Oral Daily Jason Tobias MD   40 mg at 03/15/23 1009    [Held by provider] metFORMIN (GLUCOPHAGE) tablet 500 mg  500 mg Oral BID WC Jason Tobias MD        pantoprazole (PROTONIX) tablet 40 mg  40 mg Oral QAM AC Jason Toibas MD   40 mg at 03/15/23 7720    topiramate (TOPAMAX) tablet 50 mg  50 mg Oral BID Jason Tobias MD   50 mg at 03/15/23 2112       Allergies  Allergies   Allergen Reactions    Blueberry Itching    Mushroom Extract Complex Hives and Itching    Tomato Itching       OBJECTIVE:    Intake/Output Summary (Last 24 hours) at 3/16/2023 0936  Last data filed at 3/16/2023 0537  Gross per 24 hour   Intake 810 ml   Output 2100 ml   Net -1290 ml       /72   Pulse 90   Temp 98.2 °F (36.8 °C) (Oral)   Resp 16   Ht 5' 9\" (1.753 m)   Wt 294 lb 1.6 oz (133.4 kg)   SpO2 100%   BMI 43.43 kg/m²     PHYSICAL EXAM  Gen: NAD, comfortable  HEENT: normocephalic, atraumatic, MMM, surgical scar from thyroidectomy  CV: RRR, S1/S2 present without M/R/G, +2 radial pulses, well-perfused  Pulm: No increased work of breathing, speaking full sentences  Abd: S//ND, mildly tender diffusely  MSK: no clubbing, no edema, lower back tenderness and paraspinal muscle tone  Skin: warm, dry  Neuro: CN II-XII grossly intact, no focal deficits appreciated   Psych: appropriate, alert, oriented x3    LABWORK (LAST 24 HOURS)  Recent Results (from the past 24 hour(s))   POCT Glucose    Collection Time: 03/15/23 10:14 AM   Result Value Ref Range    POC Glucose 140 (H) 70 - 110 mg/dL   POCT Glucose    Collection Time: 03/15/23  1:37 PM   Result Value Ref Range    POC Glucose 176 (H) 70 - 110 mg/dL   POCT Glucose    Collection Time: 03/15/23  3:57 PM   Result Value Ref Range    POC Glucose 122 (H) 70 - 110 mg/dL   POCT Glucose    Collection Time: 03/15/23  7:01 PM   Result Value Ref Range    POC Glucose 136 (H) 70 - 110 mg/dL   POCT Glucose    Collection Time: 03/15/23  9:19 PM Result Value Ref Range    POC Glucose 149 (H) 70 - 110 mg/dL   CBC with Auto Differential    Collection Time: 03/16/23  1:21 AM   Result Value Ref Range    WBC 15.3 (H) 4.6 - 13.2 K/uL    RBC 4.26 4.20 - 5.30 M/uL    Hemoglobin 11.7 (L) 12.0 - 16.0 g/dL    Hematocrit 38.7 35.0 - 45.0 %    MCV 90.8 78.0 - 100.0 FL    MCH 27.5 24.0 - 34.0 PG    MCHC 30.2 (L) 31.0 - 37.0 g/dL    RDW 15.4 (H) 11.6 - 14.5 %    Platelets 195 (H) 234 - 420 K/uL    MPV 9.7 9.2 - 11.8 FL    Nucleated RBCs 0.0 0  WBC    nRBC 0.00 0.00 - 0.01 K/uL    Seg Neutrophils 53 40 - 73 %    Lymphocytes 39 21 - 52 %    Monocytes 7 3 - 10 %    Eosinophils % 1 0 - 5 %    Basophils 0 0 - 2 %    Immature Granulocytes 0 0.0 - 0.5 %    Segs Absolute 8.0 1.8 - 8.0 K/UL    Absolute Lymph # 6.0 (H) 0.9 - 3.6 K/UL    Absolute Mono # 1.1 0.05 - 1.2 K/UL    Absolute Eos # 0.2 0.0 - 0.4 K/UL    Basophils Absolute 0.0 0.0 - 0.1 K/UL    Absolute Immature Granulocyte 0.0 0.00 - 0.04 K/UL    Differential Type MANUAL      Platelet Comment ADEQUATE PLATELETS      RBC Comment NORMOCYTIC, NORMOCHROMIC     Basic Metabolic Panel    Collection Time: 03/16/23  1:21 AM   Result Value Ref Range    Sodium 137 136 - 145 mmol/L    Potassium 4.1 3.5 - 5.5 mmol/L    Chloride 109 100 - 111 mmol/L    CO2 24 21 - 32 mmol/L    Anion Gap 4 3.0 - 18 mmol/L    Glucose 155 (H) 74 - 99 mg/dL    BUN 16 7.0 - 18 MG/DL    Creatinine 1.21 0.6 - 1.3 MG/DL    Bun/Cre Ratio 13 12 - 20      Est, Glom Filt Rate 59 (L) >60 ml/min/1.73m2    Calcium 8.8 8.5 - 10.1 MG/DL   Magnesium    Collection Time: 03/16/23  1:21 AM   Result Value Ref Range    Magnesium 2.6 1.6 - 2.6 mg/dL   Phosphorus    Collection Time: 03/16/23  1:21 AM   Result Value Ref Range    Phosphorus 4.1 2.5 - 4.9 MG/DL   TSH    Collection Time: 03/16/23  1:21 AM   Result Value Ref Range    TSH, 3RD GENERATION 16.30 (H) 0.36 - 3.74 uIU/mL   POCT Glucose    Collection Time: 03/16/23  2:23 AM   Result Value Ref Range    POC Glucose 183 (H) 70 - 110 mg/dL   POCT Glucose    Collection Time: 03/16/23  6:43 AM   Result Value Ref Range    POC Glucose 106 70 - 110 mg/dL       IMAGING AND PROCEDURES (LAST 24 HOURS)  XR ABDOMEN (KUB) (SINGLE AP VIEW)    Result Date: 3/14/2023  1. Limited study due to body habitus/underpenetration. 2.  Mild to moderate colonic stool burden. 3.  Nonspecific bowel gas pattern without evidence of bowel obstruction. XR CHEST PORTABLE    Result Date: 3/11/2023  No active or acute cardiopulmonary process is evident. MRI ABDOMEN WO CONTRAST MRCP    Result Date: 3/10/2023  1. Please note this is a noncontrast exam as the patient terminated exam early. 2.  No insulinoma is identified by noncontrast imaging. If the patient cannot tolerate MR imaging, consider a follow-up pancreatic protocol CT with and without IV contrast in attempt to detect a small enhancing occult nodule. 3.  Mild CBD dilation. No cause identified. 4.  Mild hepatomegaly. Otherwise unremarkable.       ================================================================  Further management for Ms. Janice Rodriguez will be discussed on rounds with my attending.       Monique Ramirez MD, PGY-1  [facility]  March 16, 2023 9:36 AM

## 2023-03-17 VITALS
TEMPERATURE: 97.9 F | HEIGHT: 69 IN | BODY MASS INDEX: 43.4 KG/M2 | WEIGHT: 293 LBS | RESPIRATION RATE: 20 BRPM | OXYGEN SATURATION: 100 % | SYSTOLIC BLOOD PRESSURE: 141 MMHG | DIASTOLIC BLOOD PRESSURE: 88 MMHG | HEART RATE: 101 BPM

## 2023-03-17 LAB
ANION GAP SERPL CALC-SCNC: 5 MMOL/L (ref 3–18)
BASOPHILS # BLD: 0 K/UL (ref 0–0.1)
BASOPHILS NFR BLD: 0 % (ref 0–2)
BUN SERPL-MCNC: 25 MG/DL (ref 7–18)
BUN/CREAT SERPL: 15 (ref 12–20)
CALCIUM SERPL-MCNC: 8.9 MG/DL (ref 8.5–10.1)
CHLORIDE SERPL-SCNC: 108 MMOL/L (ref 100–111)
CO2 SERPL-SCNC: 23 MMOL/L (ref 21–32)
CREAT SERPL-MCNC: 1.64 MG/DL (ref 0.6–1.3)
DIFFERENTIAL METHOD BLD: ABNORMAL
EOSINOPHIL # BLD: 0 K/UL (ref 0–0.4)
EOSINOPHIL NFR BLD: 0 % (ref 0–5)
ERYTHROCYTE [DISTWIDTH] IN BLOOD BY AUTOMATED COUNT: 15.2 % (ref 11.6–14.5)
GLUCOSE BLD STRIP.AUTO-MCNC: 114 MG/DL (ref 70–110)
GLUCOSE BLD STRIP.AUTO-MCNC: 169 MG/DL (ref 70–110)
GLUCOSE BLD STRIP.AUTO-MCNC: 214 MG/DL (ref 70–110)
GLUCOSE BLD STRIP.AUTO-MCNC: 46 MG/DL (ref 70–110)
GLUCOSE BLD STRIP.AUTO-MCNC: 84 MG/DL (ref 70–110)
GLUCOSE SERPL-MCNC: 68 MG/DL (ref 74–99)
HCT VFR BLD AUTO: 37.4 % (ref 35–45)
HGB BLD-MCNC: 11.4 G/DL (ref 12–16)
IMM GRANULOCYTES # BLD AUTO: 0.2 K/UL (ref 0–0.04)
IMM GRANULOCYTES NFR BLD AUTO: 1 % (ref 0–0.5)
LYMPHOCYTES # BLD: 3.3 K/UL (ref 0.9–3.6)
LYMPHOCYTES NFR BLD: 30 % (ref 21–52)
MAGNESIUM SERPL-MCNC: 2.6 MG/DL (ref 1.6–2.6)
MCH RBC QN AUTO: 27 PG (ref 24–34)
MCHC RBC AUTO-ENTMCNC: 30.5 G/DL (ref 31–37)
MCV RBC AUTO: 88.6 FL (ref 78–100)
MONOCYTES # BLD: 0.9 K/UL (ref 0.05–1.2)
MONOCYTES NFR BLD: 8 % (ref 3–10)
NEUTS SEG # BLD: 6.6 K/UL (ref 1.8–8)
NEUTS SEG NFR BLD: 61 % (ref 40–73)
NRBC # BLD: 0 K/UL (ref 0–0.01)
NRBC BLD-RTO: 0 PER 100 WBC
PHOSPHATE SERPL-MCNC: 6 MG/DL (ref 2.5–4.9)
PLATELET # BLD AUTO: 401 K/UL (ref 135–420)
PMV BLD AUTO: 9.5 FL (ref 9.2–11.8)
POTASSIUM SERPL-SCNC: 3.9 MMOL/L (ref 3.5–5.5)
RBC # BLD AUTO: 4.22 M/UL (ref 4.2–5.3)
SODIUM SERPL-SCNC: 136 MMOL/L (ref 136–145)
TSH SERPL DL<=0.05 MIU/L-ACNC: 7.76 UIU/ML (ref 0.36–3.74)
WBC # BLD AUTO: 11 K/UL (ref 4.6–13.2)

## 2023-03-17 PROCEDURE — 2580000003 HC RX 258

## 2023-03-17 PROCEDURE — 80048 BASIC METABOLIC PNL TOTAL CA: CPT

## 2023-03-17 PROCEDURE — 36415 COLL VENOUS BLD VENIPUNCTURE: CPT

## 2023-03-17 PROCEDURE — 82962 GLUCOSE BLOOD TEST: CPT

## 2023-03-17 PROCEDURE — 84443 ASSAY THYROID STIM HORMONE: CPT

## 2023-03-17 PROCEDURE — 6370000000 HC RX 637 (ALT 250 FOR IP)

## 2023-03-17 PROCEDURE — 84100 ASSAY OF PHOSPHORUS: CPT

## 2023-03-17 PROCEDURE — 94660 CPAP INITIATION&MGMT: CPT

## 2023-03-17 PROCEDURE — 83735 ASSAY OF MAGNESIUM: CPT

## 2023-03-17 PROCEDURE — 85025 COMPLETE CBC W/AUTO DIFF WBC: CPT

## 2023-03-17 PROCEDURE — 6360000002 HC RX W HCPCS

## 2023-03-17 RX ORDER — POLYETHYLENE GLYCOL 3350 17 G/17G
17 POWDER, FOR SOLUTION ORAL DAILY PRN
Qty: 30 EACH | Refills: 0 | Status: SHIPPED | OUTPATIENT
Start: 2023-03-17 | End: 2023-04-16

## 2023-03-17 RX ORDER — 0.9 % SODIUM CHLORIDE 0.9 %
500 INTRAVENOUS SOLUTION INTRAVENOUS ONCE
Status: COMPLETED | OUTPATIENT
Start: 2023-03-17 | End: 2023-03-17

## 2023-03-17 RX ORDER — LEVOTHYROXINE SODIUM 0.15 MG/1
150 TABLET ORAL DAILY
Qty: 30 TABLET | Refills: 0 | Status: SHIPPED | OUTPATIENT
Start: 2023-03-17

## 2023-03-17 RX ORDER — LISINOPRIL 40 MG/1
40 TABLET ORAL EVERY MORNING
Qty: 90 TABLET | Refills: 3
Start: 2023-03-17

## 2023-03-17 RX ORDER — LEVOTHYROXINE SODIUM 20 UG/ML
300 INJECTION, SOLUTION INTRAVENOUS DAILY
Status: DISCONTINUED | OUTPATIENT
Start: 2023-03-17 | End: 2023-03-17

## 2023-03-17 RX ORDER — GABAPENTIN 100 MG/1
400 CAPSULE ORAL 3 TIMES DAILY
Qty: 360 CAPSULE | Refills: 0
Start: 2023-03-17 | End: 2023-04-16

## 2023-03-17 RX ADMIN — TOPIRAMATE 50 MG: 100 TABLET, FILM COATED ORAL at 08:53

## 2023-03-17 RX ADMIN — ASPIRIN 81 MG CHEWABLE TABLET 81 MG: 81 TABLET CHEWABLE at 08:54

## 2023-03-17 RX ADMIN — ATORVASTATIN CALCIUM 40 MG: 40 TABLET, FILM COATED ORAL at 08:52

## 2023-03-17 RX ADMIN — ACETAMINOPHEN 650 MG: 325 TABLET ORAL at 02:27

## 2023-03-17 RX ADMIN — GABAPENTIN 500 MG: 100 CAPSULE ORAL at 08:52

## 2023-03-17 RX ADMIN — HEPARIN SODIUM 5000 UNITS: 5000 INJECTION INTRAVENOUS; SUBCUTANEOUS at 05:58

## 2023-03-17 RX ADMIN — SODIUM CHLORIDE, PRESERVATIVE FREE 10 ML: 5 INJECTION INTRAVENOUS at 08:52

## 2023-03-17 RX ADMIN — SODIUM CHLORIDE 500 ML: 9 INJECTION, SOLUTION INTRAVENOUS at 06:12

## 2023-03-17 RX ADMIN — PANTOPRAZOLE 40 MG: 40 TABLET, DELAYED RELEASE ORAL at 08:52

## 2023-03-17 ASSESSMENT — PAIN SCALES - GENERAL
PAINLEVEL_OUTOF10: 10
PAINLEVEL_OUTOF10: 0

## 2023-03-17 ASSESSMENT — PAIN DESCRIPTION - DESCRIPTORS: DESCRIPTORS: ACHING;SHARP

## 2023-03-17 ASSESSMENT — PAIN DESCRIPTION - ORIENTATION: ORIENTATION: RIGHT

## 2023-03-17 ASSESSMENT — PAIN - FUNCTIONAL ASSESSMENT: PAIN_FUNCTIONAL_ASSESSMENT: ACTIVITIES ARE NOT PREVENTED

## 2023-03-17 ASSESSMENT — PAIN DESCRIPTION - LOCATION: LOCATION: BACK;FLANK

## 2023-03-17 NOTE — PROGRESS NOTES
[facility]  Rapid/Medical Response Team Note      Patient:    Franklin Reeves 44 y.o. female, : 1985  Patient MRN: [id number]    Admission Date: 3/4/2023   Admission Diagnosis: Orthopnea [R06.01]  Hypoglycemia [E16.2]  Flank pain [R10.9]      RAPID RESPONSE  Rapid response called for: hypoglycemia    Pt called nurse to bedside at which time she stated she felt weak and unwell and asked for her to please check her blood sugar; POC glucose was 46. Nurse went to retreive glucose tabs, but pt had sprawled back onto the bed, diaphoretic, eyes open, minimally responsive. Gave 1 IM glucagon. RR called. Upon arrival PFM recognized this was our patient, well-known to our service for her refractory hypoglycemia. IV access regained, while 2nd glucagon IM administered. Received 1 am D50 via IV. Pt slowly became more expressive; appeared tearful and frightened. Glucose improved to 214. Given reassurance. Able to nod head. Became more talkative, able to recall events leading up to hypoglycemic event, but nothing thereafter. Said she really wants her cereal. Denied any pain, only felt weak and diaphoretic. Talkative and at baseline at end of rapid. At conclusion of rapid, BP noted to be in 90s/50s. This was likely transient in the setting of her hypoglycemia and diaphoresis. Consider autonomic dysfunction in ddx given this reaction and correlation.     OBJECTIVE    RRT/MRT start time:               RRT/MRT end time:  Vitals:    23 0538   BP: (!) 90/56   Pulse: 89   Resp: 16   Temp:    SpO2: 100%        Physical Exam:  Gen: diaphoretic, frightened, frail, weak  HEENT: normocephalic, MMM  CV: RRR, no M/R/G, +2 radial pulses   Pulm: CTAB, normal effort  Abd: protuberant, soft, normal BS x 4   MSK: venous stasis dermatitis (mild), +2 pitting edema to knees  Neuro: alert and oriented after glucagon      ASSESSMENT/PLAN AND DISPOSITION  Franklin Reeves is a 44y.o. year old female admitted for Orthopnea [R06.01]  Hypoglycemia [E16.2]  Flank pain [R10.9]  Rapid Response Team/ Medical Response Team Called For: Hypoglycemia    In setting of Hypoglycemia completed the below:    Labs: POC glucose x 2    Meds:   D50 amp x 1  Glucagon IM x 2  500 cc IV NS x 1      Medical Problems:  Severe Refractory Hypoglycemia, DMT2 Hx on insulin  Severe Hypothyroidism s/p Thyroidectomy 2/2 Papillary Thyroid Cancer (2013)    At conclusion of rapid, BP noted to be in 90s/50s w/ HR in 80s. Consider autonomic dysfunction / cortisol dysregulation in ddx given this reaction and correlation. Plan:  - Currently eating snacks; continue timed meals and midnight snacks  - Maintain PIV  - Continue hypoglycemic protocol  - PRNs ordered for drips if needed  - Continue ACHS glucose checks  - Add one 500 cc NS bolus  - holding BP meds temporarily, recheck BP at 6 AM  - f/u with endocrinology      Disposition: remain on 4N  Patient condition: stable    Primary team PFM continuing care. [facility] Senior resident Dr. Isi Raines present during Rapid Response.     Camelia Desai, DO -PGY 1  [facility]  March 17, 2023 5:53 AM

## 2023-03-17 NOTE — PROGRESS NOTES
Patient to leave AMA. This nurse spoke with Resident Dr. Stephanie Ruelas, who spoke with patient in regards to [facility], Dr. Skye Rodriguez would like the following instructions and appointments given to the patient prior to patient leaving AMA. Your appointments with Mohan Gleason are:  3/20 at 2 PM with Dr. Anders Carmen  3/27 at 2pm with Dr. Sheldon Naranjo  4/11 at 2:30 PM with Dr. Royal Schaefer  4/24 at 2pm with Dr. Royal Schaefer  5/15 at 2pm with Dr. Royal Schaefer    Please call Dr. Danya Garcia office to schedule an appointment as soon as possible. Do not take any medications that lower blood sugar: no insulin, no metformin, none of the once-a-week medicine  For blood pressure, take lisinopril, but hold off on amlodipine, furosemide, and metoprolol  Take 16g of glucose (4 tablets) every 4 hours, even at night.   Take a carb-heavy meal before bed  Return to the emergency department for persistent low blood sugar  Have friends and family to check on you frequently and take you to the emergency department if you do not wake up

## 2023-03-17 NOTE — PROGRESS NOTES
Received patient via wheelchair from ICU. Alert and oriented X 4. Denies pain or discomfort at present. Bed locked in lowest position. Ambulatory to bathroom with steady gait. Frequent items and call light within reach. Dual skin assessment with Barbra Rodgers RN with skin intact. 0208 Glucose taken with resulting 84 and another snack provided. 0227 Tylenol 2 tabs administered orally for complaint of back pain. 0255 Resting quietly with eyes closed without complaint voice of pain or discomfort at present. Patient noted with CPAP in place. 0500 Patient called writer to room and request glucose checked with results 46. Patient alert and able to communicate with staff. Writer went to med room to retrieve hypoglycemia medication. Upon return patient found lying across bed unresponsive. RRT called after unable to arouse patient. Glucagon  IM administered with   D50 IV push. Glucose increase to 214 and patient awaken and unable to remember what happen after we spoke. Patient eating cereal and drinking soda pop.     0540 Blood pressure ordered after taking with result 90/56. Blood pressure meds held and 500 IV bolus NS infusing. 8894 Recheck Blood pressure 112/68. Patient resting with eyes closed.

## 2023-03-17 NOTE — PROGRESS NOTES
Went to evaluate and talk with the patient who was requesting to leave against medical advice. Her daughter has been in trouble at school so she wants to go home to make sure her family is taken care of. We asked her to remain as she is still at high risk of hypoglycemia and needs frequent monitoring. Capacity:   -The patient expresses understanding of the relevant information  -Responses are consistent over time and between different providers  -The patient appreciates the significance of the information and that it applies to them  -The patient is able to reason with the relevant information   -The patient is able to express their choice to leave the hospital    It is my opinion that the patient lacks capacity    Signs and symptoms: we agreed that the symptoms that brought the patient into the hospital were hypoglycemia  Extent and limitation of exam: discussed the results of glucose results, serum insulin, cortisol, and CT. Explained that we know her insulin levels are WNL, however we do not know her 24-hour urinary cortisol or insulin antibody results. We have also not identified a safe outpatient plan for maintaining blood glucose  Current treatment plan: PRN IV dextrose, glucose tabs, frequent monitoring  Risks of forgoing treatment: risks of forgoing treatment, including coma, disability, poor diabetes control, and even death were discussed with the patient  Alternatives: the patient was offered follow-up appointments. She was not disagreeable to any particular aspect of hospital management, only being in the hospital away from family  Explicit statement: I explicitly stated that it was against my medical advice for the patient to leave the hospital at this time.   Questions: the patient was given an opportunity to ask any additional questions  Follow-up: hospital follow-appointment was offered and scheduled as described in the discharge instructions, which were printed and given to her  Medications: prior to discharge, patient was given scripts for glucose tabs, glucagon, glucose meter, polyethylene glycol  Instructions: Thorough discharge instructions for follow-up and avoiding hypoglycemia were provided. See discharge meds and discharge instructions for details. Instructions both printed, sent to pharmacy, and given verbally. Patient was able to teach-back.          At the end of our discussion, the patient decided to leave against medical advice    Dyanne Runner, MD PGY-1   [facility] [address]   March 17, 2023, 1:36 PM

## 2023-03-17 NOTE — PROGRESS NOTES
Pt picc infiltrated. Spoke with Dr. Della Hardwick and received order to use oral or glucagon for oral meds if she becomes hypoglycemic and synthroid will be assessed and placed oral if possible.

## 2023-03-17 NOTE — PROGRESS NOTES
RRT called  at 0503 for BG level of 46, pt minimally responsive. Glucagon x2 IM. Amp D50 x1 0515     0517    RRT end at 0520.

## 2023-03-17 NOTE — HOME CARE
[facility] received a home health referral on 3/15/23, but noted that patient left AMA today, Therefore [facility] is unable to continue to follow for home health  due to [facility] status, [facility] referral will be discarded, Notified  Jessica White) that due to patient leaving AMA ,[facility] unable to follow at this time. LEIF GARCIA.

## 2023-03-17 NOTE — PROGRESS NOTES
[facility]  POST-ROUNDING NOTE    Ms. Crystal Rader has lost all IV access; she is unable to get any PIV line at this time. Due to infiltration and poor vasculature, her only option at this juncture would be a PICC. It appears she is being considered for discharge tomorrow or in near future; she is no longer on IVF and her only scheduled IV med is synthroid 300 mcg each AM.     However, she has had numerous episodes of severe hypoglycemia requiring IV dextrose and drips. Discussed benefits vs risks of pursuing advanced access (I.e. PICC) w/ team and RN; sugars have been stable today without hypoglycemia. There is no indication for PICC line which presents its own risks. Has done well w/ snacks. Will use glucagon, PO glucose, and snacks per hypoglycemic protocol. If she does not respond and it is emergent, try again for PIV or IO if absolutely necessary. Concerning her synthroid, I have spoken to inpatient pharmacy who advised that IV is 75% of PO dose. There does not seem to be a definite plan yet for her outpatient dosing, as we are currently watching her BG, insulin levels, cortisol levels, and TSH. For now, will order the PO equivalent (400 mcg) for the morning and HOLD this in the EMR for day team to discuss with patient and endocrinology. See daily progress note for full assessment/plan.       Zac Weathers DO, PGY-1  [facility]  3/16/2023, 9:51 PM

## 2023-03-17 NOTE — PROGRESS NOTES
[facility] [address]  DAILY PROGRESS NOTE      Patient:    Myriam Dunn , 44 y.o. female   MRN:  [id number]  Room/Bed:  459/01  Admission Date:   3/4/2023  Code status:  Full Code    Reason for Admission: refractory hypoglycemia    ASSESSMENT AND PLAN:   Problem List Items Addressed This Visit          Endocrine    * (Principal) Hypoglycemia     Other Visit Diagnoses       Flank pain    -  Primary    Orthopnea        Myxedema coma ([facility] [address]        Relevant Orders    Transthoracic echocardiogram (TTE) complete with contrast, bubble, strain, and 3D PRN (Completed)    Right calf pain        Relevant Orders    Vascular duplex lower extremity venous right (Completed)            68-year-old female with past medical history of type 2 diabetes on insulin, papillary thyroid carcinoma status post total thyroidectomy with subsequent hypothyroidism, chronic back pain, and chronic kidney disease stage III, who presented to [facility] on 3/4/23 w/ low BS readings w/ accompanied fatigue and lightheadedness, was transferred to ICU for closer monitoring, then transferred to floor. She requires ongoing IV dextrose to maintain her blood sugar. Severe Refractory Hypoglycemia, DMT2 Hx on insulin  -Etiology unclear. Initially thought iatrogenic versus insulinoma, however MRCP did not demonstrate mass, low c-peptide, and patient has had ongoing hypoglycemia despite no additional insulin this admission.  Could perhaps be a pocket of subcutaneous insulin which recently opened up to systemic circulation or insulin antibodies  -home DM regimen at time of admission: lantus 90u BID, humalog 10u TID with meals, metformin 500 BID, farxiga 5mg, trulicity 1.5 weekly   -HbA1c at admission 7.6  -Required additional dextrose and glucagon overnight, insulin held  -AM cortisol was drawn at 1am, result unreliable  -Fasting insulin and c-peptide both normal, decreasing suspicion for insulinoma or exogenous insuline  Plan:  -Dr. Bob Lemus with endocrinology following, appreciate the help  -Pending 24-hour urinary cortisol and insulin antibodies  -continue to hold insulin  -continue close monitoring of blood glucose: ACHS, 0200  -hypoglycemia protocol in place and being followed  -Consider resuming D10 at 100mL/hr  -daily BMP, CBC, Mg, Phos     Severe Hypothyroidism s/p Thyroidectomy 2/2 Papillary Thyroid Cancer (2013)  Etiology: myxedema without coma vs subtherapeutic dosing   -at time of admission TSH 49.5 T4 0.2, fairly unchanged from previous month (2/10/23): TSH 48.3, T3 4.0, Free T4 0.1, despite starting levothyroxine 100mcg at that time   -concern for thyroid derangement contributing to hypoglycemia   -endocrine following, doing well with IV synthroid, TSH improving to 27.1 on 3/12, with Free T4 0.5  -Outpatient thyroglobulin was 1.3mg/mL on 2/9/23, no concern for cancer recurrence at this time  Plan:  -continue monitoring with daily TSH  -endocrine following, continue to appreciate recs   - IV synthroid 300mcg     Abdominal pain  -On review of outpatient records, this is not new  -UA, Ux, CT abd/pelv, KUB reassuring, suspect constipation  Plan:  -PRN acetaminophen  -Advance bowel regimen     CRISTINO on CKD III  -h/o kidney biopsy with diagnosis of severe glomerular sclerosis with FSGS  -nephrotic range proteinuria at admit (>1000 on UA), with last urine microalb:cr ratio 4098 (on 2/10/23), likely 2/2 diabetes   -Creatinine on 3/16 was 1.21, on 3/17 was 1.64  Plan:  -monitor renal function with daily BMP  -avoid nephrotoxic medications  -optimize glucose and BP control to prevent further progression of kidney disease     HTN  -Lisinopril 40mg  -Metoprolol 25mg QD (new inpatient)  -Amlodipine 10mg held     HLD  -Atorvastatin 40mg     Chest pain  -Numerous ED workups over past few months with no concern for cardiopulmonary etiology, most likely costochondritis  -Lidocaine patch     Seizure Disorder  -home med: topiramate 50mg BID  Plan:  -continue topiramate Atraumatic R Foot Pain   -XR showed soft tissue edema without acute bony injury  -does also have diabetic neuropathy on gabapentin 400mg TID - has no sensation to light touch of bilateral feet/ankles   Plan:  -increased gabapentin to 500mg TID this admission, would continue this dosing     Possible DICKSON  -patient reported, was started on CPAP in ICU  Plan:  -Already referred to [facility] outpatient     GERD  -home med: pantoprazole 40mg daily  Plan:  -continue pantoprazole        Global:  Code: full  Diet: carb controlled (60g/meal)  DVT/AC: SQH  Mobility: per protocol  Disposition: telemetry      Point of Contact: [facility]): [phone], Candelario Ernst (Son): [phone]      SUBJECTIVE:   Events of the last 24 hours:  Became hypoglycemic off of dextrose drip despite receiving nighttime snacks. Transferred out of ICU. Required dextrose and glucagon overnight, had a rapid for hypoglycemia. Despite ongoing IV requirement, she wants to go home because she got a call from her daughter's school that her daughter is skipping class and she's worried that her daughter is going to get in trouble with the law with no parent home.     ROS:  Positive for mild abdominal and back pain  Negative for SOB    CURRENT INPATIENT MEDICATIONS:  Current Facility-Administered Medications   Medication Dose Route Frequency Provider Last Rate Last Admin    levothyroxine (SYNTHROID) 300 mcg in sodium chloride (PF) 0.9 % 15 mL IV syringe  300 mcg IntraVENous Daily Opal Schumacher MD        [Held by provider] levothyroxine (SYNTHROID) tablet 400 mcg  400 mcg Oral Daily Tara Baker DO        polyethylene glycol (GLYCOLAX) packet 17 g  17 g Oral TID Opal Schumacher MD   17 g at 03/16/23 2031    [Held by provider] amLODIPine (NORVASC) tablet 10 mg  10 mg Oral Nightly Edith Tobias DO        [Held by provider] lisinopril (PRINIVIL;ZESTRIL) tablet 40 mg  40 mg Oral QHS Ran Baker DO   40 mg at 03/16/23 2027 simethicone (MYLICON) chewable tablet 80 mg  80 mg Oral 4x Daily PRN Patti Read, DO   80 mg at 03/16/23 1955    bismuth subsalicylate (PEPTO BISMOL) 262 MG/15ML suspension 30 mL  30 mL Oral Q6H PRN Patti Read, DO        [Held by provider] metoprolol tartrate (LOPRESSOR) tablet 25 mg  25 mg Oral Daily GUILLERMO Villa NP   25 mg at 03/16/23 0945    perflutren lipid microspheres (DEFINITY) injection 2 mL  2 mL IntraVENous Once Nikolai Walton MD        glucose chewable tablet 16 g  4 tablet Oral PRN Severiano Umaña MD   16 g at 03/13/23 1513    dextrose bolus 10% 125 mL  125 mL IntraVENous PRN Severiano Umaña MD   Stopped at 03/15/23 0449    Or    dextrose bolus 10% 250 mL  250 mL IntraVENous PRN Severiano Umaña MD   Stopped at 03/15/23 0805    glucagon (rDNA) injection 1 mg  1 mg SubCUTAneous PRN Severiano Umaña MD   1 mg at 03/15/23 0746    dextrose 10 % infusion   IntraVENous Continuous PRN Nigel Moe MD 50 mL/hr at 03/16/23 1148 50 mL/hr at 03/16/23 1148    [Held by provider] furosemide (LASIX) tablet 20 mg  20 mg Oral Daily Charlee Hicks PA-C   20 mg at 03/16/23 0945    heparin (porcine) injection 5,000 Units  5,000 Units SubCUTAneous 3 times per day Laurie Montague PA-C   5,000 Units at 03/17/23 0558    gabapentin (NEURONTIN) capsule 500 mg  500 mg Oral TID Armida January, DO   500 mg at 03/17/23 0852    lidocaine 4 % external patch 1 patch  1 patch TransDERmal Daily Armida January, DO   1 patch at 03/10/23 0852    lidocaine (LMX) 4 % cream   Topical PRN Armida January, DO        dextrose 50 % IV solution  25 g IntraVENous PRN Rahel Vincent MD   25 g at 03/05/23 1114    sodium chloride flush 0.9 % injection 5-40 mL  5-40 mL IntraVENous 2 times per day Nona Crawley MD   10 mL at 03/17/23 0852    sodium chloride flush 0.9 % injection 5-40 mL  5-40 mL IntraVENous PRN Nona Crawley MD        0.9 % sodium chloride infusion IntraVENous PRN Jailyn Davies MD        ondansetron (ZOFRAN-ODT) disintegrating tablet 4 mg  4 mg Oral Q8H PRN Jailyn Davies MD   4 mg at 03/14/23 0458    Or    ondansetron (ZOFRAN) injection 4 mg  4 mg IntraVENous Q6H PRN Jailyn Davies MD        acetaminophen (TYLENOL) tablet 650 mg  650 mg Oral Q6H PRN Jailyn Davies MD   650 mg at 03/17/23 4546    Or    acetaminophen (TYLENOL) suppository 650 mg  650 mg Rectal Q6H PRN Jailyn Davies MD        polyethylene glycol [facility]) packet 17 g  17 g Oral Daily PRN Jailyn Davies MD   17 g at 03/14/23 0503    aspirin chewable tablet 81 mg  81 mg Oral Daily Jailyn Davies MD   81 mg at 03/17/23 0854    atorvastatin (LIPITOR) tablet 40 mg  40 mg Oral Daily Jailyn Davies MD   40 mg at 03/17/23 6432    [Held by provider] metFORMIN (GLUCOPHAGE) tablet 500 mg  500 mg Oral BID WC Jailyn Davies MD        pantoprazole (PROTONIX) tablet 40 mg  40 mg Oral QAM AC Jailyn Davies MD   40 mg at 03/17/23 0852    topiramate (TOPAMAX) tablet 50 mg  50 mg Oral BID Jailyn Davies MD   50 mg at 03/17/23 8737       Allergies  Allergies   Allergen Reactions    Blueberry Itching    Mushroom Extract Complex Hives and Itching    Tomato Itching       OBJECTIVE:    Intake/Output Summary (Last 24 hours) at 3/17/2023 0912  Last data filed at 3/17/2023 0225  Gross per 24 hour   Intake 3110 ml   Output 475 ml   Net 2635 ml         /75   Pulse 97   Temp 97.4 °F (36.3 °C) (Oral)   Resp 20   Ht 5' 9\" (1.753 m)   Wt 295 lb 10.2 oz (134.1 kg)   SpO2 100%   BMI 43.66 kg/m²     PHYSICAL EXAM  Gen: NAD, comfortable  HEENT: normocephalic, atraumatic, MMM, surgical scar from thyroidectomy  CV: RRR, S1/S2 present without M/R/G, +2 radial pulses, well-perfused  Pulm: CTAB, no increased work of breathing, speaking full sentences  Abd: S/ND, mildly tender diffusely  MSK: no clubbing, 2+ pitting edema BLE, lower back tenderness and paraspinal muscle tone  Skin: warm, dry  Neuro: CN II-XII grossly intact, no focal deficits appreciated   Psych: appropriate, alert, oriented x3    LABWORK (LAST 24 HOURS)  Recent Results (from the past 24 hour(s))   POCT Glucose    Collection Time: 03/16/23 11:35 AM   Result Value Ref Range    POC Glucose 151 (H) 70 - 110 mg/dL   POCT Glucose    Collection Time: 03/16/23  4:23 PM   Result Value Ref Range    POC Glucose 224 (H) 70 - 110 mg/dL   POCT Glucose    Collection Time: 03/16/23  9:38 PM   Result Value Ref Range    POC Glucose 187 (H) 70 - 110 mg/dL   POCT Glucose    Collection Time: 03/17/23  1:56 AM   Result Value Ref Range    POC Glucose 84 70 - 110 mg/dL   POCT Glucose    Collection Time: 03/17/23  5:01 AM   Result Value Ref Range    POC Glucose 46 (LL) 70 - 110 mg/dL   POCT Glucose    Collection Time: 03/17/23  5:16 AM   Result Value Ref Range    POC Glucose 214 (H) 70 - 110 mg/dL   CBC with Auto Differential    Collection Time: 03/17/23  5:51 AM   Result Value Ref Range    WBC 11.0 4.6 - 13.2 K/uL    RBC 4.22 4.20 - 5.30 M/uL    Hemoglobin 11.4 (L) 12.0 - 16.0 g/dL    Hematocrit 37.4 35.0 - 45.0 %    MCV 88.6 78.0 - 100.0 FL    MCH 27.0 24.0 - 34.0 PG    MCHC 30.5 (L) 31.0 - 37.0 g/dL    RDW 15.2 (H) 11.6 - 14.5 %    Platelets 682 908 - 742 K/uL    MPV 9.5 9.2 - 11.8 FL    Nucleated RBCs 0.0 0  WBC    nRBC 0.00 0.00 - 0.01 K/uL    Seg Neutrophils 61 40 - 73 %    Lymphocytes 30 21 - 52 %    Monocytes 8 3 - 10 %    Eosinophils % 0 0 - 5 %    Basophils 0 0 - 2 %    Immature Granulocytes 1 (H) 0.0 - 0.5 %    Segs Absolute 6.6 1.8 - 8.0 K/UL    Absolute Lymph # 3.3 0.9 - 3.6 K/UL    Absolute Mono # 0.9 0.05 - 1.2 K/UL    Absolute Eos # 0.0 0.0 - 0.4 K/UL    Basophils Absolute 0.0 0.0 - 0.1 K/UL    Absolute Immature Granulocyte 0.2 (H) 0.00 - 0.04 K/UL    Differential Type AUTOMATED     Basic Metabolic Panel    Collection Time: 03/17/23  5:51 AM   Result Value Ref Range Sodium 136 136 - 145 mmol/L    Potassium 3.9 3.5 - 5.5 mmol/L    Chloride 108 100 - 111 mmol/L    CO2 23 21 - 32 mmol/L    Anion Gap 5 3.0 - 18 mmol/L    Glucose 68 (L) 74 - 99 mg/dL    BUN 25 (H) 7.0 - 18 MG/DL    Creatinine 1.64 (H) 0.6 - 1.3 MG/DL    Bun/Cre Ratio 15 12 - 20      Est, Glom Filt Rate 41 (L) >60 ml/min/1.73m2    Calcium 8.9 8.5 - 10.1 MG/DL   Magnesium    Collection Time: 03/17/23  5:51 AM   Result Value Ref Range    Magnesium 2.6 1.6 - 2.6 mg/dL   Phosphorus    Collection Time: 03/17/23  5:51 AM   Result Value Ref Range    Phosphorus 6.0 (H) 2.5 - 4.9 MG/DL   TSH    Collection Time: 03/17/23  5:51 AM   Result Value Ref Range    TSH, 3RD GENERATION 7.76 (H) 0.36 - 3.74 uIU/mL   POCT Glucose    Collection Time: 03/17/23  8:15 AM   Result Value Ref Range    POC Glucose 114 (H) 70 - 110 mg/dL       IMAGING AND PROCEDURES (LAST 24 HOURS)  XR ABDOMEN (KUB) (SINGLE AP VIEW)    Result Date: 3/14/2023  1. Limited study due to body habitus/underpenetration. 2.  Mild to moderate colonic stool burden. 3.  Nonspecific bowel gas pattern without evidence of bowel obstruction. XR CHEST PORTABLE    Result Date: 3/11/2023  No active or acute cardiopulmonary process is evident. MRI ABDOMEN WO CONTRAST MRCP    Result Date: 3/10/2023  1. Please note this is a noncontrast exam as the patient terminated exam early. 2.  No insulinoma is identified by noncontrast imaging. If the patient cannot tolerate MR imaging, consider a follow-up pancreatic protocol CT with and without IV contrast in attempt to detect a small enhancing occult nodule. 3.  Mild CBD dilation. No cause identified. 4.  Mild hepatomegaly. Otherwise unremarkable.       ================================================================  Further management for Ms. Erica Arthur will be discussed on rounds with my attending.       Latrice Mao MD, PGY-1  [facility]  March 17, 2023 9:12 AM

## 2023-03-19 LAB
COLLECT DURATION TIME UR: 24 HR
CORTIS F 24H UR-MRATE: 33 UG/24 HR (ref 6–42)
CORTIS F UR-MCNC: 11 UG/L
SPECIMEN VOL ?TM UR: 3000 ML

## 2023-03-23 LAB — INSULIN AB SER-ACNC: 5.8 UU/ML

## 2023-03-24 LAB
ACETOHEXAMIDE SERPL-MCNC: NEGATIVE UG/ML (ref 20–60)
CHLORPROPAMIDE SERPL-MCNC: NEGATIVE UG/ML (ref 75–250)
GLIMEPIRIDE SERPL-MCNC: NEGATIVE NG/ML (ref 80–250)
GLIPIZIDE SERPL-MCNC: NEGATIVE NG/ML (ref 200–1000)
GLYBURIDE SERPL-MCNC: NEGATIVE NG/ML
NATEGLINIDE SERPL-MCNC: NEGATIVE NG/ML
REPAGLINIDE SERPL-MCNC: NEGATIVE NG/ML
TOLAZAMIDE SERPL-MCNC: NEGATIVE UG/ML
TOLBUTAMIDE SERPL-MCNC: NEGATIVE UG/ML (ref 40–100)

## 2023-04-17 ENCOUNTER — HOSPITAL ENCOUNTER (OUTPATIENT)
Facility: HOSPITAL | Age: 38
Discharge: HOME | End: 2023-04-20

## 2023-04-17 LAB — LABCORP SPECIMEN COLLECTION: NORMAL

## 2023-04-17 PROCEDURE — 99001 SPECIMEN HANDLING PT-LAB: CPT

## 2023-04-17 NOTE — PROGRESS NOTES
[facility] Medicine  TRANSFER NOTE      Patient:    Edith Segovia , 44 y.o. female   MRN:  [id number]  Room/Bed:  303/01  Admission Date:   3/4/2023  Code status:  Full Code    Reason for Admission: refractory hypoglycemia    ASSESSMENT AND PLAN:   Problem List Items Addressed This Visit          Endocrine    * (Principal) Hypoglycemia     Other Visit Diagnoses       Flank pain    -  Primary    Orthopnea        Myxedema coma ([address]        Relevant Orders    Transthoracic echocardiogram (TTE) complete with contrast, bubble, strain, and 3D PRN          42-year-old female with past medical history of type 2 diabetes on insulin, papillary thyroid carcinoma status post total thyroidectomy with subsequent hypothyroidism, chronic back pain, and chronic kidney disease stage III, who presented to [facility] on 3/4/23 w/ low BS readings w/ accompanied fatigue and lightheadedness, was transferred to ICU for closer monitoring      -please see interval HPI below for full hospital course up until downgrade from ICU level care to telemetry    Severe Refractory Hypoglycemia, DMT2 Hx on insulin (resolved)  Possible etiologies include: myxedema without coma, medication induced, thyroid dysfunction, insulinoma  -high insulin regimen with persistent use despite low blood glucose levels for 1 week, no evidence of mass on repeated imaging, high insulin levels with low C-peptide - leading differential diagnosis at this time would be iatrogenic refractory hypoglycemia   -home DM regimen at time of admission: lantus 90u BID, humalog 10u TID with meals, metformin 500 BID, farxiga 5mg, trulicity 1.5 weekly   -HbA1c at admission 7.6  -has been off dextrose gtt, octreotide gtt & solucortef since 3/11  -has high dose correctional insulin ordered  -endocrine is following, for both blood glucose and thyroid management   Plan:  -Transfer to Medical floor from ICU  -Hold Lantus until hyperglycemia returns  -continue close monitoring of blood glucose: ACHS, 0200  -high dose correctional insulin, if insulin needs increase can consider adding back low dose lantus   -continue to appreciate endocrine recs  -hypoglycemia protocol in place   -daily BMP, CBC, Mg, Phos    Severe Hypothyroidism s/p Thyroidectomy 2/2 Papillary Thyroid Cancer (2013)  Etiology: myxedema without coma vs subtherapeutic dosing   -at time of admission TSH 49.5 T4 0.2, fairly unchanged from previous month (2/10/23): TSH 48.3, T3 4.0, Free T4 0.1, despite starting levothyroxine 100mcg at that time   -concern for thyroid derangement contributing to hypoglycemia   -endocrine following, doing well with IV synthroid, TSH improving to 27.1 on 3/12, with Free T4 0.5  Plan:  -continue monitoring with daily TSH  -endocrine following, continue to appreciate recs   - IV synthroid 300mcg, prednisone 20mg for two days    CKD III  -h/o kidney biopsy with diagnosis of severe glomerular sclerosis with FSGS  -nephrotic range proteinuria at admit (>1000 on UA), with last urine microalb:cr ratio 4098 (on 2/10/23), likely 2/2 diabetes   Plan:  -monitor renal function with daily BMP  -avoid nephrotoxic medications  -optimize glucose and BP control to prevent further progression of kidney disease    HTN  HLD  Chest pain/exertional dyspnea  -home meds: amlodipine 10 mg daily atorvastatin 40 mg daily  -Patient reports chest pain and shortness of breath with exertion, seems to be an ongoing issue   -Evaluated at ED 1/1/2023 for the symptoms. Work-up was negative for MI and PE.   Patient was diagnosed with costochondritis  -pain continues to be reproducible on exam   -EKGs throughout this admission have been NSR  -trops wnl   Plan:  -follow up echo to complete cardiac workup - if negative CP likely MSK in nature  -Continue home amlodipine, atorvastatin  -lasix added this admission, BP have been wnl, would continue at this time  -has PRN lidocaine cream to be applied to chest wall     Seizure Disorder  -home med: topiramate 50mg BID  Plan:  -continue topiramate    Atraumatic R Foot Pain   -XR showed soft tissue edema without acute bony injury  -does also have diabetic neuropathy on gabapentin 400mg TID - has no sensation to light touch of bilateral feet/ankles   Plan:  -increased gabapentin to 500mg TID this admission, would continue this dosing    Possible DICKSON  -patient reported, was started on CPAP in ICU  Plan:  -will need OP sleep study for home CPAP     GERD  -home med: pantoprazole 40mg daily  Plan:  -continue pantoprazole      Global:  Code: full  Diet: carb controlled (60g/meal)  DVT/AC: SQH  Mobility: per protocol  Disposition: telemetry     Point of Contact: [facility] [phone], Lux Zavala (Son): [phone]    Interval HPI:  -patient presented to the ED on 3/5 for persistent blood glucose in the 40-50's x1 week, continued to take her home insulin regimen despite this, which included lantus 90u BID, humalog 10u TID with meals, metformin 500 BID, farxiga 5mg, trulicity 1.5 weekly   -she was admitted to stepdown unit for D10 gtt, however she continued to have BG persistently in the 50's even with multiple pushes of D50 - she was then transferred to the ICU for close BG monitoring  -endocrine was consulted for both BG and thryoid monitoring   -patient was started on D20 gtt to improve BG, suboptimal response led to additions of glucagon gtt and solucortef   -was started on increased dose of levothyroxine to 125, later titrated to 200mcg IV, with daily TSH T4 to monitor response   -due to concern for insulinoma, patient had MRI/MRCP to rule out, which returned negative for evidence of mass   -by 3/12 she had been weaned off glucagon and dextrose gtts and solucortef while still able to maintain adequate blood glucose levels, and she was palced on high dose correctional insulin   -TSH and T4 were improving on IV synthroid 200  -at that point she was transitioned back to [facility] and transferred to medicine/telemetry     SUBJECTIVE:   Patient seen at beside. No acute complaints. Sitting on the side of the bed comfortably. Denies any pain, nausea, vomiting, lightheadedness.  [place] to get out of the hospital.    ROS (positive findings are in BOLD; negative findings are in regular font)  Constitutional: fevers, chills, appetite changes, weight changes, fatigue  HEENT: changes in vision, changes in hearing, sore throat, dysphagia  Cardiovascular: chest pain, palpitations, PND, orthopnea, edema  Pulmonary: SOB, cough, sputum production, wheezing, chest tightness  Gastrointestinal: abdominal pain, nausea/vomiting, diarrhea, constipation, melena, hematochezia  Genitourinary: dysuria, hesitation, dribbling, urgency, hematuria  Musculoskeletal: arthralgias, myalgias  Skin: rash, itching  Neurological: sensory changes, motor changes, headache  Psychiatric: mood changes  Endocrine: heat/cold intolerance  Heme: easy bruising/easy bleeding, LAD    CURRENT INPATIENT MEDICATIONS:  Current Facility-Administered Medications   Medication Dose Route Frequency Provider Last Rate Last Admin    metoprolol tartrate (LOPRESSOR) tablet 25 mg  25 mg Oral Daily GUILLERMO Salazar NP   25 mg at 03/12/23 1034    perflutren lipid microspheres (DEFINITY) injection 2 mL  2 mL IntraVENous Once Ivy Jamison MD        glucose chewable tablet 16 g  4 tablet Oral PRN Yancy Eli MD        dextrose bolus 10% 125 mL  125 mL IntraVENous PRN Yancy Eli MD        Or    dextrose bolus 10% 250 mL  250 mL IntraVENous PRN Yancy Eli MD        glucagon (rDNA) injection 1 mg  1 mg SubCUTAneous PRN Yancy Eli MD        dextrose 10 % infusion   IntraVENous Continuous PRN Yancy Eli MD        [facility] [place] by provider] insulin glargine (LANTUS) injection vial 60 Units  60 Units SubCUTAneous Nightly Yanyc Eli MD        [facility] [place] by provider] insulin lispro (HUMALOG) injection vial 14 Units  14 Units SubCUTAneous TID  Rosangela Garcia MD        insulin lispro (HUMALOG) injection vial 0-16 Units  0-16 Units SubCUTAneous TID  Rosangela Garcia MD        insulin lispro (HUMALOG) injection vial 0-4 Units  0-4 Units SubCUTAneous Nightly Rosangela Garcia MD        levothyroxine (SYNTHROID) 200 mcg in sodium chloride (PF) 0.9 % 10 mL IV syringe  200 mcg IntraVENous Daily Rosangela Garcia MD   200 mcg at 03/12/23 1034    furosemide (LASIX) tablet 20 mg  20 mg Oral Daily Charlee Hicks PA-C   20 mg at 03/12/23 1035    heparin (porcine) injection 5,000 Units  5,000 Units SubCUTAneous 3 times per day Judith Peña PA-C   5,000 Units at 03/12/23 1333    gabapentin (NEURONTIN) capsule 500 mg  500 mg Oral TID Francisca Sams DO   500 mg at 03/12/23 1333    lidocaine 4 % external patch 1 patch  1 patch TransDERmal Daily Francisca Sams DO   1 patch at 03/12/23 1035    lidocaine (LMX) 4 % cream   Topical PRN Francisca Sams DO        dextrose 50 % IV solution  25 g IntraVENous PRN Carlton Guerrero MD   25 g at 03/05/23 1114    sodium chloride flush 0.9 % injection 5-40 mL  5-40 mL IntraVENous 2 times per day Anoop Knapp MD   10 mL at 03/12/23 1036    sodium chloride flush 0.9 % injection 5-40 mL  5-40 mL IntraVENous PRN Anoop Knapp MD        0.9 % sodium chloride infusion   IntraVENous PRN Anoop Knapp MD        ondansetron (ZOFRAN-ODT) disintegrating tablet 4 mg  4 mg Oral Q8H PRN Anoop Knapp MD   4 mg at 03/11/23 1933    Or    ondansetron (ZOFRAN) injection 4 mg  4 mg IntraVENous Q6H PRN Anoop Knapp MD        acetaminophen (TYLENOL) tablet 650 mg  650 mg Oral Q6H PRN Anoop Knapp MD   650 mg at 03/10/23 1221    Or    acetaminophen (TYLENOL) suppository 650 mg  650 mg Rectal Q6H PRN Anoop Knapp MD        polyethylene glycol (GLYCOLAX) packet 17 g  17 g Oral Daily PRN Anoop Knapp MD        aspirin chewable tablet 81 mg  81 mg Oral Daily Agnes Westfall MD   81 mg at 03/12/23 1034    atorvastatin (LIPITOR) tablet 40 mg  40 mg Oral Daily Agnes Westfall MD   40 mg at 03/12/23 1034    [Held by provider] metFORMIN (GLUCOPHAGE) tablet 500 mg  500 mg Oral BID WC Agnes Westfall MD        pantoprazole (PROTONIX) tablet 40 mg  40 mg Oral QAM AC Agnes Westfall MD   40 mg at 03/12/23 1033    topiramate (TOPAMAX) tablet 50 mg  50 mg Oral BID Agnes Westfall MD   50 mg at 03/12/23 1033       Allergies  Allergies   Allergen Reactions    Blueberry Itching    Mushroom Extract Complex Hives and Itching    Tomato Itching       OBJECTIVE:    Intake/Output Summary (Last 24 hours) at 3/12/2023 1337  Last data filed at 3/11/2023 2005  Gross per 24 hour   Intake 346.25 ml   Output 850 ml   Net -503.75 ml       /85   Pulse 89   Temp 97.9 °F (36.6 °C) (Axillary)   Resp 10   Ht 5' 9\" (1.753 m)   Wt 289 lb (131.1 kg)   SpO2 100%   BMI 42.68 kg/m²     PHYSICAL EXAM  Gen: NAD, comfortable, obese   HEENT: normocephalic, atraumatic, MMM, no thyromegaly, no JVD  CV: RRR, S1/S2 present without M/R/G, +2 radial pulses, well-perfused, PMI not displaced  Pulm: CTAB, no wheezes, no crackles  Abd: S/NT/ND, no rebound, no guarding, no hepatosplenomegaly   MSK: no clubbing, no edema  Skin: warm, dry, intact, no rash  Neuro: CN II-XII grossly intact, no sensation to light RLE up to mid shin, LL up to high ankle, normal strength  Psych: appropriate, alert, oriented x3    RESULTS     No results found for: LACTA     Lab results last 24 hrs:  Recent Results (from the past 24 hour(s))   POCT Glucose    Collection Time: 03/11/23  1:30 PM   Result Value Ref Range    POC Glucose 275 (H) 70 - 110 mg/dL   POCT Glucose    Collection Time: 03/11/23  3:17 PM   Result Value Ref Range    POC Glucose 217 (H) 70 - 110 mg/dL   POCT Glucose    Collection Time: 03/11/23  4:16 PM   Result Value Ref Range    POC Glucose 195 (H) 70 - 110 mg/dL   POCT Glucose    Collection Time: 03/11/23  5:24 PM   Result Value Ref Range    POC Glucose 205 (H) 70 - 110 mg/dL   POCT Glucose    Collection Time: 03/11/23  6:21 PM   Result Value Ref Range    POC Glucose 197 (H) 70 - 110 mg/dL   POCT Glucose    Collection Time: 03/11/23  7:26 PM   Result Value Ref Range    POC Glucose 194 (H) 70 - 110 mg/dL   POCT Glucose    Collection Time: 03/11/23 10:01 PM   Result Value Ref Range    POC Glucose 232 (H) 70 - 110 mg/dL   CBC with Auto Differential    Collection Time: 03/12/23  4:21 AM   Result Value Ref Range    WBC 12.8 4.6 - 13.2 K/uL    RBC 3.91 (L) 4.20 - 5.30 M/uL    Hemoglobin 10.7 (L) 12.0 - 16.0 g/dL    Hematocrit 33.8 (L) 35.0 - 45.0 %    MCV 86.4 78.0 - 100.0 FL    MCH 27.4 24.0 - 34.0 PG    MCHC 31.7 31.0 - 37.0 g/dL    RDW 14.9 (H) 11.6 - 14.5 %    Platelets 029 (H) 106 - 420 K/uL    MPV 9.3 9.2 - 11.8 FL    Nucleated RBCs 0.2 (H) 0  WBC    nRBC 0.02 (H) 0.00 - 0.01 K/uL    Seg Neutrophils 52 40 - 73 %    Lymphocytes 39 21 - 52 %    Monocytes 7 3 - 10 %    Eosinophils % 0 0 - 5 %    Basophils 0 0 - 2 %    Immature Granulocytes 1 (H) 0.0 - 0.5 %    Segs Absolute 6.7 1.8 - 8.0 K/UL    Absolute Lymph # 5.0 (H) 0.9 - 3.6 K/UL    Absolute Mono # 0.9 0.05 - 1.2 K/UL    Absolute Eos # 0.0 0.0 - 0.4 K/UL    Basophils Absolute 0.0 0.0 - 0.1 K/UL    Absolute Immature Granulocyte 0.1 (H) 0.00 - 0.04 K/UL    Differential Type AUTOMATED     Basic Metabolic Panel    Collection Time: 03/12/23  4:21 AM   Result Value Ref Range    Sodium 138 136 - 145 mmol/L    Potassium 3.7 3.5 - 5.5 mmol/L    Chloride 111 100 - 111 mmol/L    CO2 24 21 - 32 mmol/L    Anion Gap 3 3.0 - 18 mmol/L    Glucose 114 (H) 74 - 99 mg/dL    BUN 18 7.0 - 18 MG/DL    Creatinine 0.98 0.6 - 1.3 MG/DL    Bun/Cre Ratio 18 12 - 20      Est, Glom Filt Rate >60 >60 ml/min/1.73m2    Calcium 8.3 (L) 8.5 - 10.1 MG/DL   Magnesium    Collection Time: 03/12/23  4:21 AM   Result Value Ref Range    Magnesium 2.6 1.6 - 2.6 mg/dL   Phosphorus    Collection Time: 03/12/23  4:21 AM   Result Value Ref Range    Phosphorus 3.4 2.5 - 4.9 MG/DL   TSH    Collection Time: 03/12/23  4:21 AM   Result Value Ref Range    TSH, 3RD GENERATION 27.10 (H) 0.36 - 3.74 uIU/mL   T4, Free    Collection Time: 03/12/23  4:21 AM   Result Value Ref Range    T4 Free 0.5 (L) 0.7 - 1.5 NG/DL   Transthoracic echocardiogram (TTE) complete with contrast, bubble, strain, and 3D PRN    Collection Time: 03/12/23 12:17 PM   Result Value Ref Range    Body Surface Area 2.53 m2    IVSd 1.3 (A) 0.6 - 0.9 cm    LVIDd 4.0 3.9 - 5.3 cm    LVIDs 2.9 cm    LVOT Diameter 2.0 cm    LVPWd 1.3 (A) 0.6 - 0.9 cm    EF BP 57 55 - 100 %    LV Ejection Fraction A2C 56 %    LV Ejection Fraction A4C 56 %    LV EDV A2C 138 mL    LV EDV A4C 139 mL    LV EDV  (A) 56 - 104 mL    LV ESV A2C 61 mL    LV ESV A4C 61 mL    LV ESV BP 61 (A) 19 - 49 mL    LVOT Peak Gradient 3 mmHg    LVOT Mean Gradient 2 mmHg    LVOT SV 64.4 ml    LVOT Peak Velocity 0.9 m/s    LVOT VTI 20.5 cm    RV Basal Dimension 4.5 cm    RV Free Wall Peak S' 14 cm/s    LA Volume A/L 62 mL    LA Volume 2C 59 (A) 22 - 52 mL    LA Volume 4C 58 (A) 22 - 52 mL    LA Volume 2C 55 (A) 22 - 52 mL    LA Volume 4C 53 (A) 22 - 52 mL    AV Area by Peak Velocity 2.5 cm2    AV Area by VTI 2.8 cm2    AV Peak Gradient 5 mmHg    AV Mean Gradient 3 mmHg    AV Peak Velocity 1.1 m/s    AV Mean Velocity 0.9 m/s    AV VTI 22.7 cm    MV A Velocity 0.70 m/s    MV E Wave Deceleration Time 96.9 ms    MV E Velocity 0.72 m/s    LV E' Lateral Velocity 10 cm/s    LV E' Septal Velocity 9 cm/s    PV Peak Gradient 4 mmHg    PV Max Velocity 1.0 m/s    TAPSE 2.6 1.7 cm    TR Peak Gradient 29 mmHg    TR Max Velocity 2.68 m/s    Ascending Aorta 3.5 cm    Aortic Root 2.9 cm    Fractional Shortening 2D 28 28 - 44 %    LV ESV Index BP 25 mL/m2    LV EDV Index BP 58 mL/m2    LV ESV Index A4C 25 mL/m2    LV EDV Index A4C 57 mL/m2 LV ESV Index A2C 25 mL/m2    LV EDV Index A2C 57 mL/m2    LVIDd Index 1.65 cm/m2    LVIDs Index 1.20 cm/m2    LV RWT Ratio 0.65     LV Mass 2D 186.5 (A) 67 - 162 g    LV Mass 2D Index 77.1 43 - 95 g/m2    MV E/A 1.03     E/E' Ratio (Averaged) 7.60     E/E' Lateral 7.20     E/E' Septal 8.00     LA Volume Index A/L 26 16 - 34 mL/m2    LVOT Stroke Volume Index 26.6 mL/m2    LVOT Area 3.1 cm2    Ao Root Index 1.20 cm/m2    Ascending Aorta Index 1.45 cm/m2    AV Velocity Ratio 0.82     LVOT:AV VTI Index 0.90     RASHEL/BSA VTI 1.2 cm2/m2    RASHEL/BSA Peak Velocity 1.0 cm2/m2    Est. RA Pressure 8 mmHg    RVSP 37 mmHg    RV Mid Dimension 4.0 cm    RV Longitudinal Dimension 6.6 cm   POCT Glucose    Collection Time: 03/12/23 12:23 PM   Result Value Ref Range    POC Glucose 174 (H) 70 - 110 mg/dL         Imaging results last 24hrs (Impression only):  XR ANKLE RIGHT (2 VIEWS)    Result Date: 3/6/2023  Limited two-view study. Mild midfoot arthropathy. Diffuse nonspecific soft tissue swelling. XR CHEST PORTABLE    Result Date: 3/11/2023  No active or acute cardiopulmonary process is evident. MRI ABDOMEN WO CONTRAST MRCP    Result Date: 3/10/2023  1. Please note this is a noncontrast exam as the patient terminated exam early. 2.  No insulinoma is identified by noncontrast imaging. If the patient cannot tolerate MR imaging, consider a follow-up pancreatic protocol CT with and without IV contrast in attempt to detect a small enhancing occult nodule. 3.  Mild CBD dilation. No cause identified. 4.  Mild hepatomegaly. Otherwise unremarkable.           ================================================================  Further management for Ms. Darci Kingston will be discussed on rounds with my attending. Marlene Ponce DO, PGY-1  [facility]  March 12, 2023 1:37 PM      [facility]  Senior Addendum to History and Physical    I have also seen and independently evaluated the patient.  I agree with the plan as noted above.     Additional HPI, A/P:     26-year-old female with past medical history of type 2 diabetes on insulin, papillary thyroid carcinoma status post total thyroidectomy with subsequent hypothyroidism, chronic back pain, and chronic kidney disease stage III, who presented to [facility] on 3/4/23 w/ low BS readings w/ accompanied fatigue and lightheadedness, was transferred to ICU for closer monitoring was weaned off of dextrose,octreotide, synthroid drip and pushes of D50, had significant hypothyroidism based on blood sugar and now excepted onto our service will be transferred to medical floor    Severe, persistent hypoglycemia in setting of hypothyroidism likely secondary to exogenous insulin administration versus possible myxedema versus thyroid dysfunction versus insulinoma (resolved)  -Home regiment of Lantus 90 units twice daily, Humalog 10 units before meals, metformin 500 mg twice daily, Farxiga 5 mg daily, Trulicity 1.5 units weekly  -On 3-7-2023 insulin was 158, proinsulin on 3-8-2023 was 8.1, C-peptide was low at 0.9  -MRI did not show evidence of insulinoma  Plan  -Glucose checks ACHS  -Hypoglycemia protocol  -Lantus held until hyperglycemia returns  -High-dose correctional insulin regimen  - F/u AM labs  -Endocrinology consulted recs appreciated      Severe hypothyroidism status post thyroidectomy secondary to papillary thyroid carcinoma likely secondary to med nonadherence versus subtherapeutic dosing  -Most recent TSH was 27, T4 free was 0.5  Plan  -Endocrinology was consulted recommendations appreciated  -IV Synthroid 300 mcg daily for one more day than switch to PO synthroid 300mg daily  - Prednisone 20mg daily for two days than d/c  - AM TSH, thyroglobulin    Vitals, labs and imaging reviewed     For additional problem list, assessment, and plan see intern note    Vasquez Aguila MD , PGY-2   [facility]   March 12, 2023, 3:13 PM No

## 2023-05-01 ENCOUNTER — APPOINTMENT (OUTPATIENT)
Facility: HOSPITAL | Age: 38
End: 2023-05-01
Payer: MEDICAID

## 2023-05-01 ENCOUNTER — HOSPITAL ENCOUNTER (EMERGENCY)
Facility: HOSPITAL | Age: 38
Discharge: HOME | End: 2023-05-01
Attending: EMERGENCY MEDICINE
Payer: MEDICAID

## 2023-05-01 VITALS
DIASTOLIC BLOOD PRESSURE: 98 MMHG | TEMPERATURE: 98.4 F | HEART RATE: 98 BPM | OXYGEN SATURATION: 100 % | SYSTOLIC BLOOD PRESSURE: 130 MMHG | RESPIRATION RATE: 17 BRPM

## 2023-05-01 DIAGNOSIS — R11.2 NAUSEA AND VOMITING, UNSPECIFIED VOMITING TYPE: Primary | ICD-10-CM

## 2023-05-01 LAB
ALBUMIN SERPL-MCNC: 2.8 G/DL (ref 3.4–5)
ALBUMIN/GLOB SERPL: 0.5 (ref 0.8–1.7)
ALP SERPL-CCNC: 99 U/L (ref 45–117)
ALT SERPL-CCNC: 23 U/L (ref 13–56)
ANION GAP SERPL CALC-SCNC: 3 MMOL/L (ref 3–18)
APPEARANCE UR: ABNORMAL
AST SERPL-CCNC: 31 U/L (ref 10–38)
BACTERIA URNS QL MICRO: ABNORMAL /HPF
BASOPHILS # BLD: 0 K/UL (ref 0–0.1)
BASOPHILS NFR BLD: 0 % (ref 0–2)
BILIRUB SERPL-MCNC: 0.8 MG/DL (ref 0.2–1)
BILIRUB UR QL: NEGATIVE
BUN SERPL-MCNC: 13 MG/DL (ref 7–18)
BUN/CREAT SERPL: 10 (ref 12–20)
CALCIUM SERPL-MCNC: 9.1 MG/DL (ref 8.5–10.1)
CHLORIDE SERPL-SCNC: 109 MMOL/L (ref 100–111)
CO2 SERPL-SCNC: 23 MMOL/L (ref 21–32)
COLOR UR: YELLOW
CREAT SERPL-MCNC: 1.24 MG/DL (ref 0.6–1.3)
DIFFERENTIAL METHOD BLD: NORMAL
EOSINOPHIL # BLD: 0 K/UL (ref 0–0.4)
EOSINOPHIL NFR BLD: 0 % (ref 0–5)
EPITH CASTS URNS QL MICRO: ABNORMAL /LPF (ref 0–5)
ERYTHROCYTE [DISTWIDTH] IN BLOOD BY AUTOMATED COUNT: 13.9 % (ref 11.6–14.5)
GLOBULIN SER CALC-MCNC: 5.3 G/DL (ref 2–4)
GLUCOSE BLD STRIP.AUTO-MCNC: 195 MG/DL (ref 70–110)
GLUCOSE SERPL-MCNC: 203 MG/DL (ref 74–99)
GLUCOSE UR STRIP.AUTO-MCNC: 100 MG/DL
HCG SERPL QL: NEGATIVE
HCT VFR BLD AUTO: 37.8 % (ref 35–45)
HGB BLD-MCNC: 12.1 G/DL (ref 12–16)
HGB UR QL STRIP: ABNORMAL
IMM GRANULOCYTES # BLD AUTO: 0 K/UL (ref 0–0.04)
IMM GRANULOCYTES NFR BLD AUTO: 0 % (ref 0–0.5)
KETONES UR QL STRIP.AUTO: NEGATIVE MG/DL
LEUKOCYTE ESTERASE UR QL STRIP.AUTO: NEGATIVE
LIPASE SERPL-CCNC: 89 U/L (ref 73–393)
LYMPHOCYTES # BLD: 2.5 K/UL (ref 0.9–3.6)
LYMPHOCYTES NFR BLD: 34 % (ref 21–52)
MCH RBC QN AUTO: 26.7 PG (ref 24–34)
MCHC RBC AUTO-ENTMCNC: 32 G/DL (ref 31–37)
MCV RBC AUTO: 83.4 FL (ref 78–100)
MONOCYTES # BLD: 0.5 K/UL (ref 0.05–1.2)
MONOCYTES NFR BLD: 7 % (ref 3–10)
NEUTS SEG # BLD: 4.5 K/UL (ref 1.8–8)
NEUTS SEG NFR BLD: 59 % (ref 40–73)
NITRITE UR QL STRIP.AUTO: NEGATIVE
NRBC # BLD: 0 K/UL (ref 0–0.01)
NRBC BLD-RTO: 0 PER 100 WBC
PH UR STRIP: 5.5 (ref 5–8)
PLATELET # BLD AUTO: 353 K/UL (ref 135–420)
PMV BLD AUTO: 9.9 FL (ref 9.2–11.8)
POTASSIUM SERPL-SCNC: 4.9 MMOL/L (ref 3.5–5.5)
PROT SERPL-MCNC: 8.1 G/DL (ref 6.4–8.2)
PROT UR STRIP-MCNC: >1000 MG/DL
RBC # BLD AUTO: 4.53 M/UL (ref 4.2–5.3)
RBC #/AREA URNS HPF: ABNORMAL /HPF (ref 0–5)
SODIUM SERPL-SCNC: 135 MMOL/L (ref 136–145)
SP GR UR REFRACTOMETRY: 1.02 (ref 1–1.03)
UROBILINOGEN UR QL STRIP.AUTO: 0.2 EU/DL (ref 0.2–1)
WBC # BLD AUTO: 7.6 K/UL (ref 4.6–13.2)
WBC URNS QL MICRO: ABNORMAL /HPF (ref 0–4)

## 2023-05-01 PROCEDURE — 83690 ASSAY OF LIPASE: CPT

## 2023-05-01 PROCEDURE — 84703 CHORIONIC GONADOTROPIN ASSAY: CPT

## 2023-05-01 PROCEDURE — 99285 EMERGENCY DEPT VISIT HI MDM: CPT

## 2023-05-01 PROCEDURE — 81001 URINALYSIS AUTO W/SCOPE: CPT

## 2023-05-01 PROCEDURE — 87086 URINE CULTURE/COLONY COUNT: CPT

## 2023-05-01 PROCEDURE — 96374 THER/PROPH/DIAG INJ IV PUSH: CPT

## 2023-05-01 PROCEDURE — 6360000004 HC RX CONTRAST MEDICATION: Performed by: EMERGENCY MEDICINE

## 2023-05-01 PROCEDURE — 74177 CT ABD & PELVIS W/CONTRAST: CPT

## 2023-05-01 PROCEDURE — 85025 COMPLETE CBC W/AUTO DIFF WBC: CPT

## 2023-05-01 PROCEDURE — 6360000002 HC RX W HCPCS: Performed by: PHYSICIAN ASSISTANT

## 2023-05-01 PROCEDURE — 82962 GLUCOSE BLOOD TEST: CPT

## 2023-05-01 PROCEDURE — 80053 COMPREHEN METABOLIC PANEL: CPT

## 2023-05-01 RX ORDER — ONDANSETRON 4 MG/1
4 TABLET, FILM COATED ORAL EVERY 8 HOURS PRN
Qty: 30 TABLET | Refills: 0 | Status: SHIPPED | OUTPATIENT
Start: 2023-05-01

## 2023-05-01 RX ORDER — ONDANSETRON 2 MG/ML
4 INJECTION INTRAMUSCULAR; INTRAVENOUS
Status: COMPLETED | OUTPATIENT
Start: 2023-05-01 | End: 2023-05-01

## 2023-05-01 RX ADMIN — IOPAMIDOL 80 ML: 612 INJECTION, SOLUTION INTRAVENOUS at 13:00

## 2023-05-01 RX ADMIN — ONDANSETRON 4 MG: 2 INJECTION INTRAMUSCULAR; INTRAVENOUS at 12:03

## 2023-05-01 ASSESSMENT — ENCOUNTER SYMPTOMS
NAUSEA: 1
SHORTNESS OF BREATH: 0
DIARRHEA: 1
VOMITING: 1
ABDOMINAL PAIN: 1

## 2023-05-01 NOTE — ED TRIAGE NOTES
Pt. Arrives to the ED endorsing nausea and vomting since yesterday. Pt. Denies any abdominal pain. Pt. Think she may have food poisoning.

## 2023-05-01 NOTE — ED PROVIDER NOTES
EMERGENCY DEPARTMENT HISTORY AND PHYSICAL EXAM    8:08 PM      Date: 5/1/2023  Patient Name: Patito Harrington    History of Presenting Illness     Chief Complaint   Patient presents with    Emesis     Since yesterday. History Provided By: Patient    Additional History (Context): Patito Harrington is a 44 y.o. female with   Past Medical History:   Diagnosis Date    Arthritis     Chest pain     Diabetes ([facility] [address]     Essential hypertension     Headache(784.0)     Hyperchloremia     Hypertension     Seizures ([facility] [address]     8/2020 last seizure    Seizures ([facility] [address]     SOB (shortness of breath)     Thyroid cancer ([facility] [address]    } who presents with c/o epigastric abdominal pain associated with n/v/d x 2 days. Pt denies fever/chills, chest pain, dyspnea, cough, dysuria, hematuria. Denies sick contacts, known exposure to [employer]. Denies family members with similar symptoms. Did not take any medication for symptoms PTA. PCP: Stas Barahona MD    No current facility-administered medications for this encounter. Current Outpatient Medications   Medication Sig Dispense Refill    ondansetron (ZOFRAN) 4 MG tablet Take 1 tablet by mouth every 8 hours as needed for Nausea or Vomiting 30 tablet 0    gabapentin (NEURONTIN) 100 MG capsule Take 4 capsules by mouth 3 times daily for 30 days. Max Daily Amount: 1,200 mg 360 capsule 0    glucose 4 g chewable tablet Take 4 tablets by mouth every 4 hours for 14 days Also okay to take additional 4 tablets as needed for hypoglycemia 336 tablet 0    lisinopril (PRINIVIL;ZESTRIL) 40 MG tablet Take 1 tablet by mouth every morning 90 tablet 3    glucagon, rDNA, 1 MG injection Inject 1 mg into the skin as needed for Low blood sugar (Blood glucose LESS THAN 60 mg/dL and glucose tabs did not bring it up enough) For BS under 60, take 4 glucose tabs, re-check in 1 hour. If glucose is still <60, inject glucagon. Recheck again in 1 hour.  If glucose is still under 60, go to ER 7 each 0    blood glucose

## 2023-05-03 LAB
BACTERIA SPEC CULT: NORMAL
CC UR VC: NORMAL
SERVICE CMNT-IMP: NORMAL

## 2023-05-04 ENCOUNTER — HOSPITAL ENCOUNTER (EMERGENCY)
Facility: HOSPITAL | Age: 38
Discharge: HOME | End: 2023-05-04
Attending: EMERGENCY MEDICINE
Payer: MEDICAID

## 2023-05-04 ENCOUNTER — APPOINTMENT (OUTPATIENT)
Facility: HOSPITAL | Age: 38
End: 2023-05-04
Payer: MEDICAID

## 2023-05-04 VITALS
OXYGEN SATURATION: 100 % | DIASTOLIC BLOOD PRESSURE: 88 MMHG | BODY MASS INDEX: 35.44 KG/M2 | SYSTOLIC BLOOD PRESSURE: 134 MMHG | WEIGHT: 240 LBS | RESPIRATION RATE: 16 BRPM | HEART RATE: 98 BPM | TEMPERATURE: 97.7 F

## 2023-05-04 DIAGNOSIS — R10.13 ABDOMINAL PAIN, EPIGASTRIC: Primary | ICD-10-CM

## 2023-05-04 LAB
ALBUMIN SERPL-MCNC: 3 G/DL (ref 3.4–5)
ALBUMIN/GLOB SERPL: 0.6 (ref 0.8–1.7)
ALP SERPL-CCNC: 91 U/L (ref 45–117)
ALT SERPL-CCNC: 19 U/L (ref 13–56)
AMPHET UR QL SCN: NEGATIVE
ANION GAP SERPL CALC-SCNC: 5 MMOL/L (ref 3–18)
APPEARANCE UR: CLEAR
AST SERPL-CCNC: 14 U/L (ref 10–38)
BACTERIA URNS QL MICRO: NEGATIVE /HPF
BARBITURATES UR QL SCN: NEGATIVE
BASOPHILS # BLD: 0 K/UL (ref 0–0.1)
BASOPHILS NFR BLD: 0 % (ref 0–2)
BENZODIAZ UR QL: NEGATIVE
BILIRUB SERPL-MCNC: 0.6 MG/DL (ref 0.2–1)
BILIRUB UR QL: NEGATIVE
BUN SERPL-MCNC: 25 MG/DL (ref 7–18)
BUN/CREAT SERPL: 14 (ref 12–20)
CALCIUM SERPL-MCNC: 8.8 MG/DL (ref 8.5–10.1)
CANNABINOIDS UR QL SCN: POSITIVE
CHLORIDE SERPL-SCNC: 106 MMOL/L (ref 100–111)
CO2 SERPL-SCNC: 25 MMOL/L (ref 21–32)
COCAINE UR QL SCN: NEGATIVE
COLOR UR: YELLOW
CREAT SERPL-MCNC: 1.82 MG/DL (ref 0.6–1.3)
DIFFERENTIAL METHOD BLD: NORMAL
EOSINOPHIL # BLD: 0 K/UL (ref 0–0.4)
EOSINOPHIL NFR BLD: 0 % (ref 0–5)
EPITH CASTS URNS QL MICRO: NORMAL /LPF (ref 0–5)
ERYTHROCYTE [DISTWIDTH] IN BLOOD BY AUTOMATED COUNT: 13.5 % (ref 11.6–14.5)
GLOBULIN SER CALC-MCNC: 4.9 G/DL (ref 2–4)
GLUCOSE SERPL-MCNC: 220 MG/DL (ref 74–99)
GLUCOSE UR STRIP.AUTO-MCNC: NEGATIVE MG/DL
HCG SERPL QL: NEGATIVE
HCT VFR BLD AUTO: 38.1 % (ref 35–45)
HGB BLD-MCNC: 12.1 G/DL (ref 12–16)
HGB UR QL STRIP: ABNORMAL
IMM GRANULOCYTES # BLD AUTO: 0 K/UL (ref 0–0.04)
IMM GRANULOCYTES NFR BLD AUTO: 0 % (ref 0–0.5)
KETONES UR QL STRIP.AUTO: NEGATIVE MG/DL
LEUKOCYTE ESTERASE UR QL STRIP.AUTO: NEGATIVE
LIPASE SERPL-CCNC: 92 U/L (ref 73–393)
LYMPHOCYTES # BLD: 2.4 K/UL (ref 0.9–3.6)
LYMPHOCYTES NFR BLD: 26 % (ref 21–52)
Lab: ABNORMAL
MCH RBC QN AUTO: 26.5 PG (ref 24–34)
MCHC RBC AUTO-ENTMCNC: 31.8 G/DL (ref 31–37)
MCV RBC AUTO: 83.4 FL (ref 78–100)
METHADONE UR QL: NEGATIVE
MONOCYTES # BLD: 0.5 K/UL (ref 0.05–1.2)
MONOCYTES NFR BLD: 6 % (ref 3–10)
NEUTS SEG # BLD: 6 K/UL (ref 1.8–8)
NEUTS SEG NFR BLD: 68 % (ref 40–73)
NITRITE UR QL STRIP.AUTO: NEGATIVE
NRBC # BLD: 0 K/UL (ref 0–0.01)
NRBC BLD-RTO: 0 PER 100 WBC
OPIATES UR QL: NEGATIVE
PCP UR QL: NEGATIVE
PH UR STRIP: 5.5 (ref 5–8)
PLATELET # BLD AUTO: 384 K/UL (ref 135–420)
PMV BLD AUTO: 10.5 FL (ref 9.2–11.8)
POTASSIUM SERPL-SCNC: 3.9 MMOL/L (ref 3.5–5.5)
PROT SERPL-MCNC: 7.9 G/DL (ref 6.4–8.2)
PROT UR STRIP-MCNC: 300 MG/DL
RBC # BLD AUTO: 4.57 M/UL (ref 4.2–5.3)
RBC #/AREA URNS HPF: NEGATIVE /HPF (ref 0–5)
SODIUM SERPL-SCNC: 136 MMOL/L (ref 136–145)
SP GR UR REFRACTOMETRY: 1.01 (ref 1–1.03)
UROBILINOGEN UR QL STRIP.AUTO: 1 EU/DL (ref 0.2–1)
WBC # BLD AUTO: 8.9 K/UL (ref 4.6–13.2)
WBC URNS QL MICRO: NORMAL /HPF (ref 0–4)

## 2023-05-04 PROCEDURE — 87086 URINE CULTURE/COLONY COUNT: CPT

## 2023-05-04 PROCEDURE — 83690 ASSAY OF LIPASE: CPT

## 2023-05-04 PROCEDURE — 6370000000 HC RX 637 (ALT 250 FOR IP)

## 2023-05-04 PROCEDURE — 85025 COMPLETE CBC W/AUTO DIFF WBC: CPT

## 2023-05-04 PROCEDURE — 2580000003 HC RX 258

## 2023-05-04 PROCEDURE — 99284 EMERGENCY DEPT VISIT MOD MDM: CPT

## 2023-05-04 PROCEDURE — 80053 COMPREHEN METABOLIC PANEL: CPT

## 2023-05-04 PROCEDURE — 76705 ECHO EXAM OF ABDOMEN: CPT

## 2023-05-04 PROCEDURE — 81001 URINALYSIS AUTO W/SCOPE: CPT

## 2023-05-04 PROCEDURE — 80307 DRUG TEST PRSMV CHEM ANLYZR: CPT

## 2023-05-04 PROCEDURE — 84703 CHORIONIC GONADOTROPIN ASSAY: CPT

## 2023-05-04 PROCEDURE — 96360 HYDRATION IV INFUSION INIT: CPT

## 2023-05-04 RX ORDER — FAMOTIDINE 20 MG/1
20 TABLET, FILM COATED ORAL
Status: COMPLETED | OUTPATIENT
Start: 2023-05-04 | End: 2023-05-04

## 2023-05-04 RX ORDER — FAMOTIDINE 20 MG/1
20 TABLET, FILM COATED ORAL 2 TIMES DAILY
Qty: 20 TABLET | Refills: 0 | Status: SHIPPED | OUTPATIENT
Start: 2023-05-04 | End: 2023-05-14

## 2023-05-04 RX ORDER — 0.9 % SODIUM CHLORIDE 0.9 %
500 INTRAVENOUS SOLUTION INTRAVENOUS ONCE
Status: COMPLETED | OUTPATIENT
Start: 2023-05-04 | End: 2023-05-04

## 2023-05-04 RX ADMIN — FAMOTIDINE 20 MG: 20 TABLET, FILM COATED ORAL at 11:01

## 2023-05-04 RX ADMIN — SODIUM CHLORIDE 500 ML: 9 INJECTION, SOLUTION INTRAVENOUS at 12:31

## 2023-05-04 ASSESSMENT — ENCOUNTER SYMPTOMS
ABDOMINAL DISTENTION: 0
NAUSEA: 1
DIARRHEA: 1
ABDOMINAL PAIN: 1
RHINORRHEA: 0
VOMITING: 1

## 2023-05-04 ASSESSMENT — PAIN DESCRIPTION - LOCATION: LOCATION: ABDOMEN

## 2023-05-04 NOTE — ED TRIAGE NOTES
Pt arrived with c/o of N/V/D and abdominal pain since Sunday. Pt was seen in the ED for same symptoms on Monday, prescribed zofran with no relief. Pt states she has continue to have episodes of vomiting and diarrhea.

## 2023-05-04 NOTE — ED NOTES
Pt reports loose stool x4. Had 3x loose stools at home and once here. Denies feeling nauseated at this time. Medicated per MAR. Saltine crackers given plus ginger ale.      Jeff Espinoza RN  05/04/23 0262

## 2023-05-04 NOTE — ED PROVIDER NOTES
EMERGENCY DEPARTMENT HISTORY AND PHYSICAL EXAM        Date: 5/4/2023  Patient Name: Prosper Soria    History of Presenting Illness     Chief Complaint   Patient presents with    Abdominal Pain    Emesis       History Provided By: Patient     HPI: Prosper Soria, 44 y.o. female PMHx significant for for diabetes, hypertension, seizure disorder presents ambulatory to the ED with cc of abdominal pain. Patient was seen in ER on 5/1/2023 and had a full work-up including a CT with IV contrast which was all negative. Patient was discharged with Zofran and symptomatic management encouraged to follow-up with PCP. Patient stated that she never followed up with PCP but has been having continual nausea, vomiting, abdominal pain, and diarrhea. Patient describes her abdominal pain in the epigastric region that leaves a sour taste in her mouth. patient states that her symptoms are not relieved by the Zofran. Patient does admit to use of daily marijuana and is unsure of things make her symptoms worse. Patient has never had a colonoscopy/endoscopy. Patient denies blood in her stool, dark tarry stools, hematemesis, prior abdominal surgeries, NSAID use, recent antibiotic         There are no other complaints, changes, or physical findings at this time. PCP: Gosia Rand MD    No current facility-administered medications on file prior to encounter. Current Outpatient Medications on File Prior to Encounter   Medication Sig Dispense Refill    ondansetron (ZOFRAN) 4 MG tablet Take 1 tablet by mouth every 8 hours as needed for Nausea or Vomiting 30 tablet 0    gabapentin (NEURONTIN) 100 MG capsule Take 4 capsules by mouth 3 times daily for 30 days.  Max Daily Amount: 1,200 mg 360 capsule 0    glucose 4 g chewable tablet Take 4 tablets by mouth every 4 hours for 14 days Also okay to take additional 4 tablets as needed for hypoglycemia 336 tablet 0    lisinopril (PRINIVIL;ZESTRIL) 40 MG tablet Take 1 tablet by

## 2023-05-04 NOTE — ED NOTES
Pt is seen and discharged by provider. IV removed. Discharge papers with instructions given.      Cassandra Ponce RN  05/04/23 [id number]

## 2023-05-05 LAB
BACTERIA SPEC CULT: NORMAL
CC UR VC: NORMAL
SERVICE CMNT-IMP: NORMAL

## 2023-06-05 ENCOUNTER — HOSPITAL ENCOUNTER (EMERGENCY)
Facility: HOSPITAL | Age: 38
Discharge: HOME | End: 2023-06-05
Attending: EMERGENCY MEDICINE
Payer: MEDICAID

## 2023-06-05 ENCOUNTER — APPOINTMENT (OUTPATIENT)
Facility: HOSPITAL | Age: 38
End: 2023-06-05
Payer: MEDICAID

## 2023-06-05 VITALS
HEART RATE: 91 BPM | SYSTOLIC BLOOD PRESSURE: 134 MMHG | WEIGHT: 259 LBS | DIASTOLIC BLOOD PRESSURE: 84 MMHG | TEMPERATURE: 98.8 F | RESPIRATION RATE: 15 BRPM | HEIGHT: 69 IN | BODY MASS INDEX: 38.36 KG/M2 | OXYGEN SATURATION: 100 %

## 2023-06-05 DIAGNOSIS — G40.909 SEIZURE DISORDER (HCC): Primary | ICD-10-CM

## 2023-06-05 LAB
ALBUMIN SERPL-MCNC: 2.7 G/DL (ref 3.4–5)
ALBUMIN/GLOB SERPL: 0.5 (ref 0.8–1.7)
ALP SERPL-CCNC: 100 U/L (ref 45–117)
ALT SERPL-CCNC: 23 U/L (ref 13–56)
ANION GAP SERPL CALC-SCNC: 7 MMOL/L (ref 3–18)
APPEARANCE UR: CLEAR
AST SERPL-CCNC: 15 U/L (ref 10–38)
BACTERIA URNS QL MICRO: ABNORMAL /HPF
BASOPHILS # BLD: 0 K/UL (ref 0–0.1)
BASOPHILS NFR BLD: 0 % (ref 0–2)
BILIRUB SERPL-MCNC: 0.4 MG/DL (ref 0.2–1)
BILIRUB UR QL: NEGATIVE
BUN SERPL-MCNC: 23 MG/DL (ref 7–18)
BUN/CREAT SERPL: 16 (ref 12–20)
CALCIUM SERPL-MCNC: 9.2 MG/DL (ref 8.5–10.1)
CHLORIDE SERPL-SCNC: 103 MMOL/L (ref 100–111)
CO2 SERPL-SCNC: 23 MMOL/L (ref 21–32)
COLOR UR: YELLOW
CREAT SERPL-MCNC: 1.48 MG/DL (ref 0.6–1.3)
D DIMER PPP FEU-MCNC: 0.46 UG/ML(FEU)
DIFFERENTIAL METHOD BLD: NORMAL
EOSINOPHIL # BLD: 0 K/UL (ref 0–0.4)
EOSINOPHIL NFR BLD: 0 % (ref 0–5)
EPITH CASTS URNS QL MICRO: ABNORMAL /LPF (ref 0–5)
ERYTHROCYTE [DISTWIDTH] IN BLOOD BY AUTOMATED COUNT: 14 % (ref 11.6–14.5)
GLOBULIN SER CALC-MCNC: 5.4 G/DL (ref 2–4)
GLUCOSE SERPL-MCNC: 353 MG/DL (ref 74–99)
GLUCOSE UR STRIP.AUTO-MCNC: >1000 MG/DL
HCG SERPL QL: NEGATIVE
HCT VFR BLD AUTO: 37.7 % (ref 35–45)
HGB BLD-MCNC: 12.1 G/DL (ref 12–16)
HGB UR QL STRIP: ABNORMAL
IMM GRANULOCYTES # BLD AUTO: 0 K/UL (ref 0–0.04)
IMM GRANULOCYTES NFR BLD AUTO: 0 % (ref 0–0.5)
KETONES UR QL STRIP.AUTO: NEGATIVE MG/DL
LEUKOCYTE ESTERASE UR QL STRIP.AUTO: NEGATIVE
LYMPHOCYTES # BLD: 3 K/UL (ref 0.9–3.6)
LYMPHOCYTES NFR BLD: 42 % (ref 21–52)
MCH RBC QN AUTO: 26.6 PG (ref 24–34)
MCHC RBC AUTO-ENTMCNC: 32.1 G/DL (ref 31–37)
MCV RBC AUTO: 82.9 FL (ref 78–100)
MONOCYTES # BLD: 0.5 K/UL (ref 0.05–1.2)
MONOCYTES NFR BLD: 8 % (ref 3–10)
NEUTS SEG # BLD: 3.5 K/UL (ref 1.8–8)
NEUTS SEG NFR BLD: 49 % (ref 40–73)
NITRITE UR QL STRIP.AUTO: NEGATIVE
NRBC # BLD: 0 K/UL (ref 0–0.01)
NRBC BLD-RTO: 0 PER 100 WBC
PH UR STRIP: 6 (ref 5–8)
PLATELET # BLD AUTO: 358 K/UL (ref 135–420)
PMV BLD AUTO: 10.6 FL (ref 9.2–11.8)
POTASSIUM SERPL-SCNC: 4.1 MMOL/L (ref 3.5–5.5)
PROT SERPL-MCNC: 8.1 G/DL (ref 6.4–8.2)
PROT UR STRIP-MCNC: 300 MG/DL
RBC # BLD AUTO: 4.55 M/UL (ref 4.2–5.3)
RBC #/AREA URNS HPF: ABNORMAL /HPF (ref 0–5)
SODIUM SERPL-SCNC: 133 MMOL/L (ref 136–145)
SP GR UR REFRACTOMETRY: 1.01 (ref 1–1.03)
UROBILINOGEN UR QL STRIP.AUTO: 1 EU/DL (ref 0.2–1)
WBC # BLD AUTO: 7.1 K/UL (ref 4.6–13.2)
WBC URNS QL MICRO: ABNORMAL /HPF (ref 0–4)

## 2023-06-05 PROCEDURE — 94761 N-INVAS EAR/PLS OXIMETRY MLT: CPT

## 2023-06-05 PROCEDURE — 2580000003 HC RX 258: Performed by: EMERGENCY MEDICINE

## 2023-06-05 PROCEDURE — 84703 CHORIONIC GONADOTROPIN ASSAY: CPT

## 2023-06-05 PROCEDURE — 85025 COMPLETE CBC W/AUTO DIFF WBC: CPT

## 2023-06-05 PROCEDURE — 81001 URINALYSIS AUTO W/SCOPE: CPT

## 2023-06-05 PROCEDURE — 6360000002 HC RX W HCPCS: Performed by: EMERGENCY MEDICINE

## 2023-06-05 PROCEDURE — 85379 FIBRIN DEGRADATION QUANT: CPT

## 2023-06-05 PROCEDURE — 71045 X-RAY EXAM CHEST 1 VIEW: CPT

## 2023-06-05 PROCEDURE — 80053 COMPREHEN METABOLIC PANEL: CPT

## 2023-06-05 PROCEDURE — 99285 EMERGENCY DEPT VISIT HI MDM: CPT

## 2023-06-05 PROCEDURE — 93005 ELECTROCARDIOGRAM TRACING: CPT | Performed by: EMERGENCY MEDICINE

## 2023-06-05 PROCEDURE — 96374 THER/PROPH/DIAG INJ IV PUSH: CPT

## 2023-06-05 RX ORDER — NAPROXEN 500 MG/1
500 TABLET ORAL 2 TIMES DAILY
Qty: 20 TABLET | Refills: 0 | Status: SHIPPED | OUTPATIENT
Start: 2023-06-05

## 2023-06-05 RX ORDER — SODIUM CHLORIDE, SODIUM LACTATE, POTASSIUM CHLORIDE, AND CALCIUM CHLORIDE .6; .31; .03; .02 G/100ML; G/100ML; G/100ML; G/100ML
1000 INJECTION, SOLUTION INTRAVENOUS ONCE
Status: COMPLETED | OUTPATIENT
Start: 2023-06-05 | End: 2023-06-05

## 2023-06-05 RX ORDER — KETOROLAC TROMETHAMINE 15 MG/ML
15 INJECTION, SOLUTION INTRAMUSCULAR; INTRAVENOUS ONCE
Status: COMPLETED | OUTPATIENT
Start: 2023-06-05 | End: 2023-06-05

## 2023-06-05 RX ADMIN — KETOROLAC TROMETHAMINE 15 MG: 15 INJECTION, SOLUTION INTRAMUSCULAR; INTRAVENOUS at 14:43

## 2023-06-05 RX ADMIN — SODIUM CHLORIDE, POTASSIUM CHLORIDE, SODIUM LACTATE AND CALCIUM CHLORIDE 1000 ML: 600; 310; 30; 20 INJECTION, SOLUTION INTRAVENOUS at 14:43

## 2023-06-05 ASSESSMENT — PAIN SCALES - GENERAL: PAINLEVEL_OUTOF10: 8

## 2023-06-05 ASSESSMENT — PAIN DESCRIPTION - LOCATION: LOCATION: FLANK;CHEST

## 2023-06-05 ASSESSMENT — ENCOUNTER SYMPTOMS
SHORTNESS OF BREATH: 0
COUGH: 0

## 2023-06-05 ASSESSMENT — PAIN - FUNCTIONAL ASSESSMENT: PAIN_FUNCTIONAL_ASSESSMENT: NONE - DENIES PAIN

## 2023-06-05 NOTE — ED PROVIDER NOTES
(shortness of breath)     Thyroid cancer [facility])        Past Surgical History:  Past Surgical History:   Procedure Laterality Date    CT BIOPSY RENAL  7/30/2020    CT BIOPSY RENAL 7/30/2020 [facility] RAD CT    PARTIAL HYSTERECTOMY (CERVIX NOT REMOVED)      THYROIDECTOMY      TUBAL LIGATION         Family History:  Family History   Problem Relation Age of Onset    Hypertension Mother        Social History:  Social History     Tobacco Use    Smoking status: Former    Smokeless tobacco: Never   Substance Use Topics    Alcohol use: No    Drug use: No       Allergies: Allergies   Allergen Reactions    Blueberry Itching    Mushroom Extract Complex Hives and Itching    Tomato Itching         Review of Systems       Review of Systems   Constitutional:  Negative for chills, fatigue and fever. Respiratory:  Negative for cough and shortness of breath. Cardiovascular:  Positive for chest pain. Negative for palpitations and leg swelling. Neurological:  Positive for seizures. Negative for syncope, light-headedness and headaches. Physical Exam   BP (!) 167/67   Pulse 95   Temp 98.8 °F (37.1 °C) (Oral)   Resp 17   Ht 5' 9\" (1.753 m)   Wt 259 lb (117.5 kg)   SpO2 100%   BMI 38.25 kg/m²     Physical Exam  Constitutional:       Appearance: She is obese. HENT:      Head: Normocephalic and atraumatic. Mouth/Throat:      Mouth: Mucous membranes are dry. Eyes:      Extraocular Movements: Extraocular movements intact. Conjunctiva/sclera: Conjunctivae normal.   Cardiovascular:      Rate and Rhythm: Normal rate and regular rhythm. Pulses: Normal pulses. Heart sounds: Normal heart sounds. Pulmonary:      Effort: Pulmonary effort is normal.      Breath sounds: Normal breath sounds. Abdominal:      General: Abdomen is flat. Palpations: Abdomen is soft. Musculoskeletal:         General: Normal range of motion. Cervical back: Normal range of motion. Skin:     General: Skin is warm and dry.

## 2023-06-05 NOTE — ED NOTES
PATIENT VISITED BY RELATIVE. PATIENT MEDICATED AS PER MAR, AND INFORMED OF EFFECTS OF MEDICATION.      PATIENT HAD CXR DONE      Christa Kumari RN  06/05/23 5847

## 2023-06-05 NOTE — ED NOTES
PATIENT REVIEWED AND DISCHARGE FOR HOME, DISCHARGE INSTRUCTION GIVEN. PERIPHERAL IV REMOVED. PATIENT LEFT FOR HOME IN NIL DISTRESS.       Megan Samuel RN  06/05/23 3120

## 2023-06-05 NOTE — ED TRIAGE NOTES
Pt arrived via EMS for seizure like activtiy. Pt had an endoscopy on Friday that was negative but pt has pain to left flank under left breast pain. Pt thinks the pain caused her to have a seizure. Hx noted. Pt arrived via EMS.

## 2023-06-06 LAB
EKG ATRIAL RATE: 94 BPM
EKG DIAGNOSIS: NORMAL
EKG P AXIS: 60 DEGREES
EKG P-R INTERVAL: 136 MS
EKG Q-T INTERVAL: 356 MS
EKG QRS DURATION: 78 MS
EKG QTC CALCULATION (BAZETT): 445 MS
EKG R AXIS: 23 DEGREES
EKG T AXIS: 46 DEGREES
EKG VENTRICULAR RATE: 94 BPM

## 2023-06-06 PROCEDURE — 93010 ELECTROCARDIOGRAM REPORT: CPT | Performed by: INTERNAL MEDICINE

## 2023-06-08 ENCOUNTER — HOSPITAL ENCOUNTER (OUTPATIENT)
Facility: HOSPITAL | Age: 38
Discharge: HOME | End: 2023-06-10
Attending: PODIATRIST
Payer: MEDICAID

## 2023-06-08 DIAGNOSIS — E11.52 TYPE 2 DIABETES MELLITUS WITH DIABETIC PERIPHERAL ANGIOPATHY WITH GANGRENE, UNSPECIFIED WHETHER LONG TERM INSULIN USE (HCC): ICD-10-CM

## 2023-06-08 PROCEDURE — 93922 UPR/L XTREMITY ART 2 LEVELS: CPT

## 2023-06-30 ENCOUNTER — HOSPITAL ENCOUNTER (OUTPATIENT)
Facility: HOSPITAL | Age: 38
End: 2023-06-30
Payer: MEDICAID

## 2023-06-30 DIAGNOSIS — M14.671 CHARCOT'S JOINT OF ANKLE, RIGHT: ICD-10-CM

## 2023-06-30 PROCEDURE — 73610 X-RAY EXAM OF ANKLE: CPT

## 2023-07-10 ENCOUNTER — APPOINTMENT (OUTPATIENT)
Facility: HOSPITAL | Age: 38
End: 2023-07-10
Payer: MEDICAID

## 2023-07-10 ENCOUNTER — HOSPITAL ENCOUNTER (EMERGENCY)
Facility: HOSPITAL | Age: 38
Discharge: HOME | End: 2023-07-10
Attending: EMERGENCY MEDICINE
Payer: MEDICAID

## 2023-07-10 VITALS
RESPIRATION RATE: 14 BRPM | TEMPERATURE: 98 F | HEIGHT: 68 IN | HEART RATE: 98 BPM | OXYGEN SATURATION: 100 % | WEIGHT: 250 LBS | BODY MASS INDEX: 37.89 KG/M2 | SYSTOLIC BLOOD PRESSURE: 116 MMHG | DIASTOLIC BLOOD PRESSURE: 88 MMHG

## 2023-07-10 DIAGNOSIS — S49.92XA ARM INJURY, LEFT, INITIAL ENCOUNTER: ICD-10-CM

## 2023-07-10 DIAGNOSIS — S09.90XA CLOSED HEAD INJURY, INITIAL ENCOUNTER: ICD-10-CM

## 2023-07-10 DIAGNOSIS — G40.919 BREAKTHROUGH SEIZURE (HCC): Primary | ICD-10-CM

## 2023-07-10 LAB
ALBUMIN SERPL-MCNC: 2.3 G/DL (ref 3.4–5)
ALBUMIN/GLOB SERPL: 0.5 (ref 0.8–1.7)
ALP SERPL-CCNC: 98 U/L (ref 45–117)
ALT SERPL-CCNC: 30 U/L (ref 13–56)
ANION GAP SERPL CALC-SCNC: 3 MMOL/L (ref 3–18)
AST SERPL-CCNC: 27 U/L (ref 10–38)
BASOPHILS # BLD: 0 K/UL (ref 0–0.1)
BASOPHILS NFR BLD: 0 % (ref 0–2)
BILIRUB SERPL-MCNC: 0.4 MG/DL (ref 0.2–1)
BUN SERPL-MCNC: 13 MG/DL (ref 7–18)
BUN/CREAT SERPL: 13 (ref 12–20)
CALCIUM SERPL-MCNC: 8.9 MG/DL (ref 8.5–10.1)
CHLORIDE SERPL-SCNC: 107 MMOL/L (ref 100–111)
CO2 SERPL-SCNC: 29 MMOL/L (ref 21–32)
CREAT SERPL-MCNC: 1.04 MG/DL (ref 0.6–1.3)
DIFFERENTIAL METHOD BLD: ABNORMAL
EOSINOPHIL # BLD: 0 K/UL (ref 0–0.4)
EOSINOPHIL NFR BLD: 0 % (ref 0–5)
ERYTHROCYTE [DISTWIDTH] IN BLOOD BY AUTOMATED COUNT: 13.8 % (ref 11.6–14.5)
GLOBULIN SER CALC-MCNC: 5.1 G/DL (ref 2–4)
GLUCOSE BLD STRIP.AUTO-MCNC: 77 MG/DL (ref 70–110)
GLUCOSE SERPL-MCNC: 98 MG/DL (ref 74–99)
HCG SERPL QL: NEGATIVE
HCT VFR BLD AUTO: 39.5 % (ref 35–45)
HGB BLD-MCNC: 12.6 G/DL (ref 12–16)
IMM GRANULOCYTES # BLD AUTO: 0 K/UL (ref 0–0.04)
IMM GRANULOCYTES NFR BLD AUTO: 1 % (ref 0–0.5)
LYMPHOCYTES # BLD: 3.2 K/UL (ref 0.9–3.6)
LYMPHOCYTES NFR BLD: 40 % (ref 21–52)
MAGNESIUM SERPL-MCNC: 2.1 MG/DL (ref 1.6–2.6)
MCH RBC QN AUTO: 26.8 PG (ref 24–34)
MCHC RBC AUTO-ENTMCNC: 31.9 G/DL (ref 31–37)
MCV RBC AUTO: 84 FL (ref 78–100)
MONOCYTES # BLD: 0.6 K/UL (ref 0.05–1.2)
MONOCYTES NFR BLD: 7 % (ref 3–10)
NEUTS SEG # BLD: 4.1 K/UL (ref 1.8–8)
NEUTS SEG NFR BLD: 52 % (ref 40–73)
NRBC # BLD: 0 K/UL (ref 0–0.01)
NRBC BLD-RTO: 0 PER 100 WBC
PLATELET # BLD AUTO: 384 K/UL (ref 135–420)
PMV BLD AUTO: 10.4 FL (ref 9.2–11.8)
POTASSIUM SERPL-SCNC: 3.6 MMOL/L (ref 3.5–5.5)
PROT SERPL-MCNC: 7.4 G/DL (ref 6.4–8.2)
RBC # BLD AUTO: 4.7 M/UL (ref 4.2–5.3)
SODIUM SERPL-SCNC: 139 MMOL/L (ref 136–145)
WBC # BLD AUTO: 7.9 K/UL (ref 4.6–13.2)

## 2023-07-10 PROCEDURE — 99284 EMERGENCY DEPT VISIT MOD MDM: CPT

## 2023-07-10 PROCEDURE — 73080 X-RAY EXAM OF ELBOW: CPT

## 2023-07-10 PROCEDURE — 83735 ASSAY OF MAGNESIUM: CPT

## 2023-07-10 PROCEDURE — 6360000002 HC RX W HCPCS: Performed by: PHYSICIAN ASSISTANT

## 2023-07-10 PROCEDURE — 72125 CT NECK SPINE W/O DYE: CPT

## 2023-07-10 PROCEDURE — 82962 GLUCOSE BLOOD TEST: CPT

## 2023-07-10 PROCEDURE — 84703 CHORIONIC GONADOTROPIN ASSAY: CPT

## 2023-07-10 PROCEDURE — 94761 N-INVAS EAR/PLS OXIMETRY MLT: CPT

## 2023-07-10 PROCEDURE — 2580000003 HC RX 258: Performed by: PHYSICIAN ASSISTANT

## 2023-07-10 PROCEDURE — 70450 CT HEAD/BRAIN W/O DYE: CPT

## 2023-07-10 PROCEDURE — 96361 HYDRATE IV INFUSION ADD-ON: CPT

## 2023-07-10 PROCEDURE — 80053 COMPREHEN METABOLIC PANEL: CPT

## 2023-07-10 PROCEDURE — 96374 THER/PROPH/DIAG INJ IV PUSH: CPT

## 2023-07-10 PROCEDURE — 85025 COMPLETE CBC W/AUTO DIFF WBC: CPT

## 2023-07-10 RX ORDER — LEVETIRACETAM 500 MG/5ML
1000 INJECTION, SOLUTION, CONCENTRATE INTRAVENOUS
Status: COMPLETED | OUTPATIENT
Start: 2023-07-10 | End: 2023-07-10

## 2023-07-10 RX ORDER — LIOTHYRONINE SODIUM 50 UG/1
50 TABLET ORAL DAILY
COMMUNITY

## 2023-07-10 RX ORDER — AMLODIPINE BESYLATE 10 MG/1
10 TABLET ORAL DAILY
COMMUNITY

## 2023-07-10 RX ORDER — GLIPIZIDE 5 MG/1
5 TABLET ORAL DAILY
COMMUNITY

## 2023-07-10 RX ORDER — OMEPRAZOLE 20 MG/1
20 CAPSULE, DELAYED RELEASE ORAL 2 TIMES DAILY
COMMUNITY

## 2023-07-10 RX ORDER — 0.9 % SODIUM CHLORIDE 0.9 %
1000 INTRAVENOUS SOLUTION INTRAVENOUS ONCE
Status: COMPLETED | OUTPATIENT
Start: 2023-07-10 | End: 2023-07-10

## 2023-07-10 RX ORDER — PIOGLITAZONEHYDROCHLORIDE 30 MG/1
30 TABLET ORAL DAILY
COMMUNITY

## 2023-07-10 RX ORDER — GABAPENTIN 400 MG/1
400 CAPSULE ORAL DAILY
COMMUNITY

## 2023-07-10 RX ADMIN — LEVETIRACETAM 1000 MG: 100 INJECTION, SOLUTION INTRAVENOUS at 10:07

## 2023-07-10 RX ADMIN — SODIUM CHLORIDE 1000 ML: 9 INJECTION, SOLUTION INTRAVENOUS at 10:07

## 2023-07-10 ASSESSMENT — ENCOUNTER SYMPTOMS
SHORTNESS OF BREATH: 0
VOMITING: 0
WHEEZING: 0
SORE THROAT: 0
NAUSEA: 0
ABDOMINAL DISTENTION: 0
EYE DISCHARGE: 0
DIARRHEA: 0

## 2023-07-10 ASSESSMENT — PAIN DESCRIPTION - ORIENTATION: ORIENTATION: LEFT

## 2023-07-10 ASSESSMENT — PAIN SCALES - GENERAL: PAINLEVEL_OUTOF10: 10

## 2023-07-10 ASSESSMENT — PAIN - FUNCTIONAL ASSESSMENT: PAIN_FUNCTIONAL_ASSESSMENT: 0-10

## 2023-07-10 ASSESSMENT — PAIN DESCRIPTION - DESCRIPTORS: DESCRIPTORS: ACHING

## 2023-07-10 NOTE — ED PROVIDER NOTES
MCG tablet Take 1 tablet by mouth daily Take half an hour before food or medications. Do not take at the same time as pantoprazole or omeprazole    blood glucose monitor kit and supplies Dispense sufficient amount for indicated testing frequency plus additional to accommodate PRN testing needs. Dispense all needed supplies to include: monitor, strips, lancing device, lancets, control solutions, alcohol swabs. acetaminophen (TYLENOL) 500 MG tablet Take 2 tablets by mouth every 6 hours as needed    lidocaine (LIDODERM) 5 % Apply patch to the affected area for 12 hours a day and remove for 12 hours a day. pantoprazole (PROTONIX) 40 MG tablet ceived the following from [address] - [facility]: Outside name: pantoprazole (PROTONIX) 40 mg tablet    topiramate (TOPAMAX) 50 MG tablet ceived the following from [facility] - [facility]: Outside name: topiramate (TOPAMAX) 50 mg tablet       Disposition:  Home     DISCHARGE NOTE:   Pt has been reexamined. Patient has no new complaints, changes, or physical findings. Care plan outlined and precautions discussed. Results of workup were reviewed with the patient. All medications were reviewed with the patient. All of pt's questions and concerns were addressed. Patient was instructed and agrees to follow up with PCP/neurology as well as to return to the ED upon further deterioration. Patient is ready to go home. Medication List        CONTINUE taking these medications      * blood glucose monitor kit and supplies  Dispense sufficient amount for indicated testing frequency plus additional to accommodate PRN testing needs. Dispense all needed supplies to include: monitor, strips, lancing device, lancets, control solutions, alcohol swabs. * blood glucose monitor kit and supplies  Dispense sufficient amount for indicated testing frequency plus additional to accommodate PRN testing needs.  Dispense all needed supplies to include: monitor, strips, lancing

## 2023-08-01 ENCOUNTER — APPOINTMENT (OUTPATIENT)
Facility: HOSPITAL | Age: 38
End: 2023-08-01
Payer: MEDICAID

## 2023-08-01 ENCOUNTER — HOSPITAL ENCOUNTER (OUTPATIENT)
Facility: HOSPITAL | Age: 38
Setting detail: OBSERVATION
LOS: 1 days | Discharge: HOME | End: 2023-08-03
Attending: EMERGENCY MEDICINE
Payer: MEDICAID

## 2023-08-01 DIAGNOSIS — G40.919 BREAKTHROUGH SEIZURE (HCC): Primary | ICD-10-CM

## 2023-08-01 DIAGNOSIS — E16.2 HYPOGLYCEMIA: ICD-10-CM

## 2023-08-01 PROBLEM — E11.649 HYPOGLYCEMIA ASSOCIATED WITH DIABETES (HCC): Status: ACTIVE | Noted: 2023-08-01

## 2023-08-01 LAB
ALBUMIN SERPL-MCNC: 3 G/DL (ref 3.4–5)
ALBUMIN/GLOB SERPL: 0.6 (ref 0.8–1.7)
ALP SERPL-CCNC: 105 U/L (ref 45–117)
ALT SERPL-CCNC: 22 U/L (ref 13–56)
ANION GAP SERPL CALC-SCNC: 4 MMOL/L (ref 3–18)
APPEARANCE UR: CLEAR
AST SERPL-CCNC: 22 U/L (ref 10–38)
BACTERIA URNS QL MICRO: ABNORMAL /HPF
BASOPHILS # BLD: 0 K/UL (ref 0–0.1)
BASOPHILS NFR BLD: 0 % (ref 0–2)
BILIRUB SERPL-MCNC: 0.4 MG/DL (ref 0.2–1)
BILIRUB UR QL: NEGATIVE
BUN SERPL-MCNC: 17 MG/DL (ref 7–18)
BUN/CREAT SERPL: 13 (ref 12–20)
CALCIUM SERPL-MCNC: 9.2 MG/DL (ref 8.5–10.1)
CHLORIDE SERPL-SCNC: 111 MMOL/L (ref 100–111)
CO2 SERPL-SCNC: 23 MMOL/L (ref 21–32)
COLOR UR: YELLOW
CREAT SERPL-MCNC: 1.29 MG/DL (ref 0.6–1.3)
DIFFERENTIAL METHOD BLD: ABNORMAL
EOSINOPHIL # BLD: 0 K/UL (ref 0–0.4)
EOSINOPHIL NFR BLD: 0 % (ref 0–5)
EPITH CASTS URNS QL MICRO: ABNORMAL /LPF (ref 0–5)
ERYTHROCYTE [DISTWIDTH] IN BLOOD BY AUTOMATED COUNT: 14.3 % (ref 11.6–14.5)
GLOBULIN SER CALC-MCNC: 5 G/DL (ref 2–4)
GLUCOSE BLD STRIP.AUTO-MCNC: 111 MG/DL (ref 70–110)
GLUCOSE BLD STRIP.AUTO-MCNC: 55 MG/DL (ref 70–110)
GLUCOSE BLD STRIP.AUTO-MCNC: 57 MG/DL (ref 70–110)
GLUCOSE BLD STRIP.AUTO-MCNC: 59 MG/DL (ref 70–110)
GLUCOSE BLD STRIP.AUTO-MCNC: 59 MG/DL (ref 70–110)
GLUCOSE BLD STRIP.AUTO-MCNC: 61 MG/DL (ref 70–110)
GLUCOSE BLD STRIP.AUTO-MCNC: 62 MG/DL (ref 70–110)
GLUCOSE BLD STRIP.AUTO-MCNC: 71 MG/DL (ref 70–110)
GLUCOSE BLD STRIP.AUTO-MCNC: 74 MG/DL (ref 70–110)
GLUCOSE BLD STRIP.AUTO-MCNC: 74 MG/DL (ref 70–110)
GLUCOSE BLD STRIP.AUTO-MCNC: 79 MG/DL (ref 70–110)
GLUCOSE BLD STRIP.AUTO-MCNC: 94 MG/DL (ref 70–110)
GLUCOSE BLD STRIP.AUTO-MCNC: 95 MG/DL (ref 70–110)
GLUCOSE SERPL-MCNC: 50 MG/DL (ref 74–99)
GLUCOSE UR STRIP.AUTO-MCNC: NEGATIVE MG/DL
HCG UR QL: NEGATIVE
HCT VFR BLD AUTO: 40.7 % (ref 35–45)
HGB BLD-MCNC: 13 G/DL (ref 12–16)
HGB UR QL STRIP: ABNORMAL
HYALINE CASTS URNS QL MICRO: ABNORMAL /LPF (ref 0–2)
IMM GRANULOCYTES # BLD AUTO: 0 K/UL (ref 0–0.04)
IMM GRANULOCYTES NFR BLD AUTO: 0 % (ref 0–0.5)
KETONES UR QL STRIP.AUTO: NEGATIVE MG/DL
LEUKOCYTE ESTERASE UR QL STRIP.AUTO: NEGATIVE
LYMPHOCYTES # BLD: 4.1 K/UL (ref 0.9–3.6)
LYMPHOCYTES NFR BLD: 46 % (ref 21–52)
MAGNESIUM SERPL-MCNC: 2.3 MG/DL (ref 1.6–2.6)
MCH RBC QN AUTO: 26.2 PG (ref 24–34)
MCHC RBC AUTO-ENTMCNC: 31.9 G/DL (ref 31–37)
MCV RBC AUTO: 81.9 FL (ref 78–100)
MONOCYTES # BLD: 0.5 K/UL (ref 0.05–1.2)
MONOCYTES NFR BLD: 5 % (ref 3–10)
NEUTS SEG # BLD: 4.3 K/UL (ref 1.8–8)
NEUTS SEG NFR BLD: 48 % (ref 40–73)
NITRITE UR QL STRIP.AUTO: NEGATIVE
NRBC # BLD: 0 K/UL (ref 0–0.01)
NRBC BLD-RTO: 0 PER 100 WBC
NT PRO BNP: 164 PG/ML (ref 0–450)
PH UR STRIP: 5.5 (ref 5–8)
PLATELET # BLD AUTO: 389 K/UL (ref 135–420)
PMV BLD AUTO: 10.2 FL (ref 9.2–11.8)
POTASSIUM SERPL-SCNC: 4.2 MMOL/L (ref 3.5–5.5)
PROT SERPL-MCNC: 8 G/DL (ref 6.4–8.2)
PROT UR STRIP-MCNC: >1000 MG/DL
RBC # BLD AUTO: 4.97 M/UL (ref 4.2–5.3)
RBC #/AREA URNS HPF: ABNORMAL /HPF (ref 0–5)
SODIUM SERPL-SCNC: 138 MMOL/L (ref 136–145)
SP GR UR REFRACTOMETRY: 1.02 (ref 1–1.03)
TROPONIN I SERPL HS-MCNC: 6 NG/L (ref 0–54)
UROBILINOGEN UR QL STRIP.AUTO: 0.2 EU/DL (ref 0.2–1)
WBC # BLD AUTO: 9 K/UL (ref 4.6–13.2)
WBC URNS QL MICRO: ABNORMAL /HPF (ref 0–4)

## 2023-08-01 PROCEDURE — 96360 HYDRATION IV INFUSION INIT: CPT

## 2023-08-01 PROCEDURE — 83735 ASSAY OF MAGNESIUM: CPT

## 2023-08-01 PROCEDURE — 99285 EMERGENCY DEPT VISIT HI MDM: CPT

## 2023-08-01 PROCEDURE — 80201 ASSAY OF TOPIRAMATE: CPT

## 2023-08-01 PROCEDURE — 72125 CT NECK SPINE W/O DYE: CPT

## 2023-08-01 PROCEDURE — 83880 ASSAY OF NATRIURETIC PEPTIDE: CPT

## 2023-08-01 PROCEDURE — 93005 ELECTROCARDIOGRAM TRACING: CPT

## 2023-08-01 PROCEDURE — 96361 HYDRATE IV INFUSION ADD-ON: CPT

## 2023-08-01 PROCEDURE — 6370000000 HC RX 637 (ALT 250 FOR IP)

## 2023-08-01 PROCEDURE — 2580000003 HC RX 258

## 2023-08-01 PROCEDURE — 80053 COMPREHEN METABOLIC PANEL: CPT

## 2023-08-01 PROCEDURE — 85025 COMPLETE CBC W/AUTO DIFF WBC: CPT

## 2023-08-01 PROCEDURE — 81025 URINE PREGNANCY TEST: CPT

## 2023-08-01 PROCEDURE — 70450 CT HEAD/BRAIN W/O DYE: CPT

## 2023-08-01 PROCEDURE — 84484 ASSAY OF TROPONIN QUANT: CPT

## 2023-08-01 PROCEDURE — 82962 GLUCOSE BLOOD TEST: CPT

## 2023-08-01 PROCEDURE — 71045 X-RAY EXAM CHEST 1 VIEW: CPT

## 2023-08-01 PROCEDURE — 81001 URINALYSIS AUTO W/SCOPE: CPT

## 2023-08-01 PROCEDURE — G0378 HOSPITAL OBSERVATION PER HR: HCPCS

## 2023-08-01 RX ORDER — DEXTROSE, SODIUM CHLORIDE, SODIUM LACTATE, POTASSIUM CHLORIDE, AND CALCIUM CHLORIDE 5; .6; .31; .03; .02 G/100ML; G/100ML; G/100ML; G/100ML; G/100ML
INJECTION, SOLUTION INTRAVENOUS CONTINUOUS
Status: DISCONTINUED | OUTPATIENT
Start: 2023-08-01 | End: 2023-08-01

## 2023-08-01 RX ORDER — DEXTROSE MONOHYDRATE 100 MG/ML
INJECTION, SOLUTION INTRAVENOUS CONTINUOUS PRN
Status: DISCONTINUED | OUTPATIENT
Start: 2023-08-01 | End: 2023-08-01

## 2023-08-01 RX ORDER — BUMETANIDE 1 MG/1
1 TABLET ORAL DAILY
Status: DISCONTINUED | OUTPATIENT
Start: 2023-08-01 | End: 2023-08-01

## 2023-08-01 RX ORDER — BUMETANIDE 1 MG/1
1 TABLET ORAL DAILY
Status: DISCONTINUED | OUTPATIENT
Start: 2023-08-02 | End: 2023-08-03 | Stop reason: HOSPADM

## 2023-08-01 RX ORDER — SODIUM CHLORIDE, SODIUM LACTATE, POTASSIUM CHLORIDE, AND CALCIUM CHLORIDE .6; .31; .03; .02 G/100ML; G/100ML; G/100ML; G/100ML
1000 INJECTION, SOLUTION INTRAVENOUS ONCE
Status: COMPLETED | OUTPATIENT
Start: 2023-08-01 | End: 2023-08-01

## 2023-08-01 RX ORDER — LEVETIRACETAM 500 MG/1
1000 TABLET ORAL
Status: COMPLETED | OUTPATIENT
Start: 2023-08-01 | End: 2023-08-01

## 2023-08-01 RX ORDER — DEXTROSE MONOHYDRATE 100 MG/ML
INJECTION, SOLUTION INTRAVENOUS CONTINUOUS PRN
Status: DISCONTINUED | OUTPATIENT
Start: 2023-08-01 | End: 2023-08-03 | Stop reason: HOSPADM

## 2023-08-01 RX ORDER — TOPIRAMATE 25 MG/1
100 TABLET ORAL 2 TIMES DAILY
Status: DISCONTINUED | OUTPATIENT
Start: 2023-08-01 | End: 2023-08-03 | Stop reason: HOSPADM

## 2023-08-01 RX ORDER — DEXTROSE AND SODIUM CHLORIDE 5; .45 G/100ML; G/100ML
INJECTION, SOLUTION INTRAVENOUS CONTINUOUS
Status: DISCONTINUED | OUTPATIENT
Start: 2023-08-01 | End: 2023-08-03 | Stop reason: HOSPADM

## 2023-08-01 RX ORDER — PANTOPRAZOLE SODIUM 40 MG/1
40 TABLET, DELAYED RELEASE ORAL
Status: DISCONTINUED | OUTPATIENT
Start: 2023-08-02 | End: 2023-08-03 | Stop reason: HOSPADM

## 2023-08-01 RX ORDER — TOPIRAMATE 100 MG/1
100 TABLET, FILM COATED ORAL 2 TIMES DAILY
COMMUNITY

## 2023-08-01 RX ORDER — ACETAMINOPHEN 500 MG
1000 TABLET ORAL
Status: COMPLETED | OUTPATIENT
Start: 2023-08-01 | End: 2023-08-01

## 2023-08-01 RX ORDER — GLUCAGON 1 MG
1 KIT INJECTION PRN
Status: DISCONTINUED | OUTPATIENT
Start: 2023-08-01 | End: 2023-08-03 | Stop reason: HOSPADM

## 2023-08-01 RX ORDER — AMLODIPINE BESYLATE 10 MG/1
10 TABLET ORAL DAILY
Status: DISCONTINUED | OUTPATIENT
Start: 2023-08-01 | End: 2023-08-03 | Stop reason: HOSPADM

## 2023-08-01 RX ORDER — LIOTHYRONINE SODIUM 25 UG/1
50 TABLET ORAL DAILY
Status: DISCONTINUED | OUTPATIENT
Start: 2023-08-01 | End: 2023-08-02

## 2023-08-01 RX ORDER — BUMETANIDE 1 MG/1
1 TABLET ORAL DAILY
COMMUNITY

## 2023-08-01 RX ADMIN — ACETAMINOPHEN 1000 MG: 500 TABLET ORAL at 14:00

## 2023-08-01 RX ADMIN — DEXTROSE AND SODIUM CHLORIDE: 5; 450 INJECTION, SOLUTION INTRAVENOUS at 21:57

## 2023-08-01 RX ADMIN — Medication 16 G: at 15:32

## 2023-08-01 RX ADMIN — AMLODIPINE BESYLATE 10 MG: 10 TABLET ORAL at 22:00

## 2023-08-01 RX ADMIN — SODIUM CHLORIDE, SODIUM LACTATE, POTASSIUM CHLORIDE, CALCIUM CHLORIDE, AND DEXTROSE MONOHYDRATE: 600; 310; 30; 20; 5 INJECTION, SOLUTION INTRAVENOUS at 14:00

## 2023-08-01 RX ADMIN — Medication 16 G: at 13:40

## 2023-08-01 RX ADMIN — DEXTROSE MONOHYDRATE 250 ML: 100 INJECTION, SOLUTION INTRAVENOUS at 23:35

## 2023-08-01 RX ADMIN — TOPIRAMATE 100 MG: 25 TABLET, FILM COATED ORAL at 21:56

## 2023-08-01 RX ADMIN — Medication 16 G: at 17:31

## 2023-08-01 RX ADMIN — Medication 16 G: at 19:33

## 2023-08-01 RX ADMIN — LEVETIRACETAM 1000 MG: 500 TABLET, FILM COATED ORAL at 14:00

## 2023-08-01 RX ADMIN — SODIUM CHLORIDE, SODIUM LACTATE, POTASSIUM CHLORIDE, AND CALCIUM CHLORIDE 1000 ML: 600; 310; 30; 20 INJECTION, SOLUTION INTRAVENOUS at 15:32

## 2023-08-01 ASSESSMENT — PAIN SCALES - GENERAL
PAINLEVEL_OUTOF10: 0
PAINLEVEL_OUTOF10: 10

## 2023-08-01 ASSESSMENT — PAIN - FUNCTIONAL ASSESSMENT: PAIN_FUNCTIONAL_ASSESSMENT: 0-10

## 2023-08-01 NOTE — ED TRIAGE NOTES
Pt had witnessed seizure at work lasting 1-2 minutes, takes Topamax and reports she has not missed a dose. Veto Favors into coworker, did not hit head. Pt alert and oriented upon arrival, complaining of headache.

## 2023-08-01 NOTE — ED PROVIDER NOTES
bolus bolus 1,000 mL (0 mLs IntraVENous Stopped 8/1/23 1600)   glucose chewable tablet 16 g (16 g Oral Given 8/1/23 1731)       ED Course as of 08/01/23 1910 Tue Aug 01, 2023   1840 Signed out to me from Dr. Capri Gonzalez at 6 PM, attending signout, patient with decreased blood sugar and resultant seizure, were giving the patient a meal and will reassess and monitor. [CB]      ED Course User Index  [CB] Renetta Whitt MD       None    Medical Decision Making     SCREENING TOOLS:  None    DDX: Seizure disorder, hypoglycemia, eclampsia, electrolyte derangement, ACS, PE, infection, tension headache, traumatic intracranial injury, traumatic spine injury,    DISCUSSION:  This appears to be a severe conditon. Vladimir Rider is a 43 y.o. female with a PMHX of max edema, hypothyroidism, T2DM, HTN, HLD, history of seizures presented to the ER for seizure. Patient also endorses headache since fall and C-spine tenderness. Unsure if she actually hit her head on the ground. Patient also reporting she feels like she is hypoglycemic. Patient noted to be hypoglycemic to 59, hypoglycemia orders given. Patient alert and oriented x4. Patient given mitesh crackers and peanut butter. We will also start D5 infusion. Patient appears alert and oriented x4. Will give 1 dose of 1 g of Keppra here. Also give 1 g Tylenol for headache. Given possible head trauma, seizure, will proceed with CT of the head. C-spine tenderness, proceed with CT cervical spine. Placed patient in c-collar. Patient also endorsing atypical chest pain. Pain elicited on exam upon palpation and with movement of left arm. We will proceed with troponins, EKG, chest x-ray. Patient also endorsing mild shortness of breath, leg swelling. We will proceed with chest x-ray and proBNP to evaluate further.    //  Blood glucose of 50 noted on metabolic panel. Otherwise no electrolyte derangements.   Repeat point-of-care blood glucose at 1405 74, D5

## 2023-08-01 NOTE — ED NOTES
BGL 61 at this time; medicated per [facility]. Pt alert in NAD.      Dread Knowles RN  08/01/23 1946

## 2023-08-01 NOTE — ED NOTES
Check patient sugar. Patient sugar dropped abit, alerted doctor.  Recheck in 15 minutes     Adry Weston  08/01/23 4180

## 2023-08-01 NOTE — DISCHARGE INSTRUCTIONS
Your blood sugar was low here in the ER. This likely caused her seizure. Is important that you eat regularly scheduled meals while taking her insulin. If you are to be on your feet often or are outside. You should take snacks and stay well-hydrated. Please follow-up your PCM, be sure to keep a blood sugar log to share with your doctor. Please return to the ER with ongoing low blood sugar, repeat seizures, or any other new concerning symptoms. Also, remember, do not restart insulin unless you are advised to start by your physician. Please follow-up with your neurologist to review your seizure medications. Topamax may need to be increased. DISCHARGE SUMMARY from Nurse    PATIENT INSTRUCTIONS:    After general anesthesia or intravenous sedation, for 24 hours or while taking prescription Narcotics:  Limit your activities  Do not drive and operate hazardous machinery  Do not make important personal or business decisions  Do  not drink alcoholic beverages  If you have not urinated within 8 hours after discharge, please contact your surgeon on call. Report the following to your surgeon:  Excessive pain, swelling, redness or odor of or around the surgical area  Temperature over 100.5  Nausea and vomiting lasting longer than 4 hours or if unable to take medications  Any signs of decreased circulation or nerve impairment to extremity: change in color, persistent  numbness, tingling, coldness or increase pain  Any questions    What to do at Home:  Recommended activity: activity as tolerated, rest as needed    If you experience any of the following symptoms fainting , sweating , dizziness, low blood sugar test,  please follow up with Primary Care Provider or go to the nearest Emergency Room. *  Please give a list of your current medications to your Primary Care Provider.     *  Please update this list whenever your medications are discontinued, doses are      changed, or new medications (including

## 2023-08-02 PROBLEM — E11.649 HYPOGLYCEMIA ASSOCIATED WITH DIABETES (HCC): Status: RESOLVED | Noted: 2023-08-01 | Resolved: 2023-08-02

## 2023-08-02 PROBLEM — N05.1 FSGS (FOCAL SEGMENTAL GLOMERULOSCLEROSIS): Status: ACTIVE | Noted: 2023-08-02

## 2023-08-02 PROBLEM — K21.9 GERD (GASTROESOPHAGEAL REFLUX DISEASE): Status: ACTIVE | Noted: 2023-08-02

## 2023-08-02 LAB
ANION GAP SERPL CALC-SCNC: 4 MMOL/L (ref 3–18)
BASOPHILS # BLD: 0 K/UL (ref 0–0.1)
BASOPHILS NFR BLD: 0 % (ref 0–2)
BUN SERPL-MCNC: 16 MG/DL (ref 7–18)
BUN/CREAT SERPL: 13 (ref 12–20)
CALCIUM SERPL-MCNC: 8.8 MG/DL (ref 8.5–10.1)
CHLORIDE SERPL-SCNC: 114 MMOL/L (ref 100–111)
CO2 SERPL-SCNC: 21 MMOL/L (ref 21–32)
CREAT SERPL-MCNC: 1.2 MG/DL (ref 0.6–1.3)
DIFFERENTIAL METHOD BLD: ABNORMAL
EKG ATRIAL RATE: 89 BPM
EKG DIAGNOSIS: NORMAL
EKG P AXIS: 64 DEGREES
EKG P-R INTERVAL: 144 MS
EKG Q-T INTERVAL: 348 MS
EKG QRS DURATION: 90 MS
EKG QTC CALCULATION (BAZETT): 423 MS
EKG R AXIS: 13 DEGREES
EKG T AXIS: 69 DEGREES
EKG VENTRICULAR RATE: 89 BPM
EOSINOPHIL # BLD: 0 K/UL (ref 0–0.4)
EOSINOPHIL NFR BLD: 0 % (ref 0–5)
ERYTHROCYTE [DISTWIDTH] IN BLOOD BY AUTOMATED COUNT: 14.6 % (ref 11.6–14.5)
GLUCOSE BLD STRIP.AUTO-MCNC: 109 MG/DL (ref 70–110)
GLUCOSE BLD STRIP.AUTO-MCNC: 114 MG/DL (ref 70–110)
GLUCOSE BLD STRIP.AUTO-MCNC: 127 MG/DL (ref 70–110)
GLUCOSE BLD STRIP.AUTO-MCNC: 130 MG/DL (ref 70–110)
GLUCOSE BLD STRIP.AUTO-MCNC: 145 MG/DL (ref 70–110)
GLUCOSE BLD STRIP.AUTO-MCNC: 150 MG/DL (ref 70–110)
GLUCOSE BLD STRIP.AUTO-MCNC: 157 MG/DL (ref 70–110)
GLUCOSE BLD STRIP.AUTO-MCNC: 52 MG/DL (ref 70–110)
GLUCOSE BLD STRIP.AUTO-MCNC: 54 MG/DL (ref 70–110)
GLUCOSE BLD STRIP.AUTO-MCNC: 55 MG/DL (ref 70–110)
GLUCOSE BLD STRIP.AUTO-MCNC: 55 MG/DL (ref 70–110)
GLUCOSE SERPL-MCNC: 47 MG/DL (ref 74–99)
HCT VFR BLD AUTO: 37.9 % (ref 35–45)
HGB BLD-MCNC: 12 G/DL (ref 12–16)
IMM GRANULOCYTES # BLD AUTO: 0 K/UL (ref 0–0.04)
IMM GRANULOCYTES NFR BLD AUTO: 1 % (ref 0–0.5)
LYMPHOCYTES # BLD: 2.9 K/UL (ref 0.9–3.6)
LYMPHOCYTES NFR BLD: 36 % (ref 21–52)
MAGNESIUM SERPL-MCNC: 2.4 MG/DL (ref 1.6–2.6)
MCH RBC QN AUTO: 26 PG (ref 24–34)
MCHC RBC AUTO-ENTMCNC: 31.7 G/DL (ref 31–37)
MCV RBC AUTO: 82.2 FL (ref 78–100)
MONOCYTES # BLD: 0.6 K/UL (ref 0.05–1.2)
MONOCYTES NFR BLD: 8 % (ref 3–10)
NEUTS SEG # BLD: 4.4 K/UL (ref 1.8–8)
NEUTS SEG NFR BLD: 55 % (ref 40–73)
NRBC # BLD: 0 K/UL (ref 0–0.01)
NRBC BLD-RTO: 0 PER 100 WBC
PLATELET # BLD AUTO: 352 K/UL (ref 135–420)
PMV BLD AUTO: 10.7 FL (ref 9.2–11.8)
POTASSIUM SERPL-SCNC: 3.5 MMOL/L (ref 3.5–5.5)
RBC # BLD AUTO: 4.61 M/UL (ref 4.2–5.3)
SODIUM SERPL-SCNC: 139 MMOL/L (ref 136–145)
T3FREE SERPL-MCNC: 1.8 PG/ML (ref 2.18–3.98)
T4 FREE SERPL-MCNC: 0.2 NG/DL (ref 0.7–1.5)
T4 SERPL-MCNC: <0.5 UG/DL (ref 4.8–13.9)
TSH SERPL DL<=0.05 MIU/L-ACNC: 20.7 UIU/ML (ref 0.36–3.74)
WBC # BLD AUTO: 8.1 K/UL (ref 4.6–13.2)

## 2023-08-02 PROCEDURE — 6360000002 HC RX W HCPCS

## 2023-08-02 PROCEDURE — 93010 ELECTROCARDIOGRAM REPORT: CPT | Performed by: INTERNAL MEDICINE

## 2023-08-02 PROCEDURE — 85025 COMPLETE CBC W/AUTO DIFF WBC: CPT

## 2023-08-02 PROCEDURE — 84442 ASSAY OF THYROID ACTIVITY: CPT

## 2023-08-02 PROCEDURE — 84439 ASSAY OF FREE THYROXINE: CPT

## 2023-08-02 PROCEDURE — 94761 N-INVAS EAR/PLS OXIMETRY MLT: CPT

## 2023-08-02 PROCEDURE — 83735 ASSAY OF MAGNESIUM: CPT

## 2023-08-02 PROCEDURE — 80048 BASIC METABOLIC PNL TOTAL CA: CPT

## 2023-08-02 PROCEDURE — 84481 FREE ASSAY (FT-3): CPT

## 2023-08-02 PROCEDURE — 36415 COLL VENOUS BLD VENIPUNCTURE: CPT

## 2023-08-02 PROCEDURE — 6370000000 HC RX 637 (ALT 250 FOR IP)

## 2023-08-02 PROCEDURE — 2580000003 HC RX 258

## 2023-08-02 PROCEDURE — 84681 ASSAY OF C-PEPTIDE: CPT

## 2023-08-02 PROCEDURE — 84436 ASSAY OF TOTAL THYROXINE: CPT

## 2023-08-02 PROCEDURE — 96361 HYDRATE IV INFUSION ADD-ON: CPT

## 2023-08-02 PROCEDURE — 83525 ASSAY OF INSULIN: CPT

## 2023-08-02 PROCEDURE — 83527 ASSAY OF INSULIN: CPT

## 2023-08-02 PROCEDURE — 96372 THER/PROPH/DIAG INJ SC/IM: CPT

## 2023-08-02 PROCEDURE — 84443 ASSAY THYROID STIM HORMONE: CPT

## 2023-08-02 PROCEDURE — G0378 HOSPITAL OBSERVATION PER HR: HCPCS

## 2023-08-02 PROCEDURE — 82962 GLUCOSE BLOOD TEST: CPT

## 2023-08-02 RX ORDER — DEXTROSE AND SODIUM CHLORIDE 10; .2 G/100ML; G/100ML
INJECTION, SOLUTION INTRAVENOUS CONTINUOUS
Status: DISCONTINUED | OUTPATIENT
Start: 2023-08-02 | End: 2023-08-02

## 2023-08-02 RX ORDER — LIOTHYRONINE SODIUM 5 UG/1
5 TABLET ORAL EVERY 12 HOURS
Status: DISCONTINUED | OUTPATIENT
Start: 2023-08-02 | End: 2023-08-03 | Stop reason: HOSPADM

## 2023-08-02 RX ORDER — LEVOTHYROXINE SODIUM 0.15 MG/1
150 TABLET ORAL DAILY
Status: DISCONTINUED | OUTPATIENT
Start: 2023-08-03 | End: 2023-08-03 | Stop reason: HOSPADM

## 2023-08-02 RX ORDER — GABAPENTIN 400 MG/1
400 CAPSULE ORAL NIGHTLY
Status: DISCONTINUED | OUTPATIENT
Start: 2023-08-02 | End: 2023-08-03 | Stop reason: HOSPADM

## 2023-08-02 RX ORDER — ACETAMINOPHEN 325 MG/1
650 TABLET ORAL EVERY 4 HOURS PRN
Status: DISCONTINUED | OUTPATIENT
Start: 2023-08-02 | End: 2023-08-03 | Stop reason: HOSPADM

## 2023-08-02 RX ORDER — ENOXAPARIN SODIUM 100 MG/ML
30 INJECTION SUBCUTANEOUS EVERY 12 HOURS SCHEDULED
Status: DISCONTINUED | OUTPATIENT
Start: 2023-08-02 | End: 2023-08-03 | Stop reason: HOSPADM

## 2023-08-02 RX ADMIN — LIOTHYRONINE SODIUM 5 MCG: 5 TABLET ORAL at 22:09

## 2023-08-02 RX ADMIN — TOPIRAMATE 100 MG: 25 TABLET, FILM COATED ORAL at 21:00

## 2023-08-02 RX ADMIN — TOPIRAMATE 100 MG: 25 TABLET, FILM COATED ORAL at 08:41

## 2023-08-02 RX ADMIN — DEXTROSE MONOHYDRATE 250 ML: 100 INJECTION, SOLUTION INTRAVENOUS at 02:34

## 2023-08-02 RX ADMIN — AMLODIPINE BESYLATE 10 MG: 10 TABLET ORAL at 08:41

## 2023-08-02 RX ADMIN — ACETAMINOPHEN 325MG 650 MG: 325 TABLET ORAL at 22:09

## 2023-08-02 RX ADMIN — DEXTROSE MONOHYDRATE 250 ML: 100 INJECTION, SOLUTION INTRAVENOUS at 05:26

## 2023-08-02 RX ADMIN — ACETAMINOPHEN 325MG 650 MG: 325 TABLET ORAL at 17:48

## 2023-08-02 RX ADMIN — GABAPENTIN 400 MG: 400 CAPSULE ORAL at 21:00

## 2023-08-02 RX ADMIN — ENOXAPARIN SODIUM 30 MG: 100 INJECTION SUBCUTANEOUS at 08:42

## 2023-08-02 RX ADMIN — LIOTHYRONINE SODIUM 5 MCG: 5 TABLET ORAL at 08:42

## 2023-08-02 RX ADMIN — ENOXAPARIN SODIUM 30 MG: 100 INJECTION SUBCUTANEOUS at 21:00

## 2023-08-02 RX ADMIN — LEVOTHYROXINE SODIUM 137 MCG: 25 TABLET ORAL at 07:12

## 2023-08-02 RX ADMIN — PANTOPRAZOLE SODIUM 40 MG: 40 TABLET, DELAYED RELEASE ORAL at 07:12

## 2023-08-02 RX ADMIN — DEXTROSE AND SODIUM CHLORIDE: 5; 450 INJECTION, SOLUTION INTRAVENOUS at 18:28

## 2023-08-02 RX ADMIN — BUMETANIDE 1 MG: 1 TABLET ORAL at 08:41

## 2023-08-02 RX ADMIN — DEXTROSE AND SODIUM CHLORIDE: 5; 450 INJECTION, SOLUTION INTRAVENOUS at 08:42

## 2023-08-02 ASSESSMENT — PAIN SCALES - GENERAL
PAINLEVEL_OUTOF10: 10
PAINLEVEL_OUTOF10: 0
PAINLEVEL_OUTOF10: 4
PAINLEVEL_OUTOF10: 0
PAINLEVEL_OUTOF10: 0

## 2023-08-02 ASSESSMENT — PAIN DESCRIPTION - DESCRIPTORS: DESCRIPTORS: ACHING

## 2023-08-02 ASSESSMENT — PAIN DESCRIPTION - LOCATION
LOCATION: HEAD
LOCATION: HEAD

## 2023-08-02 NOTE — CONSULTS
Acitivity:  Walking 1 mile every day  Complications:  She has foot paresthesias, frequent orthostasis, some SOB walking 100', ankle edema at times. No nausea, vomiting, diarrhea, constipation. No foot problems. Family History   Problem Relation Age of Onset    Hypertension Mother         Social History     Socioeconomic History    Marital status: Single     Spouse name: Not on file    Number of children: Not on file    Years of education: Not on file    Highest education level: Not on file   Occupational History    Not on file   Tobacco Use    Smoking status: Former    Smokeless tobacco: Never   Substance and Sexual Activity    Alcohol use: No    Drug use: No    Sexual activity: Not on file   Other Topics Concern    Not on file   Social History Narrative    Not on file     Social Determinants of Health     Financial Resource Strain: Not on file   Food Insecurity: Not on file   Transportation Needs: Not on file   Physical Activity: Not on file   Stress: Not on file   Social Connections: Not on file   Intimate Partner Violence: Not on file   Housing Stability: Not on file      Past Medical History:   Diagnosis Date    Arthritis     Chest pain     Diabetes ([address])     Essential hypertension     Headache(784.0)     Hyperchloremia     Hypertension     Seizures ([address])     8/2020 last seizure    Seizures ([address])     SOB (shortness of breath)     Thyroid cancer ([address])      ROS:  HEENT - Some blurred vision. No headache, sore throat. Neck - No swelling, adenopathy, pain or stiffness  Resp - No fever, chills, cough. CV - No chest pain, PND. GI - No abdominal pain, nausea, vomiting, diarrhea, constipation, blood in stool.  - No dysuria, hematuria, flank pain. MS - No myalgias, joint pains. Psych - Oriented to person, place, date. Physical Exam:   HEENT:  Sclera white, conjunctiva pink. DENY. EOM intact. Mouth:  Mucous membranes moist.  Dentition in good repair. Throat normal.  Neck:  No cervical adenopathy. Low Panic  CM  130 High  CM  55 Low  CM  55 Low  CM              Assessment:  1)  Type 2 DM in fair control (HbA1c 7.3% 3/30/23) with peripheral neuropathy, retinopathy, nephropathy, not testing regularly  2) Hypothyroidism S/P thyroidectomy, not taking her medication properly resulting in clinical hypothyroidism and likely at least partly responsible for her recurrent hypoglycemia. 3)  Seizure disorder awaiting neurology appointment in October. 4)  Hypertension  5)  Hyperlipidemia  6)  Obesity    Recommendations:  1) Resume her usual levothyroxine dose 150 mcg/day. 2) I gave her instructions on appropriate use of levothyroxine. 3) Oral antidiabetic medications can be held until her glucoses rise consistently. 4) I will follow along with you.

## 2023-08-02 NOTE — ED NOTES
TRANSFER - OUT REPORT:    Verbal report given to Brooke Lebron RN on [employer]  being transferred to [facility] for routine progression of patient care       Report consisted of patient's Situation, Background, Assessment and   Recommendations(SBAR). Information from the following report(s) ED SBAR was reviewed with the receiving nurse. [place] Assessment:                           Lines:   Peripheral IV 08/01/23 Left Antecubital (Active)        Opportunity for questions and clarification was provided.                 Elliot Pulido RN  08/01/23 2007

## 2023-08-02 NOTE — PROGRESS NOTES
Pharmacist Review and Automatic Dose Adjustment of Prophylactic Enoxaparin    The reviewing pharmacist has made an adjustment to the ordered enoxaparin dose or converted to UFH per the approved [facility] protocol and table as identified below. Yamila Recinos is a 43 y.o. female. Recent Labs     08/01/23  1313   CREATININE 1.29       Estimated Creatinine Clearance: 76 mL/min (based on SCr of 1.29 mg/dL). Height:   Ht Readings from Last 1 Encounters:   08/01/23 5' 7\" (1.702 m)     Weight:  Wt Readings from Last 1 Encounters:   08/01/23 250 lb (113.4 kg)           Plan: Based upon the patient's weight and renal function, the ordered enoxaparin dose of 40 mg SQ daily has been changed to 30 mg SQ q12h.     Thank you,    Byron Momin, [facility]  8/2/2023

## 2023-08-02 NOTE — CARE COORDINATION
Patient works full time and plan to return to work after discharge unless provided a note to stay out of work. Per Dr. Miki James with [facility], no plan for patient to remain out of work once discharge. CM requested a H2H for DM.      Antony Guerrero, BSN, RN  Case Management  [phone]

## 2023-08-02 NOTE — CARE COORDINATION
Case Management Assessment  Initial Evaluation    Date/Time of Evaluation: 8/2/2023 9:51 AM  Assessment Completed by: Bill Mcintyre RN    If patient is discharged prior to next notation, then this note serves as note for discharge by case management. Patient Name: Vladimir Rider                   YOB: 1985  Diagnosis: Hypoglycemia [E16.2]  Breakthrough seizure ([address]) [G40.919]  Hypoglycemia associated with diabetes ([address]) [E11.649]                   Date / Time: 8/1/2023 12:48 PM    Patient Admission Status: Observation   Readmission Risk (Low < 19, Mod (19-27), High > 27): Readmission Risk Score: 10.9    Current PCP: Linda Peacock MD  PCP verified by CM? Yes    Chart Reviewed: Yes      History Provided by: Patient  Patient Orientation: Alert and Oriented    Patient Cognition: Alert    Hospitalization in the last 30 days (Readmission):  No    If yes, Readmission Assessment in CM Navigator will be completed. Advance Directives:      Code Status: Full Code   Patient's Primary Decision Maker is: Legal Next of Kin      Discharge Planning:    Patient lives with: Children Type of Home: House  Primary Care Giver: Self  Patient Support Systems include: Children   Current Financial resources: Medicaid  Current community resources:    Current services prior to admission: None            Current DME:              Type of Home Care services:  Nursing Services    ADLS  Prior functional level: Independent in ADLs/IADLs  Current functional level: Independent in ADLs/IADLs    PT AM-PAC:   /24  OT AM-PAC:   /24    Family can provide assistance at DC: Yes  Would you like Case Management to discuss the discharge plan with any other family members/significant others, and if so, who?  No  Plans to Return to Present Housing: Yes  Other Identified Issues/Barriers to RETURNING to current housing: None identified  Potential Assistance needed at discharge: Home Care            Potential DME:    Patient expects to associated with the providers?   Yes  ([address] [facility] for [facility])         Erasto Moses RN  Case Management Department  Ph: [phone]

## 2023-08-02 NOTE — PROGRESS NOTES
[facility]  BRIEF UPDATE NOTE    Assessment & Plan:    - Received a page from nursing staff regarding starting a D10 infusion given patient's recurrent hypoglycemic events despite adequate nutrition. Order for D10 placed at 100 ml/h  - shortly after received another page from nurses that patient was found with a insulin pen, getting ready to give herself an injection of insulin. - Patient had Novolin pen and syringe with approx 30 units in syringe for self-administration.  - Discussion with patient is that she restarted her insulin at home because she was finding her blood sugars over 250 at home. During initial exam patient denied taking any further insulin since discharge. Patient reports that she restarted taking insulin on her own because she was worried about her blood sugars. - discussed with patient that while in hospital we need to ensure she's not taking any medications that aren't provided for her by the nurses. We also discussed that currently we're trying to bring up her blood sugar with a blood sugar goal of 150-200.   - Insulin was removed from patient room by nursing staff and patient confirmed that she had no other insulin at this time. - home health ordered for skill nursing for medication management    The above patient and plan were discussed with my supervising physician. See daily progress note for full assessment/plan.       Caitie Laguna MD, PGY-2  [facility]  8/2/2023, 3:24 AM

## 2023-08-02 NOTE — PROGRESS NOTES
[facility]  DAILY PROGRESS NOTE      Patient:    Sheyla Yang , 43 y.o. female   MRN:  [id number]  Room/Bed:  204/01  Admission Date:   8/1/2023  Code status:  Full Code    Reason for Admission: Sheyla Yang is a 43y.o. year old female with PMH of CKD, FSGS, diabetic neuropathy, maxillary edema, papillary thyroid carcinoma status post total thyroidectomy with subsequent hypothyroidism, perceived seizures, HLD, T2DM, and GERD admitted for severe refractory hypoglycemia. Hypoglycemia likely secondary to hypothyroidism and insulin overuse. Continuing workup to confirm the most likely etiology of the hypoglycemia. Consulting endocrinology for further recommendations regarding evaluation and treatment. Continuing with patient education regarding medication adherence and insulin use. Will consider discharge home when patients glucose is more stable.     ASSESSMENT AND PLAN:   Problem List Items Addressed This Visit          Endocrine    * (Principal) Hypoglycemia     Other Visit Diagnoses       Breakthrough seizure ([address])    -  Primary    Relevant Medications    levETIRAcetam (KEPPRA) tablet 1,000 mg (Completed)    topiramate (TOPAMAX) 100 MG tablet    topiramate (TOPAMAX) tablet 100 mg    gabapentin (NEURONTIN) capsule 400 mg (Start on 8/2/2023  9:00 PM)            Refractory hypoglycemia w history of severe refractory hypoglycemia  Uncontrolled type 2 diabetes  Hypothyroidism s/p thyroidectomy due to papillary thyroid carcinoma  - patient found with insulin pen overnight trying to inject insulin  - patient reports restarting insulin on her own  - hx of positive anti-insulin antibodies  - hx of fsgs could be contributing to thyroglobulin binding hormone loss  - MRI and am cortisol not sufficient to complete previous workup  - normal c-peptide 1.5 (3/15)  - thyroid studies as follows: TSH - 20.70, T3 - 1.8, T4 - 0.2  - patient reports taking T3 and not T4  - HbA1c (6/7/23): 9.0%, home medications 08/01/23  2:00 PM   Result Value Ref Range    Color, UA YELLOW      Appearance CLEAR      Specific Gravity, UA 1.017 1.005 - 1.030      pH, Urine 5.5 5.0 - 8.0      Protein, UA >1000 (A) NEG mg/dL    Glucose, UA Negative NEG mg/dL    Ketones, Urine Negative NEG mg/dL    Bilirubin Urine Negative NEG      Blood, Urine SMALL (A) NEG      Urobilinogen, Urine 0.2 0.2 - 1.0 EU/dL    Nitrite, Urine Negative NEG      Leukocyte Esterase, Urine Negative NEG     Urinalysis, Micro    Collection Time: 08/01/23  2:00 PM   Result Value Ref Range    WBC, UA 4 to 10 0 - 4 /hpf    RBC, UA 4 to 10 0 - 5 /hpf    Epithelial Cells UA 4+ 0 - 5 /lpf    BACTERIA, URINE 1+ (A) NEG /hpf    Hyaline Casts, UA 0 to 3 0 - 2 /lpf   EKG 12 Lead    Collection Time: 08/01/23  2:01 PM   Result Value Ref Range    Ventricular Rate 89 BPM    Atrial Rate 89 BPM    P-R Interval 144 ms    QRS Duration 90 ms    Q-T Interval 348 ms    QTc Calculation (Bazett) 423 ms    P Axis 64 degrees    R Axis 13 degrees    T Axis 69 degrees    Diagnosis       Normal sinus rhythm  Normal ECG  When compared with ECG of 05-JUN-2023 14:02,  No significant change was found     POCT Glucose    Collection Time: 08/01/23  2:05 PM   Result Value Ref Range    POC Glucose 74 70 - 110 mg/dL   POCT Glucose    Collection Time: 08/01/23  3:26 PM   Result Value Ref Range    POC Glucose 55 (L) 70 - 110 mg/dL   POCT Glucose    Collection Time: 08/01/23  3:59 PM   Result Value Ref Range    POC Glucose 111 (H) 70 - 110 mg/dL   POCT Glucose    Collection Time: 08/01/23  5:01 PM   Result Value Ref Range    POC Glucose 95 70 - 110 mg/dL   POCT Glucose    Collection Time: 08/01/23  5:18 PM   Result Value Ref Range    POC Glucose 71 70 - 110 mg/dL   POCT Glucose    Collection Time: 08/01/23  5:58 PM   Result Value Ref Range    POC Glucose 94 70 - 110 mg/dL   POCT Glucose    Collection Time: 08/01/23  6:33 PM   Result Value Ref Range    POC Glucose 79 70 - 110 mg/dL   POCT Glucose    Collection

## 2023-08-02 NOTE — FLOWSHEET NOTE
SBAR report from ED received from [facility]. Patient alert and oriented, accompanied by transport and family members. Patient oriented to unit, call light in reach.

## 2023-08-02 NOTE — PROGRESS NOTES
2331 Patient's accucheck 57, repeat 59. Patient only complaint was feeling diaphoretic, \"feel sweaty\". Patient given juices, requesting food. 2335 Dextrose 10 IV given. 0006 accucheck 612    5550 Patient called, states my \"sugar has drop\". Accucheck 55, repeat 55. 0234 Dextrose 10% IV given. Patient requesting food, sandwich and juice provided. 710  [address]  130. Call placed to [facility], reported freq accuchecks and  treatments. 1963 Patient found with home Novolog insulin drawn up, to self inject. Patient stopped  and medication retrieved. PF  MD's called and incident reported. Patient states after she eats, she takes her insulin. Explained to patient the treatment plan, patient having difficulty in comprehending information/ teaching.

## 2023-08-02 NOTE — PROGRESS NOTES
Chart review relevant snippets, labs, and imaging related to patients refractory hypoglycemia from last admission. .. Endocrine 3/4/23  C-peptide and proinsulin levels do not support hyperinsulinism as the couse of hypoglycemia. High insulin in the face of such levels would suggest factitious hypoglycemia due to exogenous insulin injection    Endocrine  3/7/23  I think the patient's hypoglycemia can be attributed to her hypothyroidism and stacking of her insulin. Hypothyroidism will usually decrease the insulin requirement. [Note as of admission 8/1/23, pt no longer taking insulin]    Family Medicine 3/14/23  -Etiology unclear. Initially thought iatrogenic versus insulinoma, however MRCP did not demonstrate mass, low c-peptide, and patient has had ongoing hypoglycemia despite no additional insulin this admission. Could perhaps be a pocket of subcutaneous insulin which recently opened up to systemic circulation or insulin antibodies. Fasting insulin and c-peptide both normal, decreasing suspicion for insulinoma or exogenous insuline    Endocrine 3/17/23  This patient continues to have hypoglycemic episodes early morning (usually around 4AM to 7AM). Suggestion that she stay overnight with additional blood work for glycogen storage disease work up as well as a carbohydrate heavy snack in the late evening (around 10PM-11PM) with continued monitoring of glucoses overnight but patient declines.      Relevant Lab Tests:  3/23 normal 24 hr urine cortisol  3/5: insulin free: high at 800  3/7 Insulin: high at 160  3/15 insulin: normal at 20, 21  3/15: insulin antibodies: high at 6  3/15: Urine free cortisol and 24hr cortisol: WNL  3/15: 1AM cortisol 2.0 (low) [however this test should be done at 8am]  Glycogen storage disease testing: not done    Relevant Imaging  3/9/23 MRI Ab wo: insufficient study due to lack of contrast, no insulinoma identified  3/15/23 CT Ab Pelvis wo: no adrenal abnormalities  5/1/23\" CT ab pelvis w: no adrenal abnormalities    For my assessment of this patients refractory hypoglycemia, see my H&P dated 8/1/23. ..

## 2023-08-02 NOTE — PROGRESS NOTES
conducted an initial consultation and Spiritual Assessment for Vladimir Rider, who is a 43 y.o.,female. Patient's Primary Language is: [place]. According to the patient's EMR Sikhism Affiliation is: [facility].     The reason the Patient came to the hospital is:   Patient Active Problem List    Diagnosis Date Noted    Type 2 diabetes mellitus with hypoglycemia without coma, with long-term current use of insulin ([address]) 03/13/2023    Hypothyroidism due to Hashimoto's thyroiditis 03/13/2023    Hypertension 03/13/2023    Hyperlipidemia 03/13/2023    Flank pain 03/10/2023    Insulin overdose 03/10/2023    Myxedema 30/31/4540    Metabolic syndrome 71/01/0405    Type 2 diabetes mellitus with hyperglycemia, with long-term current use of insulin ([address]) 03/06/2023    Hypoglycemia associated with diabetes ([address]) 08/01/2023    Obesity, morbid ([address]) 11/05/2018    Hypoglycemia 05/13/2016    Hyperglycemia 09/01/2013    Vaginosis 09/01/2013    Obesity (BMI 30-39.9) 07/12/2012    Seizures ([address]) 07/12/2012    Migraines 07/12/2012        The  provided the following Interventions:  Initiated a relationship of care and support. Explored issues of dedrick, belief, spirituality and Scientology/ritual needs while hospitalized. Listened empathically. Provided information about Spiritual Care Services. Offered prayer and assurance of continued prayers on patient's behalf. Chart reviewed. The following outcomes where achieved:  Patient shared limited information about both their medical narrative and spiritual journey/beliefs.  confirmed Patient's Sikhism Affiliation. Patient processed feeling about current hospitalization. Patient expressed gratitude for 's visit. Assessment:  Patient does not have any Scientology/cultural needs that will affect patient's preferences in health care. There are no spiritual or Scientology issues which require intervention at this time.      Plan:  Chaplains will

## 2023-08-02 NOTE — H&P
[facility] Family Medicine  Admission History and Physical      Patient:    Ailyn Abrams      43 y.o. female            MRN:       [id number]                                                                                    Admission Date:         8/1/2023  Code status:                Lucero Pastrana is a 43y.o. year old female with PMH of CKD, diabetic neuropathy, maxillary edema, papillary thyroid carcinoma status post total thyroidectomy with subsequent hypothyroidism, FSGS, seizure d/o, HLD, T2DM, and GERD present admitted for Hypoglycemia [E16.2]  Breakthrough seizure ([address]) [G40.919]  Hypoglycemia associated with diabetes ([address]) [E11.649]. Refractory hypoglycemia w history of severe refractory hypoglycemia and Uncontrolled type 2 diabetes  Hypothyroidism s/p thyroidectomy due to papillary thyroid carcinoma    Extensive chart review of [facility] Martin and [facility] FM done for workup related to this. Relevant snippets, lab tests, and imaging provided in progress note dated 8/2/23. .. Possible causes of hypoglycemia can include patient's use of antidiabetic medications and significant insulin resistance (pt with acanthosis nigricans on PE). Insulin resistance possibly worsened by anti-insulin antibodies requiring high doses of insulin in the past (90u BID). Another cause could be uncontrolled hypothyroidism contributing to blood sugar abnormalities, as well as history of FSGS which may lead to loss of thyroid binding globulin. Pt taking 50 liothyronine but no levothyroxine at home. This is unusual to take T3 without T4 and it is more common to take T3 w T4 in a 13 or 16:1 ratio. Can consider repeating MRI ab w contrast (MRCP) and morning cortisol tests due to previously incomplete work up. Cortisol tests may be abnormal due to Hunter's or hypothyroidism.  MRI w contrast may have significantly more sensitivity than CT for insulinoma, although this may be unlikely given recommend transitioning to longer acting levothyroxine for hypothyroid management given her previous TSH in June was 22 and there is a correlation with hypothyroidism leading to hypoglycemia. Hypoglycemia  DDx to include insulinoma, diabetic medication overuse (sulfonyl ureal vs insulin use), decreased PO intake, hypothyroidism.   - admit to medicine for observation  - daily CBC, CMP, Mg, Phos  - hold all diabetic medications  - obtain TSH, T4, T4 free, T3, T3 free  - mIVF with D51/2NS at 100 ml/h  - would consider discontinue liothyronine and transition to levothyroxine     Seizures  - Continue topomax 100 mg  - Consider starting secondary medication - received loading dose of Keppra given   - consult neurology in am for further recommendation on seizure management, can consider obtaining EEG for further evaluation of seizure disorder     Subjective:  Haylie Rudolph is a 43 y.o. female with diabetes mellitus, seizure disorder, CKD IIIa with nephropathy, hypothyroid s/p total thyroidectomy for papillary thyroid cancer in 2013, HTN, now presenting with complaint of seizure while at work. She was noted to have a 1-2 minute seizure while at work. Patient reports that she has seizures worse in the summer and that she hasn't seen a neurologist for many years and her next appointment will be in October. After arriving to the hospital she was noted to be hypoglycemic. She has a history of similar presentation and at that time she was taken off insulin. She continued on glipizide, farxiga and pioglitazone. She denies taking any further insuline since that time. She reports that she was having a normal day at work prior to having the seizure. She did noted that she's been experiencing some low blood sugars at home for the past 2-3 nights. ROS was positive for chest pain, lower leg edema.         Objective:  Vitals, labs and imaging reviewed      Physical Exam:   General:  AAOx3, NAD   HEENT: Conjunctiva pink,

## 2023-08-03 ENCOUNTER — HOME HEALTH ADMISSION (OUTPATIENT)
Age: 38
End: 2023-08-03

## 2023-08-03 VITALS
WEIGHT: 250 LBS | BODY MASS INDEX: 39.24 KG/M2 | HEIGHT: 67 IN | SYSTOLIC BLOOD PRESSURE: 128 MMHG | RESPIRATION RATE: 18 BRPM | TEMPERATURE: 97.9 F | HEART RATE: 81 BPM | OXYGEN SATURATION: 100 % | DIASTOLIC BLOOD PRESSURE: 91 MMHG

## 2023-08-03 LAB
ANION GAP SERPL CALC-SCNC: 7 MMOL/L (ref 3–18)
BASOPHILS # BLD: 0 K/UL (ref 0–0.1)
BASOPHILS NFR BLD: 0 % (ref 0–2)
BUN SERPL-MCNC: 15 MG/DL (ref 7–18)
BUN/CREAT SERPL: 11 (ref 12–20)
C PEPTIDE SERPL-MCNC: 2.9 NG/ML (ref 1.1–4.4)
CALCIUM SERPL-MCNC: 8.6 MG/DL (ref 8.5–10.1)
CHLORIDE SERPL-SCNC: 112 MMOL/L (ref 100–111)
CO2 SERPL-SCNC: 19 MMOL/L (ref 21–32)
CREAT SERPL-MCNC: 1.32 MG/DL (ref 0.6–1.3)
DIFFERENTIAL METHOD BLD: ABNORMAL
EOSINOPHIL # BLD: 0 K/UL (ref 0–0.4)
EOSINOPHIL NFR BLD: 0 % (ref 0–5)
ERYTHROCYTE [DISTWIDTH] IN BLOOD BY AUTOMATED COUNT: 14.6 % (ref 11.6–14.5)
GLUCOSE BLD STRIP.AUTO-MCNC: 102 MG/DL (ref 70–110)
GLUCOSE BLD STRIP.AUTO-MCNC: 115 MG/DL (ref 70–110)
GLUCOSE BLD STRIP.AUTO-MCNC: 149 MG/DL (ref 70–110)
GLUCOSE BLD STRIP.AUTO-MCNC: 187 MG/DL (ref 70–110)
GLUCOSE SERPL-MCNC: 114 MG/DL (ref 74–99)
HCT VFR BLD AUTO: 38.5 % (ref 35–45)
HGB BLD-MCNC: 12.3 G/DL (ref 12–16)
IMM GRANULOCYTES # BLD AUTO: 0 K/UL (ref 0–0.04)
IMM GRANULOCYTES NFR BLD AUTO: 1 %
LYMPHOCYTES # BLD: 3.5 K/UL (ref 0.9–3.6)
LYMPHOCYTES NFR BLD: 46 % (ref 21–52)
MCH RBC QN AUTO: 26.6 PG (ref 24–34)
MCHC RBC AUTO-ENTMCNC: 31.9 G/DL (ref 31–37)
MCV RBC AUTO: 83.2 FL (ref 78–100)
MONOCYTES # BLD: 0.5 K/UL (ref 0.05–1.2)
MONOCYTES NFR BLD: 7 % (ref 3–10)
NEUTS SEG # BLD: 3.4 K/UL (ref 1.8–8)
NEUTS SEG NFR BLD: 46 % (ref 40–73)
NRBC # BLD: 0 K/UL (ref 0–0.01)
NRBC BLD-RTO: 0 PER 100 WBC
PLATELET # BLD AUTO: 360 K/UL (ref 135–420)
PMV BLD AUTO: 10.7 FL (ref 9.2–11.8)
POTASSIUM SERPL-SCNC: 3.9 MMOL/L (ref 3.5–5.5)
RBC # BLD AUTO: 4.63 M/UL (ref 4.2–5.3)
SODIUM SERPL-SCNC: 138 MMOL/L (ref 136–145)
TOPIRAMATE SERPL-MCNC: 6.9 UG/ML (ref 2–25)
WBC # BLD AUTO: 7.4 K/UL (ref 4.6–13.2)

## 2023-08-03 PROCEDURE — 6370000000 HC RX 637 (ALT 250 FOR IP)

## 2023-08-03 PROCEDURE — G0378 HOSPITAL OBSERVATION PER HR: HCPCS

## 2023-08-03 PROCEDURE — 6360000002 HC RX W HCPCS

## 2023-08-03 PROCEDURE — 6370000000 HC RX 637 (ALT 250 FOR IP): Performed by: INTERNAL MEDICINE

## 2023-08-03 PROCEDURE — 96372 THER/PROPH/DIAG INJ SC/IM: CPT

## 2023-08-03 PROCEDURE — 85025 COMPLETE CBC W/AUTO DIFF WBC: CPT

## 2023-08-03 PROCEDURE — 36415 COLL VENOUS BLD VENIPUNCTURE: CPT

## 2023-08-03 PROCEDURE — 96361 HYDRATE IV INFUSION ADD-ON: CPT

## 2023-08-03 PROCEDURE — 80048 BASIC METABOLIC PNL TOTAL CA: CPT

## 2023-08-03 PROCEDURE — 94761 N-INVAS EAR/PLS OXIMETRY MLT: CPT

## 2023-08-03 PROCEDURE — 82962 GLUCOSE BLOOD TEST: CPT

## 2023-08-03 RX ORDER — LEVOTHYROXINE SODIUM 0.15 MG/1
150 TABLET ORAL DAILY
Qty: 30 TABLET | Refills: 3 | Status: SHIPPED | OUTPATIENT
Start: 2023-08-04

## 2023-08-03 RX ADMIN — LEVOTHYROXINE SODIUM 150 MCG: 150 TABLET ORAL at 08:29

## 2023-08-03 RX ADMIN — LIOTHYRONINE SODIUM 5 MCG: 5 TABLET ORAL at 08:28

## 2023-08-03 RX ADMIN — ENOXAPARIN SODIUM 30 MG: 100 INJECTION SUBCUTANEOUS at 08:29

## 2023-08-03 RX ADMIN — BUMETANIDE 1 MG: 1 TABLET ORAL at 08:29

## 2023-08-03 RX ADMIN — TOPIRAMATE 100 MG: 25 TABLET, FILM COATED ORAL at 08:28

## 2023-08-03 RX ADMIN — AMLODIPINE BESYLATE 10 MG: 10 TABLET ORAL at 08:29

## 2023-08-03 RX ADMIN — PANTOPRAZOLE SODIUM 40 MG: 40 TABLET, DELAYED RELEASE ORAL at 08:32

## 2023-08-03 ASSESSMENT — PAIN SCALES - GENERAL
PAINLEVEL_OUTOF10: 0

## 2023-08-03 NOTE — CARE COORDINATION
Discharge order noted for today. Pt has been accepted to [facility] agency for [facility]. Met with patient and she is agreeable to the transition plan today. Transport has been arranged through her friend. Patient's discharge summary and home health  orders have been forwarded to [facility] home health agency via [id number]. Updated bedside RN, Johny Esaon, to the transition plan.   Discharge information has been documented on the AVS.       SHAYY SalasN, RN  Case Management  [phone]

## 2023-08-03 NOTE — DISCHARGE SUMMARY
and insulin use. Glucose has stabilized and discharged home.     Patient's problem list was managed in hospital as stated below:     Refractory hypoglycemia w history of severe refractory hypoglycemia  Uncontrolled type 2 diabetes  Hypothyroidism s/p thyroidectomy due to papillary thyroid carcinoma  - continued D5 1/2  mL/hr  - c-peptide (normal) and free insulin ordered, pending free insulin  - continued levothyroxine 150 mcg daily and discontinued liothyronine per endocrinology recs  - held glipizide, farxiga, and pioglitazone during admission and at discharge  - continued patient education to increase compliance     Possible seizure disorder  Syncope  - continued home topiramate 100mg BID  - ordered topiramate level  - continued hypoglycemia management and monitored for seizure activity  - consider follow up with neurology outpatient     Chest pain/moderate concentric hypertrophy  - Lidocaine patch  - Consider cardiology follow up as outpatient     FSGS   - TBG level ordered, not resulted  - Patient will follow up with Nephro OP  - Continued Bumex 1 mg     Atraumatic R Foot Pain  - Patient will continue with outpatient management with Dr. Cem Luis  - continued gabapentin 400 mg daily      GERD  -continued pantoprazole while IP    Other stable chronic conditions:  -possible DICKSON: consider follow up with sleep study outpatient      Pertinent Results:       Laboratory Results:  LABORATORY RESULTS   HEMATOLOGY Lab Results   Component Value Date/Time    WBC 7.4 08/03/2023 06:09 AM    HGB 12.3 08/03/2023 06:09 AM    HCT 38.5 08/03/2023 06:09 AM     08/03/2023 06:09 AM    MCV 83.2 08/03/2023 06:09 AM       CHEMISTRIES Lab Results   Component Value Date/Time     08/02/2023 04:50 AM    K 3.5 08/02/2023 04:50 AM     08/02/2023 04:50 AM    CO2 21 08/02/2023 04:50 AM    BUN 16 08/02/2023 04:50 AM    GFRAA 39 08/08/2022 11:03 PM      HEPATIC FUNCTION Lab Results   Component Value Date/Time    GLOB

## 2023-08-03 NOTE — PROGRESS NOTES
Glucose 150 (H) 70 - 110 mg/dL   POCT Glucose    Collection Time: 08/02/23 10:19 PM   Result Value Ref Range    POC Glucose 127 (H) 70 - 110 mg/dL   POCT Glucose    Collection Time: 08/03/23  1:16 AM   Result Value Ref Range    POC Glucose 149 (H) 70 - 110 mg/dL   POCT Glucose    Collection Time: 08/03/23  5:21 AM   Result Value Ref Range    POC Glucose 115 (H) 70 - 110 mg/dL       IMAGING AND PROCEDURES (LAST 24 HOURS)  CT Head W/O Contrast    Result Date: 8/1/2023  Similar large bilateral mastoid effusions. CT CSpine W/O Contrast    Result Date: 8/1/2023  Straightening cervical spine may be positional or related to spasm. No acute findings of trauma otherwise. Large bilateral mastoid effusions. XR CHEST PORTABLE    Result Date: 8/1/2023  No acute findings.       ================================================================  Further management for Ms. Yamila Recinos will be discussed on rounds with my attending.       Orene Bosworth, MD, PGY-1  [facility] Family Medicine  August 3, 2023 6:43 AM

## 2023-08-03 NOTE — HOME CARE
Late Entry:  Called and spoke with patient via phone. Explained our [facility] service. Patient agrees. Verified Address and phone number. Updated different address in chart.     Referral was noted and arranged by B.W. and sent to central intake and scheduling.        ----   Lorenzo Mayers, [address]

## 2023-08-04 LAB — T4BG SERPL-MCNC: 17 UG/ML (ref 13–39)

## 2023-08-07 LAB
INSULIN FREE SERPL-ACNC: 112 UU/ML
INSULIN SERPL-ACNC: 112 UU/ML

## 2023-08-21 ENCOUNTER — HOSPITAL ENCOUNTER (EMERGENCY)
Facility: HOSPITAL | Age: 38
Discharge: HOME | End: 2023-08-22
Attending: EMERGENCY MEDICINE
Payer: MEDICAID

## 2023-08-21 DIAGNOSIS — M79.604 BILATERAL LEG PAIN: Primary | ICD-10-CM

## 2023-08-21 DIAGNOSIS — M79.605 BILATERAL LEG PAIN: Primary | ICD-10-CM

## 2023-08-21 PROCEDURE — 99284 EMERGENCY DEPT VISIT MOD MDM: CPT

## 2023-08-21 RX ORDER — OXYCODONE HYDROCHLORIDE AND ACETAMINOPHEN 5; 325 MG/1; MG/1
1 TABLET ORAL
Status: COMPLETED | OUTPATIENT
Start: 2023-08-22 | End: 2023-08-22

## 2023-08-21 ASSESSMENT — PAIN - FUNCTIONAL ASSESSMENT: PAIN_FUNCTIONAL_ASSESSMENT: 0-10

## 2023-08-21 ASSESSMENT — PAIN DESCRIPTION - LOCATION: LOCATION: LEG

## 2023-08-21 ASSESSMENT — PAIN DESCRIPTION - DESCRIPTORS: DESCRIPTORS: BURNING

## 2023-08-21 ASSESSMENT — PAIN DESCRIPTION - PAIN TYPE: TYPE: ACUTE PAIN

## 2023-08-21 ASSESSMENT — PAIN SCALES - GENERAL: PAINLEVEL_OUTOF10: 8

## 2023-08-21 ASSESSMENT — PAIN DESCRIPTION - FREQUENCY: FREQUENCY: CONTINUOUS

## 2023-08-21 ASSESSMENT — PAIN DESCRIPTION - ORIENTATION: ORIENTATION: RIGHT;LEFT

## 2023-08-22 VITALS
HEART RATE: 73 BPM | OXYGEN SATURATION: 100 % | TEMPERATURE: 97.4 F | SYSTOLIC BLOOD PRESSURE: 128 MMHG | BODY MASS INDEX: 36.37 KG/M2 | RESPIRATION RATE: 14 BRPM | DIASTOLIC BLOOD PRESSURE: 71 MMHG | HEIGHT: 68 IN | WEIGHT: 240 LBS

## 2023-08-22 PROCEDURE — 6360000002 HC RX W HCPCS: Performed by: EMERGENCY MEDICINE

## 2023-08-22 PROCEDURE — 6370000000 HC RX 637 (ALT 250 FOR IP): Performed by: EMERGENCY MEDICINE

## 2023-08-22 RX ORDER — FUROSEMIDE 10 MG/ML
40 INJECTION INTRAMUSCULAR; INTRAVENOUS
Status: COMPLETED | OUTPATIENT
Start: 2023-08-22 | End: 2023-08-22

## 2023-08-22 RX ORDER — MORPHINE SULFATE 4 MG/ML
4 INJECTION, SOLUTION INTRAMUSCULAR; INTRAVENOUS
Status: COMPLETED | OUTPATIENT
Start: 2023-08-22 | End: 2023-08-22

## 2023-08-22 RX ADMIN — MORPHINE SULFATE 4 MG: 4 INJECTION, SOLUTION INTRAMUSCULAR; INTRAVENOUS at 02:23

## 2023-08-22 RX ADMIN — OXYCODONE HYDROCHLORIDE AND ACETAMINOPHEN 1 TABLET: 5; 325 TABLET ORAL at 00:12

## 2023-08-22 RX ADMIN — FUROSEMIDE 40 MG: 10 INJECTION, SOLUTION INTRAMUSCULAR; INTRAVENOUS at 02:57

## 2023-08-22 ASSESSMENT — ENCOUNTER SYMPTOMS
GASTROINTESTINAL NEGATIVE: 1
VOMITING: 0
ABDOMINAL PAIN: 0
RESPIRATORY NEGATIVE: 1

## 2023-08-22 ASSESSMENT — PAIN SCALES - GENERAL
PAINLEVEL_OUTOF10: 7
PAINLEVEL_OUTOF10: 10
PAINLEVEL_OUTOF10: 10

## 2023-08-22 ASSESSMENT — PAIN DESCRIPTION - LOCATION
LOCATION: LEG

## 2023-08-22 ASSESSMENT — PAIN DESCRIPTION - ORIENTATION
ORIENTATION: RIGHT;LEFT

## 2023-08-22 NOTE — ED NOTES
Ppt allergies verified pt medicated per [facility] pt reports bilateral leg pain 10/10. Swelling noted.       Mary Anne Simon RN  08/22/23 2167

## 2023-08-22 NOTE — ED NOTES
The following labs were labeled with appropriate pt sticker and tubed to lab:     [x] Blue     [x] Lavender   [] on ice  [] Green/yellow  [x] Green/black [] on ice  [] Madonna Kitten  [] on ice  [x] Yellow  [x] Red  [] Type/ Screen  [] ABG  [] VBG    [] COVID-19 swab    [] Rapid  [] PCR  [] Flu swab  [] Peds Viral Panel     [] Urine Sample  [] Fecal Sample  [] Pelvic Cultures  [] Blood Cultures  [] X 2  [] STREP Cultures       [facility], RN  08/21/23 0060

## 2023-08-22 NOTE — ED NOTES
Pt resting NAD, VSS, pt reports pain 10/10 pt allergies verified pt medicated per MAR. Family at bedside.  Cardiac monitor in place     Valery Hernandes  08/22/23 4025 .

## 2023-08-22 NOTE — ED PROVIDER NOTES
[facility]  EMERGENCY DEPARTMENT ENCOUNTER      Pt Name: Elizabeth Valerio  MRN: [id number]  [address] [facility] 1985  Date of evaluation: 8/21/2023  Provider: Irais Bryant MD    CHIEF COMPLAINT     No chief complaint on file. HISTORY OF PRESENT ILLNESS   (Location/Symptom, Timing/Onset, Context/Setting, Quality, Duration, Modifying Factors, Severity)  Note limiting factors. Elizabeth Valerio is a 43 y.o. female who presents to the emergency department     79-year-old female past medical history of diabetes and diabetic neuropathy presents to the emergency department with bilateral leg swelling and numbness. This been going on for the past 3 days. She is on gabapentin at home. Does not know the dose. Patient states she typically takes Tylenol for pain. Pain medication is not helping. No fevers or chills no chest pain or shortness. The history is provided by the patient and medical records. No  was used. Nursing Notes were reviewed. REVIEW OF SYSTEMS    (2-9 systems for level 4, 10 or more for level 5)     Review of Systems   Respiratory: Negative. Cardiovascular:  Positive for leg swelling. Negative for chest pain and palpitations. Gastrointestinal: Negative. Negative for abdominal pain and vomiting. Neurological:  Positive for weakness and numbness. Except as noted above the remainder of the review of systems was reviewed and negative.        PAST MEDICAL HISTORY     Past Medical History:   Diagnosis Date    Arthritis     Chest pain     Diabetes ([address])     Essential hypertension     Headache(784.0)     Hyperchloremia     Hypertension     Seizures ([address])     8/2020 last seizure    Seizures ([address])     SOB (shortness of breath)     Thyroid cancer [facility])          SURGICAL HISTORY       Past Surgical History:   Procedure Laterality Date    CT BIOPSY RENAL  7/30/2020    CT BIOPSY RENAL 7/30/2020 [facility] RAD CT    PARTIAL HYSTERECTOMY (CERVIX NOT REMOVED)

## 2023-08-22 NOTE — ED TRIAGE NOTES
44 y/o female to the ed with a c/c of bilateral leg pain that has been persitent ove the past several days. Patient reports the pain is an 8 on a 1-10 scale, and is a persistent burning in nature. Patient reports PMH of NIDD, and states that this issue has become chronic in nature. Patient lower extremities appear edematous bilaterally, with patient reporting the swelling has \"been getting worse. Patient denies chest pain, sob or difficulty breathing. Patient denies ABD pain, n/v/d or fever. Patient noted with + msp x 4, pupils PERRLA @ 3 and lung sounds that are clear and equil bilaterally. Patient placed on telemetry, BP and pulse ox. 12-lead EKG performed. Vitals noted as recorded. PIV placed with labs drawn and sent. Call light placed within patient's reach, and bed in lowest position with side rails up x 2 for safety. Provider at the bedside for assessment.

## 2023-09-06 ENCOUNTER — HOSPITAL ENCOUNTER (EMERGENCY)
Facility: HOSPITAL | Age: 38
Discharge: HOME | End: 2023-09-06
Attending: EMERGENCY MEDICINE
Payer: MEDICAID

## 2023-09-06 VITALS
SYSTOLIC BLOOD PRESSURE: 153 MMHG | OXYGEN SATURATION: 98 % | DIASTOLIC BLOOD PRESSURE: 88 MMHG | TEMPERATURE: 99.1 F | RESPIRATION RATE: 19 BRPM | HEART RATE: 96 BPM

## 2023-09-06 DIAGNOSIS — R05.1 ACUTE COUGH: Primary | ICD-10-CM

## 2023-09-06 DIAGNOSIS — R19.7 NAUSEA VOMITING AND DIARRHEA: ICD-10-CM

## 2023-09-06 DIAGNOSIS — R11.2 NAUSEA VOMITING AND DIARRHEA: ICD-10-CM

## 2023-09-06 LAB
FLUAV RNA SPEC QL NAA+PROBE: NOT DETECTED
FLUBV RNA SPEC QL NAA+PROBE: NOT DETECTED
GLUCOSE BLD STRIP.AUTO-MCNC: 169 MG/DL (ref 70–110)
SARS-COV-2 RNA RESP QL NAA+PROBE: NOT DETECTED

## 2023-09-06 PROCEDURE — 99283 EMERGENCY DEPT VISIT LOW MDM: CPT

## 2023-09-06 PROCEDURE — 87636 SARSCOV2 & INF A&B AMP PRB: CPT

## 2023-09-06 PROCEDURE — 82962 GLUCOSE BLOOD TEST: CPT

## 2023-09-06 ASSESSMENT — ENCOUNTER SYMPTOMS
SHORTNESS OF BREATH: 0
COUGH: 1
VOICE CHANGE: 0
STRIDOR: 0
CHEST TIGHTNESS: 0
FACIAL SWELLING: 0
CHOKING: 0
TROUBLE SWALLOWING: 0
APNEA: 0
RHINORRHEA: 1
SINUS PAIN: 0
SINUS PRESSURE: 0
SORE THROAT: 1
WHEEZING: 0

## 2023-09-06 NOTE — ED PROVIDER NOTES
EMERGENCY DEPARTMENT HISTORY AND PHYSICAL EXAM        Date: 9/6/2023  Patient Name: Alysha Javier    History of Presenting Illness     Chief Complaint   Patient presents with    URI    Emesis       History Provided By: History obtained from patient  Accompanied in the ED by her daughter    HPI: Alysha Javier, 43 y.o. female PMHx significant for type 2 diabetes, hypothyroidism due to Hashimoto's thyroiditis, hypertension, seizures, migraines focal segmental glomerulosclerosis presents to the ED with cc of cough, emesis, diarrhea x1 day    Patient felt fine yesterday but she works at a  and states that coworkers have recently traveled. Today she is thrown up 3-4 times, had 3-4 episodes of diarrhea, decreased appetite not eating anything today. Feels like both ears are congested. Diabetes: She used to take insulin states providers have taken her off of it and she just takes metformin. Not sure when her last blood glucose test was. No fever, chills, chest pain, shortness of breath, leg swelling     There are no other complaints, changes, or physical findings at this time. Records Reviewed: ED visit August 21, 2023, bilateral leg pain likely diabetic neuropathy treated with Lasix, morphine, Percocet. PCP: Cassandra Aguirre MD    No current facility-administered medications on file prior to encounter. Current Outpatient Medications on File Prior to Encounter   Medication Sig Dispense Refill    glucose 4 g chewable tablet Take 4 tablets by mouth as needed for Low blood sugar 60 tablet 3    levothyroxine (SYNTHROID) 150 MCG tablet Take 1 tablet by mouth Daily 30 tablet 3    topiramate (TOPAMAX) 100 MG tablet Take 1 tablet by mouth 2 times daily      bumetanide (BUMEX) 1 MG tablet Take 1 tablet by mouth daily      gabapentin (NEURONTIN) 400 MG capsule Take 1 capsule by mouth daily.       omeprazole (PRILOSEC) 20 MG delayed release capsule Take 1 capsule by mouth 2 times daily      amLODIPine

## 2023-09-06 NOTE — DISCHARGE INSTRUCTIONS
Use an antihistamine such as Zyrtec, Claritin, or Allegra to treat your ear congestion    The 'BRAT' diet is suggested, then progress to diet as tolerated as symptoms dalia. Bananas, Rice, Applesauce, Toast are foods easily absorbed by your GI tract during periods of illness. Call if bloody stools, persistent diarrhea, vomiting, fever or abdominal pain. Sore throat therapies:  Lozenges and sore throat sprays with topical anesthetics are helpful. Ibuprofen and tylenol in combination help with pain. For patients with significant sore throat pain, hydration with frozen (eg, ice or popsicles) or warmed liquids (eg, teas, soups), rather than room temperature or refrigerated fluids, may provide relief. Very cold foods can have a numbing-like effect that temporarily reduces or alleviates the pain of swallowing. Ice cubes or frozen popsicles facilitate hydration; ice cream and frozen yogurt provide caloric intake. Warm fluids and foods, including teas, soups, and soft non-irritating foods, may be better tolerated by patients with throat pain than irritating foods (eg, rough-textured or spicy foods) or fluids at room temperatures. Foods that coat the throat, including honey and hard candies, can facilitate intake of calories while temporarily relieving throat pain.     Return to ED or schedule visit with PCP if signs of infection such as fast heart rate or fever develop

## 2023-09-07 LAB — GLUCOSE BLD STRIP.AUTO-MCNC: 164 MG/DL (ref 70–110)

## 2023-09-07 NOTE — ED NOTES
I have reviewed the discharge instructions with the patient. The patient verbalized understanding of instructions with no further questions.       Ruby Pichardo RN  09/06/23 2003

## 2023-11-14 DIAGNOSIS — S99.921S FOOT TRAUMA, RIGHT, SEQUELA: Primary | ICD-10-CM

## 2023-12-09 ENCOUNTER — APPOINTMENT (OUTPATIENT)
Facility: HOSPITAL | Age: 38
End: 2023-12-09
Payer: MEDICAID

## 2023-12-09 ENCOUNTER — HOSPITAL ENCOUNTER (EMERGENCY)
Facility: HOSPITAL | Age: 38
Discharge: HOME | End: 2023-12-09
Payer: MEDICAID

## 2023-12-09 VITALS
TEMPERATURE: 98.9 F | SYSTOLIC BLOOD PRESSURE: 133 MMHG | RESPIRATION RATE: 15 BRPM | OXYGEN SATURATION: 97 % | HEART RATE: 90 BPM | DIASTOLIC BLOOD PRESSURE: 85 MMHG

## 2023-12-09 DIAGNOSIS — G40.919 BREAKTHROUGH SEIZURE (HCC): Primary | ICD-10-CM

## 2023-12-09 LAB
ALBUMIN SERPL-MCNC: 2.8 G/DL (ref 3.4–5)
ALBUMIN/GLOB SERPL: 0.7 (ref 0.8–1.7)
ALP SERPL-CCNC: 103 U/L (ref 45–117)
ALT SERPL-CCNC: 18 U/L (ref 13–56)
ANION GAP SERPL CALC-SCNC: 5 MMOL/L (ref 3–18)
APPEARANCE UR: CLEAR
AST SERPL-CCNC: 12 U/L (ref 10–38)
BACTERIA URNS QL MICRO: NEGATIVE /HPF
BASOPHILS # BLD: 0 K/UL (ref 0–0.1)
BASOPHILS NFR BLD: 0 % (ref 0–2)
BILIRUB SERPL-MCNC: 0.2 MG/DL (ref 0.2–1)
BILIRUB UR QL: NEGATIVE
BUN SERPL-MCNC: 16 MG/DL (ref 7–18)
BUN/CREAT SERPL: 11 (ref 12–20)
CALCIUM SERPL-MCNC: 8.7 MG/DL (ref 8.5–10.1)
CHLORIDE SERPL-SCNC: 111 MMOL/L (ref 100–111)
CO2 SERPL-SCNC: 22 MMOL/L (ref 21–32)
COLOR UR: YELLOW
CREAT SERPL-MCNC: 1.41 MG/DL (ref 0.6–1.3)
DIFFERENTIAL METHOD BLD: ABNORMAL
EOSINOPHIL # BLD: 0.1 K/UL (ref 0–0.4)
EOSINOPHIL NFR BLD: 1 % (ref 0–5)
EPITH CASTS URNS QL MICRO: NORMAL /LPF (ref 0–5)
ERYTHROCYTE [DISTWIDTH] IN BLOOD BY AUTOMATED COUNT: 15.1 % (ref 11.6–14.5)
GLOBULIN SER CALC-MCNC: 4.3 G/DL (ref 2–4)
GLUCOSE SERPL-MCNC: 134 MG/DL (ref 74–99)
GLUCOSE UR STRIP.AUTO-MCNC: >1000 MG/DL
HCT VFR BLD AUTO: 37.5 % (ref 35–45)
HGB BLD-MCNC: 11.8 G/DL (ref 12–16)
HGB UR QL STRIP: ABNORMAL
IMM GRANULOCYTES # BLD AUTO: 0.1 K/UL (ref 0–0.04)
IMM GRANULOCYTES NFR BLD AUTO: 1 % (ref 0–0.5)
KETONES UR QL STRIP.AUTO: NEGATIVE MG/DL
LEUKOCYTE ESTERASE UR QL STRIP.AUTO: NEGATIVE
LIPASE SERPL-CCNC: 46 U/L (ref 13–75)
LYMPHOCYTES # BLD: 4.4 K/UL (ref 0.9–3.6)
LYMPHOCYTES NFR BLD: 48 % (ref 21–52)
MAGNESIUM SERPL-MCNC: 2 MG/DL (ref 1.6–2.6)
MCH RBC QN AUTO: 26.7 PG (ref 24–34)
MCHC RBC AUTO-ENTMCNC: 31.5 G/DL (ref 31–37)
MCV RBC AUTO: 84.8 FL (ref 78–100)
MONOCYTES # BLD: 0.6 K/UL (ref 0.05–1.2)
MONOCYTES NFR BLD: 7 % (ref 3–10)
NEUTS SEG # BLD: 4 K/UL (ref 1.8–8)
NEUTS SEG NFR BLD: 44 % (ref 40–73)
NITRITE UR QL STRIP.AUTO: NEGATIVE
NRBC # BLD: 0 K/UL (ref 0–0.01)
NRBC BLD-RTO: 0 PER 100 WBC
PH UR STRIP: 6.5 (ref 5–8)
PLATELET # BLD AUTO: 430 K/UL (ref 135–420)
PMV BLD AUTO: 11 FL (ref 9.2–11.8)
POTASSIUM SERPL-SCNC: 3.9 MMOL/L (ref 3.5–5.5)
PROT SERPL-MCNC: 7.1 G/DL (ref 6.4–8.2)
PROT UR STRIP-MCNC: >1000 MG/DL
RBC # BLD AUTO: 4.42 M/UL (ref 4.2–5.3)
RBC #/AREA URNS HPF: NORMAL /HPF (ref 0–5)
SODIUM SERPL-SCNC: 138 MMOL/L (ref 136–145)
SP GR UR REFRACTOMETRY: 1.02 (ref 1–1.03)
UROBILINOGEN UR QL STRIP.AUTO: 1 EU/DL (ref 0.2–1)
WBC # BLD AUTO: 9.2 K/UL (ref 4.6–13.2)
WBC URNS QL MICRO: NORMAL /HPF (ref 0–4)

## 2023-12-09 PROCEDURE — 71100 X-RAY EXAM RIBS UNI 2 VIEWS: CPT

## 2023-12-09 PROCEDURE — 6360000002 HC RX W HCPCS: Performed by: PHYSICIAN ASSISTANT

## 2023-12-09 PROCEDURE — 80053 COMPREHEN METABOLIC PANEL: CPT

## 2023-12-09 PROCEDURE — 85025 COMPLETE CBC W/AUTO DIFF WBC: CPT

## 2023-12-09 PROCEDURE — 70450 CT HEAD/BRAIN W/O DYE: CPT

## 2023-12-09 PROCEDURE — 96375 TX/PRO/DX INJ NEW DRUG ADDON: CPT

## 2023-12-09 PROCEDURE — 81001 URINALYSIS AUTO W/SCOPE: CPT

## 2023-12-09 PROCEDURE — 99284 EMERGENCY DEPT VISIT MOD MDM: CPT

## 2023-12-09 PROCEDURE — 83690 ASSAY OF LIPASE: CPT

## 2023-12-09 PROCEDURE — 96374 THER/PROPH/DIAG INJ IV PUSH: CPT

## 2023-12-09 PROCEDURE — 83735 ASSAY OF MAGNESIUM: CPT

## 2023-12-09 RX ORDER — ONDANSETRON 2 MG/ML
4 INJECTION INTRAMUSCULAR; INTRAVENOUS
Status: COMPLETED | OUTPATIENT
Start: 2023-12-09 | End: 2023-12-09

## 2023-12-09 RX ORDER — LEVETIRACETAM 500 MG/5ML
1000 INJECTION, SOLUTION, CONCENTRATE INTRAVENOUS
Status: COMPLETED | OUTPATIENT
Start: 2023-12-09 | End: 2023-12-09

## 2023-12-09 RX ADMIN — LEVETIRACETAM 1000 MG: 100 INJECTION, SOLUTION INTRAVENOUS at 20:56

## 2023-12-09 RX ADMIN — ONDANSETRON 4 MG: 2 INJECTION INTRAMUSCULAR; INTRAVENOUS at 20:54

## 2023-12-09 ASSESSMENT — ENCOUNTER SYMPTOMS
ABDOMINAL DISTENTION: 0
DIARRHEA: 0
WHEEZING: 0
EYE DISCHARGE: 0
VOMITING: 0
NAUSEA: 0
SHORTNESS OF BREATH: 0
SORE THROAT: 0

## 2023-12-09 ASSESSMENT — PAIN - FUNCTIONAL ASSESSMENT: PAIN_FUNCTIONAL_ASSESSMENT: NONE - DENIES PAIN

## 2023-12-10 NOTE — ED TRIAGE NOTES
Pt arrived via EMS following a seizure witnessed by daughter lasting approximately 1 min. Pt has hx of seizures. Pt is alert and oriented. VSS.
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
## 2024-02-25 ENCOUNTER — APPOINTMENT (OUTPATIENT)
Facility: HOSPITAL | Age: 39
End: 2024-02-25
Payer: MEDICAID

## 2024-02-25 ENCOUNTER — HOSPITAL ENCOUNTER (EMERGENCY)
Facility: HOSPITAL | Age: 39
Discharge: HOME | End: 2024-02-25
Payer: MEDICAID

## 2024-02-25 VITALS
RESPIRATION RATE: 18 BRPM | HEIGHT: 68 IN | TEMPERATURE: 99.9 F | SYSTOLIC BLOOD PRESSURE: 137 MMHG | HEART RATE: 90 BPM | WEIGHT: 260 LBS | OXYGEN SATURATION: 100 % | BODY MASS INDEX: 39.4 KG/M2 | DIASTOLIC BLOOD PRESSURE: 78 MMHG

## 2024-02-25 DIAGNOSIS — J11.1 INFLUENZA: Primary | ICD-10-CM

## 2024-02-25 LAB
FLUAV RNA SPEC QL NAA+PROBE: DETECTED
FLUBV RNA SPEC QL NAA+PROBE: NOT DETECTED
SARS-COV-2 RNA RESP QL NAA+PROBE: NOT DETECTED

## 2024-02-25 PROCEDURE — 99284 EMERGENCY DEPT VISIT MOD MDM: CPT

## 2024-02-25 PROCEDURE — 71101 X-RAY EXAM UNILAT RIBS/CHEST: CPT

## 2024-02-25 PROCEDURE — 87636 SARSCOV2 & INF A&B AMP PRB: CPT

## 2024-02-25 RX ORDER — IBUPROFEN 600 MG/1
600 TABLET ORAL 3 TIMES DAILY PRN
Qty: 30 TABLET | Refills: 0 | Status: SHIPPED | OUTPATIENT
Start: 2024-02-25

## 2024-02-25 RX ORDER — DEXTROMETHORPHN/ACETAMINOPH/CP 10-325-2MG
1 TABLET ORAL 2 TIMES DAILY
Qty: 20 TABLET | Refills: 0 | Status: SHIPPED | OUTPATIENT
Start: 2024-02-25

## 2024-02-25 ASSESSMENT — PAIN DESCRIPTION - ORIENTATION: ORIENTATION: RIGHT;POSTERIOR

## 2024-02-25 ASSESSMENT — PAIN DESCRIPTION - FREQUENCY: FREQUENCY: CONTINUOUS

## 2024-02-25 ASSESSMENT — PAIN DESCRIPTION - PAIN TYPE: TYPE: ACUTE PAIN

## 2024-02-25 ASSESSMENT — PAIN - FUNCTIONAL ASSESSMENT: PAIN_FUNCTIONAL_ASSESSMENT: 0-10

## 2024-02-25 ASSESSMENT — PAIN SCALES - GENERAL: PAINLEVEL_OUTOF10: 10

## 2024-02-25 ASSESSMENT — PAIN DESCRIPTION - LOCATION: LOCATION: RIB CAGE

## 2024-02-25 ASSESSMENT — PAIN DESCRIPTION - DESCRIPTORS: DESCRIPTORS: ACHING;BURNING
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
## 2024-04-10 ENCOUNTER — TRANSCRIBE ORDERS (OUTPATIENT)
Facility: HOSPITAL | Age: 39
End: 2024-04-10

## 2024-04-10 ENCOUNTER — HOSPITAL ENCOUNTER (OUTPATIENT)
Facility: HOSPITAL | Age: 39
Discharge: HOME | End: 2024-04-13
Payer: MEDICAID

## 2024-04-10 DIAGNOSIS — M14.671 CHARCOT'S JOINT OF ANKLE, RIGHT: Primary | ICD-10-CM

## 2024-04-10 DIAGNOSIS — M14.671 CHARCOT'S JOINT OF ANKLE, RIGHT: ICD-10-CM

## 2024-04-10 PROCEDURE — 73610 X-RAY EXAM OF ANKLE: CPT

## 2024-04-10 PROCEDURE — 73630 X-RAY EXAM OF FOOT: CPT

## 2024-07-20 ENCOUNTER — APPOINTMENT (OUTPATIENT)
Facility: HOSPITAL | Age: 39
End: 2024-07-20
Payer: MEDICAID

## 2024-07-20 ENCOUNTER — HOSPITAL ENCOUNTER (EMERGENCY)
Facility: HOSPITAL | Age: 39
Discharge: HOME | End: 2024-07-20
Attending: STUDENT IN AN ORGANIZED HEALTH CARE EDUCATION/TRAINING PROGRAM
Payer: MEDICAID

## 2024-07-20 VITALS
SYSTOLIC BLOOD PRESSURE: 164 MMHG | OXYGEN SATURATION: 98 % | HEIGHT: 68 IN | RESPIRATION RATE: 18 BRPM | HEART RATE: 80 BPM | DIASTOLIC BLOOD PRESSURE: 92 MMHG | WEIGHT: 235 LBS | TEMPERATURE: 98 F | BODY MASS INDEX: 35.61 KG/M2

## 2024-07-20 DIAGNOSIS — Z86.39 HISTORY OF DIABETES MELLITUS: ICD-10-CM

## 2024-07-20 DIAGNOSIS — G40.909 SEIZURE DISORDER (HCC): Primary | ICD-10-CM

## 2024-07-20 DIAGNOSIS — Z86.79 HISTORY OF HYPERTENSION: ICD-10-CM

## 2024-07-20 LAB
ANION GAP SERPL CALC-SCNC: 3 MMOL/L (ref 3–18)
APPEARANCE UR: CLEAR
BACTERIA URNS QL MICRO: NEGATIVE /HPF
BASOPHILS # BLD: 0 K/UL (ref 0–0.1)
BASOPHILS NFR BLD: 1 % (ref 0–2)
BILIRUB UR QL: NEGATIVE
BUN SERPL-MCNC: 15 MG/DL (ref 7–18)
BUN/CREAT SERPL: 12 (ref 12–20)
CALCIUM SERPL-MCNC: 9.2 MG/DL (ref 8.5–10.1)
CHLORIDE SERPL-SCNC: 106 MMOL/L (ref 100–111)
CO2 SERPL-SCNC: 28 MMOL/L (ref 21–32)
COLOR UR: YELLOW
CREAT SERPL-MCNC: 1.23 MG/DL (ref 0.6–1.3)
DIFFERENTIAL METHOD BLD: ABNORMAL
EOSINOPHIL # BLD: 0.2 K/UL (ref 0–0.4)
EOSINOPHIL NFR BLD: 2 % (ref 0–5)
EPITH CASTS URNS QL MICRO: NORMAL /LPF (ref 0–5)
ERYTHROCYTE [DISTWIDTH] IN BLOOD BY AUTOMATED COUNT: 13.9 % (ref 11.6–14.5)
GLUCOSE BLD STRIP.AUTO-MCNC: 98 MG/DL (ref 74–99)
GLUCOSE SERPL-MCNC: 84 MG/DL (ref 74–99)
GLUCOSE UR STRIP.AUTO-MCNC: NEGATIVE MG/DL
HCT VFR BLD AUTO: 43.8 % (ref 35–45)
HGB BLD-MCNC: 13.6 G/DL (ref 12–16)
HGB UR QL STRIP: ABNORMAL
IMM GRANULOCYTES # BLD AUTO: 0.1 K/UL (ref 0–0.04)
IMM GRANULOCYTES NFR BLD AUTO: 1 % (ref 0–0.5)
KETONES UR QL STRIP.AUTO: NEGATIVE MG/DL
LEUKOCYTE ESTERASE UR QL STRIP.AUTO: NEGATIVE
LYMPHOCYTES # BLD: 2.7 K/UL (ref 0.9–3.6)
LYMPHOCYTES NFR BLD: 37 % (ref 21–52)
MCH RBC QN AUTO: 27.1 PG (ref 24–34)
MCHC RBC AUTO-ENTMCNC: 31.1 G/DL (ref 31–37)
MCV RBC AUTO: 87.3 FL (ref 78–100)
MONOCYTES # BLD: 0.6 K/UL (ref 0.05–1.2)
MONOCYTES NFR BLD: 8 % (ref 3–10)
NEUTS SEG # BLD: 3.8 K/UL (ref 1.8–8)
NEUTS SEG NFR BLD: 52 % (ref 40–73)
NITRITE UR QL STRIP.AUTO: NEGATIVE
NRBC # BLD: 0 K/UL (ref 0–0.01)
NRBC BLD-RTO: 0 PER 100 WBC
PH UR STRIP: 6.5 (ref 5–8)
PLATELET # BLD AUTO: 327 K/UL (ref 135–420)
PMV BLD AUTO: 10 FL (ref 9.2–11.8)
POTASSIUM SERPL-SCNC: 4.4 MMOL/L (ref 3.5–5.5)
PROT UR STRIP-MCNC: 300 MG/DL
RBC # BLD AUTO: 5.02 M/UL (ref 4.2–5.3)
RBC #/AREA URNS HPF: NORMAL /HPF (ref 0–5)
SERVICE CMNT-IMP: NORMAL
SODIUM SERPL-SCNC: 137 MMOL/L (ref 136–145)
SP GR UR REFRACTOMETRY: 1.02 (ref 1–1.03)
T4 FREE SERPL-MCNC: 0.7 NG/DL (ref 0.7–1.5)
TSH SERPL DL<=0.05 MIU/L-ACNC: 33 UIU/ML (ref 0.36–3.74)
UROBILINOGEN UR QL STRIP.AUTO: 0.2 EU/DL (ref 0.2–1)
WBC # BLD AUTO: 7.3 K/UL (ref 4.6–13.2)
WBC URNS QL MICRO: NORMAL /HPF (ref 0–4)

## 2024-07-20 PROCEDURE — 84443 ASSAY THYROID STIM HORMONE: CPT

## 2024-07-20 PROCEDURE — 82962 GLUCOSE BLOOD TEST: CPT

## 2024-07-20 PROCEDURE — 80048 BASIC METABOLIC PNL TOTAL CA: CPT

## 2024-07-20 PROCEDURE — 99285 EMERGENCY DEPT VISIT HI MDM: CPT

## 2024-07-20 PROCEDURE — 71045 X-RAY EXAM CHEST 1 VIEW: CPT

## 2024-07-20 PROCEDURE — 81001 URINALYSIS AUTO W/SCOPE: CPT

## 2024-07-20 PROCEDURE — 93005 ELECTROCARDIOGRAM TRACING: CPT | Performed by: STUDENT IN AN ORGANIZED HEALTH CARE EDUCATION/TRAINING PROGRAM

## 2024-07-20 PROCEDURE — 85025 COMPLETE CBC W/AUTO DIFF WBC: CPT

## 2024-07-20 PROCEDURE — 80177 DRUG SCRN QUAN LEVETIRACETAM: CPT

## 2024-07-20 PROCEDURE — 94761 N-INVAS EAR/PLS OXIMETRY MLT: CPT

## 2024-07-20 PROCEDURE — 84439 ASSAY OF FREE THYROXINE: CPT

## 2024-07-20 ASSESSMENT — ENCOUNTER SYMPTOMS
DIARRHEA: 0
ABDOMINAL PAIN: 0
PHOTOPHOBIA: 0
NAUSEA: 0
EYE REDNESS: 0
COUGH: 0
FACIAL SWELLING: 0
WHEEZING: 0
SHORTNESS OF BREATH: 0

## 2024-07-20 ASSESSMENT — PAIN - FUNCTIONAL ASSESSMENT: PAIN_FUNCTIONAL_ASSESSMENT: 0-10

## 2024-07-20 ASSESSMENT — PAIN SCALES - GENERAL: PAINLEVEL_OUTOF10: 10

## 2024-07-20 ASSESSMENT — PAIN DESCRIPTION - LOCATION: LOCATION: HEAD

## 2024-07-20 NOTE — ED TRIAGE NOTES
Patient arrives to ED via [facility] with c/o seizures.  Per EMS patient had 3 witnessed seizures at home and one witnessed seizure while in care of EM.  Patient is now A&OX4, has been placed on full cardiac monitor and has call bell in reach.    Patient denies CP, SOB, N/V/D at this time.

## 2024-07-20 NOTE — ED PROVIDER NOTES
MD HASEEB         CONSULTS:  None    PROCEDURES:  Unless otherwise noted below, none     Procedures      FINAL IMPRESSION      1. Seizure disorder (HCC)    2. History of hypertension    3. History of diabetes mellitus          DISPOSITION/PLAN   DISPOSITION Decision To Discharge 07/20/2024 04:38:47 PM      PATIENT REFERRED TO:  Johnathan Villatoro MD  [address] [address]  [phone]    In 1 week      [facility] EMERGENCY DEPT  [address] [id number] [phone]    As needed      DISCHARGE MEDICATIONS:  Current Discharge Medication List        Controlled Substances Monitoring:          No data to display                (Please note that portions of this note were completed with a voice recognition program.  Efforts were made to edit the dictations but occasionally words are mis-transcribed.)    Brendan Salinas III, MD (electronically signed)  Attending Emergency Physician           Brendan Salinas III, MD  07/20/24 1261

## 2024-07-21 LAB
EKG ATRIAL RATE: 88 BPM
EKG DIAGNOSIS: NORMAL
EKG P AXIS: 59 DEGREES
EKG P-R INTERVAL: 172 MS
EKG Q-T INTERVAL: 368 MS
EKG QRS DURATION: 84 MS
EKG QTC CALCULATION (BAZETT): 445 MS
EKG R AXIS: 17 DEGREES
EKG T AXIS: 45 DEGREES
EKG VENTRICULAR RATE: 88 BPM

## 2024-07-21 PROCEDURE — 93010 ELECTROCARDIOGRAM REPORT: CPT | Performed by: INTERNAL MEDICINE

## 2024-07-22 LAB — LEVETIRACETAM SERPL-MCNC: 16.5 UG/ML (ref 10–40)

## 2024-08-28 ENCOUNTER — OFFICE VISIT (OUTPATIENT)
Age: 39
End: 2024-08-28
Payer: MEDICAID

## 2024-08-28 VITALS
RESPIRATION RATE: 16 BRPM | DIASTOLIC BLOOD PRESSURE: 76 MMHG | HEART RATE: 86 BPM | HEIGHT: 68 IN | SYSTOLIC BLOOD PRESSURE: 125 MMHG | TEMPERATURE: 97.6 F | WEIGHT: 293 LBS | BODY MASS INDEX: 44.41 KG/M2

## 2024-08-28 DIAGNOSIS — E66.01 MORBID OBESITY (HCC): ICD-10-CM

## 2024-08-28 DIAGNOSIS — Z12.31 ENCOUNTER FOR SCREENING MAMMOGRAM FOR MALIGNANT NEOPLASM OF BREAST: ICD-10-CM

## 2024-08-28 DIAGNOSIS — F17.290 OTHER TOBACCO PRODUCT NICOTINE DEPENDENCE, UNCOMPLICATED: ICD-10-CM

## 2024-08-28 DIAGNOSIS — E66.01 MORBID OBESITY (HCC): Primary | ICD-10-CM

## 2024-08-28 PROCEDURE — 99204 OFFICE O/P NEW MOD 45 MIN: CPT | Performed by: STUDENT IN AN ORGANIZED HEALTH CARE EDUCATION/TRAINING PROGRAM

## 2024-08-28 PROCEDURE — 3078F DIAST BP <80 MM HG: CPT | Performed by: STUDENT IN AN ORGANIZED HEALTH CARE EDUCATION/TRAINING PROGRAM

## 2024-08-28 PROCEDURE — 3074F SYST BP LT 130 MM HG: CPT | Performed by: STUDENT IN AN ORGANIZED HEALTH CARE EDUCATION/TRAINING PROGRAM

## 2024-08-28 RX ORDER — LEVETIRACETAM 500 MG/1
TABLET ORAL
COMMUNITY
Start: 2024-07-10

## 2024-08-28 RX ORDER — PIOGLITAZONEHYDROCHLORIDE 30 MG/1
30 TABLET ORAL DAILY
COMMUNITY

## 2024-08-28 RX ORDER — FAMOTIDINE 20 MG/1
TABLET, FILM COATED ORAL
COMMUNITY

## 2024-08-28 RX ORDER — GLIPIZIDE 5 MG/1
5 TABLET ORAL
COMMUNITY

## 2024-08-28 RX ORDER — INSULIN ASPART 100 [IU]/ML
INJECTION, SOLUTION INTRAVENOUS; SUBCUTANEOUS
COMMUNITY

## 2024-08-28 RX ORDER — LISINOPRIL 40 MG/1
TABLET ORAL
COMMUNITY

## 2024-08-28 RX ORDER — ATORVASTATIN CALCIUM 40 MG/1
TABLET, FILM COATED ORAL
COMMUNITY

## 2024-08-28 RX ORDER — DULAGLUTIDE 1.5 MG/.5ML
1.5 INJECTION, SOLUTION SUBCUTANEOUS
COMMUNITY

## 2024-08-28 RX ORDER — ERGOCALCIFEROL 1.25 MG/1
CAPSULE ORAL
COMMUNITY

## 2024-08-28 RX ORDER — OXYCODONE AND ACETAMINOPHEN 10; 325 MG/1; MG/1
TABLET ORAL
COMMUNITY
Start: 2024-08-20

## 2024-08-28 NOTE — PROGRESS NOTES
Eileen Dudley is a 39 y.o. female (: 1985)     Chief Complaint   Patient presents with    New Patient     Bariatric consult       Medication list and allergies have been reviewed with Eileen Dudley and updated as of today's date.     I have gone over all Medical, Surgical and Social History with Eileen Dudley and updated/added the information accordingly.

## 2024-08-28 NOTE — PROGRESS NOTES
Bariatric Surgery Initial Consult    Patient: Eileen Dudley MRN: [id number]  SSN: [id number]    YOB: 1985  Age: 39 y.o.  Sex: female      Subjective:      CC: Morbid Obesity    Current Weight: 296  Body mass index is 45.01 kg/m².  Ideal body weight: 63.9 kg (140 lb 14 oz)  Adjusted ideal body weight: 92 kg (202 lb 14.8 oz)  Excess Body Weight:   History of Present Illness  The patient presents for evaluation of weight loss surgery.    She has been considering bariatric surgery for several years, as far back as 2019.  She enrolled in our program at that time but did not progress to surgery.  Now, as a diabetic experiencing numerous related complications, she believes the surgery could potentially eliminate her diabetes. Over the past four years, she has noticed changes in her eyesight, foot health, and fluctuating weight. Despite attempts at dieting, exercising, and taking diet pills, she has not seen any improvement. She has also tried Topamax for weight loss without success. She is interested in bypass surgery as a potential solution.    Her diabetes diagnosis dates back to when she was 14 and pregnant with her son. The condition has persisted and worsened over time, with her blood sugar levels frequently rising and falling. She is under the care of an endocrinologist, Dr. Mak, and a podiatrist. She has been on insulin since she was 14 or 15 years old. Her current medications include glipizide, Trulicity, insulin, metformin, and Actos.  She was previously taken off insulin and managed her diabetes with oral medications, but was put back on insulin following her recent surgery.  She is unsure of her most recent hemoglobin A1c and those records are not currently available.    She also has type II CKD and and heavy proteinuria, she follows with nephrology for this (Sb).  She has not had a mammogram in several years. She has high blood pressure and high cholesterol. She experiences  seizures  medical records, imaging, performing a history and physical exam, documenting, and coordinating future care.    Signed By: Varinder Reynoso MD     August 28, 2024

## 2024-10-16 ENCOUNTER — HOSPITAL ENCOUNTER (OUTPATIENT)
Facility: HOSPITAL | Age: 39
Setting detail: SPECIMEN
Discharge: HOME | End: 2024-10-19

## 2024-10-16 LAB — LABCORP SPECIMEN COLLECTION: NORMAL

## 2024-10-16 PROCEDURE — 99001 SPECIMEN HANDLING PT-LAB: CPT

## 2024-11-25 ENCOUNTER — APPOINTMENT (OUTPATIENT)
Facility: HOSPITAL | Age: 39
End: 2024-11-25
Payer: MEDICAID

## 2024-11-25 ENCOUNTER — HOSPITAL ENCOUNTER (EMERGENCY)
Facility: HOSPITAL | Age: 39
Discharge: HOME | End: 2024-11-25
Attending: EMERGENCY MEDICINE
Payer: MEDICAID

## 2024-11-25 VITALS
RESPIRATION RATE: 18 BRPM | HEIGHT: 68 IN | SYSTOLIC BLOOD PRESSURE: 160 MMHG | TEMPERATURE: 98.1 F | HEART RATE: 79 BPM | WEIGHT: 240 LBS | BODY MASS INDEX: 36.37 KG/M2 | DIASTOLIC BLOOD PRESSURE: 94 MMHG

## 2024-11-25 DIAGNOSIS — M25.561 ACUTE PAIN OF BOTH KNEES: ICD-10-CM

## 2024-11-25 DIAGNOSIS — R07.89 CHEST WALL PAIN: ICD-10-CM

## 2024-11-25 DIAGNOSIS — M25.562 ACUTE PAIN OF BOTH KNEES: ICD-10-CM
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
## 2024-11-25 DIAGNOSIS — M25.512 LEFT SHOULDER PAIN, UNSPECIFIED CHRONICITY: ICD-10-CM

## 2024-11-25 DIAGNOSIS — M79.671 RIGHT FOOT PAIN: ICD-10-CM

## 2024-11-25 PROCEDURE — 6370000000 HC RX 637 (ALT 250 FOR IP): Performed by: EMERGENCY MEDICINE

## 2024-11-25 PROCEDURE — 73030 X-RAY EXAM OF SHOULDER: CPT

## 2024-11-25 PROCEDURE — 71045 X-RAY EXAM CHEST 1 VIEW: CPT

## 2024-11-25 PROCEDURE — 99283 EMERGENCY DEPT VISIT LOW MDM: CPT

## 2024-11-25 PROCEDURE — 73630 X-RAY EXAM OF FOOT: CPT

## 2024-11-25 PROCEDURE — 73562 X-RAY EXAM OF KNEE 3: CPT

## 2024-11-25 RX ORDER — HYDROCODONE BITARTRATE AND ACETAMINOPHEN 5; 325 MG/1; MG/1
1 TABLET ORAL
Status: COMPLETED | OUTPATIENT
Start: 2024-11-25 | End: 2024-11-25

## 2024-11-25 RX ORDER — HYDROCODONE BITARTRATE AND ACETAMINOPHEN 5; 325 MG/1; MG/1
1 TABLET ORAL EVERY 6 HOURS PRN
Qty: 10 TABLET | Refills: 0 | Status: SHIPPED | OUTPATIENT
Start: 2024-11-25 | End: 2024-11-28

## 2024-11-25 RX ADMIN — HYDROCODONE BITARTRATE AND ACETAMINOPHEN 1 TABLET: 5; 325 TABLET ORAL at 20:23

## 2024-11-25 ASSESSMENT — PAIN - FUNCTIONAL ASSESSMENT: PAIN_FUNCTIONAL_ASSESSMENT: 0-10

## 2024-11-25 ASSESSMENT — PAIN SCALES - GENERAL
PAINLEVEL_OUTOF10: 7

## 2024-11-25 ASSESSMENT — PAIN DESCRIPTION - LOCATION
LOCATION: SHOULDER
LOCATION: SHOULDER
LOCATION: GENERALIZED

## 2024-11-25 NOTE — ED TRIAGE NOTES
Pt states that she was in a department store when she slipped on some lotion and fell. Pt states that she fell forward landing on her hands and knees. Pt now at this time complaining of generalized body aches.

## 2024-11-26 NOTE — ED PROVIDER NOTES
the morning and at bedtime (Patient not taking: Reported on 8/28/2024)    ibuprofen (ADVIL;MOTRIN) 600 MG tablet Take 1 tablet by mouth 3 times daily as needed for Pain (Patient not taking: Reported on 8/28/2024)    glucose 4 g chewable tablet Take 4 tablets by mouth as needed for Low blood sugar    levothyroxine (SYNTHROID) 150 MCG tablet Take 1 tablet by mouth Daily    topiramate (TOPAMAX) 100 MG tablet Take 1 tablet by mouth 2 times daily    bumetanide (BUMEX) 1 MG tablet Take 1 tablet by mouth daily (Patient not taking: Reported on 8/28/2024)    gabapentin (NEURONTIN) 400 MG capsule Take 1 capsule by mouth daily.    omeprazole (PRILOSEC) 20 MG delayed release capsule Take 1 capsule by mouth 2 times daily (Patient not taking: Reported on 8/28/2024)    amLODIPine (NORVASC) 10 MG tablet Take 1 tablet by mouth daily    blood glucose monitor kit and supplies Dispense sufficient amount for indicated testing frequency plus additional to accommodate PRN testing needs. Dispense all needed supplies to include: monitor, strips, lancing device, lancets, control solutions, alcohol swabs.    blood glucose monitor kit and supplies Dispense sufficient amount for indicated testing frequency plus additional to accommodate PRN testing needs. Dispense all needed supplies to include: monitor, strips, lancing device, lancets, control solutions, alcohol swabs.    acetaminophen (TYLENOL) 500 MG tablet Take 2 tablets by mouth every 6 hours as needed    lidocaine (LIDODERM) 5 % Apply patch to the affected area for 12 hours a day and remove for 12 hours a day.       Disposition:  ***    DISCHARGE NOTE:   ***  Pt has been reexamined. *** Patient has no new complaints, changes, or physical findings.  Care plan outlined and precautions discussed.  Results of *** were reviewed with the patient. All medications were reviewed with the patient; will d/c home with ***. All of pt's questions and concerns were addressed. Patient was instructed and  agrees to follow up with ***, as well as to return to the ED upon further deterioration. Patient is ready to go home.    [unfilled]       Medication List        CONTINUE taking these medications      * blood glucose monitor kit and supplies  Dispense sufficient amount for indicated testing frequency plus additional to accommodate PRN testing needs. Dispense all needed supplies to include: monitor, strips, lancing device, lancets, control solutions, alcohol swabs.     * blood glucose monitor kit and supplies  Dispense sufficient amount for indicated testing frequency plus additional to accommodate PRN testing needs. Dispense all needed supplies to include: monitor, strips, lancing device, lancets, control solutions, alcohol swabs.           * This list has 2 medication(s) that are the same as other medications prescribed for you. Read the directions carefully, and ask your doctor or other care provider to review them with you.                ASK your doctor about these medications      acetaminophen 500 MG tablet  Commonly known as: TYLENOL     amLODIPine 10 MG tablet  Commonly known as: NORVASC     atorvastatin 40 MG tablet  Commonly known as: LIPITOR     bumetanide 1 MG tablet  Commonly known as: BUMEX     Coricidin HBP -2 MG Tabs  Generic drug: DM-APAP-CPM  Take 1 tablet by mouth in the morning and at bedtime     ergocalciferol 1.25 MG (95579 UT) capsule  Commonly known as: ERGOCALCIFEROL     famotidine 20 MG tablet  Commonly known as: PEPCID     gabapentin 400 MG capsule  Commonly known as: NEURONTIN     glipiZIDE 5 MG tablet  Commonly known as: GLUCOTROL     glucose 4 g chewable tablet  Take 4 tablets by mouth as needed for Low blood sugar     ibuprofen 600 MG tablet  Commonly known as: ADVIL;MOTRIN  Take 1 tablet by mouth 3 times daily as needed for Pain     insulin glargine 100 UNIT/ML injection pen  Commonly known as: LANTUS;BASAGLAR     levETIRAcetam 500 MG tablet  Commonly known as: KEPPRA

## 2025-02-27 ENCOUNTER — HOSPITAL ENCOUNTER (OUTPATIENT)
Facility: HOSPITAL | Age: 40
Setting detail: SPECIMEN
Discharge: HOME | End: 2025-03-02

## 2025-02-27 LAB — LABCORP SPECIMEN COLLECTION: NORMAL

## 2025-02-27 PROCEDURE — 99001 SPECIMEN HANDLING PT-LAB: CPT

## 2025-03-06 ENCOUNTER — HOSPITAL ENCOUNTER (EMERGENCY)
Facility: HOSPITAL | Age: 40
Discharge: HOME | End: 2025-03-06
Attending: STUDENT IN AN ORGANIZED HEALTH CARE EDUCATION/TRAINING PROGRAM
Payer: MEDICAID

## 2025-03-06 ENCOUNTER — APPOINTMENT (OUTPATIENT)
Facility: HOSPITAL | Age: 40
End: 2025-03-06
Payer: MEDICAID

## 2025-03-06 VITALS
HEIGHT: 69 IN | SYSTOLIC BLOOD PRESSURE: 131 MMHG | TEMPERATURE: 96.8 F | DIASTOLIC BLOOD PRESSURE: 86 MMHG | OXYGEN SATURATION: 100 % | BODY MASS INDEX: 38.51 KG/M2 | RESPIRATION RATE: 16 BRPM | HEART RATE: 84 BPM | WEIGHT: 260 LBS

## 2025-03-06 DIAGNOSIS — E07.9 THYROID DISEASE: ICD-10-CM

## 2025-03-06 DIAGNOSIS — M79.10 MYALGIA: ICD-10-CM

## 2025-03-06 DIAGNOSIS — R09.89 PULMONARY CONGESTION: Primary | ICD-10-CM

## 2025-03-06 LAB
ALBUMIN SERPL-MCNC: 3 G/DL (ref 3.4–5)
ALBUMIN/GLOB SERPL: 0.6 (ref 0.8–1.7)
ALP SERPL-CCNC: 98 U/L (ref 45–117)
ALT SERPL-CCNC: 26 U/L (ref 13–56)
ANION GAP SERPL CALC-SCNC: 6 MMOL/L (ref 3–18)
APPEARANCE UR: CLEAR
AST SERPL-CCNC: 29 U/L (ref 10–38)
BACTERIA URNS QL MICRO: ABNORMAL /HPF
BASOPHILS # BLD: 0.05 K/UL (ref 0–0.1)
BASOPHILS NFR BLD: 0.5 % (ref 0–2)
BILIRUB SERPL-MCNC: 0.5 MG/DL (ref 0.2–1)
BILIRUB UR QL: NEGATIVE
BUN SERPL-MCNC: 17 MG/DL (ref 7–18)
BUN/CREAT SERPL: 12 (ref 12–20)
CALCIUM SERPL-MCNC: 8.5 MG/DL (ref 8.5–10.1)
CHLORIDE SERPL-SCNC: 107 MMOL/L (ref 100–111)
CO2 SERPL-SCNC: 24 MMOL/L (ref 21–32)
COLOR UR: YELLOW
CREAT SERPL-MCNC: 1.46 MG/DL (ref 0.6–1.3)
DIFFERENTIAL METHOD BLD: ABNORMAL
ECHO BSA: 2.4 M2
EKG ATRIAL RATE: 90 BPM
EKG DIAGNOSIS: NORMAL
EKG P AXIS: 41 DEGREES
EKG P-R INTERVAL: 148 MS
EKG Q-T INTERVAL: 378 MS
EKG QRS DURATION: 78 MS
EKG QTC CALCULATION (BAZETT): 462 MS
EKG R AXIS: 18 DEGREES
EKG T AXIS: 73 DEGREES
EKG VENTRICULAR RATE: 90 BPM
EOSINOPHIL # BLD: 0.11 K/UL (ref 0–0.4)
EOSINOPHIL NFR BLD: 1.2 % (ref 0–5)
EPITH CASTS URNS QL MICRO: ABNORMAL /LPF (ref 0–5)
ERYTHROCYTE [DISTWIDTH] IN BLOOD BY AUTOMATED COUNT: 15.2 % (ref 11.6–14.5)
GLOBULIN SER CALC-MCNC: 5.1 G/DL (ref 2–4)
GLUCOSE BLD STRIP.AUTO-MCNC: 115 MG/DL (ref 70–110)
GLUCOSE SERPL-MCNC: 58 MG/DL (ref 74–99)
GLUCOSE UR STRIP.AUTO-MCNC: >1000 MG/DL
HCT VFR BLD AUTO: 47.4 % (ref 35–45)
HGB BLD-MCNC: 14.6 G/DL (ref 12–16)
HGB UR QL STRIP: ABNORMAL
HYALINE CASTS URNS QL MICRO: ABNORMAL /LPF (ref 0–2)
IMM GRANULOCYTES # BLD AUTO: 0.08 K/UL (ref 0–0.04)
IMM GRANULOCYTES NFR BLD AUTO: 0.9 % (ref 0–0.5)
KETONES UR QL STRIP.AUTO: NEGATIVE MG/DL
LEUKOCYTE ESTERASE UR QL STRIP.AUTO: NEGATIVE
LYMPHOCYTES # BLD: 2.51 K/UL (ref 0.9–3.6)
LYMPHOCYTES NFR BLD: 27.4 % (ref 21–52)
MAGNESIUM SERPL-MCNC: 2.4 MG/DL (ref 1.6–2.6)
MCH RBC QN AUTO: 27.1 PG (ref 24–34)
MCHC RBC AUTO-ENTMCNC: 30.8 G/DL (ref 31–37)
MCV RBC AUTO: 87.9 FL (ref 78–100)
MONOCYTES # BLD: 0.65 K/UL (ref 0.05–1.2)
MONOCYTES NFR BLD: 7.1 % (ref 3–10)
MUCOUS THREADS URNS QL MICRO: ABNORMAL /LPF
NEUTS SEG # BLD: 5.77 K/UL (ref 1.8–8)
NEUTS SEG NFR BLD: 62.9 % (ref 40–73)
NITRITE UR QL STRIP.AUTO: NEGATIVE
NRBC # BLD: 0 K/UL (ref 0–0.01)
NRBC BLD-RTO: 0 PER 100 WBC
NT PRO BNP: 190 PG/ML (ref 0–450)
PH UR STRIP: 5.5 (ref 5–8)
PLATELET # BLD AUTO: 279 K/UL (ref 135–420)
PMV BLD AUTO: 10.5 FL (ref 9.2–11.8)
POTASSIUM SERPL-SCNC: 3.6 MMOL/L (ref 3.5–5.5)
PROT SERPL-MCNC: 8.1 G/DL (ref 6.4–8.2)
PROT UR STRIP-MCNC: >1000 MG/DL
RBC # BLD AUTO: 5.39 M/UL (ref 4.2–5.3)
RBC #/AREA URNS HPF: ABNORMAL /HPF (ref 0–5)
SODIUM SERPL-SCNC: 137 MMOL/L (ref 136–145)
SP GR UR REFRACTOMETRY: 1.02 (ref 1–1.03)
T4 FREE SERPL-MCNC: 0.7 NG/DL (ref 0.7–1.5)
TROPONIN I SERPL HS-MCNC: 8 NG/L (ref 0–54)
TSH SERPL DL<=0.05 MIU/L-ACNC: 55.1 UIU/ML (ref 0.36–3.74)
UROBILINOGEN UR QL STRIP.AUTO: 0.2 EU/DL (ref 0.2–1)
WBC # BLD AUTO: 9.2 K/UL (ref 4.6–13.2)
WBC URNS QL MICRO: ABNORMAL /HPF (ref 0–5)

## 2025-03-06 PROCEDURE — 85025 COMPLETE CBC W/AUTO DIFF WBC: CPT

## 2025-03-06 PROCEDURE — 93005 ELECTROCARDIOGRAM TRACING: CPT | Performed by: EMERGENCY MEDICINE

## 2025-03-06 PROCEDURE — 83735 ASSAY OF MAGNESIUM: CPT

## 2025-03-06 PROCEDURE — 96374 THER/PROPH/DIAG INJ IV PUSH: CPT

## 2025-03-06 PROCEDURE — 93010 ELECTROCARDIOGRAM REPORT: CPT | Performed by: INTERNAL MEDICINE

## 2025-03-06 PROCEDURE — 71045 X-RAY EXAM CHEST 1 VIEW: CPT

## 2025-03-06 PROCEDURE — 82962 GLUCOSE BLOOD TEST: CPT

## 2025-03-06 PROCEDURE — 81001 URINALYSIS AUTO W/SCOPE: CPT

## 2025-03-06 PROCEDURE — 6360000002 HC RX W HCPCS: Performed by: EMERGENCY MEDICINE

## 2025-03-06 PROCEDURE — 87086 URINE CULTURE/COLONY COUNT: CPT

## 2025-03-06 PROCEDURE — 80053 COMPREHEN METABOLIC PANEL: CPT

## 2025-03-06 PROCEDURE — 84484 ASSAY OF TROPONIN QUANT: CPT

## 2025-03-06 PROCEDURE — 99285 EMERGENCY DEPT VISIT HI MDM: CPT

## 2025-03-06 PROCEDURE — 83880 ASSAY OF NATRIURETIC PEPTIDE: CPT

## 2025-03-06 PROCEDURE — 93970 EXTREMITY STUDY: CPT

## 2025-03-06 PROCEDURE — 84439 ASSAY OF FREE THYROXINE: CPT

## 2025-03-06 PROCEDURE — 84443 ASSAY THYROID STIM HORMONE: CPT

## 2025-03-06 RX ORDER — BUMETANIDE 1 MG/1
1 TABLET ORAL DAILY
Qty: 30 TABLET | Refills: 2 | Status: SHIPPED | OUTPATIENT
Start: 2025-03-06

## 2025-03-06 RX ORDER — BUMETANIDE 0.25 MG/ML
2.5 INJECTION, SOLUTION INTRAMUSCULAR; INTRAVENOUS
Status: COMPLETED | OUTPATIENT
Start: 2025-03-06 | End: 2025-03-06

## 2025-03-06 RX ADMIN — BUMETANIDE 2.5 MG: 0.25 INJECTION, SOLUTION INTRAMUSCULAR; INTRAVENOUS at 16:46

## 2025-03-06 NOTE — ED TRIAGE NOTES
Pt states she's having severe body aches, and pain when walking. Wonders if it's thyroid levels or sugars. States her body, \"be swelling up again.\"   Currently denies CP/SOB.

## 2025-03-06 NOTE — ED PROVIDER NOTES
EMERGENCY DEPARTMENT HISTORY AND PHYSICAL EXAM      Date: 3/6/2025  Patient Name: Eileen Dudley    History of Presenting Illness     Chief Complaint   Patient presents with    Generalized Body Aches    Fatigue       History (Context): Eileen Dudley is a 39 y.o. female  presents to the ED today with chief complaint of 1 week of generalized bodyaches, fatigue, and exertional shortness of breath wondering if it is the fact that her thyroid may be off.  She had a total thyroidectomy back in 2010.  She takes her levothyroxine regularly.  Denies missing any doses.  Denies chest pain but does have the exertional shortness of breath.  Is also having increased urination and bilateral flank pain.  Denies melena hematochezia hematemesis nausea vomiting or diarrhea.      PCP: Johnathan Villatoro MD    Current Facility-Administered Medications   Medication Dose Route Frequency Provider Last Rate Last Admin    bumetanide (BUMEX) injection 2.5 mg  2.5 mg IntraVENous NOW Dipika Meneses PA         Current Outpatient Medications   Medication Sig Dispense Refill    bumetanide (BUMEX) 1 MG tablet Take 1 tablet by mouth daily 30 tablet 2    atorvastatin (LIPITOR) 40 MG tablet       TRULICITY 1.5 MG/0.5ML SC injection Inject 0.5 mLs into the skin every 7 days      ergocalciferol (ERGOCALCIFEROL) 1.25 MG (92403 UT) capsule       famotidine (PEPCID) 20 MG tablet       glipiZIDE (GLUCOTROL) 5 MG tablet Take 1 tablet by mouth daily (with breakfast)      NOVOLOG FLEXPEN 100 UNIT/ML injection pen       insulin glargine (LANTUS;BASAGLAR) 100 UNIT/ML injection pen Inject 60 Units into the skin      levETIRAcetam (KEPPRA) 500 MG tablet       lisinopril (PRINIVIL;ZESTRIL) 40 MG tablet       metFORMIN (GLUCOPHAGE) 500 MG tablet Take 1 tablet by mouth 2 times daily (with meals)      oxyCODONE-acetaminophen (PERCOCET)  MG per tablet       pioglitazone (ACTOS) 30 MG tablet Take 1 tablet by mouth daily      DM-APAP-CPM (CORICIDIN HBP)

## 2025-03-07 ENCOUNTER — TELEPHONE (OUTPATIENT)
Age: 40
End: 2025-03-07

## 2025-03-07 LAB
BACTERIA SPEC CULT: NORMAL
SERVICE CMNT-IMP: NORMAL

## 2025-03-07 NOTE — TELEPHONE ENCOUNTER
----- Message from Carlota Aleman PA-C sent at 3/7/2025  8:18 AM EST -----  Regarding: CHF-NP  Can we please arrange an appt for CHF with any available provider, thank you  ----- Message -----  From: Dipika Meneses PA  Sent: 3/6/2025   4:28 PM EST  To: Carlota Aleman PA-C    Hey, she has pulmonary congestion and is c/o MURPHY.  Not taking Bumex at this time.  See no definite prior cardiology management and that is recent and will ask if she can follow-up with you thank you.    Resumed Bumex at prior dosing and prescription sent

## 2025-03-09 PROBLEM — G47.33 OSA (OBSTRUCTIVE SLEEP APNEA): Status: ACTIVE | Noted: 2025-03-09

## 2025-03-13 LAB — ECHO BSA: 2.4 M2

## 2025-04-26 ENCOUNTER — HOSPITAL ENCOUNTER (EMERGENCY)
Facility: HOSPITAL | Age: 40
Discharge: HOME | End: 2025-04-27
Payer: MEDICAID

## 2025-04-26 ENCOUNTER — APPOINTMENT (OUTPATIENT)
Facility: HOSPITAL | Age: 40
End: 2025-04-26
Payer: MEDICAID

## 2025-04-26 DIAGNOSIS — K59.00 CONSTIPATION, UNSPECIFIED CONSTIPATION TYPE: Primary | ICD-10-CM

## 2025-04-26 DIAGNOSIS — E87.6 HYPOKALEMIA: ICD-10-CM

## 2025-04-26 LAB
ALBUMIN SERPL-MCNC: 3.4 G/DL (ref 3.4–5)
ALBUMIN/GLOB SERPL: 0.6 (ref 0.8–1.7)
ALP SERPL-CCNC: 91 U/L (ref 45–117)
ALT SERPL-CCNC: 27 U/L (ref 13–56)
ANION GAP SERPL CALC-SCNC: 7 MMOL/L (ref 3–18)
AST SERPL-CCNC: 22 U/L (ref 10–38)
BASOPHILS # BLD: 0.05 K/UL (ref 0–0.1)
BASOPHILS NFR BLD: 0.4 % (ref 0–2)
BILIRUB SERPL-MCNC: 0.5 MG/DL (ref 0.2–1)
BUN SERPL-MCNC: 21 MG/DL (ref 7–18)
BUN/CREAT SERPL: 12 (ref 12–20)
CALCIUM SERPL-MCNC: 9.6 MG/DL (ref 8.5–10.1)
CHLORIDE SERPL-SCNC: 99 MMOL/L (ref 100–111)
CO2 SERPL-SCNC: 31 MMOL/L (ref 21–32)
CREAT SERPL-MCNC: 1.72 MG/DL (ref 0.6–1.3)
DIFFERENTIAL METHOD BLD: ABNORMAL
EOSINOPHIL # BLD: 0.09 K/UL (ref 0–0.4)
EOSINOPHIL NFR BLD: 0.8 % (ref 0–5)
ERYTHROCYTE [DISTWIDTH] IN BLOOD BY AUTOMATED COUNT: 14.4 % (ref 11.6–14.5)
GLOBULIN SER CALC-MCNC: 5.3 G/DL (ref 2–4)
GLUCOSE SERPL-MCNC: 116 MG/DL (ref 74–99)
HCG SERPL QL: NEGATIVE
HCT VFR BLD AUTO: 45.9 % (ref 35–45)
HGB BLD-MCNC: 14.5 G/DL (ref 12–16)
IMM GRANULOCYTES # BLD AUTO: 0.07 K/UL (ref 0–0.04)
IMM GRANULOCYTES NFR BLD AUTO: 0.6 % (ref 0–0.5)
LYMPHOCYTES # BLD: 2.47 K/UL (ref 0.9–3.6)
LYMPHOCYTES NFR BLD: 22 % (ref 21–52)
MCH RBC QN AUTO: 27.3 PG (ref 24–34)
MCHC RBC AUTO-ENTMCNC: 31.6 G/DL (ref 31–37)
MCV RBC AUTO: 86.3 FL (ref 78–100)
MONOCYTES # BLD: 0.67 K/UL (ref 0.05–1.2)
MONOCYTES NFR BLD: 6 % (ref 3–10)
NEUTS SEG # BLD: 7.86 K/UL (ref 1.8–8)
NEUTS SEG NFR BLD: 70.2 % (ref 40–73)
NRBC # BLD: 0 K/UL (ref 0–0.01)
NRBC BLD-RTO: 0 PER 100 WBC
PLATELET # BLD AUTO: 259 K/UL (ref 135–420)
PMV BLD AUTO: 10.7 FL (ref 9.2–11.8)
POTASSIUM SERPL-SCNC: 2.5 MMOL/L (ref 3.5–5.5)
PROT SERPL-MCNC: 8.7 G/DL (ref 6.4–8.2)
RBC # BLD AUTO: 5.32 M/UL (ref 4.2–5.3)
SODIUM SERPL-SCNC: 137 MMOL/L (ref 136–145)
WBC # BLD AUTO: 11.2 K/UL (ref 4.6–13.2)

## 2025-04-26 PROCEDURE — 83735 ASSAY OF MAGNESIUM: CPT

## 2025-04-26 PROCEDURE — 2580000003 HC RX 258: Performed by: PHYSICIAN ASSISTANT

## 2025-04-26 PROCEDURE — 85025 COMPLETE CBC W/AUTO DIFF WBC: CPT

## 2025-04-26 PROCEDURE — 84703 CHORIONIC GONADOTROPIN ASSAY: CPT

## 2025-04-26 PROCEDURE — 96365 THER/PROPH/DIAG IV INF INIT: CPT

## 2025-04-26 PROCEDURE — 6370000000 HC RX 637 (ALT 250 FOR IP): Performed by: PHYSICIAN ASSISTANT

## 2025-04-26 PROCEDURE — 74018 RADEX ABDOMEN 1 VIEW: CPT

## 2025-04-26 PROCEDURE — 80053 COMPREHEN METABOLIC PANEL: CPT

## 2025-04-26 PROCEDURE — 99284 EMERGENCY DEPT VISIT MOD MDM: CPT

## 2025-04-26 PROCEDURE — 2580000003 HC RX 258: Performed by: EMERGENCY MEDICINE

## 2025-04-26 PROCEDURE — 96360 HYDRATION IV INFUSION INIT: CPT

## 2025-04-26 PROCEDURE — 6360000002 HC RX W HCPCS: Performed by: PHYSICIAN ASSISTANT

## 2025-04-26 RX ORDER — POTASSIUM CHLORIDE 7.45 MG/ML
10 INJECTION INTRAVENOUS
Status: COMPLETED | OUTPATIENT
Start: 2025-04-27 | End: 2025-04-27

## 2025-04-26 RX ORDER — 0.9 % SODIUM CHLORIDE 0.9 %
1000 INTRAVENOUS SOLUTION INTRAVENOUS ONCE
Status: COMPLETED | OUTPATIENT
Start: 2025-04-27 | End: 2025-04-27

## 2025-04-26 RX ORDER — 0.9 % SODIUM CHLORIDE 0.9 %
500 INTRAVENOUS SOLUTION INTRAVENOUS ONCE
Status: COMPLETED | OUTPATIENT
Start: 2025-04-26 | End: 2025-04-27

## 2025-04-26 RX ORDER — POTASSIUM CHLORIDE 1500 MG/1
40 TABLET, EXTENDED RELEASE ORAL
Status: COMPLETED | OUTPATIENT
Start: 2025-04-26 | End: 2025-04-26

## 2025-04-26 RX ADMIN — SODIUM CHLORIDE 1000 ML: 0.9 INJECTION, SOLUTION INTRAVENOUS at 23:56

## 2025-04-26 RX ADMIN — POTASSIUM CHLORIDE 40 MEQ: 1500 TABLET, EXTENDED RELEASE ORAL at 23:56

## 2025-04-26 RX ADMIN — POTASSIUM CHLORIDE 10 MEQ: 7.46 INJECTION, SOLUTION INTRAVENOUS at 23:56

## 2025-04-26 RX ADMIN — SODIUM CHLORIDE 500 ML: 0.9 INJECTION, SOLUTION INTRAVENOUS at 23:15

## 2025-04-27 ENCOUNTER — APPOINTMENT (OUTPATIENT)
Facility: HOSPITAL | Age: 40
End: 2025-04-27
Payer: MEDICAID

## 2025-04-27 VITALS
HEART RATE: 92 BPM | RESPIRATION RATE: 14 BRPM | WEIGHT: 282.6 LBS | OXYGEN SATURATION: 97 % | DIASTOLIC BLOOD PRESSURE: 82 MMHG | SYSTOLIC BLOOD PRESSURE: 150 MMHG | BODY MASS INDEX: 41.86 KG/M2 | HEIGHT: 69 IN | TEMPERATURE: 98.1 F

## 2025-04-27 LAB
GLUCOSE BLD STRIP.AUTO-MCNC: 76 MG/DL (ref 70–110)
MAGNESIUM SERPL-MCNC: 2.6 MG/DL (ref 1.6–2.6)

## 2025-04-27 PROCEDURE — 96366 THER/PROPH/DIAG IV INF ADDON: CPT

## 2025-04-27 PROCEDURE — 82962 GLUCOSE BLOOD TEST: CPT

## 2025-04-27 PROCEDURE — 6360000002 HC RX W HCPCS: Performed by: PHYSICIAN ASSISTANT

## 2025-04-27 PROCEDURE — 6370000000 HC RX 637 (ALT 250 FOR IP): Performed by: EMERGENCY MEDICINE

## 2025-04-27 PROCEDURE — 74176 CT ABD & PELVIS W/O CONTRAST: CPT

## 2025-04-27 RX ORDER — POTASSIUM CHLORIDE 1500 MG/1
20 TABLET, EXTENDED RELEASE ORAL 2 TIMES DAILY
Qty: 10 TABLET | Refills: 0 | Status: SHIPPED | OUTPATIENT
Start: 2025-04-27

## 2025-04-27 RX ORDER — POLYETHYLENE GLYCOL 3350 17 G/17G
17 POWDER, FOR SOLUTION ORAL DAILY
Qty: 600 G | Refills: 0 | Status: SHIPPED | OUTPATIENT
Start: 2025-04-27 | End: 2025-05-27

## 2025-04-27 RX ORDER — DOCUSATE SODIUM 100 MG/1
100 CAPSULE, LIQUID FILLED ORAL 2 TIMES DAILY
Qty: 60 CAPSULE | Refills: 0 | Status: SHIPPED | OUTPATIENT
Start: 2025-04-27

## 2025-04-27 RX ADMIN — MAJOR MAGNESIUM CITRATE ORAL SOLUTION - LEMON 296 ML: 1.75 LIQUID ORAL at 03:32

## 2025-04-27 RX ADMIN — POTASSIUM CHLORIDE 10 MEQ: 7.46 INJECTION, SOLUTION INTRAVENOUS at 02:29

## 2025-04-27 RX ADMIN — POTASSIUM CHLORIDE 10 MEQ: 7.46 INJECTION, SOLUTION INTRAVENOUS at 01:23

## 2025-04-27 NOTE — ED TRIAGE NOTES
Pt comes in with co constipation, last bm 5-7 days. States \" You can feel it stuck right there\". CO pain through entire body.

## 2025-04-27 NOTE — ED NOTES
PA ok with pt receiving fleet enema  Administered for pt  Pt states that it did not work     
Pt arrvied via EMS from home c/o constipation for the last 5 days. Pt ambulatory from stretcher to the bathroom on arrival.   
Pt discharged. PIV removed.   
Pt on full cardiac monitor.   
Detail Level: Detailed
Quality 130: Documentation Of Current Medications In The Medical Record: Current Medications Documented
Quality 431: Preventive Care And Screening: Unhealthy Alcohol Use - Screening: Patient screened for unhealthy alcohol use using a single question and scores less than 2 times per year
Quality 226: Preventive Care And Screening: Tobacco Use: Screening And Cessation Intervention: Patient screened for tobacco use and is an ex/non-smoker

## 2025-04-27 NOTE — ED PROVIDER NOTES
EMERGENCY DEPARTMENT HISTORY AND PHYSICAL EXAM    Date: 4/26/2025  Patient Name: Eileen Dudley    History of Presenting Illness     Chief Complaint   Patient presents with    Constipation         History Provided By:Patient     Chief Complaint: constipation  Duration: 5 days  Timing:  acute  Location: lower abdomen  Quality: pressure  Severity: moderate  Modifying Factors: miralax, enema  Associated Symptoms: lower back pain, abdominal distention      Additional History (Context): Eileen Dudley is a 39 y.o. female with PMH of DM, HTN, seizures who presents with c/o constipation x 5 days.    Pt reports she has been constipated for 5 days.  She feels lower abdominal pain that is radiating to her back and making her legs and feet feel pressure.  Pt is able to urinate without issues, denies hematuria, dysuria.  Pt report she has tried multiple home remedies, miralax and an enema without relief. Pt reports she feels like she has to have a BM but when she strains, it makes the pain worse and cannot defecate.  Denies rectal bleeding, N/V/D, fever, chills, HA, CP, SOB.    Pt last ate and drank around 3-4pm today (4/26/2025).    PCP: Johnathan Villatoro MD    Current Facility-Administered Medications   Medication Dose Route Frequency Provider Last Rate Last Admin    potassium chloride 10 mEq/100 mL IVPB (Peripheral Line)  10 mEq IntraVENous Q1H Nanci Dejesus, PAStuartC 100 mL/hr at 04/27/25 0123 10 mEq at 04/27/25 0123     Current Outpatient Medications   Medication Sig Dispense Refill    potassium chloride (KLOR-CON M) 20 MEQ extended release tablet Take 1 tablet by mouth 2 times daily 10 tablet 0    polyethylene glycol (GLYCOLAX) 17 GM/SCOOP powder Take 17 g by mouth daily 600 g 0    docusate sodium (COLACE) 100 MG capsule Take 1 capsule by mouth 2 times daily 60 capsule 0    bumetanide (BUMEX) 1 MG tablet Take 1 tablet by mouth daily 30 tablet 2    atorvastatin (LIPITOR) 40 MG tablet       TRULICITY 1.5 MG/0.5ML SC

## 2025-05-15 NOTE — DISCHARGE INSTRUCTIONS
Patient Education        Learning About High Blood Sugar  What is high blood sugar? Your body turns the food you eat into glucose (sugar), which it uses for energy. But if your body isn't able to use the sugar right away, it can build up in your blood and lead to high blood sugar. When the amount of sugar in your blood stays too high for too much of the time, you may have diabetes. Diabetes is a disease that can cause serious health problems. The good news is that lifestyle changes may help you get your blood sugar back to normal and avoid or delay diabetes. What causes high blood sugar? Sugar (glucose) can build up in your blood if you:  · Are overweight. · Have a family history of diabetes. · Take certain medicines, such as steroids. What are the symptoms? Having high blood sugar may not cause any symptoms at all. Or it may make you feel very thirsty or very hungry. You may also urinate more often than usual, have blurry vision, or lose weight without trying. How is high blood sugar treated? You can take steps to lower your blood sugar level if you understand what makes it get higher. Your doctor may want you to learn how to test your blood sugar level at home. Then you can see how illness, stress, or different kinds of food or medicine raise or lower your blood sugar level. Other tests may be needed to see if you have diabetes. How can you prevent high blood sugar? · Watch your weight. If you're overweight, losing just a small amount of weight may help. Reducing fat around your waist is most important. · Limit the amount of calories, sweets, and unhealthy fat you eat. Ask your doctor if a dietitian can help you. A registered dietitian can help you create meal plans that fit your lifestyle. · Get at least 30 minutes of exercise on most days of the week. Exercise helps control your blood sugar. It also helps you maintain a healthy weight. Walking is a good choice.  You also may want to do other activities, such as running, swimming, cycling, or playing tennis or team sports. · If your doctor prescribed medicines, take them exactly as prescribed. Call your doctor if you think you are having a problem with your medicine. You will get more details on the specific medicines your doctor prescribes. Follow-up care is a key part of your treatment and safety. Be sure to make and go to all appointments, and call your doctor if you are having problems. It's also a good idea to know your test results and keep a list of the medicines you take. Where can you learn more? Go to http://joshFRX Polymersautumn.info/  Enter O108 in the search box to learn more about \"Learning About High Blood Sugar. \"  Current as of: December 20, 2019               Content Version: 12.5  © 0430-7165 [employer]. Care instructions adapted under license by [employer] (which disclaims liability or warranty for this information). If you have questions about a medical condition or this instruction, always ask your healthcare professional. [address] any warranty or liability for your use of this information. Patient Education        Headache: Care Instructions  Your Care Instructions     Headaches have many possible causes. Most headaches aren't a sign of a more serious problem, and they will get better on their own. Home treatment may help you feel better faster. The doctor has checked you carefully, but problems can develop later. If you notice any problems or new symptoms, get medical treatment right away. Follow-up care is a key part of your treatment and safety. Be sure to make and go to all appointments, and call your doctor if you are having problems. It's also a good idea to know your test results and keep a list of the medicines you take. How can you care for yourself at home? · Do not drive if you have taken a prescription pain medicine.   · Rest in a quiet, dark room until your headache is gone. Close your eyes and try to relax or go to sleep. Don't watch TV or read. · Put a cold, moist cloth or cold pack on the painful area for 10 to 20 minutes at a time. Put a thin cloth between the cold pack and your skin. · Use a warm, moist towel or a heating pad set on low to relax tight shoulder and neck muscles. · Have someone gently massage your neck and shoulders. · Take pain medicines exactly as directed. ? If the doctor gave you a prescription medicine for pain, take it as prescribed. ? If you are not taking a prescription pain medicine, ask your doctor if you can take an over-the-counter medicine. · Be careful not to take pain medicine more often than the instructions allow, because you may get worse or more frequent headaches when the medicine wears off. · Do not ignore new symptoms that occur with a headache, such as a fever, weakness or numbness, vision changes, or confusion. These may be signs of a more serious problem. To prevent headaches  · Keep a headache diary so you can figure out what triggers your headaches. Avoiding triggers may help you prevent headaches. Record when each headache began, how long it lasted, and what the pain was like (throbbing, aching, stabbing, or dull). Write down any other symptoms you had with the headache, such as nausea, flashing lights or dark spots, or sensitivity to bright light or loud noise. Note if the headache occurred near your period. List anything that might have triggered the headache, such as certain foods (chocolate, cheese, wine) or odors, smoke, bright light, stress, or lack of sleep. · Find healthy ways to deal with stress. Headaches are most common during or right after stressful times. Take time to relax before and after you do something that has caused a headache in the past.  · Try to keep your muscles relaxed by keeping good posture.  Check your jaw, face, neck, and shoulder muscles for tension, and try relaxing them. When sitting at a desk, change positions often, and stretch for 30 seconds each hour. · Get plenty of sleep and exercise. · Eat regularly and well. Long periods without food can trigger a headache. · Treat yourself to a massage. Some people find that regular massages are very helpful in relieving tension. · Limit caffeine by not drinking too much coffee, tea, or soda. But don't quit caffeine suddenly, because that can also give you headaches. · Reduce eyestrain from computers by blinking frequently and looking away from the computer screen every so often. Make sure you have proper eyewear and that your monitor is set up properly, about an arm's length away. · Seek help if you have depression or anxiety. Your headaches may be linked to these conditions. Treatment can both prevent headaches and help with symptoms of anxiety or depression. When should you call for help? [id number] anytime you think you may need emergency care. For example, call if:  · You have signs of a stroke. These may include:  ? Sudden numbness, paralysis, or weakness in your face, arm, or leg, especially on only one side of your body. ? Sudden vision changes. ? Sudden trouble speaking. ? Sudden confusion or trouble understanding simple statements. ? Sudden problems with walking or balance. ? A sudden, severe headache that is different from past headaches. Call your doctor now or seek immediate medical care if:  · You have a new or worse headache. · Your headache gets much worse. Where can you learn more? Go to http://josh-autumn.info/  Enter M271 in the search box to learn more about \"Headache: Care Instructions. \"  Current as of: November 20, 2019               Content Version: 12.5  © 6747-8850 [employer]. Care instructions adapted under license by [employer] (which disclaims liability or warranty for this information).  If you have questions about a medical condition or this instruction, always ask your healthcare professional. [place] [address] any warranty or liability for your use of this information. 1

## 2025-05-29 ENCOUNTER — OFFICE VISIT (OUTPATIENT)
Age: 40
End: 2025-05-29
Payer: MEDICAID

## 2025-05-29 VITALS
BODY MASS INDEX: 43.1 KG/M2 | HEIGHT: 69 IN | HEART RATE: 90 BPM | DIASTOLIC BLOOD PRESSURE: 86 MMHG | OXYGEN SATURATION: 99 % | SYSTOLIC BLOOD PRESSURE: 133 MMHG | WEIGHT: 291 LBS

## 2025-05-29 DIAGNOSIS — I50.9 CONGESTIVE HEART FAILURE, UNSPECIFIED HF CHRONICITY, UNSPECIFIED HEART FAILURE TYPE (HCC): Primary | ICD-10-CM

## 2025-05-29 DIAGNOSIS — Z72.0 TOBACCO ABUSE: ICD-10-CM

## 2025-05-29 DIAGNOSIS — I10 ESSENTIAL HYPERTENSION WITH GOAL BLOOD PRESSURE LESS THAN 140/90: ICD-10-CM

## 2025-05-29 DIAGNOSIS — R60.0 LOCALIZED EDEMA: ICD-10-CM

## 2025-05-29 PROCEDURE — 99406 BEHAV CHNG SMOKING 3-10 MIN: CPT | Performed by: INTERNAL MEDICINE

## 2025-05-29 PROCEDURE — 3075F SYST BP GE 130 - 139MM HG: CPT | Performed by: INTERNAL MEDICINE

## 2025-05-29 PROCEDURE — 99204 OFFICE O/P NEW MOD 45 MIN: CPT | Performed by: INTERNAL MEDICINE

## 2025-05-29 PROCEDURE — 3079F DIAST BP 80-89 MM HG: CPT | Performed by: INTERNAL MEDICINE

## 2025-05-29 RX ORDER — DAPAGLIFLOZIN 10 MG/1
10 TABLET, FILM COATED ORAL DAILY
COMMUNITY

## 2025-05-29 RX ORDER — METOLAZONE 2.5 MG/1
2.5 TABLET ORAL DAILY
COMMUNITY
Start: 2025-03-11 | End: 2026-03-11

## 2025-05-29 ASSESSMENT — PATIENT HEALTH QUESTIONNAIRE - PHQ9
1. LITTLE INTEREST OR PLEASURE IN DOING THINGS: NOT AT ALL
SUM OF ALL RESPONSES TO PHQ QUESTIONS 1-9: 0
2. FEELING DOWN, DEPRESSED OR HOPELESS: NOT AT ALL
SUM OF ALL RESPONSES TO PHQ QUESTIONS 1-9: 0

## 2025-05-29 NOTE — PROGRESS NOTES
Identified pt with two pt identifiers(name and ). Reviewed record in preparation for visit and have obtained necessary documentation.    Eileen Dudley presents today for   Chief Complaint   Patient presents with    New Patient      ed visit. fluid around heart       Pt c/o SOB, CHEST PAIN/ PRESSURE, FATIGUE/WEAKNESS, SWELLING.             Eileen Dudley preferred language for health care discussion is english/other.    Personal Protective Equipment:   Personal Protective Equipment was used including: mask-surgical and hands-gloves. Patient was placed on no precaution(s). Patient was not masked.    Precautions:   Patient currently on None  Patient currently roomed with door closed.    Is someone accompanying this pt? daughter    Is the patient using any DME equipment during OV? no    Depression Screenin/29/2025    11:02 AM   PHQ-9 Questionaire   Little interest or pleasure in doing things 0   Feeling down, depressed, or hopeless 0   PHQ-9 Total Score 0        Learning Assessment:  Who is the primary learner? Patient    What is the preferred language for health care of the primary learner? ENGLISH    How does the primary learner prefer to learn new concepts? DEMONSTRATION    Answered By self    Relationship to Learner SELF        Abuse Screenin/29/2025    11:00 AM   AMB Abuse Screening   Do you ever feel afraid of your partner? N   Are you in a relationship with someone who physically or mentally threatens you? N   Is it safe for you to go home? Y          Fall Risk      2025    11:03 AM   Fall Risk   Do you feel unsteady or are you worried about falling?  no   2 or more falls in past year? no   Fall with injury in past year? no         Pt currently taking Anticoagulant /Antiplatelet therapy? no    Coordination of Care:  1. Have you been to the ER, urgent care clinic since your last visit? Hospitalized since your last visit? no    2. Have you seen or consulted any other health care

## 2025-05-29 NOTE — PROGRESS NOTES
Eileen Dudley is 39 y.o. female     Patient is here today for cardiac evaluation  Denies prior history of MI or CHF  Patient is here today for evaluation of dyspnea and edema  [facility] with edema and shortness of breath.  She was told that she had a fluid in her lungs.  She has been started on Bumex  Denies any chest pain or chest tightness.  Main concern is fatigue tiredness short of breath and swelling of lower extremity.  Using 2-3 pillows at night.  Denies any nausea, vomiting, abdominal pain, fever, chills, sputum production. No hematuria or other bleeding complaints    Past Medical History:   Diagnosis Date    Arthritis     Diabetes (HCC)     Hyperchloremia     Hypertension     Seizures (HCC)     8/2020 last seizure    Thyroid cancer (HCC)        Review of Systems:  Cardiac symptoms as noted above in HPI. All others negative.  Denies fatigue, malaise, skin rash, joint pain, blurring vision, photophobia, neck pain, hemoptysis, chronic cough, nausea, vomiting, hematuria, burning micturition, BRBPR, chronic headaches.    Current Outpatient Medications   Medication Sig    dapagliflozin (FARXIGA) 10 MG tablet Take 1 tablet by mouth daily    metOLazone (ZAROXOLYN) 2.5 MG tablet Take 1 tablet by mouth daily    potassium chloride (KLOR-CON M) 20 MEQ extended release tablet Take 1 tablet by mouth 2 times daily    bumetanide (BUMEX) 1 MG tablet Take 1 tablet by mouth daily    atorvastatin (LIPITOR) 40 MG tablet     NOVOLOG FLEXPEN 100 UNIT/ML injection pen     insulin glargine (LANTUS;BASAGLAR) 100 UNIT/ML injection pen Inject 60 Units into the skin    amLODIPine (NORVASC) 10 MG tablet Take 1 tablet by mouth daily    docusate sodium (COLACE) 100 MG capsule Take 1 capsule by mouth 2 times daily    TRULICITY 1.5 MG/0.5ML SC injection Inject 0.5 mLs into the skin every 7 days    ergocalciferol (ERGOCALCIFEROL) 1.25 MG (53751 UT)

## (undated) DEVICE — FLUFF AND POLYMER UNDERPAD,EXTRA HEAVY: Brand: WINGS

## (undated) DEVICE — BITE BLK ENDOSCP AD 54FR GRN POLYETH ENDOSCP W STRP SLD

## (undated) DEVICE — Device

## (undated) DEVICE — CATH IV SAFE STR 22GX1IN BLU -- PROTECTIV PLUS

## (undated) DEVICE — FORCEPS BX L240CM JAW DIA2.8MM L CAP W/ NDL MIC MESH TOOTH